# Patient Record
Sex: MALE | Race: WHITE | NOT HISPANIC OR LATINO | Employment: FULL TIME | URBAN - METROPOLITAN AREA
[De-identification: names, ages, dates, MRNs, and addresses within clinical notes are randomized per-mention and may not be internally consistent; named-entity substitution may affect disease eponyms.]

---

## 2017-05-13 ENCOUNTER — APPOINTMENT (OUTPATIENT)
Dept: LAB | Facility: MEDICAL CENTER | Age: 52
End: 2017-05-13
Payer: COMMERCIAL

## 2017-05-13 DIAGNOSIS — E03.9 HYPOTHYROIDISM: ICD-10-CM

## 2017-05-13 LAB — TSH SERPL DL<=0.05 MIU/L-ACNC: 1.55 UIU/ML (ref 0.36–3.74)

## 2017-05-13 PROCEDURE — 36415 COLL VENOUS BLD VENIPUNCTURE: CPT

## 2017-05-13 PROCEDURE — 84443 ASSAY THYROID STIM HORMONE: CPT

## 2017-05-30 ENCOUNTER — ALLSCRIPTS OFFICE VISIT (OUTPATIENT)
Dept: OTHER | Facility: OTHER | Age: 52
End: 2017-05-30

## 2017-06-06 ENCOUNTER — ALLSCRIPTS OFFICE VISIT (OUTPATIENT)
Dept: OTHER | Facility: OTHER | Age: 52
End: 2017-06-06

## 2017-06-06 DIAGNOSIS — D72.819 DECREASED WHITE BLOOD CELL COUNT: ICD-10-CM

## 2017-06-06 DIAGNOSIS — I10 ESSENTIAL (PRIMARY) HYPERTENSION: ICD-10-CM

## 2017-06-06 DIAGNOSIS — E03.9 HYPOTHYROIDISM: ICD-10-CM

## 2017-06-06 DIAGNOSIS — C01 MALIGNANT NEOPLASM OF BASE OF TONGUE (HCC): ICD-10-CM

## 2017-06-26 ENCOUNTER — ANESTHESIA EVENT (OUTPATIENT)
Dept: GASTROENTEROLOGY | Facility: HOSPITAL | Age: 52
End: 2017-06-26
Payer: COMMERCIAL

## 2017-06-26 RX ORDER — TADALAFIL 10 MG/1
5 TABLET ORAL DAILY PRN
COMMUNITY
End: 2018-03-23 | Stop reason: ALTCHOICE

## 2017-06-26 RX ORDER — DIPHENOXYLATE HYDROCHLORIDE AND ATROPINE SULFATE 2.5; .025 MG/1; MG/1
1 TABLET ORAL DAILY
COMMUNITY

## 2017-06-26 RX ORDER — LEVOTHYROXINE SODIUM 0.05 MG/1
88 TABLET ORAL DAILY
COMMUNITY
End: 2018-06-13 | Stop reason: SDUPTHER

## 2017-06-27 ENCOUNTER — ANESTHESIA (OUTPATIENT)
Dept: GASTROENTEROLOGY | Facility: HOSPITAL | Age: 52
End: 2017-06-27
Payer: COMMERCIAL

## 2017-06-27 ENCOUNTER — GENERIC CONVERSION - ENCOUNTER (OUTPATIENT)
Dept: PERIOP | Facility: HOSPITAL | Age: 52
End: 2017-06-27

## 2017-06-27 ENCOUNTER — HOSPITAL ENCOUNTER (OUTPATIENT)
Facility: HOSPITAL | Age: 52
Setting detail: OUTPATIENT SURGERY
Discharge: HOME | End: 2017-06-27
Attending: SURGERY | Admitting: SURGERY
Payer: COMMERCIAL

## 2017-06-27 VITALS
DIASTOLIC BLOOD PRESSURE: 72 MMHG | HEIGHT: 68 IN | SYSTOLIC BLOOD PRESSURE: 115 MMHG | RESPIRATION RATE: 20 BRPM | OXYGEN SATURATION: 100 % | WEIGHT: 167 LBS | HEART RATE: 47 BPM | TEMPERATURE: 98 F | BODY MASS INDEX: 25.31 KG/M2

## 2017-06-27 DIAGNOSIS — Z12.11 ENCOUNTER FOR SCREENING FOR MALIGNANT NEOPLASM OF COLON: ICD-10-CM

## 2017-06-27 PROCEDURE — 88305 TISSUE EXAM BY PATHOLOGIST: CPT | Performed by: SURGERY

## 2017-06-27 RX ORDER — SODIUM CHLORIDE 9 MG/ML
125 INJECTION, SOLUTION INTRAVENOUS CONTINUOUS
Status: DISCONTINUED | OUTPATIENT
Start: 2017-06-27 | End: 2017-06-27 | Stop reason: HOSPADM

## 2017-06-27 RX ORDER — PROPOFOL 10 MG/ML
INJECTION, EMULSION INTRAVENOUS AS NEEDED
Status: DISCONTINUED | OUTPATIENT
Start: 2017-06-27 | End: 2017-06-27 | Stop reason: SURG

## 2017-06-27 RX ORDER — ONDANSETRON 2 MG/ML
4 INJECTION INTRAMUSCULAR; INTRAVENOUS ONCE AS NEEDED
Status: DISCONTINUED | OUTPATIENT
Start: 2017-06-27 | End: 2017-06-27 | Stop reason: HOSPADM

## 2017-06-27 RX ADMIN — PROPOFOL 50 MG: 10 INJECTION, EMULSION INTRAVENOUS at 09:48

## 2017-06-27 RX ADMIN — PROPOFOL 50 MG: 10 INJECTION, EMULSION INTRAVENOUS at 09:46

## 2017-06-27 RX ADMIN — PROPOFOL 70 MG: 10 INJECTION, EMULSION INTRAVENOUS at 09:42

## 2017-06-27 RX ADMIN — PROPOFOL 30 MG: 10 INJECTION, EMULSION INTRAVENOUS at 09:44

## 2017-06-27 RX ADMIN — PROPOFOL 30 MG: 10 INJECTION, EMULSION INTRAVENOUS at 09:51

## 2017-06-27 RX ADMIN — PROPOFOL 20 MG: 10 INJECTION, EMULSION INTRAVENOUS at 09:53

## 2017-06-27 RX ADMIN — SODIUM CHLORIDE 125 ML/HR: 0.9 INJECTION, SOLUTION INTRAVENOUS at 09:35

## 2017-07-03 ENCOUNTER — GENERIC CONVERSION - ENCOUNTER (OUTPATIENT)
Dept: OTHER | Facility: OTHER | Age: 52
End: 2017-07-03

## 2017-07-05 ENCOUNTER — GENERIC CONVERSION - ENCOUNTER (OUTPATIENT)
Dept: OTHER | Facility: OTHER | Age: 52
End: 2017-07-05

## 2017-08-11 ENCOUNTER — TRANSCRIBE ORDERS (OUTPATIENT)
Dept: ADMINISTRATIVE | Age: 52
End: 2017-08-11

## 2017-08-11 ENCOUNTER — APPOINTMENT (OUTPATIENT)
Dept: LAB | Age: 52
End: 2017-08-11
Payer: COMMERCIAL

## 2017-08-11 DIAGNOSIS — Z00.8 HEALTH EXAMINATION IN POPULATION SURVEY: ICD-10-CM

## 2017-08-11 DIAGNOSIS — Z00.8 HEALTH EXAMINATION IN POPULATION SURVEY: Primary | ICD-10-CM

## 2017-08-11 LAB
CHOLEST SERPL-MCNC: 229 MG/DL (ref 50–200)
EST. AVERAGE GLUCOSE BLD GHB EST-MCNC: 108 MG/DL
HBA1C MFR BLD: 5.4 % (ref 4.2–6.3)
HDLC SERPL-MCNC: 85 MG/DL (ref 40–60)
LDLC SERPL CALC-MCNC: 132 MG/DL (ref 0–100)
TRIGL SERPL-MCNC: 60 MG/DL

## 2017-08-11 PROCEDURE — 83036 HEMOGLOBIN GLYCOSYLATED A1C: CPT

## 2017-08-11 PROCEDURE — 36415 COLL VENOUS BLD VENIPUNCTURE: CPT

## 2017-08-11 PROCEDURE — 80061 LIPID PANEL: CPT

## 2017-09-25 ENCOUNTER — ALLSCRIPTS OFFICE VISIT (OUTPATIENT)
Dept: OTHER | Facility: OTHER | Age: 52
End: 2017-09-25

## 2017-09-25 DIAGNOSIS — M25.50 PAIN IN JOINT: ICD-10-CM

## 2017-09-25 DIAGNOSIS — R53.83 OTHER FATIGUE: ICD-10-CM

## 2017-09-26 ENCOUNTER — APPOINTMENT (OUTPATIENT)
Dept: LAB | Facility: MEDICAL CENTER | Age: 52
End: 2017-09-26
Payer: COMMERCIAL

## 2017-09-26 DIAGNOSIS — R53.83 OTHER FATIGUE: ICD-10-CM

## 2017-09-26 DIAGNOSIS — M25.50 PAIN IN JOINT: ICD-10-CM

## 2017-09-26 LAB
ALBUMIN SERPL BCP-MCNC: 3.3 G/DL (ref 3.5–5)
ALP SERPL-CCNC: 55 U/L (ref 46–116)
ALT SERPL W P-5'-P-CCNC: 34 U/L (ref 12–78)
ANION GAP SERPL CALCULATED.3IONS-SCNC: 7 MMOL/L (ref 4–13)
AST SERPL W P-5'-P-CCNC: 17 U/L (ref 5–45)
BASOPHILS # BLD AUTO: 0.01 THOUSANDS/ΜL (ref 0–0.1)
BASOPHILS NFR BLD AUTO: 0 % (ref 0–1)
BILIRUB SERPL-MCNC: 0.92 MG/DL (ref 0.2–1)
BUN SERPL-MCNC: 10 MG/DL (ref 5–25)
CALCIUM SERPL-MCNC: 8.8 MG/DL (ref 8.3–10.1)
CHLORIDE SERPL-SCNC: 103 MMOL/L (ref 100–108)
CO2 SERPL-SCNC: 30 MMOL/L (ref 21–32)
CREAT SERPL-MCNC: 0.73 MG/DL (ref 0.6–1.3)
EOSINOPHIL # BLD AUTO: 0.12 THOUSAND/ΜL (ref 0–0.61)
EOSINOPHIL NFR BLD AUTO: 2 % (ref 0–6)
ERYTHROCYTE [DISTWIDTH] IN BLOOD BY AUTOMATED COUNT: 13 % (ref 11.6–15.1)
ERYTHROCYTE [SEDIMENTATION RATE] IN BLOOD: 12 MM/HOUR (ref 0–10)
GFR SERPL CREATININE-BSD FRML MDRD: 105 ML/MIN/1.73SQ M
GLUCOSE SERPL-MCNC: 119 MG/DL (ref 65–140)
HCT VFR BLD AUTO: 42.2 % (ref 36.5–49.3)
HGB BLD-MCNC: 14.6 G/DL (ref 12–17)
LYMPHOCYTES # BLD AUTO: 0.74 THOUSANDS/ΜL (ref 0.6–4.47)
LYMPHOCYTES NFR BLD AUTO: 13 % (ref 14–44)
MCH RBC QN AUTO: 32.7 PG (ref 26.8–34.3)
MCHC RBC AUTO-ENTMCNC: 34.6 G/DL (ref 31.4–37.4)
MCV RBC AUTO: 94 FL (ref 82–98)
MONOCYTES # BLD AUTO: 0.33 THOUSAND/ΜL (ref 0.17–1.22)
MONOCYTES NFR BLD AUTO: 6 % (ref 4–12)
NEUTROPHILS # BLD AUTO: 4.53 THOUSANDS/ΜL (ref 1.85–7.62)
NEUTS SEG NFR BLD AUTO: 79 % (ref 43–75)
NRBC BLD AUTO-RTO: 0 /100 WBCS
PLATELET # BLD AUTO: 166 THOUSANDS/UL (ref 149–390)
PMV BLD AUTO: 10.8 FL (ref 8.9–12.7)
POTASSIUM SERPL-SCNC: 3.6 MMOL/L (ref 3.5–5.3)
PROT SERPL-MCNC: 6.9 G/DL (ref 6.4–8.2)
RBC # BLD AUTO: 4.47 MILLION/UL (ref 3.88–5.62)
SODIUM SERPL-SCNC: 140 MMOL/L (ref 136–145)
TSH SERPL DL<=0.05 MIU/L-ACNC: 2.38 UIU/ML (ref 0.36–3.74)
WBC # BLD AUTO: 5.74 THOUSAND/UL (ref 4.31–10.16)

## 2017-09-26 PROCEDURE — 36415 COLL VENOUS BLD VENIPUNCTURE: CPT

## 2017-09-26 PROCEDURE — 86618 LYME DISEASE ANTIBODY: CPT

## 2017-09-26 PROCEDURE — 85025 COMPLETE CBC W/AUTO DIFF WBC: CPT

## 2017-09-26 PROCEDURE — 86430 RHEUMATOID FACTOR TEST QUAL: CPT

## 2017-09-26 PROCEDURE — 80053 COMPREHEN METABOLIC PANEL: CPT

## 2017-09-26 PROCEDURE — 84443 ASSAY THYROID STIM HORMONE: CPT

## 2017-09-26 PROCEDURE — 86038 ANTINUCLEAR ANTIBODIES: CPT

## 2017-09-26 PROCEDURE — 85652 RBC SED RATE AUTOMATED: CPT

## 2017-09-27 LAB
B BURGDOR IGG SER IA-ACNC: 0.2
B BURGDOR IGM SER IA-ACNC: 0.2
RHEUMATOID FACT SER QL LA: NEGATIVE
RYE IGE QN: NEGATIVE

## 2017-10-27 NOTE — PROGRESS NOTES
Assessment  1  Fatigue (780 79) (R53 83)   2  Arthralgia, unspecified joint (719 40) (M25 50)    Plan  Arthralgia, unspecified joint, Fatigue    · (1) FLY SCREEN W/REFLEX TO TITER/PATTERN; Status:Active; Requested  for:42Jdc8052;    · (1) CBC/PLT/DIFF; Status:Active; Requested for:31Yoh0571;    · (1) COMPREHENSIVE METABOLIC PANEL; Status:Active; Requested for:78Hno7110;    · (1) LYME ANTIBODY PROFILE W/REFLEX TO WESTERN BLOT; Status:Active; Requested for:52Mth9554;    · (1) RHEUMATOID FACTOR SCREEN; Status:Active; Requested for:10Yhr1499;    · (1) SED RATE; Status:Active; Requested for:94Pve2139;    · (1) TSH; Status:Active; Requested for:70Cmy5465;     Discussion/Summary    #1 fatiguearthralgiasI reviewed with pt  Unclear causes - ? viral, inflammatory, other?check labs as above  Cont conservative measures as directed  Recheck 2 weeks if no change - earlier if worse  Pt to call for problems or concerns in the interim  The patient was counseled regarding instructions for management,-- risk factor reductions,-- prognosis,-- patient and family education,-- impressions,-- risks and benefits of treatment options,-- importance of compliance with treatment  Possible side effects of new medications were reviewed with the patient/guardian today  The treatment plan was reviewed with the patient/guardian  The patient/guardian understands and agrees with the treatment plan      Chief Complaint  fatigue, achy      History of Present Illness  HPI: as abovept awoke with whole body aches this morning  Pt has been experiencing fatigue for the last 2 weeks  ?sl elevated temp this morning (99 8)  No rashes, URI symptoms or cough  No GI or  complaints  No known insect bites  Review of Systems    Constitutional: as noted in HPI    ENT: no complaints of earache, no loss of hearing, no nosebleeds or nasal discharge, no sore throat or hoarseness     Cardiovascular: no complaints of slow or fast heart rate, no chest pain, no palpitations, no leg claudication or lower extremity edema  Respiratory: no complaints of shortness of breath, no wheezing or cough, no dyspnea on exertion, no orthopnea or PND  Gastrointestinal: no complaints of abdominal pain, no constipation, no nausea or vomiting, no diarrhea or bloody stools  Genitourinary: no complaints of dysuria or incontinence, no hesitancy, no nocturia, no genital lesion, no inadequacy of penile erection  Musculoskeletal: as noted in HPI  Integumentary: no complaints of skin rash or lesion, no itching or dry skin, no skin wounds  Neurological: as noted in HPI  Active Problems  1  Abnormal loss of weight (783 21) (R63 4)   2  Acute frontal sinusitis (461 1) (J01 10)   3  Acute laryngitis (464 00) (J04 0)   4  Acute pharyngitis (462) (J02 9)   5  Acute upper respiratory infection (465 9) (J06 9)   6  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)   7  Chronic laryngitis (476 0) (J37 0)   8  Colon cancer screening (V76 51) (Z12 11)   9  Cough (786 2) (R05)   10  Dysphagia (787 20) (R13 10)   11  Erectile dysfunction (607 84) (N52 9)   12  Fever (780 60) (R50 9)   13  Hypertension (401 9) (I10)   14  Hypothyroidism (244 9) (E03 9)   15  Immunization due (V05 9) (Z23)   16  Influenza (487 1) (J11 1)   17  Leukopenia (288 50) (D72 819)   18  Malignant neoplasm of tongue (141 9) (C02 9)   19  Psoriasis (696 1) (L40 9)   20  Viral syndrome (079 99) (B34 9)    Past Medical History  1  History of Malignant Tongue Neoplasm (V10 01)  Active Problems And Past Medical History Reviewed: The active problems and past medical history were reviewed and updated today  Family History  Mother    1  Family history of Reactive Airway Dysfunction  Father    2  Family history of Coronary Artery Disease (V17 49)   3  Family history of Hypertension (V17 49)   4   Family history of Psoriasis    Social History   · Alcohol Use (History)   · History of Current Smoker (305 1)   · pt quit with dx of tongue base cancer   · Daily Coffee Consumption (1  Cups/Day)   · Daily Cola Consumption (___ Cans/Day)   · Daily Tea Consumption (___ Cups/Day)   · Dental care, regularly   · Employed   · Exercise: Running   · Exercise: Weight training   · Former smoker (V15 82) (Z02 702)   · High school graduate   ·    · No guns in the home   · Pets / animals   · Six children  The social history was reviewed and updated today  Surgical History  1  History of Floor Mouth Excision Of Malignant Tumor  Surgical History Reviewed: The surgical history was reviewed and updated today  Current Meds   1  Cialis 5 MG Oral Tablet; TAKE AS DIRECTED; Therapy: 45QBB2839 to (Last Rx:30Nov2016) Ordered   2  Daily Value Multivitamin TABS; Take 1 tablet daily Recorded   3  Levothyroxine Sodium 88 MCG Oral Tablet; TAKE 1 TABLET DAILY  Requested for:   98Xol0222; Last Rx:05Ktv2584 Ordered    The medication list was reviewed and updated today  Allergies  1  No Known Drug Allergies  2  No Known Environmental Allergies   3  No Known Food Allergies    Vitals   Recorded: 54ZOB8309 02:36PM   Temperature 98 1 F   Heart Rate 62   Systolic 150   Diastolic 80   Height 5 ft 8 in   Weight 173 lb    BMI Calculated 26 3   BSA Calculated 1 92     Physical Exam    Constitutional   General appearance: No acute distress, well appearing and well nourished  Head and Face   Head and face: Normal     Palpation of the face and sinuses: No sinus tenderness  Eyes   Conjunctiva and lids: No erythema, swelling or discharge  Pupils and irises: Equal, round, reactive to light  Ears, Nose, Mouth, and Throat   External inspection of ears and nose: Normal     Otoscopic examination: Tympanic membranes translucent with normal light reflex  Canals patent without erythema  Nasal mucosa, septum, and turbinates: Normal without edema or erythema  Lips, teeth, and gums: Normal, good dentition      Oropharynx: Abnormal  -- mucous membranes sl dry (chronic)  Neck   Neck: Abnormal  -- neck with post RT treatment skin changes and mild atrophy  Thyroid: Normal, no thyromegaly  Pulmonary   Respiratory effort: No increased work of breathing or signs of respiratory distress  Auscultation of lungs: Clear to auscultation  Cardiovascular   Auscultation of heart: Normal rate and rhythm, normal S1 and S2, no murmurs  Carotid pulses: 2+ bilaterally  Abdominal aorta: Normal     Pedal pulses: 2+ bilaterally  Peripheral vascular exam: Normal     Examination of extremities for edema and/or varicosities: Normal     Abdomen   Abdomen: Non-tender, no masses  Liver and spleen: No hepatomegaly or splenomegaly  Lymphatic   Palpation of lymph nodes in neck: No lymphadenopathy  Palpation of lymph nodes in axillae: No lymphadenopathy  Palpation of lymph nodes in groin: No lymphadenopathy  Palpation of lymph nodes in other areas: No lymphadenopathy  Musculoskeletal   Gait and station: Normal     Inspection/palpation of digits and nails: Normal without clubbing or cyanosis  Inspection/palpation of joints, bones, and muscles: Normal     Muscle strength/tone: Normal     Skin   Skin and subcutaneous tissue: Abnormal  -- as above  Neurologic   Cranial nerves: Cranial nerves 2-12 intact  Cortical function: Normal mental status  Results/Data  (1) HEMOGLOBIN A1C 11Aug2017 09:56AM Myriam Man     Test Name Result Flag Reference   HEMOGLOBIN A1C 5 4 %  4 2-6 3   EST  AVG  GLUCOSE 108 mg/dl         Future Appointments    Date/Time Provider Specialty Site   12/12/2017 03:30 PM ESTELLE Molina   [address]     Signatures   Electronically signed by : ESTELLE Valdez ; Sep 26 2017  7:29AM EST                       (Author)

## 2017-12-04 ENCOUNTER — HOSPITAL ENCOUNTER (EMERGENCY)
Facility: HOSPITAL | Age: 52
Discharge: HOME | End: 2017-12-04
Attending: EMERGENCY MEDICINE
Payer: COMMERCIAL

## 2017-12-04 ENCOUNTER — APPOINTMENT (EMERGENCY)
Dept: RADIOLOGY | Facility: HOSPITAL | Age: 52
End: 2017-12-04
Payer: COMMERCIAL

## 2017-12-04 VITALS
RESPIRATION RATE: 16 BRPM | DIASTOLIC BLOOD PRESSURE: 108 MMHG | SYSTOLIC BLOOD PRESSURE: 183 MMHG | TEMPERATURE: 101.2 F | OXYGEN SATURATION: 100 % | HEART RATE: 77 BPM

## 2017-12-04 DIAGNOSIS — J06.9 URI WITH COUGH AND CONGESTION: Primary | ICD-10-CM

## 2017-12-04 DIAGNOSIS — D72.819 LEUKOPENIA, UNSPECIFIED TYPE: ICD-10-CM

## 2017-12-04 LAB
ALBUMIN SERPL BCP-MCNC: 3.6 G/DL (ref 3.5–5)
ALP SERPL-CCNC: 60 U/L (ref 46–116)
ALT SERPL W P-5'-P-CCNC: 40 U/L (ref 12–78)
ANION GAP SERPL CALCULATED.3IONS-SCNC: 9 MMOL/L (ref 4–13)
APTT PPP: 28 SECONDS (ref 23–35)
AST SERPL W P-5'-P-CCNC: 23 U/L (ref 5–45)
BASOPHILS # BLD AUTO: 0 THOUSANDS/ΜL (ref 0–0.1)
BASOPHILS NFR BLD AUTO: 0 % (ref 0–1)
BILIRUB DIRECT SERPL-MCNC: 0.15 MG/DL (ref 0–0.2)
BILIRUB SERPL-MCNC: 0.7 MG/DL (ref 0.2–1)
BUN SERPL-MCNC: 12 MG/DL (ref 5–25)
CALCIUM SERPL-MCNC: 8.7 MG/DL (ref 8.3–10.1)
CHLORIDE SERPL-SCNC: 99 MMOL/L (ref 100–108)
CO2 SERPL-SCNC: 29 MMOL/L (ref 21–32)
CREAT SERPL-MCNC: 0.81 MG/DL (ref 0.6–1.3)
EOSINOPHIL # BLD AUTO: 0.05 THOUSAND/ΜL (ref 0–0.61)
EOSINOPHIL NFR BLD AUTO: 2 % (ref 0–6)
ERYTHROCYTE [DISTWIDTH] IN BLOOD BY AUTOMATED COUNT: 12.1 % (ref 11.6–15.1)
GFR SERPL CREATININE-BSD FRML MDRD: 101 ML/MIN/1.73SQ M
GLUCOSE SERPL-MCNC: 101 MG/DL (ref 65–140)
HCT VFR BLD AUTO: 43.7 % (ref 36.5–49.3)
HGB BLD-MCNC: 14.6 G/DL (ref 12–17)
INR PPP: 0.93 (ref 0.86–1.16)
LACTATE SERPL-SCNC: 0.7 MMOL/L (ref 0.5–2)
LYMPHOCYTES # BLD AUTO: 0.3 THOUSANDS/ΜL (ref 0.6–4.47)
LYMPHOCYTES NFR BLD AUTO: 13 % (ref 14–44)
MCH RBC QN AUTO: 31.1 PG (ref 26.8–34.3)
MCHC RBC AUTO-ENTMCNC: 33.4 G/DL (ref 31.4–37.4)
MCV RBC AUTO: 93 FL (ref 82–98)
MONOCYTES # BLD AUTO: 0.47 THOUSAND/ΜL (ref 0.17–1.22)
MONOCYTES NFR BLD AUTO: 20 % (ref 4–12)
NEUTROPHILS # BLD AUTO: 1.52 THOUSANDS/ΜL (ref 1.85–7.62)
NEUTS SEG NFR BLD AUTO: 65 % (ref 43–75)
PLATELET # BLD AUTO: 147 THOUSANDS/UL (ref 149–390)
PMV BLD AUTO: 9.8 FL (ref 8.9–12.7)
POTASSIUM SERPL-SCNC: 3.7 MMOL/L (ref 3.5–5.3)
PROT SERPL-MCNC: 6.9 G/DL (ref 6.4–8.2)
PROTHROMBIN TIME: 12.7 SECONDS (ref 12.1–14.4)
RBC # BLD AUTO: 4.7 MILLION/UL (ref 3.88–5.62)
SODIUM SERPL-SCNC: 137 MMOL/L (ref 136–145)
TROPONIN I SERPL-MCNC: <0.02 NG/ML
WBC # BLD AUTO: 2.34 THOUSAND/UL (ref 4.31–10.16)

## 2017-12-04 PROCEDURE — 36415 COLL VENOUS BLD VENIPUNCTURE: CPT | Performed by: EMERGENCY MEDICINE

## 2017-12-04 PROCEDURE — 80076 HEPATIC FUNCTION PANEL: CPT | Performed by: EMERGENCY MEDICINE

## 2017-12-04 PROCEDURE — 85025 COMPLETE CBC W/AUTO DIFF WBC: CPT | Performed by: EMERGENCY MEDICINE

## 2017-12-04 PROCEDURE — 99285 EMERGENCY DEPT VISIT HI MDM: CPT

## 2017-12-04 PROCEDURE — 80048 BASIC METABOLIC PNL TOTAL CA: CPT | Performed by: EMERGENCY MEDICINE

## 2017-12-04 PROCEDURE — 85730 THROMBOPLASTIN TIME PARTIAL: CPT | Performed by: EMERGENCY MEDICINE

## 2017-12-04 PROCEDURE — 83605 ASSAY OF LACTIC ACID: CPT | Performed by: EMERGENCY MEDICINE

## 2017-12-04 PROCEDURE — 96360 HYDRATION IV INFUSION INIT: CPT

## 2017-12-04 PROCEDURE — 85610 PROTHROMBIN TIME: CPT | Performed by: EMERGENCY MEDICINE

## 2017-12-04 PROCEDURE — 93005 ELECTROCARDIOGRAM TRACING: CPT

## 2017-12-04 PROCEDURE — 84484 ASSAY OF TROPONIN QUANT: CPT | Performed by: EMERGENCY MEDICINE

## 2017-12-04 PROCEDURE — 71020 HB CHEST X-RAY 2VW FRONTAL&LATL: CPT

## 2017-12-04 RX ORDER — DOXYCYCLINE HYCLATE 100 MG/1
100 CAPSULE ORAL ONCE
Status: COMPLETED | OUTPATIENT
Start: 2017-12-04 | End: 2017-12-04

## 2017-12-04 RX ORDER — DOXYCYCLINE HYCLATE 100 MG/1
100 CAPSULE ORAL 2 TIMES DAILY
Qty: 20 CAPSULE | Refills: 0 | Status: SHIPPED | OUTPATIENT
Start: 2017-12-04 | End: 2017-12-14

## 2017-12-04 RX ADMIN — DOXYCYCLINE 100 MG: 100 CAPSULE ORAL at 14:26

## 2017-12-04 RX ADMIN — SODIUM CHLORIDE 1000 ML: 0.9 INJECTION, SOLUTION INTRAVENOUS at 13:42

## 2017-12-04 NOTE — DISCHARGE INSTRUCTIONS
Upper Respiratory Infection, Ambulatory Care   [facility], [place], and Jhony Morrison[de-identified] Complete Guide to Symptoms, Illness, and Surgery, 4th ed  Jay Jay Chand, [place], [place], , 2000  Renetta Pantoja[de-identified] The common cold  Jihan Hernandez , 2003; 943(2614): 51-59  [place] for Clinical Systems Improvement (ICSI):: Viral upper respiratory infection (VURI) in adults and children    December 2002  [facility]): [place] for Clinical Systems Improvement (ICSI)  Available at: [employer] com cy  aspx? view_id=1&doc_id=3658 , (cited 11/17/03)  Medline Plus Health Information[de-identified] Colds    August 7, 2002  Available at: Kt pandya , (cited 11/17/03)  Cindy Andrade : The [place] Manual of Nursing Practice, 7th ed  ELOY Liu , 2001  Wilder White & Katharine G: Upper Respiratory Infection  In: Abdelrahman GOULD, ed  The 5-Minute Clinical Consult 2012, 20th ed  [address] [facility] [place], [place], 2012  © 2014 [address] is for End User's use only and may not be sold, redistributed or otherwise used for commercial purposes  All illustrations and images included in CareNotes® are the copyrighted property of A D A M , Inc  or [address]  The above information is an  only  It is not intended as medical advice for individual conditions or treatments  Talk to your doctor, nurse or pharmacist before following any medical regimen to see if it is safe and effective for you

## 2017-12-04 NOTE — ED PROVIDER NOTES
History  Chief Complaint   Patient presents with    Chest Pain     Pt here for cough and chest tightness  Also now with fevers  URI started 3 days ago  Worsening since  History provided by:  Patient  Chest Pain   Pain location:  Substernal area  Pain quality: dull    Pain radiates to:  Does not radiate  Pain severity:  Moderate  Onset quality:  Gradual  Duration:  1 day  Timing:  Intermittent  Progression:  Waxing and waning  Chronicity:  New  Context comment:  Cough, URI symptoms, found to be febrile here, history of "bad immune system" due to large amount of chemo and radiation for a tongue cancer  High risk for pneumonia  Relieved by:  None tried  Worsened by:  Nothing tried  Ineffective treatments:  None tried  Associated symptoms: cough and fever    Associated symptoms: no abdominal pain, no diaphoresis, no dizziness, no headache, no nausea, no numbness, no palpitations, no shortness of breath and not vomiting    Risk factors comment:  Multiple family members with similar URI symptoms      Prior to Admission Medications   Prescriptions Last Dose Informant Patient Reported?  Taking?   levothyroxine 50 mcg tablet   Yes No   Sig: Take 88 mcg by mouth daily   multivitamin (THERAGRAN) TABS   Yes No   Sig: Take 1 tablet by mouth daily   tadalafil (CIALIS) 10 MG tablet   Yes No   Sig: Take 5 mg by mouth daily as needed for erectile dysfunction      Facility-Administered Medications: None       Past Medical History:   Diagnosis Date    Cancer of base of tongue (HCC)     excision    Cough     Disease of thyroid gland     hypo    Dysphagia     ED (erectile dysfunction)     Hypertension     Leukopenia     Psoriasis     Tongue cancer (HCC)     Weight loss, abnormal        Past Surgical History:   Procedure Laterality Date    OTHER SURGICAL HISTORY      infusion port inserted and removed    PEG TUBE PLACEMENT      and removal    CA COLONOSCOPY FLX DX W/COLLJ SPEC WHEN PFRMD N/A 6/27/2017 Procedure: COLONOSCOPY with polypectomy x2;  Surgeon: Jaclyn Brown MD;  Location: [facility]; Service: General    TONGUE BIOPSY / EXCISION         History reviewed  No pertinent family history  I have reviewed and agree with the history as documented  Social History   Substance Use Topics    Smoking status: Former Smoker    Smokeless tobacco: Not on file    Alcohol use Yes      Comment: socially        Review of Systems   Constitutional: Positive for fever  Negative for activity change, chills and diaphoresis  HENT: Negative for congestion, sinus pressure and sore throat  Eyes: Negative for pain and visual disturbance  Respiratory: Positive for cough  Negative for chest tightness, shortness of breath, wheezing and stridor  Cardiovascular: Positive for chest pain  Negative for palpitations  Gastrointestinal: Negative for abdominal distention, abdominal pain, constipation, diarrhea, nausea and vomiting  Genitourinary: Negative for dysuria and frequency  Musculoskeletal: Negative for neck pain and neck stiffness  Skin: Negative for rash  Neurological: Negative for dizziness, speech difficulty, light-headedness, numbness and headaches  Physical Exam  ED Triage Vitals [12/04/17 1211]   Temperature Pulse Respirations Blood Pressure SpO2   (!) 101 2 °F (38 4 °C) 93 18 170/96 97 %      Temp Source Heart Rate Source Patient Position - Orthostatic VS BP Location FiO2 (%)   Oral Monitor Sitting Left arm --      Pain Score       6           Orthostatic Vital Signs  Vitals:    12/04/17 1211 12/04/17 1342 12/04/17 1358   BP: 170/96 (!) 196/88 (!) 183/108   Pulse: 93 82 77   Patient Position - Orthostatic VS: Sitting Lying Sitting       Physical Exam   Constitutional: He is oriented to person, place, and time  He appears well-developed  No distress  HENT:   Head: Normocephalic and atraumatic  Eyes: Pupils are equal, round, and reactive to light  Neck: Normal range of motion  Neck supple  No tracheal deviation present  Cardiovascular: Normal rate, regular rhythm, normal heart sounds and intact distal pulses  No murmur heard  Pulmonary/Chest: Effort normal and breath sounds normal  No stridor  No respiratory distress  Abdominal: Soft  He exhibits no distension  There is no tenderness  There is no rebound and no guarding  Musculoskeletal: Normal range of motion  Neurological: He is alert and oriented to person, place, and time  Skin: Skin is warm and dry  He is not diaphoretic  No erythema  No pallor  Psychiatric: He has a normal mood and affect  Vitals reviewed  ED Medications  Medications   sodium chloride 0 9 % bolus 1,000 mL (1,000 mL Intravenous New Bag 12/4/17 1342)   doxycycline hyclate (VIBRAMYCIN) capsule 100 mg (not administered)       Diagnostic Studies  Results Reviewed     Procedure Component Value Units Date/Time    Lactic acid, plasma [[id number]]  (Normal) Collected:  12/04/17 1340    Lab Status:  Final result Specimen:  Blood from Arm, Right Updated:  12/04/17 1408     LACTIC ACID 0 7 mmol/L     Narrative:         Result may be elevated if tourniquet was used during collection  Troponin I [[id number]]  (Normal) Collected:  12/04/17 1340    Lab Status:  Final result Specimen:  Blood from Arm, Right Updated:  12/04/17 1407     Troponin I <0 02 ng/mL     Narrative:         S[employer]ens Chemistry analyzer 99% cutoff is > 0 04 ng/mL in network labs    o cTnI 99% cutoff is useful only when applied to patients in the clinical setting of myocardial ischemia  o cTnI 99% cutoff should be interpreted in the context of clinical history, ECG findings and possibly cardiac imaging to establish correct diagnosis  o cTnI 99% cutoff may be suggestive but clearly not indicative of a coronary event without the clinical setting of myocardial ischemia      Basic metabolic panel [[id number]]  (Abnormal) Collected:  12/04/17 1340    Lab Status:  Final result Specimen:  Blood from Arm, Right Updated:  12/04/17 1406     Sodium 137 mmol/L      Potassium 3 7 mmol/L      Chloride 99 (L) mmol/L      CO2 29 mmol/L      Anion Gap 9 mmol/L      BUN 12 mg/dL      Creatinine 0 81 mg/dL      Glucose 101 mg/dL      Calcium 8 7 mg/dL      eGFR 101 ml/min/1 73sq m     Narrative:         National Kidney Disease Education Program recommendations are as follows:  GFR calculation is accurate only with a steady state creatinine  Chronic Kidney disease less than 60 ml/min/1 73 sq  meters  Kidney failure less than 15 ml/min/1 73 sq  meters  Hepatic function panel [[id number]]  (Normal) Collected:  12/04/17 1340    Lab Status:  Final result Specimen:  Blood from Arm, Right Updated:  12/04/17 1406     Total Bilirubin 0 70 mg/dL      Bilirubin, Direct 0 15 mg/dL      Alkaline Phosphatase 60 U/L      AST 23 U/L      ALT 40 U/L      Total Protein 6 9 g/dL      Albumin 3 6 g/dL     Protime-INR [[id number]]  (Normal) Collected:  12/04/17 1340    Lab Status:  Final result Specimen:  Blood from Arm, Right Updated:  12/04/17 1359     Protime 12 7 seconds      INR 0 93    APTT [[id number]]  (Normal) Collected:  12/04/17 1340    Lab Status:  Final result Specimen:  Blood from Arm, Right Updated:  12/04/17 1359     PTT 28 seconds     Narrative:          Therapeutic Heparin Range = 60-90 seconds    CBC and differential [[id number]]  (Abnormal) Collected:  12/04/17 1340    Lab Status:  Final result Specimen:  Blood from Arm, Right Updated:  12/04/17 1350     WBC 2 34 (L) Thousand/uL      RBC 4 70 Million/uL      Hemoglobin 14 6 g/dL      Hematocrit 43 7 %      MCV 93 fL      MCH 31 1 pg      MCHC 33 4 g/dL      RDW 12 1 %      MPV 9 8 fL      Platelets 040 (L) Thousands/uL      Neutrophils Relative 65 %      Lymphocytes Relative 13 (L) %      Monocytes Relative 20 (H) %      Eosinophils Relative 2 %      Basophils Relative 0 %      Neutrophils Absolute 1 52 (L) Thousands/µL      Lymphocytes Absolute 0 30 (L) Thousands/µL      Monocytes Absolute 0 47 Thousand/µL      Eosinophils Absolute 0 05 Thousand/µL      Basophils Absolute 0 00 Thousands/µL                  XR chest 2 views   ED Interpretation by Nisha Tomas DO (12/04 1412)   No acute infiltrate                 Procedures  ECG 12 Lead Documentation  Date/Time: 12/4/2017 1:15 PM  Performed by: Cee Collins by: Erika Lora     ECG reviewed by me, the ED Provider: yes    Patient location:  ED  Previous ECG:     Previous ECG:  Unavailable  Interpretation:     Interpretation: normal    Rate:     ECG rate:  87    ECG rate assessment: normal    Rhythm:     Rhythm: sinus rhythm    Ectopy:     Ectopy: PVCs      PVCs:  Frequent  QRS:     QRS axis:  Normal    QRS intervals:  Normal  Conduction:     Conduction: normal             Phone Contacts  ED Phone Contact    ED Course  ED Course as of Dec 04 1414   Mon Dec 04, 2017   1412  Patient with compromised immune system, mild leukopenia here, as multiple family members have similar symptoms likely viral upper respiratory tract infection but cover for pneumonia will place on doxycycline  Patient agrees with the plan                                MDM  Number of Diagnoses or Management Options  Leukopenia, unspecified type: new and requires workup  URI with cough and congestion: new and requires workup  Diagnosis management comments:  57-year-old male presents febrile with a degree of chest pain, occasionally states he has a weakened immune system due to large amount chemo and radiation has scarring in the area was told he has a high risk for pneumonia    Will check chest x-ray, blood work, less likely cardiac in origin more likely infectious due to fever disposition pending ED workup       Amount and/or Complexity of Data Reviewed  Clinical lab tests: ordered and reviewed  Tests in the radiology section of CPT®: ordered and reviewed  Review and summarize past medical records: yes  Independent visualization of images, tracings, or specimens: yes      CritCare Time    Disposition  Final diagnoses:   URI with cough and congestion   Leukopenia, unspecified type     Time reflects when diagnosis was documented in both MDM as applicable and the Disposition within this note     Time User Action Codes Description Comment    12/4/2017  2:13 PM Bradford Myaa Add [J06 9] URI with cough and congestion     12/4/2017  2:14 PM Bradford Maya Add [D72 819] Leukopenia, unspecified type       ED Disposition     ED Disposition Condition Comment    Discharge  Tresa Choi Sr  discharge to home/self care  Condition at discharge: Good        Follow-up Information     Follow up With Specialties Details Why Kymberly Gates MD Family Medicine Go in 3 days For repeat evaluation and  to ensure resolution [address]  [phone]          Patient's Medications   Discharge Prescriptions    DOXYCYCLINE HYCLATE (VIBRAMYCIN) 100 MG CAPSULE    Take 1 capsule by mouth 2 (two) times a day for 10 days       Start Date: 12/4/2017 End Date: 12/14/2017       Order Dose: 100 mg       Quantity: 20 capsule    Refills: 0     No discharge procedures on file      ED Provider  Electronically Signed by           Marquis Marcus DO  12/04/17 1435

## 2017-12-04 NOTE — ED NOTES
[facility]  [facility] , RN  12/04/17 1400

## 2017-12-06 LAB
ATRIAL RATE: 87 BPM
P AXIS: 42 DEGREES
PR INTERVAL: 150 MS
QRS AXIS: 20 DEGREES
QRSD INTERVAL: 98 MS
QT INTERVAL: 360 MS
QTC INTERVAL: 433 MS
T WAVE AXIS: 24 DEGREES
VENTRICULAR RATE: 87 BPM

## 2017-12-12 ENCOUNTER — ALLSCRIPTS OFFICE VISIT (OUTPATIENT)
Dept: OTHER | Facility: OTHER | Age: 52
End: 2017-12-12

## 2017-12-12 DIAGNOSIS — E03.9 HYPOTHYROIDISM: ICD-10-CM

## 2017-12-12 DIAGNOSIS — D72.819 DECREASED WHITE BLOOD CELL COUNT: ICD-10-CM

## 2017-12-13 ENCOUNTER — GENERIC CONVERSION - ENCOUNTER (OUTPATIENT)
Dept: OTHER | Facility: OTHER | Age: 52
End: 2017-12-13

## 2017-12-13 ENCOUNTER — APPOINTMENT (OUTPATIENT)
Dept: LAB | Facility: MEDICAL CENTER | Age: 52
End: 2017-12-13
Payer: COMMERCIAL

## 2017-12-13 DIAGNOSIS — E03.9 HYPOTHYROIDISM: ICD-10-CM

## 2017-12-13 DIAGNOSIS — D72.819 DECREASED WHITE BLOOD CELL COUNT: ICD-10-CM

## 2017-12-13 LAB
BASOPHILS # BLD AUTO: 0.01 THOUSANDS/ΜL (ref 0–0.1)
BASOPHILS NFR BLD AUTO: 0 % (ref 0–1)
EOSINOPHIL # BLD AUTO: 0.1 THOUSAND/ΜL (ref 0–0.61)
EOSINOPHIL NFR BLD AUTO: 2 % (ref 0–6)
ERYTHROCYTE [DISTWIDTH] IN BLOOD BY AUTOMATED COUNT: 12.2 % (ref 11.6–15.1)
HCT VFR BLD AUTO: 44.6 % (ref 36.5–49.3)
HGB BLD-MCNC: 15.1 G/DL (ref 12–17)
LYMPHOCYTES # BLD AUTO: 0.75 THOUSANDS/ΜL (ref 0.6–4.47)
LYMPHOCYTES NFR BLD AUTO: 15 % (ref 14–44)
MCH RBC QN AUTO: 31.7 PG (ref 26.8–34.3)
MCHC RBC AUTO-ENTMCNC: 33.9 G/DL (ref 31.4–37.4)
MCV RBC AUTO: 94 FL (ref 82–98)
MONOCYTES # BLD AUTO: 0.55 THOUSAND/ΜL (ref 0.17–1.22)
MONOCYTES NFR BLD AUTO: 11 % (ref 4–12)
NEUTROPHILS # BLD AUTO: 3.71 THOUSANDS/ΜL (ref 1.85–7.62)
NEUTS SEG NFR BLD AUTO: 72 % (ref 43–75)
NRBC BLD AUTO-RTO: 0 /100 WBCS
PLATELET # BLD AUTO: 262 THOUSANDS/UL (ref 149–390)
PMV BLD AUTO: 10.1 FL (ref 8.9–12.7)
RBC # BLD AUTO: 4.76 MILLION/UL (ref 3.88–5.62)
TSH SERPL DL<=0.05 MIU/L-ACNC: 3.08 UIU/ML (ref 0.36–3.74)
WBC # BLD AUTO: 5.14 THOUSAND/UL (ref 4.31–10.16)

## 2017-12-13 PROCEDURE — 84443 ASSAY THYROID STIM HORMONE: CPT

## 2017-12-13 PROCEDURE — 36415 COLL VENOUS BLD VENIPUNCTURE: CPT

## 2017-12-13 PROCEDURE — 85025 COMPLETE CBC W/AUTO DIFF WBC: CPT

## 2017-12-14 NOTE — PROGRESS NOTES
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)   2  Hypertension (401 9) (I10)   3  Hypothyroidism (244 9) (E03 9)   4  Leukopenia (288 50) (D72 819)    Plan  Hypothyroidism    · (1) TSH; Status:Active; Requested for:89Lap0257;   Leukopenia    · (1) CBC/PLT/DIFF; Status:Active; Requested for:17Zvp3286;     Discussion/Summary    1  Hypertension - controlled  Continue present medications  Continue monitor labs  Recheck 6 monthsHypothyroidism- recheck TSH on the lower dose of Synthroid  Just medications if not at goal  Recheck 6 monthsLeukopenia - patient with a history of leukopenia but had recently improved  White blood cells were low again in the emergency room however, I have patient repeat CBC and follow-up  Recheck 6 months-earlier if blood count is lower  cancer of the base of tongue - I reviewed with patient  No evidence recurrence  Continue to monitor  will follow up as above  Patient call for problems or concerns in the interim  The patient was counseled regarding diagnostic results,-- instructions for management,-- risk factor reductions,-- prognosis,-- patient and family education,-- impressions,-- risks and benefits of treatment options,-- importance of compliance with treatment  The treatment plan was reviewed with the patient/guardian  The patient/guardian understands and agrees with the treatment plan      Chief Complaint  6mo ck up due to Hypothyroid - note:pt is supposed to be taking Levothyroxine 88mcg but incorrect dose of 75mcg sent to pharm, so pt has not been on 88mcg since 10/2017   Patient is here today for follow up of chronic conditions described in HPI  History of Present Illness  as abovepharmacy refilled 75mcg of l-thyrox instead of 88 in Sept  Pt was able to get 88mcgs but ran out and had not been back to the pharmacy so he has been taking 75mcg for the last month  pt did not notice any differencept was seen in the ED 12/4 for chest discomfort and fever   Labs significant for low WBC (2 3) but CXR and EKG were normal  Pt was discharged to home  Pt has not had any further issues sincept denies any new GI or  complaints  pt had colonoscopy in June - 2 polyps found (both hyperplastic)         Review of Systems   Eyes: No complaints of eye pain, no red eyes, no discharge from eyes, no itchy eyes  ENT: no complaints of earache, no hearing loss, no nosebleeds, no nasal discharge, no sore throat, no hoarseness  Cardiovascular: as noted in HPI  Respiratory: as noted in HPI  Gastrointestinal: No complaints of abdominal pain, no constipation, no nausea or vomiting, no diarrhea or bloody stools  Genitourinary: No complaints of dysuria, no incontinence, no hesitancy, no nocturia, no genital lesion, no testicular pain  Musculoskeletal: No complaints of arthralgia, no myalgias, no joint swelling or stiffness, no limb pain or swelling  Integumentary: No complaints of skin rash or skin lesions, no itching, no skin wound, no dry skin  Neurological: No compliants of headache, no confusion, no convulsions, no numbness or tingling, no dizziness or fainting, no limb weakness, no difficulty walking  Endocrine: No complaints of proptosis, no hot flashes, no muscle weakness, no erectile dysfunction, no deepening of the voice, no feelings of weakness  Hematologic/Lymphatic: No complaints of swollen glands, no swollen glands in the neck, does not bleed easily, no easy bruising  Active Problems  1  Abnormal loss of weight (783 21) (R63 4)   2  Acute frontal sinusitis (461 1) (J01 10)   3  Acute laryngitis (464 00) (J04 0)   4  Acute pharyngitis (462) (J02 9)   5  Acute upper respiratory infection (465 9) (J06 9)   6  Arthralgia, unspecified joint (719 40) (M25 50)   7  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)   8  Chronic laryngitis (476 0) (J37 0)   9  Colon cancer screening (V76 51) (Z12 11)   10  Cough (786 2) (R05)   11  Dysphagia (787 20) (R13 10)   12   Erectile dysfunction (607 84) (N52 9)   13  Fatigue (780 79) (R53 83)   14  Fever (780 60) (R50 9)   15  Hypertension (401 9) (I10)   16  Hypothyroidism (244 9) (E03 9)   17  Immunization due (V05 9) (Z23)   18  Influenza (487 1) (J11 1)   19  Leukopenia (288 50) (D72 819)   20  Malignant neoplasm of tongue (141 9) (C02 9)   21  Psoriasis (696 1) (L40 9)   22  Viral syndrome (079 99) (B34 9)    Past Medical History  1  History of Malignant Tongue Neoplasm (V10 01)    The active problems and past medical history were reviewed and updated today  Surgical History  1  History of Floor Mouth Excision Of Malignant Tumor    Family History  Mother    1  Family history of Reactive Airway Dysfunction  Father    2  Family history of Coronary Artery Disease (V17 49)   3  Family history of Hypertension (V17 49)   4  Family history of Psoriasis    Social History     · Alcohol Use (History)   · History of Current Smoker (305 1)   · Daily Coffee Consumption (1  Cups/Day)   · Daily Cola Consumption (___ Cans/Day)   · Daily Tea Consumption (___ Cups/Day)   · Dental care, regularly   · Employed   · Exercise: Running   · Exercise: Weight training   · Former smoker (V15 82) (Z87 891)   · High school graduate   ·    · No guns in the home   · Pets / animals   · Six children  The social history was reviewed and updated today  Current Meds   1  Cialis 5 MG Oral Tablet; TAKE AS DIRECTED; Therapy: 04BMX3599 to (Last Rx:30Nov2016) Ordered   2  Daily Value Multivitamin TABS; Take 1 tablet daily Recorded   3  Levothyroxine Sodium 75 MCG Oral Tablet; Therapy: (Recorded:66Mvd9529) to Recorded   4  Levothyroxine Sodium 88 MCG Oral Tablet; TAKE 1 TABLET DAILY  Requested for: 53Yok5449; Last Rx:09Udc1585 Ordered    The medication list was reviewed and updated today  Allergies  1  No Known Drug Allergies  2  No Known Environmental Allergies   3   No Known Food Allergies    Vitals  Vital Signs    Recorded: 12Dec2017 03:38PM   Temperature 99 2 F   Heart Rate 72 Systolic 620   Diastolic 84   Height 5 ft 8 in   Weight 178 lb 4 oz   BMI Calculated 27 1   BSA Calculated 1 95       Physical Exam   Constitutional  General appearance: No acute distress, well appearing and well nourished  Head and Face  Head and face: Normal    Palpation of the face and sinuses: No sinus tenderness  Eyes  Conjunctiva and lids: No erythema, swelling or discharge  Pupils and irises: Equal, round, reactive to light  Ears, Nose, Mouth, and Throat  External inspection of ears and nose: Normal    Otoscopic examination: Tympanic membranes translucent with normal light reflex  Canals patent without erythema  Nasal mucosa, septum, and turbinates: Normal without edema or erythema  Lips, teeth, and gums: Normal, good dentition  Oropharynx: Abnormal  -- mucous membranes sl dry (chronic)  Neck  Neck: Abnormal  -- neck with post RT treatment skin changes and mild atrophy  Thyroid: Normal, no thyromegaly  Pulmonary  Respiratory effort: No increased work of breathing or signs of respiratory distress  Auscultation of lungs: Clear to auscultation  Cardiovascular  Auscultation of heart: Normal rate and rhythm, normal S1 and S2, no murmurs  Carotid pulses: 2+ bilaterally  Abdominal aorta: Normal    Pedal pulses: 2+ bilaterally  Peripheral vascular exam: Normal    Examination of extremities for edema and/or varicosities: Normal    Abdomen  Abdomen: Non-tender, no masses  Liver and spleen: No hepatomegaly or splenomegaly  Lymphatic  Palpation of lymph nodes in neck: No lymphadenopathy  Palpation of lymph nodes in other areas: No lymphadenopathy  Musculoskeletal  Gait and station: Normal    Inspection/palpation of digits and nails: Normal without clubbing or cyanosis  Inspection/palpation of joints, bones, and muscles: Normal    Muscle strength/tone: Normal    Skin  Skin and subcutaneous tissue: Abnormal  -- as above  Neurologic  Cranial nerves: Cranial nerves 2-12 intact  Cortical function: Normal mental status  Sensation: No sensory loss  Health Management  Colon cancer screening   COLONOSCOPY (GI, SURG); every 8 years; Last 28IDG5955; Next Due: 92LFH7284;  Active    Signatures   Electronically signed by : ESTELLE Caceres ; Dec 13 2017  7:30AM EST                       (Author)

## 2018-01-12 VITALS
HEART RATE: 62 BPM | WEIGHT: 173 LBS | BODY MASS INDEX: 26.22 KG/M2 | HEIGHT: 68 IN | SYSTOLIC BLOOD PRESSURE: 110 MMHG | DIASTOLIC BLOOD PRESSURE: 80 MMHG | TEMPERATURE: 98.1 F

## 2018-01-12 VITALS
RESPIRATION RATE: 16 BRPM | TEMPERATURE: 97.7 F | WEIGHT: 172.5 LBS | DIASTOLIC BLOOD PRESSURE: 82 MMHG | SYSTOLIC BLOOD PRESSURE: 132 MMHG | HEART RATE: 58 BPM | BODY MASS INDEX: 26.23 KG/M2

## 2018-01-12 NOTE — RESULT NOTES
Verified Results  (1) CBC/PLT/DIFF 83CMA1902 04:27PM [employer] [facility] Order Number: [id number]     Test Name Result Flag Reference   WBC COUNT 2 95 Thousand/uL L 4 31-10 16   RBC COUNT 4 78 Million/uL  3 88-5 62   HEMOGLOBIN 15 1 g/dL  12 0-17 0   HEMATOCRIT 44 4 %  36 5-49 3   MCV 93 fL  82-98   MCH 31 6 pg  26 8-34 3   MCHC 34 0 g/dL  31 4-37 4   RDW 12 2 %  11 6-15 1   MPV 10 3 fL  8 9-12 7   PLATELET COUNT 061 Thousands/uL  149-390   nRBC AUTOMATED 0 /100 WBCs     NEUTROPHILS RELATIVE PERCENT 56 %  43-75   LYMPHOCYTES RELATIVE PERCENT 26 %  14-44   MONOCYTES RELATIVE PERCENT 16 % H 4-12   EOSINOPHILS RELATIVE PERCENT 2 %  0-6   BASOPHILS RELATIVE PERCENT 0 %  0-1   NEUTROPHILS ABSOLUTE COUNT 1 63 Thousands/?L L 1 85-7 62   LYMPHOCYTES ABSOLUTE COUNT 0 77 Thousands/?L  0 60-4 47   MONOCYTES ABSOLUTE COUNT 0 46 Thousand/?L  0 17-1 22   EOSINOPHILS ABSOLUTE COUNT 0 07 Thousand/?L  0 00-0 61   BASOPHILS ABSOLUTE COUNT 0 01 Thousands/?L  0 00-0 10   - Patient Instructions: This bloodwork is non-fasting  Please drink two glasses of water morning of bloodwork  - Patient Instructions: This bloodwork is non-fasting  Please drink two glasses of water morning of bloodwork  (1) TSH 84MHW8914 04:27PM [employer] Order Number: [id number]     Test Name Result Flag Reference   TSH 4 610 uIU/mL H 0 358-3 740   - Patient Instructions: This bloodwork is non-fasting  Please drink two glasses of water morning of bloodwork  - Patient Instructions: This bloodwork is non-fasting  Please drink two glasses of water morning of bloodwork  Patients undergoing fluorescein dye angiography may retain small amounts of fluorescein in the body for 48-72 hours post procedure  Samples containing fluorescein can produce falsely depressed TSH values  If the patient had this procedure,a specimen should be resubmitted post fluorescein clearance

## 2018-01-14 VITALS
WEIGHT: 167.5 LBS | HEIGHT: 68 IN | BODY MASS INDEX: 25.39 KG/M2 | DIASTOLIC BLOOD PRESSURE: 80 MMHG | RESPIRATION RATE: 16 BRPM | SYSTOLIC BLOOD PRESSURE: 148 MMHG | HEART RATE: 80 BPM | TEMPERATURE: 98.4 F

## 2018-01-15 NOTE — MISCELLANEOUS
Message   Recorded as Task   Date: 07/03/2017 02:28 PM, Created By: Klaus Martinez   Task Name: Call Back   Assigned To: [facility],Team   Regarding Patient: Deepa Chauhan, Status: Active   CommentYony Elizabethas - 03 Jul 2017 2:28 PM     TASK CREATED  Please call with results  Hyperplastic polyps  7-10 years before repeat colonoscopy  Marimar Freitas - 03 Jul 2017 2:42 PM     TASK EDITED  Left message for patient to please give our office a call back  Blake Tate - 05 Jul 2017 9:31 AM     TASK EDITED  Patient called back at 9:29 on 7/5/17 and results were given  Told patient to except a letter of this information and I just told patient they will need to make an appointment in the next 7-10 years  Active Problems    1  Abnormal loss of weight (783 21) (R63 4)   2  Acute frontal sinusitis (461 1) (J01 10)   3  Acute laryngitis (464 00) (J04 0)   4  Acute pharyngitis (462) (J02 9)   5  Acute upper respiratory infection (465 9) (J06 9)   6  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)   7  Chronic laryngitis (476 0) (J37 0)   8  Colon cancer screening (V76 51) (Z12 11)   9  Cough (786 2) (R05)   10  Dysphagia (787 20) (R13 10)   11  Erectile dysfunction (607 84) (N52 9)   12  Fever (780 60) (R50 9)   13  Hypertension (401 9) (I10)   14  Hypothyroidism (244 9) (E03 9)   15  Immunization due (V05 9) (Z23)   16  Influenza (487 1) (J11 1)   17  Leukopenia (288 50) (D72 819)   18  Malignant neoplasm of tongue (141 9) (C02 9)   19  Psoriasis (696 1) (L40 9)   20  Viral syndrome (079 99) (B34 9)    Current Meds   1  Cialis 5 MG Oral Tablet; TAKE AS DIRECTED; Therapy: 11GUC9207 to (Last Rx:30Nov2016) Ordered   2  Daily Value Multivitamin TABS; Take 1 tablet daily Recorded   3  Levothyroxine Sodium 88 MCG Oral Tablet; TAKE 1 TABLET DAILY  Requested for:   14Fof2370; Last Rx:71Eli7906 Ordered    Allergies    1  No Known Drug Allergies    2  No Known Environmental Allergies   3   No Known Food Allergies    Signatures   Electronically signed by : Juanjose Martins, ; Jul 5 2017  9:32AM EST                       (Author)

## 2018-01-16 NOTE — MISCELLANEOUS
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)   7  Chronic laryngitis (476 0) (J37 0)   8  Colon cancer screening (V76 51) (Z12 11)   9  Cough (786 2) (R05)   10  Dysphagia (787 20) (R13 10)   11  Erectile dysfunction (607 84) (N52 9)   12  Fever (780 60) (R50 9)   13  Hypertension (401 9) (I10)   14  Hypothyroidism (244 9) (E03 9)   15  Immunization due (V05 9) (Z23)   16  Influenza (487 1) (J11 1)   17  Leukopenia (288 50) (D72 819)   18  Malignant neoplasm of tongue (141 9) (C02 9)   19  Psoriasis (696 1) (L40 9)   20  Viral syndrome (079 99) (B34 9)    Current Meds   1  Cialis 5 MG Oral Tablet; TAKE AS DIRECTED; Therapy: 76CGC0874 to (Last Rx:30Nov2016) Ordered   2  Daily Value Multivitamin TABS; Take 1 tablet daily Recorded   3  Levothyroxine Sodium 88 MCG Oral Tablet; TAKE 1 TABLET DAILY  Requested for:   73Rzh0567; Last Rx:63Mgh0854 Ordered    Allergies    1  No Known Drug Allergies    2  No Known Environmental Allergies   3  No Known Food Allergies    Plan  Colon cancer screening    · COLONOSCOPY (GI, SURG); Status:Complete - Retrospective By Protocol  Authorization;   Done: 83PBD0213 12:00AM   · COLONOSCOPY (GI, SURG) ; every 8 years;  Last 13YOZ3781; Next 39HLL1381;  Status:Active    Signatures   Electronically signed by : Zaire Garcia, ; Jul 12 2017  1:14PM EST                       (Author)

## 2018-01-18 NOTE — MISCELLANEOUS
Message   Recorded as Task   Date: 06/28/2017 09:41 AM, Created By: Arabella Feldman   Task Name: Follow Up   [facility]   Regarding Patient: hCrissy Dan, Status: Active   CommentTerrisimón Cullen - 28 Jun 2017 9:41 AM     TASK CREATED   Marimar Freitas - 28 Jun 2017 9:45 AM     TASK EDITED  Routine post colonoscopy call placed to patient  He had this procedure on 06/27/2017  Left message on patient's phone number to please give our office a call back  Pathology is pending  Active Problems    1  Abnormal loss of weight (783 21) (R63 4)   2  Acute frontal sinusitis (461 1) (J01 10)   3  Acute laryngitis (464 00) (J04 0)   4  Acute pharyngitis (462) (J02 9)   5  Acute upper respiratory infection (465 9) (J06 9)   6  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)   7  Chronic laryngitis (476 0) (J37 0)   8  Colon cancer screening (V76 51) (Z12 11)   9  Cough (786 2) (R05)   10  Dysphagia (787 20) (R13 10)   11  Erectile dysfunction (607 84) (N52 9)   12  Fever (780 60) (R50 9)   13  Hypertension (401 9) (I10)   14  Hypothyroidism (244 9) (E03 9)   15  Immunization due (V05 9) (Z23)   16  Influenza (487 1) (J11 1)   17  Leukopenia (288 50) (D72 819)   18  Malignant neoplasm of tongue (141 9) (C02 9)   19  Psoriasis (696 1) (L40 9)   20  Viral syndrome (079 99) (B34 9)    Current Meds   1  Cialis 5 MG Oral Tablet; TAKE AS DIRECTED; Therapy: 09JFK4492 to (Last Rx:30Nov2016) Ordered   2  Daily Value Multivitamin TABS; Take 1 tablet daily Recorded   3  Levothyroxine Sodium 88 MCG Oral Tablet; TAKE 1 TABLET DAILY  Requested for:   78Rye5177; Last Rx:77Epq0907 Ordered    Allergies    1  No Known Drug Allergies    2  No Known Environmental Allergies   3   No Known Food Allergies    Signatures   Electronically signed by : Luba Herrera, ; Jul  3 2017  2:42PM EST                       (Author)

## 2018-01-18 NOTE — RESULT NOTES
Verified Results  (1) CBC/PLT/DIFF 96GME8318 07:59AM [facility] Order Number: [id number]   Order Number: [id number]     Test Name Result Flag Reference   WBC COUNT 2 32 Thousand/uL L 4 31-10 16   RBC COUNT 4 78 Million/uL  3 88-5 62   HEMOGLOBIN 15 4 g/dL  12 0-17 0   HEMATOCRIT 45 4 %  36 5-49 3   MCV 95 fL  82-98   MCH 32 2 pg  26 8-34 3   MCHC 33 9 g/dL  31 4-37 4   RDW 12 5 %  11 6-15 1   MPV 10 6 fL  8 9-12 7   PLATELET COUNT 211 Thousands/uL  149-390   nRBC AUTOMATED 0 /100 WBCs     NEUTROPHILS RELATIVE PERCENT 47 %  43-75   LYMPHOCYTES RELATIVE PERCENT 31 %  14-44   MONOCYTES RELATIVE PERCENT 17 % H 4-12   EOSINOPHILS RELATIVE PERCENT 5 %  0-6   BASOPHILS RELATIVE PERCENT 0 %  0-1   NEUTROPHILS ABSOLUTE COUNT 1 08 Thousands/?L L 1 85-7 62   LYMPHOCYTES ABSOLUTE COUNT 0 72 Thousands/?L  0 60-4 47   MONOCYTES ABSOLUTE COUNT 0 40 Thousand/?L  0 17-1 22   EOSINOPHILS ABSOLUTE COUNT 0 11 Thousand/?L  0 00-0 61   BASOPHILS ABSOLUTE COUNT 0 01 Thousands/?L  0 00-0 10     (1) COMPREHENSIVE METABOLIC PANEL 36LBX3270 12:26UY Yarelis Jean Order Number: [id number]   Order Number: [id number] Order Number: [id number] Order Number: [id number]     Test Name Result Flag Reference   GLUCOSE,RANDM 89 mg/dL     If the patient is fasting, the ADA then defines impaired fasting glucose as > 100 mg/dL and diabetes as > or equal to 123 mg/dL     SODIUM 140 mmol/L  136-145   POTASSIUM 3 9 mmol/L  3 5-5 3   CHLORIDE 104 mmol/L  100-108   CARBON DIOXIDE 29 mmol/L  21-32   ANION GAP (CALC) 7 mmol/L  4-13   BLOOD UREA NITROGEN 15 mg/dL  5-25   CREATININE 0 76 mg/dL  0 60-1 30   Standardized to IDMS reference method   CALCIUM 8 1 mg/dL L 8 3-10 1   BILI, TOTAL 0 89 mg/dL  0 20-1 00   ALK PHOSPHATAS 51 U/L     ALT (SGPT) 44 U/L  12-78   AST(SGOT) 23 U/L  5-45   ALBUMIN 3 7 g/dL  3 5-5 0   TOTAL PROTEIN 6 4 g/dL  6 4-8 2 eGFR Non-African American      >60 0 ml/min/1 73sq m   [facility] Disease Education Program recommendations are as follows:  GFR calculation is accurate only with a steady state creatinine  Chronic Kidney disease less than 60 ml/min/1 73 sq  meters  Kidney failure less than 15 ml/min/1 73 sq  meters  (1) LIPID PANEL, FASTING 72PAW5179 07:59AM Vito Escobar Order Number: [id number]  TW Order Number: [id number] Order Number: [id number] Order Number: [id number]     Test Name Result Flag Reference   CHOLESTEROL 226 mg/dL H    HDL,DIRECT 72 mg/dL H 40-60   Specimen collection should occur prior to Metamizole administration due to the potential for falsely depressed results  LDL CHOLESTEROL CALCULATED 133 mg/dL H 0-100   Triglyceride:         Normal              <150 mg/dl       Borderline High    150-199 mg/dl       High               200-499 mg/dl       Very High          >499 mg/dl  Cholesterol:         Desirable        <200 mg/dl      Borderline High  200-239 mg/dl      High             >239 mg/dl  HDL Cholesterol:        High    >59 mg/dL      Low     <41 mg/dL  LDL CALCULATED:    This screening LDL is a calculated result  It does not have the accuracy of the Direct Measured LDL in the monitoring of patients with hyperlipidemia and/or statin therapy  Direct Measure LDL (AZX582) must be ordered separately in these patients  TRIGLYCERIDES 105 mg/dL  <=150   Specimen collection should occur prior to N-Acetylcysteine or Metamizole administration due to the potential for falsely depressed results       (1) TSH 91VAX2355 07:59AM Vito Escobar Order Number: [id number]  TW Order Number: [id number] Order Number: [id number] Order Number: [id number]     Test Name Result Flag Reference   TSH 3 750 uIU/mL H 0 358-3 740

## 2018-01-22 VITALS
SYSTOLIC BLOOD PRESSURE: 132 MMHG | DIASTOLIC BLOOD PRESSURE: 84 MMHG | HEIGHT: 68 IN | HEART RATE: 72 BPM | BODY MASS INDEX: 27.01 KG/M2 | WEIGHT: 178.25 LBS | TEMPERATURE: 99.2 F

## 2018-01-23 NOTE — RESULT NOTES
Verified Results  (1) CBC/PLT/DIFF 62VTF4540 10:42AM Lynda Glaze Order Number: [id number]     Test Name Result Flag Reference   WBC COUNT 5 14 Thousand/uL  4 31-10 16   RBC COUNT 4 76 Million/uL  3 88-5 62   HEMOGLOBIN 15 1 g/dL  12 0-17 0   HEMATOCRIT 44 6 %  36 5-49 3   MCV 94 fL  82-98   MCH 31 7 pg  26 8-34 3   MCHC 33 9 g/dL  31 4-37 4   RDW 12 2 %  11 6-15 1   MPV 10 1 fL  8 9-12 7   PLATELET COUNT 877 Thousands/uL  149-390   nRBC AUTOMATED 0 /100 WBCs     NEUTROPHILS RELATIVE PERCENT 72 %  43-75   LYMPHOCYTES RELATIVE PERCENT 15 %  14-44   MONOCYTES RELATIVE PERCENT 11 %  4-12   EOSINOPHILS RELATIVE PERCENT 2 %  0-6   BASOPHILS RELATIVE PERCENT 0 %  0-1   NEUTROPHILS ABSOLUTE COUNT 3 71 Thousands/? ??L  1 85-7 62   LYMPHOCYTES ABSOLUTE COUNT 0 75 Thousands/? ??L  0 60-4 47   MONOCYTES ABSOLUTE COUNT 0 55 Thousand/? ??L  0 17-1 22   EOSINOPHILS ABSOLUTE COUNT 0 10 Thousand/? ??L  0 00-0 61   BASOPHILS ABSOLUTE COUNT 0 01 Thousands/? ??L  0 00-0 10     (1) TSH 67YMH9484 10:42AM Lynda Glaemily Order Number: [id number]     Test Name Result Flag Reference   TSH 3 080 uIU/mL  0 358-3 740   Patients undergoing fluorescein dye angiography may retain small amounts of fluorescein in the body for 48-72 hours post procedure  Samples containing fluorescein can produce falsely depressed TSH values  If the patient had this procedure,a specimen should be resubmitted post fluorescein clearance

## 2018-03-13 DIAGNOSIS — E03.9 HYPOTHYROIDISM: ICD-10-CM

## 2018-03-23 ENCOUNTER — TELEPHONE (OUTPATIENT)
Dept: FAMILY MEDICINE CLINIC | Facility: CLINIC | Age: 53
End: 2018-03-23

## 2018-03-23 DIAGNOSIS — N52.9 ERECTILE DYSFUNCTION, UNSPECIFIED ERECTILE DYSFUNCTION TYPE: Primary | ICD-10-CM

## 2018-03-23 RX ORDER — SILDENAFIL 100 MG/1
100 TABLET, FILM COATED ORAL DAILY PRN
Qty: 3 TABLET | Refills: 3 | Status: SHIPPED | OUTPATIENT
Start: 2018-03-23 | End: 2018-03-27 | Stop reason: ALTCHOICE

## 2018-03-25 RX ORDER — TADALAFIL 5 MG
TABLET ORAL
Qty: 30 TABLET | Refills: 0 | OUTPATIENT
Start: 2018-03-25

## 2018-03-26 ENCOUNTER — TELEPHONE (OUTPATIENT)
Dept: FAMILY MEDICINE CLINIC | Facility: CLINIC | Age: 53
End: 2018-03-26

## 2018-03-27 ENCOUNTER — TELEPHONE (OUTPATIENT)
Dept: FAMILY MEDICINE CLINIC | Facility: CLINIC | Age: 53
End: 2018-03-27

## 2018-03-27 DIAGNOSIS — N52.9 ERECTILE DYSFUNCTION, UNSPECIFIED ERECTILE DYSFUNCTION TYPE: ICD-10-CM

## 2018-03-27 DIAGNOSIS — N52.9 VASCULOGENIC ERECTILE DYSFUNCTION, UNSPECIFIED VASCULOGENIC ERECTILE DYSFUNCTION TYPE: Primary | ICD-10-CM

## 2018-03-27 RX ORDER — TADALAFIL 5 MG/1
5 TABLET ORAL DAILY PRN
Qty: 10 TABLET | Refills: 0 | Status: SHIPPED | OUTPATIENT
Start: 2018-03-27 | End: 2018-04-30

## 2018-03-27 NOTE — TELEPHONE ENCOUNTER
Patient called to speak with Dr Layla Mcclure, message from yesterday states he spoke with him, but patient says he did not, would like to talk directly to you

## 2018-04-30 ENCOUNTER — OFFICE VISIT (OUTPATIENT)
Dept: UROLOGY | Facility: CLINIC | Age: 53
End: 2018-04-30
Payer: COMMERCIAL

## 2018-04-30 VITALS
BODY MASS INDEX: 25.62 KG/M2 | SYSTOLIC BLOOD PRESSURE: 162 MMHG | HEART RATE: 76 BPM | DIASTOLIC BLOOD PRESSURE: 94 MMHG | HEIGHT: 69 IN | WEIGHT: 173 LBS

## 2018-04-30 DIAGNOSIS — N48.6 PEYRONIE'S DISEASE: ICD-10-CM

## 2018-04-30 DIAGNOSIS — N52.9 ED (ERECTILE DYSFUNCTION) OF ORGANIC ORIGIN: Primary | ICD-10-CM

## 2018-04-30 PROCEDURE — 99244 OFF/OP CNSLTJ NEW/EST MOD 40: CPT | Performed by: UROLOGY

## 2018-04-30 NOTE — LETTER
April 30, 2018     Hammad Daniel MD  [address] [facility]  [address]    Patient: Candi Evans Sr  YOB: 1965   Date of Visit: 4/30/2018       Dear Dr Anderson Laughter:    Thank you for referring Kiara Bonilla to me for evaluation  Below are my notes for this consultation  If you have questions, please do not hesitate to call me  I look forward to following your patient along with you  Sincerely,        Amelia Mcclure MD        CC: No Recipients  Amelia Mcclure MD  4/30/2018  3:24 PM  Sign at close encounter  Referring Physician: Hammad Daniel MD  A copy of this note was sent to the referring physician  Diagnoses and all orders for this visit:    ED (erectile dysfunction) of organic origin    Peyronie's disease            Assessment and plan:       1  Peyronie's disease  -45 degree dorsal curvature    2  Erectile dysfunction    Discussed the nature of Peyronie's disease  We discussed options include observation, injection therapy with collagenase, versus surgical intervention  The patient still appears to be in the active phase  He does have some concomitant ED  The curvature appears to limit intercourse with the patient and his wife but to an infrequent agree  I recommended evaluation by 1 of my colleagues who specializes in this disease process  I will organize that  I have also prescribed generic sildenafil for his ED as it will be preferable from a cost standpoint to daily Cialis  Dosing and adverse effects were carefully reviewed  Amelia Mcclure MD      Chief Complaint     Sexual dysfunction      History of Present Illness     Candi Evans Sr  is a 52 y o  male referred for evaluation of sexual dysfunction  Over the past few months he has noted bothersome curvature, dorsally in his penis  He feels that occurs approximately 30-45 degrees toward his head  He has no pain with erections    His wife does report some dyspareunia associated with intercourse  Patient does also have a history of erectile dysfunction  In the past he used daily Cialis  This worked well and he only needed on occasion  However this dosing was cost prohibitive  He has no lower urinary tract symptoms  Detailed Urologic History     - please refer to HPI    Review of Systems     Review of Systems   Constitutional: Negative for activity change and fatigue  HENT: Negative for congestion  Eyes: Negative for visual disturbance  Respiratory: Negative for shortness of breath and wheezing  Cardiovascular: Negative for chest pain and leg swelling  Gastrointestinal: Negative for abdominal pain  Endocrine: Positive for polyuria  Genitourinary: Positive for dysuria  Negative for flank pain, hematuria and urgency  Musculoskeletal: Negative for back pain  Allergic/Immunologic: Negative for immunocompromised state  Neurological: Negative for dizziness and numbness  Psychiatric/Behavioral: Negative for dysphoric mood  All other systems reviewed and are negative  AUA SYMPTOM SCORE      Most Recent Value   AUA SYMPTOM SCORE   How often have you had a sensation of not emptying your bladder completely after you finished urinating? 0   How often have you had to urinate again less than two hours after you finished urinating? 0   How often have you found you stopped and started again several times when you urinate?  0   How often have you found it difficult to postpone urination? 0   How often have you had a weak urinary stream?  0   How often have you had to push or strain to begin urination?   0   How many times did you most typically get up to urinate from the time you went to bed at night until the time you got up in the morning?  0   Quality of Life: If you were to spend the rest of your life with your urinary condition just the way it is now, how would you feel about that?  0   AUA SYMPTOM SCORE  0            Allergies No Known Allergies    Physical Exam     Physical Exam   Constitutional: He is oriented to person, place, and time  He appears well-developed and well-nourished  No distress  HENT:   Head: Normocephalic and atraumatic  Radiation scarring noted left neck   Eyes: EOM are normal    Neck: Normal range of motion  Cardiovascular:   Negative lower extremity edema   Pulmonary/Chest: Effort normal and breath sounds normal    Abdominal: Soft  Genitourinary:   Genitourinary Comments: Circumcised phallus  Peroneus plaques are palpable in the dorsum of the penis bilaterally, at the level of the base   Musculoskeletal: Normal range of motion  Neurological: He is alert and oriented to person, place, and time  Skin: Skin is warm  Psychiatric: He has a normal mood and affect   His behavior is normal            Vital Signs  Vitals:    04/30/18 1437   BP: 162/94   Pulse: 76   Weight: 78 5 kg (173 lb)   Height: 5' 9" (1 753 m)         Current Medications       Current Outpatient Prescriptions:     levothyroxine 50 mcg tablet, Take 88 mcg by mouth daily, Disp: , Rfl:     multivitamin (THERAGRAN) TABS, Take 1 tablet by mouth daily, Disp: , Rfl:       Active Problems     Patient Active Problem List   Diagnosis    ED (erectile dysfunction) of organic origin    Peyronie's disease         Past Medical History     Past Medical History:   Diagnosis Date    Cancer of base of tongue ([address] )     excision    Cough     Disease of thyroid gland     hypo    Dysphagia     ED (erectile dysfunction)     Hypertension     Leukopenia     Psoriasis     Tongue cancer ([address] )     Weight loss, abnormal          Surgical History     Past Surgical History:   Procedure Laterality Date    OTHER SURGICAL HISTORY      infusion port inserted and removed    PEG TUBE PLACEMENT      and removal    GA COLONOSCOPY FLX DX W/COLLJ SPEC WHEN PFRMD N/A 6/27/2017    Procedure: COLONOSCOPY with polypectomy x2;  Surgeon: Zoie Burks MD; Location: [facility]; Service: General    TONGUE BIOPSY / EXCISION           Family History     No family history on file  Social History     Social History     History   Smoking Status    Former Smoker   Smokeless Tobacco    Not on file         Pertinent Lab Values     Lab Results   Component Value Date    CREATININE 0 81 12/04/2017       No results found for: PSA    @RESULTRCNT(1H])@      Pertinent Imaging      - n/a    Portions of the record may have been created with voice recognition software   Occasional wrong word or "sound a like" substitutions may have occurred due to the inherent limitations of voice recognition software   Read the chart carefully and recognize, using context, where substitutions have occurred

## 2018-04-30 NOTE — PROGRESS NOTES
Referring Physician: Stella Kim MD  A copy of this note was sent to the referring physician  Diagnoses and all orders for this visit:    ED (erectile dysfunction) of organic origin    Peyronie's disease            Assessment and plan:       1  Peyronie's disease  -45 degree dorsal curvature    2  Erectile dysfunction    Discussed the nature of Peyronie's disease  We discussed options include observation, injection therapy with collagenase, versus surgical intervention  The patient still appears to be in the active phase  He does have some concomitant ED  The curvature appears to limit intercourse with the patient and his wife but to an infrequent agree  I recommended evaluation by 1 of my colleagues who specializes in this disease process  I will organize that  I have also prescribed generic sildenafil for his ED as it will be preferable from a cost standpoint to daily Cialis  Dosing and adverse effects were carefully reviewed  Bailee Devries MD      Chief Complaint     Sexual dysfunction      History of Present Illness     Muriel Mccray Sr  is a 52 y o  male referred for evaluation of sexual dysfunction  Over the past few months he has noted bothersome curvature, dorsally in his penis  He feels that occurs approximately 30-45 degrees toward his head  He has no pain with erections  His wife does report some dyspareunia associated with intercourse  Patient does also have a history of erectile dysfunction  In the past he used daily Cialis  This worked well and he only needed on occasion  However this dosing was cost prohibitive  He has no lower urinary tract symptoms  Detailed Urologic History     - please refer to HPI    Review of Systems     Review of Systems   Constitutional: Negative for activity change and fatigue  HENT: Negative for congestion  Eyes: Negative for visual disturbance  Respiratory: Negative for shortness of breath and wheezing  Cardiovascular: Negative for chest pain and leg swelling  Gastrointestinal: Negative for abdominal pain  Endocrine: Positive for polyuria  Genitourinary: Positive for dysuria  Negative for flank pain, hematuria and urgency  Musculoskeletal: Negative for back pain  Allergic/Immunologic: Negative for immunocompromised state  Neurological: Negative for dizziness and numbness  Psychiatric/Behavioral: Negative for dysphoric mood  All other systems reviewed and are negative  AUA SYMPTOM SCORE      Most Recent Value   AUA SYMPTOM SCORE   How often have you had a sensation of not emptying your bladder completely after you finished urinating? 0   How often have you had to urinate again less than two hours after you finished urinating? 0   How often have you found you stopped and started again several times when you urinate?  0   How often have you found it difficult to postpone urination? 0   How often have you had a weak urinary stream?  0   How often have you had to push or strain to begin urination? 0   How many times did you most typically get up to urinate from the time you went to bed at night until the time you got up in the morning?  0   Quality of Life: If you were to spend the rest of your life with your urinary condition just the way it is now, how would you feel about that?  0   AUA SYMPTOM SCORE  0            Allergies     No Known Allergies    Physical Exam     Physical Exam   Constitutional: He is oriented to person, place, and time  He appears well-developed and well-nourished  No distress  HENT:   Head: Normocephalic and atraumatic  Radiation scarring noted left neck   Eyes: EOM are normal    Neck: Normal range of motion  Cardiovascular:   Negative lower extremity edema   Pulmonary/Chest: Effort normal and breath sounds normal    Abdominal: Soft  Genitourinary:   Genitourinary Comments: Circumcised phallus    Peroneus plaques are palpable in the dorsum of the penis bilaterally, at the level of the base   Musculoskeletal: Normal range of motion  Neurological: He is alert and oriented to person, place, and time  Skin: Skin is warm  Psychiatric: He has a normal mood and affect  His behavior is normal            Vital Signs  Vitals:    04/30/18 1437   BP: 162/94   Pulse: 76   Weight: 78 5 kg (173 lb)   Height: 5' 9" (1 753 m)         Current Medications       Current Outpatient Prescriptions:     levothyroxine 50 mcg tablet, Take 88 mcg by mouth daily, Disp: , Rfl:     multivitamin (THERAGRAN) TABS, Take 1 tablet by mouth daily, Disp: , Rfl:       Active Problems     Patient Active Problem List   Diagnosis    ED (erectile dysfunction) of organic origin    Peyronie's disease         Past Medical History     Past Medical History:   Diagnosis Date    [facility] )     excision    Cough     Disease of thyroid gland     hypo    Dysphagia     ED (erectile dysfunction)     Hypertension     Leukopenia     Psoriasis     Tongue cancer ([facility] )     Weight loss, abnormal          Surgical History     Past Surgical History:   Procedure Laterality Date    OTHER SURGICAL HISTORY      infusion port inserted and removed    PEG TUBE PLACEMENT      and removal    ND COLONOSCOPY FLX DX W/COLLJ SPEC WHEN PFRMD N/A 6/27/2017    Procedure: COLONOSCOPY with polypectomy x2;  Surgeon: Alex Aponte MD;  Location: [facility]; Service: General    TONGUE BIOPSY / EXCISION           Family History     No family history on file        Social History     Social History     History   Smoking Status    Former Smoker   Smokeless Tobacco    Not on file         Pertinent Lab Values     Lab Results   Component Value Date    CREATININE 0 81 12/04/2017       No results found for: PSA    @RESULTRCNT(1H])@      Pertinent Imaging      - n/a    Portions of the record may have been created with voice recognition software   Occasional wrong word or "sound a like" substitutions may have occurred due to the inherent limitations of voice recognition software   Read the chart carefully and recognize, using context, where substitutions have occurred

## 2018-05-30 ENCOUNTER — OFFICE VISIT (OUTPATIENT)
Dept: UROLOGY | Facility: MEDICAL CENTER | Age: 53
End: 2018-05-30
Payer: COMMERCIAL

## 2018-05-30 ENCOUNTER — TELEPHONE (OUTPATIENT)
Dept: UROLOGY | Facility: MEDICAL CENTER | Age: 53
End: 2018-05-30

## 2018-05-30 VITALS
WEIGHT: 170 LBS | DIASTOLIC BLOOD PRESSURE: 110 MMHG | SYSTOLIC BLOOD PRESSURE: 170 MMHG | HEIGHT: 69 IN | BODY MASS INDEX: 25.18 KG/M2

## 2018-05-30 DIAGNOSIS — N52.1 ERECTILE DYSFUNCTION DUE TO DISEASES CLASSIFIED ELSEWHERE: ICD-10-CM

## 2018-05-30 DIAGNOSIS — N48.6 PEYRONIE'S DISEASE: Primary | ICD-10-CM

## 2018-05-30 PROCEDURE — 99214 OFFICE O/P EST MOD 30 MIN: CPT | Performed by: UROLOGY

## 2018-05-30 RX ORDER — SILDENAFIL CITRATE 20 MG/1
TABLET ORAL
Refills: 6 | COMMUNITY
Start: 2018-05-03 | End: 2020-08-29 | Stop reason: SDUPTHER

## 2018-05-30 NOTE — LETTER
May 30, 2018     Mendez Wilder MD  [address]  [address] [place] [address]    Patient: August Guillen Sr  YOB: 1965   Date of Visit: 5/30/2018       Dear Dr William Barnhart:    Thank you for referring Vanessa Tinoco to me for evaluation  Below are my notes for this consultation  If you have questions, please do not hesitate to call me  I look forward to following your patient along with you  Sincerely,        Иван Agosto MD        CC: No Recipients  Иван Agosto MD  5/30/2018  2:53 PM  Sign at close encounter  Assessment/Plan:  1  Peyronie's disease  Patient presents noting a 3-4 month history of penile curvature  The patient denies any sentinel event where he may have injured his phallus and notes that his previously straight somewhat bent to the left erection that he remembers from his younger days suddenly began curving further  He describes a curvature dorsally and to the left from the mid shaft of the penis for approximately 45°  He denies any pain change in the degree of curvature or quality of his erection for number of months at this point  He notes that this does not inhibit his ability to have erection and intercourse but his wife does note some discomfort because of the bend  The patient presented to discuss options for treatment and we discussed in detail continued surveillance without intervention versus Xiaflex versus surgical interventions either plication contralaterally or incision and grafting ipsilaterally  The patient requests Xiaflex treatment understanding risks of corporal rupture worsening of the curve progression of further erectile dysfunction bleeding ecchymosis or infection  He agrees to proceed  2  erectile dysfunction secondary to Peyronie's disease and probable veno-occlusive dysfunction    The patient responds to lower doses of sildenafil and is able to get an adequately rigid erection for penetration and maintenance throughout intercourse until climax but does complain of the inability to mount a secondary erection after an appropriate refractory period  The patient is cleared increase his sildenafil to 100 mg per episode and will also consider switching to Cialis at a later time  2    No problem-specific Assessment & Plan notes found for this encounter  Diagnoses and all orders for this visit:    Peyronie's disease  -     PSA Total, Diagnostic; Future  -     Comprehensive metabolic panel; Future  -     Testosterone, free, total; Future    Erectile dysfunction due to diseases classified elsewhere    Other orders  -     sildenafil (REVATIO) 20 mg tablet; TAKE 1-5 TABLETS AS NEEDED          Subjective:      Patient ID: Lorenza Jeffries  is a 52 y o  male  HPI 49-year-old male with a 4-5 month history of Peyronie's curvature from the mid shaft somewhat left in dorsally  He denies any sentinel event but notes being treated prior for erectile dysfunction with sildenafil responding to that  He is currently able to penetrate and maintain his erection throughout intercourse has trouble obtaining another erection after climax and notes that his wife has some discomfort with the curvature but he does not at this point  The patient presents for treatment of erectile dysfunction and Peyronie's disease    The following portions of the patient's history were reviewed and updated as appropriate: allergies, current medications, past family history, past medical history, past social history, past surgical history and problem list     Review of Systems   Constitutional: Negative  HENT: Positive for trouble swallowing  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   Eyes: Negative  Respiratory: Negative  Cardiovascular: Negative  Gastrointestinal: Negative  Endocrine: Negative  Genitourinary: Negative  Musculoskeletal: Negative  Allergic/Immunologic: Negative  Neurological: Negative  Hematological: Negative  Psychiatric/Behavioral: Negative  Objective:      BP (!) 170/110   Ht 5' 9" (1 753 m)   Wt 77 1 kg (170 lb)   BMI 25 10 kg/m²           Physical Exam   Constitutional: He is oriented to person, place, and time  He appears well-developed and well-nourished  No distress  HENT:   Head: Atraumatic  Eyes: Conjunctivae and EOM are normal    Pulmonary/Chest: Effort normal  No respiratory distress  He has no wheezes  Abdominal: Soft  He exhibits no distension  There is no tenderness  Genitourinary:   Genitourinary Comments: Phallus circumcised without cutaneous lesions  Meatus patent normally placed  There is a palpable Peyronie's plaque dorsally and on the left at the mid penile shaft to the proximal penile shaft  Scrotum normal without cutaneous lesions  Testes bilaterally normal somewhat small somewhat soft without masses or adnexal abnormality  Perineum is normal   Digital rectal examination reveals a normal anal verge normal anal sphincter tone no palpable rectal masses and a prostate that is 20 g a nodular nontender  Neurological: He is alert and oriented to person, place, and time  Psychiatric: He has a normal mood and affect  His behavior is normal  Judgment and thought content normal    Nursing note and vitals reviewed

## 2018-05-30 NOTE — PROGRESS NOTES
Assessment/Plan:  1  Peyronie's disease  Patient presents noting a 3-4 month history of penile curvature  The patient denies any sentinel event where he may have injured his phallus and notes that his previously straight somewhat bent to the left erection that he remembers from his younger days suddenly began curving further  He describes a curvature dorsally and to the left from the mid shaft of the penis for approximately 45°  He denies any pain change in the degree of curvature or quality of his erection for number of months at this point  He notes that this does not inhibit his ability to have erection and intercourse but his wife does note some discomfort because of the bend  The patient presented to discuss options for treatment and we discussed in detail continued surveillance without intervention versus Xiaflex versus surgical interventions either plication contralaterally or incision and grafting ipsilaterally  The patient requests Xiaflex treatment understanding risks of corporal rupture worsening of the curve progression of further erectile dysfunction bleeding ecchymosis or infection  He agrees to proceed  2  erectile dysfunction secondary to Peyronie's disease and probable veno-occlusive dysfunction  The patient responds to lower doses of sildenafil and is able to get an adequately rigid erection for penetration and maintenance throughout intercourse until climax but does complain of the inability to mount a secondary erection after an appropriate refractory period  The patient is cleared increase his sildenafil to 100 mg per episode and will also consider switching to Cialis at a later time  2    No problem-specific Assessment & Plan notes found for this encounter  Diagnoses and all orders for this visit:    Peyronie's disease  -     PSA Total, Diagnostic; Future  -     Comprehensive metabolic panel;  Future  -     Testosterone, free, total; Future    Erectile dysfunction due to diseases classified elsewhere    Other orders  -     sildenafil (REVATIO) 20 mg tablet; TAKE 1-5 TABLETS AS NEEDED          Subjective:      Patient ID: Candi Evans Sr  is a 52 y o  male  HPI 57-year-old male with a 4-5 month history of Peyronie's curvature from the mid shaft somewhat left in dorsally  He denies any sentinel event but notes being treated prior for erectile dysfunction with sildenafil responding to that  He is currently able to penetrate and maintain his erection throughout intercourse has trouble obtaining another erection after climax and notes that his wife has some discomfort with the curvature but he does not at this point  The patient presents for treatment of erectile dysfunction and Peyronie's disease    The following portions of the patient's history were reviewed and updated as appropriate: allergies, current medications, past family history, past medical history, past social history, past surgical history and problem list     Review of Systems   Constitutional: Negative  HENT: Positive for trouble swallowing  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   Eyes: Negative  Respiratory: Negative  Cardiovascular: Negative  Gastrointestinal: Negative  Endocrine: Negative  Genitourinary: Negative  Musculoskeletal: Negative  Allergic/Immunologic: Negative  Neurological: Negative  Hematological: Negative  Psychiatric/Behavioral: Negative  Objective:      BP (!) 170/110   Ht 5' 9" (1 753 m)   Wt 77 1 kg (170 lb)   BMI 25 10 kg/m²          Physical Exam   Constitutional: He is oriented to person, place, and time  He appears well-developed and well-nourished  No distress  HENT:   Head: Atraumatic  Eyes: Conjunctivae and EOM are normal    Pulmonary/Chest: Effort normal  No respiratory distress  He has no wheezes  Abdominal: Soft  He exhibits no distension   There is no tenderness  Genitourinary:   Genitourinary Comments: Phallus circumcised without cutaneous lesions  Meatus patent normally placed  There is a palpable Peyronie's plaque dorsally and on the left at the mid penile shaft to the proximal penile shaft  Scrotum normal without cutaneous lesions  Testes bilaterally normal somewhat small somewhat soft without masses or adnexal abnormality  Perineum is normal   Digital rectal examination reveals a normal anal verge normal anal sphincter tone no palpable rectal masses and a prostate that is 20 g a nodular nontender  Neurological: He is alert and oriented to person, place, and time  Psychiatric: He has a normal mood and affect  His behavior is normal  Judgment and thought content normal    Nursing note and vitals reviewed

## 2018-05-30 NOTE — TELEPHONE ENCOUNTER
----- Message from MIHAI ALEMAN  [facility] sent at 5/30/2018  2:59 PM EDT -----  Regarding: Xiaflex Injection  PATIENT IS TO COME BACK IN 6 WKS PER PROVIDER TO START Xiaflex INJECTION  PATIENT ASKED IF HE COULD START THE INJECTIONS SOONER THAN 6 WEEKS  HE PREFERS AS LATE IN THE DAY AS POSSIBLE FOR THE APPOINTMENT

## 2018-05-31 ENCOUNTER — APPOINTMENT (OUTPATIENT)
Dept: LAB | Facility: MEDICAL CENTER | Age: 53
End: 2018-05-31
Payer: COMMERCIAL

## 2018-05-31 DIAGNOSIS — N48.6 PEYRONIE'S DISEASE: ICD-10-CM

## 2018-05-31 DIAGNOSIS — E03.9 HYPOTHYROIDISM: ICD-10-CM

## 2018-05-31 LAB
ALBUMIN SERPL BCP-MCNC: 3.6 G/DL (ref 3.5–5)
ALP SERPL-CCNC: 56 U/L (ref 46–116)
ALT SERPL W P-5'-P-CCNC: 42 U/L (ref 12–78)
ANION GAP SERPL CALCULATED.3IONS-SCNC: 6 MMOL/L (ref 4–13)
AST SERPL W P-5'-P-CCNC: 22 U/L (ref 5–45)
BILIRUB SERPL-MCNC: 0.81 MG/DL (ref 0.2–1)
BUN SERPL-MCNC: 12 MG/DL (ref 5–25)
CALCIUM SERPL-MCNC: 9.2 MG/DL (ref 8.3–10.1)
CHLORIDE SERPL-SCNC: 99 MMOL/L (ref 100–108)
CO2 SERPL-SCNC: 32 MMOL/L (ref 21–32)
CREAT SERPL-MCNC: 0.7 MG/DL (ref 0.6–1.3)
GFR SERPL CREATININE-BSD FRML MDRD: 106 ML/MIN/1.73SQ M
GLUCOSE P FAST SERPL-MCNC: 108 MG/DL (ref 65–99)
POTASSIUM SERPL-SCNC: 4.5 MMOL/L (ref 3.5–5.3)
PROT SERPL-MCNC: 6.9 G/DL (ref 6.4–8.2)
PSA SERPL-MCNC: 0.4 NG/ML (ref 0–4)
SODIUM SERPL-SCNC: 137 MMOL/L (ref 136–145)
TSH SERPL DL<=0.05 MIU/L-ACNC: 1.84 UIU/ML (ref 0.36–3.74)

## 2018-05-31 PROCEDURE — 84443 ASSAY THYROID STIM HORMONE: CPT

## 2018-05-31 PROCEDURE — 84153 ASSAY OF PSA TOTAL: CPT

## 2018-05-31 PROCEDURE — 36415 COLL VENOUS BLD VENIPUNCTURE: CPT

## 2018-05-31 PROCEDURE — 84402 ASSAY OF FREE TESTOSTERONE: CPT

## 2018-05-31 PROCEDURE — 84403 ASSAY OF TOTAL TESTOSTERONE: CPT

## 2018-05-31 PROCEDURE — 80053 COMPREHEN METABOLIC PANEL: CPT

## 2018-06-01 LAB
TESTOST FREE SERPL-MCNC: 10.8 PG/ML (ref 7.2–24)
TESTOST SERPL-MCNC: 554 NG/DL (ref 264–916)

## 2018-06-08 ENCOUNTER — TELEPHONE (OUTPATIENT)
Dept: UROLOGY | Facility: MEDICAL CENTER | Age: 53
End: 2018-06-08

## 2018-06-08 NOTE — TELEPHONE ENCOUNTER
Called to check on status of Prior Auth for Xiaflex  They did not receive faxed prescription  Called Endo and spoke to Kalli Vargas, , Prescription was not triaged  She will send today

## 2018-06-13 DIAGNOSIS — E03.9 HYPOTHYROIDISM, UNSPECIFIED TYPE: Primary | ICD-10-CM

## 2018-06-13 RX ORDER — LEVOTHYROXINE SODIUM 0.07 MG/1
TABLET ORAL
Qty: 90 TABLET | Refills: 0 | Status: SHIPPED | OUTPATIENT
Start: 2018-06-13 | End: 2018-09-19 | Stop reason: SDUPTHER

## 2018-06-19 DIAGNOSIS — N48.6 PEYRONIE'S DISEASE: Primary | ICD-10-CM

## 2018-06-19 RX ORDER — PENTOXIFYLLINE 400 MG/1
400 TABLET, EXTENDED RELEASE ORAL
Qty: 270 TABLET | Refills: 3 | Status: SHIPPED | OUTPATIENT
Start: 2018-06-19 | End: 2018-07-09 | Stop reason: SDUPTHER

## 2018-06-19 RX ORDER — PENTOXIFYLLINE 400 MG/1
400 TABLET, EXTENDED RELEASE ORAL
COMMUNITY
End: 2018-06-19

## 2018-06-19 NOTE — TELEPHONE ENCOUNTER
[facility] denied Prior Auth  Pt needed to fail on Trental, per Dr Alfaro March post Peer to Peer from today  Pt notified  Will try Trental Order sent to Dr Alfaro March to send to pharm  Explained to pt it may take 2-3 months

## 2018-06-27 ENCOUNTER — TELEPHONE (OUTPATIENT)
Dept: UROLOGY | Facility: MEDICAL CENTER | Age: 53
End: 2018-06-27

## 2018-06-27 NOTE — TELEPHONE ENCOUNTER
LMOM for pt to return call  Need to set up appt with Dr Mark Greene to evaluate on Trental for Peyronies

## 2018-06-27 NOTE — TELEPHONE ENCOUNTER
[facility]  Pt states he's not feeling well on medication  Very tired, fatigued, headaches, legs are sore  Pt instructed to stop medication, appt for re-evaluation 7/9 with Dr Meli Wilson  Pt stated maybe he should stay on medication longer  Again told pt to d/c medication and call office on Friday with status update   Will notify Dr Meli Wilson

## 2018-06-28 NOTE — TELEPHONE ENCOUNTER
Agree with stopping trental  Will plan to attempt to authorize xiaflex as pt now failed to tolerate medication

## 2018-07-09 ENCOUNTER — OFFICE VISIT (OUTPATIENT)
Dept: UROLOGY | Facility: MEDICAL CENTER | Age: 53
End: 2018-07-09
Payer: COMMERCIAL

## 2018-07-09 VITALS
SYSTOLIC BLOOD PRESSURE: 140 MMHG | BODY MASS INDEX: 25.18 KG/M2 | WEIGHT: 170 LBS | DIASTOLIC BLOOD PRESSURE: 70 MMHG | HEIGHT: 69 IN

## 2018-07-09 DIAGNOSIS — N48.6 PEYRONIE'S DISEASE: Primary | ICD-10-CM

## 2018-07-09 PROCEDURE — 99214 OFFICE O/P EST MOD 30 MIN: CPT | Performed by: UROLOGY

## 2018-07-09 RX ORDER — PENTOXIFYLLINE 400 MG/1
400 TABLET, EXTENDED RELEASE ORAL
Qty: 270 TABLET | Refills: 0 | Status: SHIPPED | OUTPATIENT
Start: 2018-07-09 | End: 2021-05-07 | Stop reason: ALTCHOICE

## 2018-07-09 NOTE — PROGRESS NOTES
Assessment/Plan:   1  Peyronie's disease  The patient is currently tolerating Trental 400 mg p o  t i d  He is unsure as to whether he is noted any positive change in the degree of curvature from his Peyronie's disease  He will continue on this 3 month course of Trental and return to this office at which time his curvature will be assessed  If this has not significantly improved reapplication for preauthorization for [place] will be made  Diagnoses and all orders for this visit:    Peyronie's disease          Subjective:     Patient ID: Wilder Aase  is a 52 y o  male  HPI 43-year-old male with Peyronie's disease producing significant curvature and associated sexual dysfunction presents after starting Trental approximately 2 weeks ago  Initially he felt uncomfortable taking the medication however at this point he is having no side effects  He is unsure as to whether he has had any improvement in his Peyronie's curvature and will continue to monitor this over the next 3 months  If this does not improve his Peyronie's curvature Xiaflex will again be recommended    Review of Systems   Constitutional: Negative  Respiratory: Negative  Cardiovascular: Negative  Gastrointestinal: Negative  Genitourinary: Negative  Musculoskeletal: Negative  Neurological: Negative  Psychiatric/Behavioral: Negative  Objective:     Physical Exam   Constitutional: He is oriented to person, place, and time  He appears well-developed and well-nourished  HENT:   Head: Atraumatic  Eyes: EOM are normal    Neck: Neck supple  Pulmonary/Chest: Effort normal  No respiratory distress  Neurological: He is alert and oriented to person, place, and time  Psychiatric: He has a normal mood and affect  His behavior is normal  Judgment and thought content normal    Vitals reviewed

## 2018-07-09 NOTE — LETTER
July 9, 2018     Sandy Bean MD  [address] [address]    Patient: Juan Luis Simon Sr  YOB: 1965   Date of Visit: 7/9/2018       Dear Dr Fany Valentine:    Thank you for referring Jane Villarreal to me for evaluation  Below are my notes for this consultation  If you have questions, please do not hesitate to call me  I look forward to following your patient along with you  Sincerely,        Paulina Amor MD        CC: No Recipients  Paulina Amor MD  7/9/2018  4:29 PM  Sign at close encounter  Assessment/Plan:   1  Peyronie's disease  The patient is currently tolerating Trental 400 mg p o  t i d  He is unsure as to whether he is noted any positive change in the degree of curvature from his Peyronie's disease  He will continue on this 3 month course of Trental and return to this office at which time his curvature will be assessed  If this has not significantly improved reapplication for preauthorization for Lucinda [place] will be made  Diagnoses and all orders for this visit:    Peyronie's disease          Subjective:     Patient ID: Nathalia Porter  is a 52 y o  male  HPI 49-year-old male with Peyronie's disease producing significant curvature and associated sexual dysfunction presents after starting Trental approximately 2 weeks ago  Initially he felt uncomfortable taking the medication however at this point he is having no side effects  He is unsure as to whether he has had any improvement in his Peyronie's curvature and will continue to monitor this over the next 3 months  If this does not improve his Peyronie's curvature Xiaflex will again be recommended    Review of Systems   Constitutional: Negative  Respiratory: Negative  Cardiovascular: Negative  Gastrointestinal: Negative  Genitourinary: Negative  Musculoskeletal: Negative  Neurological: Negative  Psychiatric/Behavioral: Negative            Objective: Physical Exam   Constitutional: He is oriented to person, place, and time  He appears well-developed and well-nourished  HENT:   Head: Atraumatic  Eyes: EOM are normal    Neck: Neck supple  Pulmonary/Chest: Effort normal  No respiratory distress  Neurological: He is alert and oriented to person, place, and time  Psychiatric: He has a normal mood and affect  His behavior is normal  Judgment and thought content normal    Vitals reviewed

## 2018-07-14 ENCOUNTER — TRANSCRIBE ORDERS (OUTPATIENT)
Dept: ADMINISTRATIVE | Facility: HOSPITAL | Age: 53
End: 2018-07-14

## 2018-07-14 ENCOUNTER — APPOINTMENT (OUTPATIENT)
Dept: LAB | Facility: MEDICAL CENTER | Age: 53
End: 2018-07-14

## 2018-07-14 DIAGNOSIS — Z00.8 HEALTH EXAMINATION IN POPULATION SURVEYS: Primary | ICD-10-CM

## 2018-07-14 LAB
CHOLEST SERPL-MCNC: 194 MG/DL (ref 50–200)
EST. AVERAGE GLUCOSE BLD GHB EST-MCNC: 105 MG/DL
HBA1C MFR BLD: 5.3 % (ref 4.2–6.3)
HDLC SERPL-MCNC: 72 MG/DL (ref 40–60)
LDLC SERPL CALC-MCNC: 111 MG/DL (ref 0–100)
NONHDLC SERPL-MCNC: 122 MG/DL
TRIGL SERPL-MCNC: 55 MG/DL

## 2018-07-14 PROCEDURE — 36415 COLL VENOUS BLD VENIPUNCTURE: CPT | Performed by: PREVENTIVE MEDICINE

## 2018-07-14 PROCEDURE — 83036 HEMOGLOBIN GLYCOSYLATED A1C: CPT | Performed by: PREVENTIVE MEDICINE

## 2018-07-14 PROCEDURE — 80061 LIPID PANEL: CPT | Performed by: PREVENTIVE MEDICINE

## 2018-08-22 ENCOUNTER — TELEPHONE (OUTPATIENT)
Dept: UROLOGY | Facility: MEDICAL CENTER | Age: 53
End: 2018-08-22

## 2018-08-22 NOTE — TELEPHONE ENCOUNTER
Pt asked for Fredrick Mullins to give them a call back regarding future appt and medication, please advise    [phone]

## 2018-08-29 ENCOUNTER — OFFICE VISIT (OUTPATIENT)
Dept: UROLOGY | Facility: MEDICAL CENTER | Age: 53
End: 2018-08-29
Payer: COMMERCIAL

## 2018-08-29 VITALS
SYSTOLIC BLOOD PRESSURE: 142 MMHG | BODY MASS INDEX: 25.18 KG/M2 | DIASTOLIC BLOOD PRESSURE: 86 MMHG | WEIGHT: 170 LBS | HEIGHT: 69 IN

## 2018-08-29 DIAGNOSIS — N52.9 ED (ERECTILE DYSFUNCTION) OF ORGANIC ORIGIN: ICD-10-CM

## 2018-08-29 DIAGNOSIS — N48.6 PEYRONIE'S DISEASE: Primary | ICD-10-CM

## 2018-08-29 LAB
SL AMB  POCT GLUCOSE, UA: NORMAL
SL AMB LEUKOCYTE ESTERASE,UA: NORMAL
SL AMB POCT BILIRUBIN,UA: NORMAL
SL AMB POCT BLOOD,UA: NORMAL
SL AMB POCT CLARITY,UA: CLEAR
SL AMB POCT COLOR,UA: YELLOW
SL AMB POCT KETONES,UA: NORMAL
SL AMB POCT NITRITE,UA: NORMAL
SL AMB POCT PH,UA: 6.5
SL AMB POCT SPECIFIC GRAVITY,UA: 1.01
SL AMB POCT URINE PROTEIN: NORMAL
SL AMB POCT UROBILINOGEN: 0.2

## 2018-08-29 PROCEDURE — 81003 URINALYSIS AUTO W/O SCOPE: CPT | Performed by: UROLOGY

## 2018-08-29 PROCEDURE — 99214 OFFICE O/P EST MOD 30 MIN: CPT | Performed by: UROLOGY

## 2018-08-29 NOTE — PROGRESS NOTES
Assessment/Plan:      Diagnoses and all orders for this visit:    Peyronie's disease  -     POCT urine dip auto non-scope    ED (erectile dysfunction) of organic origin          Subjective:     Patient ID: Chato Ortega  is a 52 y o  male  Chief complaint:  Peyronie's disease and penile curvature with mild erectile dysfunction    History of present illness:  77-year-old male who has been on Trental for the past 3 months for treatment of Peyronie's disease noting no change in his degree of curvature and reaffirmed in the fact that the curvature is approximately 45° dorsally and somewhat to the left from the midpoint of the penis  It makes intercourse somewhat difficult with some discomfort noted on the part of his partner  The patient and I had a long discussion concerning potential side effects of this treatment including penile rupture i e  rupture of the corpora or even false aneurysm  We talked about potential worsening of the curvature or incomplete correction of the curvature as well  The patient is considering Xiaflex injections as he notes that he is having trouble with intercourse  He does noting good response from sildenafil without side effects  Review of Systems   All other systems reviewed and are negative  Objective:     Physical Exam   Constitutional: He is oriented to person, place, and time  He appears well-developed and well-nourished  HENT:   Head: Atraumatic  Eyes: Conjunctivae and EOM are normal    Neck: Neck supple  Pulmonary/Chest: Effort normal    Abdominal: Soft  He exhibits no distension  Genitourinary:   Genitourinary Comments: Phallus normal circumcised without cutaneous lesions  There is a Peyronie's plaque noted dorsally in the mid shaft of the penis  Meatus is patent and normally placed  Scrotum and scrotal contents are palpably normal    Neurological: He is alert and oriented to person, place, and time  Psychiatric: He has a normal mood and affect  His behavior is normal  Thought content normal    Vitals reviewed

## 2018-08-29 NOTE — LETTER
August 29, 2018     Barb Isabel MD  [address]  [facility]    Patient: Amber Ortez Sr  YOB: 1965   Date of Visit: 8/29/2018       Dear Dr Ann Marie Negron:    Thank you for referring Hung Scottie to me for evaluation  Below are my notes for this consultation  If you have questions, please do not hesitate to call me  I look forward to following your patient along with you  Sincerely,        Lorenza Mehta MD        CC: No Recipients  Lorenza Mehta MD  8/29/2018  5:14 PM  Sign at close encounter  Assessment/Plan:      Diagnoses and all orders for this visit:    Peyronie's disease  -     POCT urine dip auto non-scope    ED (erectile dysfunction) of organic origin          Subjective:     Patient ID: Shaniqua Deleon  is a 52 y o  male  Chief complaint:  Peyronie's disease and penile curvature with mild erectile dysfunction    History of present illness:  57-year-old male who has been on Trental for the past 3 months for treatment of Peyronie's disease noting no change in his degree of curvature and reaffirmed in the fact that the curvature is approximately 45° dorsally and somewhat to the left from the midpoint of the penis  It makes intercourse somewhat difficult with some discomfort noted on the part of his partner  The patient and I had a long discussion concerning potential side effects of this treatment including penile rupture i e  rupture of the corpora or even false aneurysm  We talked about potential worsening of the curvature or incomplete correction of the curvature as well  The patient is considering Xiaflex injections as he notes that he is having trouble with intercourse  He does noting good response from sildenafil without side effects  Review of Systems   All other systems reviewed and are negative  Objective:     Physical Exam   Constitutional: He is oriented to person, place, and time   He appears well-developed and well-nourished  HENT:   Head: Atraumatic  Eyes: Conjunctivae and EOM are normal    Neck: Neck supple  Pulmonary/Chest: Effort normal    Abdominal: Soft  He exhibits no distension  Genitourinary:   Genitourinary Comments: Phallus normal circumcised without cutaneous lesions  There is a Peyronie's plaque noted dorsally in the mid shaft of the penis  Meatus is patent and normally placed  Scrotum and scrotal contents are palpably normal    Neurological: He is alert and oriented to person, place, and time  Psychiatric: He has a normal mood and affect  His behavior is normal  Thought content normal    Vitals reviewed

## 2018-09-19 DIAGNOSIS — E03.9 HYPOTHYROIDISM, UNSPECIFIED TYPE: ICD-10-CM

## 2018-09-19 RX ORDER — LEVOTHYROXINE SODIUM 0.07 MG/1
TABLET ORAL
Qty: 90 TABLET | Refills: 0 | Status: SHIPPED | OUTPATIENT
Start: 2018-09-19 | End: 2019-03-01 | Stop reason: SDUPTHER

## 2018-11-14 ENCOUNTER — TELEPHONE (OUTPATIENT)
Dept: FAMILY MEDICINE CLINIC | Facility: CLINIC | Age: 53
End: 2018-11-14

## 2018-11-14 ENCOUNTER — OFFICE VISIT (OUTPATIENT)
Dept: FAMILY MEDICINE CLINIC | Facility: CLINIC | Age: 53
End: 2018-11-14
Payer: COMMERCIAL

## 2018-11-14 VITALS
DIASTOLIC BLOOD PRESSURE: 60 MMHG | TEMPERATURE: 100.1 F | WEIGHT: 170 LBS | BODY MASS INDEX: 25.18 KG/M2 | SYSTOLIC BLOOD PRESSURE: 110 MMHG | HEART RATE: 60 BPM | HEIGHT: 69 IN

## 2018-11-14 DIAGNOSIS — R22.1 LOCALIZED SWELLING, MASS AND LUMP, NECK: Primary | ICD-10-CM

## 2018-11-14 DIAGNOSIS — Z23 IMMUNIZATION DUE: ICD-10-CM

## 2018-11-14 DIAGNOSIS — N14.4 NEPHROTOXICITY: Primary | ICD-10-CM

## 2018-11-14 DIAGNOSIS — C01 CANCER OF BASE OF TONGUE (HCC): ICD-10-CM

## 2018-11-14 PROBLEM — M25.50 ARTHRALGIA: Status: ACTIVE | Noted: 2017-09-25

## 2018-11-14 PROCEDURE — 90682 RIV4 VACC RECOMBINANT DNA IM: CPT

## 2018-11-14 PROCEDURE — 99213 OFFICE O/P EST LOW 20 MIN: CPT | Performed by: FAMILY MEDICINE

## 2018-11-14 PROCEDURE — 90471 IMMUNIZATION ADMIN: CPT

## 2018-11-14 PROCEDURE — 3008F BODY MASS INDEX DOCD: CPT | Performed by: FAMILY MEDICINE

## 2018-11-14 NOTE — TELEPHONE ENCOUNTER
Last Bun/Creat was in May - please find out from CT if a more recent one is needed   If yes, please order

## 2018-11-14 NOTE — PROGRESS NOTES
Assessment/Plan:     Diagnoses and all orders for this visit:    Localized swelling, mass and lump, neck  -     CTA neck w wo contrast; Future    Cancer of base of tongue ([address] )  -     CTA neck w wo contrast; Future    Immunization due  -     influenza vaccine, [id number], quadrivalent, recombinant, PF, 0 5 mL, for patients 18 yr+ (FLUBLOK)        Discussion:  Patient with mild asymmetry of the neck as well as a left trapezius/supraspinatus area mass in the setting of previous tongue base cancer  Trapezius mass is hard-more than I would expect for a typical lipoma  Given asymmetry of the neck, cannot rule out underlying recurrence  I reviewed with patient  I will refer patient for a CT scan of the neck with and without contrast to include the left trapezius/supraspinatus area  Further recommendations based on these results  Patient call for problems or concerns in the interim      Subjective:      Patient ID: Gray Mcconnell  is a 52 y o  male  Here for several issues  - patient with a history of tongue base cancer status post radiation treatment 6 years ago patient notes a L trapezius mass over last 1-2 months  It is hard in occasionally tender  There is no erythema or drainage  Patient is not sure if it is growing   - patient has also noted R neck strain and posterior neck strain/discomfort  x 2 weeks  No trauma  Pt concerned b/c of his hx of tongue based CA  Unclear if there is slight swelling of the left neck  Pt has not had a neck scan in several years  - patient denies any change in weight, worsening issues with swallowing, or other problems  Neck Pain          The following portions of the patient's history were reviewed and updated as appropriate: He  has a past medical history of Cancer of base of tongue ([address] ); Cough; Disease of thyroid gland; Dysphagia; ED (erectile dysfunction); Hypertension; Leukopenia; Psoriasis;  Tongue cancer ([address] ); and Weight loss, abnormal   He   Patient Active Problem List    Diagnosis Date Noted    ED (erectile dysfunction) of organic origin 04/30/2018    Peyronie's disease 04/30/2018    Arthralgia 09/25/2017    Cancer of base of tongue ([facility] ) 05/24/2016    Hypothyroidism 11/11/2013    Hypertension 09/19/2012    Psoriasis 09/19/2012     He  has a past surgical history that includes Tongue biopsy / excision; Other surgical history; PEG tube placement; and pr colonoscopy flx dx w/collj spec when pfrmd (N/A, 6/27/2017)  He  reports that he has quit smoking  He has quit using smokeless tobacco  He reports that he drinks alcohol  His drug history is not on file  Current Outpatient Prescriptions   Medication Sig Dispense Refill    levothyroxine 75 mcg tablet TAKE 1 TABLET DAILY  90 tablet 0    multivitamin (THERAGRAN) TABS Take 1 tablet by mouth daily      pentoxifylline (TRENtal) 400 mg ER tablet Take 1 tablet (400 mg total) by mouth 3 (three) times a day with meals 270 tablet 0    sildenafil (REVATIO) 20 mg tablet TAKE 1-5 TABLETS AS NEEDED  6     No current facility-administered medications for this visit  He has No Known Allergies       Review of Systems   Constitutional: Negative  HENT: Negative  Eyes: Negative  Cardiovascular: Negative  Musculoskeletal: Positive for neck pain  L trapezius area mass    Skin: Negative  Allergic/Immunologic: Negative  Neurological: Negative  Hematological: Does not bruise/bleed easily  Objective:      /60 (BP Location: Left arm, Patient Position: Sitting, Cuff Size: Adult)   Pulse 60   Temp 100 1 °F (37 8 °C) (Tympanic)   Ht 5' 9" (1 753 m)   Wt 77 1 kg (170 lb)   BMI 25 10 kg/m²          Physical Exam   Constitutional: He appears well-developed  HENT:   Head: Normocephalic and atraumatic  Right Ear: External ear normal    Left Ear: External ear normal    Nose: Nose normal    Mouth/Throat: Oropharynx is clear and moist    Eyes: Pupils are equal, round, and reactive to light  Conjunctivae and EOM are normal    Neck: Phonation normal  No thyroid mass present  Cardiovascular: Normal rate, regular rhythm and intact distal pulses      Pulmonary/Chest: Effort normal and breath sounds normal    Musculoskeletal:   Neck as above   Skin:   Chronic RT changes on L neck

## 2018-11-15 ENCOUNTER — TRANSCRIBE ORDERS (OUTPATIENT)
Dept: LAB | Facility: CLINIC | Age: 53
End: 2018-11-15

## 2018-11-15 ENCOUNTER — APPOINTMENT (OUTPATIENT)
Dept: LAB | Facility: CLINIC | Age: 53
End: 2018-11-15
Payer: COMMERCIAL

## 2018-11-15 ENCOUNTER — HOSPITAL ENCOUNTER (OUTPATIENT)
Dept: RADIOLOGY | Age: 53
Discharge: HOME | End: 2018-11-15
Payer: COMMERCIAL

## 2018-11-15 DIAGNOSIS — C01 CANCER OF BASE OF TONGUE (HCC): ICD-10-CM

## 2018-11-15 DIAGNOSIS — R22.1 LOCALIZED SWELLING, MASS AND LUMP, NECK: ICD-10-CM

## 2018-11-15 DIAGNOSIS — N14.4 NEPHROTOXICITY: ICD-10-CM

## 2018-11-15 LAB
BUN SERPL-MCNC: 9 MG/DL (ref 5–25)
CREAT SERPL-MCNC: 0.68 MG/DL (ref 0.6–1.3)
GFR SERPL CREATININE-BSD FRML MDRD: 108 ML/MIN/1.73SQ M

## 2018-11-15 PROCEDURE — 70491 CT SOFT TISSUE NECK W/DYE: CPT

## 2018-11-15 PROCEDURE — 82565 ASSAY OF CREATININE: CPT

## 2018-11-15 PROCEDURE — 36415 COLL VENOUS BLD VENIPUNCTURE: CPT

## 2018-11-15 PROCEDURE — 84520 ASSAY OF UREA NITROGEN: CPT

## 2018-11-15 RX ADMIN — IOHEXOL 85 ML: 350 INJECTION, SOLUTION INTRAVENOUS at 14:19

## 2018-11-16 DIAGNOSIS — D17.9 LIPOMA, UNSPECIFIED SITE: Primary | ICD-10-CM

## 2018-11-27 ENCOUNTER — TELEPHONE (OUTPATIENT)
Dept: FAMILY MEDICINE CLINIC | Facility: CLINIC | Age: 53
End: 2018-11-27

## 2018-11-27 DIAGNOSIS — R22.2 MASS OF CHEST WALL: Primary | ICD-10-CM

## 2018-11-27 DIAGNOSIS — Z85.89 HISTORY OF HEAD AND NECK CANCER: ICD-10-CM

## 2018-11-27 NOTE — TELEPHONE ENCOUNTER
Pt is aware of needing and MRI and gave number for Central Scheduling    Called Eleanor from Dr Vinay Reece office to notify her of the patients knowledge of MRI

## 2018-11-27 NOTE — TELEPHONE ENCOUNTER
Eleanor from surg  Oncology Dr Neo Akins office called, Dr Neo Akins will see him but the patient needs an MRI of brachial plexus first, then they will margot him in the office  Can you order then call pt so he can margot it  Call Oncology if any problems

## 2018-11-28 ENCOUNTER — HOSPITAL ENCOUNTER (OUTPATIENT)
Dept: RADIOLOGY | Age: 53
Discharge: HOME | End: 2018-11-28
Payer: COMMERCIAL

## 2018-11-28 DIAGNOSIS — Z85.89 HISTORY OF HEAD AND NECK CANCER: ICD-10-CM

## 2018-11-28 DIAGNOSIS — R22.2 MASS OF CHEST WALL: ICD-10-CM

## 2018-11-28 PROCEDURE — A9585 GADOBUTROL INJECTION: HCPCS | Performed by: FAMILY MEDICINE

## 2018-11-28 PROCEDURE — 71552 MRI CHEST W/O & W/DYE: CPT

## 2018-11-28 RX ADMIN — GADOBUTROL 7.5 ML: 604.72 INJECTION INTRAVENOUS at 16:41

## 2018-11-29 PROBLEM — R22.2 SUPRACLAVICULAR MASS: Status: ACTIVE | Noted: 2018-11-29

## 2018-11-30 ENCOUNTER — CONSULT (OUTPATIENT)
Dept: SURGICAL ONCOLOGY | Facility: CLINIC | Age: 53
End: 2018-11-30
Payer: COMMERCIAL

## 2018-11-30 VITALS
SYSTOLIC BLOOD PRESSURE: 130 MMHG | HEIGHT: 69 IN | HEART RATE: 119 BPM | TEMPERATURE: 99.8 F | RESPIRATION RATE: 16 BRPM | WEIGHT: 171 LBS | BODY MASS INDEX: 25.33 KG/M2 | DIASTOLIC BLOOD PRESSURE: 90 MMHG

## 2018-11-30 DIAGNOSIS — R22.2 MASS ON BACK: Primary | ICD-10-CM

## 2018-11-30 DIAGNOSIS — D17.9 LIPOMA, UNSPECIFIED SITE: ICD-10-CM

## 2018-11-30 PROCEDURE — 99244 OFF/OP CNSLTJ NEW/EST MOD 40: CPT | Performed by: SURGERY

## 2018-11-30 NOTE — LETTER
November 30, 2018     [facility] MD Margaret  [address]    Patient: Sim Juarez Sr  YOB: 1965   Date of Visit: 11/30/2018       Dear Dr Ramírez Ragsdale:    Thank you for referring Ana Zapata to me for evaluation  Below are my notes for this consultation  If you have questions, please do not hesitate to call me  I look forward to following your patient along with you  Sincerely,        Saúl Johnston MD        CC: No Recipients  Saúl Johnston MD  11/30/2018  1:10 PM  Sign at close encounter               Surgical Oncology consult       [facility] [address] [address]    Sim Juarez Sr   1965  [id number]  [address]  [facility] [place] [address]    Chief Complaint   Patient presents with   [address] [place] [address] patient consult for left supraclavicular mass  Assessment/Plan:    No problem-specific Assessment & Plan notes found for this encounter  Diagnoses and all orders for this visit:    Mass on back  -     Ambulatory referral to Physical Therapy; Future    Lipoma, unspecified site  -     Ambulatory referral to Hematology / Oncology        Advance Care Planning/Advance Directives:  Discussed disease status, cancer treatment plans and/or cancer treatment goals with the patient  No history exists  History of Present Illness:   Patient is a 53-year-old man with a history of 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 2012  He has been disease free since then  Ever since completion of radiation he noticed a fullness in his left upper back/trapezius area  This is persistently become somewhat tender  He has had associated muscle weakness with in his back and shoulder since completion of therapy    There is some concern that this could represent disease recurrence and he therefore comes in to have this evaluated  Review of Systems   Constitutional: Negative  HENT: Negative  Eyes: Negative  Respiratory: Negative  Cardiovascular: Negative  Gastrointestinal: Negative  Endocrine: Negative  Musculoskeletal:        Left upper back/trapezius fullness, weakness   Skin: Negative  Allergic/Immunologic: Negative  Neurological: Negative  Hematological: Negative  Psychiatric/Behavioral: Negative  Patient Active Problem List   Diagnosis    ED (erectile dysfunction) of organic origin    Peyronie's disease    Arthralgia    Cancer of base of tongue ([facility] [address] )    Hypertension    Hypothyroidism    Psoriasis    Supraclavicular mass     Past Medical History:   Diagnosis Date    Cancer of base of tongue ([facility] [address] )     excision    Cough     Disease of thyroid gland     hypo    Dysphagia     ED (erectile dysfunction)     Hypertension     Leukopenia     Psoriasis     Tongue cancer (HCC)     Weight loss, abnormal      Past Surgical History:   Procedure Laterality Date    OTHER SURGICAL HISTORY      infusion port inserted and removed    PEG TUBE PLACEMENT      and removal    VT COLONOSCOPY FLX DX W/COLLJ SPEC WHEN PFRMD N/A 6/27/2017    Procedure: COLONOSCOPY with polypectomy x2;  Surgeon: Rodolfo Contreras MD;  Location: [facility]; Service: General    TONGUE BIOPSY / EXCISION      Floor mouth excision of malignant tumor     Family History   Problem Relation Age of Onset    Other Mother         reactive airway dysfunction    Coronary artery disease Father     Hypertension Father     Psoriasis Father      Social History     Social History    Marital status: /Civil Union     Spouse name: N/A    Number of children: 6    Years of education: N/A     Occupational History    Not on file       Social History Main Topics    Smoking status: Former Smoker     Packs/day: 0 25     Years: 20 00     Quit date: 2011    Smokeless tobacco: Former User      Comment: pt quit with dx of tongue base cancer, per allscripts    Alcohol use 7 2 oz/week     12 Cans of beer per week      Comment: socially    Drug use: No    Sexual activity: Not on file     Other Topics Concern    Not on file     Social History Narrative    Daily coffee consumption, 1 cups/day    Daily cola consumption    Daily tea consumption    Dental care, regularly    Exercise: running, weight training    High school graduate    No guns in the home    Pets/animals,  Active               Current Outpatient Prescriptions:     levothyroxine 75 mcg tablet, TAKE 1 TABLET DAILY  , Disp: 90 tablet, Rfl: 0    multivitamin (THERAGRAN) TABS, Take 1 tablet by mouth daily, Disp: , Rfl:     pentoxifylline (TRENtal) 400 mg ER tablet, Take 1 tablet (400 mg total) by mouth 3 (three) times a day with meals, Disp: 270 tablet, Rfl: 0    sildenafil (REVATIO) 20 mg tablet, TAKE 1-5 TABLETS AS NEEDED, Disp: , Rfl: 6  No Known Allergies  Vitals:    11/30/18 1226   BP: 130/90   Pulse: (!) 119   Resp: 16   Temp: 99 8 °F (37 7 °C)       Physical Exam   Constitutional: He is oriented to person, place, and time  He appears well-developed and well-nourished  HENT:   Head: Normocephalic and atraumatic  Right Ear: External ear normal    Left Ear: External ear normal    Eyes: Pupils are equal, round, and reactive to light  Neck: No JVD present  No tracheal deviation present  No thyromegaly present  Cardiovascular: Normal rate, regular rhythm and normal heart sounds  Pulmonary/Chest: Effort normal and breath sounds normal  No stridor  Abdominal: Soft  Bowel sounds are normal    Musculoskeletal: Normal range of motion  Palpable fullness in left trapezius/supraspinatus area  Firm, feels like part of muscle  Tender to deep palpation  Otherwise no discrete masses  Lymphadenopathy:     He has no cervical adenopathy  Neurological: He is alert and oriented to person, place, and time  Skin: Skin is dry     Psychiatric: He has a normal mood and affect  Judgment and thought content normal        Pathology:  none    Labs:  CBC, Coags, BMP, Mg, Phos     Imaging    HISTORY: Chest wall mass  Head and neck carcinoma      Priors: CT of the neck dated 11/15/2018      TECHNIQUE: Axial fat-suppressed T2, coronal T1, coronal fat-suppressed T2  Sagittal T1 bilaterally  Coronal fat-suppressed T1 postcontrast and axial fat-suppressed T1 postcontrast, both with fat suppression  7 5 mL of Gadavist injected intravenously without immediate consequence      FINDINGS: There is no discrete well-circumscribed mass identified within the left neck  There is volume loss of the left sternocleidomastoid muscle along its course compared to the right  The left lateral paraspinal musculature demonstrates loss of   normal fascial planes with slight enlargement compared to the opposite right side  There is faint edema and enhancement identified within this musculature extending inferiorly to the T1 level and superiorly to the C4 level  These changes extend   posteriorly into the posterior superior aspect of the trapezius muscle      These muscular changes on the left are inseparable from the proximal aspect of the brachial plexus in the immediate paraspinal region from approximately C5-T1        Normal alignment of the mid to lower cervical and upper thoracic spine      Limited visualization of the chest is grossly unremarkable      IMPRESSION:     No discrete soft tissue mass identified      Volume loss of the left sternocleidomastoid muscle compared to the right      The left lateral paraspinal musculature beginning at approximately C4 and extending inferiorly to the supraclavicular region demonstrates mild enlargement with faint edema and enhancement  The anterior inferior aspect of these muscular changes are   inseparable from the proximal aspect of the left brachial plexus extending from C5 through T1    Although there is no discrete mass, there is loss of normal fascial planes  Correlate for history of surgery and correlate with the radiation field as these   changes may be treatment related  Although unlikely, tumor infiltration cannot be excluded      Workstation performed: [id number]  Ct Soft Tissue Neck W Contrast    Result Date: 11/15/2018  Narrative: CT NECK WITH CONTRAST INDICATION:   R22 1: Localized swelling, mass and lump, neck, superior left shoulder C01: Malignant neoplasm of base of tongue  Tumor 6 years earlier, COMPARISON:  CT 3/12/2008 TECHNIQUE:  Axial, sagittal, and coronal 2D reformatted images were created from the axial source data and submitted for interpretation  Surface fiducial placed by patient  Radiation dose length product (DLP) for this visit:  399 mGy-cm   This examination, like all CT scans performed in the [facility], was performed utilizing techniques to minimize radiation dose exposure, including the use of iterative reconstruction and automated exposure control  IV Contrast:  85 mL of iohexol (OMNIPAQUE) IMAGE QUALITY:  Diagnostic  FINDINGS: VISUALIZED BRAIN PARENCHYMA:  No acute intracranial pathology of the visualized brain parenchyma  Vascular calcification VISUALIZED ORBITS AND PARANASAL SINUSES:  Dominant left maxillary polyp or retention cyst, minor mucosal thickening right maxillary sinus  NASAL CAVITY AND NASOPHARYNX:  Minor rightward deviation of the nasal septum  SUPRAHYOID NECK:  Normal oral cavity, tongue base, tonsillar fossa and epiglottis  Parapharyngeal and  spaces are normal   Treatment-related changes in the suprahyoid neck with some minor reticulation of fat and thickening of the platysma muscle likely reflecting radiation changes  Submandibular glands are markedly hypoplastic INFRAHYOID NECK:  Edema of the aryepiglottic folds and supraglottic larynx, likely treatment related  Cords are opposed  Surface fiducial placed in the anterior left supraclavicular region    Just dorsal to this, there is an excess of fat in the supraclavicular fossa  This may represent lipoma/liposarcoma  Contrasted MR could be helpful  THYROID GLAND:  Hypoplastic PAROTID AND SUBMANDIBULAR GLANDS:  Submandibular glands are atrophic, likely related to therapy  LYMPH NODES:  No pathologic or enlarged adenopathy  VASCULAR STRUCTURES:  Normal enhancement of the cervical vasculature  Cervical internal carotid artery is tortuous  THORACIC INLET:  Lung apices and upper mediastinum are unremarkable  BONY STRUCTURES: No acute fracture or destructive osseous lesion  Impression: Left supraclavicular mass thought to represent fatty tumor  Contrasted MR of the brachial plexus is suggested  Posttreatment changes in the upper neck  No discrete mass or pathologic adenopathy  Workstation performed: [id number]     Mri Brachial Plexus Wo W Contrast    Addendum Date: 11/29/2018 Addendum:   ADDENDUM:      Result Date: 11/29/2018  Narrative: MRI of the brachial plexus with and without contrast  HISTORY: Chest wall mass  Head and neck carcinoma  Priors: CT of the neck dated 11/15/2018  TECHNIQUE: Axial fat-suppressed T2, coronal T1, coronal fat-suppressed T2  Sagittal T1 bilaterally  Coronal fat-suppressed T1 postcontrast and axial fat-suppressed T1 postcontrast, both with fat suppression  7 5 mL of Gadavist injected intravenously without immediate consequence  FINDINGS: There is no discrete well-circumscribed mass identified within the left neck  There is volume loss of the left sternocleidomastoid muscle along its course compared to the right  The left lateral paraspinal musculature demonstrates loss of normal fascial planes with slight enlargement compared to the opposite right side  There is faint edema and enhancement identified within this musculature extending inferiorly to the T1 level and superiorly to the C4 level  These changes extend posteriorly into the posterior superior aspect of the trapezius muscle  These muscular changes on the left are inseparable from the proximal aspect of the brachial plexus in the immediate paraspinal region from approximately C5-T1  Normal alignment of the mid to lower cervical and upper thoracic spine  Limited visualization of the chest is grossly unremarkable  Impression: No discrete soft tissue mass identified  Volume loss of the left sternocleidomastoid muscle compared to the right  The left lateral paraspinal musculature beginning at approximately C4 and extending inferiorly to the supraclavicular region demonstrates mild enlargement with faint edema and enhancement  The anterior inferior aspect of these muscular changes are inseparable from the proximal aspect of the left brachial plexus extending from C5 through T1  Although there is no discrete mass, there is loss of normal fascial planes  Correlate for history of surgery and correlate with the radiation field as these changes may be treatment related  Although unlikely, tumor infiltration cannot be excluded  Workstation performed: [id number]     I reviewed the above laboratory and imaging data  Discussion/Summary:  Left upper back/shoulder mass  I suspect this is likely post radiation changes to the musculature given that is been persistent since his treatment several years ago  No discrete findings noted on imaging studies  He does have persistent weakness and limited range of motion is left upper shoulder since treatment  We will therefore make referral to physical therapy  We discussed possibility of biopsying this mass, though I would be inclined to follow this clinically by exam   He will therefore come back 3 months for follow-up evaluation  Will make referral for physical therapy which hopefully will help with his left shoulder mobility, flexibility, and strength

## 2018-11-30 NOTE — PROGRESS NOTES
Surgical Oncology consult       [facility]  [facility] SURGICAL ONCOLOGY [place]  [address] [facility]  [place] [address]    Jason Marcuskeyla Marinelli   1965  [phone]  [address]  [facility]GICAL ONCOL[facility] [place]  [address] [place] [address]    Chief Complaint   Patient presents with   [address] [place] [address] patient consult for left supraclavicular mass  Assessment/Plan:    No problem-specific Assessment & Plan notes found for this encounter  Diagnoses and all orders for this visit:    Mass on back  -     Ambulatory referral to Physical Therapy; Future    Lipoma, unspecified site  -     Ambulatory referral to Hematology / Oncology        Advance Care Planning/Advance Directives:  Discussed disease status, cancer treatment plans and/or cancer treatment goals with the patient  No history exists  History of Present Illness:   Patient is a 28-year-old man with a history of 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 2012  He has been disease free since then  Ever since completion of radiation he noticed a fullness in his left upper back/trapezius area  This is persistently become somewhat tender  He has had associated muscle weakness with in his back and shoulder since completion of therapy  There is some concern that this could represent disease recurrence and he therefore comes in to have this evaluated  Review of Systems   Constitutional: Negative  HENT: Negative  Eyes: Negative  Respiratory: Negative  Cardiovascular: Negative  Gastrointestinal: Negative  Endocrine: Negative  Musculoskeletal:        Left upper back/trapezius fullness, weakness   Skin: Negative  Allergic/Immunologic: Negative  Neurological: Negative  Hematological: Negative  Psychiatric/Behavioral: Negative            Patient Active Problem List   Diagnosis    ED (erectile dysfunction) of organic origin    Peyronie's disease    Arthralgia    Cancer of base of tongue (HCC)    Hypertension    Hypothyroidism    Psoriasis    Supraclavicular mass     Past Medical History:   Diagnosis Date    Cancer of base of tongue (HCC)     excision    Cough     Disease of thyroid gland     hypo    Dysphagia     ED (erectile dysfunction)     Hypertension     Leukopenia     Psoriasis     Tongue cancer (HCC)     Weight loss, abnormal      Past Surgical History:   Procedure Laterality Date    OTHER SURGICAL HISTORY      infusion port inserted and removed    PEG TUBE PLACEMENT      and removal    MI COLONOSCOPY FLX DX W/COLLJ SPEC WHEN PFRMD N/A 6/27/2017    Procedure: COLONOSCOPY with polypectomy x2;  Surgeon: Sheri Chairez MD;  Location: [facility]; Service: General    TONGUE BIOPSY / EXCISION      Floor mouth excision of malignant tumor     Family History   Problem Relation Age of Onset    Other Mother         reactive airway dysfunction    Coronary artery disease Father     Hypertension Father     Psoriasis Father      Social History     Social History    Marital status: /Civil Union     Spouse name: N/A    Number of children: 6    Years of education: N/A     Occupational History    Not on file       Social History Main Topics    Smoking status: Former Smoker     Packs/day: 0 25     Years: 20 00     Quit date: 2011    Smokeless tobacco: Former User      Comment: pt quit with dx of tongue base cancer, per allscripts    Alcohol use 7 2 oz/week     12 Cans of beer per week      Comment: socially    Drug use: No    Sexual activity: Not on file     Other Topics Concern    Not on file     Social History Narrative    Daily coffee consumption, 1 cups/day    Daily cola consumption    Daily tea consumption    Dental care, regularly    Exercise: running, weight training    High school graduate    No guns in the home    Pets/animals,  Active               Current Outpatient Prescriptions:     levothyroxine 75 mcg tablet, TAKE 1 TABLET DAILY  , Disp: 90 tablet, Rfl: 0    multivitamin (THERAGRAN) TABS, Take 1 tablet by mouth daily, Disp: , Rfl:     pentoxifylline (TRENtal) 400 mg ER tablet, Take 1 tablet (400 mg total) by mouth 3 (three) times a day with meals, Disp: 270 tablet, Rfl: 0    sildenafil (REVATIO) 20 mg tablet, TAKE 1-5 TABLETS AS NEEDED, Disp: , Rfl: 6  No Known Allergies  Vitals:    11/30/18 1226   BP: 130/90   Pulse: (!) 119   Resp: 16   Temp: 99 8 °F (37 7 °C)       Physical Exam   Constitutional: He is oriented to person, place, and time  He appears well-developed and well-nourished  HENT:   Head: Normocephalic and atraumatic  Right Ear: External ear normal    Left Ear: External ear normal    Eyes: Pupils are equal, round, and reactive to light  Neck: No JVD present  No tracheal deviation present  No thyromegaly present  Cardiovascular: Normal rate, regular rhythm and normal heart sounds  Pulmonary/Chest: Effort normal and breath sounds normal  No stridor  Abdominal: Soft  Bowel sounds are normal    Musculoskeletal: Normal range of motion  Palpable fullness in left trapezius/supraspinatus area  Firm, feels like part of muscle  Tender to deep palpation  Otherwise no discrete masses  Lymphadenopathy:     He has no cervical adenopathy  Neurological: He is alert and oriented to person, place, and time  Skin: Skin is dry  Psychiatric: He has a normal mood and affect  Judgment and thought content normal        Pathology:  none    Labs:  CBC, Coags, BMP, Mg, Phos     Imaging    HISTORY: Chest wall mass  Head and neck carcinoma      Priors: CT of the neck dated 11/15/2018      TECHNIQUE: Axial fat-suppressed T2, coronal T1, coronal fat-suppressed T2  Sagittal T1 bilaterally  Coronal fat-suppressed T1 postcontrast and axial fat-suppressed T1 postcontrast, both with fat suppression    7 5 mL of Gadavist injected intravenously without immediate consequence      FINDINGS: There is no discrete well-circumscribed mass identified within the left neck  There is volume loss of the left sternocleidomastoid muscle along its course compared to the right  The left lateral paraspinal musculature demonstrates loss of   normal fascial planes with slight enlargement compared to the opposite right side  There is faint edema and enhancement identified within this musculature extending inferiorly to the T1 level and superiorly to the C4 level  These changes extend   posteriorly into the posterior superior aspect of the trapezius muscle      These muscular changes on the left are inseparable from the proximal aspect of the brachial plexus in the immediate paraspinal region from approximately C5-T1        Normal alignment of the mid to lower cervical and upper thoracic spine      Limited visualization of the chest is grossly unremarkable      IMPRESSION:     No discrete soft tissue mass identified      Volume loss of the left sternocleidomastoid muscle compared to the right      The left lateral paraspinal musculature beginning at approximately C4 and extending inferiorly to the supraclavicular region demonstrates mild enlargement with faint edema and enhancement  The anterior inferior aspect of these muscular changes are   inseparable from the proximal aspect of the left brachial plexus extending from C5 through T1  Although there is no discrete mass, there is loss of normal fascial planes  Correlate for history of surgery and correlate with the radiation field as these   changes may be treatment related  Although unlikely, tumor infiltration cannot be excluded      Workstation performed: [id number]  Ct Soft Tissue Neck W Contrast    Result Date: 11/15/2018  Narrative: CT NECK WITH CONTRAST INDICATION:   R22 1: Localized swelling, mass and lump, neck, superior left shoulder C01: Malignant neoplasm of base of tongue    Tumor 6 years earlier, COMPARISON:  CT 3/12/2008 TECHNIQUE:  Axial, sagittal, and coronal 2D reformatted images were created from the axial source data and submitted for interpretation  Surface fiducial placed by patient  Radiation dose length product (DLP) for this visit:  930 mGy-cm   This examination, like all CT scans performed in the [facility], was performed utilizing techniques to minimize radiation dose exposure, including the use of iterative reconstruction and automated exposure control  IV Contrast:  85 mL of iohexol (OMNIPAQUE) IMAGE QUALITY:  Diagnostic  FINDINGS: VISUALIZED BRAIN PARENCHYMA:  No acute intracranial pathology of the visualized brain parenchyma  Vascular calcification VISUALIZED ORBITS AND PARANASAL SINUSES:  Dominant left maxillary polyp or retention cyst, minor mucosal thickening right maxillary sinus  NASAL CAVITY AND NASOPHARYNX:  Minor rightward deviation of the nasal septum  SUPRAHYOID NECK:  Normal oral cavity, tongue base, tonsillar fossa and epiglottis  Parapharyngeal and  spaces are normal   Treatment-related changes in the suprahyoid neck with some minor reticulation of fat and thickening of the platysma muscle likely reflecting radiation changes  Submandibular glands are markedly hypoplastic INFRAHYOID NECK:  Edema of the aryepiglottic folds and supraglottic larynx, likely treatment related  Cords are opposed  Surface fiducial placed in the anterior left supraclavicular region  Just dorsal to this, there is an excess of fat in the supraclavicular fossa  This may represent lipoma/liposarcoma  Contrasted MR could be helpful  THYROID GLAND:  Hypoplastic PAROTID AND SUBMANDIBULAR GLANDS:  Submandibular glands are atrophic, likely related to therapy  LYMPH NODES:  No pathologic or enlarged adenopathy  VASCULAR STRUCTURES:  Normal enhancement of the cervical vasculature  Cervical internal carotid artery is tortuous  THORACIC INLET:  Lung apices and upper mediastinum are unremarkable   BONY STRUCTURES: No acute fracture or destructive osseous lesion  Impression: Left supraclavicular mass thought to represent fatty tumor  Contrasted MR of the brachial plexus is suggested  Posttreatment changes in the upper neck  No discrete mass or pathologic adenopathy  Workstation performed: [id number]     Mri Brachial Plexus Wo W Contrast    Addendum Date: 11/29/2018 Addendum:   ADDENDUM:      Result Date: 11/29/2018  Narrative: MRI of the brachial plexus with and without contrast  HISTORY: Chest wall mass  Head and neck carcinoma  Priors: CT of the neck dated 11/15/2018  TECHNIQUE: Axial fat-suppressed T2, coronal T1, coronal fat-suppressed T2  Sagittal T1 bilaterally  Coronal fat-suppressed T1 postcontrast and axial fat-suppressed T1 postcontrast, both with fat suppression  7 5 mL of Gadavist injected intravenously without immediate consequence  FINDINGS: There is no discrete well-circumscribed mass identified within the left neck  There is volume loss of the left sternocleidomastoid muscle along its course compared to the right  The left lateral paraspinal musculature demonstrates loss of normal fascial planes with slight enlargement compared to the opposite right side  There is faint edema and enhancement identified within this musculature extending inferiorly to the T1 level and superiorly to the C4 level  These changes extend posteriorly into the posterior superior aspect of the trapezius muscle  These muscular changes on the left are inseparable from the proximal aspect of the brachial plexus in the immediate paraspinal region from approximately C5-T1  Normal alignment of the mid to lower cervical and upper thoracic spine  Limited visualization of the chest is grossly unremarkable  Impression: No discrete soft tissue mass identified  Volume loss of the left sternocleidomastoid muscle compared to the right   The left lateral paraspinal musculature beginning at approximately C4 and extending inferiorly to the supraclavicular region demonstrates mild enlargement with faint edema and enhancement  The anterior inferior aspect of these muscular changes are inseparable from the proximal aspect of the left brachial plexus extending from C5 through T1  Although there is no discrete mass, there is loss of normal fascial planes  Correlate for history of surgery and correlate with the radiation field as these changes may be treatment related  Although unlikely, tumor infiltration cannot be excluded  Workstation performed: [id number]     I reviewed the above laboratory and imaging data  Discussion/Summary:  Left upper back/shoulder mass  I suspect this is likely post radiation changes to the musculature given that is been persistent since his treatment several years ago  No discrete findings noted on imaging studies  He does have persistent weakness and limited range of motion is left upper shoulder since treatment  We will therefore make referral to physical therapy  We discussed possibility of biopsying this mass, though I would be inclined to follow this clinically by exam   He will therefore come back 3 months for follow-up evaluation  Will make referral for physical therapy which hopefully will help with his left shoulder mobility, flexibility, and strength

## 2018-12-06 ENCOUNTER — EVALUATION (OUTPATIENT)
Dept: PHYSICAL THERAPY | Age: 53
End: 2018-12-06
Payer: COMMERCIAL

## 2018-12-06 DIAGNOSIS — R22.2 MASS ON BACK: ICD-10-CM

## 2018-12-06 DIAGNOSIS — C01 CANCER OF BASE OF TONGUE (HCC): ICD-10-CM

## 2018-12-06 DIAGNOSIS — R29.898 LEFT ARM WEAKNESS: Primary | ICD-10-CM

## 2018-12-06 PROCEDURE — G8984 CARRY CURRENT STATUS: HCPCS

## 2018-12-06 PROCEDURE — G8985 CARRY GOAL STATUS: HCPCS

## 2018-12-06 PROCEDURE — 97162 PT EVAL MOD COMPLEX 30 MIN: CPT

## 2018-12-07 NOTE — PROGRESS NOTES
PT Evaluation     Today's date: 2018  Patient name: Garret Sylvester  : 1965  MRN: [id number]  Referring provider: Corey Matos MD  Dx:   Encounter Diagnosis     ICD-10-CM    1  Left arm weakness R29 898    2  Mass on back R22 2 Ambulatory referral to Physical Therapy                  Assessment/Plan    Subjective Evaluation    History of Present Illness  Onset date: Oct 2012    Not a recurrent problem   Quality of life: good    Pain  Current pain ratin  At best pain ratin  At worst pain ratin  Location: c spine, left shoulder  Quality: tight  Relieving factors: heat and change in position  Aggravating factors: overhead activity and lifting  Progression: no change    Social Support  Steps to enter house: yes (2 no railing )  Stairs in house: yes (11 left railing)   Lives in: multiple-level home  Lives with: spouse and young children    Employment status: working (furniture sales   sometimes lifting )  Exercise history: yardwork, race car driving , outdoors activity    Treatments  Previous treatment: physical therapy  Current treatment: physical therapy  Patient Goals  Patient goals for therapy: increased strength, independence with ADLs/IADLs, return to sport/leisure activities, decreased pain and increased motion  Patient goal: increase left ue rom to full, increase left sh periscapular and sh strength by 1/2 muscle grade        Objective    Flowsheet Rows      Most Recent Value   PT/OT G-Codes   Current Score  63   Projected Score  61   FOTO information reviewed  Yes   Assessment Type  Evaluation   G code set  Carrying, Moving & Handling Objects   Carrying, Moving and Handling Objects Current Status ()  CJ   Carrying, Moving and Handling Objects Goal Status ()  CI          Precautions: no med allergies PMH: 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  until May 2012 ( no surgery)residual left ue and cervical muscle weakness and rom deficits with periscapular involvement on the left       Daily Treatment Diary     Manual  12/6/18             I eval                                                                    Exercise Diary  12/6            Bent knee pushups 5            Cervical isometrics 5 x 5 sec holds            Cervical extension stretch 1 set of 3 hold 10 sec             Foam roll self thoracic mobilization             Sh row, sh ext, sh ext rotation , int rot theraband exer              Serratus and triceps in supine dumbells             Body blade left ue             Cervical self rotation stretch             Cervical upper trap stretch             Cervical scm stretch             Levator stretch             Corner pect stretch             Supine cervical flexion static hold              Prone hughston's exer              Prone neck extension static hold                                                                                   Modalities

## 2018-12-10 ENCOUNTER — OFFICE VISIT (OUTPATIENT)
Dept: PHYSICAL THERAPY | Age: 53
End: 2018-12-10
Payer: COMMERCIAL

## 2018-12-10 DIAGNOSIS — R53.1 GENERALIZED WEAKNESS: Primary | ICD-10-CM

## 2018-12-10 PROCEDURE — 97110 THERAPEUTIC EXERCISES: CPT

## 2018-12-11 NOTE — PROGRESS NOTES
Daily Note     Today's date: 12/10/2018  Patient name: Emory Valdovinos  : 1965  MRN: [id number]  Referring provider: Su Quinn MD  Dx:   Encounter Diagnosis     ICD-10-CM    1  Generalized weakness R53 1                   Subjective: no new c/o's       Objective: See treatment diary below       Manual  18                       I eval                                                                                                                           Exercise Diary  12/6  12/10                   Bent knee pushups 5  10                   Cervical isometrics 5 x 5 sec holds                     Cervical extension stretch 1 set of 3 hold 10 sec                      Foam roll self thoracic mobilization    5 min                   Sh row, sh ext, sh ext rotation , int rot theraband exer     2 sets of 10 green                   Serratus and triceps in supine dumbells    4lb 2 sets 10                   Body blade left ue                       Cervical self rotation stretch    1 set of 3hold 10 sec                   Cervical upper trap stretch    1 set of 3 hold 10 sec                   Cervical scm stretch                       Levator stretch    1 set of 3 hold 10 sec                   Corner pect stretch    1 set of 5 hold 10 sec                   Supine cervical flexion static hold                        Prone hughston's exer     1,3,5 4lb 2 sets 10                   Prone neck extension static hold                                                                                                                                                      Modalities                                                                                                           Assessment: Tolerated treatment well  Patient would benefit from continued PT  Pt issued written home exer program       Plan: Continue per plan of care

## 2018-12-13 ENCOUNTER — OFFICE VISIT (OUTPATIENT)
Dept: PHYSICAL THERAPY | Age: 53
End: 2018-12-13
Payer: COMMERCIAL

## 2018-12-13 DIAGNOSIS — R53.1 GENERAL WEAKNESS: Primary | ICD-10-CM

## 2018-12-13 PROCEDURE — 97110 THERAPEUTIC EXERCISES: CPT

## 2018-12-13 NOTE — PROGRESS NOTES
Daily Note     Today's date: 2018  Patient name: Dianna Jurado  : 1965  MRN: [id number]  Referring provider: Jaden Mcgrath MD  Dx:   Encounter Diagnosis     ICD-10-CM    1  General weakness R53 1                   Subjective: no new c/o's       Objective: See treatment diary below    Manual  18                       I eval                                                                                                                           Exercise Diary  12/6  12/10  12/13                 Bent knee pushups 5  10  10 + 1  push up                 Cervical isometrics 5 x 5 sec holds                     Cervical extension stretch 1 set of 3 hold 10 sec                      Foam roll self thoracic mobilization    5 min  5 min                  Sh row, sh ext, sh ext rotation , int rot theraband exer     2 sets of 10 green  3 x 10                 Serratus and triceps in supine dumbells    4lb 2 sets 10  4lb 3 x 10                 Body blade left ue      30 sec x 5                 Cervical self rotation stretch    1 set of 3hold 10 sec                   Cervical upper trap stretch    1 set of 3 hold 10 sec                   Cervical scm stretch                       Levator stretch    1 set of 3 hold 10 sec                   Corner pect stretch    1 set of 5 hold 10 sec  5 hold 10 sec                  Supine cervical flexion static hold                        Prone hughston's exer     1,3,5 4lb 2 sets 10  1,3,5 3 c 10 4lb                 Prone neck extension static hold                         ball toss to self flex and abd palms up an down      30 each                  chest pass at pitch back and overhead throw      left ue 30 each                                                                                                Modalities                                                                                                           Assessment: Tolerated treatment well   Patient would benefit from continued PT      Plan: Continue per plan of care

## 2018-12-17 ENCOUNTER — OFFICE VISIT (OUTPATIENT)
Dept: PHYSICAL THERAPY | Age: 53
End: 2018-12-17
Payer: COMMERCIAL

## 2018-12-17 DIAGNOSIS — R53.1 GENERAL WEAKNESS: Primary | ICD-10-CM

## 2018-12-17 PROCEDURE — 97140 MANUAL THERAPY 1/> REGIONS: CPT

## 2018-12-17 PROCEDURE — 97110 THERAPEUTIC EXERCISES: CPT

## 2018-12-17 NOTE — PROGRESS NOTES
Daily Note     Today's date: 2018  Patient name: Dianna Jurado  : 1965  MRN: [id number]  Referring provider: Jaden Mcgrath MD  Dx:   Encounter Diagnosis     ICD-10-CM    1  General weakness R53 1                   Subjective: Without significant changes to report  Trial Kanchan left Cervical today  Objective: See treatment diary below      Assessment: Tolerated treatment well  Patient would benefit from continued PT      Plan: Continue per plan of care           Manual  18                 OFELIA Hemphill        15 min                                                                     Exercise Diary  12/6  12/10  12/13  12/17               Bent knee pushups 5  10  10 + 1  push up                 Cervical isometrics 5 x 5 sec holds    5sec x 5   5sec x 5               Cervical extension stretch 1 set of 3 hold 10 sec                      Foam roll self thoracic mobilization    5 min  5 min   5 min               Sh row, sh ext, sh ext rotation , int rot theraband exer     2 sets of 10 green  3 x 10  30               Serratus and triceps in supine dumbells    4lb 2 sets 10  4lb 3 x 10                 Body blade left ue      30 sec x 5                 Cervical self rotation stretch    1 set of 3hold 10 sec                   Cervical upper trap stretch    1 set of 3 hold 10 sec                   Cervical scm stretch                       Levator stretch    1 set of 3 hold 10 sec                   Corner pect stretch    1 set of 5 hold 10 sec  5 hold 10 sec                  Supine cervical flexion static hold                        Prone hughston's exer     1,3,5 4lb 2 sets 10  1,3,5 3 c 10 4lb                 Prone neck extension static hold                         ball toss to self flex and abd palms up an down      30 each                  chest pass at pitch back and overhead throw      left ue 30 each                   UBE        10 min alt                                                                     Modalities

## 2018-12-18 ENCOUNTER — TELEPHONE (OUTPATIENT)
Dept: FAMILY MEDICINE CLINIC | Facility: CLINIC | Age: 53
End: 2018-12-18

## 2018-12-18 DIAGNOSIS — R53.83 FATIGUE, UNSPECIFIED TYPE: Primary | ICD-10-CM

## 2018-12-18 NOTE — TELEPHONE ENCOUNTER
Hes been feeling really tired and sluggish     He is worried his tyroid is off   Can you please order thyroid tests     He would like someone to call him to let him know

## 2018-12-19 ENCOUNTER — APPOINTMENT (OUTPATIENT)
Dept: LAB | Age: 53
End: 2018-12-19
Payer: COMMERCIAL

## 2018-12-19 DIAGNOSIS — R53.83 FATIGUE, UNSPECIFIED TYPE: ICD-10-CM

## 2018-12-19 LAB — TSH SERPL DL<=0.05 MIU/L-ACNC: 3.3 UIU/ML (ref 0.36–3.74)

## 2018-12-19 PROCEDURE — 36415 COLL VENOUS BLD VENIPUNCTURE: CPT

## 2018-12-19 PROCEDURE — 84443 ASSAY THYROID STIM HORMONE: CPT

## 2018-12-20 ENCOUNTER — OFFICE VISIT (OUTPATIENT)
Dept: PHYSICAL THERAPY | Age: 53
End: 2018-12-20
Payer: COMMERCIAL

## 2018-12-20 DIAGNOSIS — R53.1 GENERAL WEAKNESS: Primary | ICD-10-CM

## 2018-12-20 PROCEDURE — 97140 MANUAL THERAPY 1/> REGIONS: CPT

## 2018-12-20 PROCEDURE — 97110 THERAPEUTIC EXERCISES: CPT

## 2018-12-20 NOTE — PROGRESS NOTES
Daily Note     Today's date: 2018  Patient name: Jayden Wong  : 1965  MRN: [id number]  Referring provider: Reinier Rowe MD  Dx:   Encounter Diagnosis     ICD-10-CM    1  General weakness R53 1                   Subjective: Pt  With increased left cervical ROM  Objective: See treatment diary below      Assessment: Tolerated treatment well  Patient would benefit from continued PT      Plan: Continue per plan of care           Manual  18               OFELIA Hemphill        15 min  15 min                                                                   Exercise Diary  12/6  12/10  12/13  12/17  12/20             Bent knee pushups 5  10  10 + 1  push up                 Cervical isometrics 5 x 5 sec holds    5sec x 5   5sec x 5  5sec x 5             Cervical extension stretch 1 set of 3 hold 10 sec         10sec x 5             Foam roll self thoracic mobilization    5 min  5 min   5 min  5 min             Sh row, sh ext, sh ext rotation , int rot theraband exer     2 sets of 10 green  3 x 10  30  30             Serratus and triceps in supine dumbells    4lb 2 sets 10  4lb 3 x 10                 Body blade left ue      30 sec x 5                 Cervical self rotation stretch    1 set of 3hold 10 sec  10sec x 5                 Cervical upper trap stretch    1 set of 3 hold 10 sec  10sec x 5                 Cervical scm stretch                       Levator stretch    1 set of 3 hold 10 sec                   Corner pect stretch    1 set of 5 hold 10 sec  5 hold 10 sec                  Supine cervical flexion static hold                        Prone hughston's exer     1,3,5 4lb 2 sets 10  1,3,5 3 c 10 4lb                 Prone neck extension static hold                         ball toss to self flex and abd palms up an down      30 each                  chest pass at pitch back and overhead throw      left ue 30 each                   UBE        10 min alt  10 min              T-band ER/IR          20                                           Modalities

## 2018-12-24 ENCOUNTER — OFFICE VISIT (OUTPATIENT)
Dept: PHYSICAL THERAPY | Age: 53
End: 2018-12-24
Payer: COMMERCIAL

## 2018-12-24 DIAGNOSIS — M62.81 GENERALIZED MUSCLE WEAKNESS: Primary | ICD-10-CM

## 2018-12-24 PROCEDURE — 97110 THERAPEUTIC EXERCISES: CPT

## 2018-12-24 PROCEDURE — 97140 MANUAL THERAPY 1/> REGIONS: CPT

## 2018-12-27 ENCOUNTER — OFFICE VISIT (OUTPATIENT)
Dept: PHYSICAL THERAPY | Age: 53
End: 2018-12-27
Payer: COMMERCIAL

## 2018-12-27 DIAGNOSIS — R53.1 GENERAL WEAKNESS: Primary | ICD-10-CM

## 2018-12-27 PROCEDURE — G8984 CARRY CURRENT STATUS: HCPCS

## 2018-12-27 PROCEDURE — 97140 MANUAL THERAPY 1/> REGIONS: CPT

## 2018-12-27 PROCEDURE — G8985 CARRY GOAL STATUS: HCPCS

## 2018-12-27 PROCEDURE — 97110 THERAPEUTIC EXERCISES: CPT

## 2018-12-27 NOTE — PROGRESS NOTES
Daily Note     Today's date: 2018  Patient name: Mayra Aguirre  : 1965  MRN: [id number]  Referring provider: Krista Mar MD  Dx:   Encounter Diagnosis     ICD-10-CM    1   General weakness R53 1                   Subjective: "I definitely see progress  "      Objective: See treatment diary below  Manual  18           I eval                                              Graston        15 min  15 min  15 min man  15 min man          man neck stretching               15 min man                                       Exercise Diary  12/6  12/10  12/13  12/17  12/20  14/25  12/27         Bent knee pushups 5  10  10 + 1  push up      3 x 10   5 reps          Cervical isometrics 5 x 5 sec holds    5sec x 5   5sec x 5  5sec x 5             Cervical extension stretch 1 set of 3 hold 10 sec         10sec x 5             Foam roll self thoracic mobilization    5 min  5 min   5 min  5 min    5 min         Sh row, sh ext, sh ext rotation , int rot theraband exer     2 sets of 10 green  3 x 10  30  30  blue 3 x 10  blue 3 x 10         Serratus and triceps in supine dumbells    4lb 2 sets 10  4lb 3 x 10      6lb bilatearl 3 x 10           Body blade left ue      30 sec x 5      1 min x 5   1 min each         Cervical self rotation stretch    1 set of 3hold 10 sec  10sec x 5                 Cervical upper trap stretch    1 set of 3 hold 10 sec  10sec x 5                 Cervical scm stretch            man           Levator stretch    1 set of 3 hold 10 sec                   Corner pect stretch    1 set of 5 hold 10 sec  5 hold 10 sec       5 hold 10 sec   5 reps          Supine cervical flexion static hold                        Prone hughston's exer     1,3,5 4lb 2 sets 10  1,3,5 3 c 10 4lb                 Prone neck extension static hold                         ball toss to self flex and abd palms up an down      30 each        larger small ball 30 reps 30 reps           chest pass at pitch back and overhead throw      left ue 30 each         30 reps           UBE        10 min alt  10 min  10 min             T-band ER/IR          20  3 x 10            at wall ball toss on wall overhead              30 reps               Modalities                                                                                                                   Assessment: Tolerated treatment well  Patient would benefit from continued PT      Plan: Continue per plan of care

## 2019-01-03 ENCOUNTER — APPOINTMENT (OUTPATIENT)
Dept: PHYSICAL THERAPY | Age: 54
End: 2019-01-03
Payer: COMMERCIAL

## 2019-01-07 ENCOUNTER — OFFICE VISIT (OUTPATIENT)
Dept: PHYSICAL THERAPY | Age: 54
End: 2019-01-07
Payer: COMMERCIAL

## 2019-01-07 DIAGNOSIS — R53.1 GENERAL WEAKNESS: Primary | ICD-10-CM

## 2019-01-07 PROCEDURE — 97110 THERAPEUTIC EXERCISES: CPT

## 2019-01-07 PROCEDURE — 97140 MANUAL THERAPY 1/> REGIONS: CPT

## 2019-01-08 NOTE — PROGRESS NOTES
Daily Note     Today's date: 2019  Patient name: Vi Alicea  : 1965  MRN: [id number]  Referring provider: Rich Zee MD  Dx:   Encounter Diagnosis     ICD-10-CM    1   General weakness R53 1                   Subjective: foto improved to 77 upon retest today      Objective: See treatment diary below  Manual  18         I eval                                              Graston        15 min  15 min  15 min man  15 min man  15 min man        man neck stretching               15 min man  15 min man        jt mobs prone c spine                 man 15             Exercise Diary  12/6  12/10  12/13  12/17  12/20  14/25  12/27  1/7       Bent knee pushups 5  10  10 + 1  push up      3 x 10   5 reps          Cervical isometrics 5 x 5 sec holds    5sec x 5   5sec x 5  5sec x 5             Cervical extension stretch 1 set of 3 hold 10 sec         10sec x 5             Foam roll self thoracic mobilization    5 min  5 min   5 min  5 min    5 min         Sh row, sh ext, sh ext rotation , int rot theraband exer     2 sets of 10 green  3 x 10  30  30  blue 3 x 10  blue 3 x 10  blue 3 x 10       Serratus and triceps in supine dumbells    4lb 2 sets 10  4lb 3 x 10      6lb bilatearl 3 x 10           Body blade left ue      30 sec x 5      1 min x 5   1 min each  1 min each       Cervical self rotation stretch    1 set of 3hold 10 sec  10sec x 5                 Cervical upper trap stretch    1 set of 3 hold 10 sec  10sec x 5                 Cervical scm stretch            man           Levator stretch    1 set of 3 hold 10 sec                   Corner pect stretch    1 set of 5 hold 10 sec  5 hold 10 sec       5 hold 10 sec   5 reps   5       Supine cervical flexion static hold                        Prone hughston's exer     1,3,5 4lb 2 sets 10  1,3,5 3 c 10 4lb                 Prone neck extension static hold                         ball toss to self flex and abd palms up an down      30 each        larger small ball 30 reps 30 reps           chest pass at pitch back and overhead throw      left ue 30 each         30 reps           UBE        10 min alt  10 min  10 min             T-band ER/IR          20  3 x 10            at wall ball toss on wall overhead              30 reps               Modalities                                                                                                             Assessment: Tolerated treatment well  Patient would benefit from continued PT      Plan: Continue per plan of care

## 2019-01-10 ENCOUNTER — OFFICE VISIT (OUTPATIENT)
Dept: PHYSICAL THERAPY | Age: 54
End: 2019-01-10
Payer: COMMERCIAL

## 2019-01-10 DIAGNOSIS — R53.1 GENERAL WEAKNESS: Primary | ICD-10-CM

## 2019-01-10 PROCEDURE — 97110 THERAPEUTIC EXERCISES: CPT

## 2019-01-10 PROCEDURE — 97140 MANUAL THERAPY 1/> REGIONS: CPT

## 2019-01-10 NOTE — PROGRESS NOTES
Daily Note     Today's date: 1/10/2019  Patient name: Ventura Hdz  : 1965  MRN: [id number]  Referring provider: Erika Rothman MD  Dx:   Encounter Diagnosis     ICD-10-CM    1  General weakness R53 1                   Subjective: Without further complaints  Objective: See treatment diary below      Assessment: Tolerated treatment well  Patient would benefit from continued PT      Plan: Continue per plan of care           Objective: See treatment diary below  Manual  12/6/18      12/17  12/20  12/24  12/27  1/7  1/10       I eval                                              Graston        15 min  15 min  15 min man  15 min man  15 min man  15 min      man neck stretching               15 min man  15 min man        jt mobs prone c spine                 man 15             Exercise Diary  12/6  12/10  12/13  12/17  12/20  14/25  12/27  1/7  1/10     Bent knee pushups 5  10  10 + 1  push up      3 x 10   5 reps          Cervical isometrics 5 x 5 sec holds    5sec x 5   5sec x 5  5sec x 5             Cervical extension stretch 1 set of 3 hold 10 sec         10sec x 5             Foam roll self thoracic mobilization    5 min  5 min   5 min  5 min    5 min    5 min     Sh row, sh ext, sh ext rotation , int rot theraband exer     2 sets of 10 green  3 x 10  30  30  blue 3 x 10  blue 3 x 10  blue 3 x 10  30     Serratus and triceps in supine dumbells    4lb 2 sets 10  4lb 3 x 10      6lb bilatearl 3 x 10      30     Body blade left ue      30 sec x 5      1 min x 5   1 min each  1 min each  1 min     Cervical self rotation stretch    1 set of 3hold 10 sec  10sec x 5            5     Cervical upper trap stretch    1 set of 3 hold 10 sec  10sec x 5                 Cervical scm stretch            man           Levator stretch    1 set of 3 hold 10 sec                   Corner pect stretch    1 set of 5 hold 10 sec  5 hold 10 sec       5 hold 10 sec   5 reps   5  5     Supine cervical flexion static hold                        Prone hughston's exer     1,3,5 4lb 2 sets 10  1,3,5 3 c 10 4lb                 Prone neck extension static hold                         ball toss to self flex and abd palms up an down      30 each        larger small ball 30 reps 30 reps           chest pass at pitch back and overhead throw      left ue 30 each         30 reps           UBE        10 min alt  10 min  10 min             T-band ER/IR          20  3 x 10            at wall ball toss on wall overhead              30 reps               Modalities

## 2019-01-14 ENCOUNTER — OFFICE VISIT (OUTPATIENT)
Dept: PHYSICAL THERAPY | Age: 54
End: 2019-01-14
Payer: COMMERCIAL

## 2019-01-14 DIAGNOSIS — R53.1 GENERAL WEAKNESS: Primary | ICD-10-CM

## 2019-01-14 PROCEDURE — 97140 MANUAL THERAPY 1/> REGIONS: CPT

## 2019-01-14 PROCEDURE — 97110 THERAPEUTIC EXERCISES: CPT

## 2019-01-14 NOTE — PROGRESS NOTES
Daily Note     Today's date: 2019  Patient name: Halle Gilbert  : 1965  MRN: [id number]  Referring provider: Ayanna Torres MD  Dx:   Encounter Diagnosis     ICD-10-CM    1  General weakness R53 1                   Subjective: Pt  Reports he does feel looser though tightness continues very close to the windpipe  Objective: See treatment diary below      Assessment: Tolerated treatment well  Patient would benefit from continued PT      Plan: Continue per plan of care           Manual  12/6/18      12/17  12/20  12/24  12/27  1/7  1/10  1/14     I eval                                              Graston        15 min  15 min  15 min man  15 min man  15 min man  15 min  15 min    man neck stretching               15 min man  15 min man        jt mobs prone c spine                 man 15             Exercise Diary  12/6  12/10  12/13  12/17  12/20  14/25  12/27  1/7  1/10  1/14   Bent knee pushups 5  10  10 + 1  push up      3 x 10   5 reps          Cervical isometrics 5 x 5 sec holds    5sec x 5   5sec x 5  5sec x 5          hep   Cervical extension stretch 1 set of 3 hold 10 sec         10sec x 5             Foam roll self thoracic mobilization    5 min  5 min   5 min  5 min    5 min    5 min  5 min   Sh row, sh ext, sh ext rotation , int rot theraband exer     2 sets of 10 green  3 x 10  30  30  blue 3 x 10  blue 3 x 10  blue 3 x 10  30  30   Serratus and triceps in supine dumbells    4lb 2 sets 10  4lb 3 x 10      6lb bilatearl 3 x 10      30     Body blade left ue      30 sec x 5      1 min x 5   1 min each  1 min each  1 min  1 min @   Cervical self rotation stretch    1 set of 3hold 10 sec  10sec x 5            5  5   Cervical upper trap stretch    1 set of 3 hold 10 sec  10sec x 5              man   Cervical scm stretch            man           Levator stretch    1 set of 3 hold 10 sec                   Corner pect stretch    1 set of 5 hold 10 sec  5 hold 10 sec       5 hold 10 sec   5 reps   5  5     Supine cervical flexion static hold                        Prone hughston's exer     1,3,5 4lb 2 sets 10  1,3,5 3 c 10 4lb                 Prone neck extension static hold                         ball toss to self flex and abd palms up an down      30 each        larger small ball 30 reps 30 reps           chest pass at pitch back and overhead throw      left ue 30 each         30 reps           UBE        10 min alt  10 min  10 min             T-band ER/IR          20  3 x 10            at wall ball toss on wall overhead              30 reps               Modalities

## 2019-01-17 ENCOUNTER — OFFICE VISIT (OUTPATIENT)
Dept: PHYSICAL THERAPY | Age: 54
End: 2019-01-17
Payer: COMMERCIAL

## 2019-01-17 DIAGNOSIS — R53.1 GENERALIZED WEAKNESS: Primary | ICD-10-CM

## 2019-01-17 PROCEDURE — 97750 PHYSICAL PERFORMANCE TEST: CPT

## 2019-01-17 PROCEDURE — 97110 THERAPEUTIC EXERCISES: CPT

## 2019-01-21 ENCOUNTER — OFFICE VISIT (OUTPATIENT)
Dept: PHYSICAL THERAPY | Age: 54
End: 2019-01-21
Payer: COMMERCIAL

## 2019-01-21 DIAGNOSIS — R53.1 GENERALIZED WEAKNESS: Primary | ICD-10-CM

## 2019-01-21 PROCEDURE — 97110 THERAPEUTIC EXERCISES: CPT

## 2019-01-22 NOTE — PROGRESS NOTES
Daily Note     Today's date: 2019  Patient name: Mayra Aguirre  : 1965  MRN: [id number]  Referring provider: Krista Mar MD  Dx:   Encounter Diagnosis     ICD-10-CM    1  Generalized weakness R53 1                   Subjective: "I shoveled for hours yesterday "      Objective: See treatment diary below     Manual                     cerv joint mobs man                     c prom man                     graston prn left cervical and ant cerv    15 min man                     perf testing for reassessment of status                                                   Exercise Diary                     Prone sh ext, middle trap, [id number] on left 7lb on right 3 x 10  6 and 7 lbs  3 x 10                    push ups 5-10                     Bent kneepush ups 10  10                   Body blade 1min  5 positions  1 min x 5                   Overhead box lifts    3 x 10 2 5 lb in box                    press    10 6 and 7lbs                   Singaporean curls    2lb 1 set of 10                    Pull overs    2lb 1 set of 10                   Bicep curls against wall                       Overhead ball toss on wall                       Cable pull downs                       Cable diagonal pulls                       Bench press    6 and 7lb 1 set of 10                                                                                                                                                                                                 Modalities                                                                                                             Assessment: Tolerated treatment well  Patient would benefit from continued PT, emphasis on overhead push, bench press, pullovers, and  press and instruction in multijoint Turkish curl  Plan: Continue per plan of care

## 2019-01-24 ENCOUNTER — OFFICE VISIT (OUTPATIENT)
Dept: PHYSICAL THERAPY | Age: 54
End: 2019-01-24
Payer: COMMERCIAL

## 2019-01-24 DIAGNOSIS — R53.1 GENERAL WEAKNESS: Primary | ICD-10-CM

## 2019-01-24 PROCEDURE — 97140 MANUAL THERAPY 1/> REGIONS: CPT

## 2019-01-24 PROCEDURE — 97110 THERAPEUTIC EXERCISES: CPT

## 2019-01-24 NOTE — PROGRESS NOTES
Daily Note     Today's date: 2019  Patient name: Shorty Mcmanus  : 1965  MRN: [id number]  Referring provider: Fransisco Vu MD  Dx:   Encounter Diagnosis     ICD-10-CM    1  General weakness R53 1                   Subjective: "I do think my neck has loosened up with the graston " Robert ERINN PT supervising       Objective: See treatment diary below       Manual                   cerv joint mobs man                     c prom man                     graston prn left cervical and ant cerv    15 min man  15 min man                   perf testing for reassessment of status                                                   Exercise Diary                   Prone sh ext, middle trap, [id number] on left 7lb on right 3 x 10  6 and 7 lbs  3 x 10                    push ups 5-10                     Bent kneepush ups 10  10                   Body blade 1min  5 positions  1 min x 5  1 min x 5                 Overhead box lifts    3 x 10 2 5 lb in box                    press sitting    10 6 and 7lbs  18 lb bar 3 sets of 10                  Dominican curls standing     2lb 1 set of 10   2lb 3 sets of 10                  Pull overs supine     2lb 1 set of 10  1lb bar 3 sets of 10                 Bicep curls against wall                       Overhead ball toss on wall                       Cable pull downs      20 lb 3 sets of 10                 Cable diagonal pulls                       Bench press    6 and 7lb 1 set of 10  18lb bar 3 sets 10                                                                                                                                                                                               Modalities                                                                                                              Assessment: Tolerated treatment well  Patient would benefit from continued PT      Plan: Continue per plan of care

## 2019-01-28 ENCOUNTER — OFFICE VISIT (OUTPATIENT)
Dept: PHYSICAL THERAPY | Age: 54
End: 2019-01-28
Payer: COMMERCIAL

## 2019-01-28 DIAGNOSIS — R53.1 GENERALIZED WEAKNESS: Primary | ICD-10-CM

## 2019-01-28 PROCEDURE — 97110 THERAPEUTIC EXERCISES: CPT

## 2019-01-28 PROCEDURE — 97140 MANUAL THERAPY 1/> REGIONS: CPT

## 2019-01-29 NOTE — PROGRESS NOTES
Daily Note     Today's date: 2019  Patient name: Marian López  : 1965  MRN: [id number]  Referring provider: Billie Powers MD  Dx:   Encounter Diagnosis     ICD-10-CM    1  Generalized weakness R53 1                   Subjective: no new c o's Elysia PTA supervising and Rocky Zheng       Objective: See treatment diary below     Manual                 cerv joint mobs man                     c prom man                     graston prn left cervical and ant cerv    15 min man  15 min man  15 man                 perf testing for reassessment of status                                                   Exercise Diary                 Prone sh ext, middle trap, [id number] on left 7lb on right 3 x 10  6 and 7 lbs  3 x 10                    push ups 5-10                     Bent kneepush ups 10  10                   Body blade 1min  5 positions  1 min x 5  1 min x 5  1 in x 5               Overhead box lifts    3 x 10 2 5 lb in box                    press sitting    10 6 and 7lbs  18 lb bar 3 sets of 10   18lb                Serbian curls standing     2lb 1 set of 10   2lb 3 sets of 10   2lb 3 sets of 10               Pull overs supine     2lb 1 set of 10  18lb bar 3 sets of 10  18, 3 x 10               Bicep curls against wall                       Overhead ball toss on wall                       Cable pull downs      20 lb 3 sets of 10  20 lb 3 x 10               Cable diagonal pulls                       Bench press    6 and 7lb 1 set of 10  18lb bar 3 sets 10  18lb 3 x 10                                                                                                                                                                                             Modalities                                                                                                             Assessment: Tolerated treatment well  Patient would benefit from continued PT  45 min charged exer only today for 1 on 1 care      Plan: Continue per plan of care

## 2019-01-31 ENCOUNTER — OFFICE VISIT (OUTPATIENT)
Dept: PHYSICAL THERAPY | Age: 54
End: 2019-01-31
Payer: COMMERCIAL

## 2019-01-31 DIAGNOSIS — R53.1 GENERALIZED WEAKNESS: Primary | ICD-10-CM

## 2019-01-31 PROCEDURE — 97140 MANUAL THERAPY 1/> REGIONS: CPT

## 2019-01-31 PROCEDURE — 97110 THERAPEUTIC EXERCISES: CPT

## 2019-02-01 NOTE — PROGRESS NOTES
Daily Note     Today's date: 2019  Patient name: Ryan Betancur  : 1965  MRN: [id number]  Referring provider: Elmer Celis MD  Dx:   Encounter Diagnosis     ICD-10-CM    1   Generalized weakness R53 1                   Subjective: "my neck is looser after graston treatment "      Objective: See treatment diary below       Manual               cerv joint mobs man        man             c prom man        man             graston prn left cervical and ant cerv    15 min man  15 min man  15 man  man 15 min               perf testing for reassessment of status                                                   Exercise Diary               Prone sh ext, middle trap, [id number] on left 7lb on right 3 x 10  6 and 7 lbs  3 x 10                    push ups 5-10                     Bent kneepush ups 10  10                   Body blade 1min  5 positions  1 min x 5  1 min x 5  1 in x 5   x 5 1 min each             Overhead box lifts    3 x 10 2 5 lb in box                    press sitting    10 6 and 7lbs  18 lb bar 3 sets of 10   18lb   18lb 3 x 10             Turkish curls standing     2lb 1 set of 10   2lb 3 sets of 10   2lb 3 sets of 10  2lb 3 x 10             Pull overs supine     2lb 1 set of 10  18lb bar 3 sets of 10  18, 3 x 10  18lb 3 x 10             Bicep curls against wall                       Overhead ball toss on wall                       Cable pull downs      20 lb 3 sets of 10  20 lb 3 x 10  20 lb 3 x 10             Cable diagonal pulls                       Bench press    6 and 7lb 1 set of 10  18lb bar 3 sets 10  18lb 3 x 10  18lb 3 x 10                                                                                                                                                                                           Modalities                                                                                                         Assessment: Tolerated treatment well  Patient would benefit from continued PT  The pt would like ot continue 2 x week  Plan: Continue per plan of care

## 2019-02-04 ENCOUNTER — OFFICE VISIT (OUTPATIENT)
Dept: PHYSICAL THERAPY | Age: 54
End: 2019-02-04
Payer: COMMERCIAL

## 2019-02-04 DIAGNOSIS — R53.1 GENERAL WEAKNESS: Primary | ICD-10-CM

## 2019-02-04 PROCEDURE — 97140 MANUAL THERAPY 1/> REGIONS: CPT

## 2019-02-04 PROCEDURE — 97110 THERAPEUTIC EXERCISES: CPT

## 2019-02-05 NOTE — PROGRESS NOTES
Daily Note     Today's date: 2019  Patient name: Kelli Justin  : 1965  MRN: [id number]  Referring provider: Kassandra Sexton MD  Dx:   Encounter Diagnosis     ICD-10-CM    1   General weakness R53 1        Start Time: 0900  Stop Time: 1000  Total time in clinic (min): 60 minutes    Subjective: no new c/o's       Objective: See treatment diary below     Manual    2/4           cerv joint mobs man        man  man           c prom man        man  man           graston prn left cervical and ant cerv    15 min man  15 min man  15 man  man 15 min  man             perf testing for reassessment of status                                                   Exercise Diary    2/4           Prone sh ext, middle trap, [id number] on left 7lb on right 3 x 10  6 and 7 lbs  3 x 10                    push ups 5-10                     Bent kneepush ups 10  10                   Body blade 1min  5 positions  1 min x 5  1 min x 5  1 in x 5   x 5 1 min each   x 5           Overhead box lifts    3 x 10 2 5 lb in box                    press sitting    10 6 and 7lbs  18 lb bar 3 sets of 10   18lb   18lb 3 x 10  18lb 3 x 10           Polish curls standing     2lb 1 set of 10   2lb 3 sets of 10   2lb 3 sets of 10  2lb 3 x 10  2lb 3 x 10           Pull overs supine     2lb 1 set of 10  18lb bar 3 sets of 10  18, 3 x 10  18lb 3 x 10  18 lb 3 x 10           Bicep curls against wall                       Overhead ball toss on wall                       Cable pull downs      20 lb 3 sets of 10  20 lb 3 x 10  20 lb 3 x 10  20 lb 3 x 10           Cable diagonal pulls                       Bench press    6 and 7lb 1 set of 10  18lb bar 3 sets 10  18lb 3 x 10  18lb 3 x 10  18;b 3 x 10-                                                                                                                                                                                       Modalities                                                                                                             Assessment: Tolerated treatment well  Patient would benefit from continued PT      Plan: Continue per plan of care

## 2019-02-07 ENCOUNTER — APPOINTMENT (OUTPATIENT)
Dept: PHYSICAL THERAPY | Age: 54
End: 2019-02-07
Payer: COMMERCIAL

## 2019-02-08 ENCOUNTER — OFFICE VISIT (OUTPATIENT)
Dept: PHYSICAL THERAPY | Age: 54
End: 2019-02-08
Payer: COMMERCIAL

## 2019-02-08 DIAGNOSIS — R53.1 GENERALIZED WEAKNESS: Primary | ICD-10-CM

## 2019-02-08 PROCEDURE — 97140 MANUAL THERAPY 1/> REGIONS: CPT

## 2019-02-08 PROCEDURE — 97110 THERAPEUTIC EXERCISES: CPT

## 2019-02-08 NOTE — PROGRESS NOTES
Daily Note     Today's date: 2019  Patient name: Cheyenne Hdz  : 1965  MRN: [id number]  Referring provider: Neli Grossman MD  Dx:   Encounter Diagnosis     ICD-10-CM    1  Generalized weakness R53 1      2             Subjective: "I do see my left shoulder blade rising up when I do curls " Pt again encouraged to utilize wall for extra stabilization with bicep curl exer also focus on scapular stabilization        Objective: See treatment diary below  Manual    2  2/8         cerv joint mobs man        man  man  man         c prom man        man  man  man         graston prn left cervical and ant cerv    15 min man  15 min man  15 man  man 15 min  man  man           perf testing for reassessment of status                                                   Exercise Diary    2  2/8         Prone sh ext, middle trap, [id number] on left 7lb on right 3 x 10  6 and 7 lbs  3 x 10          6 lb 3 x 10          push ups 5-10                     Bent kneepush ups 10  10          10         Body blade 1min  5 positions  1 min x 5  1 min x 5  1 in x 5   x 5 1 min each   x 5  5         Overhead box lifts    3 x 10 2 5 lb in box                    press sitting    10 6 and 7lbs  18 lb bar 3 sets of 10   18lb   18lb 3 x 10  18lb 3 x 10           Djiboutian curls standing     2lb 1 set of 10   2lb 3 sets of 10   2lb 3 sets of 10  2lb 3 x 10  2lb 3 x 10           Pull overs supine     2lb 1 set of 10  18lb bar 3 sets of 10  18, 3 x 10  18lb 3 x 10  18 lb 3 x 10  18lb 3 x 10         Bicep curls against wall              6lb 3 x 10         Overhead ball toss on wall                       Cable pull downs      20 lb 3 sets of 10  20 lb 3 x 10  20 lb 3 x 10  20 lb 3 x 10  20 lb 3 x 10         Cable diagonal pulls                       Bench press    6 and 7lb 1 set of 10  18lb bar 3 sets 10  18lb 3 x 10  18lb 3 x 10  18;b 3 x 10-  18lb 3 x 10                                                                                                                                                                                       Modalities                                                                                                          Assessment: Tolerated treatment well  Patient would benefit from continued PT      Plan: Continue per plan of care

## 2019-02-27 ENCOUNTER — OFFICE VISIT (OUTPATIENT)
Dept: PHYSICAL THERAPY | Age: 54
End: 2019-02-27
Payer: COMMERCIAL

## 2019-02-27 DIAGNOSIS — R53.1 GENERALIZED WEAKNESS: Primary | ICD-10-CM

## 2019-02-27 PROCEDURE — 97110 THERAPEUTIC EXERCISES: CPT

## 2019-02-27 PROCEDURE — 97140 MANUAL THERAPY 1/> REGIONS: CPT

## 2019-02-27 NOTE — PROGRESS NOTES
Daily Note     Today's date: 2019  Patient name: Santa Miner  : 1965  MRN: [id number]  Referring provider: Arun Angel MD  Dx:   Encounter Diagnosis     ICD-10-CM    1  Generalized weakness R53 1                   Subjective: Pt  With improved neck coloration and ROM  Objective: See treatment diary below      Assessment: Tolerated treatment well  Patient would benefit from continued PT      Plan: Continue per plan of care           Objective: See treatment diary below  Manual         cerv joint mobs man        man  man  man         c prom man        man  man  man         graston prn left cervical and ant cerv    15 min man  15 min man  15 man  man 15 min  man  man  30 min         perf testing for reassessment of status                                                   Exercise Diary         Prone sh ext, middle trap, [id number] on left 7lb on right 3 x 10  6 and 7 lbs  3 x 10          6 lb 3 x 10  6# x 30        push ups 5-10              ball x 30       Bent kneepush ups 10  10          10         Body blade 1min  5 positions  1 min x 5  1 min x 5  1 in x 5   x 5 1 min each   x 5  5  5       Overhead box lifts    3 x 10 2 5 lb in box                    press sitting    10 6 and 7lbs  18 lb bar 3 sets of 10   18lb   18lb 3 x 10  18lb 3 x 10           Slovak curls standing     2lb 1 set of 10   2lb 3 sets of 10   2lb 3 sets of 10  2lb 3 x 10  2lb 3 x 10           Pull overs supine     2lb 1 set of 10  18lb bar 3 sets of 10  18, 3 x 10  18lb 3 x 10  18 lb 3 x 10  18lb 3 x 10         Bicep curls against wall              6lb 3 x 10  30       Overhead ball toss on wall                       Cable pull downs      20 lb 3 sets of 10  20 lb 3 x 10  20 lb 3 x 10  20 lb 3 x 10  20 lb 3 x 10         Cable diagonal pulls                       Bench press    6 and 7lb 1 set of 10  18lb bar 3 sets 10  18lb 3 x 10  18lb 3 x 10  18;b 3 x 10-  18lb 3 x 10                                                                                                                                                                                       Modalities

## 2019-03-01 DIAGNOSIS — E03.9 HYPOTHYROIDISM, UNSPECIFIED TYPE: ICD-10-CM

## 2019-03-01 RX ORDER — LEVOTHYROXINE SODIUM 0.07 MG/1
TABLET ORAL
Qty: 90 TABLET | Refills: 0 | Status: SHIPPED | OUTPATIENT
Start: 2019-03-01 | End: 2019-05-31 | Stop reason: SDUPTHER

## 2019-03-06 ENCOUNTER — OFFICE VISIT (OUTPATIENT)
Dept: PHYSICAL THERAPY | Age: 54
End: 2019-03-06
Payer: COMMERCIAL

## 2019-03-06 DIAGNOSIS — R53.1 GENERALIZED WEAKNESS: Primary | ICD-10-CM

## 2019-03-06 PROCEDURE — 97110 THERAPEUTIC EXERCISES: CPT

## 2019-03-06 PROCEDURE — 97140 MANUAL THERAPY 1/> REGIONS: CPT

## 2019-03-06 NOTE — PROGRESS NOTES
Daily Note     Today's date: 3/6/2019  Patient name: Meneu Chen  : 1965  MRN: [id number]  Referring provider: Darlyn Kapadia MD  Dx:   Encounter Diagnosis     ICD-10-CM    1  Generalized weakness R53 1                   Subjective: Pt tightness more at L medial scapular border along with L UT      Objective: See treatment diary below      Assessment: Tolerated treatment well  Patient would benefit from continued PT  Plan: Continue per plan of care           Objective: See treatment diary below  Manual    2/4  2  3/6     cerv joint mobs man        man  man  man    man     c prom man        man  man  man    man     graston prn left cervical and ant cerv    15 min man  15 min man  15 man  man 15 min  man  man  30 min  25min       perf testing for reassessment of status                                                   Exercise Diary    2  2  3/6     Prone sh ext, middle trap, [id number] on left 7lb on right 3 x 10  6 and 7 lbs  3 x 10          6 lb 3 x 10  6# x 30  6# x30      push ups 5-10              ball x 30       Bent kneepush ups 10  10          10         Body blade 1min  5 positions  1 min x 5  1 min x 5  1 in x 5   x 5 1 min each   x 5  5  5  x5     Overhead box lifts    3 x 10 2 5 lb in box                    press sitting    10 6 and 7lbs  18 lb bar 3 sets of 10   18lb   18lb 3 x 10  18lb 3 x 10           English curls standing     2lb 1 set of 10   2lb 3 sets of 10   2lb 3 sets of 10  2lb 3 x 10  2lb 3 x 10           Pull overs supine     2lb 1 set of 10  18lb bar 3 sets of 10  18, 3 x 10  18lb 3 x 10  18 lb 3 x 10  18lb 3 x 10    18 lb 3x10     Bicep curls against wall              6lb 3 x 10  30       Overhead ball toss on wall                       Cable pull downs      20 lb 3 sets of 10  20 lb 3 x 10  20 lb 3 x 10  20 lb 3 x 10  20 lb 3 x 10  20  20 lb 3x10     Cable diagonal pulls                       Bench press    6 and 7lb 1 set of 10  18lb bar 3 sets 10  18lb 3 x 10  18lb 3 x 10  18;b 3 x 10-  18lb 3 x 10    18 lb 3x10                                                                                                                                                                                   Modalities

## 2019-03-11 ENCOUNTER — OFFICE VISIT (OUTPATIENT)
Dept: PHYSICAL THERAPY | Age: 54
End: 2019-03-11
Payer: COMMERCIAL

## 2019-03-11 DIAGNOSIS — R53.1 GENERALIZED WEAKNESS: Primary | ICD-10-CM

## 2019-03-11 PROCEDURE — 97110 THERAPEUTIC EXERCISES: CPT

## 2019-03-11 PROCEDURE — 97140 MANUAL THERAPY 1/> REGIONS: CPT

## 2019-03-11 NOTE — PROGRESS NOTES
Daily Note     Today's date: 3/11/2019  Patient name: Sabine De Oliveira  : 1965  MRN: [id number]  Referring provider: Melvina Andrews MD  Dx:   Encounter Diagnosis     ICD-10-CM    1  Generalized weakness R53 1                   Subjective: pt  Reports he feels stronger  Objective: See treatment diary below      Assessment: Tolerated treatment well  Patient would benefit from continued PT      Plan: Continue per plan of care       Manual    2/4  2/8  2/27  3/6  3/11   cerv joint mobs man        man  man  man    man     c prom man        man  man  man    man     roselia prn left cervical and ant cerv    15 min man  15 min man  15 man  man 15 min  man  man  30 min  25min  15 min     perf testing for reassessment of status                                                   Exercise Diary    2/4  2/8  2/27  3/6  3/11   Prone sh ext, middle trap, [id number] on left 7lb on right 3 x 10  6 and 7 lbs  3 x 10          6 lb 3 x 10  6# x 30  6# x30      push ups 5-10              ball x 30       Bent kneepush ups 10  10          10      2 x 12   Body blade 1min  5 positions  1 min x 5  1 min x 5  1 in x 5   x 5 1 min each   x 5  5  5  x5  x5   Overhead box lifts    3 x 10 2 5 lb in box                    press sitting    10 6 and 7lbs  18 lb bar 3 sets of 10   18lb   18lb 3 x 10  18lb 3 x 10        18# 3 x 18   Stateless curls standing     2lb 1 set of 10   2lb 3 sets of 10   2lb 3 sets of 10  2lb 3 x 10  2lb 3 x 10           Pull overs supine     2lb 1 set of 10  18lb bar 3 sets of 10  18, 3 x 10  18lb 3 x 10  18 lb 3 x 10  18lb 3 x 10    18 lb 3x10  18# 3 x 18   Bicep curls Cable              6lb 3 x 10  30    25# x 30   Cable pull downs                    25# x 30   Cable pull downs      20 lb 3 sets of 10  20 lb 3 x 10  20 lb 3 x 10  20 lb 3 x 10  20 lb 3 x 10  20  20 lb 3x10  25# x 30   Cable diagonal pulls                       Bench press    6 and 7lb 1 set of 10  18lb bar 3 sets 10  18lb 3 x 10  18lb 3 x 10  18;b 3 x 10-  18lb 3 x 10    18 lb 3x10  18# 3 x 18                                                                                                                                                                                 Modalities

## 2019-03-13 ENCOUNTER — OFFICE VISIT (OUTPATIENT)
Dept: PHYSICAL THERAPY | Age: 54
End: 2019-03-13
Payer: COMMERCIAL

## 2019-03-13 DIAGNOSIS — R53.1 GENERALIZED WEAKNESS: Primary | ICD-10-CM

## 2019-03-13 PROCEDURE — 97140 MANUAL THERAPY 1/> REGIONS: CPT

## 2019-03-13 PROCEDURE — 97110 THERAPEUTIC EXERCISES: CPT

## 2019-03-13 NOTE — PROGRESS NOTES
Daily Note     Today's date: 3/13/2019  Patient name: Lucrecia Brown  : 1965  MRN: [id number]  Referring provider: Arjun Rey MD  Dx:   Encounter Diagnosis     ICD-10-CM    1  Generalized weakness R53 1                   Subjective: Strength improving along with cervical ROM  Objective: See treatment diary below      Assessment: Tolerated treatment well  Patient would benefit from continued PT      Plan: Continue per plan of care         Manual  3/13  1/21  1/24  1/28  1/31  2/4  2/8  2/27  3/6  3/11   cerv joint mobs        Mylene Dami  man    man     c prom         man  man  man    man     graston prn left cervical and ant cerv  15 min  15 min man  15 min man  15 man  man 15 min  man  man  30 min  25min  15 min     perf testing for reassessment of status                                                   Exercise Diary  3/13  1/21  1/24  1/28  1/31  2/4  2/8  2/27  3/6  3/11   Prone sh ext, middle trap, rows 6# 3 x 10           6 lb 3 x 10  6# x 30  6# x30      push   2 x 10              ball x 30       Bent kneepush ups 10  10          10      2 x 12   Body blade 1min  5 positions  1 min x 5  1 min x 5  1 in x 5   x 5 1 min each   x 5  5  5  x5  x5   Overhead box lifts    3 x 10 2 5 lb in box                   Ball against wall  2 x 30    18 lb bar 3 sets of 10   18lb   18lb 3 x 10  18lb 3 x 10        18# 3 x 18   Ball push-ups   3 x 10    2lb 3 sets of 10   2lb 3 sets of 10  2lb 3 x 10  2lb 3 x 10           Pull overs supine   18# bar x 30    18lb bar 3 sets of 10  18, 3 x 10  18lb 3 x 10  18 lb 3 x 10  18lb 3 x 10    18 lb 3x10  18# 3 x 18   Bicep curls Cable  25# x 30            6lb 3 x 10  30    25# x 30   Cable pull downs  25# x 30                  25# x 30   Cable pull downs  25# x 30    20 lb 3 sets of 10  20 lb 3 x 10  20 lb 3 x 10  20 lb 3 x 10  20 lb 3 x 10  20  20 lb 3x10  25# x 30   Cable diagonal pulls                       Bench press    6 and 7lb 1 set of 10  18lb bar 3 sets 10  18lb 3 x 10  18lb 3 x 10  18;b 3 x 10-  18lb 3 x 10    18 lb 3x10  18# 3 x 18                                                                                                                                                                                 Modalities

## 2019-03-20 ENCOUNTER — OFFICE VISIT (OUTPATIENT)
Dept: PHYSICAL THERAPY | Age: 54
End: 2019-03-20
Payer: COMMERCIAL

## 2019-03-20 DIAGNOSIS — R53.1 GENERALIZED WEAKNESS: Primary | ICD-10-CM

## 2019-03-20 PROCEDURE — 97140 MANUAL THERAPY 1/> REGIONS: CPT

## 2019-03-20 PROCEDURE — 97110 THERAPEUTIC EXERCISES: CPT

## 2019-03-20 NOTE — PROGRESS NOTES
Daily Note     Today's date: 3/20/2019  Patient name: Charlie Lombardi  : 1965  MRN: [id number]  Referring provider: Clemencia Rivas MD  Dx:   Encounter Diagnosis     ICD-10-CM    1  Generalized weakness R53 1                   Subjective: Without further complaints  Objective: See treatment diary below      Assessment: Tolerated treatment well  Patient would benefit from continued PT      Plan: Continue per plan of care           Manual  3/13  3/20  1/24  1/28  1/31  2/4  2/8  2/27  3/6  3/11   cerv joint mobs        [place]  man    man     c prom         man  man  man    man     graston prn left cervical and ant cerv  15 min  15 min man  15 min man  15 man  man 15 min  man  man  30 min  25min  15 min     perf testing for reassessment of status                                                   Exercise Diary  3/13  3/20  1/24  1/28  1/31  2/4  2/8  2/27  3/6  3/11   Prone sh ext, middle trap, rows 6# 3 x 10           6 lb 3 x 10  6# x 30  6# x30      push   2 x 10              ball x 30       Bent kneepush ups 10            10      2 x 12   Body blade 1min  5 positions  1 min x 5  1 min x 5  1 in x 5   x 5 1 min each   x 5  5  5  x5  x5   Overhead box lifts    3 x 10 2 5 lb in box                   Ball against wall  2 x 30    18 lb bar 3 sets of 10   18lb   18lb 3 x 10  18lb 3 x 10        18# 3 x 18   Ball push-ups   3 x 10    2lb 3 sets of 10   2lb 3 sets of 10  2lb 3 x 10  2lb 3 x 10           Pull overs supine   18# bar x 30    18lb bar 3 sets of 10  18, 3 x 10  18lb 3 x 10  18 lb 3 x 10  18lb 3 x 10    18 lb 3x10  18# 3 x 18   Bicep curls Cable  25# x 30            6lb 3 x 10  30    25# x 30   Cable pull downs  25# x 30                  25# x 30   Cable pull downs  25# x 30    20 lb 3 sets of 10  20 lb 3 x 10  20 lb 3 x 10  20 lb 3 x 10  20 lb 3 x 10  20  20 lb 3x10  25# x 30   Cable diagonal pulls                       Bench press    6 and 7lb 1 set of 10  18lb bar 3 sets 10  18lb 3 x 10  18lb 3 x 10  18;b 3 x 10-  18lb 3 x 10    18 lb 3x10  18# 3 x 18                                                                                                                                                                                 Modalities

## 2019-03-22 ENCOUNTER — APPOINTMENT (OUTPATIENT)
Dept: PHYSICAL THERAPY | Age: 54
End: 2019-03-22
Payer: COMMERCIAL

## 2019-03-26 ENCOUNTER — OFFICE VISIT (OUTPATIENT)
Dept: PHYSICAL THERAPY | Age: 54
End: 2019-03-26
Payer: COMMERCIAL

## 2019-03-26 DIAGNOSIS — R53.1 GENERALIZED WEAKNESS: Primary | ICD-10-CM

## 2019-03-26 PROCEDURE — 97140 MANUAL THERAPY 1/> REGIONS: CPT

## 2019-03-26 PROCEDURE — 97110 THERAPEUTIC EXERCISES: CPT

## 2019-03-26 NOTE — PROGRESS NOTES
PT Re-Evaluation     Today's date: 3/26/2019  Patient name: Haritha Savage  : 1965  MRN: [id number]  Referring provider: Rakel Magana MD  Dx:   Encounter Diagnosis     ICD-10-CM    1   Generalized weakness R53 1                   Assessment/Plan    Subjective    Objective        Precautions: no med allergies  Amber Dilling supervising    Daily Treatment Diary     Manual  3/26            graston c spine ant /post Man             Cervical mobs grade 3 in prone ant post man                                                       Exercise Diary  3/26            Body blade 1 min  5 positions 5            Serratus, triceps 8lb 3 x 10            Prone 1,3,5 hughstons with dumbells 5 lb 3 x 10 5lb            Foam roll self thoracic mobilization   5min            Pulleys pull downs, rows, 40 lb  3 x 10            theraband blue sh ext, rows,  3 x 10            Pushups  10                                                                                                                                                                                         Modalities

## 2019-03-29 ENCOUNTER — APPOINTMENT (OUTPATIENT)
Dept: PHYSICAL THERAPY | Age: 54
End: 2019-03-29
Payer: COMMERCIAL

## 2019-04-20 ENCOUNTER — HOSPITAL ENCOUNTER (INPATIENT)
Facility: HOSPITAL | Age: 54
LOS: 2 days | Discharge: HOME | DRG: 871 | End: 2019-04-22
Attending: EMERGENCY MEDICINE | Admitting: INTERNAL MEDICINE
Payer: COMMERCIAL

## 2019-04-20 ENCOUNTER — APPOINTMENT (EMERGENCY)
Dept: ULTRASOUND IMAGING | Facility: HOSPITAL | Age: 54
DRG: 871 | End: 2019-04-20
Payer: COMMERCIAL

## 2019-04-20 ENCOUNTER — APPOINTMENT (EMERGENCY)
Dept: CT IMAGING | Facility: HOSPITAL | Age: 54
DRG: 871 | End: 2019-04-20
Payer: COMMERCIAL

## 2019-04-20 ENCOUNTER — APPOINTMENT (EMERGENCY)
Dept: RADIOLOGY | Facility: HOSPITAL | Age: 54
DRG: 871 | End: 2019-04-20
Payer: COMMERCIAL

## 2019-04-20 DIAGNOSIS — R13.10 DYSPHAGIA: Chronic | ICD-10-CM

## 2019-04-20 DIAGNOSIS — A41.9 SEPSIS (HCC): ICD-10-CM

## 2019-04-20 DIAGNOSIS — J18.9 PNEUMONIA: Primary | ICD-10-CM

## 2019-04-20 DIAGNOSIS — D72.825 BANDEMIA: ICD-10-CM

## 2019-04-20 PROBLEM — Z85.819 HISTORY OF ORAL CANCER: Chronic | Status: ACTIVE | Noted: 2019-04-20

## 2019-04-20 LAB
ALBUMIN SERPL BCP-MCNC: 3.5 G/DL (ref 3.5–5)
ALP SERPL-CCNC: 62 U/L (ref 46–116)
ALT SERPL W P-5'-P-CCNC: 42 U/L (ref 12–78)
ANION GAP SERPL CALCULATED.3IONS-SCNC: 6 MMOL/L (ref 4–13)
APTT PPP: 27 SECONDS (ref 26–38)
AST SERPL W P-5'-P-CCNC: 23 U/L (ref 5–45)
BACTERIA UR QL AUTO: ABNORMAL /HPF
BASOPHILS # BLD MANUAL: 0 THOUSAND/UL (ref 0–0.1)
BASOPHILS NFR MAR MANUAL: 0 % (ref 0–1)
BILIRUB SERPL-MCNC: 1.45 MG/DL (ref 0.2–1)
BILIRUB UR QL STRIP: ABNORMAL
BUN SERPL-MCNC: 16 MG/DL (ref 5–25)
CALCIUM SERPL-MCNC: 9.4 MG/DL (ref 8.3–10.1)
CHLORIDE SERPL-SCNC: 101 MMOL/L (ref 100–108)
CLARITY UR: CLEAR
CO2 SERPL-SCNC: 30 MMOL/L (ref 21–32)
COLOR UR: ABNORMAL
CREAT SERPL-MCNC: 0.93 MG/DL (ref 0.6–1.3)
EOSINOPHIL # BLD MANUAL: 0 THOUSAND/UL (ref 0–0.4)
EOSINOPHIL NFR BLD MANUAL: 0 % (ref 0–6)
ERYTHROCYTE [DISTWIDTH] IN BLOOD BY AUTOMATED COUNT: 12.2 % (ref 11.6–15.1)
GFR SERPL CREATININE-BSD FRML MDRD: 91 ML/MIN/1.73SQ M
GLUCOSE SERPL-MCNC: 104 MG/DL (ref 65–140)
GLUCOSE UR STRIP-MCNC: NEGATIVE MG/DL
HCT VFR BLD AUTO: 44.9 % (ref 36.5–49.3)
HGB BLD-MCNC: 15.1 G/DL (ref 12–17)
HGB UR QL STRIP.AUTO: NEGATIVE
HYALINE CASTS #/AREA URNS LPF: ABNORMAL /LPF
INR PPP: 1.05 (ref 0.86–1.17)
KETONES UR STRIP-MCNC: NEGATIVE MG/DL
LACTATE SERPL-SCNC: 1.1 MMOL/L (ref 0.5–2)
LEUKOCYTE ESTERASE UR QL STRIP: NEGATIVE
LIPASE SERPL-CCNC: 44 U/L (ref 73–393)
LYMPHOCYTES # BLD AUTO: 0.38 THOUSAND/UL (ref 0.6–4.47)
LYMPHOCYTES # BLD AUTO: 2 % (ref 14–44)
MCH RBC QN AUTO: 32 PG (ref 26.8–34.3)
MCHC RBC AUTO-ENTMCNC: 33.6 G/DL (ref 31.4–37.4)
MCV RBC AUTO: 95 FL (ref 82–98)
MONOCYTES # BLD AUTO: 0.75 THOUSAND/UL (ref 0–1.22)
MONOCYTES NFR BLD: 4 % (ref 4–12)
MUCOUS THREADS UR QL AUTO: ABNORMAL
NEUTROPHILS # BLD MANUAL: 17.67 THOUSAND/UL (ref 1.85–7.62)
NEUTS BAND NFR BLD MANUAL: 19 % (ref 0–8)
NEUTS SEG NFR BLD AUTO: 75 % (ref 43–75)
NITRITE UR QL STRIP: POSITIVE
NON-SQ EPI CELLS URNS QL MICRO: ABNORMAL /HPF
NRBC BLD AUTO-RTO: 0 /100 WBCS
PH UR STRIP.AUTO: 5.5 [PH] (ref 4.5–8)
PLATELET # BLD AUTO: 149 THOUSANDS/UL (ref 149–390)
PLATELET # BLD AUTO: 173 THOUSANDS/UL (ref 149–390)
PLATELET BLD QL SMEAR: ADEQUATE
PMV BLD AUTO: 9.2 FL (ref 8.9–12.7)
PMV BLD AUTO: 9.3 FL (ref 8.9–12.7)
POTASSIUM SERPL-SCNC: 4 MMOL/L (ref 3.5–5.3)
PROT SERPL-MCNC: 7.2 G/DL (ref 6.4–8.2)
PROT UR STRIP-MCNC: ABNORMAL MG/DL
PROTHROMBIN TIME: 13.4 SECONDS (ref 11.8–14.2)
RBC # BLD AUTO: 4.72 MILLION/UL (ref 3.88–5.62)
RBC #/AREA URNS AUTO: ABNORMAL /HPF
SODIUM SERPL-SCNC: 137 MMOL/L (ref 136–145)
SP GR UR STRIP.AUTO: 1.02 (ref 1–1.03)
TOTAL CELLS COUNTED SPEC: 100
UROBILINOGEN UR QL STRIP.AUTO: 0.2 E.U./DL
WBC # BLD AUTO: 18.8 THOUSAND/UL (ref 4.31–10.16)
WBC #/AREA URNS AUTO: ABNORMAL /HPF

## 2019-04-20 PROCEDURE — 99223 1ST HOSP IP/OBS HIGH 75: CPT | Performed by: INTERNAL MEDICINE

## 2019-04-20 PROCEDURE — 99285 EMERGENCY DEPT VISIT HI MDM: CPT

## 2019-04-20 PROCEDURE — 80053 COMPREHEN METABOLIC PANEL: CPT | Performed by: EMERGENCY MEDICINE

## 2019-04-20 PROCEDURE — 85730 THROMBOPLASTIN TIME PARTIAL: CPT | Performed by: EMERGENCY MEDICINE

## 2019-04-20 PROCEDURE — 94664 DEMO&/EVAL PT USE INHALER: CPT

## 2019-04-20 PROCEDURE — 85027 COMPLETE CBC AUTOMATED: CPT | Performed by: EMERGENCY MEDICINE

## 2019-04-20 PROCEDURE — 81001 URINALYSIS AUTO W/SCOPE: CPT

## 2019-04-20 PROCEDURE — 85610 PROTHROMBIN TIME: CPT | Performed by: EMERGENCY MEDICINE

## 2019-04-20 PROCEDURE — 83690 ASSAY OF LIPASE: CPT | Performed by: EMERGENCY MEDICINE

## 2019-04-20 PROCEDURE — 87631 RESP VIRUS 3-5 TARGETS: CPT | Performed by: INTERNAL MEDICINE

## 2019-04-20 PROCEDURE — 96375 TX/PRO/DX INJ NEW DRUG ADDON: CPT

## 2019-04-20 PROCEDURE — 83605 ASSAY OF LACTIC ACID: CPT | Performed by: EMERGENCY MEDICINE

## 2019-04-20 PROCEDURE — 36415 COLL VENOUS BLD VENIPUNCTURE: CPT | Performed by: EMERGENCY MEDICINE

## 2019-04-20 PROCEDURE — 74177 CT ABD & PELVIS W/CONTRAST: CPT

## 2019-04-20 PROCEDURE — 96361 HYDRATE IV INFUSION ADD-ON: CPT

## 2019-04-20 PROCEDURE — 87449 NOS EACH ORGANISM AG IA: CPT | Performed by: INTERNAL MEDICINE

## 2019-04-20 PROCEDURE — 76705 ECHO EXAM OF ABDOMEN: CPT

## 2019-04-20 PROCEDURE — 87040 BLOOD CULTURE FOR BACTERIA: CPT | Performed by: EMERGENCY MEDICINE

## 2019-04-20 PROCEDURE — 71046 X-RAY EXAM CHEST 2 VIEWS: CPT

## 2019-04-20 PROCEDURE — 94760 N-INVAS EAR/PLS OXIMETRY 1: CPT

## 2019-04-20 PROCEDURE — 99291 CRITICAL CARE FIRST HOUR: CPT | Performed by: EMERGENCY MEDICINE

## 2019-04-20 PROCEDURE — 85049 AUTOMATED PLATELET COUNT: CPT | Performed by: INTERNAL MEDICINE

## 2019-04-20 PROCEDURE — 84145 PROCALCITONIN (PCT): CPT | Performed by: INTERNAL MEDICINE

## 2019-04-20 PROCEDURE — 96365 THER/PROPH/DIAG IV INF INIT: CPT

## 2019-04-20 PROCEDURE — 93005 ELECTROCARDIOGRAM TRACING: CPT

## 2019-04-20 PROCEDURE — 85007 BL SMEAR W/DIFF WBC COUNT: CPT | Performed by: EMERGENCY MEDICINE

## 2019-04-20 RX ORDER — LEVOFLOXACIN 5 MG/ML
750 INJECTION, SOLUTION INTRAVENOUS ONCE
Status: COMPLETED | OUTPATIENT
Start: 2019-04-20 | End: 2019-04-20

## 2019-04-20 RX ORDER — PENTOXIFYLLINE 400 MG/1
400 TABLET, EXTENDED RELEASE ORAL
Status: DISCONTINUED | OUTPATIENT
Start: 2019-04-20 | End: 2019-04-22 | Stop reason: HOSPADM

## 2019-04-20 RX ORDER — HYDROMORPHONE HCL/PF 1 MG/ML
1 SYRINGE (ML) INJECTION ONCE
Status: COMPLETED | OUTPATIENT
Start: 2019-04-20 | End: 2019-04-20

## 2019-04-20 RX ORDER — LEVOTHYROXINE SODIUM 0.07 MG/1
75 TABLET ORAL DAILY
Status: DISCONTINUED | OUTPATIENT
Start: 2019-04-21 | End: 2019-04-22 | Stop reason: HOSPADM

## 2019-04-20 RX ORDER — TRAMADOL HYDROCHLORIDE 50 MG/1
50 TABLET ORAL EVERY 6 HOURS PRN
Status: DISCONTINUED | OUTPATIENT
Start: 2019-04-20 | End: 2019-04-22 | Stop reason: HOSPADM

## 2019-04-20 RX ORDER — ACETAMINOPHEN 325 MG/1
650 TABLET ORAL EVERY 6 HOURS PRN
Status: DISCONTINUED | OUTPATIENT
Start: 2019-04-20 | End: 2019-04-22 | Stop reason: HOSPADM

## 2019-04-20 RX ORDER — ONDANSETRON 2 MG/ML
4 INJECTION INTRAMUSCULAR; INTRAVENOUS ONCE
Status: COMPLETED | OUTPATIENT
Start: 2019-04-20 | End: 2019-04-20

## 2019-04-20 RX ORDER — HYDROMORPHONE HCL/PF 1 MG/ML
0.2 SYRINGE (ML) INJECTION
Status: DISCONTINUED | OUTPATIENT
Start: 2019-04-20 | End: 2019-04-22 | Stop reason: HOSPADM

## 2019-04-20 RX ORDER — SODIUM CHLORIDE 9 MG/ML
100 INJECTION, SOLUTION INTRAVENOUS CONTINUOUS
Status: DISCONTINUED | OUTPATIENT
Start: 2019-04-20 | End: 2019-04-20

## 2019-04-20 RX ORDER — ONDANSETRON 2 MG/ML
4 INJECTION INTRAMUSCULAR; INTRAVENOUS EVERY 6 HOURS PRN
Status: DISCONTINUED | OUTPATIENT
Start: 2019-04-20 | End: 2019-04-22 | Stop reason: HOSPADM

## 2019-04-20 RX ORDER — SENNOSIDES 8.6 MG
1 TABLET ORAL
Status: DISCONTINUED | OUTPATIENT
Start: 2019-04-20 | End: 2019-04-22 | Stop reason: HOSPADM

## 2019-04-20 RX ORDER — OXYCODONE HYDROCHLORIDE 5 MG/1
5 TABLET ORAL EVERY 4 HOURS PRN
Status: DISCONTINUED | OUTPATIENT
Start: 2019-04-20 | End: 2019-04-22 | Stop reason: HOSPADM

## 2019-04-20 RX ORDER — ACETAMINOPHEN 325 MG/1
650 TABLET ORAL ONCE
Status: COMPLETED | OUTPATIENT
Start: 2019-04-20 | End: 2019-04-20

## 2019-04-20 RX ORDER — GUAIFENESIN/DEXTROMETHORPHAN 100-10MG/5
10 SYRUP ORAL EVERY 4 HOURS PRN
Status: DISCONTINUED | OUTPATIENT
Start: 2019-04-20 | End: 2019-04-22 | Stop reason: HOSPADM

## 2019-04-20 RX ORDER — SODIUM CHLORIDE 9 MG/ML
100 INJECTION, SOLUTION INTRAVENOUS CONTINUOUS
Status: DISCONTINUED | OUTPATIENT
Start: 2019-04-20 | End: 2019-04-21

## 2019-04-20 RX ADMIN — ONDANSETRON 4 MG: 2 INJECTION INTRAMUSCULAR; INTRAVENOUS at 17:28

## 2019-04-20 RX ADMIN — ACETAMINOPHEN 650 MG: 325 TABLET, FILM COATED ORAL at 20:14

## 2019-04-20 RX ADMIN — HYDROMORPHONE HYDROCHLORIDE 1 MG: 1 INJECTION, SOLUTION INTRAMUSCULAR; INTRAVENOUS; SUBCUTANEOUS at 11:18

## 2019-04-20 RX ADMIN — ACETAMINOPHEN 650 MG: 325 TABLET, FILM COATED ORAL at 13:40

## 2019-04-20 RX ADMIN — SODIUM CHLORIDE 100 ML/HR: 0.9 INJECTION, SOLUTION INTRAVENOUS at 17:10

## 2019-04-20 RX ADMIN — METRONIDAZOLE 500 MG: 500 INJECTION, SOLUTION INTRAVENOUS at 14:31

## 2019-04-20 RX ADMIN — SODIUM CHLORIDE 1000 ML: 0.9 INJECTION, SOLUTION INTRAVENOUS at 12:57

## 2019-04-20 RX ADMIN — SODIUM CHLORIDE 1000 ML: 0.9 INJECTION, SOLUTION INTRAVENOUS at 11:23

## 2019-04-20 RX ADMIN — IOHEXOL 100 ML: 350 INJECTION, SOLUTION INTRAVENOUS at 14:02

## 2019-04-20 RX ADMIN — METRONIDAZOLE 500 MG: 500 INJECTION, SOLUTION INTRAVENOUS at 21:33

## 2019-04-20 RX ADMIN — ONDANSETRON 4 MG: 2 INJECTION INTRAMUSCULAR; INTRAVENOUS at 11:23

## 2019-04-20 RX ADMIN — LEVOFLOXACIN 750 MG: 5 INJECTION, SOLUTION INTRAVENOUS at 15:31

## 2019-04-21 PROBLEM — R13.10 DYSPHAGIA: Chronic | Status: RESOLVED | Noted: 2019-04-20 | Resolved: 2019-04-21

## 2019-04-21 PROBLEM — A41.9 SEPSIS (HCC): Status: RESOLVED | Noted: 2019-04-20 | Resolved: 2019-04-21

## 2019-04-21 PROBLEM — E80.6 HYPERBILIRUBINEMIA: Status: ACTIVE | Noted: 2019-04-21

## 2019-04-21 PROBLEM — J15.4 STREPTOCOCCAL PNEUMONIA (HCC): Status: ACTIVE | Noted: 2019-04-20

## 2019-04-21 LAB
ALBUMIN SERPL BCP-MCNC: 2.5 G/DL (ref 3.5–5)
ALP SERPL-CCNC: 56 U/L (ref 46–116)
ALT SERPL W P-5'-P-CCNC: 36 U/L (ref 12–78)
ANION GAP SERPL CALCULATED.3IONS-SCNC: 9 MMOL/L (ref 4–13)
AST SERPL W P-5'-P-CCNC: 21 U/L (ref 5–45)
ATRIAL RATE: 71 BPM
BASOPHILS # BLD AUTO: 0.03 THOUSANDS/ΜL (ref 0–0.1)
BASOPHILS NFR BLD AUTO: 0 % (ref 0–1)
BILIRUB SERPL-MCNC: 0.7 MG/DL (ref 0.2–1)
BUN SERPL-MCNC: 12 MG/DL (ref 5–25)
CALCIUM SERPL-MCNC: 8.7 MG/DL (ref 8.3–10.1)
CHLORIDE SERPL-SCNC: 107 MMOL/L (ref 100–108)
CO2 SERPL-SCNC: 25 MMOL/L (ref 21–32)
CREAT SERPL-MCNC: 0.64 MG/DL (ref 0.6–1.3)
EOSINOPHIL # BLD AUTO: 0.02 THOUSAND/ΜL (ref 0–0.61)
EOSINOPHIL NFR BLD AUTO: 0 % (ref 0–6)
ERYTHROCYTE [DISTWIDTH] IN BLOOD BY AUTOMATED COUNT: 12.8 % (ref 11.6–15.1)
FLUAV AG SPEC QL: NOT DETECTED
FLUBV AG SPEC QL: NOT DETECTED
GFR SERPL CREATININE-BSD FRML MDRD: 109 ML/MIN/1.73SQ M
GLUCOSE SERPL-MCNC: 84 MG/DL (ref 65–140)
HCT VFR BLD AUTO: 36.6 % (ref 36.5–49.3)
HGB BLD-MCNC: 12.2 G/DL (ref 12–17)
IMM GRANULOCYTES # BLD AUTO: 0.45 THOUSAND/UL (ref 0–0.2)
IMM GRANULOCYTES NFR BLD AUTO: 3 % (ref 0–2)
L PNEUMO1 AG UR QL IA.RAPID: NEGATIVE
LYMPHOCYTES # BLD AUTO: 0.79 THOUSANDS/ΜL (ref 0.6–4.47)
LYMPHOCYTES NFR BLD AUTO: 6 % (ref 14–44)
MCH RBC QN AUTO: 31.9 PG (ref 26.8–34.3)
MCHC RBC AUTO-ENTMCNC: 33.3 G/DL (ref 31.4–37.4)
MCV RBC AUTO: 96 FL (ref 82–98)
MONOCYTES # BLD AUTO: 0.95 THOUSAND/ΜL (ref 0.17–1.22)
MONOCYTES NFR BLD AUTO: 7 % (ref 4–12)
NEUTROPHILS # BLD AUTO: 11.44 THOUSANDS/ΜL (ref 1.85–7.62)
NEUTS SEG NFR BLD AUTO: 84 % (ref 43–75)
NRBC BLD AUTO-RTO: 0 /100 WBCS
P AXIS: 46 DEGREES
PLATELET # BLD AUTO: 145 THOUSANDS/UL (ref 149–390)
PMV BLD AUTO: 9.9 FL (ref 8.9–12.7)
POTASSIUM SERPL-SCNC: 3.8 MMOL/L (ref 3.5–5.3)
PR INTERVAL: 158 MS
PROCALCITONIN SERPL-MCNC: 2.68 NG/ML
PROCALCITONIN SERPL-MCNC: 2.74 NG/ML
PROT SERPL-MCNC: 5.7 G/DL (ref 6.4–8.2)
QRS AXIS: 41 DEGREES
QRSD INTERVAL: 100 MS
QT INTERVAL: 372 MS
QTC INTERVAL: 404 MS
RBC # BLD AUTO: 3.82 MILLION/UL (ref 3.88–5.62)
RSV B RNA SPEC QL NAA+PROBE: NOT DETECTED
S PNEUM AG UR QL: POSITIVE
SODIUM SERPL-SCNC: 141 MMOL/L (ref 136–145)
T WAVE AXIS: 56 DEGREES
VENTRICULAR RATE: 71 BPM
WBC # BLD AUTO: 13.68 THOUSAND/UL (ref 4.31–10.16)

## 2019-04-21 PROCEDURE — 85025 COMPLETE CBC W/AUTO DIFF WBC: CPT | Performed by: INTERNAL MEDICINE

## 2019-04-21 PROCEDURE — 80053 COMPREHEN METABOLIC PANEL: CPT | Performed by: INTERNAL MEDICINE

## 2019-04-21 PROCEDURE — 92610 EVALUATE SWALLOWING FUNCTION: CPT

## 2019-04-21 PROCEDURE — 93010 ELECTROCARDIOGRAM REPORT: CPT | Performed by: INTERNAL MEDICINE

## 2019-04-21 PROCEDURE — 99232 SBSQ HOSP IP/OBS MODERATE 35: CPT | Performed by: INTERNAL MEDICINE

## 2019-04-21 PROCEDURE — 84145 PROCALCITONIN (PCT): CPT | Performed by: INTERNAL MEDICINE

## 2019-04-21 RX ORDER — BUTALBITAL, ACETAMINOPHEN AND CAFFEINE 50; 325; 40 MG/1; MG/1; MG/1
1 TABLET ORAL EVERY 4 HOURS PRN
Status: DISCONTINUED | OUTPATIENT
Start: 2019-04-21 | End: 2019-04-22 | Stop reason: HOSPADM

## 2019-04-21 RX ADMIN — METRONIDAZOLE 500 MG: 500 INJECTION, SOLUTION INTRAVENOUS at 05:33

## 2019-04-21 RX ADMIN — CEFTRIAXONE 1000 MG: 2 INJECTION, POWDER, FOR SOLUTION INTRAMUSCULAR; INTRAVENOUS at 11:18

## 2019-04-21 RX ADMIN — AZITHROMYCIN MONOHYDRATE 500 MG: 500 INJECTION, POWDER, LYOPHILIZED, FOR SOLUTION INTRAVENOUS at 11:18

## 2019-04-21 RX ADMIN — METRONIDAZOLE 500 MG: 500 INJECTION, SOLUTION INTRAVENOUS at 13:38

## 2019-04-21 RX ADMIN — SODIUM CHLORIDE 100 ML/HR: 0.9 INJECTION, SOLUTION INTRAVENOUS at 03:00

## 2019-04-21 RX ADMIN — ACETAMINOPHEN 650 MG: 325 TABLET, FILM COATED ORAL at 13:38

## 2019-04-21 RX ADMIN — LEVOTHYROXINE SODIUM 75 MCG: 75 TABLET ORAL at 05:30

## 2019-04-21 RX ADMIN — PENTOXIFYLLINE 400 MG: 400 TABLET, EXTENDED RELEASE ORAL at 08:34

## 2019-04-22 VITALS
DIASTOLIC BLOOD PRESSURE: 68 MMHG | HEART RATE: 70 BPM | WEIGHT: 167.2 LBS | BODY MASS INDEX: 24.76 KG/M2 | SYSTOLIC BLOOD PRESSURE: 130 MMHG | RESPIRATION RATE: 18 BRPM | TEMPERATURE: 98.7 F | HEIGHT: 69 IN | OXYGEN SATURATION: 97 %

## 2019-04-22 LAB
ALBUMIN SERPL BCP-MCNC: 2.5 G/DL (ref 3.5–5)
ALP SERPL-CCNC: 58 U/L (ref 46–116)
ALT SERPL W P-5'-P-CCNC: 31 U/L (ref 12–78)
ANION GAP SERPL CALCULATED.3IONS-SCNC: 9 MMOL/L (ref 4–13)
AST SERPL W P-5'-P-CCNC: 18 U/L (ref 5–45)
BASOPHILS # BLD AUTO: 0.01 THOUSANDS/ΜL (ref 0–0.1)
BASOPHILS NFR BLD AUTO: 0 % (ref 0–1)
BILIRUB SERPL-MCNC: 0.4 MG/DL (ref 0.2–1)
BUN SERPL-MCNC: 10 MG/DL (ref 5–25)
CALCIUM SERPL-MCNC: 8.4 MG/DL (ref 8.3–10.1)
CHLORIDE SERPL-SCNC: 106 MMOL/L (ref 100–108)
CO2 SERPL-SCNC: 24 MMOL/L (ref 21–32)
CREAT SERPL-MCNC: 0.65 MG/DL (ref 0.6–1.3)
EOSINOPHIL # BLD AUTO: 0.09 THOUSAND/ΜL (ref 0–0.61)
EOSINOPHIL NFR BLD AUTO: 1 % (ref 0–6)
ERYTHROCYTE [DISTWIDTH] IN BLOOD BY AUTOMATED COUNT: 12.8 % (ref 11.6–15.1)
GFR SERPL CREATININE-BSD FRML MDRD: 109 ML/MIN/1.73SQ M
GLUCOSE SERPL-MCNC: 88 MG/DL (ref 65–140)
HCT VFR BLD AUTO: 39.5 % (ref 36.5–49.3)
HGB BLD-MCNC: 13 G/DL (ref 12–17)
IMM GRANULOCYTES # BLD AUTO: 0.05 THOUSAND/UL (ref 0–0.2)
IMM GRANULOCYTES NFR BLD AUTO: 1 % (ref 0–2)
LYMPHOCYTES # BLD AUTO: 0.59 THOUSANDS/ΜL (ref 0.6–4.47)
LYMPHOCYTES NFR BLD AUTO: 8 % (ref 14–44)
MCH RBC QN AUTO: 31.3 PG (ref 26.8–34.3)
MCHC RBC AUTO-ENTMCNC: 32.9 G/DL (ref 31.4–37.4)
MCV RBC AUTO: 95 FL (ref 82–98)
MONOCYTES # BLD AUTO: 0.43 THOUSAND/ΜL (ref 0.17–1.22)
MONOCYTES NFR BLD AUTO: 6 % (ref 4–12)
NEUTROPHILS # BLD AUTO: 6.62 THOUSANDS/ΜL (ref 1.85–7.62)
NEUTS SEG NFR BLD AUTO: 84 % (ref 43–75)
NRBC BLD AUTO-RTO: 0 /100 WBCS
PLATELET # BLD AUTO: 150 THOUSANDS/UL (ref 149–390)
PMV BLD AUTO: 9.5 FL (ref 8.9–12.7)
POTASSIUM SERPL-SCNC: 3.5 MMOL/L (ref 3.5–5.3)
PROCALCITONIN SERPL-MCNC: 1.64 NG/ML
PROT SERPL-MCNC: 5.7 G/DL (ref 6.4–8.2)
RBC # BLD AUTO: 4.16 MILLION/UL (ref 3.88–5.62)
SODIUM SERPL-SCNC: 139 MMOL/L (ref 136–145)
WBC # BLD AUTO: 7.79 THOUSAND/UL (ref 4.31–10.16)

## 2019-04-22 PROCEDURE — 80053 COMPREHEN METABOLIC PANEL: CPT | Performed by: INTERNAL MEDICINE

## 2019-04-22 PROCEDURE — 99239 HOSP IP/OBS DSCHRG MGMT >30: CPT | Performed by: INTERNAL MEDICINE

## 2019-04-22 PROCEDURE — 85025 COMPLETE CBC W/AUTO DIFF WBC: CPT | Performed by: INTERNAL MEDICINE

## 2019-04-22 PROCEDURE — 84145 PROCALCITONIN (PCT): CPT | Performed by: INTERNAL MEDICINE

## 2019-04-22 RX ORDER — CEFDINIR 300 MG/1
300 CAPSULE ORAL EVERY 12 HOURS SCHEDULED
Qty: 10 CAPSULE | Refills: 0 | Status: SHIPPED | OUTPATIENT
Start: 2019-04-22 | End: 2019-04-27

## 2019-04-22 RX ADMIN — LEVOTHYROXINE SODIUM 75 MCG: 75 TABLET ORAL at 05:42

## 2019-04-22 RX ADMIN — PENTOXIFYLLINE 400 MG: 400 TABLET, EXTENDED RELEASE ORAL at 12:50

## 2019-04-22 RX ADMIN — PENTOXIFYLLINE 400 MG: 400 TABLET, EXTENDED RELEASE ORAL at 09:39

## 2019-04-22 RX ADMIN — CEFTRIAXONE 1000 MG: 2 INJECTION, POWDER, FOR SOLUTION INTRAMUSCULAR; INTRAVENOUS at 12:50

## 2019-04-23 ENCOUNTER — TRANSITIONAL CARE MANAGEMENT (OUTPATIENT)
Dept: FAMILY MEDICINE CLINIC | Facility: CLINIC | Age: 54
End: 2019-04-23

## 2019-04-25 ENCOUNTER — OFFICE VISIT (OUTPATIENT)
Dept: FAMILY MEDICINE CLINIC | Facility: CLINIC | Age: 54
End: 2019-04-25
Payer: COMMERCIAL

## 2019-04-25 VITALS
SYSTOLIC BLOOD PRESSURE: 132 MMHG | TEMPERATURE: 98.2 F | BODY MASS INDEX: 23.73 KG/M2 | WEIGHT: 160.2 LBS | HEART RATE: 72 BPM | HEIGHT: 69 IN | DIASTOLIC BLOOD PRESSURE: 82 MMHG

## 2019-04-25 DIAGNOSIS — R13.10 SWALLOWING DYSFUNCTION: ICD-10-CM

## 2019-04-25 DIAGNOSIS — IMO0001 TRANSITION OF CARE PERFORMED WITH SHARING OF CLINICAL SUMMARY: Primary | ICD-10-CM

## 2019-04-25 DIAGNOSIS — J13 PNEUMONIA OF RIGHT LUNG DUE TO STREPTOCOCCUS PNEUMONIAE, UNSPECIFIED PART OF LUNG (HCC): ICD-10-CM

## 2019-04-25 DIAGNOSIS — J15.4 STREPTOCOCCAL PNEUMONIA (HCC): ICD-10-CM

## 2019-04-25 LAB
BACTERIA BLD CULT: NORMAL
BACTERIA BLD CULT: NORMAL

## 2019-04-25 PROCEDURE — 1111F DSCHRG MED/CURRENT MED MERGE: CPT | Performed by: FAMILY MEDICINE

## 2019-04-25 PROCEDURE — 99495 TRANSJ CARE MGMT MOD F2F 14D: CPT | Performed by: FAMILY MEDICINE

## 2019-04-26 PROBLEM — R13.10 SWALLOWING DYSFUNCTION: Status: ACTIVE | Noted: 2019-04-26

## 2019-04-26 PROBLEM — R22.2 SUPRACLAVICULAR MASS: Status: RESOLVED | Noted: 2018-11-29 | Resolved: 2019-04-26

## 2019-05-09 ENCOUNTER — TELEPHONE (OUTPATIENT)
Dept: FAMILY MEDICINE CLINIC | Facility: CLINIC | Age: 54
End: 2019-05-09

## 2019-05-09 ENCOUNTER — APPOINTMENT (OUTPATIENT)
Dept: LAB | Age: 54
End: 2019-05-09
Payer: COMMERCIAL

## 2019-05-09 DIAGNOSIS — R61 DIAPHORESIS: ICD-10-CM

## 2019-05-09 DIAGNOSIS — R53.83 FATIGUE, UNSPECIFIED TYPE: ICD-10-CM

## 2019-05-09 DIAGNOSIS — R61 DIAPHORESIS: Primary | ICD-10-CM

## 2019-05-09 LAB
ALBUMIN SERPL BCP-MCNC: 3.3 G/DL (ref 3.5–5)
ALP SERPL-CCNC: 79 U/L (ref 46–116)
ALT SERPL W P-5'-P-CCNC: 49 U/L (ref 12–78)
ANION GAP SERPL CALCULATED.3IONS-SCNC: 3 MMOL/L (ref 4–13)
AST SERPL W P-5'-P-CCNC: 21 U/L (ref 5–45)
BASOPHILS # BLD AUTO: 0.02 THOUSANDS/ΜL (ref 0–0.1)
BASOPHILS NFR BLD AUTO: 1 % (ref 0–1)
BILIRUB SERPL-MCNC: 0.63 MG/DL (ref 0.2–1)
BUN SERPL-MCNC: 16 MG/DL (ref 5–25)
CALCIUM SERPL-MCNC: 8.8 MG/DL (ref 8.3–10.1)
CHLORIDE SERPL-SCNC: 101 MMOL/L (ref 100–108)
CO2 SERPL-SCNC: 32 MMOL/L (ref 21–32)
CREAT SERPL-MCNC: 0.74 MG/DL (ref 0.6–1.3)
EOSINOPHIL # BLD AUTO: 0.2 THOUSAND/ΜL (ref 0–0.61)
EOSINOPHIL NFR BLD AUTO: 5 % (ref 0–6)
ERYTHROCYTE [DISTWIDTH] IN BLOOD BY AUTOMATED COUNT: 12.1 % (ref 11.6–15.1)
ERYTHROCYTE [SEDIMENTATION RATE] IN BLOOD: 22 MM/HOUR (ref 0–10)
GFR SERPL CREATININE-BSD FRML MDRD: 103 ML/MIN/1.73SQ M
GLUCOSE SERPL-MCNC: 146 MG/DL (ref 65–140)
HCT VFR BLD AUTO: 41.6 % (ref 36.5–49.3)
HGB BLD-MCNC: 13.7 G/DL (ref 12–17)
IMM GRANULOCYTES # BLD AUTO: 0.02 THOUSAND/UL (ref 0–0.2)
IMM GRANULOCYTES NFR BLD AUTO: 1 % (ref 0–2)
LYMPHOCYTES # BLD AUTO: 0.7 THOUSANDS/ΜL (ref 0.6–4.47)
LYMPHOCYTES NFR BLD AUTO: 16 % (ref 14–44)
MCH RBC QN AUTO: 31.4 PG (ref 26.8–34.3)
MCHC RBC AUTO-ENTMCNC: 32.9 G/DL (ref 31.4–37.4)
MCV RBC AUTO: 95 FL (ref 82–98)
MONOCYTES # BLD AUTO: 0.51 THOUSAND/ΜL (ref 0.17–1.22)
MONOCYTES NFR BLD AUTO: 12 % (ref 4–12)
NEUTROPHILS # BLD AUTO: 2.83 THOUSANDS/ΜL (ref 1.85–7.62)
NEUTS SEG NFR BLD AUTO: 65 % (ref 43–75)
NRBC BLD AUTO-RTO: 0 /100 WBCS
PLATELET # BLD AUTO: 244 THOUSANDS/UL (ref 149–390)
PMV BLD AUTO: 10.3 FL (ref 8.9–12.7)
POTASSIUM SERPL-SCNC: 4 MMOL/L (ref 3.5–5.3)
PROCALCITONIN SERPL-MCNC: <0.05 NG/ML
PROT SERPL-MCNC: 7.6 G/DL (ref 6.4–8.2)
RBC # BLD AUTO: 4.36 MILLION/UL (ref 3.88–5.62)
SODIUM SERPL-SCNC: 136 MMOL/L (ref 136–145)
T4 FREE SERPL-MCNC: 1.19 NG/DL (ref 0.76–1.46)
TSH SERPL DL<=0.05 MIU/L-ACNC: 3.37 UIU/ML (ref 0.36–3.74)
WBC # BLD AUTO: 4.28 THOUSAND/UL (ref 4.31–10.16)

## 2019-05-09 PROCEDURE — 84443 ASSAY THYROID STIM HORMONE: CPT

## 2019-05-09 PROCEDURE — 80053 COMPREHEN METABOLIC PANEL: CPT

## 2019-05-09 PROCEDURE — 84439 ASSAY OF FREE THYROXINE: CPT

## 2019-05-09 PROCEDURE — 85025 COMPLETE CBC W/AUTO DIFF WBC: CPT

## 2019-05-09 PROCEDURE — 85652 RBC SED RATE AUTOMATED: CPT

## 2019-05-09 PROCEDURE — 84145 PROCALCITONIN (PCT): CPT

## 2019-05-09 PROCEDURE — 36415 COLL VENOUS BLD VENIPUNCTURE: CPT

## 2019-05-13 ENCOUNTER — OFFICE VISIT (OUTPATIENT)
Dept: FAMILY MEDICINE CLINIC | Facility: CLINIC | Age: 54
End: 2019-05-13
Payer: COMMERCIAL

## 2019-05-13 VITALS
SYSTOLIC BLOOD PRESSURE: 126 MMHG | BODY MASS INDEX: 23.79 KG/M2 | HEART RATE: 74 BPM | WEIGHT: 160.6 LBS | TEMPERATURE: 98.9 F | HEIGHT: 69 IN | DIASTOLIC BLOOD PRESSURE: 82 MMHG

## 2019-05-13 DIAGNOSIS — R53.83 OTHER FATIGUE: Primary | ICD-10-CM

## 2019-05-13 DIAGNOSIS — G89.29 CHRONIC MIDLINE LOW BACK PAIN, WITH SCIATICA PRESENCE UNSPECIFIED: ICD-10-CM

## 2019-05-13 DIAGNOSIS — M54.5 CHRONIC MIDLINE LOW BACK PAIN, WITH SCIATICA PRESENCE UNSPECIFIED: ICD-10-CM

## 2019-05-13 PROCEDURE — 99213 OFFICE O/P EST LOW 20 MIN: CPT | Performed by: FAMILY MEDICINE

## 2019-05-13 PROCEDURE — 3008F BODY MASS INDEX DOCD: CPT | Performed by: FAMILY MEDICINE

## 2019-05-31 DIAGNOSIS — E03.9 HYPOTHYROIDISM, UNSPECIFIED TYPE: ICD-10-CM

## 2019-05-31 RX ORDER — LEVOTHYROXINE SODIUM 0.07 MG/1
TABLET ORAL
Qty: 90 TABLET | Refills: 0 | Status: SHIPPED | OUTPATIENT
Start: 2019-05-31 | End: 2019-09-05 | Stop reason: SDUPTHER

## 2019-07-12 ENCOUNTER — OFFICE VISIT (OUTPATIENT)
Dept: FAMILY MEDICINE CLINIC | Facility: CLINIC | Age: 54
End: 2019-07-12
Payer: COMMERCIAL

## 2019-07-12 VITALS
WEIGHT: 157.6 LBS | SYSTOLIC BLOOD PRESSURE: 122 MMHG | TEMPERATURE: 97.8 F | HEART RATE: 74 BPM | BODY MASS INDEX: 23.34 KG/M2 | HEIGHT: 69 IN | DIASTOLIC BLOOD PRESSURE: 82 MMHG

## 2019-07-12 DIAGNOSIS — R13.10 SWALLOWING DYSFUNCTION: ICD-10-CM

## 2019-07-12 DIAGNOSIS — E03.9 HYPOTHYROIDISM, UNSPECIFIED TYPE: ICD-10-CM

## 2019-07-12 DIAGNOSIS — R51.9 SINUS HEADACHE: ICD-10-CM

## 2019-07-12 DIAGNOSIS — R53.83 FATIGUE, UNSPECIFIED TYPE: Primary | ICD-10-CM

## 2019-07-12 PROCEDURE — 1036F TOBACCO NON-USER: CPT | Performed by: FAMILY MEDICINE

## 2019-07-12 PROCEDURE — 99213 OFFICE O/P EST LOW 20 MIN: CPT | Performed by: FAMILY MEDICINE

## 2019-07-12 PROCEDURE — 3008F BODY MASS INDEX DOCD: CPT | Performed by: FAMILY MEDICINE

## 2019-07-12 RX ORDER — FLUTICASONE PROPIONATE 50 MCG
2 SPRAY, SUSPENSION (ML) NASAL DAILY
Qty: 1 BOTTLE | Refills: 2 | Status: SHIPPED | OUTPATIENT
Start: 2019-07-12 | End: 2021-05-07 | Stop reason: ALTCHOICE

## 2019-07-12 NOTE — PROGRESS NOTES
Assessment/Plan:     Diagnoses and all orders for this visit:    Fatigue, unspecified type  Comments:  ? mood related  Reviewed [facility] as well as stress reduction  Pt to watch now  Recheck 3m    Sinus headache  Comments:  ? allergy related  Restart flonase  Recheck 1 week if not improved  Orders:  -     fluticasone (FLONASE) 50 mcg/act nasal spray; 2 sprays into each nostril daily 2 sprays bilat qd    Hypothyroidism, unspecified type  Comments:  Monitor TSH  Recheck 3m    Swallowing dysfunction  Comments:  chronic since tongue base cancer treatment  Cont conservative therapy  Recheck as above          Subjective:      Patient ID: Marlene Bacon  is a 62 y o  male  F/u several issues  - fatigue a little better  Still has stress but is trying to handle it  Sleep is sl labile  Has occ depressed mood and anhedonia but he notes that he has many more good days than bad  PHQ done  - notes frontal HA  Sl increased congestion  Mild vertigo today  No fever/chills or cough  - still struggling with swallowing  No CV, GI or  complaints      The following portions of the patient's history were reviewed and updated as appropriate: He  has a past medical history of Cancer of base of tongue ([address] ), Cough, Disease of thyroid gland, Dysphagia, ED (erectile dysfunction), Hypertension, Leukopenia, Peyronie's disease, Psoriasis, Tongue cancer ([address] ), and Weight loss, abnormal   He   Patient Active Problem List    Diagnosis Date Noted    Swallowing dysfunction 04/26/2019    Hyperbilirubinemia 04/21/2019    Streptococcal pneumonia with Sepsis POA ([address] ) 04/20/2019    History of oral cancer 04/20/2019    ED (erectile dysfunction) of organic origin 04/30/2018    Peyronie's disease 04/30/2018    Arthralgia 09/25/2017    Cancer of [facility] of tongue [facility] ) 05/24/2016    Hypothyroidism 11/11/2013    Psoriasis 09/19/2012     He  has a past surgical history that includes Tongue biopsy / excision;  Other surgical history; PEG tube placement; and pr colonoscopy flx dx w/collj spec when pfrmd (N/A, 6/27/2017)  He  reports that he quit smoking about 8 years ago  He has a 5 00 pack-year smoking history  He has quit using smokeless tobacco  He reports that he drinks about 12 0 standard drinks of alcohol per week  He reports that he does not use drugs  Current Outpatient Medications   Medication Sig Dispense Refill    fluticasone (FLONASE) 50 mcg/act nasal spray 2 sprays into each nostril daily 2 sprays bilat qd 1 Bottle 2    levothyroxine 75 mcg tablet TAKE ONE TABLET BY MOUTH EVERY DAY 90 tablet 0    multivitamin (THERAGRAN) TABS Take 1 tablet by mouth daily      pentoxifylline (TRENtal) 400 mg ER tablet Take 1 tablet (400 mg total) by mouth 3 (three) times a day with meals 270 tablet 0    sildenafil (REVATIO) 20 mg tablet TAKE 1-5 TABLETS AS NEEDED  6     No current facility-administered medications for this visit  He has No Known Allergies       Review of Systems   Constitutional: Positive for fatigue  HENT: Positive for sinus pressure and trouble swallowing (mild, chronic)  Negative for ear pain, facial swelling, sinus pain and voice change  Eyes: Negative  Respiratory: Negative  Cardiovascular: Negative  Gastrointestinal: Negative  Endocrine: Negative  Genitourinary: Negative  Skin: Negative  Neurological: Negative  Hematological: Negative  Psychiatric/Behavioral: Positive for dysphoric mood (occasional) and sleep disturbance  Negative for suicidal ideas  The patient is nervous/anxious  Objective:      /82 (BP Location: Right arm, Patient Position: Sitting, Cuff Size: Standard)   Pulse 74   Temp 97 8 °F (36 6 °C)   Ht 5' 9" (1 753 m)   Wt 71 5 kg (157 lb 9 6 oz)   BMI 23 27 kg/m²          Physical Exam   Constitutional: He is oriented to person, place, and time  He appears well-developed and well-nourished  HENT:   Head: Normocephalic and atraumatic     Right Ear: External ear normal    Left Ear: External ear normal    Mouth/Throat: Oropharynx is clear and moist    Sl nasal congestion, clear discharge  Frontal sinus sl TTP   Eyes: Pupils are equal, round, and reactive to light  Conjunctivae and EOM are normal    Neck: Normal range of motion  Neck supple  Post RT changes in neck noted   Cardiovascular: Normal rate, regular rhythm, normal heart sounds and intact distal pulses  No murmur heard  Pulmonary/Chest: Effort normal and breath sounds normal    Musculoskeletal: Normal range of motion  He exhibits no edema  Winging of L scapula noted   Lymphadenopathy:     He has no cervical adenopathy  Neurological: He is alert and oriented to person, place, and time  No cranial nerve deficit  He exhibits normal muscle tone  Coordination normal    Psychiatric: His behavior is normal  Thought content normal    PHQ-2 = 2    Mild anxiety

## 2019-08-14 ENCOUNTER — HOSPITAL ENCOUNTER (OUTPATIENT)
Dept: RADIOLOGY | Facility: HOSPITAL | Age: 54
Discharge: HOME | End: 2019-08-14
Payer: COMMERCIAL

## 2019-08-14 ENCOUNTER — HOSPITAL ENCOUNTER (OUTPATIENT)
Dept: RADIOLOGY | Facility: HOSPITAL | Age: 54
Discharge: HOME | End: 2019-08-14
Attending: INTERNAL MEDICINE
Payer: COMMERCIAL

## 2019-08-14 DIAGNOSIS — R13.10 DYSPHAGIA: ICD-10-CM

## 2019-08-14 DIAGNOSIS — J13 PNEUMONIA OF RIGHT LUNG DUE TO STREPTOCOCCUS PNEUMONIAE, UNSPECIFIED PART OF LUNG (HCC): ICD-10-CM

## 2019-08-14 PROCEDURE — 92611 MOTION FLUOROSCOPY/SWALLOW: CPT

## 2019-08-14 PROCEDURE — G8997 SWALLOW GOAL STATUS: HCPCS

## 2019-08-14 PROCEDURE — 71046 X-RAY EXAM CHEST 2 VIEWS: CPT

## 2019-08-14 PROCEDURE — G8998 SWALLOW D/C STATUS: HCPCS

## 2019-08-14 PROCEDURE — 74230 X-RAY XM SWLNG FUNCJ C+: CPT

## 2019-08-14 PROCEDURE — G8996 SWALLOW CURRENT STATUS: HCPCS

## 2019-08-14 NOTE — PROCEDURES
Video Swallow Study      Patient Name: Ramandeep Manuel Sr  Today's Date: 8/14/2019        Past Medical History  Past Medical History:   Diagnosis Date    Cancer of base of tongue (HCC)     excision    Cough     Disease of thyroid gland     hypo    Dysphagia     ED (erectile dysfunction)     Hypertension     Leukopenia     Peyronie's disease     Psoriasis     Tongue cancer (HCC)     Weight loss, abnormal         Past Surgical History  Past Surgical History:   Procedure Laterality Date    OTHER SURGICAL HISTORY      infusion port inserted and removed    PEG TUBE PLACEMENT      and removal    MT COLONOSCOPY FLX DX W/COLLJ SPEC WHEN PFRMD N/A 6/27/2017    Procedure: COLONOSCOPY with polypectomy x2;  Surgeon: Perez Nugent MD;  Location: [facility]; Service: General    TONGUE BIOPSY / EXCISION      Floor mouth excision of malignant tumor         General Information:    63 yo gentleman referred to [address]  for a VBS by Dr Oral Arnold for dysphagia w/ occas coughing, choking when eating  Pt w/ history of tongue base retraction s/p chemo and XRT in [id number]  Pt w/ recent hospitalization at [facility] (4/20- 4/22/19)  for sepsis pneumonia  Clinical swallow eval completed 4/21/19 w/ results indicating functional oral and pharyngeal swallowing skills  CXR: 4/20/19 showed RML airspace consolidation consistent w/ pneumonia  CT-chest: 4/20 RLL airspace consolidation consistent w/ pneumonia  Cognition:  WNL    Speech/Swallow Mech: Oral motor movements appeared  WFL; Dentition was  natural; Cough was strong  Voice was raspy- pt stated it has been this way since chemo/XRT  Respiratory Status: WFL on RA;   Current diet: regular w/ thin liquids  Prior VBS : pt reported having a VBS completed at [facility] in 2011 or 2012 and results were unremarkable and follow up recommended      Pt was seen in radiology for a Video Barium Swallow Study, pt stood and was viewed laterally with the following consistencies: puree, soft/solid (nutrigrain bar), hard solid ([place] doone cookie), HTL, NTL, thin liquids, 1/2 barium pill w/ NTL by cup  Results are as follows:     **Images are available for review on PACS          Oral Stage: Mildly impaired   pt exhibited adequate bolus retrieval  Thorough mastication of all consistencies; good oral control of liquids  Slow transfer  Reduced velar elevation; Oral residue noted  Pharyngeal Stage: Moderately impaired   Swallow initiation was timely; weak tongue base retraction, minimal epiglottic inversion and airway entrance closure  Retropulsion of food boluses noted back to oral cavity w/ multiple transfers and re-swallows  Minimal pharyngeal retention after the swallow  Deep, transient penetration w/ all consistencies, small amts of passive silent aspiration noted w/ puree residual, NTL, and moreso w/ thin liquids  Strategies trialed were chin tuck, head turn w/ chin tuck and breath hold, but were not effective in improving airway closure and reducing penetration/aspiration  Smaller cup sips did reduce amount of penetration  Esophageal Stage:   briefly assessed, no overt abnormality  All materials cleared the esophagus w/o difficulty  Assessment Summary:   Mild oral/moderate pharyngeal dysphagia due to decreased tongue base retraction, decreased epiglottic inversion and airway entrance closure w/ deep transient penetration of all consistencies,  And passive silent aspiration of pharyngeal residue and thin liquids  Diagnosis/Prognosis:            Recommendations:   Soft, moist diet  Discussed nectar thick liquids vs cont thin liquids via small sips, w/ aspiration precautions and frequent oral care, pt prefers to cont w/ thin liquids at this time  Risks of aspiration and potential to develop aspiration pneumonia were explained, pt stated understanding     Aspiration Precautions  Meds as tolerated  Outpt Speech tx for dysphagia    April Rose, [address] Oakes CCC-SLP  Speech Pathogist  Available via  tiger text

## 2019-09-05 DIAGNOSIS — E03.9 HYPOTHYROIDISM, UNSPECIFIED TYPE: ICD-10-CM

## 2019-09-05 RX ORDER — LEVOTHYROXINE SODIUM 0.07 MG/1
TABLET ORAL
Qty: 90 TABLET | Refills: 0 | Status: SHIPPED | OUTPATIENT
Start: 2019-09-05 | End: 2019-12-05 | Stop reason: SDUPTHER

## 2019-10-23 NOTE — PROGRESS NOTES
Daily Note     Today's date: 2018  Patient name: Izabela Sierra  : 1965  MRN: [id number]  Referring provider: Parviz Whyte MD  Dx:   Encounter Diagnosis     ICD-10-CM    1   Generalized muscle weakness M62 81                   Subjective: "I am improving  "      Objective: See treatment diary below    Manual  18             I clintabraham Hemphill        15 min  15 min  15 min man                                                                 Exercise Diary  12/6  12/10  12/13  12/17  12/20  14/25           Bent knee pushups 5  10  10 + 1  push up      3 x 10           Cervical isometrics 5 x 5 sec holds    5sec x 5   5sec x 5  5sec x 5             Cervical extension stretch 1 set of 3 hold 10 sec         10sec x 5             Foam roll self thoracic mobilization    5 min  5 min   5 min  5 min             Sh row, sh ext, sh ext rotation , int rot theraband exer     2 sets of 10 green  3 x 10  30  30  blue 3 x 10           Serratus and triceps in supine dumbells    4lb 2 sets 10  4lb 3 x 10      6lb bilatearl 3 x 10           Body blade left ue      30 sec x 5      1 min x 5            Cervical self rotation stretch    1 set of 3hold 10 sec  10sec x 5                 Cervical upper trap stretch    1 set of 3 hold 10 sec  10sec x 5                 Cervical scm stretch                       Levator stretch    1 set of 3 hold 10 sec                   Corner pect stretch    1 set of 5 hold 10 sec  5 hold 10 sec       5 hold 10 sec            Supine cervical flexion static hold                        Prone hughston's exer     1,3,5 4lb 2 sets 10  1,3,5 3 c 10 4lb                 Prone neck extension static hold                         ball toss to self flex and abd palms up an down      30 each                  chest pass at pitch back and overhead throw      left ue 30 each                   UBE        10 min alt  10 min  10 min US results and recommendations sent to patient via [facility].                T-band ER/IR          20  3 x 10                                         Modalities                                                                                                                 Assessment: Tolerated treatment well  Patient would benefit from continued PT      Plan: Continue per plan of care

## 2019-11-07 ENCOUNTER — IMMUNIZATIONS (OUTPATIENT)
Dept: FAMILY MEDICINE CLINIC | Facility: CLINIC | Age: 54
End: 2019-11-07
Payer: COMMERCIAL

## 2019-11-07 DIAGNOSIS — Z23 ENCOUNTER FOR IMMUNIZATION: ICD-10-CM

## 2019-11-07 PROCEDURE — 90686 IIV4 VACC NO PRSV 0.5 ML IM: CPT | Performed by: FAMILY MEDICINE

## 2019-11-07 PROCEDURE — 90471 IMMUNIZATION ADMIN: CPT | Performed by: FAMILY MEDICINE

## 2019-11-11 ENCOUNTER — APPOINTMENT (OUTPATIENT)
Dept: PHYSICAL THERAPY | Age: 54
End: 2019-11-11
Payer: COMMERCIAL

## 2019-11-13 ENCOUNTER — EVALUATION (OUTPATIENT)
Dept: PHYSICAL THERAPY | Age: 54
End: 2019-11-13
Payer: COMMERCIAL

## 2019-11-13 DIAGNOSIS — R53.1 GENERALIZED WEAKNESS: Primary | ICD-10-CM

## 2019-11-13 DIAGNOSIS — C02.9 TONGUE CANCER (HCC): ICD-10-CM

## 2019-11-13 PROCEDURE — G8985 CARRY GOAL STATUS: HCPCS

## 2019-11-13 PROCEDURE — 97163 PT EVAL HIGH COMPLEX 45 MIN: CPT

## 2019-11-13 PROCEDURE — 97140 MANUAL THERAPY 1/> REGIONS: CPT

## 2019-11-13 PROCEDURE — G8984 CARRY CURRENT STATUS: HCPCS

## 2019-11-14 NOTE — PROGRESS NOTES
PT Evaluation     Today's date: 2019  Patient name: Wendy Staples   : 1965  MRN: [id number]  Referring provider: Smitha Alanis PT  Dx:   Encounter Diagnosis     ICD-10-CM    1  Tongue cancer ([facility] [address] ) C02 9    2   Generalized weakness R53 1                   Assessment  Impairments: abnormal or restricted ROM, activity intolerance, impaired physical strength and pain with function  Other impairment: muscle fatigue post chemotherapy and radiation treatment for cancer of the tongue  Understanding of Dx/Px/POC: good   Prognosis: good    Plan  Patient would benefit from: PT eval and skilled physical therapy  Referral necessary: No  Planned therapy interventions: manual therapy, massage, joint mobilization, neuromuscular re-education, patient education, postural training, strengthening, stretching, therapeutic activities, therapeutic exercise and home exercise program  Other planned therapy interventions: roselia cervical stabilization exer,   Frequency: 2x week  Duration in weeks: 8  Treatment plan discussed with: patient        Subjective Evaluation    Pain  Current pain ratin  At best pain ratin  At worst pain rating: 3  Location:  c spine left sided periscapular region  Quality: radiating, tight, sharp and dull ache  Relieving factors: heat, change in position and rest  Aggravating factors: overhead activity and lifting  Progression: improved    Social Support  Steps to enter house: yes  Stairs in house: yes   Lives in: multiple-level home  Lives with: spouse and adult children    Employment status: working  Exercise history: for past 3 months intensive weight training   Life stress: high stress at work    Treatments  Previous treatment: physical therapy  Current treatment: physical therapy  Patient Goals  Patient goals for therapy: decreased pain, increased motion, independence with ADLs/IADLs, increased strength and return to sport/leisure activities  Patient goal: increased cervical muscle strength wfl's         Objective    Flowsheet Rows      Most Recent Value   PT/OT G-Codes   Current Score  53   Projected Score  65             Precautions: no known med allergies       Manual  11/13/2019             I emigdio [place] to c spine              Cervical prom  man                                          Exercise Diary  11/13            Cervical isometrics             Cervical flexion with theraband in supine              Prone cervical extension              marcel 1,3, 5             Ball toss weighted activities             Push ups             Bench press             pullovers              press             Fitter with [employer] toss against wall overhead 3 positions             theraband ext and int rotation                                                                                                                         Modalities

## 2019-11-18 ENCOUNTER — OFFICE VISIT (OUTPATIENT)
Dept: PHYSICAL THERAPY | Age: 54
End: 2019-11-18
Payer: COMMERCIAL

## 2019-11-18 DIAGNOSIS — C02.9 TONGUE CANCER (HCC): ICD-10-CM

## 2019-11-18 DIAGNOSIS — Z92.3 STATUS POST RADIATION THERAPY: ICD-10-CM

## 2019-11-18 DIAGNOSIS — M62.81 MUSCLE WEAKNESS (GENERALIZED): Primary | ICD-10-CM

## 2019-11-18 PROCEDURE — 97110 THERAPEUTIC EXERCISES: CPT

## 2019-11-19 NOTE — PROGRESS NOTES
Daily Note     Today's date: 2019  Patient name: Juanjose Sal Sr   : 1965  MRN: [id number]  Referring provider: Tom Jaffe PT  Dx:   Encounter Diagnosis     ICD-10-CM    1  Muscle weakness (generalized) M62 81    2  Status post radiation therapy Z92 3    3  Tongue cancer ([facility] [address] ) C02 9                   Subjective: "I can' lift my head up off of the pillow when I lie on my back "  Reena Alvarez PT supervising first 15 min       Objective: See treatment diary below      Assessment: Tolerated treatment well  Patient would benefit from continued PT      Plan: Continue per plan of care        Precautions: no known med allergies       Manual  2019            I clintal            [place] to c spine              Cervical prom  man                                          Exercise Diary             Cervical isometrics  5 reps all 5 sec hold           Cervical flexion with theraband in supine   unable aarom cervical flexion3 sets of 5 reps            Prone cervical extension   Blue theraband 2 x 10            hughstons 1,3, 5             Ball toss weighted activities             Push ups  Bent knee 3 sets of 10           Bench press             pullovers              press             Fitter with ue's   30 reps side to side and forward backward 2 cords           Ball toss against wall overhead 3 positions  3 x 10 blue small weighted ball           theraband ext and int rotation  Blue 3 x 10 left ue           Body blade 5 positions 1 min hold left ue  5                                                                                                          Modalities

## 2019-11-20 ENCOUNTER — OFFICE VISIT (OUTPATIENT)
Dept: PHYSICAL THERAPY | Age: 54
End: 2019-11-20
Payer: COMMERCIAL

## 2019-11-20 DIAGNOSIS — M62.81 MUSCLE WEAKNESS (GENERALIZED): Primary | ICD-10-CM

## 2019-11-20 PROCEDURE — 97110 THERAPEUTIC EXERCISES: CPT

## 2019-11-20 NOTE — PROGRESS NOTES
Daily Note     Today's date: 2019  Patient name: Raquel Gann Sr   : 1965  MRN: [id number]  Referring provider: Reva Porter, PT  Dx:   Encounter Diagnosis     ICD-10-CM    1  Muscle weakness (generalized) M62 81                   Subjective: "I don't understand why it's still weak on my neck and left shoulder region " discussion over the effects of radiation causing muscle weakness      Objective: See treatment diary below      Assessment: Tolerated treatment well  Patient would benefit from continued PT      Plan: Continue per plan of care        Precautions: no known med allergies       Manual  2019           I emigdio [place] to c spine              Cervical prom  man                                          Exercise Diary            Cervical isometrics  5 reps all 5 sec hold           Cervical flexion with theraband in supine   unable aarom cervical flexion3 sets of 5 reps  4 sets of 10 aarom flexion in supine  No band,           Prone cervical extension   Blue theraband 2 x 10  Green theraband 3 x 10           hughstons 1,3, 5             Ball toss weighted activities   Sh flex and abd 90 30 reps palms up and down ball toss to self           Push ups  Bent knee 3 sets of 10 2 sets of 10   push up 1 set of 7          Bench press             pullovers              press             Fitter with ue's   30 reps side to side and forward backward 2 cords           Ball toss against wall overhead 3 positions  3 x 10 blue small weighted ball  3 x 10          theraband ext and int rotation  Blue 3 x 10 left ue Green 3 x 10           Body blade 5 positions 1 min hold left ue  5 5 positions           theraband green sh abd 90 int ext rot , left elbow flexed to 90 degrees   3 x 10                                                                                             Modalities

## 2019-11-21 ENCOUNTER — EVALUATION (OUTPATIENT)
Dept: SPEECH THERAPY | Age: 54
End: 2019-11-21
Payer: COMMERCIAL

## 2019-11-21 ENCOUNTER — TELEPHONE (OUTPATIENT)
Dept: FAMILY MEDICINE CLINIC | Facility: CLINIC | Age: 54
End: 2019-11-21

## 2019-11-21 DIAGNOSIS — R13.12 OROPHARYNGEAL DYSPHAGIA: Primary | ICD-10-CM

## 2019-11-21 DIAGNOSIS — R13.12 DYSPHAGIA, OROPHARYNGEAL: Primary | ICD-10-CM

## 2019-11-21 PROCEDURE — 92610 EVALUATE SWALLOWING FUNCTION: CPT

## 2019-11-21 NOTE — PROGRESS NOTES
"Chief Complaint   Patient presents with     Physical       Initial /72  Pulse 94  Temp 96.3  F (35.7  C) (Temporal)  Ht 5' 1.5\" (1.562 m)  Wt 217 lb (98.4 kg)  LMP 12/20/2017 (Exact Date)  SpO2 98%  Breastfeeding? No  BMI 40.34 kg/m2 Estimated body mass index is 40.34 kg/(m^2) as calculated from the following:    Height as of this encounter: 5' 1.5\" (1.562 m).    Weight as of this encounter: 217 lb (98.4 kg).  Medication Reconciliation: complete   Jigna Moy CMA    " Speech Language Pathology Evaluation  Today's date: 2019  Patient name: Mallorie José    : 1965        Referring provider: Pietro Garg*  Dx:   Encounter Diagnosis     ICD-10-CM    1  Dysphagia, oropharyngeal R13 12        Start Time: 930  Stop Time: 103  Total time in clinic (min): 65 minutes    Subjective Comments: Patient came easily to evaluation  He was a good  of his medical history  Safety Measures: aspiration precautions  Reason for Referral:Difficulty swallowing solids or liquids  Prior Functional Status:Swallowing effective with use of compensatory strategies  Medical History significant for:   Past Medical History:   Diagnosis Date    Cancer of base of tongue (HCC)     excision    Cough     Disease of thyroid gland     hypo    Dysphagia     ED (erectile dysfunction)     Hypertension     Leukopenia     Peyronie's disease     Psoriasis     Tongue cancer (HCC)     Weight loss, abnormal      Clinically Complex Situations:Patient has a hx of H&N CA; he is currently in physical therapy  He received ST briefly after his XRT in   Hearing:Not Tested  Vision:Other appears WFL  Medication List:   Current Outpatient Medications   Medication Sig Dispense Refill    fluticasone (FLONASE) 50 mcg/act nasal spray 2 sprays into each nostril daily 2 sprays bilat qd 1 Bottle 2    levothyroxine 75 mcg tablet TAKE ONE TABLET BY MOUTH EVERY DAY 90 tablet 0    multivitamin (THERAGRAN) TABS Take 1 tablet by mouth daily      pentoxifylline (TRENtal) 400 mg ER tablet Take 1 tablet (400 mg total) by mouth 3 (three) times a day with meals 270 tablet 0    sildenafil (REVATIO) 20 mg tablet TAKE 1-5 TABLETS AS NEEDED  6     No current facility-administered medications for this visit  Allergies: No Known Allergies  Primary Language: English  Preferred Language: English     Home Environment/ Lifestyle:  Works;  with children  Current Education status: NA  Highest Level of Education: NA  Current / Prior Services being received: Physical Therapy    Mental Status: Alert  Behavior Status:Cooperative  Communication Modalities: Verbal  Recent Speech/ Language therapy:None  Rehabilitation Prognosis:Good rehab potential to reach the established goals    Assessments:  DYSPHAGIA EVALUATION:    -Reason for referral: Signs/symptoms of dysphagia, History of aspiration pneumonia and length of time to eat Results of recent VBSS showed reduced TBR, decreased LE, poor epiglottic inversion; penetration and aspiration specifically with vallecular residue       -Subjective report of swallowing difficulty: Coughing, Choking, Globus sensation , Regurgitation of food and nasal regurgitation    -Difficulty swallowing: Solids, Liquids and Pills    -Current diet (solids): Regular patient requires liquids with all solids     -Current diet (liquids): Thin  -Alternative Feeding Method?: NA    -Facial appearance Abnormal movements   -Dentition Adequate   -Labial function WFL   -Lingual function Decreased protrusion/retraction and poor TB retraction   -Velar function Symmetrical     Sensitive gag; c/o dry mouth; noted gurgly/wet vocal quality during conversation  LIQUID CONSISTENCY TESTIN  Liquid Consistency (Thin) - water    Administered by: plastic bottle   Strategies, attempts, and responses: Other: prep set    CLINICAL FINDINGS:   Oral phase impairments: Bolus formation/control   Pharyngeal Phase Impairments: Wet vocal quality, Cough and Throat clear    *Laryngeal excursion upon palpation: Poor HLE upon palpation      SOLID CONSISTENCY TESTIN  Solid Consistency (Mechanical Soft and Puree) - diced pear and applesauce   Administered by: Spoon and Self-fed   Strategies, attempts, and responses: Multiple swallows observed consistently  Patient reported needing liquid was frequently        CLINICAL FINDINGS:   Oral phase impairments: Other (poor tongue base retraction - refer to VBSS)   Pharyngeal Phase Impairments: Wet vocal quality, Cough, Throat clear and Complaints of globus sensation    *Laryngeal excursion upon palpation: Poor HLE upon palpation      SWALLOWING DIAGNOSTIC IMPRESSION:  -Swallowing diagnosis/severity: Mild-moderate oropharyngeal phase dysphagia    -Factors affecting performance: Compliance and Other dry mouth    -Safety concerns: Risk for aspiration and Risk for inadequate nutrition/ hydration    -Risk factors: History of Head and Neck Cancer and History of aspiration pneumonia    SAFETY PRECAUTIONS:  -Supervision: Independent     -Strategies: Small sips and bites when eating, Slow rate, swallow between bites, Alternate liquids and solids and Other prep set    -Positioning: Upright position at least 30 minutes after meal and Upright position during meals    -Compensatory strategies: Effortful swallow, Multiple swallows and Throat clear    -Recommend (solids): soft regular/moist    -Recommend (liquids): Thin and Nectar-thick    -Recommend (medications): with liquids; liquid as able  REFERRALS: Gastroenterology and Otolaryngology - consider ENT for ongoing following  Patient inquired regarding possible need for EGD  Reviewed results of video barium swallow  Goals  Short Term Goals:  1  Patient will improve TBR for propulsion of bolus into the pharynx based on results of a repeat VBSS in 6-8 weeks  2   Patient will swallow without s/s of aspiration in 90% of trials  3   Patient will have decreased c/o bolus sensation with PO trials in 80% of attempts  Long Term Goals:  1  Patient will tolerate least restricted liquid without risk of aspiration with use of strategies  2   Patient will tolerate least restricted solids without risk of aspiration with use of strategies  Patient goal: "to make it easier for everyone"    Impressions/ Recommendations  Impressions:Patient presents with mild-moderate oropharyngeal dysphagia  Recommendations:   Patients would benefit from: Speech/ language therapy   Frequency:2x weekly   Duration:Other 8 weeks    Intervention certification BVFY:10/80/98  Intervention certification HV:9/93/41  Intervention Comments:dysphagia therapy, exercise, NMES, MFR, education, carryover

## 2019-11-21 NOTE — TELEPHONE ENCOUNTER
Received call from Wesley @ [address] [facility] Speech [facility]  Patient is there for an speech eval and treat  Can an order please be put into the computer for patient to be seen?

## 2019-11-25 ENCOUNTER — OFFICE VISIT (OUTPATIENT)
Dept: PHYSICAL THERAPY | Age: 54
End: 2019-11-25
Payer: COMMERCIAL

## 2019-11-25 DIAGNOSIS — M62.81 MUSCLE WEAKNESS (GENERALIZED): Primary | ICD-10-CM

## 2019-11-25 PROCEDURE — 97110 THERAPEUTIC EXERCISES: CPT

## 2019-11-26 ENCOUNTER — OFFICE VISIT (OUTPATIENT)
Dept: FAMILY MEDICINE CLINIC | Facility: CLINIC | Age: 54
End: 2019-11-26
Payer: COMMERCIAL

## 2019-11-26 VITALS
SYSTOLIC BLOOD PRESSURE: 122 MMHG | HEART RATE: 76 BPM | BODY MASS INDEX: 24.73 KG/M2 | HEIGHT: 69 IN | WEIGHT: 167 LBS | DIASTOLIC BLOOD PRESSURE: 82 MMHG | TEMPERATURE: 99 F

## 2019-11-26 DIAGNOSIS — R13.10 SWALLOWING DYSFUNCTION: ICD-10-CM

## 2019-11-26 DIAGNOSIS — E03.9 HYPOTHYROIDISM, UNSPECIFIED TYPE: Primary | ICD-10-CM

## 2019-11-26 DIAGNOSIS — C01 CANCER OF BASE OF TONGUE (HCC): ICD-10-CM

## 2019-11-26 PROCEDURE — 99214 OFFICE O/P EST MOD 30 MIN: CPT | Performed by: FAMILY MEDICINE

## 2019-11-26 NOTE — PROGRESS NOTES
Daily Note     Today's date: 2019  Patient name: Derek Duarte Sr   : 1965  MRN: [id number]  Referring provider: Charly Parks, PT  Dx:   Encounter Diagnosis     ICD-10-CM    1  Muscle weakness (generalized) M62 81                   Subjective: no new c/o's       Objective: See treatment diary below      Assessment: Tolerated treatment well  Patient would benefit from continued PT      Plan: Continue per plan of care        Precautions: no known med allergies       Manual  2019          I emigdio            [place] to c spine              Cervical prom  man                                          Exercise Diary           Cervical isometrics  5 reps all 5 sec hold           Cervical flexion with theraband in supine   unable aarom cervical flexion3 sets of 5 reps  4 sets of 10 aarom flexion in supine  No band,  3 sets of 10       10 se c holds 10 reps aarom          Prone cervical extension   Blue theraband 2 x 10  Green theraband 3 x 10  Green 3 x 10         hughstons 1,3, 5             Ball toss weighted activities   Sh flex and abd 90 30 reps palms up and down ball toss to self  30 reps each         Push ups  Bent knee 3 sets of 10 2 sets of 10   push up 1 set of 7 7  push ups          Bench press             pullovers              press             Fitter with ue's   30 reps side to side and forward backward 2 cords  30 reps each         Ball toss against wall overhead 3 positions  3 x 10 blue small weighted ball  3 x 10 3 x 10         theraband ext and int rotation  Blue 3 x 10 left ue Green 3 x 10  Green 3 x 10          Body blade 5 positions 1 min hold left ue  5 5 positions  1 min holds 5 positions         theraband green sh abd 90 int ext rot , left elbow flexed to 90 degrees   3 x 10  3 x 10                                                                                           Modalities

## 2019-11-26 NOTE — PROGRESS NOTES
Assessment/Plan:    Cancer of base of tongue (HCC)  No evidence of recurrence  Cont to monitor  Recheck 6m    Hypothyroidism  Appears to be stable clinically  Check TSH  Adjust meds if TSH is not at goal  Recheck 6m      Swallowing dysfunction  Pt involved with speech therapy  Continue present care  Recheck 6m - earlier if worse       Diagnoses and all orders for this visit:    Hypothyroidism, unspecified type  -     TSH, 3rd generation; Future    Swallowing dysfunction    Cancer of base of tongue (HCC)          Subjective:      Patient ID: Todd Price  is a 62 y o  male  F/u several issues  - pt with sl cough and congestion for the last few days  Pt states that he gets this with season change every year  No hx of allergy  Nothing has worked in the past  Pt notes sl chills last night but no fever    - fatigue seems to be better  Pt has been very active at work  - pt exercises 2-3x/week at [employer]  Does resistant machines but no Cardio  Denies CP, palp, lightheadedness or other CV symptoms with or without exertion  - still struggling with swallowing  No aspiration symptoms noted  - no  complaints      The following portions of the patient's history were reviewed and updated as appropriate:   He  has a past medical history of Cancer of base of tongue ([address] ), Cough, Disease of thyroid gland, Dysphagia, ED (erectile dysfunction), Hypertension, Leukopenia, Peyronie's disease, Psoriasis, Tongue cancer ([address] ), and Weight loss, abnormal   He   Patient Active Problem List    Diagnosis Date Noted    Swallowing dysfunction 04/26/2019    Hyperbilirubinemia 04/21/2019    History of oral cancer 04/20/2019    ED (erectile dysfunction) of organic origin 04/30/2018    Peyronie's disease 04/30/2018    Arthralgia 09/25/2017    Cancer of base of tongue ([address] ) 05/24/2016    Hypothyroidism 11/11/2013    Psoriasis 09/19/2012     He  has a past surgical history that includes Tongue biopsy / excision;  Other surgical history; PEG tube placement; and pr colonoscopy flx dx w/collj spec when pfrmd (N/A, 6/27/2017)  He  reports that he quit smoking about 8 years ago  He has a 5 00 pack-year smoking history  He has quit using smokeless tobacco  He reports that he drinks about 12 0 standard drinks of alcohol per week  He reports that he does not use drugs  Current Outpatient Medications   Medication Sig Dispense Refill    fluticasone (FLONASE) 50 mcg/act nasal spray 2 sprays into each nostril daily 2 sprays bilat qd 1 Bottle 2    levothyroxine 75 mcg tablet TAKE ONE TABLET BY MOUTH EVERY DAY 90 tablet 0    multivitamin (THERAGRAN) TABS Take 1 tablet by mouth daily      pentoxifylline (TRENtal) 400 mg ER tablet Take 1 tablet (400 mg total) by mouth 3 (three) times a day with meals 270 tablet 0    sildenafil (REVATIO) 20 mg tablet TAKE 1-5 TABLETS AS NEEDED  6     No current facility-administered medications for this visit  He has No Known Allergies       Review of Systems   Constitutional: Negative for fatigue (improved)  HENT: Positive for trouble swallowing (mild, chronic)  Negative for voice change  Eyes: Negative  Respiratory: Negative  Cardiovascular: Negative  Gastrointestinal: Negative  Endocrine: Negative  Genitourinary: Negative  Skin: Negative  Neurological: Negative  Hematological: Negative  Psychiatric/Behavioral: Positive for sleep disturbance  Negative for dysphoric mood (improved) and suicidal ideas  The patient is nervous/anxious (occasional)  Objective:      /82 (BP Location: Right arm, Patient Position: Sitting, Cuff Size: Standard)   Pulse 76   Temp 99 °F (37 2 °C)   Ht 5' 9" (1 753 m)   Wt 75 8 kg (167 lb)   BMI 24 66 kg/m²          Physical Exam   Constitutional: He is oriented to person, place, and time  He appears well-developed and well-nourished  HENT:   Head: Normocephalic and atraumatic     Right Ear: External ear normal    Left Ear: External ear normal    Nose: Nose normal    Mouth/Throat: Oropharynx is clear and moist    Eyes: Pupils are equal, round, and reactive to light  Conjunctivae and EOM are normal    Neck: Normal range of motion  Neck supple  Post RT changes in neck noted - unchanged   Cardiovascular: Normal rate, regular rhythm, normal heart sounds and intact distal pulses  No murmur heard  Pulmonary/Chest: Effort normal and breath sounds normal    Abdominal: Soft  Bowel sounds are normal    Musculoskeletal: Normal range of motion  He exhibits no edema  Lymphadenopathy:     He has no cervical adenopathy  Neurological: He is alert and oriented to person, place, and time  No cranial nerve deficit  He exhibits normal muscle tone  Coordination normal    Skin: Skin is warm  Psychiatric: He has a normal mood and affect  His behavior is normal  Thought content normal    Vitals reviewed

## 2019-12-01 PROBLEM — J15.4 STREPTOCOCCAL PNEUMONIA (HCC): Status: RESOLVED | Noted: 2019-04-20 | Resolved: 2019-12-01

## 2019-12-02 ENCOUNTER — OFFICE VISIT (OUTPATIENT)
Dept: SPEECH THERAPY | Age: 54
End: 2019-12-02
Payer: COMMERCIAL

## 2019-12-02 ENCOUNTER — OFFICE VISIT (OUTPATIENT)
Dept: PHYSICAL THERAPY | Age: 54
End: 2019-12-02
Payer: COMMERCIAL

## 2019-12-02 DIAGNOSIS — R13.12 DYSPHAGIA, OROPHARYNGEAL: Primary | ICD-10-CM

## 2019-12-02 DIAGNOSIS — R53.1 GENERAL WEAKNESS: Primary | ICD-10-CM

## 2019-12-02 DIAGNOSIS — C01 CANCER OF BASE OF TONGUE (HCC): ICD-10-CM

## 2019-12-02 PROCEDURE — 92526 ORAL FUNCTION THERAPY: CPT

## 2019-12-02 PROCEDURE — 97110 THERAPEUTIC EXERCISES: CPT

## 2019-12-02 NOTE — PROGRESS NOTES
Speech Treatment Note    Today's date: 2019  Patient name: Christo Geronimo  : 1965  MRN: [id number]  Referring provider: Anita Cates*  Dx:   Encounter Diagnosis     ICD-10-CM    1  Dysphagia, oropharyngeal R13 12    2  Cancer of base of tongue ([facility] [address] ) C01        Start Time: 0830  Stop Time: 0915  Total time in clinic (min): 45 minutes    Visit Number: 2/BOMN  Re-assessment: 20    Subjective/Behavioral: Patient transitioned with treating therapist and discussed current status  Patient reported that he no longer regurgitates food, but continues to require multiple swallows on all trials  This was observed during the session in additional to alternating solids and liquids  Patient tolerated NMES (min threshold: 2 5 mA & max threshold: 5 5 mA) in conjunction with PO intake and exercises  Traditional VitalStim    Channel(s) used: 2   Placement: 3b   Duty Cycle: standard   Frequency: standard   Phase Duration: standard   Treatment Duration: 40 minutes     Patient consumed the following during [facility] session: 4 oz pear applesauce puree and ~10 oz of water  Clinician educated patient on the following safe swallow strategies: Small sips and bites when eating    During swallowing trials, patient was observed to elevate his head up which appeared to increase throat clearing  He was educated on why not to do this and was told to hold at a neutral slight chin tongue position  Reviewed safe swallow strategies and discussed carryover of exercises at home (yawn/sign, tongue strengthening, effortful swallow, and Chelsey) to be conducted 1-3x daily in-between meals  Patient reported he used to do this post cancer treatment  Patient was observed to throat clear frequently throughout the session  He reported that he does this all of the time  Goals  Short Term Goals:  1    Patient will improve TBR for propulsion of bolus into the pharynx based on results of a repeat VBSS in - weeks  Completed >20 trials of effortful swallow in puree today w/ additional of vital stimulation  Presented home based exercises to improve TBR  2   Patient will swallow without s/s of aspiration in 90% of trials  Patient was observed to throat clear throughout the session, but did not appear to be caused by swallowing, but secondary to a chronic globus sensation  3   Patient will have decreased c/o bolus sensation with PO trials in 80% of attempts  See goal 2    Long Term Goals:  1  Patient will tolerate least restricted liquid without risk of aspiration with use of strategies  2   Patient will tolerate least restricted solids without risk of aspiration with use of strategies  Patient goal: "to make it easier for everyone"      Other:Patient was provided with home exercises/ activies to target goals in plan of care    Recommendations:Continue with Plan of Care

## 2019-12-02 NOTE — PROGRESS NOTES
Daily Note     Today's date: 2019  Patient name: Maribel López Sr   : 1965  MRN: [id number]  Referring provider: Yessica Johnson, PT  Dx:   Encounter Diagnosis     ICD-10-CM    1  General weakness R53 1                   Subjective: pt seen [phone] and [phone]  With speech therapy in between  Will PT supervising [phone]      Objective: See treatment diary below      Assessment: Tolerated treatment well  Patient would benefit from continued PT      Plan: Continue per plan of care        Precautions: no known med allergies       Manual  2019         I eval            graston to c spine              Cervical prom  man                                          Exercise Diary          Cervical isometrics  5 reps all 5 sec hold           Cervical flexion with theraband in supine   unable aarom cervical flexion3 sets of 5 reps  4 sets of 10 aarom flexion in supine  No band,  3 sets of 10       10 se c holds 10 reps aarom          Prone cervical extension   Blue theraband 2 x 10  Green theraband 3 x 10  Green 3 x 10         hughstons 1,3, 5             Ball toss weighted activities   Sh flex and abd 90 30 reps palms up and down ball toss to self  30 reps each 30 reps each        Push ups  Bent knee 3 sets of 10 2 sets of 10   push up 1 set of 7 7  push ups  7        Bench press             pullovers              press             Fitter with ue's   30 reps side to side and forward backward 2 cords  30 reps each         Ball toss against wall overhead 3 positions  3 x 10 blue small weighted ball  3 x 10 3 x 10 3 x 10        theraband ext and int rotation  Blue 3 x 10 left ue Green 3 x 10  Green 3 x 10  Green 3 x 100        Body blade 5 positions 1 min hold left ue  5 5 positions  1 min holds 5 positions 1 min x 5 x 2        theraband green sh abd 90 int ext rot , left elbow flexed to 90 degrees   3 x 10  3 x 10 3 x 10 Modalities

## 2019-12-04 ENCOUNTER — OFFICE VISIT (OUTPATIENT)
Dept: SPEECH THERAPY | Age: 54
End: 2019-12-04
Payer: COMMERCIAL

## 2019-12-04 ENCOUNTER — OFFICE VISIT (OUTPATIENT)
Dept: PHYSICAL THERAPY | Age: 54
End: 2019-12-04
Payer: COMMERCIAL

## 2019-12-04 DIAGNOSIS — Z92.3 STATUS POST RADIATION THERAPY: ICD-10-CM

## 2019-12-04 DIAGNOSIS — R13.12 DYSPHAGIA, OROPHARYNGEAL: Primary | ICD-10-CM

## 2019-12-04 DIAGNOSIS — C01 CANCER OF BASE OF TONGUE (HCC): ICD-10-CM

## 2019-12-04 DIAGNOSIS — R53.1 GENERAL WEAKNESS: Primary | ICD-10-CM

## 2019-12-04 PROCEDURE — 92526 ORAL FUNCTION THERAPY: CPT

## 2019-12-04 PROCEDURE — 97110 THERAPEUTIC EXERCISES: CPT

## 2019-12-04 NOTE — PROGRESS NOTES
Daily Note     Today's date: 2019  Patient name: Awa Pena Sr   : 1965  MRN: [id number]  Referring provider: Bia Patel, PT  Dx:   Encounter Diagnosis     ICD-10-CM    1  General weakness R53 1    2  Status post radiation therapy Z92 3                   Subjective: "I haven't been to the gym due to illness "      Objective: See treatment diary below      Assessment: Tolerated treatment well  Patient would benefit from continued PT      Plan: Continue per plan of care        Precautions: no known med allergies       Manual  2019        I eval            graston to c spine              Cervical prom  man                                          Exercise Diary         Cervical isometrics  5 reps all 5 sec hold    5 with ball on wall       Cervical flexion with theraband in supine   unable aarom cervical flexion3 sets of 5 reps  4 sets of 10 aarom flexion in supine  No band,  3 sets of 10       10 se c holds 10 reps aarom   3 x 10       Prone cervical extension   Blue theraband 2 x 10  Green theraband 3 x 10  Green 3 x 10  manually resisted 3 x 10       hughstons 1,3, 5             Ball toss weighted activities   Sh flex and abd 90 30 reps palms up and down ball toss to self  30 reps each 30 reps each 30 reps each       Push ups  Bent knee 3 sets of 10 2 sets of 10   push up 1 set of 7 7  push ups  7 9         Bench press             pullovers              press             Fitter with ue's   30 reps side to side and forward backward 2 cords  30 reps each         Ball toss against wall overhead 3 positions  3 x 10 blue small weighted ball  3 x 10 3 x 10 3 x 10 3 x 10       theraband ext and int rotation  Blue 3 x 10 left ue Green 3 x 10  Green 3 x 10  Green 3 x 100 Green 3 x 10       Body blade 5 positions 1 min hold left ue  5 5 positions  1 min holds 5 positions 1 min x 5 x 2 X 5 1 min each x 2 therabanjanneth green sh abd 90 int ext rot , left elbow flexed to 90 degrees   3 x 10  3 x 10 3 x 10 3 x10                                                                                         Modalities

## 2019-12-04 NOTE — PROGRESS NOTES
Speech Treatment Note    Today's date: 2019  Patient name: Davonte Ham  : 1965  MRN: [id number]  Referring provider: Cesar Starkey*  Dx:   Encounter Diagnosis     ICD-10-CM    1  Dysphagia, oropharyngeal R13 12    2  Cancer of base of tongue ([facility] [address] ) C01        Start Time: 0930  Stop Time: 1015  Total time in clinic (min): 45 minutes    Visit Number:   Re-assessment: 20    Subjective/Behavioral: Patient transitioned with treating therapist and discussed current status  Patient reported that he no longer regurgitates food, but continues to require multiple swallows on all trials  This was observed during the session in additional to alternating solids and liquids  Patient tolerated NMES (min threshold: 2 5 mA & max threshold: 8 5 mA) in conjunction with PO intake and exercises  Traditional VitalStim    Channel(s) used: 2   Placement: 3b   Duty Cycle: 57/1 sec   Frequency: 80 pps   Phase Duration: 300 microseconds   Treatment Duration: 31 minutes     Patient consumed the following during todays session: 4 oz pear mechanical soft and ~10 oz of water  Clinician educated patient on the following safe swallow strategies: Small sips and bites when eating    Previous session reported head tilt up - this was not observed during today's session  Reviewed safe swallow strategies and discussed carryover of exercises at home (mendelsohn, effortful swallow, and Chelsey) to be conducted 1-3x daily in-between meals  Patient was observed to throat clear frequently throughout the session  He reported that he does this all of the time  Goals  Short Term Goals:  1  Patient will improve TBR for propulsion of bolus into the pharynx based on results of a repeat VBSS in 6-8 weeks  Completed >30 trials of effortful swallow in mechanical soft today w/ addition of NMES  Presented home based exercises to improve TBR      2   Patient will swallow without s/s of aspiration in 90% of trials  Inconsistent throat clearing at rest, with swallowing exercises and PO attempts  3   Patient will have decreased c/o bolus sensation with PO trials in 80% of attempts  Patient required liquid wash with all PO trials today  Encouraged patient to use liquid wash after 3 effortful swallows  All globus sensation cleared after liquid wash  Long Term Goals:  1  Patient will tolerate least restricted liquid without risk of aspiration with use of strategies  2   Patient will tolerate least restricted solids without risk of aspiration with use of strategies  Patient goal: "to make it easier for everyone"      Other:Patient was provided with home exercises/ activies to target goals in plan of care    Recommendations:Continue with Plan of Care

## 2019-12-05 DIAGNOSIS — E03.9 HYPOTHYROIDISM, UNSPECIFIED TYPE: ICD-10-CM

## 2019-12-05 RX ORDER — LEVOTHYROXINE SODIUM 0.07 MG/1
TABLET ORAL
Qty: 90 TABLET | Refills: 0 | Status: SHIPPED | OUTPATIENT
Start: 2019-12-05 | End: 2019-12-31

## 2019-12-06 DIAGNOSIS — E03.9 HYPOTHYROIDISM, UNSPECIFIED TYPE: ICD-10-CM

## 2019-12-06 RX ORDER — LEVOTHYROXINE SODIUM 0.07 MG/1
TABLET ORAL
Qty: 90 TABLET | Refills: 0 | Status: SHIPPED | OUTPATIENT
Start: 2019-12-06 | End: 2019-12-31

## 2019-12-09 ENCOUNTER — OFFICE VISIT (OUTPATIENT)
Dept: PHYSICAL THERAPY | Age: 54
End: 2019-12-09
Payer: COMMERCIAL

## 2019-12-09 ENCOUNTER — OFFICE VISIT (OUTPATIENT)
Dept: SPEECH THERAPY | Age: 54
End: 2019-12-09
Payer: COMMERCIAL

## 2019-12-09 DIAGNOSIS — T66.XXXS ADVERSE EFFECT OF RADIATION, SEQUELA: ICD-10-CM

## 2019-12-09 DIAGNOSIS — R53.1 GENERAL WEAKNESS: Primary | ICD-10-CM

## 2019-12-09 DIAGNOSIS — R13.12 DYSPHAGIA, OROPHARYNGEAL: Primary | ICD-10-CM

## 2019-12-09 DIAGNOSIS — C01 CANCER OF BASE OF TONGUE (HCC): ICD-10-CM

## 2019-12-09 PROCEDURE — 92526 ORAL FUNCTION THERAPY: CPT

## 2019-12-09 PROCEDURE — 97110 THERAPEUTIC EXERCISES: CPT

## 2019-12-09 NOTE — PROGRESS NOTES
Daily Note     Today's date: 2019  Patient name: Semaj Perea Sr   : 1965  MRN: [id number]  Referring provider: Tim Galeana, PT  Dx:   Encounter Diagnosis     ICD-10-CM    1  General weakness R53 1    2  Adverse effect of radiation, sequela T66  XXXS                   Subjective:  Pt here late modification of schedule  Objective: See treatment diary below      Assessment: Tolerated treatment well  Patient would benefit from continued PT      Plan: Continue per plan of care        Precautions: no known med allergies       Manual  2019       I eval            graston to c spine              Cervical prom  man      man                                    Exercise Diary        Cervical isometrics  5 reps all 5 sec hold    5 with ball on wall 5      Cervical flexion with theraband in supine   unable aarom cervical flexion3 sets of 5 reps  4 sets of 10 aarom flexion in supine  No band,  3 sets of 10       10 se c holds 10 reps aarom   3 x 10  3 x10      Prone cervical extension   Blue theraband 2 x 10  Green theraband 3 x 10  Green 3 x 10  manually resisted 3 x 10  3 x 10      hughstons 1,3, 5             Ball toss weighted activities   Sh flex and abd 90 30 reps palms up and down ball toss to self  30 reps each 30 reps each 30 reps each 30 reps  30 reps      Push ups  Bent knee 3 sets of 10 2 sets of 10   push up 1 set of 7 7  push ups  7 9    11 reps      Bench press             pullovers              press             Fitter with ue's   30 reps side to side and forward backward 2 cords  30 reps each    30 reps      Ball toss against wall overhead 3 positions  3 x 10 blue small weighted ball  3 x 10 3 x 10 3 x 10 3 x 10  3  x10      theraband ext and int rotation  Blue 3 x 10 left ue Green 3 x 10  Green 3 x 10  Green 3 x 100 Green 3 x 10  Green 3 x 10     Body blade 5 positions 1 min hold left ue  5 5 positions  1 min holds 5 positions 1 min x 5 x 2 X 5 1 min each x 2    1min x 5      theraband green sh abd 90 int ext rot , left elbow flexed to 90 degrees   3 x 10  3 x 10 3 x 10 3 x10  3 x 10                                                                                       Modalities

## 2019-12-09 NOTE — PROGRESS NOTES
Speech Treatment Note    Today's date: 2019  Patient name: Maritza Mueller  : 1965  MRN: [id number]  Referring provider: Gabriela Ybarra  Dx:   Encounter Diagnosis     ICD-10-CM    1  Dysphagia, oropharyngeal R13 12    2  Cancer of base of tongue ([facility] [address] ) C01        Start Time: 915  Stop Time: 1005  Total time in clinic (min): 50 minutes    Visit Number: 34/BOMN  Re-assessment: 20    Subjective/Behavioral: Patient transitioned with treating therapist and discussed current status  SLP student intern present  Patient reports no changes this week  Patient tolerated NMES (min threshold: 2 5 mA & max threshold: 9 5 mA) in conjunction with PO intake and exercises  Traditional VitalStim    Channel(s) used: 2   Placement: 3b   Duty Cycle: 57/1 sec   Frequency: 80 pps   Phase Duration: 300 microseconds   Treatment Duration: Total 24  minutes     Patient consumed the following during todays session: several apple chips, ritz crackers, xavier crackers  Clinician educated patient on the following safe swallow strategies: Small sips and bites when eating    Previous session reported head tilt up - this was not observed during today's session  NMES and PO trials were broken into 2 sets - initial set 13 min noted: Patient was noted to have t/c x1 after ritz cracker, x2 after xavier cracker; at least 7 attempts with no s/s of aspiration  Patient had appropriate alternating solids and liquids  He was noted to have a delayed throat clear x1 after 13 min intervention  The patient was noted to consistently use multiple swallows  Second PO trial initiated after further dysphagia therapy - see below: patient continued PO trials with NMES x11 minutes: patient continued above consistencies with x1 throat/clear following all PO  Continued with multiple swallows, alternating solids and liquids  Initiated sEMG with dysphagia therapy today    Patient was able to use visual biofeedback to improve success with completion of effortful swallows and Mendehlsohn swallow exercises  He completed x 12 Mendhelsohn, x6 Chelsey, and x10 Effortful swallows  This occurred between the PO trials  Reviewed safe swallow strategies and discussed carryover of exercises at home (mendelsohn, effortful swallow, and Chelsey) to be conducted 1-3x daily in-between meals  Patient was observed to throat clear frequently throughout the session  He reported that he does this all of the time  Goals  Short Term Goals:  1  Patient will improve TBR for propulsion of bolus into the pharynx based on results of a repeat VBSS in 6-8 weeks  Completed >30 trials of effortful swallow in mechanical soft today w/ addition of NMES  Presented home based exercises to improve TBR  2   Patient will swallow without s/s of aspiration in 90% of trials  Inconsistent throat clearing at rest, with swallowing exercises and PO attempts  3   Patient will have decreased c/o bolus sensation with PO trials in 80% of attempts  Patient required liquid wash with all PO trials today  Encouraged patient to use liquid wash after 3 effortful swallows  All globus sensation cleared after liquid wash  Long Term Goals:  1  Patient will tolerate least restricted liquid without risk of aspiration with use of strategies  2   Patient will tolerate least restricted solids without risk of aspiration with use of strategies  Patient goal: "to make it easier for everyone"      Other:Patient was provided with home exercises/ activies to target goals in plan of care    Recommendations:Continue with Plan of Care

## 2019-12-10 ENCOUNTER — OFFICE VISIT (OUTPATIENT)
Dept: SPEECH THERAPY | Age: 54
End: 2019-12-10
Payer: COMMERCIAL

## 2019-12-10 DIAGNOSIS — R13.12 DYSPHAGIA, OROPHARYNGEAL: Primary | ICD-10-CM

## 2019-12-10 DIAGNOSIS — C01 CANCER OF BASE OF TONGUE (HCC): ICD-10-CM

## 2019-12-10 PROCEDURE — 92526 ORAL FUNCTION THERAPY: CPT

## 2019-12-10 NOTE — PROGRESS NOTES
Speech Treatment Note    Today's date: 12/10/2019  Patient name: Vivi Felix  : 1965  MRN: [id number]  Referring provider: Airam Martin*  Dx:   Encounter Diagnosis     ICD-10-CM    1  Dysphagia, oropharyngeal R13 12    2  Cancer of base of tongue ([facility] [address] ) C01        Start Time: 0915  Stop Time: 1005  Total time in clinic (min): 50 minutes    Visit Number: 5/BOMN  Re-assessment: 20    Subjective/Behavioral: Patient transitioned with treating therapist and discussed current status  SLP student intern present  Patient reports no changes this week  Initiated Vital Stim Plus with sEMG combined today  sEMG    Average Threshold: 60uV   Average Work: 14 6uV   Exercise: Mendehlsohn/Effortful [place]   Sets x Reps: 10 x 2   Total time with NMES: 8 min 37 seconds  Patient frequently using water via cup to maintain ease of swallowing  Noted frequent throat clearing  Patient reports "rushing"  Discussed need to take time  Occasional note of neck extension with last sips - discussed neutral head positioning to maintain airway protection  Patient agreeable  Patient tolerated NMES (min threshold: 2 5 mA & max threshold: 13 5 mA) in conjunction with PO intake and exercises  Traditional VitalStim    Channel(s) used: 1 and 2   Placement: 3b   Duty Cycle: 57/1 sec   Frequency: 80 pps   Phase Duration: 300 microseconds   Treatment Duration: Total 12  minutes     Patient consumed the following during todays session: ravioli in sauce  Clinician educated patient on the following safe swallow strategies: Small sips and bites when eating; posture; time  PO trial initiated after exercise/sEMG - biofeedback with NMES: patient continued PO trials with NMES x12 minutes: patient continued above consistencies with x1 throat/clear following all PO  Continued with multiple swallows, alternating solids and liquids        Reviewed safe swallow strategies and discussed carryover of exercises at home [facility], effortful swallow, and Chelsey) to be conducted 1-3x daily in-between meals  Patient was observed to throat clear frequently throughout the session  He reported that he does this all of the time  Goals  Short Term Goals:  1  Patient will improve TBR for propulsion of bolus into the pharynx based on results of a repeat VBSS in 6-8 weeks  Completed >30 trials of effortful swallow in mechanical soft today w/ addition of NMES  Presented home based exercises to improve TBR  2   Patient will swallow without s/s of aspiration in 90% of trials  Inconsistent throat clearing at rest, with swallowing exercises and PO attempts  3   Patient will have decreased c/o bolus sensation with PO trials in 80% of attempts  Patient required liquid wash with all PO trials today  Encouraged patient to use liquid wash after 3 effortful swallows  All globus sensation cleared after liquid wash  Long Term Goals:  1  Patient will tolerate least restricted liquid without risk of aspiration with use of strategies  2   Patient will tolerate least restricted solids without risk of aspiration with use of strategies  Patient goal: "to make it easier for everyone"      Other:Patient was provided with home exercises/ activies to target goals in plan of care    Recommendations:Continue with Plan of Care

## 2019-12-11 ENCOUNTER — APPOINTMENT (OUTPATIENT)
Dept: PHYSICAL THERAPY | Age: 54
End: 2019-12-11
Payer: COMMERCIAL

## 2019-12-18 ENCOUNTER — OFFICE VISIT (OUTPATIENT)
Dept: PHYSICAL THERAPY | Age: 54
End: 2019-12-18
Payer: COMMERCIAL

## 2019-12-18 DIAGNOSIS — R53.1 GENERALIZED WEAKNESS: Primary | ICD-10-CM

## 2019-12-18 PROCEDURE — 97110 THERAPEUTIC EXERCISES: CPT

## 2019-12-18 PROCEDURE — 97140 MANUAL THERAPY 1/> REGIONS: CPT

## 2019-12-18 NOTE — PROGRESS NOTES
Daily Note     Today's date: 2019  Patient name: Radha Fox Sr   : 1965  MRN: [id number]  Referring provider: Frankey Kill, PT  Dx:   Encounter Diagnosis     ICD-10-CM    1  Generalized weakness R53 1                   Subjective: "My insurance is changing at the end of the year and I'm upset about it " Pt's spouse changing jobs hence insurance options changing  Objective: See treatment diary below      Assessment: Tolerated treatment well  Patient would benefit from continued PT      Plan: Continue per plan of care        Precautions: no known med allergies       Manual  2019      I eval            graston to c spine              Cervical prom  man      man man                                   Exercise Diary       Cervical isometrics  5 reps all 5 sec hold    5 with ball on wall 5      Cervical flexion with theraband in supine   unable aarom cervical flexion3 sets of 5 reps  4 sets of 10 aarom flexion in supine  No band,  3 sets of 10       10 se c holds 10 reps aarom   3 x 10  3 x10 3 x 10     Prone cervical extension   Blue theraband 2 x 10  Green theraband 3 x 10  Green 3 x 10  manually resisted 3 x 10  3 x 10 3 x 10     hughstons 1,3, 5             Ball toss weighted activities   Sh flex and abd 90 30 reps palms up and down ball toss to self  30 reps each 30 reps each 30 reps each 30 reps  30 reps      Push ups  Bent knee 3 sets of 10 2 sets of 10   push up 1 set of 7 7  push ups  7 9   11 reps       Bench press             pullovers              press             Fitter with ue's   30 reps side to side and forward backward 2 cords  30 reps each   30 reps 30 reps      Ball toss against wall overhead 3 positions  3 x 10 blue small weighted ball  3 x 10 3 x 10 3 x 10 3 x 10 3 x 10 3  x10      theraband ext and int rotation  Blue 3 x 10 left ue Green 3 x 10  Green 3 x 10  Green 3 x 100 Green 3 x 10 Green 3 x 10 Green 3 x 10     Body blade 5 positions 1 min hold left ue  5 5 positions  1 min holds 5 positions 1 min x 5 x 2 X 5 1 min each x 2 1 min x 5   1min x 5      theraband green sh abd 90 int ext rot , left elbow flexed to 90 degrees   3 x 10  3 x 10 3 x 10 3 x10  3 x 10 3 x 10                                                                                       Modalities

## 2019-12-30 ENCOUNTER — APPOINTMENT (OUTPATIENT)
Dept: LAB | Age: 54
End: 2019-12-30
Payer: COMMERCIAL

## 2019-12-30 DIAGNOSIS — E03.9 HYPOTHYROIDISM, UNSPECIFIED TYPE: ICD-10-CM

## 2019-12-30 LAB — TSH SERPL DL<=0.05 MIU/L-ACNC: 4.61 UIU/ML (ref 0.36–3.74)

## 2019-12-30 PROCEDURE — 84443 ASSAY THYROID STIM HORMONE: CPT

## 2019-12-30 PROCEDURE — 36415 COLL VENOUS BLD VENIPUNCTURE: CPT

## 2019-12-31 ENCOUNTER — TELEPHONE (OUTPATIENT)
Dept: FAMILY MEDICINE CLINIC | Facility: CLINIC | Age: 54
End: 2019-12-31

## 2019-12-31 DIAGNOSIS — E03.9 HYPOTHYROIDISM, UNSPECIFIED TYPE: Primary | ICD-10-CM

## 2019-12-31 RX ORDER — LEVOTHYROXINE SODIUM 88 UG/1
88 TABLET ORAL DAILY
Qty: 30 TABLET | Refills: 3 | Status: SHIPPED | OUTPATIENT
Start: 2019-12-31 | End: 2020-01-02

## 2020-01-02 DIAGNOSIS — E03.9 HYPOTHYROIDISM, UNSPECIFIED TYPE: ICD-10-CM

## 2020-01-02 RX ORDER — LEVOTHYROXINE SODIUM 88 UG/1
88 TABLET ORAL DAILY
Qty: 30 TABLET | Refills: 3 | Status: SHIPPED | OUTPATIENT
Start: 2020-01-02 | End: 2020-02-24

## 2020-01-02 NOTE — TELEPHONE ENCOUNTER
Patients wife stated she called on Monday asking for this to be sent to [facility] in [place]   Please resend today     And please change her Pharm and take out home star from her chart

## 2020-02-18 NOTE — PROGRESS NOTES
Patient has not returned to therapy secondary to recent changes in insurance  Discharge at this time - 2/18/20  In the event he is able to return, we can re-evaluate for current status and determine POC

## 2020-02-23 DIAGNOSIS — E03.9 HYPOTHYROIDISM, UNSPECIFIED TYPE: ICD-10-CM

## 2020-02-24 RX ORDER — LEVOTHYROXINE SODIUM 88 UG/1
TABLET ORAL
Qty: 90 TABLET | Refills: 2 | Status: SHIPPED | OUTPATIENT
Start: 2020-02-24 | End: 2020-11-30

## 2020-08-25 ENCOUNTER — TELEPHONE (OUTPATIENT)
Dept: FAMILY MEDICINE CLINIC | Facility: CLINIC | Age: 55
End: 2020-08-25

## 2020-08-25 DIAGNOSIS — R05.8 PRODUCTIVE COUGH: Primary | ICD-10-CM

## 2020-08-25 NOTE — TELEPHONE ENCOUNTER
He refused video visit, he will go in a walk in if need  Can you order a chest xray? He has hx of  CA  He has a dry cough at night, white mucus in the am   Has nose congestion, denies fever  He doesn't want it to turn in the pneumonia like in the past   Pl adv      He is also over due for his 6 mon ck

## 2020-08-25 NOTE — TELEPHONE ENCOUNTER
Patient notified of CXR order  No fever/chills, sob or any other issues  Appt scheduled for 10/5/20

## 2020-08-25 NOTE — TELEPHONE ENCOUNTER
Any fever/chills, SOB or other issues?  I will order the CXR but he should come in some time in the next few weeks

## 2020-08-29 DIAGNOSIS — N52.9 ED (ERECTILE DYSFUNCTION) OF ORGANIC ORIGIN: Primary | ICD-10-CM

## 2020-08-31 ENCOUNTER — APPOINTMENT (OUTPATIENT)
Dept: RADIOLOGY | Facility: MEDICAL CENTER | Age: 55
End: 2020-08-31
Payer: COMMERCIAL

## 2020-08-31 DIAGNOSIS — R05.8 PRODUCTIVE COUGH: ICD-10-CM

## 2020-08-31 PROCEDURE — 71046 X-RAY EXAM CHEST 2 VIEWS: CPT

## 2020-08-31 RX ORDER — SILDENAFIL CITRATE 20 MG/1
20 TABLET ORAL DAILY PRN
Qty: 30 TABLET | Refills: 0 | Status: SHIPPED | OUTPATIENT
Start: 2020-08-31 | End: 2020-09-23

## 2020-09-02 ENCOUNTER — APPOINTMENT (OUTPATIENT)
Dept: LAB | Facility: CLINIC | Age: 55
End: 2020-09-02
Payer: COMMERCIAL

## 2020-09-02 ENCOUNTER — OFFICE VISIT (OUTPATIENT)
Dept: FAMILY MEDICINE CLINIC | Facility: CLINIC | Age: 55
End: 2020-09-02
Payer: COMMERCIAL

## 2020-09-02 VITALS
WEIGHT: 156 LBS | BODY MASS INDEX: 23.11 KG/M2 | HEIGHT: 69 IN | HEART RATE: 72 BPM | TEMPERATURE: 97.9 F | SYSTOLIC BLOOD PRESSURE: 120 MMHG | DIASTOLIC BLOOD PRESSURE: 76 MMHG

## 2020-09-02 DIAGNOSIS — Z13.83 SCREENING FOR CARDIOVASCULAR, RESPIRATORY, AND GENITOURINARY DISEASES: ICD-10-CM

## 2020-09-02 DIAGNOSIS — Z13.6 SCREENING FOR CARDIOVASCULAR, RESPIRATORY, AND GENITOURINARY DISEASES: ICD-10-CM

## 2020-09-02 DIAGNOSIS — C01 CANCER OF BASE OF TONGUE (HCC): ICD-10-CM

## 2020-09-02 DIAGNOSIS — R13.10 SWALLOWING DYSFUNCTION: ICD-10-CM

## 2020-09-02 DIAGNOSIS — Z12.5 SCREENING FOR MALIGNANT NEOPLASM OF PROSTATE: ICD-10-CM

## 2020-09-02 DIAGNOSIS — R05.9 COUGH: ICD-10-CM

## 2020-09-02 DIAGNOSIS — J30.9 ALLERGIC RHINITIS, UNSPECIFIED SEASONALITY, UNSPECIFIED TRIGGER: ICD-10-CM

## 2020-09-02 DIAGNOSIS — E03.9 HYPOTHYROIDISM, UNSPECIFIED TYPE: ICD-10-CM

## 2020-09-02 DIAGNOSIS — R63.4 WEIGHT LOSS: ICD-10-CM

## 2020-09-02 DIAGNOSIS — Z13.89 SCREENING FOR CARDIOVASCULAR, RESPIRATORY, AND GENITOURINARY DISEASES: ICD-10-CM

## 2020-09-02 DIAGNOSIS — R13.12 OROPHARYNGEAL DYSPHAGIA: ICD-10-CM

## 2020-09-02 DIAGNOSIS — Z00.00 ANNUAL PHYSICAL EXAM: Primary | ICD-10-CM

## 2020-09-02 LAB
ALBUMIN SERPL BCP-MCNC: 3.5 G/DL (ref 3.5–5)
ALP SERPL-CCNC: 94 U/L (ref 46–116)
ALT SERPL W P-5'-P-CCNC: 38 U/L (ref 12–78)
ANION GAP SERPL CALCULATED.3IONS-SCNC: 3 MMOL/L (ref 4–13)
AST SERPL W P-5'-P-CCNC: 22 U/L (ref 5–45)
BASOPHILS # BLD AUTO: 0.02 THOUSANDS/ΜL (ref 0–0.1)
BASOPHILS NFR BLD AUTO: 0 % (ref 0–1)
BILIRUB SERPL-MCNC: 0.83 MG/DL (ref 0.2–1)
BUN SERPL-MCNC: 8 MG/DL (ref 5–25)
CALCIUM SERPL-MCNC: 9.2 MG/DL (ref 8.3–10.1)
CHLORIDE SERPL-SCNC: 99 MMOL/L (ref 100–108)
CHOLEST SERPL-MCNC: 203 MG/DL (ref 50–200)
CO2 SERPL-SCNC: 34 MMOL/L (ref 21–32)
CREAT SERPL-MCNC: 0.64 MG/DL (ref 0.6–1.3)
EOSINOPHIL # BLD AUTO: 0.19 THOUSAND/ΜL (ref 0–0.61)
EOSINOPHIL NFR BLD AUTO: 4 % (ref 0–6)
ERYTHROCYTE [DISTWIDTH] IN BLOOD BY AUTOMATED COUNT: 12.2 % (ref 11.6–15.1)
GFR SERPL CREATININE-BSD FRML MDRD: 109 ML/MIN/1.73SQ M
GLUCOSE P FAST SERPL-MCNC: 94 MG/DL (ref 65–99)
HCT VFR BLD AUTO: 43.5 % (ref 36.5–49.3)
HDLC SERPL-MCNC: 63 MG/DL
HGB BLD-MCNC: 14.4 G/DL (ref 12–17)
IMM GRANULOCYTES # BLD AUTO: 0.02 THOUSAND/UL (ref 0–0.2)
IMM GRANULOCYTES NFR BLD AUTO: 0 % (ref 0–2)
LDLC SERPL CALC-MCNC: 130 MG/DL (ref 0–100)
LYMPHOCYTES # BLD AUTO: 0.71 THOUSANDS/ΜL (ref 0.6–4.47)
LYMPHOCYTES NFR BLD AUTO: 16 % (ref 14–44)
MCH RBC QN AUTO: 31.6 PG (ref 26.8–34.3)
MCHC RBC AUTO-ENTMCNC: 33.1 G/DL (ref 31.4–37.4)
MCV RBC AUTO: 96 FL (ref 82–98)
MONOCYTES # BLD AUTO: 0.46 THOUSAND/ΜL (ref 0.17–1.22)
MONOCYTES NFR BLD AUTO: 10 % (ref 4–12)
NEUTROPHILS # BLD AUTO: 3.06 THOUSANDS/ΜL (ref 1.85–7.62)
NEUTS SEG NFR BLD AUTO: 70 % (ref 43–75)
NONHDLC SERPL-MCNC: 140 MG/DL
NRBC BLD AUTO-RTO: 0 /100 WBCS
PLATELET # BLD AUTO: 297 THOUSANDS/UL (ref 149–390)
PMV BLD AUTO: 9.6 FL (ref 8.9–12.7)
POTASSIUM SERPL-SCNC: 4.1 MMOL/L (ref 3.5–5.3)
PROT SERPL-MCNC: 7.9 G/DL (ref 6.4–8.2)
PSA SERPL-MCNC: 0.6 NG/ML (ref 0–4)
RBC # BLD AUTO: 4.55 MILLION/UL (ref 3.88–5.62)
SODIUM SERPL-SCNC: 136 MMOL/L (ref 136–145)
TRIGL SERPL-MCNC: 52 MG/DL
TSH SERPL DL<=0.05 MIU/L-ACNC: 3.24 UIU/ML (ref 0.36–3.74)
WBC # BLD AUTO: 4.46 THOUSAND/UL (ref 4.31–10.16)

## 2020-09-02 PROCEDURE — 80053 COMPREHEN METABOLIC PANEL: CPT

## 2020-09-02 PROCEDURE — 84443 ASSAY THYROID STIM HORMONE: CPT

## 2020-09-02 PROCEDURE — 80061 LIPID PANEL: CPT

## 2020-09-02 PROCEDURE — 82785 ASSAY OF IGE: CPT

## 2020-09-02 PROCEDURE — 86003 ALLG SPEC IGE CRUDE XTRC EA: CPT

## 2020-09-02 PROCEDURE — 1036F TOBACCO NON-USER: CPT | Performed by: FAMILY MEDICINE

## 2020-09-02 PROCEDURE — 99396 PREV VISIT EST AGE 40-64: CPT | Performed by: FAMILY MEDICINE

## 2020-09-02 PROCEDURE — 85025 COMPLETE CBC W/AUTO DIFF WBC: CPT

## 2020-09-02 PROCEDURE — 3725F SCREEN DEPRESSION PERFORMED: CPT | Performed by: FAMILY MEDICINE

## 2020-09-02 PROCEDURE — G0103 PSA SCREENING: HCPCS

## 2020-09-02 PROCEDURE — 36415 COLL VENOUS BLD VENIPUNCTURE: CPT

## 2020-09-02 RX ORDER — BENZONATATE 200 MG/1
200 CAPSULE ORAL 3 TIMES DAILY PRN
Qty: 30 CAPSULE | Refills: 0 | Status: SHIPPED | OUTPATIENT
Start: 2020-09-02 | End: 2021-05-07 | Stop reason: ALTCHOICE

## 2020-09-02 RX ORDER — LORATADINE 10 MG/1
10 TABLET ORAL DAILY
Qty: 30 TABLET | Refills: 2 | Status: SHIPPED | OUTPATIENT
Start: 2020-09-02 | End: 2020-12-19

## 2020-09-02 NOTE — PROGRESS NOTES
[address]    NAME: Merced Baron Sr   AGE: 60 y o  SEX: male  : 1965     DATE: 9/3/2020     Assessment and Plan:     Problem List Items Addressed This Visit        Digestive    Cancer of base of tongue ([address] )     No signs of recurrence  Still with swallowing issues  Would benefit from restarting speech therapy, but cannot afford it under present insurance plan  Encourage compliance with recommendations made by speech therapy after his swallowing study as well as encouraging use of protein drinks  Recheck labs and f/u 6m         Relevant Orders    CBC and differential (Completed)    Comprehensive metabolic panel (Completed)       Endocrine    Hypothyroidism     Appears to be stable clinically  Check TSH  Adjust meds if TSH is not at goal  Recheck 6m           Relevant Orders    TSH, 3rd generation with Free T4 reflex (Completed)       Respiratory    Allergic rhinitis     Check allergy panel  Start loratadine - watch for worsening dry mouth  Recheck 2 weeks if not improved         Relevant Medications    loratadine (CLARITIN) 10 mg tablet    Other Relevant Orders    [facility] Allergy Panel, Adult       Other    Swallowing dysfunction     Still with swallowing issues  Would benefit from restarting speech therapy, but cannot afford it under present insurance plan  Encourage compliance with recommendations made by speech therapy after his swallowing study as well as encouraging use of protein drinks   Recheck labs and f/u 6m             Other Visit Diagnoses     Annual physical exam    -  Primary    Weight loss        Relevant Orders    CBC and differential (Completed)    Comprehensive metabolic panel (Completed)    Oropharyngeal dysphagia        Cough        Relevant Medications    benzonatate (TESSALON) 200 MG capsule    Screening for cardiovascular, respiratory, and genitourinary diseases        Relevant Orders    Lipid panel (Completed)    Screening for malignant neoplasm of prostate        Relevant Orders    PSA, Total Screen (Completed)          Immunizations and preventive care screenings were discussed with patient today  Appropriate education was printed on patient's after visit summary  Counseling:  · Exercise: the importance of regular exercise/physical activity was discussed  Recommend exercise 3-5 times per week for at least 30 minutes  Return in about 6 months (around 3/2/2021)  Chief Complaint:     Chief Complaint   Patient presents with    Physical Exam    Follow-up      History of Present Illness:     Adult Annual Physical   Patient here for a comprehensive physical exam  The patient reports problems - as above  - pt wth increased nasal congestion  cough and rhinorrhea over the last 1-2 weeks  Typically gets this in the fall  Covid test done last week was negative  CXR pending  - still with dry mouth s/p treatment of tongue based cancer  Still has issues with swallowing and has difficulty eating  Was going to Speech therapy but had to stop when insurance changed  Pt notes that he has lost more weight since stopping      Diet and Physical Activity  · Diet/Nutrition: mostly soft, moist diet  · Exercise: no formal exercise  Depression Screening  PHQ-9 Depression Screening    PHQ-9:    Frequency of the following problems over the past two weeks:       Little interest or pleasure in doing things:  0 - not at all  Feeling down, depressed, or hopeless:  0 - not at all  PHQ-2 Score:  0       General Health  · Sleep: sleeps well and gets 4-6 hours of sleep on average  · Hearing: normal - bilateral   · Vision: no vision problems  · Dental: regular dental visits and brushes teeth twice daily   Health  · Symptoms include: none     Review of Systems:     Review of Systems   Constitutional: Positive for appetite change  Negative for chills, diaphoresis, fatigue and fever     HENT: Positive for congestion, rhinorrhea and trouble swallowing  Negative for ear pain, sinus pressure, sinus pain and sore throat  Eyes: Negative  Respiratory: Positive for cough  Cardiovascular: Negative  Gastrointestinal: Negative  Endocrine: Negative  Genitourinary: Negative  Musculoskeletal: Positive for back pain (occ rhomboid area) and myalgias (occ rhomboid)  Negative for arthralgias and gait problem  Skin: Negative  Allergic/Immunologic: Negative  Neurological: Negative  Hematological: Negative  Psychiatric/Behavioral: Negative  Past Medical History:     Past Medical History:   Diagnosis Date    Cancer of base of tongue ([address] )     excision    Cough     Disease of thyroid gland     hypo    Dysphagia     ED (erectile dysfunction)     Hypertension     Leukopenia     Peyronie's disease     Psoriasis     Tongue cancer (HCC)     Weight loss, abnormal       Past Surgical History:     Past Surgical History:   Procedure Laterality Date    OTHER SURGICAL HISTORY      infusion port inserted and removed    PEG TUBE PLACEMENT      and removal    [place] COLONOSCOPY FLX DX W/COLLJ SPEC WHEN PFRMD N/A 6/27/2017    Procedure: COLONOSCOPY with polypectomy x2;  Surgeon: Kendy Munoz MD;  Location: [facility];   Service: General    TONGUE BIOPSY / EXCISION      Floor mouth excision of malignant tumor      Family History:     Family History   Problem Relation Age of Onset    Other Mother         reactive airway dysfunction    Coronary artery disease Father     Hypertension Father     Psoriasis Father       Social History:        Social History     Socioeconomic History    Marital status: /Civil Union     Spouse name: None    Number of children: 6    Years of education: None    Highest education level: None   Occupational History    None   Social Needs    Financial resource strain: None    Food insecurity     Worry: None     Inability: None    Transportation needs Medical: None     Non-medical: None   Tobacco Use    Smoking status: Former Smoker     Packs/day: 0 25     Years: 20 00     Pack years: 5 00     Last attempt to quit:      Years since quittin 6    Smokeless tobacco: Former User    Tobacco comment: pt quit with dx of tongue base cancer, per allscripts   Substance and Sexual Activity    Alcohol use:  Yes     Alcohol/week: 12 0 standard drinks     Types: 12 Cans of beer per week     Comment: socially    Drug use: No    Sexual activity: None   Lifestyle    Physical activity     Days per week: None     Minutes per session: None    Stress: None   Relationships    Social connections     Talks on phone: None     Gets together: None     Attends Anabaptist service: None     Active member of club or organization: None     Attends meetings of clubs or organizations: None     Relationship status: None    Intimate partner violence     Fear of current or ex partner: None     Emotionally abused: None     Physically abused: None     Forced sexual activity: None   Other Topics Concern    None   Social History Narrative    Daily coffee consumption, 1 cups/day    Daily cola consumption    Daily tea consumption    Dental care, regularly    Exercise: running, weight training    High school graduate    No guns in the home    Pets/animals,  Active          Current Medications:     Current Outpatient Medications   Medication Sig Dispense Refill    benzonatate (TESSALON) 200 MG capsule Take 1 capsule (200 mg total) by mouth 3 (three) times a day as needed for cough 30 capsule 0    fluticasone (FLONASE) 50 mcg/act nasal spray 2 sprays into each nostril daily 2 sprays bilat qd 1 Bottle 2    levothyroxine 88 mcg tablet TAKE 1 TABLET BY MOUTH EVERY DAY 90 tablet 2    loratadine (CLARITIN) 10 mg tablet Take 1 tablet (10 mg total) by mouth daily 30 tablet 2    multivitamin (THERAGRAN) TABS Take 1 tablet by mouth daily      pentoxifylline (TRENtal) 400 mg ER tablet Take 1 tablet (400 mg total) by mouth 3 (three) times a day with meals 270 tablet 0    sildenafil (REVATIO) 20 mg tablet Take 1 tablet (20 mg total) by mouth daily as needed (erectile dysfunction) 30 tablet 0     No current facility-administered medications for this visit  Allergies:     No Known Allergies   Physical Exam:     /76   Pulse 72   Temp 97 9 °F (36 6 °C)   Ht 5' 9" (1 753 m)   Wt 70 8 kg (156 lb)   BMI 23 04 kg/m²     Physical Exam  Vitals signs reviewed  Constitutional:       Appearance: He is well-developed  HENT:      Head: Normocephalic and atraumatic  Right Ear: Tympanic membrane, ear canal and external ear normal       Left Ear: Tympanic membrane, ear canal and external ear normal       Nose: Congestion (mild) present  Eyes:      Extraocular Movements: Extraocular movements intact  Conjunctiva/sclera: Conjunctivae normal       Pupils: Pupils are equal, round, and reactive to light  Neck:      Musculoskeletal: Normal range of motion and neck supple  Thyroid: No thyromegaly  Vascular: No JVD  Comments: L neck with RT changes  Cardiovascular:      Rate and Rhythm: Normal rate and regular rhythm  Pulses: Normal pulses  Heart sounds: Normal heart sounds  No murmur  Pulmonary:      Effort: Pulmonary effort is normal  No respiratory distress  Breath sounds: Normal breath sounds  No wheezing or rales  Abdominal:      General: Bowel sounds are normal  There is no distension  Palpations: Abdomen is soft  There is no mass  Tenderness: There is no abdominal tenderness  Musculoskeletal: Normal range of motion  General: No swelling  Right lower leg: No edema  Left lower leg: No edema  Lymphadenopathy:      Cervical: No cervical adenopathy  Skin:     General: Skin is warm  Capillary Refill: Capillary refill takes less than 2 seconds     Neurological:      Mental Status: He is alert and oriented to person, place, and time  Cranial Nerves: No cranial nerve deficit  Sensory: No sensory deficit  Motor: No weakness or abnormal muscle tone  Coordination: Coordination normal       Gait: Gait normal       Deep Tendon Reflexes: Reflexes normal    Psychiatric:         Mood and Affect: Mood normal          Behavior: Behavior normal          Thought Content:  Thought content normal          Judgment: Judgment normal       Comments: PHQ-2 = 0          MD Israel Crabtree

## 2020-09-02 NOTE — PATIENT INSTRUCTIONS

## 2020-09-03 PROBLEM — J30.9 ALLERGIC RHINITIS: Status: ACTIVE | Noted: 2020-09-03

## 2020-09-03 PROBLEM — Z85.819 HISTORY OF ORAL CANCER: Chronic | Status: RESOLVED | Noted: 2019-04-20 | Resolved: 2020-09-03

## 2020-09-03 LAB
A ALTERNATA IGE QN: <0.1 KUA/I
A FUMIGATUS IGE QN: <0.1 KUA/I
ALLERGEN COMMENT: ABNORMAL
BERMUDA GRASS IGE QN: <0.1 KUA/I
BOXELDER IGE QN: 0.14 KUA/I
C HERBARUM IGE QN: <0.1 KUA/I
CAT DANDER IGE QN: <0.1 KUA/I
CMN PIGWEED IGE QN: <0.1 KUA/I
COMMON RAGWEED IGE QN: <0.1 KUA/I
COTTONWOOD IGE QN: <0.1 KUA/I
D FARINAE IGE QN: <0.1 KUA/I
D PTERONYSS IGE QN: <0.1 KUA/I
DOG DANDER IGE QN: <0.1 KUA/I
LONDON PLANE IGE QN: <0.1 KUA/I
MOUSE URINE PROT IGE QN: <0.1 KUA/I
MT JUNIPER IGE QN: <0.1 KUA/I
MUGWORT IGE QN: <0.1 KUA/I
P NOTATUM IGE QN: <0.1 KUA/I
ROACH IGE QN: <0.1 KUA/I
SHEEP SORREL IGE QN: <0.1 KUA/I
SILVER BIRCH IGE QN: <0.1 KUA/I
TIMOTHY IGE QN: <0.1 KUA/I
TOTAL IGE SMQN RAST: 108 KU/L (ref 0–113)
WALNUT IGE QN: <0.1 KUA/I
WHITE ASH IGE QN: 0.37 KUA/I
WHITE ELM IGE QN: <0.1 KUA/I
WHITE MULBERRY IGE QN: <0.1 KUA/I
WHITE OAK IGE QN: 0.11 KUA/I

## 2020-09-03 NOTE — ASSESSMENT & PLAN NOTE
Check allergy panel  Start loratadine - watch for worsening dry mouth   Recheck 2 weeks if not improved

## 2020-09-03 NOTE — ASSESSMENT & PLAN NOTE
Still with swallowing issues  Would benefit from restarting speech therapy, but cannot afford it under present insurance plan  Encourage compliance with recommendations made by speech therapy after his swallowing study as well as encouraging use of protein drinks   Recheck labs and f/u 6m

## 2020-09-03 NOTE — ASSESSMENT & PLAN NOTE
No signs of recurrence  Still with swallowing issues  Would benefit from restarting speech therapy, but cannot afford it under present insurance plan  Encourage compliance with recommendations made by speech therapy after his swallowing study as well as encouraging use of protein drinks   Recheck labs and f/u 6m

## 2020-09-08 ENCOUNTER — TELEPHONE (OUTPATIENT)
Dept: FAMILY MEDICINE CLINIC | Facility: CLINIC | Age: 55
End: 2020-09-08

## 2020-09-08 DIAGNOSIS — R05.9 COUGH: Primary | ICD-10-CM

## 2020-09-08 RX ORDER — DEXTROMETHORPHAN HYDROBROMIDE AND PROMETHAZINE HYDROCHLORIDE 15; 6.25 MG/5ML; MG/5ML
5 SOLUTION ORAL 4 TIMES DAILY PRN
Qty: 150 ML | Refills: 0 | Status: SHIPPED | OUTPATIENT
Start: 2020-09-08 | End: 2021-05-07 | Stop reason: ALTCHOICE

## 2020-09-08 NOTE — TELEPHONE ENCOUNTER
Patient called stating the tessalon medication has closed his throat up twice  He is asking if something else could be called in? He would also like a call back       [facility]

## 2020-09-23 DIAGNOSIS — N52.9 ED (ERECTILE DYSFUNCTION) OF ORGANIC ORIGIN: ICD-10-CM

## 2020-09-23 RX ORDER — SILDENAFIL CITRATE 20 MG/1
TABLET ORAL
Qty: 30 TABLET | Refills: 0 | Status: SHIPPED | OUTPATIENT
Start: 2020-09-23 | End: 2021-01-08 | Stop reason: SDUPTHER

## 2020-11-24 ENCOUNTER — TELEPHONE (OUTPATIENT)
Dept: FAMILY MEDICINE CLINIC | Facility: CLINIC | Age: 55
End: 2020-11-24

## 2020-11-24 DIAGNOSIS — R13.12 OROPHARYNGEAL DYSPHAGIA: Primary | ICD-10-CM

## 2020-11-24 DIAGNOSIS — R13.10 SWALLOWING DYSFUNCTION: ICD-10-CM

## 2020-11-29 DIAGNOSIS — E03.9 HYPOTHYROIDISM, UNSPECIFIED TYPE: ICD-10-CM

## 2020-11-30 RX ORDER — LEVOTHYROXINE SODIUM 88 UG/1
TABLET ORAL
Qty: 90 TABLET | Refills: 2 | Status: SHIPPED | OUTPATIENT
Start: 2020-11-30 | End: 2021-05-07

## 2020-12-03 ENCOUNTER — APPOINTMENT (OUTPATIENT)
Dept: SPEECH THERAPY | Age: 55
End: 2020-12-03
Payer: COMMERCIAL

## 2020-12-07 ENCOUNTER — EVALUATION (OUTPATIENT)
Dept: SPEECH THERAPY | Age: 55
End: 2020-12-07
Payer: COMMERCIAL

## 2020-12-07 DIAGNOSIS — R13.12 OROPHARYNGEAL DYSPHAGIA: Primary | ICD-10-CM

## 2020-12-07 PROCEDURE — 92610 EVALUATE SWALLOWING FUNCTION: CPT

## 2020-12-19 DIAGNOSIS — J30.9 ALLERGIC RHINITIS, UNSPECIFIED SEASONALITY, UNSPECIFIED TRIGGER: ICD-10-CM

## 2020-12-19 RX ORDER — LORATADINE 10 MG/1
TABLET ORAL
Qty: 90 TABLET | Refills: 0 | Status: SHIPPED | OUTPATIENT
Start: 2020-12-19 | End: 2021-05-07 | Stop reason: ALTCHOICE

## 2020-12-20 ENCOUNTER — NURSE TRIAGE (OUTPATIENT)
Dept: OTHER | Facility: OTHER | Age: 55
End: 2020-12-20

## 2020-12-20 DIAGNOSIS — Z11.59 SPECIAL SCREENING EXAMINATION FOR UNSPECIFIED VIRAL DISEASE: ICD-10-CM

## 2020-12-20 DIAGNOSIS — Z11.59 SPECIAL SCREENING EXAMINATION FOR UNSPECIFIED VIRAL DISEASE: Primary | ICD-10-CM

## 2020-12-20 PROCEDURE — U0003 INFECTIOUS AGENT DETECTION BY NUCLEIC ACID (DNA OR RNA); SEVERE ACUTE RESPIRATORY SYNDROME CORONAVIRUS 2 (SARS-COV-2) (CORONAVIRUS DISEASE [COVID-19]), AMPLIFIED PROBE TECHNIQUE, MAKING USE OF HIGH THROUGHPUT TECHNOLOGIES AS DESCRIBED BY CMS-2020-01-R: HCPCS | Performed by: FAMILY MEDICINE

## 2020-12-22 LAB — SARS-COV-2 RNA SPEC QL NAA+PROBE: DETECTED

## 2020-12-23 ENCOUNTER — TELEMEDICINE (OUTPATIENT)
Dept: FAMILY MEDICINE CLINIC | Facility: CLINIC | Age: 55
End: 2020-12-23
Payer: COMMERCIAL

## 2020-12-23 DIAGNOSIS — U07.1 COVID-19 VIRUS INFECTION: Primary | ICD-10-CM

## 2020-12-23 PROCEDURE — 99213 OFFICE O/P EST LOW 20 MIN: CPT | Performed by: FAMILY MEDICINE

## 2020-12-23 PROCEDURE — 1036F TOBACCO NON-USER: CPT | Performed by: FAMILY MEDICINE

## 2020-12-26 ENCOUNTER — TELEPHONE (OUTPATIENT)
Dept: FAMILY MEDICINE CLINIC | Facility: CLINIC | Age: 55
End: 2020-12-26

## 2021-01-07 ENCOUNTER — TELEPHONE (OUTPATIENT)
Dept: UROLOGY | Facility: MEDICAL CENTER | Age: 56
End: 2021-01-07

## 2021-01-07 DIAGNOSIS — N52.9 ED (ERECTILE DYSFUNCTION) OF ORGANIC ORIGIN: ICD-10-CM

## 2021-01-07 NOTE — TELEPHONE ENCOUNTER
Spoke to pt and advised he would need to make an appt to see Dr Nery Adkins to discuss adjustment of sildenafil rx  Pt hasn't been seen since Aug  2018  Pt told he would be contacted to set up appt

## 2021-01-07 NOTE — TELEPHONE ENCOUNTER
Advised pt provider would be contacted to send in new sildenafil rx until his appt with Dr Nasra Betancur in April to discuss dosage adjustments or other suggestions

## 2021-01-07 NOTE — TELEPHONE ENCOUNTER
Pt called back stating his appointment is not until April is he suppose to wait until then for refill,I informed pt again he has not been seen since 2018 and medical assessment is required he asked why was medication refill in Sept without appointment,at this point I told pt I would again send his request

## 2021-01-07 NOTE — TELEPHONE ENCOUNTER
This is a patient of Dr Marcelina Espinosa in [facility]  Patient called and would like to talk to the nurse or doctor about getting sildenafil adjusted or other suggestions  Patient goes to [facility] in [address] [place] [address]  Please call patient back at [phone]

## 2021-01-08 RX ORDER — SILDENAFIL CITRATE 20 MG/1
TABLET ORAL
Qty: 30 TABLET | Refills: 0 | Status: SHIPPED | OUTPATIENT
Start: 2021-01-08 | End: 2021-05-07 | Stop reason: ALTCHOICE

## 2021-01-08 NOTE — TELEPHONE ENCOUNTER
I spoke with the patient regarding his situation  He feels his testosterone levels are low because he so unbelievably tired all the time  Performance is also an issue  He was scheduled to see Dr Belinda Samson in April, 2021 but decided to reschedule his visit with [facility] Workman for 2/26/21  We also discuss correct dosing of Sildenafil 20mg  He was apparently only taking ONE 20mg tablet thinking that that was all he needed and did not know to take multiple pills per encounter  I reviewed correct dosing instructions, side-effects, etc ; patient verbalized understanding  We also discussed cost-effective pricing and various pharmacy vendors as Sildenafil 20mg is NOT covered for the purpose in which he is intending to us it  Naveed recommended for best cash pay pricing  Cost of #30 count supply of Sildenafil 20mg to [facility]pharmacy in [place] is $20 25  Naveed coupon was texted to the patient    Script for the requested medication was queued and forwarded to the Advanced Practitioner covering the [address] [facility] location for approval

## 2021-02-05 ENCOUNTER — TELEPHONE (OUTPATIENT)
Dept: SPEECH THERAPY | Age: 56
End: 2021-02-05

## 2021-02-05 NOTE — TELEPHONE ENCOUNTER
Spoke with patient and wife  He is interested in returning to therapy  He is going to schedule his VBSS first; then call back to schedule our next appointment

## 2021-02-12 NOTE — PROGRESS NOTES
Addended by: OZ ARNOLD on: 2/12/2021 07:57 AM     Modules accepted: Orders     PT Re-Evaluation     Today's date: 2019  Patient name: Izabela Sierra  : 1965  MRN: [id number]  Referring provider: Parviz Whyte MD  Dx:   Encounter Diagnosis     ICD-10-CM    1   Generalized weakness R53 1                   Assessment/Plan    Subjective Evaluation    History of Present Illness  Mechanism of injury: The pt has made steady progress in left ue strength and scapular stability, recommend to continue PT with progression to discharge to local fitness facility ( [employer])  Not a recurrent problem   Quality of life: good    Pain  Current pain ratin  At best pain ratin  At worst pain ratin  Location: cervical tightness  Quality: tight  Progression: improved    Treatments  Previous treatment: physical therapy  Current treatment: physical therapy  Patient Goals  Patient goals for therapy: increased motion, increased strength, independence with ADLs/IADLs, return to sport/leisure activities, return to work and decreased pain  Patient goal: achieve overhead lifts without scapular weakness        Objective        Precautions: no med allergies    Daily Treatment Diary     Manual              cerv joint mobs man            c prom man            graston prn left cervical and ant cerv              perf testing for reassessment of status                             Exercise Diary              Prone sh ext, middle trap, [id number] on left 7lb on right 3 x 10             push ups 5-10            Bent kneepush ups 10            Body blade 1min  5 positions            Overhead box lifts              press             German curls             Pull overs             Bicep curls against wall             Overhead ball toss on wall             Cable pull downs             Cable diagonal pulls             Bench press                                                                                                            Modalities

## 2021-02-23 ENCOUNTER — APPOINTMENT (OUTPATIENT)
Dept: SPEECH THERAPY | Age: 56
End: 2021-02-23
Payer: COMMERCIAL

## 2021-03-02 ENCOUNTER — OFFICE VISIT (OUTPATIENT)
Dept: UROLOGY | Facility: CLINIC | Age: 56
End: 2021-03-02
Payer: COMMERCIAL

## 2021-03-02 ENCOUNTER — OFFICE VISIT (OUTPATIENT)
Dept: FAMILY MEDICINE CLINIC | Facility: CLINIC | Age: 56
End: 2021-03-02
Payer: COMMERCIAL

## 2021-03-02 VITALS
DIASTOLIC BLOOD PRESSURE: 100 MMHG | BODY MASS INDEX: 22.84 KG/M2 | TEMPERATURE: 97.7 F | HEIGHT: 69 IN | HEART RATE: 70 BPM | OXYGEN SATURATION: 98 % | SYSTOLIC BLOOD PRESSURE: 142 MMHG | WEIGHT: 154.2 LBS

## 2021-03-02 VITALS
WEIGHT: 154 LBS | SYSTOLIC BLOOD PRESSURE: 132 MMHG | DIASTOLIC BLOOD PRESSURE: 70 MMHG | HEIGHT: 69 IN | BODY MASS INDEX: 22.81 KG/M2

## 2021-03-02 DIAGNOSIS — N52.9 ERECTILE DYSFUNCTION, UNSPECIFIED ERECTILE DYSFUNCTION TYPE: ICD-10-CM

## 2021-03-02 DIAGNOSIS — N40.0 BENIGN PROSTATIC HYPERPLASIA WITHOUT LOWER URINARY TRACT SYMPTOMS: ICD-10-CM

## 2021-03-02 DIAGNOSIS — N52.9 ERECTILE DYSFUNCTION, UNSPECIFIED ERECTILE DYSFUNCTION TYPE: Primary | ICD-10-CM

## 2021-03-02 DIAGNOSIS — E03.9 HYPOTHYROIDISM, UNSPECIFIED TYPE: ICD-10-CM

## 2021-03-02 DIAGNOSIS — I10 ESSENTIAL HYPERTENSION: Primary | ICD-10-CM

## 2021-03-02 DIAGNOSIS — C01 CANCER OF BASE OF TONGUE (HCC): ICD-10-CM

## 2021-03-02 PROCEDURE — 99213 OFFICE O/P EST LOW 20 MIN: CPT | Performed by: PHYSICIAN ASSISTANT

## 2021-03-02 PROCEDURE — 99214 OFFICE O/P EST MOD 30 MIN: CPT | Performed by: FAMILY MEDICINE

## 2021-03-02 PROCEDURE — 1036F TOBACCO NON-USER: CPT | Performed by: PHYSICIAN ASSISTANT

## 2021-03-02 PROCEDURE — 3008F BODY MASS INDEX DOCD: CPT | Performed by: PHYSICIAN ASSISTANT

## 2021-03-02 PROCEDURE — 3725F SCREEN DEPRESSION PERFORMED: CPT | Performed by: FAMILY MEDICINE

## 2021-03-02 RX ORDER — AMLODIPINE BESYLATE 5 MG/1
5 TABLET ORAL DAILY
Qty: 30 TABLET | Refills: 5 | Status: SHIPPED | OUTPATIENT
Start: 2021-03-02 | End: 2021-08-27

## 2021-03-02 RX ORDER — TADALAFIL 5 MG/1
5 TABLET ORAL DAILY PRN
Qty: 30 TABLET | Refills: 6 | Status: SHIPPED | OUTPATIENT
Start: 2021-03-02 | End: 2021-03-12 | Stop reason: SDUPTHER

## 2021-03-02 NOTE — PROGRESS NOTES
Assessment/Plan:    Cancer of base of tongue (HCC)  Persistent swallowing issues  Discussed second opinion  Pt will look into it  Recheck 6m      Hypothyroidism  Appears to be stable clinically  Check TSH  Adjust meds if TSH is not at goal  Recheck 6m      Essential hypertension  Poor control  Restart amlodipine 5mg qd  Recheck 3m         Diagnoses and all orders for this visit:    Essential hypertension  -     CBC and differential; Future  -     Comprehensive metabolic panel; Future  -     Lipid panel; Future  -     amLODIPine (NORVASC) 5 mg tablet; Take 1 tablet (5 mg total) by mouth daily    Hypothyroidism, unspecified type  -     TSH, 3rd generation; Future    Cancer of base of tongue (HCC)          Subjective:      Patient ID: Kelly Aguayo  is a 60 y o  male  F/u several issues  - pt recovering well from COVID - had infection at the end of December  Pt states that energy is stable  He denies any CV, resp, new GI or extremity complaints  Mood stable  - has not been exercising as much  Planning on returning this week   - pt denies CP, palp, lightheadedness or other CV symptoms with or without exertion  Home Bps have gone up recently  140-160/110-120 recently  No change in meds, increased stimulant use or other changes  - fatigue seems to be better  Pt has been very active at work  - still struggling with swallowing  No aspiration symptoms noted   Pt is interested in getting second opinion from different Speech therapist  Pt thinking of going back to [place] to speak with them  - no  complaints      The following portions of the patient's history were reviewed and updated as appropriate:   He  has a past medical history of Cancer of base of tongue ([address] ), Cough, Disease of thyroid gland, Dysphagia, ED (erectile dysfunction), Hypertension, Leukopenia, Peyronie's disease, Psoriasis, Tongue cancer ([address] ), and Weight loss, abnormal   He   Patient Active Problem List    Diagnosis Date Noted    Allergic rhinitis 09/03/2020    Swallowing dysfunction 04/26/2019    Hyperbilirubinemia 04/21/2019    ED (erectile dysfunction) of organic origin 04/30/2018    Peyronie's disease 04/30/2018    Arthralgia 09/25/2017    Cancer of base of [address] [address] ) 05/24/2016    Hypothyroidism 11/11/2013    Essential hypertension 09/19/2012    Psoriasis 09/19/2012     He  has a past surgical history that includes Tongue biopsy / excision; Other surgical history; PEG tube placement; and pr colonoscopy flx dx w/collj spec when pfrmd (N/A, 6/27/2017)  He  reports that he quit smoking about 10 years ago  He has a 5 00 pack-year smoking history  He has quit using smokeless tobacco  He reports current alcohol use of about 12 0 standard drinks of alcohol per week  He reports that he does not use drugs  Current Outpatient Medications   Medication Sig Dispense Refill    levothyroxine 88 mcg tablet TAKE 1 TABLET BY MOUTH EVERY DAY 90 tablet 2    multivitamin (THERAGRAN) TABS Take 1 tablet by mouth daily      pentoxifylline (TRENtal) 400 mg ER tablet Take 1 tablet (400 mg total) by mouth 3 (three) times a day with meals 270 tablet 0    sildenafil (REVATIO) 20 mg tablet Take 1 to 5 tablets by mouth one (1) hour prior to intercourse on an empty stomach  Limit to 3 encounters per week   30 tablet 0    amLODIPine (NORVASC) 5 mg tablet Take 1 tablet (5 mg total) by mouth daily 30 tablet 5    benzonatate (TESSALON) 200 MG capsule Take 1 capsule (200 mg total) by mouth 3 (three) times a day as needed for cough 30 capsule 0    fluticasone (FLONASE) 50 mcg/act nasal spray 2 sprays into each nostril daily 2 sprays bilat qd 1 Bottle 2    loratadine (CLARITIN) 10 mg tablet TAKE 1 TABLET BY MOUTH EVERY DAY 90 tablet 0    Promethazine-DM (PHENERGAN-DM) 6 25-15 mg/5 mL oral syrup Take 5 mL by mouth 4 (four) times a day as needed for cough 150 mL 0    tadalafil (CIALIS) 5 MG tablet Take 1 tablet (5 mg total) by mouth daily as needed for erectile dysfunction 30 tablet 6     No current facility-administered medications for this visit  He has No Known Allergies       Review of Systems   Constitutional: Negative  HENT: Negative for ear pain, mouth sores, sinus pressure, sinus pain, sore throat and voice change  Eyes: Negative  Respiratory: Negative  Cardiovascular: Negative  Gastrointestinal: Negative  Endocrine: Negative  Genitourinary: Negative  Musculoskeletal: Negative  Skin: Negative  Allergic/Immunologic: Negative  Neurological: Negative  Hematological: Negative  Psychiatric/Behavioral: Negative  Objective:      /100   Pulse 70   Temp 97 7 °F (36 5 °C)   Ht 5' 9" (1 753 m)   Wt 69 9 kg (154 lb 3 2 oz)   SpO2 98%   BMI 22 77 kg/m²          Physical Exam  Vitals signs reviewed  Constitutional:       Appearance: Normal appearance  He is well-developed  HENT:      Head: Normocephalic and atraumatic  Right Ear: Tympanic membrane, ear canal and external ear normal       Left Ear: Tympanic membrane, ear canal and external ear normal       Mouth/Throat:      Mouth: Mucous membranes are dry  Comments: unchanged  Eyes:      Extraocular Movements: Extraocular movements intact  Conjunctiva/sclera: Conjunctivae normal       Pupils: Pupils are equal, round, and reactive to light  Neck:      Musculoskeletal: Normal range of motion and neck supple  Comments: Post RT changes in neck noted - unchanged  Cardiovascular:      Rate and Rhythm: Normal rate and regular rhythm  Pulses: Normal pulses  Heart sounds: Normal heart sounds  No murmur  Pulmonary:      Effort: Pulmonary effort is normal       Breath sounds: Normal breath sounds  No rales  Abdominal:      General: Bowel sounds are normal  There is no distension  Palpations: Abdomen is soft  There is no mass  Tenderness: There is no abdominal tenderness  Musculoskeletal: Normal range of motion  General: No swelling or deformity  Right lower leg: No edema  Left lower leg: No edema  Lymphadenopathy:      Cervical: No cervical adenopathy  Skin:     General: Skin is warm  Capillary Refill: Capillary refill takes less than 2 seconds  Neurological:      Mental Status: He is alert and oriented to person, place, and time  Cranial Nerves: No cranial nerve deficit  Motor: No abnormal muscle tone        Coordination: Coordination normal    Psychiatric:         Mood and Affect: Mood normal

## 2021-03-02 NOTE — TELEPHONE ENCOUNTER
Patient seen by Eleanor Rodriges today in [place]    Patient called stating medication was to expensive and would like to know where else he can get it    He stated Eleanor Rodriges mentioned another pharmacy patient does not remember

## 2021-03-03 ENCOUNTER — TRANSCRIBE ORDERS (OUTPATIENT)
Dept: ADMINISTRATIVE | Age: 56
End: 2021-03-03

## 2021-03-03 ENCOUNTER — APPOINTMENT (OUTPATIENT)
Dept: LAB | Age: 56
End: 2021-03-03
Payer: COMMERCIAL

## 2021-03-03 DIAGNOSIS — N52.9 ERECTILE DYSFUNCTION, UNSPECIFIED ERECTILE DYSFUNCTION TYPE: ICD-10-CM

## 2021-03-03 DIAGNOSIS — E03.9 HYPOTHYROIDISM, UNSPECIFIED TYPE: ICD-10-CM

## 2021-03-03 DIAGNOSIS — I10 ESSENTIAL HYPERTENSION: ICD-10-CM

## 2021-03-03 DIAGNOSIS — N40.0 BENIGN PROSTATIC HYPERPLASIA WITHOUT LOWER URINARY TRACT SYMPTOMS: ICD-10-CM

## 2021-03-03 LAB
ALBUMIN SERPL BCP-MCNC: 3.4 G/DL (ref 3.5–5)
ALP SERPL-CCNC: 63 U/L (ref 46–116)
ALT SERPL W P-5'-P-CCNC: 30 U/L (ref 12–78)
ANION GAP SERPL CALCULATED.3IONS-SCNC: 5 MMOL/L (ref 4–13)
AST SERPL W P-5'-P-CCNC: 20 U/L (ref 5–45)
BASOPHILS # BLD AUTO: 0.01 THOUSANDS/ΜL (ref 0–0.1)
BASOPHILS NFR BLD AUTO: 0 % (ref 0–1)
BILIRUB SERPL-MCNC: 0.8 MG/DL (ref 0.2–1)
BUN SERPL-MCNC: 11 MG/DL (ref 5–25)
CALCIUM ALBUM COR SERPL-MCNC: 9.1 MG/DL (ref 8.3–10.1)
CALCIUM SERPL-MCNC: 8.6 MG/DL (ref 8.3–10.1)
CHLORIDE SERPL-SCNC: 101 MMOL/L (ref 100–108)
CHOLEST SERPL-MCNC: 199 MG/DL (ref 50–200)
CO2 SERPL-SCNC: 32 MMOL/L (ref 21–32)
CREAT SERPL-MCNC: 0.58 MG/DL (ref 0.6–1.3)
EOSINOPHIL # BLD AUTO: 0.1 THOUSAND/ΜL (ref 0–0.61)
EOSINOPHIL NFR BLD AUTO: 3 % (ref 0–6)
ERYTHROCYTE [DISTWIDTH] IN BLOOD BY AUTOMATED COUNT: 13.1 % (ref 11.6–15.1)
GFR SERPL CREATININE-BSD FRML MDRD: 112 ML/MIN/1.73SQ M
GLUCOSE P FAST SERPL-MCNC: 91 MG/DL (ref 65–99)
HCT VFR BLD AUTO: 46 % (ref 36.5–49.3)
HDLC SERPL-MCNC: 78 MG/DL
HGB BLD-MCNC: 15.2 G/DL (ref 12–17)
IMM GRANULOCYTES # BLD AUTO: 0.01 THOUSAND/UL (ref 0–0.2)
IMM GRANULOCYTES NFR BLD AUTO: 0 % (ref 0–2)
LDLC SERPL CALC-MCNC: 111 MG/DL (ref 0–100)
LYMPHOCYTES # BLD AUTO: 0.61 THOUSANDS/ΜL (ref 0.6–4.47)
LYMPHOCYTES NFR BLD AUTO: 20 % (ref 14–44)
MCH RBC QN AUTO: 31.3 PG (ref 26.8–34.3)
MCHC RBC AUTO-ENTMCNC: 33 G/DL (ref 31.4–37.4)
MCV RBC AUTO: 95 FL (ref 82–98)
MONOCYTES # BLD AUTO: 0.34 THOUSAND/ΜL (ref 0.17–1.22)
MONOCYTES NFR BLD AUTO: 11 % (ref 4–12)
NEUTROPHILS # BLD AUTO: 2.04 THOUSANDS/ΜL (ref 1.85–7.62)
NEUTS SEG NFR BLD AUTO: 66 % (ref 43–75)
NONHDLC SERPL-MCNC: 121 MG/DL
NRBC BLD AUTO-RTO: 0 /100 WBCS
PLATELET # BLD AUTO: 239 THOUSANDS/UL (ref 149–390)
PMV BLD AUTO: 9.5 FL (ref 8.9–12.7)
POTASSIUM SERPL-SCNC: 4.1 MMOL/L (ref 3.5–5.3)
PROT SERPL-MCNC: 7.2 G/DL (ref 6.4–8.2)
PSA SERPL-MCNC: 0.6 NG/ML (ref 0–4)
RBC # BLD AUTO: 4.85 MILLION/UL (ref 3.88–5.62)
SODIUM SERPL-SCNC: 138 MMOL/L (ref 136–145)
TRIGL SERPL-MCNC: 52 MG/DL
TSH SERPL DL<=0.05 MIU/L-ACNC: 2.39 UIU/ML (ref 0.36–3.74)
WBC # BLD AUTO: 3.11 THOUSAND/UL (ref 4.31–10.16)

## 2021-03-03 PROCEDURE — 36415 COLL VENOUS BLD VENIPUNCTURE: CPT

## 2021-03-03 PROCEDURE — 84153 ASSAY OF PSA TOTAL: CPT

## 2021-03-03 PROCEDURE — 85025 COMPLETE CBC W/AUTO DIFF WBC: CPT

## 2021-03-03 PROCEDURE — 84443 ASSAY THYROID STIM HORMONE: CPT

## 2021-03-03 PROCEDURE — 80053 COMPREHEN METABOLIC PANEL: CPT

## 2021-03-03 PROCEDURE — 80061 LIPID PANEL: CPT

## 2021-03-03 NOTE — PROGRESS NOTES
UROLOGY PROGRESS NOTE   Patient Identifiers: Jos Juarez (MRN [id number])  Date of Service: 3/2/2021    Subjective:     57-year-old man history of Peyronie's disease and erectile dysfunction  He last saw Dr Bela Davis for evaluation of Peyronie's disease and ED  He had been on Trental all 400 mg t i d  and was offered Xiaflex injection  He uses only 20 mg of sildenafil with a fairly good response  His last testosterone was over 500 three years ago  PSA 0 6  He has no lower tract symptoms  He is interested in trying a different medication        Reason for visit:  ED follow-up    Objective:     VITALS:    Vitals:    03/02/21 1441   BP: 132/70           LABS:  Lab Results   Component Value Date    HGB 14 4 09/02/2020    HCT 43 5 09/02/2020    WBC 4 46 09/02/2020     09/02/2020   ]    Lab Results   Component Value Date     12/05/2015    K 4 1 09/02/2020    CL 99 (L) 09/02/2020    CO2 34 (H) 09/02/2020    BUN 8 09/02/2020    CREATININE 0 64 09/02/2020    CALCIUM 9 2 09/02/2020    GLUCOSE 85 12/05/2015   ]        INPATIENT MEDS:    Current Outpatient Medications:     amLODIPine (NORVASC) 5 mg tablet, Take 1 tablet (5 mg total) by mouth daily, Disp: 30 tablet, Rfl: 5    benzonatate (TESSALON) 200 MG capsule, Take 1 capsule (200 mg total) by mouth 3 (three) times a day as needed for cough, Disp: 30 capsule, Rfl: 0    fluticasone (FLONASE) 50 mcg/act nasal spray, 2 sprays into each nostril daily 2 sprays bilat qd, Disp: 1 Bottle, Rfl: 2    levothyroxine 88 mcg tablet, TAKE 1 TABLET BY MOUTH EVERY DAY, Disp: 90 tablet, Rfl: 2    loratadine (CLARITIN) 10 mg tablet, TAKE 1 TABLET BY MOUTH EVERY DAY, Disp: 90 tablet, Rfl: 0    multivitamin (THERAGRAN) TABS, Take 1 tablet by mouth daily, Disp: , Rfl:     pentoxifylline (TRENtal) 400 mg ER tablet, Take 1 tablet (400 mg total) by mouth 3 (three) times a day with meals, Disp: 270 tablet, Rfl: 0    Promethazine-DM (PHENERGAN-DM) 6 25-15 mg/5 mL oral syrup, Take 5 mL by mouth 4 (four) times a day as needed for cough, Disp: 150 mL, Rfl: 0    sildenafil (REVATIO) 20 mg tablet, Take 1 to 5 tablets by mouth one (1) hour prior to intercourse on an empty stomach  Limit to 3 encounters per week , Disp: 30 tablet, Rfl: 0    tadalafil (CIALIS) 5 MG tablet, Take 1 tablet (5 mg total) by mouth daily as needed for erectile dysfunction, Disp: 30 tablet, Rfl: 6      Physical Exam:   /70 (BP Location: Left arm, Patient Position: Sitting, Cuff Size: Adult)   Ht 5' 9" (1 753 m)   Wt 69 9 kg (154 lb)   BMI 22 74 kg/m²   GEN: no acute distress    RESP: breathing comfortably with no accessory muscle use    ABD: soft, non-tender, non-distended   INCISION:    EXT: no significant peripheral edema   (Male): Penis circumcised, phallus normal, meatus patent  Testicles descended into scrotum bilaterally without masses nor tenderness  No inguinal hernias bilaterally  NEGRO: Prostate is enlarged at 35 grams  The prostate is not boggy  The prostate is not tender  No nodules noted      RADIOLOGY:    none     Assessment:    1  Erectile dysfunction   2   BPH     Plan:   - he would like to repeat a testosterone and a PSA-  - I sent a prescription for tadalafil 5 mg for daily use  - he will call me if he has any questions about the prescription or if there is a cost issue  - otherwise I will see him in 6 months with PSA prior to visit

## 2021-03-04 ENCOUNTER — HOSPITAL ENCOUNTER (OUTPATIENT)
Dept: RADIOLOGY | Facility: HOSPITAL | Age: 56
Discharge: HOME | End: 2021-03-04
Payer: COMMERCIAL

## 2021-03-04 DIAGNOSIS — R13.12 OROPHARYNGEAL DYSPHAGIA: ICD-10-CM

## 2021-03-04 PROCEDURE — 92611 MOTION FLUOROSCOPY/SWALLOW: CPT

## 2021-03-04 PROCEDURE — 74230 X-RAY XM SWLNG FUNCJ C+: CPT

## 2021-03-04 NOTE — PROCEDURES
Video Swallow Study      Patient Name: Job Collins Sr  Today's Date: 3/4/2021        Past Medical History  Past Medical History:   Diagnosis Date    Cancer of base of tongue (HCC)     excision    Cough     Disease of thyroid gland     hypo    Dysphagia     ED (erectile dysfunction)     Hypertension     Leukopenia     Peyronie's disease     Psoriasis     Tongue cancer (HCC)     Weight loss, abnormal         Past Surgical History  Past Surgical History:   Procedure Laterality Date    OTHER SURGICAL HISTORY      infusion port inserted and removed    PEG TUBE PLACEMENT      and removal    CT COLONOSCOPY FLX DX W/COLLJ SPEC WHEN PFRMD N/A 6/27/2017    Procedure: COLONOSCOPY with polypectomy x2;  Surgeon: Navi Downey MD;  Location: [facility]; Service: General    TONGUE BIOPSY / EXCISION      Floor mouth excision of malignant tumor         General Information:    61 yo gentleman referred to [facility]  for a VBS by Dr Олег Monaco for dysphagia w/  c/o difficulty swallowing  Pt has a hx of base of tongue cancer  Pt is known to the department and had a VBS 8/14/19,  followed up w/ outpatient ST between 11/2019- 12/2019  Pt states he is going to return to outpatient ST for dysphagia tx starting next week  Pt reports it feels like he "chews and swallows and it doesn't go down " Pt reports he has xerostomia, has lost weight, and has difficulty gaining weight  Pt states it might take him 30-40 minutes to eat half of a hamburger, and he becomes frustrated and tired  Pt reports sometimes foods/liquids come out his nose and causes him to sneeze  Pt reports "gurgling" voice since his onset of cancer reportedly in 2012  Pt reports he has an easier time w/ gel pills than non-coated pills  Cognition:  Alert, able to partake in conversation, and follow directions  Speech/Swallow [facility]: Oral motor movements appeared  [facility];  Dentition was  adequate; Respiratory Status: on RA;   Current diet: regular, thin liquids  Prior VBS 08/14/2019: Mild oral/moderate pharyngeal dysphagia due to decreased tongue base retraction, decreased epiglottic inversion and airway entrance closure w/ deep transient penetration of all consistencies,  And passive silent aspiration of pharyngeal residue and thin liquids  Recommended soft, moist diet  Discussed nectar thick liquids vs cont thin liquids via small sips, w/ aspiration precautions and frequent oral care, pt prefers to cont w/ thin liquids at this time  Risks of aspiration and potential to develop aspiration pneumonia were explained, pt stated understanding  Outpatient speech tx for dysphagia  Pt was seen in radiology for a Video Barium Swallow Study, standing upright and viewed laterally with the following consistencies: HTL by cup, NTL by cup, thin liquid by cup, puree, and soft solids (nutrigrain bar)  Results are as follows:     **Images are available for review on PACS      Oral Stage: WFL   Pt had adequate bolus retrieval, and mastication of soft solids  Pt had good bolus formation and good oral control of liquids  Mildly reduced velar elevation  No oral residue noted  Pharyngeal Stage: severly impaired   No premature spill noted  Back and forth pharyngeal retropulsion to the oral cavity noted w/ soft solids and purees > liquids  Delayed swallow initiation of all consistencies  Minimal BOT retraction, no epiglottic inversion, minimal hyolaryngeal elevation, and reduced airway entrance closure  Transient penetration of purees  Pt had trace penetration and silent aspiration of HTL, NTL, and thin liquids during the swallow and after the swallow on vocal fold retention  Mild vallecular and posterior pharyngeal wall retention of soft solids, cleared w/ HTL  Chin tuck strategy increased airway entrance closure but did not eliminate penetration or silent aspiration w/ HTL   Effortful swallow increased amount of silent aspiration w/ HTL  Esophageal Stage:   Briefly assessed  No over abnormalities noted  Assessment Summary:   Pt presents w/ severe pharyngeal dysphagia characterized by no epiglottic inversion, minimal BOT retraction, minimal hyolaryngeal elevation, reduced airway entrance closure, and trace amounts of penetration and silent aspiration during and after swallowing thick and thin liquids  Pt is at high risk of developing aspiration pneumonia  Lengthy discussion was had w/ patient to review results of VBS and recommendations  Diagnosis/Prognosis:            Recommendations:   Soft, moist foods  Continue thin liquids per previous discussion and pt's preference  Pills crushed  Careful monitoring of any development of aspiration pneumonia symptoms  Outpatient dysphagia tx  Aspiration precautions  Consider alternative means of nutrition

## 2021-03-08 NOTE — TELEPHONE ENCOUNTER
Patient called stating he needs to know what pharmacy he can go to get his prescription  (Cialis)  Patient would have to pay out of pocket      Patient can be reached at [phone]

## 2021-03-09 ENCOUNTER — OFFICE VISIT (OUTPATIENT)
Dept: SPEECH THERAPY | Age: 56
End: 2021-03-09
Payer: COMMERCIAL

## 2021-03-09 DIAGNOSIS — Z85.89 HISTORY OF HEAD AND NECK CANCER: ICD-10-CM

## 2021-03-09 DIAGNOSIS — R13.13 PHARYNGEAL DYSPHAGIA: Primary | ICD-10-CM

## 2021-03-09 PROCEDURE — 92526 ORAL FUNCTION THERAPY: CPT

## 2021-03-09 RX ORDER — TADALAFIL 5 MG/1
5 TABLET ORAL DAILY
Qty: 90 TABLET | Refills: 1 | Status: CANCELLED | OUTPATIENT
Start: 2021-03-09

## 2021-03-09 NOTE — TELEPHONE ENCOUNTER
I attempted to reach the patient by phone but needed to leave a message on his mobile phone voice mail  I left my direct dial number for ease in communication

## 2021-03-09 NOTE — TELEPHONE ENCOUNTER
Patient returned my call  He stated [facility] wanted to charge him $125 for a 30 day supply of this medication  We discussed cost-effective pricing and alternative pharmacy vendors  [employer]ave[employer] recommended for best cash pay pricing  Cost of #90 count supply of Tadalafil 5mg to [facility] is $16 41  [employer] coupon was texted to the patient    Script requeued and forwarded to the Advanced Practitioner covering the [facility] location for approval

## 2021-03-09 NOTE — PROGRESS NOTES
Speech Language Pathology Plan of Care update  Today's date: 3/9/2021  Patient name: Leeann Hayes    : 1965        Referring provider: Nyasia He*  Dx:   Encounter Diagnosis     ICD-10-CM    1  Pharyngeal dysphagia  R13 13    2  History of head and neck cancer  Z85 89        Start Time:   Stop Time: 955  Total time in clinic (min): 65 minutes    Subjective Comments: Patient came easily to therapy session today independently  He reported some information regarding results of VBSS  He reports he would have liked to trial more water during the test   Provided extensive education regarding results of VBSS, status of swallowing with VBSS, considerations of diet level, therapy recommendations  Utilized video recorded VBSS for education and explanations  Safety Measures: aspiration precautions  Clinically Complex Situations:Previous therapy to address similar deficits and hx of H&N cancer/RT  Recent results of VBSS:   "Oral Stage: WFL   Pt had adequate bolus retrieval, and mastication of soft solids  Pt had good bolus formation and good oral control of liquids  Mildly reduced velar elevation  No oral residue noted     Pharyngeal Stage: severly impaired   No premature spill noted  Back and forth pharyngeal retropulsion to the oral cavity noted w/ soft solids and purees > liquids  Delayed swallow initiation of all consistencies  Minimal BOT retraction, no epiglottic inversion, minimal hyolaryngeal elevation, and reduced airway entrance closure  Transient penetration of purees  Pt had trace penetration and silent aspiration of HTL, NTL, and thin liquids during the swallow and after the swallow on vocal fold retention  Mild vallecular and posterior pharyngeal wall retention of soft solids, cleared w/ HTL  Chin tuck strategy increased airway entrance closure but did not eliminate penetration or silent aspiration w/ HTL   Effortful swallow increased amount of silent aspiration w/ HTL     Esophageal Stage:   Briefly assessed  No over abnormalities noted     Assessment Summary:   Pt presents w/ severe pharyngeal dysphagia characterized by no epiglottic inversion, minimal BOT retraction, minimal hyolaryngeal elevation, reduced airway entrance closure, and trace amounts of penetration and silent aspiration during and after swallowing thick and thin liquids  Pt is at high risk of developing aspiration pneumonia  Lengthy discussion was had w/ patient to review results of VBS and recommendations     Diagnosis/Prognosis:   Recommendations:   Soft, moist foods  Continue thin liquids per previous discussion and pt's preference  Pills crushed  Careful monitoring of any development of aspiration pneumonia symptoms  Outpatient dysphagia tx  Aspiration precautions  Consider alternative means of nutrition "     Mental Status: Alert  Behavior Status:Cooperative  Communication Modalities: Verbal  Recent Speech/ Language therapy:None  It has been approximately 1 years since previous therapy  Rehabilitation Prognosis:Good rehab potential to reach the established goals    Assessments:  Swallowing Assessment   Reason for referral:Signs/ Symptoms of Dysphagia  Subjective Report of Swallowing Difficulty Globus sensation  and requires many swallows per bite; increased mastication; avoids certain foods  Difficulty SwallowingSolids, Liquids and Pills    Current DietSolids Regular, Mechanical Soft and Pureed and Liquids Thin   Swallowing History: Patient was recommended to receive outpatient swallowing therapy secondary to reduced TBR, reduced hyolaryngeal rise, risk of aspiration  Testing Variables: patient sat upright in a chair; fed himself  Laryngeal Excursion upon palpation Poor HLE upon palpation    Liquid Consistency Testing:   Thin Liquids  Administered By: Self-fed and water bottle    Clinical Observations:Prolonged oral transit time    Signs/Symptoms of penetration/aspiration:Coughing, Globus sensation reported and Throat clear    Strategies/Response: Multiple swallows with multiple swallows able to clear sensation  Clinical Findings   Oral Phase Impairments Other prolonged mastication   Pharyngeal Phase Impairments Decreased Hyolaryngeal elevation      Liquid Swallowing comments: Patient reports he drinks a lot of water each day  Swallowing Diagnostic Impression  Swallowing Diagnosis/Severity Pharyngeal Dysphagia severe  Factors affecting performance None  Safety concerns Risk for aspiration  Risk FactorsHistory of Head and Neck Cancer and Xerostomia    Safety Precautions  Supervision:Independent   Presentation:Other: single bites and sips  StrategiesSmall sips and bites when eating, Slow rate, swallow between bites and Alternate liquids and solids  PositioningUpright position at least 30 minutes after meal  Compensatory Strategies Multiple swallows  Recommend: dysphagia therapy, NMES, MFR    Diet Texture Recommendations  Solids: Other: Continue current diet - based on swallow study - patient to continue soft, moist foods with thin liquid at his request     Liquids: Thin - per patient request     Pills: Other: as tolerated      Goals    Short Term Goals:  1  Patient will improve timing of swallow response with liquids in 90% of opportunities across 3 sessions  2  Patient will improve tongue base retraction for improve propulsion of food based on results of next VBSS  3   Patient will improve hyolaryngeal rise for improved swallow in 90+% of opportunities across 3 sessions  3   Patient will improve airway protection based on result next VBSS  Long Term Goals:  1  Patient will tolerate least restrictive diet without s/s of aspiration by discharge  2   Patient will utilize strategies with meals for swallowing without s/s of aspiration independently by discharge        Patient tolerated NMES (min threshold: 1 5 mA & max threshold: 18 5 mA) in conjunction with PO intake and exercises  Traditional VitalStim    Channel(s) used: 1,2   Placement: 2b   Duty Cycle: 57/3 s   Frequency: 80 pulses per second   Phase Duration: 300 microseconds   Treatment Duration: 5 minutes     Patient consumed the following during todays session: Water via bottle    Clinician educated patient on the following safe swallow strategies: breath hold, no talking while eating  Reviewed safe swallow strategies and discussed carryover at home  Patient able to independently verbalize safe swallow strategies as trained by primary clinician  Delayed coughing was noted after sipping water  Impressions/ Recommendations  Impressions:Patient presents with moderate to severe pharyngeal dysphagia  He is currently at risk for aspiration  Recommendations:   Patients would benefit from: Dysphagia therapy including NMES   Patient would benefit from VBSS  Frequency:2x weekly   Duration:Other 3 months    Intervention certification SHNM:5/5/23  Intervention certification BP:0/0/7972  Intervention Comments: dysphagia therapy, MFR, NMES

## 2021-03-11 NOTE — TELEPHONE ENCOUNTER
Patient called me directly stating [facility] has NOT received the prescription redirect we spoke about on the 9th    Script requeued and forwarded to Clinical Lead, MAR Armstrong for approval

## 2021-03-12 ENCOUNTER — OFFICE VISIT (OUTPATIENT)
Dept: SPEECH THERAPY | Age: 56
End: 2021-03-12
Payer: COMMERCIAL

## 2021-03-12 DIAGNOSIS — N52.9 ERECTILE DYSFUNCTION, UNSPECIFIED ERECTILE DYSFUNCTION TYPE: ICD-10-CM

## 2021-03-12 DIAGNOSIS — R13.13 PHARYNGEAL DYSPHAGIA: Primary | ICD-10-CM

## 2021-03-12 DIAGNOSIS — Z85.89 HISTORY OF HEAD AND NECK CANCER: ICD-10-CM

## 2021-03-12 PROCEDURE — 92526 ORAL FUNCTION THERAPY: CPT

## 2021-03-12 RX ORDER — TADALAFIL 5 MG/1
5 TABLET ORAL DAILY PRN
Qty: 30 TABLET | Refills: 6 | Status: SHIPPED | OUTPATIENT
Start: 2021-03-12 | End: 2021-04-26 | Stop reason: SDUPTHER

## 2021-03-12 NOTE — PROGRESS NOTES
Speech Treatment Note    Today's date: 3/12/2021  Patient name: Gay Maravilla  : 1965  MRN: [id number]  Referring provider: Gaylyn Sever*  Dx:   Encounter Diagnosis     ICD-10-CM    1  Pharyngeal dysphagia  R13 13    2  History of head and neck cancer  Z85 89        Start Time: 0900  Stop Time: 0940  Total time in clinic (min): 40 minutes     Recommendations:       Patients would benefit from: Dysphagia therapy including NMES       Patient would benefit from VBSS  Frequency:2x weekly       Duration:Other 3 months     Intervention certification SCYV:  Intervention certification [id number]  Intervention Comments: dysphagia therapy, MFR, NMES  Visit Number: 2      Subjective/Behavioral: Patient arrived late to appointment today  Continued education  Goals     Short Term Goals:  1  Patient will improve timing of swallow response with liquids in 90% of opportunities across 3 sessions  2  Patient will improve tongue base retraction for improve propulsion of food based on results of next VBSS  3   Patient will improve hyolaryngeal rise for improved swallow in 90+% of opportunities across 3 sessions  3   Patient will improve airway protection based on result next VBSS        Long Term Goals:  1  Patient will tolerate least restrictive diet without s/s of aspiration by discharge  2   Patient will utilize strategies with meals for swallowing without s/s of aspiration independently by discharge        Patient tolerated NMES (min threshold: 1 5 mA & max threshold: channel 1: 14 5 channel 2: 16 5 mA) in conjunction with PO intake and exercises       Traditional VitalStim     Channel(s) used: 1,2   Placement: 2b   Duty Cycle: 57/1 s   Frequency: 80 pulses per second   Phase Duration: 300 microseconds   Treatment Duration: 22 minutes    Average mA - Ch 1: 13 2, Ch 2 : 14 3    Patient consumed the following during todays session: Water via bottle; puree via spoon     Patient reports noted change in sensation with PO trials during NMES in which he needed to concentrate on what strategies to utilize  Appropriate oral stage swallowing;   Patient used piecemeal transfer with multiple swallows per bolus  Patient was noted to have consistent throat clearing at rest and throughout PO trials  Education regarding NMES and dysphagia therapy was initiated  Will provide exercise and carryover activities for next session       Assessment: Continues with moderate to severe pharyngeal dysphagia  He is currently at risk for aspiration  Other: Patient was educated throughout the session about POC and intervention strategies      Recommendations:Continue with Plan of Care

## 2021-03-16 ENCOUNTER — OFFICE VISIT (OUTPATIENT)
Dept: SPEECH THERAPY | Age: 56
End: 2021-03-16
Payer: COMMERCIAL

## 2021-03-16 DIAGNOSIS — Z85.89 HISTORY OF HEAD AND NECK CANCER: ICD-10-CM

## 2021-03-16 DIAGNOSIS — R13.13 PHARYNGEAL DYSPHAGIA: Primary | ICD-10-CM

## 2021-03-16 PROCEDURE — 92526 ORAL FUNCTION THERAPY: CPT

## 2021-03-16 NOTE — PROGRESS NOTES
Speech Treatment Note    Today's date: 3/16/2021  Patient name: Leeann Hayes  : 1965  MRN: [id number]  Referring provider: Nyasia He*  Dx:   Encounter Diagnosis     ICD-10-CM    1  Pharyngeal dysphagia  R13 13    2  History of head and neck cancer  Z85 89        Start Time: 915  Stop Time: 945  Total time in clinic (min): 30 minutes     Recommendations:       Patients would benefit from: Dysphagia therapy including NMES       Patient would benefit from VBSS  Frequency:2x weekly       Duration:Other 3 months     Intervention certification [id number]  Intervention certification [id number]  Intervention Comments: dysphagia therapy, MFR, NMES  Visit Number: 3      Subjective/Behavioral: Patient arrived late to appointment today  Discussed importance of timeliness for full benefits of intervention  Patient reports understanding  He reports some follow up comments regarding NMES feeling/tingly up into his teeth last session  Continued education regarding dysphagia, impairments  Goals     Short Term Goals:  1  Patient will improve timing of swallow response with liquids in 90% of opportunities across 3 sessions  2  Patient will improve tongue base retraction for improve propulsion of food based on results of next VBSS  3   Patient will improve hyolaryngeal rise for improved swallow in 90+% of opportunities across 3 sessions  3   Patient will improve airway protection based on result next VBSS        Long Term Goals:  1  Patient will tolerate least restrictive diet without s/s of aspiration by discharge    2   Patient will utilize strategies with meals for swallowing without s/s of aspiration independently by discharge        Patient tolerated NMES (min threshold: 1 5 mA & max threshold: channel 1: 13 5 channel 2: 15 0 mA) in conjunction with PO intake and exercises       Traditional VitalStim     Channel(s) used: 1,2   Placement: 2b   Duty Cycle: 57/1 s   Frequency: 80 pulses per second   Phase Duration: 300 microseconds   Treatment Duration: 23 minutes    Average mA - Ch 1: 12 4, Ch 2 : 13 5    Patient consumed the following during todays session: Water - approximately 10 oz via bottle; puree - 4oz via spoon        Patient reports feeling improvements at home  Appropriate oral stage swallowing; Patient was noted to extend neck, reported for stretches, after multiple swallows before taking another bolus  Patient used piecemeal transfer with multiple swallows per bolus  Patient used water for liquid wash after each puree  He was noted to have only 3 delayed cough or throat clear during entire PO trials  Reviewed results of VBSS with physiology and impairments  Initiated BOT exercises  Patient noted to benefits from visual models and verbal explanations for completion of tongue protrusion/retraction exercise  Also modeled and explained overemphasized velar initial sounds  Assessment: Noted less overt s/s of aspiration during PO trials  Continues with moderate to severe pharyngeal dysphagia  He is currently at risk for aspiration  Other: Patient was educated throughout the session about POC and intervention strategies      Recommendations:Continue with Plan of Care

## 2021-03-19 ENCOUNTER — OFFICE VISIT (OUTPATIENT)
Dept: SPEECH THERAPY | Age: 56
End: 2021-03-19
Payer: COMMERCIAL

## 2021-03-19 DIAGNOSIS — R13.13 PHARYNGEAL DYSPHAGIA: Primary | ICD-10-CM

## 2021-03-19 DIAGNOSIS — Z85.89 HISTORY OF HEAD AND NECK CANCER: ICD-10-CM

## 2021-03-19 PROCEDURE — 92526 ORAL FUNCTION THERAPY: CPT

## 2021-03-19 NOTE — PROGRESS NOTES
Speech Treatment Note    Today's date: 3/19/2021  Patient name: Misael Yeung  : 1965  MRN: [id number]  Referring provider: Metro Route*  Dx:   Encounter Diagnosis     ICD-10-CM    1  Pharyngeal dysphagia  R13 13    2  History of head and neck cancer  Z85 89                    Recommendations:       Patients would benefit from: Dysphagia therapy including NMES       Patient would benefit from VBSS  Frequency:2x weekly       Duration:Other 3 months     Intervention certification BSFD:03  Intervention certification [id number]  Intervention Comments: dysphagia therapy, MFR, NMES  Visit Number: 4      Subjective/Behavioral: Patient arrived late to appointment today  Patient acknowledged lateness as "better but not perfect"  He reports no changes since last session; however, reports wife commented on observing increased intake  Goals     Short Term Goals:  1  Patient will improve timing of swallow response with liquids in 90% of opportunities across 3 sessions  2  Patient will improve tongue base retraction for improve propulsion of food based on results of next VBSS  3   Patient will improve hyolaryngeal rise for improved swallow in 90+% of opportunities across 3 sessions  3   Patient will improve airway protection based on result next VBSS        Long Term Goals:  1  Patient will tolerate least restrictive diet without s/s of aspiration by discharge    2   Patient will utilize strategies with meals for swallowing without s/s of aspiration independently by discharge        Patient tolerated NMES (min threshold: 0 5 mA & max threshold: channel 1: 8 0channel 2: 15 0 mA) in conjunction with PO intake and exercises       Traditional VitalStim     Channel(s) used: 1,2   Placement: 2b   Duty Cycle: 57/1 s   Frequency: 80 pulses per second   Phase Duration: 300 microseconds   Treatment Duration: 21 minutes    Average mA - Ch 1: 7 8, Ch 2 : 14 0  Utilized coban to maintain contact throughout  Patient consumed the following during todays session: Water - approximately 10 oz via bottle; sausage renato - solid - self fed      Patient reports feeling improvements at home  Appropriate oral stage swallowing; No neck extension observed today  Patient with prolonged mastication  Discussed increased attention to tongue base retraction to propel food posteriorly - patient reports understanding  Discussed carryover with at least 1 PO trial/day  Reviewed carryover of tongue base retraction exercises- patient reports limited carryover but "thought about doing it"  Patient used piecemeal transfer with multiple swallows per bolus  Patient used water for liquid wash after each bite of solid  He was noted to have only 3 delayed cough or throat clear during entire PO trials  He later reports feeling of residue in his nasal cavity/area - discussed sniff/swallow strategy to try to remove before blowing/sneezing occurs  Increasing coughing was noted at this time  Assessment: Noted less overt s/s of aspiration during PO trials  Continues with moderate to severe pharyngeal dysphagia  He is currently at risk for aspiration  Other: Patient was educated throughout the session about POC and intervention strategies      Recommendations:Continue with Plan of Care

## 2021-03-26 ENCOUNTER — OFFICE VISIT (OUTPATIENT)
Dept: SPEECH THERAPY | Age: 56
End: 2021-03-26
Payer: COMMERCIAL

## 2021-03-26 DIAGNOSIS — R13.13 PHARYNGEAL DYSPHAGIA: Primary | ICD-10-CM

## 2021-03-26 DIAGNOSIS — Z85.89 HISTORY OF HEAD AND NECK CANCER: ICD-10-CM

## 2021-03-26 PROCEDURE — 92526 ORAL FUNCTION THERAPY: CPT

## 2021-03-26 NOTE — PROGRESS NOTES
Speech Treatment Note    Today's date: 3/26/2021  Patient name: Val Nieto  : 1965  MRN: [id number]  Referring provider: Eladio Talavera*  Dx:   Encounter Diagnosis     ICD-10-CM    1  Pharyngeal dysphagia  R13 13    2  History of head and neck cancer  Z85 89        Start Time: 905  Stop Time: 940  Total time in clinic (min): 35 minutes     Recommendations:       Patients would benefit from: Dysphagia therapy including NMES       Patient would benefit from VBSS  Frequency:2x weekly       Duration:Other 3 months     Intervention certification GFKN:23  Intervention certification [id number]  Intervention Comments: dysphagia therapy, MFR, NMES  Visit Number: 5      Subjective/Behavioral: Patient arrived late to appointment today  Patient reports his swallowing has been stable, not challenges or improvements since the previous session  He reports currently high stress with job  Goals     Short Term Goals:  1  Patient will improve timing of swallow response with liquids in 90% of opportunities across 3 sessions  2  Patient will improve tongue base retraction for improve propulsion of food based on results of next VBSS  3   Patient will improve hyolaryngeal rise for improved swallow in 90+% of opportunities across 3 sessions  3   Patient will improve airway protection based on result next VBSS        Long Term Goals:  1  Patient will tolerate least restrictive diet without s/s of aspiration by discharge    2   Patient will utilize strategies with meals for swallowing without s/s of aspiration independently by discharge        Patient tolerated NMES (min threshold: 1 0  mA & max threshold: channel 1: 10 0 channel 2: 15 0 mA) in conjunction with PO intake and exercises       Traditional VitalStim     Channel(s) used: 1,2   Placement: 2b   Duty Cycle: 57/1 s   Frequency: 80 pulses per second   Phase Duration: 300 microseconds   Treatment Duration: 21 minutes    Average mA - Ch 1: 9 9, Ch 2 : 14 4  Utilized coban to maintain contact throughout  Patient consumed the following during todays session: Water - approximately 10 oz via bottle; diced pear cup in fruit juice        Patient reports feeling improvements at home  Appropriate oral stage swallowing; No neck extension observed today  Patient with prolonged mastication  Reviewed carryover of tongue base retraction exercises- patient reports some carryover  Patient used piecemeal transfer with multiple swallows per bolus  Patient used water for liquid wash after each bite of mixed consistency  He was noted to have coughing and throat clearing at least 50% of trials today  Discussed and educated regarding mixed consistency challenges - encouraged patient to take solids/liquids separately  Expressed understanding  Assessment: Noted some overt s/s of aspiration during PO trials  Continues with moderate to severe pharyngeal dysphagia  He is currently at risk for aspiration  Other: Patient was educated throughout the session about POC and intervention strategies      Recommendations:Continue with Plan of Care

## 2021-03-29 ENCOUNTER — OFFICE VISIT (OUTPATIENT)
Dept: SPEECH THERAPY | Age: 56
End: 2021-03-29
Payer: COMMERCIAL

## 2021-03-29 DIAGNOSIS — Z85.89 HISTORY OF HEAD AND NECK CANCER: ICD-10-CM

## 2021-03-29 DIAGNOSIS — R13.13 PHARYNGEAL DYSPHAGIA: Primary | ICD-10-CM

## 2021-03-29 PROCEDURE — 92526 ORAL FUNCTION THERAPY: CPT

## 2021-03-29 NOTE — PROGRESS NOTES
Speech Treatment Note    Today's date: 3/29/2021  Patient name: Minor Pitts  : 1965  MRN: [id number]  Referring provider: Meaghan Kovacs*  Dx:   Encounter Diagnosis     ICD-10-CM    1  Pharyngeal dysphagia  R13 13    2  History of head and neck cancer  Z85 89        Start Time: 1045  Stop Time: 1115  Total time in clinic (min): 30 minutes     Recommendations:       Patients would benefit from: Dysphagia therapy including NMES       Patient would benefit from VBSS  Frequency:2x weekly       Duration:Other 3 months     Intervention certification CUAE:  Intervention certification [id number]  Intervention Comments: dysphagia therapy, MFR, NMES  Visit Number: 6      Subjective/Behavioral: Patient arrived late to appointment today  Patient reports his swallowing has been stable, no challenges or improvements since the previous session  However, later he reports eating 2 slices of pizza which is unusual Also that his wife noted improvement  He reports currently high stress with job  Goals     Short Term Goals:  1  Patient will improve timing of swallow response with liquids in 90% of opportunities across 3 sessions  2  Patient will improve tongue base retraction for improve propulsion of food based on results of next VBSS  3   Patient will improve hyolaryngeal rise for improved swallow in 90+% of opportunities across 3 sessions  3   Patient will improve airway protection based on result next VBSS        Long Term Goals:  1  Patient will tolerate least restrictive diet without s/s of aspiration by discharge    2   Patient will utilize strategies with meals for swallowing without s/s of aspiration independently by discharge        Patient tolerated NMES (min threshold: 2 0  mA & max threshold: channel 1: 10 0 channel 2: 12 0 mA) in conjunction with PO intake and exercises       Traditional VitalStim     Channel(s) used: 1,2   Placement: 2b   Duty Cycle: 57/1 s Frequency: 80 pulses per second   Phase Duration: 300 microseconds   Treatment Duration: 20 minutes    Average mA - Ch 1: 9 9, Ch 2 : 11 6    Utilized coban to maintain contact throughout  Patient consumed the following during todays session: Water - approximately 16 oz via bottle; flavored diced pear cup in fruit juice        Patient reports feeling improvements at home  Appropriate oral stage swallowing; No neck extension observed today  Patient with prolonged mastication  Reviewed carryover of tongue base retraction exercises- patient reports some carryover  Patient would benefit from written/visual cues for swallowing therapy exercises for task completion with carryover  Patient used piecemeal transfer with multiple swallows per bolus  Patient used water for liquid wash after each bite of mixed consistency  He was noted to separate liquids/solids with the mixed consistency - noted x3 throat clear/cough - significant improvement from previous session  Assessment: Noted some overt s/s of aspiration during PO trials  Continues with moderate to severe pharyngeal dysphagia  He is currently at risk for aspiration  Other: Patient was educated throughout the session about POC and intervention strategies      Recommendations:Continue with Plan of Care

## 2021-03-30 ENCOUNTER — OFFICE VISIT (OUTPATIENT)
Dept: SPEECH THERAPY | Age: 56
End: 2021-03-30
Payer: COMMERCIAL

## 2021-03-30 DIAGNOSIS — Z85.89 HISTORY OF HEAD AND NECK CANCER: ICD-10-CM

## 2021-03-30 DIAGNOSIS — R13.13 PHARYNGEAL DYSPHAGIA: Primary | ICD-10-CM

## 2021-03-30 PROCEDURE — 92526 ORAL FUNCTION THERAPY: CPT

## 2021-04-07 ENCOUNTER — OFFICE VISIT (OUTPATIENT)
Dept: SPEECH THERAPY | Age: 56
End: 2021-04-07
Payer: COMMERCIAL

## 2021-04-07 DIAGNOSIS — R13.13 PHARYNGEAL DYSPHAGIA: Primary | ICD-10-CM

## 2021-04-07 DIAGNOSIS — Z85.89 HISTORY OF HEAD AND NECK CANCER: ICD-10-CM

## 2021-04-07 PROCEDURE — 92526 ORAL FUNCTION THERAPY: CPT

## 2021-04-07 NOTE — PROGRESS NOTES
Speech Treatment Note    Today's date: 2021  Patient name: Sarika ePace  : 1965  MRN: [id number]  Referring provider: Bernarda Tarango*  Dx:   Encounter Diagnosis     ICD-10-CM    1  Pharyngeal dysphagia  R13 13    2  History of head and neck cancer  Z85 89        Start Time: 8102  Stop Time: 1725  Total time in clinic (min): 50 minutes     Recommendations:       Patients would benefit from: Dysphagia therapy including NMES       Patient would benefit from VBSS  Frequency:2x weekly       Duration:Other 3 months     Intervention certification BTNF:1/3/97  Intervention certification NW:3/4/0825  Intervention Comments: dysphagia therapy, MFR, NMES  Visit Number: 8    Subjective/Behavioral: Patient reports very stressful day today  He reports min-0 oral intake except hydration (water)  During PO trials he stated "it's a rough day"  Goals     Short Term Goals:  1  Patient will improve timing of swallow response with liquids in 90% of opportunities across 3 sessions  2  Patient will improve tongue base retraction for improve propulsion of food based on results of next VBSS  3   Patient will improve hyolaryngeal rise for improved swallow in 90+% of opportunities across 3 sessions  3   Patient will improve airway protection based on result next VBSS        Long Term Goals:  1  Patient will tolerate least restrictive diet without s/s of aspiration by discharge    2   Patient will utilize strategies with meals for swallowing without s/s of aspiration independently by discharge        Patient tolerated NMES (min threshold: 2 0  mA & max threshold: channel 1: 13 0 channel 2: 11 0 mA) in conjunction with PO intake and exercises       Traditional VitalStim     Channel(s) used: 1,2   Placement: 2b   Duty Cycle: 57/1 s   Frequency: 80 pulses per second   Phase Duration: 300 microseconds   Treatment Duration: 36 minutes      Average mA - Ch 1: 12 5, Ch 2 : 11 0    Utilized coban to maintain contact throughout  Patient consumed the following during todays session: Water - approximately 16 oz via bottle; diced pear cup in fruit juice        Noted increased prolongation of mastication today  Increased phonation throughout PO trial today  Patient used piecemeal transfer with multiple swallows per bolus  Patient used water for liquid wash after each bite of mixed consistency  He was noted to separate liquids/solids with the mixed consistency  Patient was noted to have wet vocal quality x4 during session  x1 sneeze, eye watering during PO trials  Patient also noted to have extended time to complete 4 oz cup of fruit  Decreased success with increased s/s of aspiration today  Reviewed carryover of tongue base retraction exercises- patient reports some carryover  Reviewed effortful and kasie swallows x5 each during today's NMES intervention  Patient was able to complete given direction  Reports he has been able to do [facility] for some time - discussed increased tongue protrusion for increasing strengthening  Assessment: Noted some overt s/s of aspiration during PO trials  Continues with moderate to severe pharyngeal dysphagia  He is currently at risk for aspiration  Other: Patient was educated throughout the session about POC and intervention strategies  Discussed meal time and eating as part of therapy and encouraged consistent PO intake daily      Recommendations:Continue with Plan of Care

## 2021-04-09 ENCOUNTER — OFFICE VISIT (OUTPATIENT)
Dept: SPEECH THERAPY | Age: 56
End: 2021-04-09
Payer: COMMERCIAL

## 2021-04-09 DIAGNOSIS — Z85.89 HISTORY OF HEAD AND NECK CANCER: ICD-10-CM

## 2021-04-09 DIAGNOSIS — R13.13 PHARYNGEAL DYSPHAGIA: Primary | ICD-10-CM

## 2021-04-09 PROCEDURE — 92526 ORAL FUNCTION THERAPY: CPT

## 2021-04-09 NOTE — PROGRESS NOTES
Speech Treatment Note    Today's date: 2021  Patient name: Jeferson Page  : 1965  MRN: [id number]  Referring provider: William Kamara*  Dx:   Encounter Diagnosis     ICD-10-CM    1  Pharyngeal dysphagia  R13 13    2  History of head and neck cancer  Z85 89        Start Time: 910  Stop Time: 0940  Total time in clinic (min): 30 minutes     Recommendations:       Patients would benefit from: Dysphagia therapy including NMES       Patient would benefit from VBSS  Frequency:2x weekly       Duration:Other 3 months     Intervention certification PYNM:9/3/18  Intervention certification [id number]  Intervention Comments: dysphagia therapy, MFR, NMES  Visit Number: 9    Subjective/Behavioral: Patient reports very stressful day today  He reports no major changes with swallowing in 2 days  He reports he did he chicken parmesan for dinner the other night  Goals     Short Term Goals:  1  Patient will improve timing of swallow response with liquids in 90% of opportunities across 3 sessions  2  Patient will improve tongue base retraction for improve propulsion of food based on results of next VBSS  3   Patient will improve hyolaryngeal rise for improved swallow in 90+% of opportunities across 3 sessions  3   Patient will improve airway protection based on result next VBSS        Long Term Goals:  1  Patient will tolerate least restrictive diet without s/s of aspiration by discharge    2   Patient will utilize strategies with meals for swallowing without s/s of aspiration independently by discharge        Patient tolerated NMES (min threshold: 2 0  mA & max threshold: channel 1: 14 5 channel 2: 12 5 mA) in conjunction with PO intake and exercises       Traditional VitalStim     Channel(s) used: 1,2   Placement: 2b   Duty Cycle: 57/1 s   Frequency: 80 pulses per second   Phase Duration: 300 microseconds   Treatment Duration:  20 minutes      Average mA - Ch 1: 12 8, Ch 2 : 11 2    Utilized coban to maintain contact throughout  Patient consumed the following during todays session: Water - approximately 16 oz via bottle; coffee via insulated cup with lid   ; diced pear cup in fruit juice        Reviewed strategies to separate solids/liquids; use liquids for wash; limited phonation during meal   Patient with more appropriate mastication timeliness today  Patient used piecemeal transfer with multiple swallows per bolus  Patient used water for liquid wash after each bite of mixed consistency  He was noted to separate liquids/solids with the mixed consistency  Patient was noted to have wet vocal quality x1 during session, x3 throat clear, x1 cough  Discussed avoiding neck extension with thin liquids  Also discussed use of prep set with liquids - patient expressed reciprocal understanding and demonstrated use of prep set with liquids x1  Reviewed carryover of tongue base retraction exercises- patient reports some carryover  Reviewed effortful and kasie swallows Reviewed carryover strategies  Assessment: Noted some overt s/s of aspiration during PO trials  Continues with moderate to severe pharyngeal dysphagia  He is currently at risk for aspiration  Other: Patient was educated throughout the session about POC and intervention strategies  Discussed meal time and eating as part of therapy and encouraged consistent PO intake daily      Recommendations:Continue with Plan of Care

## 2021-04-12 ENCOUNTER — OFFICE VISIT (OUTPATIENT)
Dept: SPEECH THERAPY | Age: 56
End: 2021-04-12
Payer: COMMERCIAL

## 2021-04-12 DIAGNOSIS — Z85.89 HISTORY OF HEAD AND NECK CANCER: ICD-10-CM

## 2021-04-12 DIAGNOSIS — R13.13 PHARYNGEAL DYSPHAGIA: Primary | ICD-10-CM

## 2021-04-12 PROCEDURE — 92526 ORAL FUNCTION THERAPY: CPT

## 2021-04-12 NOTE — PROGRESS NOTES
Speech Treatment Note    Today's date: 2021  Patient name: Ajay Luna  : 1965  MRN: [id number]  Referring provider: Prema Xie*  Dx:   Encounter Diagnosis     ICD-10-CM    1  Pharyngeal dysphagia  R13 13    2  History of head and neck cancer  Z85 89        Start Time: 1539  Stop Time: 1630  Total time in clinic (min): 51 minutes     Recommendations:       Patients would benefit from: Dysphagia therapy including NMES       Patient would benefit from VBSS  Frequency:2x weekly       Duration:Other 3 months     Intervention certification PZFQ:3/2/16  Intervention certification HV:  Intervention Comments: dysphagia therapy, MFR, NMES  Visit Number: 10    Subjective/Behavioral: Patient acclimated today  He reports no major changes with swallowing in 2 days  He reports he did he chicken parmesan for dinner the other night  Patient reports going to the gym more consistently  Discussed benefits of exercise and stretching  Patient also reports eating a hamburger this weekend with success  Goals     Short Term Goals:  1  Patient will improve timing of swallow response with liquids in 90% of opportunities across 3 sessions  2  Patient will improve tongue base retraction for improve propulsion of food based on results of next VBSS  3   Patient will improve hyolaryngeal rise for improved swallow in 90+% of opportunities across 3 sessions  3   Patient will improve airway protection based on result next VBSS        Long Term Goals:  1  Patient will tolerate least restrictive diet without s/s of aspiration by discharge  2   Patient will utilize strategies with meals for swallowing without s/s of aspiration independently by discharge        Patient tolerated NMES (min threshold: 2 0  mA & sub max/therapeutic level: channel 1: 11 5 channel 2: 15  0mA) in conjunction with PO intake and exercises       Traditional VitalStim     Channel(s) used: 1,2   Placement: 2b Duty Cycle: 57/1 s   Frequency: 80 pulses per second   Phase Duration: 300 microseconds   Treatment Duration:  39 minutes      Average mA - Ch 1:11 1mA , Ch 2 :14 2mA at therapeutic level  "when I swallow, it pulls more"  Utilized coban to maintain contact throughout  Patient consumed the following during todays session: Water - approximately 16 oz via bottle; fruit cup of mandarin oranges - discussed mixed consistency of fruit        Reviewed strategies to separate solids/liquids; use liquids for wash; limited phonation during meal   Patient with more appropriate mastication timeliness today  Patient used piecemeal transfer with multiple swallows per bolus  Patient used water for liquid wash after each bite of mixed consistency  He was noted to separate liquids/solids with the mixed consistency  Patient was noted to have wet vocal quality x1 during session, x3 throat clear, x1 cough  After a significant cough, patient blew his nose and reports mandarin orange debris in tissue  Continued with effortful and kasie swallows x5 each; introduced Mendelsohn maneuver  - patient able to produce with verbal cues, tactile cues x5  Assessment: Noted some overt s/s of aspiration during PO trials  Continues with moderate to severe pharyngeal dysphagia  He is currently at risk for aspiration  Other: Patient was educated throughout the session about POC and intervention strategies      Recommendations:Continue with Plan of Care

## 2021-04-16 ENCOUNTER — OFFICE VISIT (OUTPATIENT)
Dept: SPEECH THERAPY | Age: 56
End: 2021-04-16
Payer: COMMERCIAL

## 2021-04-16 DIAGNOSIS — R13.13 PHARYNGEAL DYSPHAGIA: Primary | ICD-10-CM

## 2021-04-16 DIAGNOSIS — Z85.89 HISTORY OF HEAD AND NECK CANCER: ICD-10-CM

## 2021-04-16 PROCEDURE — 92526 ORAL FUNCTION THERAPY: CPT

## 2021-04-16 NOTE — PROGRESS NOTES
Speech Treatment Note    Today's date: 2021  Patient name: Santa Miner  : 1965  MRN: [id number]  Referring provider: Catherine Hoffman*  Dx:   Encounter Diagnosis     ICD-10-CM    1  Pharyngeal dysphagia  R13 13    2  History of head and neck cancer  Z85 89        Start Time: 920  Stop Time: 1010  Total time in clinic (min): 50 minutes     Recommendations:       Patients would benefit from: Dysphagia therapy including NMES       Patient would benefit from VBSS  Frequency:2x weekly       Duration:Other 3 months     Intervention certification XVCU:09  Intervention certification [id number]  Intervention Comments: dysphagia therapy, MFR, NMES  Visit Number: 11    Subjective/Behavioral:  He reports no major changes with swallowing in 2 days  Patient notably throat clearing consistently during application of NMES, during dysphagia therapy exercises reported below  Patient reports he has been waking up with dryness/throat clearing most of the week  Discussed impacts of seasonal changes - consider humidifier, nasal saline spray  Goals     Short Term Goals:  1  Patient will improve timing of swallow response with liquids in 90% of opportunities across 3 sessions  2  Patient will improve tongue base retraction for improve propulsion of food based on results of next VBSS  3   Patient will improve hyolaryngeal rise for improved swallow in 90+% of opportunities across 3 sessions  3   Patient will improve airway protection based on result next VBSS        Long Term Goals:  1  Patient will tolerate least restrictive diet without s/s of aspiration by discharge  2   Patient will utilize strategies with meals for swallowing without s/s of aspiration independently by discharge        Patient tolerated NMES (min threshold: 2 0  mA & sub max/therapeutic level: channel 1: 14 0 channel 2: 17  0mA) in conjunction with dysphagia therapy - see below - exercise first, then PO intake  Traditional NMES        Channel(s) used: 1,2   Placement: 2b   Duty Cycle: 57/1 s   Frequency: 80 pulses per second   Phase Duration: 300 microseconds   Treatment Duration:  36 5 minutes      Average mA - Ch 1:13 7 mA , Ch 2 :16 4 mA at therapeutic level  "It's pulling now "    Utilized coban to maintain contact throughout  Dysphagia therapy/exercises:   Continued with effortful and kasie swallows x5 each; Reviewed and completed Mendelsohn maneuver  - patient able to produce with verbal cues, tactile cues x5  Increased throat clearing observed during exercises - patient reports "It's difficulty today"  Patient consumed the following during todays session: Water - approximately 16 oz via bottle; fruit cup of flavored pears - discussed increasing/changing foods         Reviewed strategies to separate solids/liquids; use liquids for wash; limited phonation during meal   Patient with more appropriate mastication timeliness today  Patient used piecemeal transfer with multiple swallows per bolus  Patient used water for liquid wash after each bite of mixed consistency  Patient was given cues for swallow hard/fast - patient reports difficult with increasing bolus size, discussed maintaining smaller bolus size while increasing hard/fast attempts  Patient agreeable  Patient was noted to have less throat clearing during meal than at rest prior to PO intake  Patient reports consistently having trouble the last 2 days  Assessment: Noted significant throat clearing at rest  Less symptoms during PO trials  Patient hesitant to follow hard/fast cueing  Continue with dysphagia therapy, NMES; increase MFR/warm towel  Other: Patient was educated throughout the session about POC and intervention strategies      Recommendations:Continue with Plan of Care

## 2021-04-22 ENCOUNTER — OFFICE VISIT (OUTPATIENT)
Dept: SPEECH THERAPY | Age: 56
End: 2021-04-22
Payer: COMMERCIAL

## 2021-04-22 DIAGNOSIS — Z85.89 HISTORY OF HEAD AND NECK CANCER: ICD-10-CM

## 2021-04-22 DIAGNOSIS — R13.13 PHARYNGEAL DYSPHAGIA: Primary | ICD-10-CM

## 2021-04-22 PROCEDURE — 92526 ORAL FUNCTION THERAPY: CPT

## 2021-04-22 NOTE — PROGRESS NOTES
Speech Treatment Note    Today's date: 2021  Patient name: Raysa Brooks  : 1965  MRN: [id number]  Referring provider: Michael Faulkner*  Dx:   Encounter Diagnosis     ICD-10-CM    1  Pharyngeal dysphagia  R13 13    2  History of head and neck cancer  Z85 89        Start Time: 1510  Stop Time: 1600  Total time in clinic (min): 50 minutes     Recommendations:       Patients would benefit from: Dysphagia therapy including NMES       Patient would benefit from VBSS  Frequency:2x weekly       Duration:Other 3 months     Intervention certification [id number]  Intervention certification [id number]  Intervention Comments: dysphagia therapy, MFR, NMES  Visit Number: 12    Subjective/Behavioral:  He reports no major changes with swallowing in 2 days  Patient reports improvements with throat clearing  He reports noting increased throat clearing with certain foods - supplement and creamer  Goals     Short Term Goals:  1  Patient will improve timing of swallow response with liquids in 90% of opportunities across 3 sessions  2  Patient will improve tongue base retraction for improve propulsion of food based on results of next VBSS  3   Patient will improve hyolaryngeal rise for improved swallow in 90+% of opportunities across 3 sessions  3   Patient will improve airway protection based on result next VBSS        Long Term Goals:  1  Patient will tolerate least restrictive diet without s/s of aspiration by discharge  2   Patient will utilize strategies with meals for swallowing without s/s of aspiration independently by discharge        Patient tolerated NMES (min threshold: 2 0  mA & sub max/therapeutic level: channel 1: 12 5 channel 2: 17  5mA) in conjunction with dysphagia therapy - see below - exercise first, then PO intake     Traditional NMES        Channel(s) used: 1,2   Placement: 2b   Duty Cycle: 57/1 s   Frequency: 80 pulses per second   Phase Duration: 300 microseconds   Treatment Duration:  40 minutes      Average mA - Ch 1: 12 2 mA , Ch 2 : 16 8 mA at therapeutic level  Utilized coban to maintain contact throughout  Dysphagia therapy/exercises:   Continued with effortful and kasie swallows x5 each; Patient reports improvements with swallowing today  Patient noted to report that he is unable to do effortful swallows with a bolus  Patient consumed the following during todays session: Water - approximately 16 oz via bottle; fruit cup of flavored pears - discussed increasing/changing foods         Reviewed strategies to separate solids/liquids; use liquids for wash; limited phonation during meal   Patient with more appropriate mastication timeliness today  Patient used piecemeal transfer with multiple swallows per bolus  Patient used water for liquid wash after each bite of mixed consistency  Patient with throat clear x2  Patient noted to have improvement since previous session  Assessment: Overall improvement with todays PO trials  Continue with dysphagia therapy, NMES; increase MFR/warm towel  Other: Patient was educated throughout the session about POC and intervention strategies      Recommendations:Continue with Plan of Care

## 2021-04-23 ENCOUNTER — OFFICE VISIT (OUTPATIENT)
Dept: SPEECH THERAPY | Age: 56
End: 2021-04-23
Payer: COMMERCIAL

## 2021-04-23 DIAGNOSIS — R13.13 PHARYNGEAL DYSPHAGIA: Primary | ICD-10-CM

## 2021-04-23 DIAGNOSIS — Z85.89 HISTORY OF HEAD AND NECK CANCER: ICD-10-CM

## 2021-04-23 PROCEDURE — 92526 ORAL FUNCTION THERAPY: CPT

## 2021-04-23 NOTE — PROGRESS NOTES
Speech Treatment Note    Today's date: 2021  Patient name: Jayden Wong  : 1965  MRN: [id number]  Referring provider: Pancho Pavon  Dx:   Encounter Diagnosis     ICD-10-CM    1  Pharyngeal dysphagia  R13 13    2  History of head and neck cancer  Z85 89        Start Time: 925  Stop Time: 1000  Total time in clinic (min): 35 minutes     Recommendations:       Patients would benefit from: Dysphagia therapy including NMES       Patient would benefit from VBSS  Frequency:2x weekly       Duration:Other 3 months     Intervention certification CQEO:  Intervention certification [id number]  Intervention Comments: dysphagia therapy, MFR, NMES  Visit Number: 12    Subjective/Behavioral:  He reports no major changes with swallowing in 2 days  Patient reports improvements with throat clearing  He reports noting increased throat clearing with certain foods - supplement and creamer  Goals     Short Term Goals:  1  Patient will improve timing of swallow response with liquids in 90% of opportunities across 3 sessions  2  Patient will improve tongue base retraction for improve propulsion of food based on results of next VBSS  3   Patient will improve hyolaryngeal rise for improved swallow in 90+% of opportunities across 3 sessions  3   Patient will improve airway protection based on result next VBSS        Long Term Goals:  1  Patient will tolerate least restrictive diet without s/s of aspiration by discharge  2   Patient will utilize strategies with meals for swallowing without s/s of aspiration independently by discharge        Patient tolerated NMES (min threshold: 2 0  mA & sub max/therapeutic level: channel 1: 13 0 channel 2: 15  0mA) in conjunction with dysphagia therapy - see below - exercise first, then PO intake     Traditional NMES        Channel(s) used: 1,2   Placement: 2b   Duty Cycle: 57/1 s   Frequency: 80 pulses per second   Phase Duration: 300 microseconds   Treatment Duration:  32 minutes      Average mA - Ch 1: 12 9 mA , Ch 2 : 14 9 mA at therapeutic level  Utilized coban to maintain contact throughout  Dysphagia therapy/exercises:   Re-educated regarding tongue base retraction exercises - patient able to complete given model from SLP  Patient instructed to practice daily  Continued with effortful, mendelsohn,  and kasie swallows x3 each  Patient reports improvements with swallowing today  Patient consumed the following during todays session: Water - approximately 16 oz via bottle; mandarin oranges - discussed increasing/changing foods        Reviewed strategies to separate solids/liquids; use liquids for wash; limited phonation during meal   Patient with more appropriate mastication timeliness today  Patient used piecemeal transfer with multiple swallows per bolus  Patient used water for liquid wash after each bite of mixed consistency  Patient with throat clear x1  Patient noted to have improvement since previous session  Assessment: Overall improvement with todays PO trials  Continue with dysphagia therapy, NMES; increase MFR/warm towel  Other: Patient was educated throughout the session about POC and intervention strategies  Discussed next week consistency - patient willing to do scrambled eggs      Recommendations:Continue with Plan of Care

## 2021-04-24 ENCOUNTER — TELEPHONE (OUTPATIENT)
Dept: OTHER | Facility: OTHER | Age: 56
End: 2021-04-24

## 2021-04-24 NOTE — TELEPHONE ENCOUNTER
Pt called regarding rx: Cilalis 5 mg  " I received the 30 day supply a month ago but I would like to get the 90 day supply "

## 2021-04-26 DIAGNOSIS — N52.9 ERECTILE DYSFUNCTION, UNSPECIFIED ERECTILE DYSFUNCTION TYPE: ICD-10-CM

## 2021-04-26 RX ORDER — TADALAFIL 5 MG/1
5 TABLET ORAL DAILY
Qty: 90 TABLET | Refills: 3 | Status: SHIPPED | OUTPATIENT
Start: 2021-04-26 | End: 2021-04-27 | Stop reason: SDUPTHER

## 2021-04-26 NOTE — TELEPHONE ENCOUNTER
Called and spoke to patient at this time     Advised 90 days supply sent by provider Rosie     Patient would like medication sent to [facility] on [address]   He states medication is cheaper there       Advised will route to provider to have sent to [facility]    Patient verbalized understanding and is thankful for call

## 2021-04-27 DIAGNOSIS — N52.9 ERECTILE DYSFUNCTION, UNSPECIFIED ERECTILE DYSFUNCTION TYPE: ICD-10-CM

## 2021-04-27 RX ORDER — TADALAFIL 5 MG/1
5 TABLET ORAL DAILY
Qty: 90 TABLET | Refills: 3 | Status: SHIPPED | OUTPATIENT
Start: 2021-04-27 | End: 2022-06-28

## 2021-04-28 ENCOUNTER — OFFICE VISIT (OUTPATIENT)
Dept: SPEECH THERAPY | Age: 56
End: 2021-04-28
Payer: COMMERCIAL

## 2021-04-28 DIAGNOSIS — R13.13 PHARYNGEAL DYSPHAGIA: Primary | ICD-10-CM

## 2021-04-28 DIAGNOSIS — Z85.89 HISTORY OF HEAD AND NECK CANCER: ICD-10-CM

## 2021-04-28 PROCEDURE — 92526 ORAL FUNCTION THERAPY: CPT

## 2021-04-28 NOTE — PROGRESS NOTES
Speech Treatment Note    Today's date: 2021  Patient name: Lee Ann Whitten  : 1965  MRN: [id number]  Referring provider: Priscilla Jain*  Dx:   Encounter Diagnosis     ICD-10-CM    1  Pharyngeal dysphagia  R13 13    2  History of head and neck cancer  Z85 89        Start Time:   Stop Time:   Total time in clinic (min): 60 minutes     Recommendations:       Patients would benefit from: Dysphagia therapy including NMES       Patient would benefit from VBSS  Frequency:2x weekly       Duration:Other 3 months     Intervention certification BNKZ:  Intervention certification [id number]  Intervention Comments: dysphagia therapy, MFR, NMES  Visit Number: 12    Subjective/Behavioral:  He reports no major changes with swallowing in the last few days  Reports fatigue from lengthy work trip  Reports increased allergy like symptoms since return - noted with wet vocal quality at rest   Patient encouraged to increase nasal saline and use of humidifier at night  Patient reports eating burger at a restaurant over his trip - patient reports eating entire burger and fries - He reports moisture on the burger (sauce, pickles); along with lots of liquid wash  Goals     Short Term Goals:  1  Patient will improve timing of swallow response with liquids in 90% of opportunities across 3 sessions  2  Patient will improve tongue base retraction for improve propulsion of food based on results of next VBSS  3   Patient will improve hyolaryngeal rise for improved swallow in 90+% of opportunities across 3 sessions  3   Patient will improve airway protection based on result next VBSS        Long Term Goals:  1  Patient will tolerate least restrictive diet without s/s of aspiration by discharge    2   Patient will utilize strategies with meals for swallowing without s/s of aspiration independently by discharge        Patient tolerated NMES (min threshold: 2 0  mA & sub max/therapeutic level: channel 1: 13 0 channel 2: 15  0mA) in conjunction with dysphagia therapy - see below - exercise first, then PO intake  Traditional NMES        Channel(s) used: 1,2   Placement: 2b/inverted T position   Duty Cycle: 57/1 s   Frequency: 80 pulses per second   Phase Duration: 300 microseconds   Treatment Duration:  40 minutes      Average mA - Ch 1: 12 9 mA , Ch 2 : 14 9 mA at therapeutic level  Noted vocal change and report of "pulling"  Utilized coban to maintain contact throughout  Dysphagia therapy/exercises:   Continued with effortful, mendelsohn,  and kasie swallows x5 each  Continued tongue base retraction exercises after the meal       Patient consumed the following during todays session: Water - approximately 16 oz via cup; mandarin oranges - discussed increasing/changing foods        Reviewed strategies to separate solids/liquids; use liquids for wash; limited phonation during meal   Patient with more appropriate mastication timeliness today  Patient used piecemeal transfer with multiple swallows per bolus  Patient used water for liquid wash after each bite of mixed consistency  Patient with throat clear x3  Patient had significant cough x2 - reports feeling "piece" stuck in back of throat - patient coughed, blew his nose and was able to regroup; returning to PO intake  Assessment: Overall improvement with todays PO trials  Continue with dysphagia therapy, NMES; increase MFR/warm towel  Other: Patient was educated throughout the session about POC and intervention strategies  Discussed next session consistency - patient willing to do scrambled eggs      Recommendations:Continue with Plan of Care

## 2021-04-29 ENCOUNTER — OFFICE VISIT (OUTPATIENT)
Dept: SPEECH THERAPY | Age: 56
End: 2021-04-29
Payer: COMMERCIAL

## 2021-04-29 DIAGNOSIS — Z85.89 HISTORY OF HEAD AND NECK CANCER: ICD-10-CM

## 2021-04-29 DIAGNOSIS — R13.13 PHARYNGEAL DYSPHAGIA: Primary | ICD-10-CM

## 2021-04-29 PROCEDURE — 92526 ORAL FUNCTION THERAPY: CPT

## 2021-04-29 NOTE — PROGRESS NOTES
Speech Treatment Note    Today's date: 2021  Patient name: Ajay Luna  : 1965  MRN: [id number]  Referring provider: Prema Barton  Dx:   Encounter Diagnosis     ICD-10-CM    1  Pharyngeal dysphagia  R13 13    2  History of head and neck cancer  Z85 89        Start Time: 930  Stop Time: 1030  Total time in clinic (min): 60 minutes     Recommendations:       Patients would benefit from: Dysphagia therapy including NMES       Patient would benefit from VBSS  Frequency:2x weekly       Duration:Other 3 months     Intervention certification CFNQ:  Intervention certification [id number]  Intervention Comments: dysphagia therapy, MFR, NMES  Visit Number: 12    Subjective/Behavioral:  He reports no major changes with swallowing in the last few days  Reports fatigue from lengthy work trip  Reports increased allergy like symptoms since return - noted with wet vocal quality at rest   Patient encouraged to increase nasal saline and use of humidifier at night  Patient reports eating burger at a restaurant over his trip - patient reports eating entire burger and fries - He reports moisture on the burger (sauce, pickles); along with lots of liquid wash  Goals     Short Term Goals:  1  Patient will improve timing of swallow response with liquids in 90% of opportunities across 3 sessions  2  Patient will improve tongue base retraction for improve propulsion of food based on results of next VBSS  3   Patient will improve hyolaryngeal rise for improved swallow in 90+% of opportunities across 3 sessions  3   Patient will improve airway protection based on result next VBSS        Long Term Goals:  1  Patient will tolerate least restrictive diet without s/s of aspiration by discharge    2   Patient will utilize strategies with meals for swallowing without s/s of aspiration independently by discharge        Patient tolerated NMES (min threshold: 2 0  mA & sub max/therapeutic level: channel 1: 14 0 channel 2: 16  0mA) in conjunction with dysphagia therapy - see below - exercise first, then PO intake  Traditional NMES        Channel(s) used: 1,2   Placement: 2b/inverted T position   Duty Cycle: 57/1 s   Frequency: 80 pulses per second   Phase Duration: 300 microseconds   Treatment Duration:  33 minutes      Average mA - Ch 1: 13 4 mA , Ch 2 : 15 2 mA at therapeutic level  Noted throat clearing and report of "pulling"  Utilized coban to maintain contact throughout  Dysphagia therapy/exercises:   Continued with effortful, mendelsohn,  and kasie swallows x5 each  Patient consumed the following during todays session: Water - approximately 16 oz via cup; Scrambled egg with cheese        Patient was offered and accepted ketchup to use to increase moisture in the scrambled eggs; Noted increased conversation during PO trials today  Patient with more appropriate mastication timeliness today  Patient used piecemeal transfer with multiple swallows per bolus  Patient used water for liquid wash after each bite of solid consistency  Patient with throat clear x3  Patient had significant cough x1 - patient commented that he was talking too much  Continued further NMES in conjunction with sEMG to continue dysphagia therapy - patient given visual feedback from sEMG  Education provided regarding visual representation of effortful swallow and mendhelsohn swallows  Patient had positive feedback regarding understanding and use of visual biofeedback method  Incorporated NMES to above specifications at submaximal therapeutic level for completion of x10 sets of 5 seconds on, 10 seconds rest   Patient felt he was able to increase effort and benefit from exercise with visual biofeedback  Assessment: Overall improvement with todays PO trials  Continue with dysphagia therapy, NMES; increase MFR/warm towel      Other: Patient was educated throughout the session about POC and intervention strategies      Recommendations:Continue with Plan of Care

## 2021-05-04 LAB
ALBUMIN SERPL BCP-MCNC: 3.7 G/DL (ref 3.5–5)
ALP SERPL-CCNC: 85 U/L (ref 46–116)
ALT SERPL W P-5'-P-CCNC: 49 U/L (ref 12–78)
ANION GAP SERPL CALCULATED.3IONS-SCNC: 7 MMOL/L (ref 4–13)
AST SERPL W P-5'-P-CCNC: 36 U/L (ref 5–45)
BASOPHILS # BLD AUTO: 0.02 THOUSANDS/ΜL (ref 0–0.1)
BASOPHILS NFR BLD AUTO: 1 % (ref 0–1)
BILIRUB SERPL-MCNC: 0.47 MG/DL (ref 0.2–1)
BUN SERPL-MCNC: 5 MG/DL (ref 5–25)
CALCIUM SERPL-MCNC: 8.5 MG/DL (ref 8.3–10.1)
CHLORIDE SERPL-SCNC: 100 MMOL/L (ref 100–108)
CO2 SERPL-SCNC: 31 MMOL/L (ref 21–32)
CREAT SERPL-MCNC: 0.59 MG/DL (ref 0.6–1.3)
EOSINOPHIL # BLD AUTO: 0.13 THOUSAND/ΜL (ref 0–0.61)
EOSINOPHIL NFR BLD AUTO: 3 % (ref 0–6)
ERYTHROCYTE [DISTWIDTH] IN BLOOD BY AUTOMATED COUNT: 12.7 % (ref 11.6–15.1)
GFR SERPL CREATININE-BSD FRML MDRD: 112 ML/MIN/1.73SQ M
GLUCOSE SERPL-MCNC: 81 MG/DL (ref 65–140)
HCT VFR BLD AUTO: 43.9 % (ref 36.5–49.3)
HGB BLD-MCNC: 14.5 G/DL (ref 12–17)
IMM GRANULOCYTES # BLD AUTO: 0.01 THOUSAND/UL (ref 0–0.2)
IMM GRANULOCYTES NFR BLD AUTO: 0 % (ref 0–2)
LYMPHOCYTES # BLD AUTO: 0.89 THOUSANDS/ΜL (ref 0.6–4.47)
LYMPHOCYTES NFR BLD AUTO: 22 % (ref 14–44)
MCH RBC QN AUTO: 31.3 PG (ref 26.8–34.3)
MCHC RBC AUTO-ENTMCNC: 33 G/DL (ref 31.4–37.4)
MCV RBC AUTO: 95 FL (ref 82–98)
MONOCYTES # BLD AUTO: 0.58 THOUSAND/ΜL (ref 0.17–1.22)
MONOCYTES NFR BLD AUTO: 14 % (ref 4–12)
NEUTROPHILS # BLD AUTO: 2.41 THOUSANDS/ΜL (ref 1.85–7.62)
NEUTS SEG NFR BLD AUTO: 60 % (ref 43–75)
NRBC BLD AUTO-RTO: 0 /100 WBCS
PLATELET # BLD AUTO: 201 THOUSANDS/UL (ref 149–390)
PMV BLD AUTO: 9 FL (ref 8.9–12.7)
POTASSIUM SERPL-SCNC: 3.7 MMOL/L (ref 3.5–5.3)
PROT SERPL-MCNC: 7.6 G/DL (ref 6.4–8.2)
RBC # BLD AUTO: 4.64 MILLION/UL (ref 3.88–5.62)
SODIUM SERPL-SCNC: 138 MMOL/L (ref 136–145)
TROPONIN I SERPL-MCNC: <0.02 NG/ML
WBC # BLD AUTO: 4.04 THOUSAND/UL (ref 4.31–10.16)

## 2021-05-04 PROCEDURE — 84443 ASSAY THYROID STIM HORMONE: CPT | Performed by: EMERGENCY MEDICINE

## 2021-05-04 PROCEDURE — 99285 EMERGENCY DEPT VISIT HI MDM: CPT

## 2021-05-04 PROCEDURE — 93005 ELECTROCARDIOGRAM TRACING: CPT

## 2021-05-04 PROCEDURE — 36415 COLL VENOUS BLD VENIPUNCTURE: CPT

## 2021-05-04 PROCEDURE — 84484 ASSAY OF TROPONIN QUANT: CPT | Performed by: EMERGENCY MEDICINE

## 2021-05-04 PROCEDURE — 85025 COMPLETE CBC W/AUTO DIFF WBC: CPT | Performed by: EMERGENCY MEDICINE

## 2021-05-04 PROCEDURE — 80053 COMPREHEN METABOLIC PANEL: CPT | Performed by: EMERGENCY MEDICINE

## 2021-05-05 ENCOUNTER — HOSPITAL ENCOUNTER (EMERGENCY)
Facility: HOSPITAL | Age: 56
Discharge: HOME | End: 2021-05-05
Attending: EMERGENCY MEDICINE | Admitting: EMERGENCY MEDICINE
Payer: COMMERCIAL

## 2021-05-05 ENCOUNTER — APPOINTMENT (EMERGENCY)
Dept: CT IMAGING | Facility: HOSPITAL | Age: 56
End: 2021-05-05
Payer: COMMERCIAL

## 2021-05-05 ENCOUNTER — TELEPHONE (OUTPATIENT)
Dept: FAMILY MEDICINE CLINIC | Facility: CLINIC | Age: 56
End: 2021-05-05

## 2021-05-05 VITALS
RESPIRATION RATE: 18 BRPM | SYSTOLIC BLOOD PRESSURE: 139 MMHG | DIASTOLIC BLOOD PRESSURE: 74 MMHG | OXYGEN SATURATION: 100 % | TEMPERATURE: 99.5 F | WEIGHT: 162.7 LBS | HEART RATE: 60 BPM | BODY MASS INDEX: 24.03 KG/M2

## 2021-05-05 DIAGNOSIS — G47.30 SLEEP APNEA IN ADULT: Primary | ICD-10-CM

## 2021-05-05 DIAGNOSIS — R56.9 UNSPECIFIED CONVULSIONS (HCC): ICD-10-CM

## 2021-05-05 DIAGNOSIS — E03.9 HYPOTHYROIDISM: ICD-10-CM

## 2021-05-05 DIAGNOSIS — I10 HYPERTENSION: ICD-10-CM

## 2021-05-05 LAB
ATRIAL RATE: 83 BPM
P AXIS: 61 DEGREES
PR INTERVAL: 154 MS
QRS AXIS: 35 DEGREES
QRSD INTERVAL: 98 MS
QT INTERVAL: 374 MS
QTC INTERVAL: 429 MS
T WAVE AXIS: 51 DEGREES
TSH SERPL DL<=0.05 MIU/L-ACNC: 7.25 UIU/ML (ref 0.36–3.74)
VENTRICULAR RATE: 79 BPM

## 2021-05-05 PROCEDURE — 96361 HYDRATE IV INFUSION ADD-ON: CPT

## 2021-05-05 PROCEDURE — 99285 EMERGENCY DEPT VISIT HI MDM: CPT | Performed by: EMERGENCY MEDICINE

## 2021-05-05 PROCEDURE — 70450 CT HEAD/BRAIN W/O DYE: CPT

## 2021-05-05 PROCEDURE — 96374 THER/PROPH/DIAG INJ IV PUSH: CPT

## 2021-05-05 PROCEDURE — G1004 CDSM NDSC: HCPCS

## 2021-05-05 PROCEDURE — 93010 ELECTROCARDIOGRAM REPORT: CPT | Performed by: INTERNAL MEDICINE

## 2021-05-05 RX ORDER — LABETALOL 20 MG/4 ML (5 MG/ML) INTRAVENOUS SYRINGE
10 ONCE
Status: COMPLETED | OUTPATIENT
Start: 2021-05-05 | End: 2021-05-05

## 2021-05-05 RX ADMIN — LABETALOL 20 MG/4 ML (5 MG/ML) INTRAVENOUS SYRINGE 10 MG: at 00:44

## 2021-05-05 RX ADMIN — SODIUM CHLORIDE 1000 ML: 0.9 INJECTION, SOLUTION INTRAVENOUS at 00:39

## 2021-05-05 NOTE — ED PROVIDER NOTES
History  Chief Complaint   Patient presents with    Seizure - New Onset     Pt reports being in the airplane when all of the sudden Pt started "convulsing" for about 20 seconds, then woke up suddenly  Reports being in the sun and drinking a couple beers earlier  Denies CP/SOB     78-year-old male presents the emergency department with his wife for evaluation of a convulsive type of episode that occurred while he was traveling by plane home to [place] from the [facility]  Patient does not recall exactly what happened and history is provided mostly by his wife  She states that they had spent the day in the sun and drank some alcohol prior to boarding the plane to return home  Within the 1st 20 minutes of the flight the patient fell asleep  While he was sleeping he was bent for with his neck in a flexed position  He had a few episodes of mild clonal muscle jerking followed by a brief period where he appeared flaccid  Patient's wife quickly got up to try to wake him and he woke up  No tongue biting or incontinence noted  He did not have a postictal period and there was no confusion  The patient does have a history of tongue cancer and received radiation to the neck  He has chronic difficulty swallowing and chronically feels that his airway is narrowed  Patient's wife has noticed that he has sleep apnea and she suspects that he may have stopped breathing secondary to the position that his neck was in while on the plane  He did not appear to be blue or hypoxic during the event  He returned to his normal state of health and stayed awake for the remainder of the flight  He presents currently feeling fatigued but offers no other complaints        History provided by:  Patient and spouse   used: No    Medical Problem  Location:  Generalized  Quality:  Brief episode of twitching/shaking followed by brief unresponsiveness, ? syncope  Onset quality:  Sudden  Duration: 20 seconds  Timing:  Unable to specify  Progression:  Resolved  Chronicity:  New  Context:  Patient did drink alcohol and spent the day in the sun prior to boarding his plane  Patient's wife does suspect that he may have been slightly dehydrated  Relieved by:  Nothing  Worsened by:  Nothing  Ineffective treatments:  None  Associated symptoms: fatigue and loss of consciousness    Associated symptoms: no abdominal pain, no congestion, no cough, no diarrhea, no ear pain, no fever, no headaches, no nausea (Brief), no sore throat, no vomiting and no wheezing        Prior to Admission Medications   Prescriptions Last Dose Informant Patient Reported? Taking? amLODIPine (NORVASC) 5 mg tablet  Self No No   Sig: Take 1 tablet (5 mg total) by mouth daily   multivitamin (THERAGRAN) TABS  Self Yes No   Sig: Take 1 tablet by mouth daily   tadalafil (CIALIS) 5 MG tablet  Self No No   Sig: Take 1 tablet (5 mg total) by mouth daily      Facility-Administered Medications: None       Past Medical History:   Diagnosis Date    Cancer of base of tongue (HCC)     excision    Cough     Disease of thyroid gland     hypo    Dysphagia     ED (erectile dysfunction)     Hypertension     Leukopenia     Peyronie's disease     Psoriasis     Tongue cancer ([facility] [address] )     Weight loss, abnormal        Past Surgical History:   Procedure Laterality Date    OTHER SURGICAL HISTORY      infusion port inserted and removed    PEG TUBE PLACEMENT      and removal    PA COLONOSCOPY FLX DX W/COLLJ SPEC WHEN PFRMD N/A 6/27/2017    Procedure: COLONOSCOPY with polypectomy x2;  Surgeon: Tabitha Elder MD;  Location: [facility];   Service: General    TONGUE BIOPSY / EXCISION      Floor mouth excision of malignant tumor       Family History   Problem Relation Age of Onset    Other Mother         reactive airway dysfunction    Coronary artery disease Father     Hypertension Father     Psoriasis Father      I have reviewed and agree with the history as documented  E-Cigarette/Vaping    E-Cigarette Use Never User      E-Cigarette/Vaping Substances    Nicotine No     THC No     CBD No     Flavoring No     Other No     Unknown No      Social History     Tobacco Use    Smoking status: Former Smoker     Packs/day: 0 25     Years: 20 00     Pack years: 5 00     Quit date: 2011     Years since quitting: 10 3    Smokeless tobacco: Former User    Tobacco comment: pt quit with dx of tongue base cancer, per allscripts   Substance Use Topics    Alcohol use: Yes     Alcohol/week: 12 0 standard drinks     Types: 12 Cans of beer per week     Comment: socially    Drug use: No       Review of Systems   Constitutional: Positive for fatigue  Negative for appetite change and fever  HENT: Positive for trouble swallowing (Chronic)  Negative for congestion, ear pain and sore throat  Respiratory: Negative for cough and wheezing  Gastrointestinal: Negative for abdominal pain, diarrhea, nausea (Brief) and vomiting  Musculoskeletal: Negative for back pain  Neurological: Positive for loss of consciousness and syncope  Negative for dizziness, seizures, speech difficulty, weakness, light-headedness, numbness and headaches  All other systems reviewed and are negative  Physical Exam  Physical Exam  Vitals signs reviewed  Constitutional:       General: He is not in acute distress  Appearance: Normal appearance  He is well-developed and normal weight  He is not ill-appearing, toxic-appearing or diaphoretic  Comments: Skin of the face is red for secondary to sun exposure   HENT:      Head: Normocephalic and atraumatic  Jaw: There is normal jaw occlusion  Comments: Swallows well without difficulty handling secretions     Nose: Nose normal       Mouth/Throat:      Mouth: Mucous membranes are dry  Pharynx: No posterior oropharyngeal erythema     Eyes:      Conjunctiva/sclera: Conjunctivae normal       Pupils: Pupils are equal, round, and reactive to light  Neck:      Musculoskeletal: Normal range of motion and neck supple  Cardiovascular:      Rate and Rhythm: Normal rate and regular rhythm  Occasional extrasystoles are present  Heart sounds: Normal heart sounds  No murmur  Pulmonary:      Effort: Pulmonary effort is normal  No accessory muscle usage or respiratory distress  Breath sounds: Normal breath sounds  Chest:      Chest wall: No tenderness  Abdominal:      General: Bowel sounds are normal  There is no distension  Palpations: Abdomen is soft  Tenderness: There is no abdominal tenderness  There is no guarding or rebound  Musculoskeletal: Normal range of motion  General: No tenderness or deformity  Lymphadenopathy:      Cervical: No cervical adenopathy  Skin:     General: Skin is warm and dry  Capillary Refill: Capillary refill takes less than 2 seconds  Findings: No rash  Comments: Radiation changes to the neck present   Neurological:      General: No focal deficit present  Mental Status: He is alert and oriented to person, place, and time  Mental status is at baseline  Cranial Nerves: No cranial nerve deficit  Sensory: No sensory deficit  Motor: No weakness  Coordination: Coordination normal       Deep Tendon Reflexes: Reflexes are normal and symmetric  Psychiatric:         Mood and Affect: Mood normal          Behavior: Behavior normal          Thought Content:  Thought content normal          Judgment: Judgment normal          Vital Signs  ED Triage Vitals [05/04/21 2241]   Temperature Pulse Respirations Blood Pressure SpO2   99 5 °F (37 5 °C) 76 18 (!) 211/127 100 %      Temp Source Heart Rate Source Patient Position - Orthostatic VS BP Location FiO2 (%)   Oral Monitor Sitting Left arm --      Pain Score       --           Vitals:    05/05/21 0047 05/05/21 0100 05/05/21 0145 05/05/21 0200   BP: 153/84 134/83 151/86 139/74   Pulse: 64 60 60 60   Patient Position - Orthostatic VS: Lying Lying Lying Lying         Visual Acuity  Visual Acuity      Most Recent Value   L Pupil Size (mm)  3   R Pupil Size (mm)  3          ED Medications  Medications   sodium chloride 0 9 % bolus 1,000 mL (0 mL Intravenous Stopped 5/5/21 0242)   Labetalol HCl (NORMODYNE) injection 10 mg (10 mg Intravenous Given 5/5/21 0044)       Diagnostic Studies  Results Reviewed     Procedure Component Value Units Date/Time    TSH [[id number]]  (Abnormal) Collected: 05/04/21 2257    Lab Status: Final result Specimen: Blood from Arm, Right Updated: 05/05/21 0055     TSH 3RD GENERATON 7 249 uIU/mL     Narrative:      Patients undergoing fluorescein dye angiography may retain small amounts of fluorescein in the body for 48-72 hours post procedure  Samples containing fluorescein can produce falsely depressed TSH values  If the patient had this procedure,a specimen should be resubmitted post fluorescein clearance        Comprehensive metabolic panel [[id number]]  (Abnormal) Collected: 05/04/21 2257    Lab Status: Final result Specimen: Blood from Arm, Right Updated: 05/04/21 2340     Sodium 138 mmol/L      Potassium 3 7 mmol/L      Chloride 100 mmol/L      CO2 31 mmol/L      ANION GAP 7 mmol/L      BUN 5 mg/dL      Creatinine 0 59 mg/dL      Glucose 81 mg/dL      Calcium 8 5 mg/dL      AST 36 U/L      ALT 49 U/L      Alkaline Phosphatase 85 U/L      Total Protein 7 6 g/dL      Albumin 3 7 g/dL      Total Bilirubin 0 47 mg/dL      eGFR 112 ml/min/1 73sq m     Narrative:      Elisha guidelines for Chronic Kidney Disease (CKD):     Stage 1 with normal or high GFR (GFR > 90 mL/min/1 73 square meters)    Stage 2 Mild CKD (GFR = 60-89 mL/min/1 73 square meters)    Stage 3A Moderate CKD (GFR = 45-59 mL/min/1 73 square meters)    Stage 3B Moderate CKD (GFR = 30-44 mL/min/1 73 square meters)    Stage 4 Severe CKD (GFR = 15-29 mL/min/1 73 square meters)    Stage 5 End Stage CKD (GFR <15 mL/min/1 73 square meters)  Note: GFR calculation is accurate only with a steady state creatinine    Troponin I [[id number]]  (Normal) Collected: 05/04/21 2257    Lab Status: Final result Specimen: Blood from Arm, Right Updated: 05/04/21 2325     Troponin I <0 02 ng/mL     CBC and differential [[id number]]  (Abnormal) Collected: 05/04/21 2257    Lab Status: Final result Specimen: Blood from Arm, Right Updated: 05/04/21 2308     WBC 4 04 Thousand/uL      RBC 4 64 Million/uL      Hemoglobin 14 5 g/dL      Hematocrit 43 9 %      MCV 95 fL      MCH 31 3 pg      MCHC 33 0 g/dL      RDW 12 7 %      MPV 9 0 fL      Platelets 364 Thousands/uL      nRBC 0 /100 WBCs      Neutrophils Relative 60 %      Immat GRANS % 0 %      Lymphocytes Relative 22 %      Monocytes Relative 14 %      Eosinophils Relative 3 %      Basophils Relative 1 %      Neutrophils Absolute 2 41 Thousands/µL      Immature Grans Absolute 0 01 Thousand/uL      Lymphocytes Absolute 0 89 Thousands/µL      Monocytes Absolute 0 58 Thousand/µL      Eosinophils Absolute 0 13 Thousand/µL      Basophils Absolute 0 02 Thousands/µL                  CT head without contrast   Final Result by Walter Billings MD (05/05 0139)      No acute intracranial hemorrhage, midline shift, or mass effect                    Workstation performed: [id number]                    Procedures  ECG 12 Lead Documentation Only    Date/Time: 5/5/2021 2:15 AM  Performed by: Mango Cullen DO  Authorized by: Mango Cullen DO     Indications / Diagnosis:  Question of syncope verses seizure  ECG reviewed by me, the ED Provider: yes    Patient location:  ED  Previous ECG:     Previous ECG:  Compared to current    Comparison ECG info:  April 2019    Similarity:  No change  Interpretation:     Interpretation: abnormal    Rate:     ECG rate:  79    ECG rate assessment: normal    Rhythm:     Rhythm: sinus rhythm    Ectopy:     Ectopy: PVCs      PVCs:  Frequent  QRS:     QRS axis:  Normal  Q waves:     Q waves:  V1 and V2  Other findings:     Other findings: LAE               ED Course  ED Course as of May 07 1853   Wed May 05, 2021   0157 Patient fell asleep and his wife noted him to be snoring  He did drop his oxygen concentration into the 80's but quickly recovers with position change/awakening  SBIRT 20yo+      Most Recent Value   SBIRT (24 yo +)   In order to provide better care to our patients, we are screening all of our patients for alcohol and drug use  Would it be okay to ask you these screening questions? No Filed at: 05/05/2021 0130                    MDM  Number of Diagnoses or Management Options  Hypertension: new and requires workup  Hypothyroidism: new and requires workup  Sleep apnea in adult: new and requires workup  Unspecified convulsions ([facility] [address] ): new and requires workup     Amount and/or Complexity of Data Reviewed  Clinical lab tests: ordered and reviewed  Tests in the radiology section of CPT®: ordered and reviewed  Tests in the medicine section of CPT®: reviewed and ordered  Decide to obtain previous medical records or to obtain history from someone other than the patient: yes  Obtain history from someone other than the patient: yes  Independent visualization of images, tracings, or specimens: yes    Risk of Complications, Morbidity, and/or Mortality  General comments: 57-year-old male presents after having a an episode of possible syncope  Patient's workup is reassuring, head CT is within normal limits  I did witness the patient become briefly hypoxic while he fell asleep and I do suspect that he has of some sleep apnea component that may have contributed to the episode that occurred today on the plane  It is likely that he has narrowing of his trachea secondary to previous neck radiation which may make him more prone to obstruction with neck movement  I highly encouraged him to have the sleep study that had been previously recommended    Patient's wife also agrees  This may also be contributing to his significantly elevated blood pressure  I do not suspect the patient had a seizure as he did not have a postictal period and return to normal mental status after being awoken  I suspect he may have had a hypoxic event while traveling  Patient informed that his TSH is slightly elevated and will need follow-up  We discussed signs and symptoms return to the emergency department  Patient's wife will ensure that he has close follow-up with his PCP    Patient Progress  Patient progress: improved      Disposition  Final diagnoses:   Sleep apnea in adult   [facility] )   Hypothyroidism   Hypertension     Time reflects when diagnosis was documented in both MDM as applicable and the Disposition within this note     Time User Action Codes Description Comment    5/5/2021  1:59 AM Milly Georgetown Add [G47 30] Sleep apnea in adult     5/5/2021  1:59 AM Carmela Farooq Add [R56 9] Unspecified convulsions ([facility] )     5/5/2021  1:59 AM Carmela Farooq Add [E03 9] Hypothyroidism     5/5/2021  2:00 AM Carmela Farooq Add [I10] Hypertension       ED Disposition     ED Disposition Condition Date/Time Comment    Discharge Stable Wed May 5, 2021  1:58 AM Darby Deandre Sr  discharge to home/self care  Follow-up Information     Follow up With Specialties Details Why Arlyn Valentine MD [facility] Schedule an appointment as soon as possible for a visit in 1 day for recheck of blood pressure, For recheck of current symptoms [id number]    [address]            Discharge Medication List as of 5/5/2021  2:02 AM      CONTINUE these medications which have NOT CHANGED    Details   amLODIPine (NORVASC) 5 mg tablet Take 1 tablet (5 mg total) by mouth daily, Starting Tue 3/2/2021, Normal      multivitamin (THERAGRAN) TABS Take 1 tablet by mouth daily, Historical Med      tadalafil (CIALIS) 5 MG tablet Take 1 tablet (5 mg total) by mouth daily, Starting Tue 4/27/2021, Normal      benzonatate (TESSALON) 200 MG capsule Take 1 capsule (200 mg total) by mouth 3 (three) times a day as needed for cough, Starting Wed 9/2/2020, Normal      fluticasone (FLONASE) 50 mcg/act nasal spray 2 sprays into each nostril daily 2 sprays bilat qd, Starting Fri 7/12/2019, Normal      levothyroxine 88 mcg tablet TAKE 1 TABLET BY MOUTH EVERY DAY, Normal      loratadine (CLARITIN) 10 mg tablet TAKE 1 TABLET BY MOUTH EVERY DAY, Normal      pentoxifylline (TRENtal) 400 mg ER tablet Take 1 tablet (400 mg total) by mouth 3 (three) times a day with meals, Starting Mon 7/9/2018, Normal      Promethazine-DM (PHENERGAN-DM) 6 25-15 mg/5 mL oral syrup Take 5 mL by mouth 4 (four) times a day as needed for cough, Starting Tue 9/8/2020, Normal      sildenafil (REVATIO) 20 mg tablet Take 1 to 5 tablets by mouth one (1) hour prior to intercourse on an empty stomach  Limit to 3 encounters per week , Normal           No discharge procedures on file      PDMP Review     None          ED Provider  Electronically Signed by           Kamari Gallego DO  05/07/21 8351

## 2021-05-05 NOTE — TELEPHONE ENCOUNTER
Patient was seen in the ER yesterday he had a weird issue while on the plane sleeping  The ER wants the patient to get a sleep study done but stated the PCP had to give him an order  Also patients TSH was up to 7 2 and would like the medication adjusted?      [facility]  Aware to check with pharmacy

## 2021-05-06 ENCOUNTER — OFFICE VISIT (OUTPATIENT)
Dept: SPEECH THERAPY | Age: 56
End: 2021-05-06
Payer: COMMERCIAL

## 2021-05-06 DIAGNOSIS — R13.13 PHARYNGEAL DYSPHAGIA: Primary | ICD-10-CM

## 2021-05-06 DIAGNOSIS — Z85.89 HISTORY OF HEAD AND NECK CANCER: ICD-10-CM

## 2021-05-06 PROCEDURE — 92526 ORAL FUNCTION THERAPY: CPT

## 2021-05-06 NOTE — PROGRESS NOTES
Speech Treatment Note    Today's date: 2021  Patient name: Haritha Savage  : 1965  MRN: [id number]  Referring provider: Norris Garibay*  Dx:   Encounter Diagnosis     ICD-10-CM    1  Pharyngeal dysphagia  R13 13    2  History of head and neck cancer  Z85 89        Start Time: 1000  Stop Time: 1045  Total time in clinic (min): 45 minutes     Recommendations:       Patients would benefit from: Dysphagia therapy including NMES       Patient would benefit from VBSS  Frequency:2x weekly       Duration:Other 3 months     Intervention certification CWOH:7/3/46  Intervention certification [id number]  Intervention Comments: dysphagia therapy, MFR, NMES  Visit Number: 13    Subjective/Behavioral:  Patient reports recent ER visit after "episode" on flight home from vacation - he reports wife thought he stopped breathing/passed out  ER cleared him for heart/seizure activity, etc   Patient concerned regarding possible sleep apnea - patient following up with PCP tomorrow  Goals     Short Term Goals:  1  Patient will improve timing of swallow response with liquids in 90% of opportunities across 3 sessions  2  Patient will improve tongue base retraction for improve propulsion of food based on results of next VBSS  3   Patient will improve hyolaryngeal rise for improved swallow in 90+% of opportunities across 3 sessions  3   Patient will improve airway protection based on result next VBSS        Long Term Goals:  1  Patient will tolerate least restrictive diet without s/s of aspiration by discharge  2   Patient will utilize strategies with meals for swallowing without s/s of aspiration independently by discharge        Patient tolerated NMES (min threshold: 2 0  mA & sub max/therapeutic level: channel 1: 11 5 channel 2: 13  5mA) in conjunction with dysphagia therapy - see below - exercise first, then PO intake       Traditional NMES        Channel(s) used: 1,2   Placement: 2b/inverted T position   Duty Cycle: 57/1 s   Frequency: 80 pulses per second   Phase Duration: 300 microseconds   Treatment Duration:  22 5 minutes      Average mA - Ch 1: 11 2 mA , Ch 2 : 13 1 mA at therapeutic level  Noted throat clearing and report of "pulling"  Utilized coban to maintain contact throughout  Dysphagia therapy/exercises:   Continued with effortful, mendelsohn,  and kasie swallows x5 each  Patient consumed the following during todays session: Water - approximately 16 oz via cup; Scrambled egg with cheese        Patient was offered and accepted ketchup to use to increase moisture in the scrambled eggs; Noted increased conversation during PO trials today  Patient with more appropriate mastication timeliness today  Patient used piecemeal transfer with multiple swallows per bolus  Patient used water for liquid wash after each bite of solid consistency  Patient with throat clear x0 during the meal; x1 post the meal     Attempted sEMG with limited response through machine - will continue next session  Assessment: Overall improvement with todays PO trials  Continue with dysphagia therapy, NMES; increase MFR/warm towel  Other: Patient was educated throughout the session about POC and intervention strategies      Recommendations:Continue with Plan of Care

## 2021-05-07 ENCOUNTER — OFFICE VISIT (OUTPATIENT)
Dept: FAMILY MEDICINE CLINIC | Facility: CLINIC | Age: 56
End: 2021-05-07
Payer: COMMERCIAL

## 2021-05-07 ENCOUNTER — TELEPHONE (OUTPATIENT)
Dept: FAMILY MEDICINE CLINIC | Facility: CLINIC | Age: 56
End: 2021-05-07

## 2021-05-07 ENCOUNTER — APPOINTMENT (OUTPATIENT)
Dept: SPEECH THERAPY | Age: 56
End: 2021-05-07
Payer: COMMERCIAL

## 2021-05-07 VITALS
SYSTOLIC BLOOD PRESSURE: 130 MMHG | HEIGHT: 69 IN | DIASTOLIC BLOOD PRESSURE: 80 MMHG | BODY MASS INDEX: 23.4 KG/M2 | TEMPERATURE: 97.9 F | WEIGHT: 158 LBS | HEART RATE: 64 BPM

## 2021-05-07 DIAGNOSIS — C01 CANCER OF BASE OF TONGUE (HCC): ICD-10-CM

## 2021-05-07 DIAGNOSIS — E03.9 HYPOTHYROIDISM, UNSPECIFIED TYPE: ICD-10-CM

## 2021-05-07 DIAGNOSIS — R06.81 WITNESSED EPISODE OF APNEA: Primary | ICD-10-CM

## 2021-05-07 DIAGNOSIS — I10 ESSENTIAL HYPERTENSION: ICD-10-CM

## 2021-05-07 DIAGNOSIS — R13.10 SWALLOWING DYSFUNCTION: ICD-10-CM

## 2021-05-07 PROCEDURE — 3079F DIAST BP 80-89 MM HG: CPT | Performed by: FAMILY MEDICINE

## 2021-05-07 PROCEDURE — 3075F SYST BP GE 130 - 139MM HG: CPT | Performed by: FAMILY MEDICINE

## 2021-05-07 PROCEDURE — 3008F BODY MASS INDEX DOCD: CPT | Performed by: FAMILY MEDICINE

## 2021-05-07 PROCEDURE — 1036F TOBACCO NON-USER: CPT | Performed by: FAMILY MEDICINE

## 2021-05-07 PROCEDURE — 99214 OFFICE O/P EST MOD 30 MIN: CPT | Performed by: FAMILY MEDICINE

## 2021-05-07 RX ORDER — LEVOTHYROXINE SODIUM 0.1 MG/1
100 TABLET ORAL DAILY
Qty: 30 TABLET | Refills: 4 | Status: SHIPPED | OUTPATIENT
Start: 2021-05-07 | End: 2021-08-02

## 2021-05-07 NOTE — TELEPHONE ENCOUNTER
Patient wife called she is requesting a call back from Dr Bettina Granda   She said you did not discuss his high TSH levels and meds     Please call Tyree Valderrama

## 2021-05-07 NOTE — PROGRESS NOTES
Assessment/Plan:    Cancer of base of tongue ([facility] [address] )  S/p treatment - no signs of recurrence  Has trouble swallowing but unclear if treatment related to sleep issues  Refer for home sleep study and to sleep specialist  Alejandro montana    Hypothyroidism  TSH elevated  Increase L-thyroxine to 100mcg qd  Recheck TSh in 2m    Essential hypertension  Well controlled  Cont present treatment  Monitor labs  Recheck 6m      Swallowing dysfunction  F/u with speech therapy  Witnessed episode of apnea  Has trouble swallowing s/p cancer treatment but unclear if treatment related to sleep issues  Refer for home sleep study and to sleep specialist  Alejandro Curtis 3m       Diagnoses and all orders for this visit:    Witnessed episode of apnea  -     Home Study; Future  -     Ambulatory referral to Sleep Medicine; Future    Cancer of base of tongue ([facility] )  -     Ambulatory referral to Sleep Medicine; Future    Swallowing dysfunction  -     Ambulatory referral to Sleep Medicine; Future    Essential hypertension    Hypothyroidism, unspecified type          Subjective:      Patient ID: Margarito Mccabe  is a 60 y o  male  Pt was returning on a flight from [place] when he fell asleep right after take off (pt was in the upright position)  Pt then had an oberved episode of "jerking" of his head up and down briefly, then he went limp  No arm/leg tremor  Pt came awake after 20-30sec without any confusion or other symptoms  No loss of bowel or bladder  Has hx of swallowing dysfunction secondary to oropharyngeal CA treatment  Wife has noted occ episode of apnea when he sleeps  - while in the ED, TSH found to be elevated  Dose of med not changed        The following portions of the patient's history were reviewed and updated as appropriate:   He  has a past medical history of Cancer of base of tongue ([facility] [address] ), Cough, Disease of thyroid gland, Dysphagia, ED (erectile dysfunction), Hypertension, Leukopenia, Peyronie's disease, Psoriasis, Tongue cancer ([facility] [address] ), and Weight loss, abnormal   He   Patient Active Problem List    Diagnosis Date Noted    Witnessed episode of apnea 05/10/2021    Allergic rhinitis 09/03/2020    Swallowing dysfunction 04/26/2019    Hyperbilirubinemia 04/21/2019    ED (erectile dysfunction) of organic origin 04/30/2018    Peyronie's disease 04/30/2018    Arthralgia 09/25/2017    Cancer of base of tongue ([facility] [address] ) 05/24/2016    Hypothyroidism 11/11/2013    Essential hypertension 09/19/2012    Psoriasis 09/19/2012     He  has a past surgical history that includes Tongue biopsy / excision; Other surgical history; PEG tube placement; and pr colonoscopy flx dx w/collj spec when pfrmd (N/A, 6/27/2017)  He  reports that he quit smoking about 10 years ago  He has a 5 00 pack-year smoking history  He has quit using smokeless tobacco  He reports current alcohol use of about 12 0 standard drinks of alcohol per week  He reports that he does not use drugs  Current Outpatient Medications   Medication Sig Dispense Refill    amLODIPine (NORVASC) 5 mg tablet Take 1 tablet (5 mg total) by mouth daily 30 tablet 5    multivitamin (THERAGRAN) TABS Take 1 tablet by mouth daily      tadalafil (CIALIS) 5 MG tablet Take 1 tablet (5 mg total) by mouth daily 90 tablet 3    levothyroxine 100 mcg tablet Take 1 tablet (100 mcg total) by mouth daily 30 tablet 4     No current facility-administered medications for this visit  He has No Known Allergies       Review of Systems   Constitutional: Positive for fatigue (intermittent)  Negative for activity change and appetite change  HENT: Positive for trouble swallowing (chronic)  Eyes: Negative  Respiratory: Negative  Cardiovascular: Negative  Gastrointestinal: Negative  Endocrine: Negative  Genitourinary: Negative  Musculoskeletal: Negative for arthralgias and back pain  Skin: Negative  Allergic/Immunologic: Negative  Neurological: Negative    Negative for dizziness, seizures, speech difficulty, light-headedness and headaches  Hematological: Negative  Psychiatric/Behavioral: Negative  Objective:      /80   Pulse 64   Temp 97 9 °F (36 6 °C)   Ht 5' 9" (1 753 m)   Wt 71 7 kg (158 lb)   BMI 23 33 kg/m²          Physical Exam  Vitals signs reviewed  HENT:      Head: Normocephalic and atraumatic  Right Ear: Tympanic membrane, ear canal and external ear normal       Left Ear: Tympanic membrane, ear canal and external ear normal       Nose: Nose normal  No congestion  Mouth/Throat:      Comments: No masses  Eyes:      Extraocular Movements: Extraocular movements intact  Conjunctiva/sclera: Conjunctivae normal       Pupils: Pupils are equal, round, and reactive to light  Neck:      Comments: L neck with RT related changes  No masses appreciated  Cardiovascular:      Rate and Rhythm: Normal rate and regular rhythm  Pulses: Normal pulses  Pulmonary:      Effort: Pulmonary effort is normal  No respiratory distress  Breath sounds: No stridor  No wheezing or rales  Musculoskeletal: Normal range of motion  General: No swelling or tenderness  Right lower leg: No edema  Left lower leg: No edema  Lymphadenopathy:      Cervical: No cervical adenopathy  Skin:     General: Skin is warm  Capillary Refill: Capillary refill takes less than 2 seconds  Neurological:      General: No focal deficit present  Mental Status: He is alert  Cranial Nerves: No cranial nerve deficit  Sensory: No sensory deficit  Motor: No weakness

## 2021-05-10 PROBLEM — R06.81 WITNESSED EPISODE OF APNEA: Status: ACTIVE | Noted: 2021-05-10

## 2021-05-10 NOTE — ASSESSMENT & PLAN NOTE
Has trouble swallowing s/p cancer treatment but unclear if treatment related to sleep issues   Refer for home sleep study and to sleep specialist  Gavin montana

## 2021-05-10 NOTE — ASSESSMENT & PLAN NOTE
S/p treatment - no signs of recurrence  Has trouble swallowing but unclear if treatment related to sleep issues   Refer for home sleep study and to sleep specialist  Felipa montana

## 2021-05-11 ENCOUNTER — APPOINTMENT (OUTPATIENT)
Dept: SPEECH THERAPY | Age: 56
End: 2021-05-11
Payer: COMMERCIAL

## 2021-05-14 ENCOUNTER — OFFICE VISIT (OUTPATIENT)
Dept: SPEECH THERAPY | Age: 56
End: 2021-05-14
Payer: COMMERCIAL

## 2021-05-14 DIAGNOSIS — R13.13 PHARYNGEAL DYSPHAGIA: Primary | ICD-10-CM

## 2021-05-14 DIAGNOSIS — Z85.89 HISTORY OF HEAD AND NECK CANCER: ICD-10-CM

## 2021-05-14 PROCEDURE — 92526 ORAL FUNCTION THERAPY: CPT

## 2021-05-14 NOTE — PROGRESS NOTES
Speech Treatment Note    Today's date: 2021  Patient name: Izabela Sierra  : 1965  MRN: [id number]  Referring provider: Bushra Kwan*  Dx:   Encounter Diagnosis     ICD-10-CM    1  Pharyngeal dysphagia  R13 13    2  History of head and neck cancer  Z85 89        Start Time: 928  Stop Time: 1000  Total time in clinic (min): 32 minutes     Recommendations:       Patients would benefit from: Dysphagia therapy including NMES       Patient would benefit from VBSS  Frequency:2x weekly       Duration:Other 3 months     Intervention certification [id number]  Intervention certification JR:  Intervention Comments: dysphagia therapy, MFR, NMES  Visit Number: 13    Subjective/Behavioral:  Patient reports Continued concern regarding breathing status; noting several instances during meals where he felt he couldn't catch his breath/could not breath  Discussed current plan set - Sleep study for end , Sleep Specialist end   Discussed also possible return to his ENT for consult  Patient agreeable  Goals     Short Term Goals:  1  Patient will improve timing of swallow response with liquids in 90% of opportunities across 3 sessions  2  Patient will improve tongue base retraction for improve propulsion of food based on results of next VBSS  3   Patient will improve hyolaryngeal rise for improved swallow in 90+% of opportunities across 3 sessions  3   Patient will improve airway protection based on result next VBSS        Long Term Goals:  1  Patient will tolerate least restrictive diet without s/s of aspiration by discharge  2   Patient will utilize strategies with meals for swallowing without s/s of aspiration independently by discharge        Patient tolerated NMES (min threshold: 2 0  mA & sub max/therapeutic level: channel 1: 11 0 channel 2: 11  0mA) in conjunction with dysphagia therapy - see below - exercise first, then PO intake       Traditional NMES        Channel(s) used: 1,2   Placement: 2b/inverted T position   Duty Cycle: 57/1 s   Frequency: 80 pulses per second   Phase Duration: 300 microseconds   Treatment Duration:  29 minutes      Average mA - Ch 1: 10 7 mA , Ch 2 : 10 7 mA at therapeutic level  Noted throat clearing and report of "pulling"  Utilized coban to maintain contact throughout  Reviewed carryover completion of dysphagia therapy/exercises:   Effortful, mendelsohn,  and kasie swallows  Unable to complete today secondary delayed start of session  Also discussed carryover of slow rate at meals for increasing clearance, monitoring volume, avoiding instances of breathing difficulties - patient to monitor for any patterns  Patient consumed the following during todays session: Water - approximately 16 oz via cup; fruit cup  Patient presented with significant cough x2 during PO trials  Patient able to adjust rate of intake to minimize any difficulties with breathing; used multiple swallows  Assessment: Overall improvement with todays PO trials  Continue with dysphagia therapy, NMES; increase MFR/warm towel  Other: Patient was educated throughout the session about POC and intervention strategies      Recommendations:Continue with Plan of Care

## 2021-05-17 ENCOUNTER — OFFICE VISIT (OUTPATIENT)
Dept: SPEECH THERAPY | Age: 56
End: 2021-05-17
Payer: COMMERCIAL

## 2021-05-17 DIAGNOSIS — Z85.89 HISTORY OF HEAD AND NECK CANCER: ICD-10-CM

## 2021-05-17 DIAGNOSIS — R13.13 PHARYNGEAL DYSPHAGIA: Primary | ICD-10-CM

## 2021-05-17 PROCEDURE — 92526 ORAL FUNCTION THERAPY: CPT

## 2021-05-17 NOTE — PROGRESS NOTES
Speech Treatment Note    Today's date: 2021  Patient name: Sarika Peace  : 1965  MRN: [id number]  Referring provider: Bernarda Tarango*  Dx:   Encounter Diagnosis     ICD-10-CM    1  Pharyngeal dysphagia  R13 13    2  History of head and neck cancer  Z85 89        Start Time: 1535  Stop Time: 1630  Total time in clinic (min): 55 minutes     Recommendations:       Patients would benefit from: Dysphagia therapy including NMES       Patient would benefit from VBSS  Frequency:2x weekly       Duration:Other 3 months     Intervention certification HPFD:  Intervention certification [id number]  Intervention Comments: dysphagia therapy, MFR, NMES  Visit Number: 14    Subjective/Behavioral:  Patient reports no major changes since previous session  Goals     Short Term Goals:  1  Patient will improve timing of swallow response with liquids in 90% of opportunities across 3 sessions  2  Patient will improve tongue base retraction for improve propulsion of food based on results of next VBSS  3   Patient will improve hyolaryngeal rise for improved swallow in 90+% of opportunities across 3 sessions  3   Patient will improve airway protection based on result next VBSS        Long Term Goals:  1  Patient will tolerate least restrictive diet without s/s of aspiration by discharge  2   Patient will utilize strategies with meals for swallowing without s/s of aspiration independently by discharge        Patient tolerated NMES (min threshold: 2 0  mA & sub max/therapeutic level: channel 1: 11 0 channel 2: 15  0mA) in conjunction with dysphagia therapy - see below - exercise first, then PO intake  Traditional NMES        Channel(s) used: 1,2   Placement: 2b/inverted T position   Duty Cycle: 57/1 s   Frequency: 80 pulses per second   Phase Duration: 300 microseconds   Treatment Duration:  37 minutes      Average mA - Ch 1: 10 6 mA , Ch 2 : 14 0 mA at therapeutic level   Noted throat clearing and report of "pulling"  Reviewed carryover completion of dysphagia therapy/exercises:   Effortful, mendelsohn,  and kasie swallows  Utilized sEMG for improving visual feedback  Patient responded well and able to produce increased muscle activity to targets given visual feedback  Patient consumed the following during todays session: Water - approximately 16 oz via cup; pasta salad -   Patient presented with significant cough x0 during PO trials; delay cough x1  Patient able to adjust rate of intake to minimize any difficulties with breathing; used multiple swallows  Assessment: Overall improvement with todays PO trials  Continue with dysphagia therapy, NMES; increase MFR/warm towel  Other: Patient was educated throughout the session about POC and intervention strategies  Consider sEMG and sEMG/NMES combo only      Recommendations:Continue with Plan of Care

## 2021-05-24 ENCOUNTER — APPOINTMENT (OUTPATIENT)
Dept: SPEECH THERAPY | Age: 56
End: 2021-05-24
Payer: COMMERCIAL

## 2021-06-10 ENCOUNTER — TELEPHONE (OUTPATIENT)
Dept: SLEEP CENTER | Facility: CLINIC | Age: 56
End: 2021-06-10

## 2021-06-10 NOTE — TELEPHONE ENCOUNTER
----- Message from Misbah Banks MD sent at 6/10/2021  2:30 PM EDT -----  Approved  ----- Message -----  From: Maru Bernard MA  Sent: 5/11/2021   8:29 AM EDT  To: Sleep Medicine [place] Provider    This sleep study needs approval      If approved please sign and return to clerical pool  If denied please include reasons why  Also provide alternative testing if warranted  Please sign and return to clerical pool

## 2021-06-28 ENCOUNTER — HOSPITAL ENCOUNTER (OUTPATIENT)
Dept: SLEEP CENTER | Facility: CLINIC | Age: 56
Discharge: HOME | End: 2021-06-28
Payer: COMMERCIAL

## 2021-06-28 DIAGNOSIS — R06.81 WITNESSED EPISODE OF APNEA: ICD-10-CM

## 2021-06-28 PROCEDURE — G0399 HOME SLEEP TEST/TYPE 3 PORTA: HCPCS

## 2021-06-30 PROCEDURE — 95806 SLEEP STUDY UNATT&RESP EFFT: CPT | Performed by: INTERNAL MEDICINE

## 2021-07-02 ENCOUNTER — TELEPHONE (OUTPATIENT)
Dept: SLEEP CENTER | Facility: CLINIC | Age: 56
End: 2021-07-02

## 2021-07-02 NOTE — TELEPHONE ENCOUNTER
Called patient regarding results of sleep study  Patient states results were reviewed with him by his PCP office  Patient already has consult scheduled with Dr Alayna Bentley on 9/16/21 to review results further and discuss treatment options

## 2021-07-02 NOTE — TELEPHONE ENCOUNTER
Dr Aarti Herman and staff,  I see you placed an order for a CPAP titration study for this patient  At this time PAP studies are on hold since our testing equipment has been affected by the Amy recall  We are being told by management that we do not expect to have new equipment for at least 30-60 days  Would you like to order APAP instead? Patient has consult scheduled with Dr Darlene Phillip on 9/16/21

## 2021-07-13 NOTE — TELEPHONE ENCOUNTER
Spoke to sleep center and because everything going on with the CPAP, they schedules are full and the appt he has is the only one

## 2021-07-21 ENCOUNTER — OFFICE VISIT (OUTPATIENT)
Dept: FAMILY MEDICINE CLINIC | Facility: CLINIC | Age: 56
End: 2021-07-21
Payer: COMMERCIAL

## 2021-07-21 ENCOUNTER — TELEPHONE (OUTPATIENT)
Dept: NEUROLOGY | Facility: CLINIC | Age: 56
End: 2021-07-21

## 2021-07-21 VITALS
SYSTOLIC BLOOD PRESSURE: 130 MMHG | BODY MASS INDEX: 23.7 KG/M2 | TEMPERATURE: 98.2 F | HEART RATE: 62 BPM | DIASTOLIC BLOOD PRESSURE: 74 MMHG | WEIGHT: 160 LBS | HEIGHT: 69 IN

## 2021-07-21 DIAGNOSIS — R20.2 PARESTHESIAS: Primary | ICD-10-CM

## 2021-07-21 DIAGNOSIS — R29.898 SHOULDER WEAKNESS: ICD-10-CM

## 2021-07-21 DIAGNOSIS — M95.8 WINGED SCAPULA OF BOTH SIDES: ICD-10-CM

## 2021-07-21 PROCEDURE — 3008F BODY MASS INDEX DOCD: CPT | Performed by: FAMILY MEDICINE

## 2021-07-21 PROCEDURE — 99214 OFFICE O/P EST MOD 30 MIN: CPT | Performed by: FAMILY MEDICINE

## 2021-07-21 PROCEDURE — 3075F SYST BP GE 130 - 139MM HG: CPT | Performed by: FAMILY MEDICINE

## 2021-07-21 PROCEDURE — 3078F DIAST BP <80 MM HG: CPT | Performed by: FAMILY MEDICINE

## 2021-07-21 PROCEDURE — 1036F TOBACCO NON-USER: CPT | Performed by: FAMILY MEDICINE

## 2021-07-21 NOTE — PROGRESS NOTES
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 for post radiation brachial plexus injury the patient has some cervical signs as well as some mild carpal tunnel syndrome size at the wrist   Given complexity of presentation and history, will recommend 2nd opinion with Neurology as well as any EMG  Will contact Neurology to discuss case  Continue conservative care for now  Recheck 2 months    Winged scapula of both sides   Unclear cause of right-sided symptoms  Left side would be consistent with the suspected brachial plexus injury  Refer to Neurology  Await EMG results  Recheck 2 months       Diagnoses and all orders for this visit:    Paresthesias  -     EMG 1 Limb; Future  -     Ambulatory referral to Neurology; Future    Shoulder weakness  -     EMG 1 Limb; Future  -     Ambulatory referral to Neurology; Future    Winged scapula of both sides  -     EMG 1 Limb; Future  -     Ambulatory referral to Neurology; Future          Subjective:      Patient ID: Wilder Aase  is a 60 y o  male  61 yo male with hx of tongue base CA, s/p chemo and RT present with worsening numbness in L hand over the last 2-3 years  Symptoms primarily in first 2 fingers, that then involved the 3rd  Now has numbness that is radiating up his forearm  Pt notes improvement when he takes a hot shower  Pt has decreased ROM of his L shoulder and worsening L shoulder / arm weakness since his treatment   He denies HA, increased neck pain, CP, SOB, or other symptoms associated with present symptoms      The following portions of the patient's history were reviewed and updated as appropriate:   He  has a past medical history of Cancer of base of tongue ([facility] [address] ), Cough, Disease of thyroid gland, Dysphagia, ED (erectile dysfunction), Hypertension, Leukopenia, Peyronie's disease, Psoriasis, Tongue cancer ([facility] [address] ), and Weight loss, abnormal   He   Patient Active Problem List    Diagnosis Date Noted    Paresthesias 07/22/2021    Shoulder weakness 07/22/2021    Winged scapula of both sides 07/22/2021    ERIK (obstructive sleep apnea)     Witnessed episode of apnea 05/10/2021    Allergic rhinitis 09/03/2020    Swallowing dysfunction 04/26/2019    Hyperbilirubinemia 04/21/2019    ED (erectile dysfunction) of organic origin 04/30/2018    Peyronie's disease 04/30/2018    Arthralgia 09/25/2017    Cancer of base of tongue ([facility] [address] ) 05/24/2016    Hypothyroidism 11/11/2013    Essential hypertension 09/19/2012    Psoriasis 09/19/2012     He  has a past surgical history that includes Tongue biopsy / excision; Other surgical history; PEG tube placement; and pr colonoscopy flx dx w/collj spec when pfrmd (N/A, 6/27/2017)  He  reports that he quit smoking about 10 years ago  He has a 5 00 pack-year smoking history  He has quit using smokeless tobacco  He reports current alcohol use of about 12 0 standard drinks of alcohol per week  He reports that he does not use drugs  Current Outpatient Medications   Medication Sig Dispense Refill    amLODIPine (NORVASC) 5 mg tablet Take 1 tablet (5 mg total) by mouth daily 30 tablet 5    levothyroxine 100 mcg tablet Take 1 tablet (100 mcg total) by mouth daily 30 tablet 4    multivitamin (THERAGRAN) TABS Take 1 tablet by mouth daily      tadalafil (CIALIS) 5 MG tablet Take 1 tablet (5 mg total) by mouth daily 90 tablet 3     No current facility-administered medications for this visit  He has No Known Allergies       Review of Systems Constitutional: Negative  HENT: Positive for trouble swallowing  Chronic dry mouth   Eyes: Negative  Respiratory: Negative  Cardiovascular: Negative  Gastrointestinal: Negative  Musculoskeletal: Positive for arthralgias, myalgias and neck stiffness  Negative for back pain, gait problem, joint swelling and neck pain  Skin: Negative  Chronic RT changes to L neck   Neurological: Positive for weakness and numbness  Negative for dizziness, light-headedness and headaches  Objective:      /74   Pulse 62   Temp 98 2 °F (36 8 °C)   Ht 5' 9" (1 753 m)   Wt 72 6 kg (160 lb)   BMI 23 63 kg/m²          Physical Exam  Vitals reviewed  HENT:      Head: Normocephalic  Eyes:      Extraocular Movements: Extraocular movements intact  Conjunctiva/sclera: Conjunctivae normal       Pupils: Pupils are equal, round, and reactive to light  Neck:      Comments: L neck with RT skin changes and atrophy of L strap muscles and L trapezius  (+) sl decreased ROM on L rotation  ?mild L arm discomfort on posterior flexion with L rotation  Cardiovascular:      Rate and Rhythm: Normal rate and regular rhythm  Pulses: Normal pulses  Pulmonary:      Effort: Pulmonary effort is normal       Breath sounds: No wheezing or rales  Musculoskeletal:         General: Deformity (bilat winges scapula  Atrophy of L neck and L trapezius) present  No swelling or tenderness  Right lower leg: No edema  Left lower leg: No edema  Comments: Decreased AROM of L shoulder on anterior flexion and abduction  L trapezius atrophy noted  (+) bilat winged scapula   Lymphadenopathy:      Cervical: No cervical adenopathy  Skin:     General: Skin is warm  Capillary Refill: Capillary refill takes less than 2 seconds  Comments: RT changes of L neck   Neurological:      Mental Status: He is alert and oriented to person, place, and time        Sensory: Sensory deficit (mild in median nerve distribution of L hand  (+) Tinnel and phalens) present  Motor: Weakness (Mild L deltoid and L ) present        Coordination: Coordination normal       Gait: Gait normal       Deep Tendon Reflexes: Reflexes normal

## 2021-07-21 NOTE — TELEPHONE ENCOUNTER
Best contact number for patient:  [phone]  Emergency Contact name and number:  [phone]-wife-Prabha  Referring provider and telephone number:  Jason Olszewski  Primary Care Provider Name and if affiliated with [facility]:     Reason for Appointment/Dx:paresthesia    Have you seen and followed up with a pediatric Neurologist for this disease in the past?      No (If yes ok to schedule with Dr Trini Almodovar)    Neurology Location patient would like to be seen:    Order received? Yes                                                 Records Received? Yes    Have you ever seen another Neurologist?       No    Insurance Information    Insurance Name:    ID/Policy #:    Secondary Insurance:    ID/Policy#: Workman's Comp/ Accident/ School  Information      Workman's Comp/Accident/School related? No    If yes name of Insurance company:    Claim #:    Date of Injury:    Type of Injury:     Name and Telephone Number:    Notes:                   Appointment date: 11/10/21 @ 2pm, w/Dr Lexis Martinez in [place]  Verified address/insurance/phone-all current  Sent appt details/directions

## 2021-07-22 PROBLEM — R20.2 PARESTHESIAS: Status: ACTIVE | Noted: 2021-07-22

## 2021-07-22 PROBLEM — R29.898 SHOULDER WEAKNESS: Status: ACTIVE | Noted: 2021-07-22

## 2021-07-22 PROBLEM — M95.8 WINGED SCAPULA OF BOTH SIDES: Status: ACTIVE | Noted: 2021-07-22

## 2021-07-22 NOTE — ASSESSMENT & PLAN NOTE
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

## 2021-07-23 NOTE — ASSESSMENT & PLAN NOTE
Unclear cause of right-sided symptoms  Left side would be consistent with the suspected brachial plexus injury  Refer to Neurology  Await EMG results    Recheck 2 months

## 2021-07-29 ENCOUNTER — HOSPITAL ENCOUNTER (OUTPATIENT)
Dept: NEUROLOGY | Facility: CLINIC | Age: 56
Discharge: HOME | End: 2021-07-29
Payer: COMMERCIAL

## 2021-07-29 DIAGNOSIS — R20.2 PARESTHESIAS: ICD-10-CM

## 2021-07-29 DIAGNOSIS — R29.898 SHOULDER WEAKNESS: ICD-10-CM

## 2021-07-29 DIAGNOSIS — M95.8 WINGED SCAPULA OF BOTH SIDES: ICD-10-CM

## 2021-07-29 PROCEDURE — 95910 NRV CNDJ TEST 7-8 STUDIES: CPT | Performed by: PSYCHIATRY & NEUROLOGY

## 2021-07-29 PROCEDURE — 95886 MUSC TEST DONE W/N TEST COMP: CPT | Performed by: PSYCHIATRY & NEUROLOGY

## 2021-08-01 DIAGNOSIS — E03.9 HYPOTHYROIDISM, UNSPECIFIED TYPE: ICD-10-CM

## 2021-08-02 RX ORDER — LEVOTHYROXINE SODIUM 0.1 MG/1
TABLET ORAL
Qty: 90 TABLET | Refills: 1 | Status: SHIPPED | OUTPATIENT
Start: 2021-08-02 | End: 2022-01-31

## 2021-08-24 ENCOUNTER — HOSPITAL ENCOUNTER (OUTPATIENT)
Dept: RADIOLOGY | Facility: HOSPITAL | Age: 56
Discharge: HOME | End: 2021-08-24
Payer: COMMERCIAL

## 2021-08-24 DIAGNOSIS — R94.131 ABNORMAL EMG: ICD-10-CM

## 2021-08-24 DIAGNOSIS — M54.12 LEFT CERVICAL RADICULOPATHY: ICD-10-CM

## 2021-08-24 PROCEDURE — G1004 CDSM NDSC: HCPCS

## 2021-08-24 PROCEDURE — 72141 MRI NECK SPINE W/O DYE: CPT

## 2021-08-27 DIAGNOSIS — I10 ESSENTIAL HYPERTENSION: ICD-10-CM

## 2021-08-27 RX ORDER — AMLODIPINE BESYLATE 5 MG/1
TABLET ORAL
Qty: 90 TABLET | Refills: 1 | Status: SHIPPED | OUTPATIENT
Start: 2021-08-27 | End: 2022-01-31

## 2021-09-16 ENCOUNTER — OFFICE VISIT (OUTPATIENT)
Dept: SLEEP CENTER | Facility: CLINIC | Age: 56
End: 2021-09-16
Payer: COMMERCIAL

## 2021-09-16 VITALS
DIASTOLIC BLOOD PRESSURE: 90 MMHG | WEIGHT: 160 LBS | SYSTOLIC BLOOD PRESSURE: 150 MMHG | HEIGHT: 69 IN | BODY MASS INDEX: 23.7 KG/M2 | HEART RATE: 78 BPM

## 2021-09-16 DIAGNOSIS — R13.10 SWALLOWING DYSFUNCTION: ICD-10-CM

## 2021-09-16 DIAGNOSIS — R06.81 WITNESSED EPISODE OF APNEA: ICD-10-CM

## 2021-09-16 DIAGNOSIS — C01 CANCER OF BASE OF TONGUE (HCC): ICD-10-CM

## 2021-09-16 PROCEDURE — 3077F SYST BP >= 140 MM HG: CPT | Performed by: INTERNAL MEDICINE

## 2021-09-16 PROCEDURE — 99203 OFFICE O/P NEW LOW 30 MIN: CPT | Performed by: INTERNAL MEDICINE

## 2021-09-16 PROCEDURE — 3080F DIAST BP >= 90 MM HG: CPT | Performed by: INTERNAL MEDICINE

## 2021-09-16 PROCEDURE — 1036F TOBACCO NON-USER: CPT | Performed by: INTERNAL MEDICINE

## 2021-09-16 PROCEDURE — 3008F BODY MASS INDEX DOCD: CPT | Performed by: INTERNAL MEDICINE

## 2021-09-17 ENCOUNTER — TELEPHONE (OUTPATIENT)
Dept: SLEEP CENTER | Facility: CLINIC | Age: 56
End: 2021-09-17

## 2021-09-17 NOTE — TELEPHONE ENCOUNTER
DILSHAD script given to [employer] representative  Left message for patient, advised script given to [facility], they will contact patient  Advised to call office with questions or concerns

## 2021-10-07 ENCOUNTER — CONSULT (OUTPATIENT)
Dept: NEUROLOGY | Facility: CLINIC | Age: 56
End: 2021-10-07
Payer: COMMERCIAL

## 2021-10-07 VITALS
HEART RATE: 65 BPM | BODY MASS INDEX: 23.7 KG/M2 | HEIGHT: 69 IN | TEMPERATURE: 97.4 F | WEIGHT: 160 LBS | DIASTOLIC BLOOD PRESSURE: 70 MMHG | SYSTOLIC BLOOD PRESSURE: 130 MMHG

## 2021-10-07 DIAGNOSIS — G56.02 CARPAL TUNNEL SYNDROME OF LEFT WRIST: ICD-10-CM

## 2021-10-07 DIAGNOSIS — M95.8 WINGED SCAPULA OF BOTH SIDES: ICD-10-CM

## 2021-10-07 DIAGNOSIS — R29.898 SHOULDER WEAKNESS: ICD-10-CM

## 2021-10-07 DIAGNOSIS — G54.0 BRACHIAL PLEXOPATHY: Primary | ICD-10-CM

## 2021-10-07 DIAGNOSIS — R20.2 PARESTHESIAS: ICD-10-CM

## 2021-10-07 PROCEDURE — 3078F DIAST BP <80 MM HG: CPT | Performed by: PSYCHIATRY & NEUROLOGY

## 2021-10-07 PROCEDURE — 1036F TOBACCO NON-USER: CPT | Performed by: PSYCHIATRY & NEUROLOGY

## 2021-10-07 PROCEDURE — 3008F BODY MASS INDEX DOCD: CPT | Performed by: PSYCHIATRY & NEUROLOGY

## 2021-10-07 PROCEDURE — 99244 OFF/OP CNSLTJ NEW/EST MOD 40: CPT | Performed by: PSYCHIATRY & NEUROLOGY

## 2021-10-07 PROCEDURE — 3075F SYST BP GE 130 - 139MM HG: CPT | Performed by: PSYCHIATRY & NEUROLOGY

## 2021-10-07 RX ORDER — GABAPENTIN 100 MG/1
100 CAPSULE ORAL 2 TIMES DAILY
Qty: 30 CAPSULE | Refills: 1 | Status: SHIPPED | OUTPATIENT
Start: 2021-10-07 | End: 2022-08-10 | Stop reason: ALTCHOICE

## 2021-11-01 ENCOUNTER — HOSPITAL ENCOUNTER (OUTPATIENT)
Dept: RADIOLOGY | Age: 56
Discharge: HOME | End: 2021-11-01
Payer: COMMERCIAL

## 2021-11-01 DIAGNOSIS — M95.8 WINGED SCAPULA OF BOTH SIDES: ICD-10-CM

## 2021-11-01 DIAGNOSIS — R29.898 SHOULDER WEAKNESS: ICD-10-CM

## 2021-11-01 DIAGNOSIS — R20.2 PARESTHESIAS: ICD-10-CM

## 2021-11-01 PROCEDURE — A9585 GADOBUTROL INJECTION: HCPCS

## 2021-11-01 PROCEDURE — G1004 CDSM NDSC: HCPCS

## 2021-11-01 PROCEDURE — 71552 MRI CHEST W/O & W/DYE: CPT

## 2021-11-01 RX ADMIN — GADOBUTROL 7 ML: 604.72 INJECTION INTRAVENOUS at 15:50

## 2022-01-24 ENCOUNTER — TELEPHONE (OUTPATIENT)
Dept: NEUROLOGY | Facility: CLINIC | Age: 57
End: 2022-01-24

## 2022-01-24 NOTE — TELEPHONE ENCOUNTER
Patient of Dr Troy Rivas, left  asking for results of MRI done 3-4 months ago  Jeremiah Reddy was called away for work so will not be able to make his appointment on the 1/27 and responded back to text he recd (already cancelled)  Requesting cb to review result  Thank you  [phone]

## 2022-01-29 DIAGNOSIS — I10 ESSENTIAL HYPERTENSION: ICD-10-CM

## 2022-01-29 DIAGNOSIS — E03.9 HYPOTHYROIDISM, UNSPECIFIED TYPE: ICD-10-CM

## 2022-01-31 RX ORDER — LEVOTHYROXINE SODIUM 0.1 MG/1
TABLET ORAL
Qty: 90 TABLET | Refills: 1 | Status: SHIPPED | OUTPATIENT
Start: 2022-01-31

## 2022-01-31 RX ORDER — AMLODIPINE BESYLATE 5 MG/1
TABLET ORAL
Qty: 90 TABLET | Refills: 1 | Status: SHIPPED | OUTPATIENT
Start: 2022-01-31 | End: 2022-08-10 | Stop reason: ALTCHOICE

## 2022-02-26 ENCOUNTER — OFFICE VISIT (OUTPATIENT)
Dept: URGENT CARE | Facility: MEDICAL CENTER | Age: 57
End: 2022-02-26
Payer: COMMERCIAL

## 2022-02-26 ENCOUNTER — APPOINTMENT (OUTPATIENT)
Dept: RADIOLOGY | Facility: MEDICAL CENTER | Age: 57
End: 2022-02-26
Payer: COMMERCIAL

## 2022-02-26 ENCOUNTER — NURSE TRIAGE (OUTPATIENT)
Dept: OTHER | Facility: OTHER | Age: 57
End: 2022-02-26

## 2022-02-26 VITALS
HEART RATE: 56 BPM | DIASTOLIC BLOOD PRESSURE: 64 MMHG | SYSTOLIC BLOOD PRESSURE: 106 MMHG | WEIGHT: 160 LBS | HEIGHT: 69 IN | RESPIRATION RATE: 18 BRPM | OXYGEN SATURATION: 98 % | TEMPERATURE: 97.6 F | BODY MASS INDEX: 23.7 KG/M2

## 2022-02-26 DIAGNOSIS — R50.9 FEVER, UNSPECIFIED FEVER CAUSE: Primary | ICD-10-CM

## 2022-02-26 DIAGNOSIS — R50.9 FEVER, UNSPECIFIED FEVER CAUSE: ICD-10-CM

## 2022-02-26 DIAGNOSIS — B34.9 VIRAL SYNDROME: ICD-10-CM

## 2022-02-26 PROCEDURE — G0382 LEV 3 HOSP TYPE B ED VISIT: HCPCS | Performed by: PHYSICIAN ASSISTANT

## 2022-02-26 PROCEDURE — 71046 X-RAY EXAM CHEST 2 VIEWS: CPT

## 2022-02-26 PROCEDURE — 87636 SARSCOV2 & INF A&B AMP PRB: CPT | Performed by: PHYSICIAN ASSISTANT

## 2022-02-26 NOTE — PROGRESS NOTES
[facility] Now        NAME: Sergio Aguila Sr  is a 60 y o  male  : 1965    MRN: [id number]  DATE: 2022  TIME: 1:01 PM    Assessment and Plan   Fever, unspecified fever cause [R50 9]  1  Fever, unspecified fever cause  XR chest pa & lateral   2  Viral syndrome  Covid19 and INFLUENZA A/B PCR         Patient Instructions     1  Based on our discussion, we will call you when the official reading of your chest x-ray is complete and if the radiologist sees an pneumonia fill the antibiotic prescription as given in paper  2  Continue acetaminophen and/or ibuprofen as needed for fever and pain control  3  Increase oral hydration  4  Go to the ER for any worsening symptoms  5  Follow-up with your primary care provider early next week  Chief Complaint     Chief Complaint   Patient presents with    Headache    Fever     Fever began last night  History of Present Illness       40-year-old male patient with a history of high fever intermittently since last evening  At onset patient did have some rigors and chills, but has had no more specific symptoms since  Fever has been controlled with both acetaminophen and ibuprofen  Patient denies any increase in cough from normal, sore throat, vomiting and diarrhea or any other specific symptoms  He does relate some generalized body aches  He is concerned about pneumonia due to his swallowing issues status post neck radiation therapy  Review of Systems   Review of Systems   Constitutional: Positive for fever  Negative for chills  HENT: Negative for ear pain and sore throat  Eyes: Negative for pain and visual disturbance  Respiratory: Negative for cough and shortness of breath  Cardiovascular: Negative for chest pain and palpitations  Gastrointestinal: Negative for abdominal pain and vomiting  Genitourinary: Negative for dysuria and hematuria  Musculoskeletal: Positive for myalgias   Negative for arthralgias and back pain    Skin: Negative for color change and rash  Neurological: Negative for seizures and syncope  All other systems reviewed and are negative  Current Medications       Current Outpatient Medications:     amLODIPine (NORVASC) 5 mg tablet, TAKE 1 TABLET BY MOUTH EVERY DAY, Disp: 90 tablet, Rfl: 1    gabapentin (Neurontin) 100 mg capsule, Take 1 capsule (100 mg total) by mouth 2 (two) times a day, Disp: 30 capsule, Rfl: 1    levothyroxine 100 mcg tablet, TAKE 1 TABLET BY MOUTH EVERY DAY, Disp: 90 tablet, Rfl: 1    multivitamin (THERAGRAN) TABS, Take 1 tablet by mouth daily, Disp: , Rfl:     tadalafil (CIALIS) 5 MG tablet, Take 1 tablet (5 mg total) by mouth daily, Disp: 90 tablet, Rfl: 3    Current Allergies     Allergies as of 02/26/2022    (No Known Allergies)            The following portions of the patient's history were reviewed and updated as appropriate: allergies, current medications, past family history, past medical history, past social history, past surgical history and problem list      Past Medical History:   Diagnosis Date    Cancer of base of tongue (HCC)     excision    Cough     Disease of thyroid gland     hypo    Dysphagia     ED (erectile dysfunction)     Hypertension     Leukopenia     Peyronie's disease     Psoriasis     Tongue cancer ([address] )     Weight loss, abnormal        Past Surgical History:   Procedure Laterality Date    OTHER SURGICAL HISTORY      infusion port inserted and removed    PEG TUBE PLACEMENT      and removal    UT COLONOSCOPY FLX DX W/COLLJ SPEC WHEN PFRMD N/A 6/27/2017    Procedure: COLONOSCOPY with polypectomy x2;  Surgeon: Lisa Salazar MD;  Location: [facility];   Service: General    TONGUE BIOPSY / EXCISION      Floor mouth excision of malignant tumor       Family History   Problem Relation Age of Onset    Other Mother         reactive airway dysfunction    Coronary artery disease Father     Hypertension Father     Psoriasis Father Medications have been verified  Objective   /64   Pulse 56   Temp 97 6 °F (36 4 °C)   Resp 18   Ht 5' 9" (1 753 m)   Wt 72 6 kg (160 lb)   SpO2 98%   BMI 23 63 kg/m²        Physical Exam     Physical Exam  Vitals and nursing note reviewed  Constitutional:       Appearance: Normal appearance  HENT:      Head: Normocephalic  Nose: Nose normal       Mouth/Throat:      Mouth: Mucous membranes are dry  Pharynx: Oropharynx is clear  Eyes:      Conjunctiva/sclera: Conjunctivae normal       Pupils: Pupils are equal, round, and reactive to light  Cardiovascular:      Rate and Rhythm: Normal rate and regular rhythm  Pulses: Normal pulses  Pulmonary:      Effort: Pulmonary effort is normal       Breath sounds: Normal breath sounds  Musculoskeletal:         General: Normal range of motion  Cervical back: Normal range of motion and neck supple  Skin:     General: Skin is warm and dry  Neurological:      Mental Status: He is alert  Psychiatric:         Mood and Affect: Mood normal          Behavior: Behavior normal          Medical decision making note:   Due to patient's risk factors for pneumonia despite clear lung sounds and an essentially normal physical exam, will perform chest x-ray  Will also send off COVID/flu test       Preliminary x-ray reading:   I question possible right upper lobe and right middle lobe infiltrate  Patient prefers to take prescription in paper and await the official radiology report before taking antibiotics

## 2022-02-26 NOTE — TELEPHONE ENCOUNTER
Regarding: Fever, possible pneumonia  ----- Message from Tierra Beyer sent at 2/26/2022  9:43 AM EST -----  "My  woke up with a high fever   He has a history of neck cancer and is prone to aspirating, so I feel like it is pneumonia "

## 2022-02-26 NOTE — TELEPHONE ENCOUNTER
Spoke with Dr Liudmila Milton, she will call patient's wife directly  Reason for Disposition   Fever > 104 F (40 C)    Answer Assessment - Initial Assessment Questions  1  TEMPERATURE: "What is the most recent temperature?"  "How was it measured?"       103 to 104 forehead  2  ONSET: "When did the fever start?"       Started this morning  3  SYMPTOMS: "Do you have any other symptoms besides the fever?"  (e g , colds, headache, sore throat, earache, cough, rash, diarrhea, vomiting, abdominal pain)     Headache and body aches  4  CAUSE: If there are no symptoms, ask: "What do you think is causing the fever?"      Unsure  5  CONTACTS: "Does anyone else in the family have an infection?"      Denies  6  TREATMENT: "What have you done so far to treat this fever?" (e g , medications)     Tylenol 1000mg at 0700  Temp now down to 102 forehead  7  IMMUNOCOMPROMISE: "Do you have of the following: diabetes, HIV positive, splenectomy, cancer chemotherapy, chronic steroid treatment, transplant patient, etc "      Hx of tongue cancer 2013  9  TRAVEL: "Have you traveled out of the country in the last month?" (e g , travel history, exposures)     Denies    This past Thusday 2 24 had ENT appt, swallow study done  Protocols used:  FEVER-ADULT-

## 2022-02-26 NOTE — TELEPHONE ENCOUNTER
Spoke to patient's wife- recommended she take him to the ER so he can have full workup including blood and cultures if needed

## 2022-02-26 NOTE — PATIENT INSTRUCTIONS
1  Based on our discussion, we will call you when the official reading of your chest x-ray is complete and if the radiologist sees an pneumonia fill the antibiotic prescription as given in paper  2  Continue acetaminophen and/or ibuprofen as needed for fever and pain control  3  Increase oral hydration  4  Go to the ER for any worsening symptoms  5  Follow-up with your primary care provider early next week

## 2022-02-27 ENCOUNTER — TELEPHONE (OUTPATIENT)
Dept: URGENT CARE | Facility: MEDICAL CENTER | Age: 57
End: 2022-02-27

## 2022-02-27 LAB
FLUAV RNA RESP QL NAA+PROBE: NEGATIVE
FLUBV RNA RESP QL NAA+PROBE: NEGATIVE
SARS-COV-2 RNA RESP QL NAA+PROBE: NEGATIVE

## 2022-03-08 ENCOUNTER — TELEPHONE (OUTPATIENT)
Dept: FAMILY MEDICINE CLINIC | Facility: CLINIC | Age: 57
End: 2022-03-08

## 2022-03-08 NOTE — TELEPHONE ENCOUNTER
Patient is coming in on Thurs for a ER urgent care FU  Pneum     He is requesting a order for a repeat chest xray to be done before  he comes in

## 2022-03-08 NOTE — TELEPHONE ENCOUNTER
Patient wants to know if he should still come in on Thursday or wait until after he has repeat x-rays

## 2022-03-08 NOTE — TELEPHONE ENCOUNTER
If he is feeling better, we can put the visit off 2 weeks  He should get the CXR around 3/18 and I will see him the following week   I will put the order in

## 2022-03-08 NOTE — TELEPHONE ENCOUNTER
CXR was done on 2/26   I can often take 3-4 weeks to see resolution of pneumonia - I would prefer to wait until then to do the repeat CXR  (that is, if done too early, the XR may still show pneumonia, which would mean that we would still have to repeat it in 2 more weeks)

## 2022-03-21 ENCOUNTER — APPOINTMENT (OUTPATIENT)
Dept: RADIOLOGY | Facility: MEDICAL CENTER | Age: 57
End: 2022-03-21
Payer: COMMERCIAL

## 2022-03-21 DIAGNOSIS — J18.9 COMMUNITY ACQUIRED PNEUMONIA OF RIGHT LUNG, UNSPECIFIED PART OF LUNG: ICD-10-CM

## 2022-03-21 PROCEDURE — 71046 X-RAY EXAM CHEST 2 VIEWS: CPT

## 2022-03-23 ENCOUNTER — OFFICE VISIT (OUTPATIENT)
Dept: FAMILY MEDICINE CLINIC | Facility: CLINIC | Age: 57
End: 2022-03-23
Payer: COMMERCIAL

## 2022-03-23 VITALS
TEMPERATURE: 98 F | OXYGEN SATURATION: 98 % | HEART RATE: 68 BPM | SYSTOLIC BLOOD PRESSURE: 142 MMHG | DIASTOLIC BLOOD PRESSURE: 98 MMHG | WEIGHT: 153 LBS | RESPIRATION RATE: 16 BRPM | HEIGHT: 69 IN | BODY MASS INDEX: 22.66 KG/M2

## 2022-03-23 DIAGNOSIS — I10 ESSENTIAL HYPERTENSION: ICD-10-CM

## 2022-03-23 DIAGNOSIS — J18.9 COMMUNITY ACQUIRED PNEUMONIA OF RIGHT LUNG, UNSPECIFIED PART OF LUNG: ICD-10-CM

## 2022-03-23 DIAGNOSIS — R13.10 SWALLOWING DYSFUNCTION: Primary | ICD-10-CM

## 2022-03-23 DIAGNOSIS — G54.0 LEFT BRACHIAL PLEXITIS: ICD-10-CM

## 2022-03-23 PROCEDURE — 3077F SYST BP >= 140 MM HG: CPT | Performed by: FAMILY MEDICINE

## 2022-03-23 PROCEDURE — 3008F BODY MASS INDEX DOCD: CPT | Performed by: FAMILY MEDICINE

## 2022-03-23 PROCEDURE — 3080F DIAST BP >= 90 MM HG: CPT | Performed by: FAMILY MEDICINE

## 2022-03-23 PROCEDURE — 3725F SCREEN DEPRESSION PERFORMED: CPT | Performed by: FAMILY MEDICINE

## 2022-03-23 PROCEDURE — 99214 OFFICE O/P EST MOD 30 MIN: CPT | Performed by: FAMILY MEDICINE

## 2022-03-23 PROCEDURE — 1036F TOBACCO NON-USER: CPT | Performed by: FAMILY MEDICINE

## 2022-03-23 RX ORDER — LEVOFLOXACIN 500 MG/1
TABLET, FILM COATED ORAL
COMMUNITY
Start: 2022-02-26 | End: 2022-08-10 | Stop reason: ALTCHOICE

## 2022-03-23 NOTE — PROGRESS NOTES
Assessment/Plan:    Essential hypertension  Slightly elevated the patient is little upset today  Patient will check blood pressures at home (wife is a nurse) in call in 1-2 weeks results  Adjust medications if still elevated  Left brachial plexitis  Secondary to radiation therapy  I reviewed with patient  Discussed range-of-motion exercises as well as strengthening exercises to maintain as much function as possible  Recheck 3 months    Swallowing dysfunction  I reviewed with patient  Is been disappointed regarding success of therapy so far  Weight has decreased recently due to worsening problems with swallowing  Recommend that he follow-up with speech therapy and ENT at [facility] to get 2nd opinion  Patient will call in 1-2 weeks with follow-up  Community acquired pneumonia of right lung  Appear to be viral rather than aspiration  Repeat chest x-ray official reading is pending however I did not appreciate any persistent infiltrate  Patient is asymptomatic at present  Recheck as needed  Diagnoses and all orders for this visit:    Swallowing dysfunction    Essential hypertension    Left brachial plexitis    Community acquired pneumonia of right lung, unspecified part of lung    Other orders  -     levofloxacin (LEVAQUIN) 500 mg tablet; TAKE 1 TABLET BY MOUTH DAILY FOR 10 DAYS (Patient not taking: Reported on 3/23/2022)          Subjective:      Patient ID: Klaus Kwon  is a 59 y o  male  Here for several concerns  - pt with febrile illness in Feb  Seen at local Urgent Care where CXR showed mild R patchy pneumonia  COVD neg  Fever resolved and pt denies cough or SOB  Official reading of repeat CXR done earlier this week is still pending, but appears to have cleared  - pt continues to have issues with swallowing  Pt is s/p RT for tongue based Ca, and has developed worsening dysphagia over the years   Speech eval showed "severe pharyngeal dysphagia characterized by no epiglottic inversion, minimal BOT retraction, minimal hyolaryngeal elevation, reduced airway entrance closure, and trace amounts of penetration and silent aspiration during and after swallowing thick and thin liquids"  Pt was recommended to use thick liquids etc, but finds that he needs to use thin liquids to be able to get the "thick stuff down"  Weight has been decreasing  Pt went back to ENT but was not offered any other suggestions  Pt has not been back to [facility] arm/hand/shoulder remain weak  Work up suggests brachial plexopathy as a result of his RT  No significant C-spine issues noted  Has is starting to exercise more  No decreased ROM noted  The following portions of the patient's history were reviewed and updated as appropriate:   He  has a past medical history of Cancer of base of tongue ([facility] [address] ), Cough, Disease of thyroid gland, Dysphagia, ED (erectile dysfunction), Hypertension, Leukopenia, Peyronie's disease, Psoriasis, Tongue cancer ([facility] [address] ), and Weight loss, abnormal   He   Patient Active Problem List    Diagnosis Date Noted    Community acquired pneumonia of right lung 03/27/2022    Left brachial plexitis 10/07/2021    Cervical radiculopathy at C8     Carpal tunnel syndrome     Paresthesias 07/22/2021    Shoulder weakness 07/22/2021    Winged scapula of both sides 07/22/2021    ERIK (obstructive sleep apnea)     Witnessed episode of apnea 05/10/2021    Allergic rhinitis 09/03/2020    Swallowing dysfunction 04/26/2019    Hyperbilirubinemia 04/21/2019    ED (erectile dysfunction) of organic origin 04/30/2018    Peyronie's disease 04/30/2018    Arthralgia 09/25/2017    Cancer of base of tongue ([facility] [address] ) 05/24/2016    Hypothyroidism 11/11/2013    Essential hypertension 09/19/2012    Psoriasis 09/19/2012     He  has a past surgical history that includes Tongue biopsy / excision; Other surgical history; PEG tube placement; and pr colonoscopy flx dx w/collj spec when pfrmd (N/A, 6/27/2017)    He reports that he quit smoking about 11 years ago  He has a 5 00 pack-year smoking history  He has quit using smokeless tobacco  He reports current alcohol use of about 12 0 standard drinks of alcohol per week  He reports that he does not use drugs  Current Outpatient Medications   Medication Sig Dispense Refill    amLODIPine (NORVASC) 5 mg tablet TAKE 1 TABLET BY MOUTH EVERY DAY 90 tablet 1    levothyroxine 100 mcg tablet TAKE 1 TABLET BY MOUTH EVERY DAY 90 tablet 1    multivitamin (THERAGRAN) TABS Take 1 tablet by mouth daily      tadalafil (CIALIS) 5 MG tablet Take 1 tablet (5 mg total) by mouth daily 90 tablet 3    gabapentin (Neurontin) 100 mg capsule Take 1 capsule (100 mg total) by mouth 2 (two) times a day (Patient not taking: Reported on 3/23/2022 ) 30 capsule 1    levofloxacin (LEVAQUIN) 500 mg tablet TAKE 1 TABLET BY MOUTH DAILY FOR 10 DAYS (Patient not taking: Reported on 3/23/2022)       No current facility-administered medications for this visit  He has No Known Allergies       Review of Systems   Constitutional: Positive for unexpected weight change  Negative for activity change, appetite change, chills and fever  HENT: Positive for sore throat and trouble swallowing  Negative for ear discharge, ear pain, mouth sores, sinus pressure and sinus pain  Eyes: Negative  Respiratory: Negative  Cardiovascular: Negative  Gastrointestinal: Negative  Genitourinary: Negative  Musculoskeletal: Positive for arthralgias and myalgias  Negative for gait problem, joint swelling and neck pain  Neurological: Positive for weakness and numbness  Negative for dizziness and light-headedness  Objective:      /98   Pulse 68   Temp 98 °F (36 7 °C)   Resp 16   Ht 5' 9" (1 753 m)   Wt 69 4 kg (153 lb)   SpO2 98%   BMI 22 59 kg/m²          Physical Exam  Vitals reviewed  HENT:      Head: Normocephalic        Right Ear: Tympanic membrane, ear canal and external ear normal  Left Ear: Tympanic membrane, ear canal and external ear normal       Mouth/Throat:      Mouth: Mucous membranes are dry  Pharynx: No oropharyngeal exudate or posterior oropharyngeal erythema  Eyes:      Extraocular Movements: Extraocular movements intact  Conjunctiva/sclera: Conjunctivae normal       Pupils: Pupils are equal, round, and reactive to light  Neck:      Comments: L neck with skin changes and muscular atrophy-unchanged   Cardiovascular:      Rate and Rhythm: Normal rate and regular rhythm  Pulses: Normal pulses  Pulmonary:      Effort: Pulmonary effort is normal       Breath sounds: No wheezing or rales  Abdominal:      General: There is no distension  Palpations: There is no mass  Tenderness: There is no abdominal tenderness  Musculoskeletal:         General: Deformity present  No swelling or tenderness  Right lower leg: No edema  Left lower leg: No edema  Comments: Left trapezius atrophy, and left wing scapular unchanged  Persistent decreased range of motion of left shoulder with abduction and anterior flexion  Lymphadenopathy:      Cervical: No cervical adenopathy  Skin:     General: Skin is warm  Capillary Refill: Capillary refill takes less than 2 seconds  Comments: RT changes of L neck   Neurological:      Mental Status: He is alert and oriented to person, place, and time  Sensory: Sensory deficit (mild left hand-unchanged) present  Motor: Weakness (L hand ) present        Coordination: Coordination normal       Gait: Gait normal       Deep Tendon Reflexes: Reflexes normal

## 2022-03-27 PROBLEM — J18.9 COMMUNITY ACQUIRED PNEUMONIA OF RIGHT LUNG: Status: ACTIVE | Noted: 2022-03-27

## 2022-03-27 NOTE — ASSESSMENT & PLAN NOTE
Secondary to radiation therapy  I reviewed with patient  Discussed range-of-motion exercises as well as strengthening exercises to maintain as much function as possible    Recheck 3 months

## 2022-03-27 NOTE — ASSESSMENT & PLAN NOTE
Appear to be viral rather than aspiration  Repeat chest x-ray official reading is pending however I did not appreciate any persistent infiltrate  Patient is asymptomatic at present  Recheck as needed

## 2022-03-27 NOTE — ASSESSMENT & PLAN NOTE
I reviewed with patient  Is been disappointed regarding success of therapy so far  Weight has decreased recently due to worsening problems with swallowing  Recommend that he follow-up with speech therapy and ENT at [facility] to get 2nd opinion  Patient will call in 1-2 weeks with follow-up

## 2022-03-27 NOTE — ASSESSMENT & PLAN NOTE
Slightly elevated the patient is little upset today  Patient will check blood pressures at home (wife is a nurse) in call in 1-2 weeks results  Adjust medications if still elevated

## 2022-05-04 ENCOUNTER — OFFICE VISIT (OUTPATIENT)
Dept: URGENT CARE | Facility: MEDICAL CENTER | Age: 57
End: 2022-05-04
Payer: COMMERCIAL

## 2022-05-04 VITALS
WEIGHT: 153 LBS | HEART RATE: 105 BPM | RESPIRATION RATE: 22 BRPM | TEMPERATURE: 101.4 F | BODY MASS INDEX: 22.66 KG/M2 | OXYGEN SATURATION: 98 % | HEIGHT: 69 IN

## 2022-05-04 DIAGNOSIS — R50.9 FEVER, UNSPECIFIED FEVER CAUSE: ICD-10-CM

## 2022-05-04 DIAGNOSIS — J20.9 ACUTE BRONCHITIS, UNSPECIFIED ORGANISM: Primary | ICD-10-CM

## 2022-05-04 PROCEDURE — G0382 LEV 3 HOSP TYPE B ED VISIT: HCPCS | Performed by: PHYSICIAN ASSISTANT

## 2022-05-04 PROCEDURE — 87636 SARSCOV2 & INF A&B AMP PRB: CPT | Performed by: PHYSICIAN ASSISTANT

## 2022-05-04 RX ORDER — ALBUTEROL SULFATE 90 UG/1
2 AEROSOL, METERED RESPIRATORY (INHALATION) EVERY 4 HOURS PRN
Qty: 8.5 G | Refills: 1 | Status: SHIPPED | OUTPATIENT
Start: 2022-05-04 | End: 2022-05-11

## 2022-05-04 RX ORDER — BENZONATATE 200 MG/1
200 CAPSULE ORAL 3 TIMES DAILY PRN
Qty: 20 CAPSULE | Refills: 0 | Status: SHIPPED | OUTPATIENT
Start: 2022-05-04 | End: 2022-08-10 | Stop reason: ALTCHOICE

## 2022-05-04 RX ORDER — AZITHROMYCIN 250 MG/1
TABLET, FILM COATED ORAL
Qty: 6 TABLET | Refills: 0 | Status: SHIPPED | OUTPATIENT
Start: 2022-05-04 | End: 2022-05-08

## 2022-05-04 NOTE — PATIENT INSTRUCTIONS
1  Use albuterol inhaler as directed  2  Use over-the-counter plain Mucinex or Robitussin for phlegm relief  3  Increase oral fluids  4  Use over-the-counter ibuprofen or acetaminophen as needed for fever pain  5  If symptoms do not improve within the next 4-5 days or if they worsen at any time till the Z-Vaughn prescription and take it as directed  6  Go to the ER with any worsening symptoms

## 2022-05-04 NOTE — PROGRESS NOTES
[facility] Now        NAME: Susan Pemberton Sr  is a 59 y o  male  : 1965    MRN: [id number]  DATE: May 4, 2022  TIME: 6:12 PM    Assessment and Plan   Acute bronchitis, unspecified organism [J20 9]  1  Acute bronchitis, unspecified organism  azithromycin (ZITHROMAX) 250 mg tablet    albuterol (ProAir HFA) 90 mcg/act inhaler    benzonatate (TESSALON) 200 MG capsule    Covid19 and INFLUENZA A/B PCR   2  Fever, unspecified fever cause  azithromycin (ZITHROMAX) 250 mg tablet    Covid19 and INFLUENZA A/B PCR         Patient Instructions   1  Use albuterol inhaler as directed  2  Use over-the-counter plain Mucinex or Robitussin for phlegm relief  3  Increase oral fluids  4  Use over-the-counter ibuprofen or acetaminophen as needed for fever pain  5  If symptoms do not improve within the next 4-5 days or if they worsen at any time till the Z-Vaughn prescription and take it as directed  6  Go to the ER with any worsening symptoms  Chief Complaint     Chief Complaint   Patient presents with    Cough     cough, fever, chills since           History of Present Illness       53-year-old male patient with a 3 day history of severe cough, fever, fatigue, body aches  Positive nasal congestion, rhinorrhea  No GI symptoms  Patient was exposed to [employer] about a week and a half to 2 weeks ago  Review of Systems   Review of Systems   Constitutional: Positive for appetite change, chills, fatigue and fever  HENT: Positive for congestion, rhinorrhea, sinus pressure and sore throat  Negative for facial swelling and sinus pain  Respiratory: Positive for cough  Negative for shortness of breath  Cardiovascular: Negative for chest pain  Gastrointestinal: Negative for abdominal pain, diarrhea, nausea and vomiting  Musculoskeletal: Positive for myalgias  Skin: Negative for rash  Neurological: Negative for dizziness           Current Medications       Current Outpatient Medications:    albuterol (ProAir HFA) 90 mcg/act inhaler, Inhale 2 puffs every 4 (four) hours as needed for wheezing or shortness of breath for up to 7 days, Disp: 8 5 g, Rfl: 1    amLODIPine (NORVASC) 5 mg tablet, TAKE 1 TABLET BY MOUTH EVERY DAY, Disp: 90 tablet, Rfl: 1    azithromycin (ZITHROMAX) 250 mg tablet, Take 2 tablets today then 1 tablet daily x 4 days, Disp: 6 tablet, Rfl: 0    benzonatate (TESSALON) 200 MG capsule, Take 1 capsule (200 mg total) by mouth 3 (three) times a day as needed for cough, Disp: 20 capsule, Rfl: 0    gabapentin (Neurontin) 100 mg capsule, Take 1 capsule (100 mg total) by mouth 2 (two) times a day (Patient not taking: Reported on 3/23/2022 ), Disp: 30 capsule, Rfl: 1    levofloxacin (LEVAQUIN) 500 mg tablet, TAKE 1 TABLET BY MOUTH DAILY FOR 10 DAYS (Patient not taking: Reported on 3/23/2022), Disp: , Rfl:     levothyroxine 100 mcg tablet, TAKE 1 TABLET BY MOUTH EVERY DAY, Disp: 90 tablet, Rfl: 1    multivitamin (THERAGRAN) TABS, Take 1 tablet by mouth daily, Disp: , Rfl:     tadalafil (CIALIS) 5 MG tablet, Take 1 tablet (5 mg total) by mouth daily, Disp: 90 tablet, Rfl: 3    Current Allergies     Allergies as of 05/04/2022    (No Known Allergies)            The following portions of the patient's history were reviewed and updated as appropriate: allergies, current medications, past family history, past medical history, past social history, past surgical history and problem list      Past Medical History:   Diagnosis Date    Cancer of base of tongue (HCC)     excision    Cough     Disease of thyroid gland     hypo    Dysphagia     ED (erectile dysfunction)     Hypertension     Leukopenia     Peyronie's disease     Psoriasis     [facility] ([facility] [address] )     Weight loss, abnormal        Past Surgical History:   Procedure Laterality Date    OTHER SURGICAL HISTORY      infusion port inserted and removed    PEG TUBE PLACEMENT      and removal    GA COLONOSCOPY FLX DX W/COLLJ SPEC WHEN PFRMD N/A 6/27/2017    Procedure: COLONOSCOPY with polypectomy x2;  Surgeon: Teena Mack MD;  Location: [facility]; Service: General    TONGUE BIOPSY / EXCISION      Floor mouth excision of malignant tumor       Family History   Problem Relation Age of Onset    Other Mother         reactive airway dysfunction    Coronary artery disease Father     Hypertension Father     Psoriasis Father          Medications have been verified  Objective   Pulse 105   Temp (!) 101 4 °F (38 6 °C)   Resp 22   Ht 5' 9" (1 753 m)   Wt 69 4 kg (153 lb)   SpO2 98%   BMI 22 59 kg/m²        Physical Exam     Physical Exam  Vitals and nursing note reviewed  Constitutional:       General: He is not in acute distress  Appearance: He is ill-appearing  HENT:      Head: Normocephalic  Right Ear: Tympanic membrane normal       Left Ear: Tympanic membrane normal       Nose: Congestion and rhinorrhea present  Mouth/Throat:      Mouth: Mucous membranes are dry  Pharynx: Posterior oropharyngeal erythema present  Eyes:      Conjunctiva/sclera: Conjunctivae normal       Pupils: Pupils are equal, round, and reactive to light  Cardiovascular:      Rate and Rhythm: Regular rhythm  Tachycardia present  Pulses: Normal pulses  Pulmonary:      Effort: Pulmonary effort is normal       Breath sounds: Wheezing and rhonchi present  Abdominal:      Tenderness: There is no abdominal tenderness  Musculoskeletal:         General: Normal range of motion  Cervical back: Normal range of motion and neck supple  No rigidity  Skin:     General: Skin is warm and dry  Neurological:      Mental Status: He is alert     Psychiatric:         Mood and Affect: Mood normal          Behavior: Behavior normal

## 2022-08-03 ENCOUNTER — APPOINTMENT (OUTPATIENT)
Dept: LAB | Facility: MEDICAL CENTER | Age: 57
End: 2022-08-03
Payer: COMMERCIAL

## 2022-08-03 DIAGNOSIS — N52.9 ERECTILE DYSFUNCTION, UNSPECIFIED ERECTILE DYSFUNCTION TYPE: ICD-10-CM

## 2022-08-03 LAB — PSA SERPL-MCNC: 0.6 NG/ML (ref 0–4)

## 2022-08-03 PROCEDURE — 36415 COLL VENOUS BLD VENIPUNCTURE: CPT

## 2022-08-03 PROCEDURE — 84153 ASSAY OF PSA TOTAL: CPT

## 2022-08-10 ENCOUNTER — OFFICE VISIT (OUTPATIENT)
Dept: UROLOGY | Facility: CLINIC | Age: 57
End: 2022-08-10
Payer: COMMERCIAL

## 2022-08-10 ENCOUNTER — TELEPHONE (OUTPATIENT)
Dept: FAMILY MEDICINE CLINIC | Facility: CLINIC | Age: 57
End: 2022-08-10

## 2022-08-10 ENCOUNTER — APPOINTMENT (OUTPATIENT)
Dept: LAB | Facility: AMBULARY SURGERY CENTER | Age: 57
End: 2022-08-10
Attending: OTOLARYNGOLOGY
Payer: COMMERCIAL

## 2022-08-10 VITALS
HEART RATE: 86 BPM | SYSTOLIC BLOOD PRESSURE: 138 MMHG | BODY MASS INDEX: 22.36 KG/M2 | OXYGEN SATURATION: 98 % | HEIGHT: 69 IN | DIASTOLIC BLOOD PRESSURE: 86 MMHG | WEIGHT: 151 LBS

## 2022-08-10 DIAGNOSIS — E03.9 ACQUIRED HYPOTHYROIDISM: ICD-10-CM

## 2022-08-10 DIAGNOSIS — J38.01 PARALYSIS OF LEFT VOCAL FOLD: ICD-10-CM

## 2022-08-10 DIAGNOSIS — N52.9 ERECTILE DYSFUNCTION, UNSPECIFIED ERECTILE DYSFUNCTION TYPE: ICD-10-CM

## 2022-08-10 DIAGNOSIS — J38.3 GLOTTIC INSUFFICIENCY: ICD-10-CM

## 2022-08-10 DIAGNOSIS — R49.0 DYSPHONIA: ICD-10-CM

## 2022-08-10 DIAGNOSIS — R13.10 DYSPHAGIA, UNSPECIFIED TYPE: ICD-10-CM

## 2022-08-10 DIAGNOSIS — R13.13 PHARYNGEAL DYSPHAGIA: ICD-10-CM

## 2022-08-10 DIAGNOSIS — Z12.5 SCREENING FOR PROSTATE CANCER: ICD-10-CM

## 2022-08-10 DIAGNOSIS — Z12.5 SCREENING FOR PROSTATE CANCER: Primary | ICD-10-CM

## 2022-08-10 DIAGNOSIS — Z91.89 RISK OF EXPOSURE TO LYME DISEASE: ICD-10-CM

## 2022-08-10 LAB — TSH SERPL DL<=0.05 MIU/L-ACNC: 3.89 UIU/ML (ref 0.45–4.5)

## 2022-08-10 PROCEDURE — 99213 OFFICE O/P EST LOW 20 MIN: CPT | Performed by: PHYSICIAN ASSISTANT

## 2022-08-10 PROCEDURE — 84443 ASSAY THYROID STIM HORMONE: CPT

## 2022-08-10 PROCEDURE — 36415 COLL VENOUS BLD VENIPUNCTURE: CPT

## 2022-08-10 RX ORDER — TADALAFIL 5 MG/1
5 TABLET ORAL DAILY
Qty: 90 TABLET | Refills: 3 | Status: SHIPPED | OUTPATIENT
Start: 2022-08-10

## 2022-08-10 NOTE — TELEPHONE ENCOUNTER
----- Message from Kwasi Bowie MD sent at 8/10/2022 10:24 AM EDT -----  Pt needs appt after 8/16   Please print out the attached form for me to fill out  ----- Message -----  From: Marlene Salazar  Sent: 8/9/2022   5:29 PM EDT  To: Kwasi oBwie MD

## 2022-08-10 NOTE — PROGRESS NOTES
1  Screening for prostate cancer  PSA, Total Screen   2  Erectile dysfunction, unspecified erectile dysfunction type  tadalafil (CIALIS) 5 MG tablet       Assessment and plan:     1  Erectile dysfunction  2  LUTS  - continue cialis 5mg daily      3  Prostate cancer screening  - PSA stable at 0 6  - normal NEGRO  - f/u 1 year PSA prior to visit    Karri Varma PA-C      Chief Complaint     ED    History of Present Illness     Sadia Bueno Sr  is a 59 y o  Male presenting today for erectile dysfunction  Patient uses Cialis 5 mg daily for lower urinary tract and erectile function  Overall happy with the results of the pharmacotherapy without any reported adverse side effects  History of Peyronie disease  No family history of  malignancy    PSA 0 6 (8/3/22)    Laboratory     Lab Results   Component Value Date    CREATININE 0 59 (L) 05/04/2021       Lab Results   Component Value Date    PSA 0 6 08/03/2022    PSA 0 6 03/03/2021    PSA 0 6 09/02/2020       Review of Systems     Review of Systems   Constitutional: Negative for activity change, appetite change, chills, diaphoresis, fatigue, fever and unexpected weight change  Respiratory: Negative for chest tightness and shortness of breath  Cardiovascular: Negative for chest pain, palpitations and leg swelling  Gastrointestinal: Negative for abdominal distention, abdominal pain, constipation, diarrhea, nausea and vomiting  Genitourinary: Negative for decreased urine volume, difficulty urinating, dysuria, enuresis, flank pain, frequency, genital sores, hematuria and urgency  Musculoskeletal: Negative for back pain, gait problem and myalgias  Skin: Negative for color change, pallor, rash and wound  Psychiatric/Behavioral: Negative for behavioral problems  The patient is not nervous/anxious  Allergies     No Known Allergies    Physical Exam     Physical Exam  Constitutional:       General: He is not in acute distress       Appearance: Normal appearance  He is not ill-appearing  HENT:      Head: Normocephalic and atraumatic  Eyes:      General:         Right eye: No discharge  Left eye: No discharge  Conjunctiva/sclera: Conjunctivae normal    Pulmonary:      Effort: Pulmonary effort is normal  No respiratory distress  Genitourinary:     Comments: Good rectal tone  Prostate 30g, no nodules, smooth, symmetric  Musculoskeletal:         General: No swelling or tenderness  Normal range of motion  Skin:     General: Skin is warm and dry  Coloration: Skin is not jaundiced or pale  Neurological:      General: No focal deficit present  Mental Status: He is alert and oriented to person, place, and time     Psychiatric:         Mood and Affect: Mood normal          Behavior: Behavior normal            Vital Signs     Vitals:    08/10/22 0737   BP: 138/86   Pulse: 86   SpO2: 98%   Weight: 68 5 kg (151 lb)   Height: 5' 9" (1 753 m)         Current Medications       Current Outpatient Medications:     levothyroxine 100 mcg tablet, TAKE 1 TABLET BY MOUTH EVERY DAY, Disp: 90 tablet, Rfl: 1    multivitamin (THERAGRAN) TABS, Take 1 tablet by mouth daily, Disp: , Rfl:     tadalafil (CIALIS) 5 MG tablet, Take 1 tablet (5 mg total) by mouth daily, Disp: 90 tablet, Rfl: 3    amLODIPine (NORVASC) 5 mg tablet, TAKE 1 TABLET BY MOUTH EVERY DAY (Patient not taking: Reported on 8/10/2022), Disp: 90 tablet, Rfl: 1    benzonatate (TESSALON) 200 MG capsule, Take 1 capsule (200 mg total) by mouth 3 (three) times a day as needed for cough (Patient not taking: No sig reported), Disp: 20 capsule, Rfl: 0    gabapentin (Neurontin) 100 mg capsule, Take 1 capsule (100 mg total) by mouth 2 (two) times a day (Patient not taking: No sig reported), Disp: 30 capsule, Rfl: 1    levofloxacin (LEVAQUIN) 500 mg tablet, TAKE 1 TABLET BY MOUTH DAILY FOR 10 DAYS (Patient not taking: No sig reported), Disp: , Rfl:       Active Problems     Patient Active Problem List   Diagnosis    ED (erectile dysfunction) of organic origin    Peyronie's disease    Arthralgia    Cancer of base of tongue ([address] )    Essential hypertension    Hypothyroidism    Psoriasis    Hyperbilirubinemia    Swallowing dysfunction    Allergic rhinitis    Witnessed episode of apnea    ERIK (obstructive sleep apnea)    Paresthesias    Shoulder weakness    Winged scapula of both sides    Cervical radiculopathy at C8    Carpal tunnel syndrome    Left brachial plexitis    Community acquired pneumonia of right lung         Past Medical History     Past Medical History:   Diagnosis Date    Cancer of base of tongue (HCC)     excision    Cough     Disease of thyroid gland     hypo    Dysphagia     ED (erectile dysfunction)     Hypertension     Leukopenia     Peyronie's disease     Psoriasis     Tongue cancer (HCC)     Weight loss, abnormal          Surgical History     Past Surgical History:   Procedure Laterality Date    OTHER SURGICAL HISTORY      infusion port inserted and removed    PEG TUBE PLACEMENT      and removal    AL COLONOSCOPY FLX DX W/COLLJ SPEC WHEN PFRMD N/A 6/27/2017    Procedure: COLONOSCOPY with polypectomy x2;  Surgeon: Haja Ac MD;  Location: [facility];   Service: General    TONGUE BIOPSY / EXCISION      Floor mouth excision of malignant tumor         Family History     Family History   Problem Relation Age of Onset    Other Mother         reactive airway dysfunction    Coronary artery disease Father     Hypertension Father     Psoriasis Father          Social History     Social History       Radiology

## 2022-08-11 ENCOUNTER — RA CDI HCC (OUTPATIENT)
Dept: OTHER | Facility: HOSPITAL | Age: 57
End: 2022-08-11

## 2022-08-11 NOTE — PROGRESS NOTES
[facility]  coding opportunities       Chart reviewed, no opportunity found: CHART REVIEWED, NO OPPORTUNITY FOUND        Patients Insurance        Commercial Insurance: [employer]

## 2022-08-18 ENCOUNTER — OFFICE VISIT (OUTPATIENT)
Dept: FAMILY MEDICINE CLINIC | Facility: CLINIC | Age: 57
End: 2022-08-18
Payer: COMMERCIAL

## 2022-08-18 VITALS
HEART RATE: 86 BPM | DIASTOLIC BLOOD PRESSURE: 82 MMHG | BODY MASS INDEX: 22.81 KG/M2 | SYSTOLIC BLOOD PRESSURE: 130 MMHG | WEIGHT: 154 LBS | TEMPERATURE: 97.9 F | HEIGHT: 69 IN

## 2022-08-18 DIAGNOSIS — I10 ESSENTIAL HYPERTENSION: ICD-10-CM

## 2022-08-18 DIAGNOSIS — E03.9 HYPOTHYROIDISM, UNSPECIFIED TYPE: ICD-10-CM

## 2022-08-18 DIAGNOSIS — C01 CANCER OF BASE OF TONGUE (HCC): ICD-10-CM

## 2022-08-18 DIAGNOSIS — Z01.818 PREOP EXAMINATION: Primary | ICD-10-CM

## 2022-08-18 DIAGNOSIS — R13.10 SWALLOWING DYSFUNCTION: ICD-10-CM

## 2022-08-18 PROCEDURE — 99214 OFFICE O/P EST MOD 30 MIN: CPT | Performed by: FAMILY MEDICINE

## 2022-08-18 PROCEDURE — 93000 ELECTROCARDIOGRAM COMPLETE: CPT | Performed by: FAMILY MEDICINE

## 2022-08-18 PROCEDURE — 3079F DIAST BP 80-89 MM HG: CPT | Performed by: FAMILY MEDICINE

## 2022-08-18 PROCEDURE — 3075F SYST BP GE 130 - 139MM HG: CPT | Performed by: FAMILY MEDICINE

## 2022-08-18 NOTE — PROGRESS NOTES
Patient ID: Ty Walker is a 59 y o  male  HPI: 59 y o male is being seen for a preoperative visit: laryngoscopy, vocal fold injection, bx of epiglotttis    Surgical Risk Assessment:    Prior anesthesia: Adverse Reaction to : Epidural n    General n   Spinal n  Family history of adverse reactions to anesthesia? Pertinent Past Medical History:  Hypothyroid, Tongue Base CA, HTN, L shoulder weakness    Exercise Capacity:     Able to walk 4 blocks w/o Sx      y   Able to walk 2 flights of steps w/o Sx   y    Lifestyle Factors: Tobacco Use:  Former  Quit         Pack years  Alcohol Use:  2-3 beers/night  Illicit Drug Use:  none  No transfusions : Protestant   none     ERIK Risk Factors: none      Personal history of venous thromboembolic disease:    History of Steroid use for >2 weeks within last year? no      Family History   Problem Relation Age of Onset    Other Mother         reactive airway dysfunction    Coronary artery disease Father     Hypertension Father     Psoriasis Father      Social History     Socioeconomic History    Marital status: /Civil Union     Spouse name: Not on file    Number of children: 10    Years of education: Not on file    Highest education level: Not on file   Occupational History    Not on file   Tobacco Use    Smoking status: Former Smoker     Packs/day: 0 25     Years: 20 00     Pack years: 5 00     Quit date:      Years since quittin 6    Smokeless tobacco: Former User    Tobacco comment: pt quit with dx of tongue base cancer, per allscripts   Vaping Use    Vaping Use: Never used   Substance and Sexual Activity    Alcohol use:  Yes     Alcohol/week: 12 0 standard drinks     Types: 12 Cans of beer per week     Comment: socially    Drug use: No    Sexual activity: Not on file   Other Topics Concern    Not on file   Social History Narrative    Daily coffee consumption, 1 cups/day    Daily cola consumption    Daily tea consumption    Dental care, regularly    Exercise: running, weight training    High school graduate    No guns in the home    Pets/animals,  Active         Social Determinants of Health     Financial Resource Strain: Not on file   Food Insecurity: Not on file   Transportation Needs: Not on file   Physical Activity: Not on file   Stress: Not on file   Social Connections: Not on file   Intimate Partner Violence: Not on file   Housing Stability: Not on file     Past Medical History:   Diagnosis Date    Cancer of base of tongue ([facility] )     excision    Cough     Disease of thyroid gland     hypo    Dysphagia     ED (erectile dysfunction)     Hypertension     Leukopenia     Peyronie's disease     Psoriasis     Tongue cancer ([facility] )     Weight loss, abnormal      Past Surgical History:   Procedure Laterality Date    OTHER SURGICAL HISTORY      infusion port inserted and removed    PEG TUBE PLACEMENT      and removal    [place] COLONOSCOPY FLX DX W/COLLJ SPEC WHEN PFRMD N/A 6/27/2017    Procedure: COLONOSCOPY with polypectomy x2;  Surgeon: Kareem Berry MD;  Location: [facility];   Service: General    TONGUE BIOPSY / EXCISION      Floor mouth excision of malignant tumor     No Known Allergies    Current Outpatient Medications:     levothyroxine 100 mcg tablet, TAKE 1 TABLET BY MOUTH EVERY DAY, Disp: 90 tablet, Rfl: 1    multivitamin (THERAGRAN) TABS, Take 1 tablet by mouth daily, Disp: , Rfl:     pentoxifylline (TRENtal) 400 mg ER tablet, Take 1 tablet (400 mg total) by mouth 3 (three) times a day with meals, Disp: 90 tablet, Rfl: 11    tadalafil (CIALIS) 5 MG tablet, Take 1 tablet (5 mg total) by mouth daily, Disp: 90 tablet, Rfl: 3     Review of Systems    Consitutional:  Positive for intermittent chills but no  fever, sleep disturbance, weight gain and weight loss  ENT:  blurry vision, double vision, eye pain and photophobia hoarseness  Pulmonary:  cough with swallowing but shortness of breath, dyspnea on exertion  Cardiovascular:  Denies chest pain/pressure   Abdomen:   Denies abdominal pain, nausea, vomiting, diarrhea, constipation    Genitourinary:  no urinary symptoms   Hematology/Lymphatics:   Denies, blood clots, bruising, jaundice   Musculoskeletal:  Positive for muscular weakness in legs and L arm  No gait disturbance, myalgia,  arthalgia  Integumentary:  Denies ecchymosis, petechiae, rash or lesions Positive for L neck atrophy  Neurological:  Chronic tingling and numbness in L arm/hand  Weakness in legs  Psychological:  "I have accepted where I am at and have moved on"  "It is still very frustrating"  OBJECTIVE    /82   Pulse 86   Temp 97 9 °F (36 6 °C)   Ht 5' 9" (1 753 m)   Wt 69 9 kg (154 lb)   BMI 22 74 kg/m²     Constitutional:  Well appearing and in no acute distress  ENT:  ENT exam normal, no neck nodes or sinus tenderness   Pulmonary:  clear to auscultation bilaterally and no crackles, no wheezes, chest expansion normal  Cardiovascular:  S1S2, regular rate and rhythm  Gastrointestinal:  abdomen is soft without significant tenderness  Lymphatic:  no lymphadenopathy   Musculoskeletal:  Atrophy of the L neck and upper back with post radiation scarring  Bilat winged scapula  Skin:  L neck and upper chest/back skin atrophy  Neurologic:  Alert and oriented x 4 and CN I-XII intact  L upper extremity weakness      DATA:  Laboratory Results:     Lab Results   Component Value Date    ALT 49 05/04/2021    AST 36 05/04/2021    BUN 5 05/04/2021    CALCIUM 8 5 05/04/2021     05/04/2021    CHOL 199 12/05/2015    CO2 31 05/04/2021    CREATININE 0 59 (L) 05/04/2021    HDL 78 03/03/2021    HCT 43 9 05/04/2021    HGB 14 5 05/04/2021    HGBA1C 5 3 07/14/2018     05/04/2021    K 3 7 05/04/2021    PSA 0 6 08/03/2022     12/05/2015    TRIG 52 03/03/2021    WBC 4 04 (L) 05/04/2021     ECG: NSR with incomplete LBBB  Possible WARREN No ST/T changes   No change from 2019 ECG    Patient Clearance:Risk Estimation: per the Revised Cardiac Risk Index (Circ  [id number], 1999): the patient's risk factors for cardiac complications include:   RCI Risk Class: I    Current medications which may produce withdrawal symptoms if withheld perioperatively:    Pre-op Evaluation Plan  1  Further preoperative work-up as follows:  2  Medication Management/Recommendations:  3   Prophylaxis for cardiac events with perioperative beta-blockers:     Clearance:  Patient is CLEARED for surgery without any additional cardiac testing      Assessment/Plan:  Diagnoses and all orders for this visit:    Preop examination  -     POCT ECG    Swallowing dysfunction    Cancer of base of tongue (HCC)    Essential hypertension  -     POCT ECG    Hypothyroidism, unspecified type

## 2022-08-21 PROBLEM — J18.9 COMMUNITY ACQUIRED PNEUMONIA OF RIGHT LUNG: Status: RESOLVED | Noted: 2022-03-27 | Resolved: 2022-08-21

## 2022-08-28 DIAGNOSIS — E03.9 HYPOTHYROIDISM, UNSPECIFIED TYPE: ICD-10-CM

## 2022-08-29 RX ORDER — LEVOTHYROXINE SODIUM 0.1 MG/1
TABLET ORAL
Qty: 90 TABLET | Refills: 1 | Status: SHIPPED | OUTPATIENT
Start: 2022-08-29

## 2022-08-30 ENCOUNTER — TELEPHONE (OUTPATIENT)
Dept: FAMILY MEDICINE CLINIC | Facility: CLINIC | Age: 57
End: 2022-08-30

## 2022-08-30 ENCOUNTER — HOSPITAL ENCOUNTER (OUTPATIENT)
Dept: VASCULAR ULTRASOUND | Facility: HOSPITAL | Age: 57
Discharge: HOME | End: 2022-08-30
Payer: COMMERCIAL

## 2022-08-30 DIAGNOSIS — G52.7 CRANIAL NEUROPATHIES, MULTIPLE: ICD-10-CM

## 2022-08-30 DIAGNOSIS — Z92.3 HX OF HEAD AND NECK RADIATION: ICD-10-CM

## 2022-08-30 DIAGNOSIS — R09.89 CARDIORESPIRATORY PROBLEMS: ICD-10-CM

## 2022-08-30 DIAGNOSIS — J38.01 PARALYSIS OF LEFT VOCAL FOLD: ICD-10-CM

## 2022-08-30 DIAGNOSIS — L90.5 SCAR OF NECK: ICD-10-CM

## 2022-08-30 DIAGNOSIS — R29.810 FACIAL WEAKNESS: ICD-10-CM

## 2022-08-30 DIAGNOSIS — Z85.89 HX OF MALIGNANT NEOPLASM OF HEAD AND NECK: ICD-10-CM

## 2022-08-30 PROCEDURE — 93880 EXTRACRANIAL BILAT STUDY: CPT | Performed by: SURGERY

## 2022-08-30 PROCEDURE — 93880 EXTRACRANIAL BILAT STUDY: CPT

## 2022-08-30 NOTE — TELEPHONE ENCOUNTER
Patient was at appt at the vascular center when his blood pressure was taken and the reading was 220/120  As a concern the tech call to make the office aware  Per Dr Montero Else, patient was recommend to come to the office for a Bp check with the nurses  The schedule was checked and an appt was offered for 115p for him to come in  Patient declined appt saying he had a full schedule  Dr Winston Flores was made aware of this and suggested patient keep an eye any symptoms and if anything arrises go to the ER and if want to get checked he will need to be seen tomorrow morning being Dr Winston Flores will not be in the office tomorrow afternoon

## 2022-09-01 NOTE — H&P (VIEW-ONLY)
Otolaryngology - Head and Neck Surgery  Return Visit      Jhonathan Vivar is a 59 y o  who returned for evaluation of swallow   HPI:    Symptoms:  Surgery scheduled 9/16/22    Breathing - ok at rest and activity, SOB only with talking or singing  Voice - stable  Swallow - more concerning/bothersome than his voice    Labs  Normal - TSH  Not done - [facility] ab panel, RF, FLY    Taking trental    MRI brain/facial nerve scheduled 9/7    Carotid doppler 8/30/22  <50% stenosis bilaterally  Vertebral art flow is not identified  BP was elevated (210-20/110-20)    SLP? 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  Treated for this 10 yrs ago (chemo/RT at [facility])  No surgery for this (other than biopsy)  Swallow issues occurred since that time (shortly after treatment)  Attributed to the treatment  Followed by Dr Binta Santiago over the years  Left side of neck is scarred, left eye twitching for the past 1-1 5 yrs  Left arm neuropathies/radiculopathies as well  Cannot raise left arm above shoulder  Solids - takes multiple swallows to go down, liquid wash  Some foods can clog like breads; but ok with chips  Thin or toasted bread works better  Liquids - no cough/choke  Drinks a lot of water daily ("a gallon")  Saliva gets more dry at night; he "always feels cold"  Gum chewing helps as well  After taking a drink, if bending over after; will have regurgitation through nasal cavity    2 cups coffee/day    Evals/workup - swallow studies, last one was approx 1-2 yrs  Tried swallow therapy with SLP ([address] [place] [address], swallow exercises, nothing manual/MFR type); not too much progress with this       Allergy- min seasonal symptoms;  Mild SOB, had inhaler for this      Reflux- no HB/regurgitation; No globus; Some mucous/throat clearing worse in AM; occasional dry cough, worse with talking; some hoarseness, worse with talking; No PND; No sore or dry throat; No sour/metallic taste     GI eval: colonoscopy; due for another within the year(?);  No EGD before  Dairy - avoids; more mucous/bothers throat  Gluten - no symptoms    Lyme? No hx    Trying to put on more weight; used to take weight gain/protein shakes  Runs [employer]  Has 6 children      Attended acupuncture -which may have helped swallow      VBS 3/4/2021  Oral- near normal  Pharyngeal- back and forth pharyngeal retropulsion, delayed swallow initiation, min BOT retraction, min hyolaryngeal elevation, reduced airway entrance closure  Transient penetration with puree, silent aspiration  Retention vallecula, posterior pharyngeal wall  Chin tuck helped somewhat  Effortful swallow increased amt of silent aspiration  Esophageal- no abnormalities    CXR 3/21/22  Resolution of right pneumonia  RML scar chronic    MRI cspine 8/24/21  Fatty marrow infiltration within the cervical spine and the proximal upper thoracic ribs with persistent asymmetric left lower cervical and upper thoracic muscular signal abnormality without a discrete soft tissue mass lesion  These changes raise the   question of prior cervical radiation therapy and possibly chronic left brachial plexitis with secondary muscular atrophy  These changes were also noted on the prior MRI brachial plexus study of November 28, 2018  Mild noncompressive degenerative discogenic disease at C5-C6        TSH 5/4/21 elevated    Takes synthroid 100mcg daily; no change was made in dose reportedly    Historical Information   Past Medical History:   Diagnosis Date    Cancer of base of tongue ([address] )     excision    Cough     Disease of thyroid gland     hypo    Dysphagia     ED (erectile dysfunction)     Hypertension     Leukopenia     Peyronie's disease     Psoriasis     Tongue cancer [facility] )     Weight loss, abnormal      Past Surgical History:   Procedure Laterality Date    OTHER SURGICAL HISTORY      infusion port inserted and removed    PEG TUBE PLACEMENT      and removal    AR COLONOSCOPY FLX DX W/COLLJ SPEC WHEN PFRMD N/A 2017    Procedure: COLONOSCOPY with polypectomy x2;  Surgeon: Tabitha Elder MD;  Location: [facility]; Service: General    TONGUE BIOPSY / EXCISION      Floor mouth excision of malignant tumor     Social History   Social History     Substance and Sexual Activity   Alcohol Use Yes    Alcohol/week: 12 0 standard drinks    Types: 12 Cans of beer per week    Comment: socially     Social History     Substance and Sexual Activity   Drug Use No     Social History     Tobacco Use   Smoking Status Former Smoker    Packs/day: 0 25    Years: 20 00    Pack years: 5 00    Quit date:     Years since quittin 6   Smokeless Tobacco Former User   Tobacco Comment    pt quit with dx of tongue base cancer, per allscripts     Family History   Problem Relation Age of Onset    Other Mother         reactive airway dysfunction    Coronary artery disease Father     Hypertension Father     Psoriasis Father        Meds/Allergies     Current Outpatient Medications on File Prior to Visit   Medication Sig Dispense Refill    levothyroxine 100 mcg tablet TAKE 1 TABLET BY MOUTH EVERY DAY 90 tablet 1    multivitamin (THERAGRAN) TABS Take 1 tablet by mouth daily      pentoxifylline (TRENtal) 400 mg ER tablet Take 1 tablet (400 mg total) by mouth 3 (three) times a day with meals 90 tablet 11    tadalafil (CIALIS) 5 MG tablet Take 1 tablet (5 mg total) by mouth daily 90 tablet 3     No current facility-administered medications on file prior to visit  No Known Allergies      Physical exam:     There were no vitals taken for this visit      Gen: NAD, cooperative, well nourished  Voice: mild raspy, some gravel/clearing  Head: normocephalic, atraumatic  Face: left eye twitch  TMJ: Nontender, no crepitus, no subluxation  Eyes: without edema or ecchymosis  Balance assessment: Gait steady  Nose: External nose without lesions  Nares patent  No pus  No polyps  Nasal septum mildly deviated  Inferior turbinates pink and without hypertrophy  Sinuses: No tenderness to palpation of maxillary and frontal sinuses  Oral cavity: No lesions/masses  Tongue mobile and soft  No abnormality of parotid ducts  Oropharynx: No lesions/masses  mild cobblestoning  Tonsils without ulceration or exudate  Neck: No LAD  No masses  Left side diffusely stiff/fibrotic with dec ROM  Salivary glands: Parotids nontender, non-enlarged  Submandibular glands nontender, non-enlarged  Thyroid: WIthout hypertrophy  No palpable nodules  Nontender  Neuro: Alert  CN III-VI, XII intact; difficulty raising left arm above shoulder, left facial weakness (eye twitch), dec elevation soft palate  Larynx: Inadequate view of the hypopharynx and larynx with indirect laryngoscopy  Pulm: CTAB, nonlabored, no stridor  CV: RRR  Extremities warm/perfused  Abd soft/nontender      Procedures  Procedure:     Strobovideolaryngoscopy (23055)    Pre-Operative Diagnosis:  1  Dysphonia   2  Dysphagia   3  Hx SCCA BOT s/p chemo/RT ~2012 [facility]  4  Left vf immobility  5  Glottic insufficiency  6  Left hemilarynx with mod edema/fibrosis  7  Min erythema  8  No pooling of secretions  9  Narrow/crowding throughout  10  No laryngeal sensation left hemilarynx, right side intact  11  Small lesion 4-5mm laryngeal surface of epiglottis right of midline, leukoplakic/hypervascular    Post-Operative Diagnosis:    1  SAME  2  Stable epiglottic lesion  3  Stable larynx    Surgeon:   Anthony Mixon MD    Anesthesia:     -Lidocaine 2%  -Oxymetazoline    Stroboscope:  Atmos  Flexible Endoscope:    chip tip  Rigid endoscope:      Operative Details:   The procedure was performed in the endoscopy special procedures room  A high resolution video laryngoscope was inserted transnasally or orally and suspended from the video system for magnification and documentation  Stroboscopic light at several frequencies and intensities was used  Voice: midl raspy, some gravel/clearing    Supraglottic Hyperfunction:    present, mild ant/post  Arytenoid Joint Movement:    Normal  Dysdiadochokinesis:     Absent  Arytenoids:   mild erythema, mod edema L>R  Posterior Cobblestoning:  present      Right Vocal Fold:  Abduction:    Normal  Adduction:    Normal  Longitudinal Tension:   Normal    Left Vocal Fold:  Abduction:    absent  Adduction:    absent  Longitudinal Tension:   dec    Strobovideolaryngoscopy with Magnification    Amplitude Symmetry:   Symmetric  Phase Symmetry:    Symmetric  Periodicity:    Regular    Glottic Closure:    incomplete    Vocal Process Height:    Equal    Amplitude of Vocal Folds:  Right: Normal  Left: Normal    Wave Form of Vocal Folds:  Right: Normal  Left: Normal    Vibratory Function of Vocal Folds:  Right: Normal  Left: Normal    Vocal Fold Color:  Right: Normal  Left: Normal    Masses/Vibratory Margin Irregularities: Mild Valerio's edema  Left hemilarynx edema/fibrosis  Other Lesions: small raised leukoplakic lesion epiglottis 4-5mm    The procedure was concluded without complication and the laryngoscope was removed  Reflux Finding Score (RFS)  Subglottic Edema:   2  Ventricular Obliteration:   2-4 L>R    Erythema/Hyperemia:    2 min  Vocal Fold Edema:   1  Diffuse Laryngeal Edema:   2 L>R   Posterior Commissure Hypertrophy:   2     Granuloma/Granulation:   0  Thick Endolaryngeal Mucus:  0     Total: 11      Assessment:    Diagnosis ICD-10-CM Associated Orders   1  Dysphonia  R49 0    2  Pharyngoesophageal dysphagia  R13 14    3  Velopharyngeal insufficiency, acquired  K13 79    4  Lesion of epiglottis  J38 7    5   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  J38 01 6  Glottic insufficiency  J38 3    7  Hypothyroidism, unspecified type  E03 9    8  ERIK (obstructive sleep apnea)  G47 33    9  Laryngeal edema  J38 4    10  Cranial neuropathies, multiple  G52 7    11  Psoriasis  L40 9    12  Scar of neck  L90 5    13  Hx of squamous cell carcinoma-BOT, s/p chemo/RT 2012  Z85 89    14  Muscle tension dysphonia  R49 0    15  Hx of malignant neoplasm of head and neck  Z85 89    16  Hx of head and neck radiation  Z92 3    17  Facial weakness  R29 810          Plan:    Sequelae from hx of scca BOT treated with chemo/RT in 2012  Multiple cranial neuropathies  Left vf paralysis/immobility and left hemilaryngeal anesthesia  Epiglottis lesion  Velopharyngeal insufficiency    Discussed surgery incl RBA of MDL, excision/biopsy of epiglottis lesion, possible vf injection (Restylane), posterior pharyngeal wall injection (CAHA), flexible esophagoscopy with possible dilation  Risks discussed include but not limited to hoarseness, need for additional surgery, pain, bleeding, infection, airway obstruction, oral/pharyngeal/esophageal injury, tongue pain/swelling/numbness      Medical clearance    Trental    SLP for MFR/swallow as scheduled    Trental    CPAP    MRI as scheduled    Labs    On hold   Restech off meds - did not tolerate placement; can also consider Bravo    He will return to my office postop

## 2022-09-02 ENCOUNTER — CLINICAL SUPPORT (OUTPATIENT)
Dept: FAMILY MEDICINE CLINIC | Facility: CLINIC | Age: 57
End: 2022-09-02
Payer: COMMERCIAL

## 2022-09-02 ENCOUNTER — TELEPHONE (OUTPATIENT)
Dept: FAMILY MEDICINE CLINIC | Facility: CLINIC | Age: 57
End: 2022-09-02

## 2022-09-02 VITALS
DIASTOLIC BLOOD PRESSURE: 104 MMHG | TEMPERATURE: 98.3 F | HEIGHT: 69 IN | WEIGHT: 155 LBS | HEART RATE: 68 BPM | BODY MASS INDEX: 22.96 KG/M2 | SYSTOLIC BLOOD PRESSURE: 168 MMHG | OXYGEN SATURATION: 98 %

## 2022-09-02 DIAGNOSIS — I10 ESSENTIAL HYPERTENSION: Primary | ICD-10-CM

## 2022-09-02 PROCEDURE — 99211 OFF/OP EST MAY X REQ PHY/QHP: CPT | Performed by: FAMILY MEDICINE

## 2022-09-02 RX ORDER — AMLODIPINE BESYLATE 5 MG/1
5 TABLET ORAL DAILY
Qty: 30 TABLET | Refills: 5 | Status: SHIPPED | OUTPATIENT
Start: 2022-09-02 | End: 2022-09-26

## 2022-09-02 NOTE — TELEPHONE ENCOUNTER
Patient's wife called and asked if Coco Son should discontinue taking the Trental    She states he cannot remember if you told him to do that

## 2022-09-02 NOTE — PROGRESS NOTES
Patient present to office for blood pressure check  Patient blood pressure was check on left arm sitting was 178/104  Patient recheck his blood pressure with his personal machine was 161/93  After waiting 10- 15 minutes patient blood pressure was 168/104; patient personal machine was 178/110  Dr Buster Piedra advised and personally met with patient to discuss further treatment  As per Dr Buster Piedra patient will stop taking Trental and start taking Amlodipine 5 mg  Patient will take 10 off systolic and 6 off diastolic, continue monitoring blood pressure at home  Patient should  Call the office in 2 weeks with results  Per Dr Buster Piedra patient verbalized an understanding

## 2022-09-02 NOTE — TELEPHONE ENCOUNTER
I want him to hold the Trental  I cannot find anything about it raising BP, but his did go up since starting it    I will let his ENT know

## 2022-09-06 ENCOUNTER — EVALUATION (OUTPATIENT)
Dept: SPEECH THERAPY | Facility: CLINIC | Age: 57
End: 2022-09-06
Payer: COMMERCIAL

## 2022-09-06 DIAGNOSIS — J38.01 PARALYSIS OF LEFT VOCAL FOLD: ICD-10-CM

## 2022-09-06 DIAGNOSIS — R49.0 DYSPHONIA: ICD-10-CM

## 2022-09-06 DIAGNOSIS — Z92.3 HX OF HEAD AND NECK RADIATION: ICD-10-CM

## 2022-09-06 DIAGNOSIS — R13.13 PHARYNGEAL DYSPHAGIA: Primary | ICD-10-CM

## 2022-09-06 DIAGNOSIS — Z85.89 HX OF MALIGNANT NEOPLASM OF HEAD AND NECK: ICD-10-CM

## 2022-09-06 PROCEDURE — 92610 EVALUATE SWALLOWING FUNCTION: CPT

## 2022-09-06 NOTE — PROGRESS NOTES
Speech-Language Pathology Initial Evaluation    Today's date: 2022  Patients name: Bismark Marquez Sr   : 1965  MRN: [id number]  Safety measures: post HN CA  Current Diet: Regular/thin  Referring provider: Rocio German MD    Primary diagnosis/billing code: R13 13  Secondary diagnosis/billing code: J38 01, Z92 3    Visit tracking:  -Referring provider: Epic  -Billing guidelines: AMA  -Visit #1 [address]: [employer]et[employer] (20%)  -RE due 2022    Subjective comments: "I can eat whatever I want, it just depends on how much time and patience I have"    How did the patient hear about us? Physician    Patient's goal(s): Patient looking to try anything that would help improve his swallowing/QOL    Reason for referral: Difficulty swallowing solids or liquids  Prior functional status: Swallowing effective with use of compensatory strategies  Clinically complex situations: Mental/behavioral diagnosis affecting rate of recovery    History: Patient is a 59 y o  male who was referred to outpatient skilled Speech Therapy services for a dysphagia evaluation  Patient was dx with SCCA of BOT in   He was treated with definitive chemo/RT  Patient does not report going through any kind of swallowing therapy  He recently sought the advice of Dr Juhi Samson, ENT to possibly help with his c/o dysphagia  Patient is scheduled for a pharyngeal wall injection, VF injection, and esophageal dilation next Friday  His weight remains stable; he reports starting up more protein shakes recently  3/4/21 VBSS  Oral Stage: WFL Pt had adequate bolus retrieval, and mastication of soft solids  Pt had good bolus formation and good oral control of liquids  Mildly reduced velar elevation  No oral residue noted       Pharyngeal Stage: severly impaired  No premature spill noted  Back and forth pharyngeal retropulsion to the oral cavity noted w/ soft solids and purees > liquids  Delayed swallow initiation of all consistencies   Minimal BOT retraction, no epiglottic inversion, minimal hyolaryngeal elevation, and reduced airway entrance closure  Transient penetration of purees  Pt had trace penetration and silent aspiration of HTL, NTL, and thin liquids during the swallow and after the swallow on vocal fold retention  Mild vallecular and posterior pharyngeal wall retention of soft solids, cleared w/ HTL  Chin tuck strategy increased airway entrance closure but did not eliminate penetration or silent aspiration w/ HTL  Effortful swallow increased amount of silent aspiration w/ HTL       Esophageal Stage: Briefly assessed  No over abnormalities noted       Assessment Summary: Pt presents w/ severe pharyngeal dysphagia characterized by no epiglottic inversion, minimal BOT retraction, minimal hyolaryngeal elevation, reduced airway entrance closure, and trace amounts of penetration and silent aspiration during and after swallowing thick and thin liquids  Pt is at high risk of developing aspiration pneumonia  Lengthy discussion was had w/ patient to review results of VBS and recommendations       Recommendations:   Soft, moist foods  Continue thin liquids per previous discussion and pt's preference  Pills crushed  Careful monitoring of any development of aspiration pneumonia symptoms  Outpatient dysphagia tx  Aspiration precautions  Consider alternative means of nutrition      Hearing: [facility] for testing  Vision: [facility] for testing    Home environment/lifestyle: home with wife and one child (in HS)   Has 6 children total    Highest level of education: unknown  Vocational status: works at [employer] status: Alert  Behavior status: Cooperative  Communication modalities: Verbal  Rehabilitation prognosis: Good rehab potential to reach the established goals    Assessments      DYSPHAGIA EVALUATION:    -Reason for referral: Signs/symptoms of dysphagia    -Subjective report of swallowing difficulty: Coughing, Globus sensation  and Regurgitation of food    -Difficulty swallowing: Solids and Pills    -Current diet (solids): Regular  -Current diet (liquids): Thin  -Current pill intake method: whole with thin liquid (one at a time)  -Alternative Feeding Method?: No    Functional Oral Intake Scale (FOIS): 6 - total oral intake with no special preparation, but must avoid specific foods or liquid items    Eating Assessment Tool (EAT-10) score:  21/40        -Facial appearance Symmetrical   -Dentition Adequate   -Labial function WFL   -Lingual function WFL   -Velar function Symmetrical   -Trismus (i e , limited jaw opening) Absent (measurement: 4cm)   -Lymphedema Absent   -Xerostomia  Present    -Neoplastic pain Absent    -Odynophagia (i e , pain with swallowing) Absent    -Mucositis  Absent   -Dysgeusia (i e , altered taste)  Absent       SWALLOWING DIAGNOSTIC IMPRESSION:  -Swallowing diagnosis/severity: severe pharyngeal phase dysphagia    -Safety concerns: Risk for aspiration    -Risk factors: History of Head and Neck Cancer and Xerostomia      Goals    Short Term Goals    Patient will identify regions of sensitivity (if any) and participate in relaxation and stretching/release exercises with clinician to reduce globus sensation and laryngeal tightness (to be achieved in 4-6 weeks)  Patient will decrease laryngeal and cervical muscle tension by independently completing relaxation/stretching exercises, to be achieved in 6-8 weeks  Long Term Goals    Patient will receive a videofluoroscopic swallow study (VFSS) to fully assess physiology and anatomy of the swallow and to determine the appropriate diet and/or rehabilitation exercises by discharge  Patient will maintain adequate hydration and nutrition with optimum safety and efficacy of swallowing function on P O  intake without overt s/sx of penetration or aspiration by discharge         Impressions/Recommendations    Impressions:   -Patient presents with severe pharyngeal dysphagia characterized by no epiglottic inversion, minimal BOT retraction, minimal hyolaryngeal elevation, reduced airway entrance closure, and trace amounts of penetration and silent aspiration during and after swallowing thick and thin liquids  Pt is at high risk of developing aspiration pneumonia  Patient was recommended to trial MFR (myofacial release) treatment for voice and swallowing by his ENT; he will also be undergoing injections and esophageal dilations next Friday in hopes to improve swallow outcomes  Recommendations:  -Patient would benefit from outpatient skilled Speech Therapy services: Dysphagia therapy - MFR  Due to patient's current symptoms he/she may benefit from Myofascial Release treatment (MFR) for voice and swallowing  This is a manual therapy technique through myofascial release (stimulation/pressure/stretch) to address patient's symptoms, and will be completed unless otherwise contraindicated         -Frequency: 1x weekly  -Duration: 6-8 weeks    -Intervention certification from: 7/7/4606  -Intervention certification to: 80/1/3544

## 2022-09-07 ENCOUNTER — HOSPITAL ENCOUNTER (OUTPATIENT)
Dept: MRI IMAGING | Facility: CLINIC | Age: 57
Discharge: HOME | End: 2022-09-07
Payer: COMMERCIAL

## 2022-09-07 DIAGNOSIS — J38.01 PARALYSIS OF LEFT VOCAL FOLD: ICD-10-CM

## 2022-09-07 DIAGNOSIS — R29.810 FACIAL WEAKNESS: ICD-10-CM

## 2022-09-07 PROCEDURE — G1004 CDSM NDSC: HCPCS

## 2022-09-07 PROCEDURE — 70553 MRI BRAIN STEM W/O & W/DYE: CPT

## 2022-09-07 PROCEDURE — A9585 GADOBUTROL INJECTION: HCPCS | Performed by: OTOLARYNGOLOGY

## 2022-09-07 RX ADMIN — GADOBUTROL 7 ML: 604.72 INJECTION INTRAVENOUS at 14:41

## 2022-09-08 ENCOUNTER — APPOINTMENT (OUTPATIENT)
Dept: LAB | Facility: MEDICAL CENTER | Age: 57
End: 2022-09-08
Payer: COMMERCIAL

## 2022-09-08 DIAGNOSIS — I10 ESSENTIAL HYPERTENSION: Primary | ICD-10-CM

## 2022-09-08 DIAGNOSIS — R49.0 DYSPHONIA: ICD-10-CM

## 2022-09-08 DIAGNOSIS — J38.3 GLOTTIC INSUFFICIENCY: ICD-10-CM

## 2022-09-08 DIAGNOSIS — R13.10 DYSPHAGIA, UNSPECIFIED TYPE: ICD-10-CM

## 2022-09-08 DIAGNOSIS — I10 ESSENTIAL HYPERTENSION: ICD-10-CM

## 2022-09-08 LAB
ALBUMIN SERPL BCP-MCNC: 3.6 G/DL (ref 3.5–5)
ALP SERPL-CCNC: 71 U/L (ref 46–116)
ALT SERPL W P-5'-P-CCNC: 35 U/L (ref 12–78)
ANION GAP SERPL CALCULATED.3IONS-SCNC: 2 MMOL/L (ref 4–13)
AST SERPL W P-5'-P-CCNC: 20 U/L (ref 5–45)
BACTERIA UR QL AUTO: ABNORMAL /HPF
BASOPHILS # BLD AUTO: 0.01 THOUSANDS/ΜL (ref 0–0.1)
BASOPHILS NFR BLD AUTO: 0 % (ref 0–1)
BILIRUB SERPL-MCNC: 0.67 MG/DL (ref 0.2–1)
BILIRUB UR QL STRIP: NEGATIVE
BUN SERPL-MCNC: 12 MG/DL (ref 5–25)
CALCIUM SERPL-MCNC: 9.5 MG/DL (ref 8.3–10.1)
CHLORIDE SERPL-SCNC: 101 MMOL/L (ref 96–108)
CLARITY UR: CLEAR
CO2 SERPL-SCNC: 33 MMOL/L (ref 21–32)
COLOR UR: YELLOW
CREAT SERPL-MCNC: 0.71 MG/DL (ref 0.6–1.3)
EOSINOPHIL # BLD AUTO: 0.15 THOUSAND/ΜL (ref 0–0.61)
EOSINOPHIL NFR BLD AUTO: 4 % (ref 0–6)
ERYTHROCYTE [DISTWIDTH] IN BLOOD BY AUTOMATED COUNT: 12.3 % (ref 11.6–15.1)
GFR SERPL CREATININE-BSD FRML MDRD: 102 ML/MIN/1.73SQ M
GLUCOSE P FAST SERPL-MCNC: 90 MG/DL (ref 65–99)
GLUCOSE UR STRIP-MCNC: NEGATIVE MG/DL
HCT VFR BLD AUTO: 46.9 % (ref 36.5–49.3)
HGB BLD-MCNC: 15.1 G/DL (ref 12–17)
HGB UR QL STRIP.AUTO: NEGATIVE
IMM GRANULOCYTES # BLD AUTO: 0.02 THOUSAND/UL (ref 0–0.2)
IMM GRANULOCYTES NFR BLD AUTO: 1 % (ref 0–2)
KETONES UR STRIP-MCNC: NEGATIVE MG/DL
LEUKOCYTE ESTERASE UR QL STRIP: NEGATIVE
LYMPHOCYTES # BLD AUTO: 0.7 THOUSANDS/ΜL (ref 0.6–4.47)
LYMPHOCYTES NFR BLD AUTO: 20 % (ref 14–44)
MCH RBC QN AUTO: 31 PG (ref 26.8–34.3)
MCHC RBC AUTO-ENTMCNC: 32.2 G/DL (ref 31.4–37.4)
MCV RBC AUTO: 96 FL (ref 82–98)
MONOCYTES # BLD AUTO: 0.44 THOUSAND/ΜL (ref 0.17–1.22)
MONOCYTES NFR BLD AUTO: 12 % (ref 4–12)
MUCOUS THREADS UR QL AUTO: ABNORMAL
NEUTROPHILS # BLD AUTO: 2.24 THOUSANDS/ΜL (ref 1.85–7.62)
NEUTS SEG NFR BLD AUTO: 63 % (ref 43–75)
NITRITE UR QL STRIP: NEGATIVE
NON-SQ EPI CELLS URNS QL MICRO: ABNORMAL /HPF
NRBC BLD AUTO-RTO: 0 /100 WBCS
PH UR STRIP.AUTO: 6 [PH]
PLATELET # BLD AUTO: 286 THOUSANDS/UL (ref 149–390)
PMV BLD AUTO: 9.7 FL (ref 8.9–12.7)
POTASSIUM SERPL-SCNC: 3.7 MMOL/L (ref 3.5–5.3)
PROT SERPL-MCNC: 7.9 G/DL (ref 6.4–8.4)
PROT UR STRIP-MCNC: ABNORMAL MG/DL
RBC # BLD AUTO: 4.87 MILLION/UL (ref 3.88–5.62)
RBC #/AREA URNS AUTO: ABNORMAL /HPF
RHEUMATOID FACT SER QL LA: NEGATIVE
SODIUM SERPL-SCNC: 136 MMOL/L (ref 135–147)
SP GR UR STRIP.AUTO: 1.02 (ref 1–1.03)
UROBILINOGEN UR STRIP-ACNC: <2 MG/DL
WBC # BLD AUTO: 3.56 THOUSAND/UL (ref 4.31–10.16)
WBC #/AREA URNS AUTO: ABNORMAL /HPF

## 2022-09-08 PROCEDURE — 86147 CARDIOLIPIN ANTIBODY EA IG: CPT

## 2022-09-08 PROCEDURE — 36415 COLL VENOUS BLD VENIPUNCTURE: CPT

## 2022-09-08 PROCEDURE — 81001 URINALYSIS AUTO W/SCOPE: CPT | Performed by: FAMILY MEDICINE

## 2022-09-08 PROCEDURE — 86200 CCP ANTIBODY: CPT

## 2022-09-08 PROCEDURE — 86376 MICROSOMAL ANTIBODY EACH: CPT

## 2022-09-08 PROCEDURE — 86235 NUCLEAR ANTIGEN ANTIBODY: CPT

## 2022-09-08 PROCEDURE — 86431 RHEUMATOID FACTOR QUANT: CPT

## 2022-09-08 PROCEDURE — 80053 COMPREHEN METABOLIC PANEL: CPT

## 2022-09-08 PROCEDURE — 83519 RIA NONANTIBODY: CPT

## 2022-09-08 PROCEDURE — 86225 DNA ANTIBODY NATIVE: CPT

## 2022-09-08 PROCEDURE — 86038 ANTINUCLEAR ANTIBODIES: CPT

## 2022-09-08 PROCEDURE — 85025 COMPLETE CBC W/AUTO DIFF WBC: CPT

## 2022-09-08 PROCEDURE — 86160 COMPLEMENT ANTIGEN: CPT

## 2022-09-08 PROCEDURE — 86430 RHEUMATOID FACTOR TEST QUAL: CPT

## 2022-09-08 PROCEDURE — 86256 FLUORESCENT ANTIBODY TITER: CPT

## 2022-09-08 RX ORDER — VALSARTAN 80 MG/1
80 TABLET ORAL DAILY
Qty: 30 TABLET | Refills: 5 | Status: SHIPPED | OUTPATIENT
Start: 2022-09-08 | End: 2022-09-13 | Stop reason: ALTCHOICE

## 2022-09-09 LAB — CCP AB SER IA-ACNC: 1.8

## 2022-09-12 LAB — ACHR BIND AB SER-SCNC: <0.03 NMOL/L (ref 0–0.24)

## 2022-09-13 NOTE — PRE-PROCEDURE INSTRUCTIONS
Pre-Surgery Instructions:   Medication Instructions    amLODIPine (NORVASC) 5 mg tablet Instructed to take per normal schedule including DOS with sips water    levothyroxine 100 mcg tablet Instructed to take per normal schedule including DOS with sips water    pentoxifylline (TRENtal) 400 mg ER tablet Take per normal schedule     tadalafil (CIALIS) 5 MG tablet Stop taking 1 day prior to surgery  Pre-op medication, and showering instructions with antibacteral soap reviewed  Instructed to avoid all ASA/NSAIDs and OTC Vit/Supp from now until after surgery per anesthesia guidelines  Tylenol ok prn  Pt  Verbalized an understanding of all instructions reviewed and offers no concerns at this time

## 2022-09-15 ENCOUNTER — ANESTHESIA EVENT (OUTPATIENT)
Dept: PERIOP | Facility: HOSPITAL | Age: 57
End: 2022-09-15
Payer: COMMERCIAL

## 2022-09-15 PROBLEM — K13.79 VELOPHARYNGEAL INSUFFICIENCY, ACQUIRED: Status: ACTIVE | Noted: 2022-09-15

## 2022-09-15 PROBLEM — L90.5 SCAR OF NECK: Status: ACTIVE | Noted: 2022-09-15

## 2022-09-15 PROBLEM — Z92.3 HX OF HEAD AND NECK RADIATION: Status: ACTIVE | Noted: 2022-09-15

## 2022-09-15 PROBLEM — Z85.89: Status: ACTIVE | Noted: 2022-09-15

## 2022-09-15 PROBLEM — R49.0 DYSPHONIA: Status: ACTIVE | Noted: 2022-09-15

## 2022-09-15 PROBLEM — J38.01 PARALYSIS OF LEFT VOCAL FOLD: Status: ACTIVE | Noted: 2022-09-15

## 2022-09-15 PROBLEM — G52.7 CRANIAL NEUROPATHIES, MULTIPLE: Status: ACTIVE | Noted: 2022-09-15

## 2022-09-15 PROBLEM — R06.02 SHORTNESS OF BREATH: Status: ACTIVE | Noted: 2022-09-15

## 2022-09-15 PROBLEM — R49.0 MUSCLE TENSION DYSPHONIA: Status: ACTIVE | Noted: 2022-09-15

## 2022-09-15 PROBLEM — J38.3 GLOTTIC INSUFFICIENCY: Status: ACTIVE | Noted: 2022-09-15

## 2022-09-15 PROBLEM — J38.7 LESION OF EPIGLOTTIS: Status: ACTIVE | Noted: 2022-09-15

## 2022-09-15 PROBLEM — R29.810 FACIAL WEAKNESS: Status: ACTIVE | Noted: 2022-09-15

## 2022-09-15 PROBLEM — J38.4 LARYNGEAL EDEMA: Status: ACTIVE | Noted: 2022-09-15

## 2022-09-16 ENCOUNTER — HOSPITAL ENCOUNTER (OUTPATIENT)
Facility: HOSPITAL | Age: 57
Setting detail: OUTPATIENT SURGERY
Discharge: HOME | End: 2022-09-16
Attending: OTOLARYNGOLOGY | Admitting: OTOLARYNGOLOGY
Payer: COMMERCIAL

## 2022-09-16 ENCOUNTER — ANESTHESIA (OUTPATIENT)
Dept: PERIOP | Facility: HOSPITAL | Age: 57
End: 2022-09-16
Payer: COMMERCIAL

## 2022-09-16 VITALS
BODY MASS INDEX: 22.96 KG/M2 | RESPIRATION RATE: 16 BRPM | OXYGEN SATURATION: 97 % | TEMPERATURE: 97.3 F | DIASTOLIC BLOOD PRESSURE: 68 MMHG | HEIGHT: 69 IN | HEART RATE: 61 BPM | SYSTOLIC BLOOD PRESSURE: 107 MMHG | WEIGHT: 155 LBS

## 2022-09-16 DIAGNOSIS — G89.18 POSTOPERATIVE PAIN: Primary | ICD-10-CM

## 2022-09-16 DIAGNOSIS — J38.3 GLOTTIC INSUFFICIENCY: ICD-10-CM

## 2022-09-16 DIAGNOSIS — R13.10 DYSPHAGIA, UNSPECIFIED TYPE: ICD-10-CM

## 2022-09-16 DIAGNOSIS — R49.0 DYSPHONIA: ICD-10-CM

## 2022-09-16 PROBLEM — T88.4XXA DIFFICULT AIRWAY FOR INTUBATION: Status: ACTIVE | Noted: 2022-09-16

## 2022-09-16 PROBLEM — R13.14 PHARYNGOESOPHAGEAL DYSPHAGIA: Status: ACTIVE | Noted: 2019-04-20

## 2022-09-16 LAB — ACHR BLOCK AB/ACHR TOTAL SFR SER: 17 % (ref 0–25)

## 2022-09-16 PROCEDURE — L8607 INJ VOCAL CORD BULKING AGENT: HCPCS | Performed by: OTOLARYNGOLOGY

## 2022-09-16 PROCEDURE — 88312 SPECIAL STAINS GROUP 1: CPT | Performed by: PATHOLOGY

## 2022-09-16 PROCEDURE — 31571 LARYNGOSCOP W/VC INJ + SCOPE: CPT | Performed by: OTOLARYNGOLOGY

## 2022-09-16 PROCEDURE — 88305 TISSUE EXAM BY PATHOLOGIST: CPT | Performed by: PATHOLOGY

## 2022-09-16 PROCEDURE — 43202 ESOPHAGOSCOPY FLEX BIOPSY: CPT | Performed by: OTOLARYNGOLOGY

## 2022-09-16 PROCEDURE — 31540 LARYNGOSCOPY W/EXC OF TUMOR: CPT | Performed by: OTOLARYNGOLOGY

## 2022-09-16 DEVICE — PROLARYN PLUS IS A WATER BASED INJECTABLE GEL IMPLANT USED TO TREAT VOCAL FOLD INSUFFICIENCY. THE PRINCIPLE COMPONENT OF PROLARYN PLUS IS SYNTHETIC CALCIUM HYDROXYAPATITE, A BIOMATERIAL FOUND IN BONE AND TEETH. INJECTION WITH PROLARYN PLUS AUGMENTS OR BULKS UPS THE DISPLACED OR DAMAGED VOCAL FOLD SO THAT IT CAN IMPROVE SPEAKING. THE RESULT IS LONG TERM RESTORATION AND AUGMENTATION. PROLARYN PLUS CAN BE INJECTED WITH A 26 GAUGE OR LARGER DIAMETER THIN-WALL-NEEDLE.
Type: IMPLANTABLE DEVICE | Site: THROAT | Status: FUNCTIONAL
Brand: PROLARYN PLUS INJECTABLE IMPLANT

## 2022-09-16 RX ORDER — SODIUM CHLORIDE, SODIUM LACTATE, POTASSIUM CHLORIDE, CALCIUM CHLORIDE 600; 310; 30; 20 MG/100ML; MG/100ML; MG/100ML; MG/100ML
INJECTION, SOLUTION INTRAVENOUS CONTINUOUS PRN
Status: DISCONTINUED | OUTPATIENT
Start: 2022-09-16 | End: 2022-09-16

## 2022-09-16 RX ORDER — DEXAMETHASONE SODIUM PHOSPHATE 10 MG/ML
INJECTION, SOLUTION INTRAMUSCULAR; INTRAVENOUS AS NEEDED
Status: DISCONTINUED | OUTPATIENT
Start: 2022-09-16 | End: 2022-09-16 | Stop reason: HOSPADM

## 2022-09-16 RX ORDER — HYDROMORPHONE HCL/PF 1 MG/ML
0.5 SYRINGE (ML) INJECTION
Status: DISCONTINUED | OUTPATIENT
Start: 2022-09-16 | End: 2022-09-16 | Stop reason: HOSPADM

## 2022-09-16 RX ORDER — LIDOCAINE HYDROCHLORIDE 10 MG/ML
INJECTION, SOLUTION EPIDURAL; INFILTRATION; INTRACAUDAL; PERINEURAL AS NEEDED
Status: DISCONTINUED | OUTPATIENT
Start: 2022-09-16 | End: 2022-09-16

## 2022-09-16 RX ORDER — ROCURONIUM BROMIDE 10 MG/ML
INJECTION, SOLUTION INTRAVENOUS AS NEEDED
Status: DISCONTINUED | OUTPATIENT
Start: 2022-09-16 | End: 2022-09-16

## 2022-09-16 RX ORDER — MAGNESIUM HYDROXIDE 1200 MG/15ML
LIQUID ORAL AS NEEDED
Status: DISCONTINUED | OUTPATIENT
Start: 2022-09-16 | End: 2022-09-16 | Stop reason: HOSPADM

## 2022-09-16 RX ORDER — HYDROCODONE BITARTRATE AND ACETAMINOPHEN 2.5; 108 MG/5ML; MG/5ML
10 SOLUTION ORAL EVERY 4 HOURS PRN
Status: DISCONTINUED | OUTPATIENT
Start: 2022-09-16 | End: 2022-09-16 | Stop reason: HOSPADM

## 2022-09-16 RX ORDER — ONDANSETRON 2 MG/ML
4 INJECTION INTRAMUSCULAR; INTRAVENOUS ONCE AS NEEDED
Status: DISCONTINUED | OUTPATIENT
Start: 2022-09-16 | End: 2022-09-16 | Stop reason: HOSPADM

## 2022-09-16 RX ORDER — DEXAMETHASONE SODIUM PHOSPHATE 10 MG/ML
INJECTION, SOLUTION INTRAMUSCULAR; INTRAVENOUS AS NEEDED
Status: DISCONTINUED | OUTPATIENT
Start: 2022-09-16 | End: 2022-09-16

## 2022-09-16 RX ORDER — PROMETHAZINE HYDROCHLORIDE 25 MG/ML
12.5 INJECTION, SOLUTION INTRAMUSCULAR; INTRAVENOUS ONCE AS NEEDED
Status: DISCONTINUED | OUTPATIENT
Start: 2022-09-16 | End: 2022-09-16 | Stop reason: HOSPADM

## 2022-09-16 RX ORDER — FENTANYL CITRATE 50 UG/ML
INJECTION, SOLUTION INTRAMUSCULAR; INTRAVENOUS AS NEEDED
Status: DISCONTINUED | OUTPATIENT
Start: 2022-09-16 | End: 2022-09-16

## 2022-09-16 RX ORDER — PROPOFOL 10 MG/ML
INJECTION, EMULSION INTRAVENOUS AS NEEDED
Status: DISCONTINUED | OUTPATIENT
Start: 2022-09-16 | End: 2022-09-16

## 2022-09-16 RX ORDER — FENTANYL CITRATE/PF 50 MCG/ML
25 SYRINGE (ML) INJECTION
Status: DISCONTINUED | OUTPATIENT
Start: 2022-09-16 | End: 2022-09-16 | Stop reason: HOSPADM

## 2022-09-16 RX ORDER — MEPERIDINE HYDROCHLORIDE 25 MG/ML
12.5 INJECTION INTRAMUSCULAR; INTRAVENOUS; SUBCUTANEOUS
Status: DISCONTINUED | OUTPATIENT
Start: 2022-09-16 | End: 2022-09-16 | Stop reason: HOSPADM

## 2022-09-16 RX ORDER — SODIUM CHLORIDE, SODIUM LACTATE, POTASSIUM CHLORIDE, CALCIUM CHLORIDE 600; 310; 30; 20 MG/100ML; MG/100ML; MG/100ML; MG/100ML
125 INJECTION, SOLUTION INTRAVENOUS CONTINUOUS
Status: DISCONTINUED | OUTPATIENT
Start: 2022-09-16 | End: 2022-09-16 | Stop reason: HOSPADM

## 2022-09-16 RX ORDER — MIDAZOLAM HYDROCHLORIDE 2 MG/2ML
INJECTION, SOLUTION INTRAMUSCULAR; INTRAVENOUS AS NEEDED
Status: DISCONTINUED | OUTPATIENT
Start: 2022-09-16 | End: 2022-09-16

## 2022-09-16 RX ORDER — EPHEDRINE SULFATE 50 MG/ML
INJECTION INTRAVENOUS AS NEEDED
Status: DISCONTINUED | OUTPATIENT
Start: 2022-09-16 | End: 2022-09-16

## 2022-09-16 RX ORDER — LIDOCAINE HYDROCHLORIDE 10 MG/ML
0.5 INJECTION, SOLUTION EPIDURAL; INFILTRATION; INTRACAUDAL; PERINEURAL ONCE
Status: COMPLETED | OUTPATIENT
Start: 2022-09-16 | End: 2022-09-16

## 2022-09-16 RX ORDER — OXYMETAZOLINE HYDROCHLORIDE 0.05 G/100ML
SPRAY NASAL AS NEEDED
Status: DISCONTINUED | OUTPATIENT
Start: 2022-09-16 | End: 2022-09-16 | Stop reason: HOSPADM

## 2022-09-16 RX ORDER — ONDANSETRON 2 MG/ML
INJECTION INTRAMUSCULAR; INTRAVENOUS AS NEEDED
Status: DISCONTINUED | OUTPATIENT
Start: 2022-09-16 | End: 2022-09-16

## 2022-09-16 RX ORDER — ALBUTEROL SULFATE 2.5 MG/3ML
2.5 SOLUTION RESPIRATORY (INHALATION) ONCE AS NEEDED
Status: DISCONTINUED | OUTPATIENT
Start: 2022-09-16 | End: 2022-09-16 | Stop reason: HOSPADM

## 2022-09-16 RX ADMIN — FENTANYL CITRATE 100 MCG: 50 INJECTION INTRAMUSCULAR; INTRAVENOUS at 09:32

## 2022-09-16 RX ADMIN — EPHEDRINE SULFATE 25 MG: 50 INJECTION INTRAVENOUS at 08:40

## 2022-09-16 RX ADMIN — ROCURONIUM BROMIDE 50 MG: 50 INJECTION, SOLUTION INTRAVENOUS at 08:40

## 2022-09-16 RX ADMIN — EPHEDRINE SULFATE 10 MG: 50 INJECTION INTRAVENOUS at 08:58

## 2022-09-16 RX ADMIN — PHENYLEPHRINE HYDROCHLORIDE 20 MCG/MIN: 10 INJECTION INTRAVENOUS at 08:54

## 2022-09-16 RX ADMIN — SODIUM CHLORIDE, SODIUM LACTATE, POTASSIUM CHLORIDE, AND CALCIUM CHLORIDE: .6; .31; .03; .02 INJECTION, SOLUTION INTRAVENOUS at 08:33

## 2022-09-16 RX ADMIN — Medication 25 MCG: at 10:41

## 2022-09-16 RX ADMIN — Medication 25 MCG: at 10:57

## 2022-09-16 RX ADMIN — PROPOFOL 200 MG: 10 INJECTION, EMULSION INTRAVENOUS at 08:39

## 2022-09-16 RX ADMIN — LIDOCAINE HYDROCHLORIDE 0.5 ML: 10 INJECTION, SOLUTION EPIDURAL; INFILTRATION; INTRACAUDAL; PERINEURAL at 08:23

## 2022-09-16 RX ADMIN — FENTANYL CITRATE 100 MCG: 50 INJECTION INTRAMUSCULAR; INTRAVENOUS at 09:03

## 2022-09-16 RX ADMIN — MIDAZOLAM 2 MG: 1 INJECTION INTRAMUSCULAR; INTRAVENOUS at 08:30

## 2022-09-16 RX ADMIN — FENTANYL CITRATE 100 MCG: 50 INJECTION INTRAMUSCULAR; INTRAVENOUS at 08:38

## 2022-09-16 RX ADMIN — LIDOCAINE HYDROCHLORIDE 2 ML: 10 INJECTION, SOLUTION EPIDURAL; INFILTRATION; INTRACAUDAL; PERINEURAL at 08:39

## 2022-09-16 RX ADMIN — SUGAMMADEX 200 MG: 100 INJECTION, SOLUTION INTRAVENOUS at 10:09

## 2022-09-16 RX ADMIN — DEXAMETHASONE SODIUM PHOSPHATE 10 MG: 10 INJECTION, SOLUTION INTRAMUSCULAR; INTRAVENOUS at 09:09

## 2022-09-16 RX ADMIN — ONDANSETRON 4 MG: 2 INJECTION INTRAMUSCULAR; INTRAVENOUS at 10:03

## 2022-09-16 RX ADMIN — SODIUM CHLORIDE, SODIUM LACTATE, POTASSIUM CHLORIDE, AND CALCIUM CHLORIDE: .6; .31; .03; .02 INJECTION, SOLUTION INTRAVENOUS at 08:56

## 2022-09-16 RX ADMIN — SODIUM CHLORIDE, SODIUM LACTATE, POTASSIUM CHLORIDE, AND CALCIUM CHLORIDE 125 ML/HR: .6; .31; .03; .02 INJECTION, SOLUTION INTRAVENOUS at 08:22

## 2022-09-16 NOTE — INTERVAL H&P NOTE
H&P reviewed  After examining the patient I find no changes in the patients condition since the H&P had been written      Vitals:    09/16/22 0700   BP: 142/95   Pulse: 69   Resp: 16   Temp: 98 7 °F (37 1 °C)   SpO2: 100%

## 2022-09-16 NOTE — OP NOTE
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]    Procedure(s) (LRB):  micro direct laryngoscopy, vocal fold injection, biopsy epiglottis lesion, posterior pharyngeal wall injection, flexible esophagoscopy with dilation (Bilateral)  ESOPHAGOSCOPY FLEXIBLE (N/A)    Specimen(s):  ID Type Source Tests Collected by Time Destination   1 : Mid-Esophagus Tissue Esophagus TISSUE EXAM Oliver Jacques MD 2022  9:35 AM    2 : epiglottis lesion Tissue Epiglottis TISSUE EXAM Oliver Jacques MD 2022  9:45 AM        Estimated Blood Loss:   Minimal    Drains:  * No LDAs found *    Anesthesia Type:   General    Operative Indications:  Dysphagia, unspecified type [R13 10]  Dysphonia [R49 0]  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]  ***    Operative Findings:  ***    Complications:   {Post Op Complications:49711}    Procedure and Technique:  ***   {Op note attestation:68753}    Patient Disposition:  {op note disposition:67410}        SIGNATURE: [unfilled]  DATE: 2022  TIME: 10:20 AM name, ID, and procedure  General endotracheal anesthesia was induced and size 5-0 ETT was used  Patient was turned 90 degrees  Eyes were protected with tape, upper teeth/gingiva were protected with tooth guard   Anterior commissure laryngoscope was used to obtain view of the larynx and was suspended  Views were obtained microscope  The esophagus was dilated first in order for the esophagus to accommodate the flexible esophagoscope  Serial dilation up to 48 [place] Mimi Esparza dilators was completed with little resistance  The flexible esophagoscope was advanced transorally into the esophagus and stomach  Above findings were noted  Retroflex view with obtained  Biopsy was obtained from one of the white plaques scattered throughout the esophagus  The esophagoscope was removed  View of the larynx was obtained with small anterior commissure laryngoscope  Exposure was limited  Due to small glottic airway and patient's report of intermittent stridor/laryngospasm, decision was made to do steroid injection subepithelially only to improve voice  Decadron 5mg/mL was injected subepithelially with Daniel needle with good elevation, better on the right  Excess was suctioned  The epiglottis lesion was then placed in view  The lesion was removed with cup forceps and submitted to pathology  Coblator LW was used to ablate this area in the event that the lesion contained dysplasia  The laryngoscope and tooth guard were removed  Posterior pharyngeal wall injection was then completed using Ether [place]  The oropharynx was exposed  Daniel needle was used to injection Prolaryn Plus into the posterior pharyngeal wall submucosally at the level where the soft palate meets the wall  A full syringe was injected  Hemostasis was confirmed  All instruments were removed  Care of patient was returned to anesthesia for extubation  Patient was then taken to the postoperative anesthesia care unit for recovery  I was present for the entire procedure    Patient Disposition:  PACU         SIGNATURE: Tamara Lugo MD  DATE: September 16, 2022  TIME: 10:20 AM

## 2022-09-16 NOTE — ANESTHESIA PREPROCEDURE EVALUATION
Procedure:  micro direct laryngoscopy, vocal fold injection, biopsy epiglottis lesion, posterior pharyngeal wall injection, flexible esophagoscopy possible dilation (Bilateral Throat)  ESOPHAGOSCOPY FLEXIBLE (N/A Esophagus)    Relevant Problems   CARDIO   (+) Essential hypertension      ENDO   (+) Hypothyroidism      GI/HEPATIC   (+) Pharyngoesophageal dysphagia      MUSCULOSKELETAL   (+) Velopharyngeal insufficiency, acquired      NEURO/PSYCH   (+) Hx of head and neck radiation   (+) Hx of malignant neoplasm of head and neck   (+) Hx of squamous cell carcinoma-BOT, s/p chemo/RT 2012   (+) Paresthesias      PULMONARY   (+) ERIK (obstructive sleep apnea)   (+) Shortness of breath      Respiratory   (+) Glottic insufficiency   (+) 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      Nervous and Auditory   (+) Carpal tunnel syndrome   (+) Cervical radiculopathy at C8   (+) Cranial neuropathies, multiple      Musculoskeletal and Integument   (+) Peyronie's disease      Other   (+) Shoulder weakness   (+) Winged scapula of both sides        Physical Exam    Airway    Mallampati score: II  TM Distance: >3 FB  Neck ROM: full     Dental       Cardiovascular  Cardiovascular exam normal    Pulmonary  Pulmonary exam normal     Other Findings        Anesthesia Plan  ASA Score- 3     Anesthesia Type- general with ASA Monitors  Additional Monitors:   Airway Plan: ETT  Plan Factors-Exercise tolerance (METS): >4 METS  Chart reviewed  EKG reviewed  Imaging results reviewed  Existing labs reviewed  Patient summary reviewed  Patient is not a current smoker  Patient did not smoke on day of surgery  Obstructive sleep apnea risk education given perioperatively  Induction- intravenous  Postoperative Plan- Plan for postoperative opioid use  Planned trial extubation    Informed Consent- Anesthetic plan and risks discussed with patient  I personally reviewed this patient with the CRNA   Discussed and agreed on the Anesthesia Plan with the MELL Marte

## 2022-09-16 NOTE — DISCHARGE INSTRUCTIONS
Voice rest x 24 hours    Resume previous diet    Tylenol, ibuprofen as needed for pain  Hydrocodone as needed for breakthrough pain    Monitor for persistent bleeding, shortness of breath, chest pain, fever above 101F  Notify Dr Colten Martinez if this should occur, or go to nearest ER    Follow up Dr Colten Martinez in 1 week

## 2022-09-16 NOTE — ANESTHESIA POSTPROCEDURE EVALUATION
Post-Op Assessment Note    CV Status:  Stable  Pain Score: 0    Pain management: adequate     Mental Status:  Alert and awake   Hydration Status:  Euvolemic   PONV Controlled:  Controlled   Airway Patency:  Patent      Post Op Vitals Reviewed: Yes      Staff: Anesthesiologist, CRNA         No complications documented      BP   169/95   Temp   97 1   Pulse  70   Resp   10   SpO2   99 face mask

## 2022-09-16 NOTE — INTERIM OP NOTE
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]  Velopharyngeal insufficiency  Epiglottis lesion    Post-Op Diagnosis Codes:  SAME    Procedure(s) (LRB):  Micro direct laryngoscopy, vocal fold injection (bilateral decadron), biopsy epiglottis lesion, posterior pharyngeal wall injection (CAHA)  Flexible esophagoscopy with dilation     Surgeon(s) and Role:     * Kristy Marie MD - Primary     * Edison Velázquez MD - Assisting    Specimens:  ID Type Source Tests Collected by Time Destination   1 : Mid-Esophagus Tissue Esophagus TISSUE EXAM Kristy Marie MD 2022  9:35 AM    2 : epiglottis lesion Tissue Epiglottis TISSUE EXAM Kristy Marie MD 2022  9:45 AM        Estimated Blood Loss:   <5mL    Anesthesia Type:    General     Findings:   Esophagus dilated to 50 Fr with min resistance  Scattered plaques throughout the esophagus (biopsied), normal LES/EGJ, normal stomach incl retroflex  VF epithelium elevated well (R>L) with Decadron 5mg/mL  Small lesion epiglottis biopsied and surface ablated with coblator LW  posterior pharyngeal wall injection with Prolaryn Plus    Complications:   None    SIGNATURE: Kristy Marie MD   DATE: 2022   TIME: 10:15 AM

## 2022-09-20 ENCOUNTER — APPOINTMENT (OUTPATIENT)
Dept: SPEECH THERAPY | Facility: CLINIC | Age: 57
End: 2022-09-20
Payer: COMMERCIAL

## 2022-09-20 DIAGNOSIS — Z92.3 HX OF HEAD AND NECK RADIATION: ICD-10-CM

## 2022-09-20 DIAGNOSIS — R13.13 PHARYNGEAL DYSPHAGIA: Primary | ICD-10-CM

## 2022-09-20 DIAGNOSIS — J38.01 PARALYSIS OF LEFT VOCAL FOLD: ICD-10-CM

## 2022-09-20 DIAGNOSIS — Z85.89 HX OF MALIGNANT NEOPLASM OF HEAD AND NECK: ICD-10-CM

## 2022-09-20 DIAGNOSIS — R49.0 DYSPHONIA: ICD-10-CM

## 2022-09-20 LAB — MISCELLANEOUS LAB TEST RESULT: NORMAL

## 2022-09-20 NOTE — PROGRESS NOTES
Daily Speech Treatment Note    Today's date: 2022  Patients name: Danita Ramires Sr   : 1965  MRN: [id number]  Safety measures: ***  Referring provider: Raul Sen MD    Primary diagnosis/billing code: A24 89  Secondary diagnosis/billing code: J38 01, Z92 3    Visit tracking:  -Referring provider: Epic  -Billing guidelines: AMA  -Visit #1*** BOMN  -Insurance: [employer] (20%)  -RE due 2022    Subjective/Behavioral:  -***    Objective/Assessment:  {ST note:15649}    Short-term goals:  Patient will identify regions of sensitivity (if any) and participate in relaxation and stretching/release exercises with clinician to reduce globus sensation and laryngeal tightness (to be achieved in 4-6 weeks)  Patient will decrease laryngeal and cervical muscle tension by independently completing relaxation/stretching exercises, to be achieved in 6-8 weeks  Following patient's verbal consent to treat, manual therapy technique through myofascial release was applied to patient's ***  Region(s) ***         Hyoid             Anterior Cervical            Palpatory examination revealed {MILD, MOD, DAVIE:41199} mechanosensitivity (i e , sensitivity to mechanical pressure/stretch) through the *** region  A mild palpatory pressure was suspected to show a {MILD, MOD, CNJ:36143} area of apparent thickening that, with stimulation/pressure/stretch, reproduced patient's complaints/correlated to {Desc; his/her:59566} primary issue of ***  Patient verbalized that technique/pressure, through triggering {Desc; his/her:29627} symptoms, was helpful      Pillow ***  Wedge ***  Heat ***      Plan:  {RECOMMENDATIONS:38039}

## 2022-09-24 DIAGNOSIS — I10 ESSENTIAL HYPERTENSION: ICD-10-CM

## 2022-09-26 RX ORDER — AMLODIPINE BESYLATE 5 MG/1
5 TABLET ORAL DAILY
Qty: 90 TABLET | Refills: 2 | Status: SHIPPED | OUTPATIENT
Start: 2022-09-26

## 2022-09-27 ENCOUNTER — OFFICE VISIT (OUTPATIENT)
Dept: SPEECH THERAPY | Facility: CLINIC | Age: 57
End: 2022-09-27
Payer: COMMERCIAL

## 2022-09-27 DIAGNOSIS — J38.01 PARALYSIS OF LEFT VOCAL FOLD: Primary | ICD-10-CM

## 2022-09-27 DIAGNOSIS — R49.0 DYSPHONIA: ICD-10-CM

## 2022-09-27 DIAGNOSIS — Z85.89 HX OF MALIGNANT NEOPLASM OF HEAD AND NECK: ICD-10-CM

## 2022-09-27 DIAGNOSIS — R13.13 PHARYNGEAL DYSPHAGIA: ICD-10-CM

## 2022-09-27 DIAGNOSIS — Z92.3 HX OF HEAD AND NECK RADIATION: ICD-10-CM

## 2022-09-27 PROCEDURE — 92526 ORAL FUNCTION THERAPY: CPT

## 2022-09-27 NOTE — PROGRESS NOTES
Daily Speech Treatment Note    Today's date: 2022  Patients name: Maliha Steiner Sr   : 1965  MRN: [id number]  Safety measures: post HN CA  Current Diet: Regular/thin  Referring provider: Selwyn Garcia MD    Primary diagnosis/billing code: R13 13  Secondary diagnosis/billing code: J38 01, Z92 3    Visit tracking:  -Referring provider: Epic  -Billing guidelines: AMA  -Visit #2 [facility]: [employer] (20%)  -RE due 2022    Subjective/Behavioral:  -Patient reports he is feeling better every day    Objective/Assessment:  -Reviewed testing results and goals in plan care with patient  Patient is in agreement at this time  Short-term goals:  Patient will identify regions of sensitivity (if any) and participate in relaxation and stretching/release exercises with clinician to reduce globus sensation and laryngeal tightness (to be achieved in 4-6 weeks)  Patient will decrease laryngeal and cervical muscle tension by independently completing relaxation/stretching exercises, to be achieved in 6-8 weeks  Following patient's verbal consent to treat, manual therapy technique through myofascial release was applied to patient's anterior neck  Region(s) 22         Hyoid    x15         Anterior Cervical   x15         Palpatory examination revealed moderate mechanosensitivity (i e , sensitivity to mechanical pressure/stretch) through the L neck region  A mild palpatory pressure was suspected to show a moderate area of apparent thickeningPatient verbalized that technique/pressure, through triggering his symptoms, was helpful  Pillow x1  Liquid chalk      Plan:  -Continue with current plan of care

## 2022-10-03 ENCOUNTER — OFFICE VISIT (OUTPATIENT)
Dept: SPEECH THERAPY | Facility: CLINIC | Age: 57
End: 2022-10-03
Payer: COMMERCIAL

## 2022-10-03 DIAGNOSIS — R13.13 PHARYNGEAL DYSPHAGIA: ICD-10-CM

## 2022-10-03 DIAGNOSIS — Z85.89 HX OF MALIGNANT NEOPLASM OF HEAD AND NECK: ICD-10-CM

## 2022-10-03 DIAGNOSIS — J38.01 PARALYSIS OF LEFT VOCAL FOLD: ICD-10-CM

## 2022-10-03 DIAGNOSIS — Z92.3 HX OF HEAD AND NECK RADIATION: ICD-10-CM

## 2022-10-03 DIAGNOSIS — R49.0 DYSPHONIA: Primary | ICD-10-CM

## 2022-10-03 LAB — MISCELLANEOUS LAB TEST RESULT: NORMAL

## 2022-10-03 PROCEDURE — 92526 ORAL FUNCTION THERAPY: CPT

## 2022-10-03 NOTE — PROGRESS NOTES
Daily Speech Treatment Note    Today's date: 10/3/2022  Patients name: John Sunshine Sr   : 1965  MRN: [id number]  Safety measures: post HN CA  Current Diet: Regular/thin  Referring provider: Shamir Hernandez MD    Primary diagnosis/billing code: R13 13  Secondary diagnosis/billing code: J38 01, Z92 3    Visit tracking:  -Referring provider: Epic  -Billing guidelines: AMA  -Visit #3 [address]: [employer] (20%)  -RE due 2022    Subjective/Behavioral:  -No changes since last week    -Dr Isis Valderrama follow up sometime the end of this month    -Patient will be out of town next week (in [place])    Objective/Assessment:    Short-term goals:  Patient will identify regions of sensitivity (if any) and participate in relaxation and stretching/release exercises with clinician to reduce globus sensation and laryngeal tightness (to be achieved in 4-6 weeks)  Patient will decrease laryngeal and cervical muscle tension by independently completing relaxation/stretching exercises, to be achieved in 6-8 weeks  Following patient's verbal consent to treat, manual therapy technique through myofascial release was applied to patient's anterior neck  Region(s) 9/27/22 10/03/22        Hyoid    x15 x20        Anterior Cervical   x15 x20        Palpatory examination revealed moderate mechanosensitivity (i e , sensitivity to mechanical pressure/stretch) through the L neck region  A mild palpatory pressure was suspected to show a moderate area of apparent thickeningPatient verbalized that technique/pressure, through triggering his symptoms, was helpful  Pillow x1  Liquid chalk      Plan:  -Continue with current plan of care

## 2022-10-04 ENCOUNTER — APPOINTMENT (OUTPATIENT)
Dept: SPEECH THERAPY | Facility: CLINIC | Age: 57
End: 2022-10-04

## 2022-10-11 ENCOUNTER — APPOINTMENT (OUTPATIENT)
Dept: SPEECH THERAPY | Facility: CLINIC | Age: 57
End: 2022-10-11

## 2022-10-18 ENCOUNTER — APPOINTMENT (OUTPATIENT)
Dept: SPEECH THERAPY | Facility: CLINIC | Age: 57
End: 2022-10-18

## 2022-10-25 ENCOUNTER — OFFICE VISIT (OUTPATIENT)
Dept: SPEECH THERAPY | Facility: CLINIC | Age: 57
End: 2022-10-25
Payer: COMMERCIAL

## 2022-10-25 DIAGNOSIS — J38.01 PARALYSIS OF LEFT VOCAL FOLD: ICD-10-CM

## 2022-10-25 DIAGNOSIS — Z92.3 HX OF HEAD AND NECK RADIATION: ICD-10-CM

## 2022-10-25 DIAGNOSIS — R49.0 DYSPHONIA: Primary | ICD-10-CM

## 2022-10-25 DIAGNOSIS — Z85.89 HX OF MALIGNANT NEOPLASM OF HEAD AND NECK: ICD-10-CM

## 2022-10-25 DIAGNOSIS — R13.13 PHARYNGEAL DYSPHAGIA: ICD-10-CM

## 2022-10-25 PROCEDURE — 92526 ORAL FUNCTION THERAPY: CPT

## 2022-10-25 NOTE — PROGRESS NOTES
Daily Speech Treatment Note    Today's date: 10/25/2022  Patient’s name: Taina Solorzano Sr   : 1965  MRN: [id number]  Safety measures: post HN CA  Current Diet: Regular/thin  Referring provider: Marina Sanford MD    Primary diagnosis/billing code: R13 13  Secondary diagnosis/billing code: J38 01, Z92 3    Visit tracking:  -Referring provider: Epic  -Billing guidelines: AMA  -Visit #4 [address]: [employer] (20%)  -RE due 2022    Subjective/Behavioral:  Patient reports he has been very busy with work; but it doing well  He saw Dr Dionte Atkinson who scoped him and reported "everything looked better than it has"  He has a follow up with Dionte Atkinson on 22/    At this time, unfortunately the patient has not showed any signs of improvement after MFR sessions/stretching and we have decided to discharge at this time  Patient in agreement  Objective/Assessment:    Short-term goals:  Patient will identify regions of sensitivity (if any) and participate in relaxation and stretching/release exercises with clinician to reduce globus sensation and laryngeal tightness (to be achieved in 4-6 weeks)  Patient will decrease laryngeal and cervical muscle tension by independently completing relaxation/stretching exercises, to be achieved in 6-8 weeks  Following patient's verbal consent to treat, manual therapy technique through myofascial release was applied to patient's anterior neck  Region(s) 9/27/22 10/03/22 10/25/22       Hyoid    x15 x20 x20       Anterior Cervical   x15 x20 x20       Palpatory examination revealed moderate mechanosensitivity (i e , sensitivity to mechanical pressure/stretch) through the L neck region  A mild palpatory pressure was suspected to show a moderate area of apparent thickeningPatient verbalized that technique/pressure, through triggering his symptoms, was helpful  Pillow x1  Liquid chalk  Lotion      Plan:  -Discharge

## 2023-01-01 PROBLEM — J38.7 LESION OF EPIGLOTTIS: Status: RESOLVED | Noted: 2022-09-15 | Resolved: 2023-01-01

## 2023-03-06 DIAGNOSIS — E03.9 HYPOTHYROIDISM, UNSPECIFIED TYPE: ICD-10-CM

## 2023-03-06 RX ORDER — LEVOTHYROXINE SODIUM 0.1 MG/1
TABLET ORAL
Qty: 90 TABLET | Refills: 1 | Status: SHIPPED | OUTPATIENT
Start: 2023-03-06

## 2023-03-26 NOTE — TELEPHONE ENCOUNTER
I spoke with patient by phone regarding the infiltrate seen on his x-ray  He actually started the antibiotic last evening as a precaution  I advised him sternly to go to the ER immediately for any worsening symptoms and to follow-up with his physician early this coming week  We discussed that his COVID test is still pending and that he should watch my chart and that I will call him if it is positive  chaplaincy/clergy/

## 2023-05-20 DIAGNOSIS — I10 ESSENTIAL HYPERTENSION: ICD-10-CM

## 2023-05-22 RX ORDER — AMLODIPINE BESYLATE 5 MG/1
5 TABLET ORAL DAILY
Qty: 90 TABLET | Refills: 2 | Status: SHIPPED | OUTPATIENT
Start: 2023-05-22

## 2023-06-27 ENCOUNTER — TELEPHONE (OUTPATIENT)
Dept: FAMILY MEDICINE CLINIC | Facility: CLINIC | Age: 58
End: 2023-06-27

## 2023-06-27 NOTE — TELEPHONE ENCOUNTER
Patient was inpatient in [facility] with aspiration pneumonia  He is currently with peg tube and is NPO  He was receiving IV antibiotics in hospital and came home on amoxicillin for peg tube at home  Had first dosing today  Came home last night  Low grade fever x 24 hours, resolved with ibuprofen and has not returned  He was extubated on 06/20/2023  He is eager to go back to eating regular food  They want to know how soon it would be advisable to do a barium swallow  Also, he is apneic  She knows he had a sleep study back in 2021 and was supposed to follow up with a CPAP study, but never scheduled it  She wants to know if you can reorder CPAP study so that he can start doing the right things  She promises he will be following through this time

## 2023-06-28 DIAGNOSIS — T17.908D ASPIRATION INTO AIRWAY, SUBSEQUENT ENCOUNTER: Primary | ICD-10-CM

## 2023-06-28 DIAGNOSIS — G47.33 OBSTRUCTIVE SLEEP APNEA SYNDROME: ICD-10-CM

## 2023-06-30 ENCOUNTER — RA CDI HCC (OUTPATIENT)
Dept: OTHER | Facility: HOSPITAL | Age: 58
End: 2023-06-30

## 2023-06-30 NOTE — PROGRESS NOTES
[facility] 75  coding opportunities       Chart reviewed, no opportunity found: CHART REVIEWED, NO OPPORTUNITY FOUND        Patients Insurance        Commercial Insurance: [employer]

## 2023-07-03 ENCOUNTER — TELEPHONE (OUTPATIENT)
Dept: SLEEP CENTER | Facility: CLINIC | Age: 58
End: 2023-07-03

## 2023-07-03 NOTE — TELEPHONE ENCOUNTER
----- Message from Keiko Grace MD sent at 6/30/2023  6:45 PM EDT -----  Approved    ----- Message -----  From: Cathy Camargo  Sent: 6/29/2023   7:59 AM EDT  To: Sleep Medicine [facility] Provider    This cpap sleep study needs approval.     If approved please sign and return to clerical pool. If denied please include reasons why. Also provide alternative testing if warranted. Please sign and return to clerical pool.

## 2023-07-05 ENCOUNTER — HOSPITAL ENCOUNTER (OUTPATIENT)
Dept: RADIOLOGY | Facility: HOSPITAL | Age: 58
Discharge: HOME | End: 2023-07-05
Attending: OTOLARYNGOLOGY
Payer: COMMERCIAL

## 2023-07-05 DIAGNOSIS — R13.14 PHARYNGOESOPHAGEAL DYSPHAGIA: ICD-10-CM

## 2023-07-05 PROCEDURE — 74230 X-RAY XM SWLNG FUNCJ C+: CPT

## 2023-07-05 PROCEDURE — 92611 MOTION FLUOROSCOPY/SWALLOW: CPT

## 2023-07-05 NOTE — PROCEDURES
Speech Pathology - Modified Barium Swallow Study    Patient Name: Elsi Brice Sr. Today's Date: 7/5/2023     Problem List  Active Problems: There are no active Hospital Problems. Past Medical History  Past Medical History:   Diagnosis Date   • Cancer of base of tongue ([address])     excision   • Cough    • Disease of thyroid gland     hypo   • Dysphagia    • ED (erectile dysfunction)    • Hypertension    • Leukopenia    • Peyronie's disease    • Psoriasis    • Sleep apnea    • Tongue cancer ([address])    • Weight loss, abnormal      Past Surgical History  Past Surgical History:   Procedure Laterality Date   • ESOPHAGOSCOPY N/A 9/16/2022    Procedure: ESOPHAGOSCOPY FLEXIBLE;  Surgeon: Rosemarie Stanley MD;  Location: [facility];  Service: ENT   • OTHER SURGICAL HISTORY      infusion port inserted and removed   • PEG TUBE PLACEMENT      and removal   • MO COLONOSCOPY FLX DX W/COLLJ SPEC WHEN PFRMD N/A 6/27/2017    Procedure: COLONOSCOPY with polypectomy x2;  Surgeon: Carson Brandt MD;  Location: [facility]; Service: General   • MO General acute hospital W/NJX VOCAL CORD THER W/MICRO/TELESCOPE Bilateral 9/16/2022    Procedure: micro direct laryngoscopy, vocal fold injection, biopsy epiglottis lesion, posterior pharyngeal wall injection, flexible esophagoscopy with dilation;  Surgeon: Rosemarie Stanley MD;  Location: [facility];  Service: ENT   • TONGUE BIOPSY / EXCISION      Floor mouth excision of malignant tumor       Assessment Summary:    Pt presents with mild oral and severe-profound pharyngeal dysphagia characterized by reduced AP transport, delayed swallow initiation with significantly reduced/minimal hyolaryngeal rise, reduced base of tongue retraction, absent epiglottic inversion and absent pharyngeal stripping wave. Airway protection/closure was minimal, and silent aspiration occurred with all liquid consistencies.  Pt was unable to swallow puree due to lack of driving force on the bolus and had to regurgitate/expectorate the puree from his vallecula. Strategies were not effective in eliminating aspiration. Note: Images are available for review in PACS as desired. Recommendations:   Recommended Diet: NPO with tube feeds   Recommended Form of Medications: via PEG   Consider referral to: ENT, OP speech therapy  SLP Dysphagia therapy recommended: yes, repeat MBS to determine if progress is made in therapy    Results Reviewed with: patient   Pt Education: initiated. Pt and caregivers would benefit from continued education. General Information;  Pt is a 59 y.o. male with a PMH remarkable for SCC BOT s/p chemo/RT 2012, L vocal fold paralysis, and dysphagia. Pt has been seen by [address] [place] [address] in the past, recently seen by ENT 4/18/23 for botulinum toxin type A (DYSPORT) treatment. Pt was on vacation in [place] last month and was hospitalized and intubated for respiratory distress. He had an MBS while admitted to the hospital which showed silent aspiration, and a PEG tube was placed. Pt reports he has been primarily using PEG tube for nutrition since d/c from the hospital with minimal oral intake. MBS was recommended to assess oropharyngeal stage swallowing skills at this time. Prior MBS 8/14/2019:  Assessment Summary:   Mild oral/moderate pharyngeal dysphagia due to decreased tongue base retraction, decreased epiglottic inversion and airway entrance closure w/ deep transient penetration of all consistencies,  And passive silent aspiration of pharyngeal residue and thin liquids. Recommendations:  Soft, moist diet  Discussed nectar thick liquids vs cont thin liquids via small sips, w/ aspiration precautions and frequent oral care, pt prefers to cont w/ thin liquids at this time. Risks of aspiration and potential to develop aspiration pneumonia were explained, pt stated understanding.    Aspiration Precautions  Meds as tolerated  Outpt Speech tx for dysphagia     MBS 6/21/23 The oral phase of swallowing is normal. Silent aspiration with thin liquid and moderately thick liquid consistencies. Pt was viewed sitting upright in the lateral and AP positions. Due to concerns for patient safety, trials provided deviated from the MBSImP Validated Protocol. Pt was given 5-mL thin liquid x2, 5-mL nectar thick, 5-mL honey thick and 5-mL pudding. Pt was also given thin liquids by straw, as well as a barium tablet with (thin liquid/pudding). Initial view observations/comments: Clear view of the upper airway      8-Point Penetration-Aspiration Scale   Thin liquid 8 - Material enters the airway, passes below the vocal folds, and no effort is made to eject     Nectar thick liquid 8 - Material enters the airway, passes below the vocal folds, and no effort is made to eject     Honey thick liquid 8 - Material enters the airway, passes below the vocal folds, and no effort is made to eject     Puree (pudding) N/a, attempted puree however unable to swallow   Solid N/a      Strategies and Efficacy: Chin tuck/head turn did not eliminate aspiration. Cued cough after swallow partially cleared aspirated material.    Aspiration Response and Efficacy: Aspiration events were silent (no cough response)    MBS IMP Rating    ORAL Impairment  Compinent 1--Lip Closure  Judged at any point during the swallow. 1 - Interlabial escape; no progression to anterior lip    Component 2--Tongue Control During Bolus Hold  Judged on held liquid boluses only and prior to productive tongue movement. 0 - Cohesive bolus between tongue to palatal seal    Component 3--Bolus Preparation/Mastication  Judged only during presentation of 1/2 shortbread cookie coated in pudding. n/a    Component 4--Bolus Transport/Lingual Motion  Judged after first productive tongue movement for oral bolus transport. 3 - Repetitive/disorganized tongue motion    Component 5--Oral Residue  Judged after first swallow or after the last swallow of the sequential swallow task.   3 - Majority of bolus remaining   Location   C - Tongue    Component 6--Initiation of Pharyngeal Swallow  Judged at first movement of the brisk superior-anterior hyoid trajectory. 3 - Bolus head in pyriforms      PHARYNGEAL Impairment  Component 7--Soft Palate Elevation  Judged during maximum displacement of soft palate. 0 - No bolus between the soft palate (SP)/pharyngeal wall (PW)    Component 8--Laryngeal Elevation  Judged when epiglottis is in its most horizontal position. 2 - Minimal superior movement of thyroid cartilage with minimal approximation of arytenoids to epiglottic petiole    Component 9--Anterior Hyoid Excursion  Judged at height of swallow/maximal anterior hyoid displacement. 2 - No anterior movement    Component 10--Epiglottic Movement  Judged at height of swallow/maximal anterior hyoid displacement. 2 - No inversion    Component 11--Laryngeal Vestibular Closure  Judged at height of swallow/maximal anterior hyoid displacement. 2 - None; wide column air/contrast in laryngeal vestibule    Component 12--Pharyngeal Stripping Wave  Judged during the full duration of the pharyngeal swallow. 2 - Absent    Component 13--Pharyngeal Contraction  Judged in AP view at rest and throughout maximum movement of structures. n/a    Component 14--Pharyngoesophageal Segment Opening  Judged during maximum distension of PES and throughout opening and closure. 2 - Minimal distension/minimal duration; marked obstruction of flow    Component 15--Tongue Base (TB) Retraction  Judged during maximum retraction of the tongue base. 3 - Wide column of contrast or air between TB and PW    Component 16--Pharyngeal Residue  Judged after first swallow or after the last swallow of the sequential swallow task.   3 - Majority of contrast within or on pharyngeal structures   Location   F - Diffuse (>3 areas)      ESOPHAGEAL Impairment  Component 17--Esophageal Clearance Upright Position  Judged in AP view during bolus transit through the oral cavity to the LES  n/a

## 2023-07-06 ENCOUNTER — OFFICE VISIT (OUTPATIENT)
Dept: FAMILY MEDICINE CLINIC | Facility: CLINIC | Age: 58
End: 2023-07-06
Payer: COMMERCIAL

## 2023-07-06 VITALS
DIASTOLIC BLOOD PRESSURE: 80 MMHG | SYSTOLIC BLOOD PRESSURE: 140 MMHG | HEIGHT: 69 IN | WEIGHT: 148 LBS | BODY MASS INDEX: 21.92 KG/M2 | HEART RATE: 68 BPM | TEMPERATURE: 96.6 F

## 2023-07-06 DIAGNOSIS — Z00.00 ANNUAL PHYSICAL EXAM: Primary | ICD-10-CM

## 2023-07-06 DIAGNOSIS — Z85.810 HISTORY OF TONGUE CANCER: ICD-10-CM

## 2023-07-06 DIAGNOSIS — Z93.1 S/P PERCUTANEOUS ENDOSCOPIC GASTROSTOMY (PEG) TUBE PLACEMENT (HCC): ICD-10-CM

## 2023-07-06 DIAGNOSIS — I10 ESSENTIAL HYPERTENSION: ICD-10-CM

## 2023-07-06 DIAGNOSIS — J96.01 ACUTE HYPOXEMIC RESPIRATORY FAILURE (HCC): ICD-10-CM

## 2023-07-06 DIAGNOSIS — K13.79 VELOPHARYNGEAL INSUFFICIENCY, ACQUIRED: ICD-10-CM

## 2023-07-06 DIAGNOSIS — R13.10 SEVERE SWALLOWING DYSFUNCTION: ICD-10-CM

## 2023-07-06 DIAGNOSIS — J69.0 ASPIRATION PNEUMONIA OF BOTH LUNGS, UNSPECIFIED ASPIRATION PNEUMONIA TYPE, UNSPECIFIED PART OF LUNG (HCC): ICD-10-CM

## 2023-07-06 DIAGNOSIS — Z78.9 ALCOHOL USE: ICD-10-CM

## 2023-07-06 PROBLEM — R41.82 ALTERED MENTAL STATUS: Status: ACTIVE | Noted: 2023-06-17

## 2023-07-06 PROBLEM — C01 CANCER OF BASE OF TONGUE (HCC): Status: ACTIVE | Noted: 2023-07-06

## 2023-07-06 PROBLEM — R56.9 CONVULSIONS, UNSPECIFIED CONVULSION TYPE (HCC): Status: ACTIVE | Noted: 2023-07-06

## 2023-07-06 PROCEDURE — 99215 OFFICE O/P EST HI 40 MIN: CPT | Performed by: FAMILY MEDICINE

## 2023-07-06 PROCEDURE — 99396 PREV VISIT EST AGE 40-64: CPT | Performed by: FAMILY MEDICINE

## 2023-07-06 NOTE — PROGRESS NOTES
[facility]ULT [facility]AL [facility]    NAME: Francisco Gage Sr.  AGE: 59 y.o. SEX: male  : 1965     DATE: 2023     Assessment and Plan:     Problem List Items Addressed This Visit        Digestive    Velopharyngeal insufficiency, acquired     I reviewed speech eval/swallowing test results with pt. I cannot recommend that patient take anything orally at this point given his recent aspiration pneumonia with resp failure, and persistent aspiration despite all interventions attempted by speech therapy. Pt reluctant to stop, stating the the swallowing studies were no different than prior tests. I strongly recommended that pt avoid alcohol which could exacerbate issues. He will f/u with ENT next week for second opinion. Pt has been to speech/swallowing therapy in the past but did not find it to be effective. Pt wishes to get ENT opinion before restarting. F/u 6w- earlier if symptoms worsen            Respiratory    Acute hypoxemic respiratory failure ([address])     Secondary to aspiration pneumonia. Oxygenation is stable, and lungs appear to be clear on exam.  Discussed risk for further aspiration-see above. Will monitor for now. Recheck chest x-ray in 3 to 4 weeks. Follow-up 6 weeks-earlier if respiratory symptoms should return         Aspiration pneumonia of both lungs (HCC)     Oxygen levels are stable and patient without symptoms. Lungs are clear on auscultation. No dullness on percussion. Recheck chest x-ray in 3 to 4 weeks. Discussed the risk for further aspiration-see above. Recheck 6 weeks         Relevant Orders    Ambulatory referral to Nutrition Services    Ambulatory referral to complex care management program    XR chest pa & lateral    Comprehensive metabolic panel    CBC and differential       Cardiovascular and Mediastinum    Essential hypertension     Blood pressure is borderline today. We will continue amlodipine.   Recommend the patient take medications via PEG tube-patient reluctant. Patient understands risks. Recheck 6 weeks            Other    Alcohol use     Patient does not seem to feel that drinking has been a problem in the past.  Review of notes from hospitalization in [place] shows that he (?  Or family) reported that he was drinking at least 3 to 4 cans of beer at least 3-4 times a week. Alcohol level done in the emergency room was consistent with intoxication (141 mg/dL on admission), and did not decrease after 1 hour. Transaminase level (ALT) was elevated though AST was normal.  I explained to patient that he should avoid all alcohol going forward given that it may have played a significant role in his episode of aspiration. We will continue to monitor. Recheck 6 weeks         History of tongue cancer     With extensive issues related to his radiation treatments. This includes but is not limited to dry mouth, and swallowing dysfunction with aspiration. Patient will follow-up with ENT regarding his swallowing issues. Recheck 6 weeks         S/P percutaneous endoscopic gastrostomy (PEG) tube placement [facility])     I reviewed with patient. Patient seems depressed and very disappointed that he now has a PEG tube. I explained at this point, PEG tube feedings are the safest way to provide him nutrition as well as give him his medications. Patient states that he has been drinking and taking orally recent swallowing test was no different than what he had several years ago. I explained that he may have just been "gi" not to have a more significant aspiration episode prior to his recent occurrence, that he is now at high risk for further episodes going forward. Patient agrees to continue tube feeds, will follow-up with ENT to discuss swallowing, but did not promise that he would stop oral intake. Patient understands risks.   Recheck 6 weeks         Relevant Orders    Ambulatory referral to Nutrition Services    Ambulatory referral to complex care management program    Comprehensive metabolic panel    CBC and differential    Severe swallowing dysfunction     I reviewed speech therapy results with the patient. They do not recommend any liquid or food to be taken orally. Patient states that this test is essentially a change from previous testing. He seems reluctant to stop oral intake. He is compliant with his PEG tube feedings however. We discussed risk for further aspiration, as well as how close this past episode was to be a terminal event. Strongly recommend that patient stop all alcohol intake which could be exacerbating his risk for aspiration. He has an appointment with ENT next week for second opinion-I will discuss with ENT prior to visit. Recheck 6 weeks         Relevant Orders    Ambulatory referral to Nutrition Services    Ambulatory referral to complex care management program    Comprehensive metabolic panel    CBC and differential   Other Visit Diagnoses     Annual physical exam    -  Primary          Immunizations and preventive care screenings were discussed with patient today. Appropriate education was printed on patient's after visit summary. Discussed risks and benefits of prostate cancer screening. We discussed the controversial history of PSA screening for prostate cancer in the [facility] as well as the risk of over detection and over treatment of prostate cancer by way of PSA screening. The patient understands that PSA blood testing is an imperfect way to screen for prostate cancer and that elevated PSA levels in the blood may also be caused by infection, inflammation, prostatic trauma or manipulation, urological procedures, or by benign prostatic enlargement. The role of the digital rectal examination in prostate cancer screening was also discussed and I discussed with him that there is large interobserver variability in the findings of digital rectal examination.     Counseling:  Alcohol/drug use: discussed moderation in alcohol intake, the recommendations for healthy alcohol use, and avoidance of illicit drug use. nutrition         Return in about 6 weeks (around 8/17/2023). Chief Complaint:     Chief Complaint   Patient presents with   • Annual Exam      History of Present Illness:     Adult Annual Physical   Patient here for a comprehensive physical exam. The patient reports problems as below. - 62 yo male with hx of tongue base ca, s/p RT, with subsequent swallowing issues and dry mouth, was in normal state of health until 6/17. Pt was on vacation with family in [place], when family found him to be unresponsive (family was at the pool, and pt decided to stay in his hotel room). Pt apparently aspirated, and became hypoxic. 911 was called and paramedics intubated patient in the field. In the ED, pt was found to be hypotensive, meeting criteria for shock, thought to be septic. Alcohol level was 141, consistent with intoxication. CXR showed bibasilar infiltrate thought to be due to aspiration. Pt was admitted to the ICU, and remained intubated. IV abx initiated. Pt slowly improved over the next few days, with stabilization of vitals, and improvement of oxygenation. Pt was extubated on 6/20. Pt underwent subsequent speech eval/swallowing eval which showed severe swallowing dysfunction, and risk for further aspiration. PEG tube placed and pt was started on PEG tube feedings. By  6/26, pt was stable and able to be discharged to home  - since return to home, pt underwent repeat swallowing eval which revealed "Pt presents with mild oral and severe-profound pharyngeal dysphagia characterized by reduced AP transport, delayed swallow initiation with significantly reduced/minimal hyolaryngeal rise, reduced base of tongue retraction, absent epiglottic inversion and absent pharyngeal stripping wave. Airway protection/closure was minimal, and silent aspiration occurred with all liquid consistencies.  Pt was unable to swallow puree due to lack of driving force on the bolus and had to regurgitate/expectorate the puree from his vallecula. Strategies were not effective in eliminating aspiration". Pt states that he has returned to his previous eating/drinking practices despite these reports. He is scheduled to see ENT next week (Dr Cleon Halsted). He is utilizing Jevity 1.5, 6.5cans a day as recommended by nutrition consult in [place]  - pt states that he is disappointed re: these recent events, and is somewhat depressed re: his need for PEG tube. - pt admits to drinking several beers several days a week. Pt does not appear to believe that this was a problem in the past, and did not ever effect his work, family responsibilities, etc.   - pt denies any ongoing chest discomfort, worsening SOB, productive cough, fever/chills or other symptoms. Pt finished Augmentin  - pt on regular meds, which where still being taken po (as per discharge instructions) despite Speech recommendations. - I reviewed available hospital records, lab results and study reports with pt      Diet and Physical Activity  Diet/Nutrition: tube feedings as well as some po? [employer] Exercise: no formal exercise. Depression Screening  PHQ-2/9 Depression Screening    Little interest or pleasure in doing things: 0 - not at all  Feeling down, depressed, or hopeless: 2 - more than half the days  PHQ-2 Score: 2  PHQ-2 Interpretation: Negative depression screen       General Health  Sleep: labile. Hearing: normal - bilateral.  Vision: no vision problems. Dental: regular dental visits and brushes teeth twice daily.  Health  Symptoms include: none     Review of Systems:     Review of Systems   Constitutional: Negative. HENT: Positive for trouble swallowing. Negative for sinus pressure and sore throat. Chronic dry mouth   Eyes: Negative. Respiratory: Negative. Negative for shortness of breath and wheezing. Cardiovascular: Negative. Gastrointestinal: Negative. Genitourinary: Negative. Musculoskeletal: Positive for arthralgias, myalgias, neck pain and neck stiffness. Negative for gait problem. Skin: Negative. Neurological: Positive for weakness (L arm related to brachial plexus injury from RT ). Negative for dizziness, facial asymmetry, light-headedness, numbness and headaches. Hematological: Negative for adenopathy. Does not bruise/bleed easily. Psychiatric/Behavioral: Positive for dysphoric mood (mild,). Negative for suicidal ideas. The patient is not nervous/anxious. Past Medical History:     Past Medical History:   Diagnosis Date   • Cancer of base of tongue ([address])     excision   • Cough    • Disease of thyroid gland     hypo   • Dysphagia    • ED (erectile dysfunction)    • Hypertension    • Leukopenia    • Peyronie's disease    • Psoriasis    • Sleep apnea    • Tongue cancer ([address])    • Weight loss, abnormal       Past Surgical History:     Past Surgical History:   Procedure Laterality Date   • ESOPHAGOSCOPY N/A 9/16/2022    Procedure: ESOPHAGOSCOPY FLEXIBLE;  Surgeon: Lora Waters MD;  Location: [facility];  Service: ENT   • OTHER SURGICAL HISTORY      infusion port inserted and removed   • PEG TUBE PLACEMENT      and removal   • NV COLONOSCOPY FLX DX W/COLLJ SPEC WHEN PFRMD N/A 6/27/2017    Procedure: COLONOSCOPY with polypectomy x2;  Surgeon: Bryn Trejo MD;  Location: [facility];   Service: General   • NV [facility] W/NJX VOCAL CORD THER W/MICRO/TELESCOPE Bilateral 9/16/2022    Procedure: micro direct laryngoscopy, vocal fold injection, biopsy epiglottis lesion, posterior pharyngeal wall injection, flexible esophagoscopy with dilation;  Surgeon: Lora Waters MD;  Location: [facility];  Service: ENT   • TONGUE BIOPSY / EXCISION      Floor mouth excision of malignant tumor      Family History:     Family History   Problem Relation Age of Onset   • Other Mother         reactive airway dysfunction   • Coronary artery disease Father    • Hypertension Father    • Psoriasis Father       Social History:     Social History     Socioeconomic History   • Marital status: /Civil Union     Spouse name: None   • Number of children: 6   • Years of education: None   • Highest education level: None   Occupational History   • None   Tobacco Use   • Smoking status: Former     Packs/day: 0.25     Years: 20.00     Total pack years: 5.00     Types: Cigarettes     Quit date: 2011     Years since quittin.5   • Smokeless tobacco: Former   • Tobacco comments:     pt quit with dx of tongue base cancer, per allscripts   Vaping Use   • Vaping Use: Never used   Substance and Sexual Activity   • Alcohol use: Yes     Alcohol/week: 12.0 standard drinks of alcohol     Types: 12 Cans of beer per week     Comment: about 2 drinks daily   • Drug use: No   • Sexual activity: Yes     Partners: Female     Birth control/protection: Post-menopausal   Other Topics Concern   • None   Social History Narrative    Daily coffee consumption, 1 cups/day    Daily cola consumption    Daily tea consumption    Dental care, regularly    Exercise: running, weight training    High school graduate    No guns in the home    Pets/animals,  Active         Social Determinants of Health     Financial Resource Strain: Not on file   Food Insecurity: Not on file   Transportation Needs: Not on file   Physical Activity: Not on file   Stress: Not on file   Social Connections: Not on file   Intimate Partner Violence: Not on file   Housing Stability: Not on file      Current Medications:     Current Outpatient Medications   Medication Sig Dispense Refill   • amLODIPine (NORVASC) 5 mg tablet TAKE 1 TABLET (5 MG TOTAL) BY MOUTH DAILY.  90 tablet 2   • levothyroxine 100 mcg tablet TAKE 1 TABLET BY MOUTH EVERY DAY 90 tablet 1   • multivitamin (THERAGRAN) TABS Take 1 tablet by mouth daily     • pentoxifylline (TRENtal) 400 mg ER tablet TAKE 1 TABLET BY MOUTH 3 TIMES A DAY WITH MEALS. 270 tablet 3   • tadalafil (CIALIS) 5 MG tablet Take 1 tablet (5 mg total) by mouth daily 90 tablet 3     No current facility-administered medications for this visit. Allergies:     No Known Allergies   Physical Exam:     /80 (BP Location: Left arm, Patient Position: Sitting, Cuff Size: Adult)   Pulse 68   Temp (!) 96.6 °F (35.9 °C)   Ht 5' 9" (1.753 m)   Wt 67.1 kg (148 lb)   BMI 21.86 kg/m²     Physical Exam  Vitals reviewed. Constitutional:       Appearance: He is well-developed. He is not toxic-appearing. Comments: Thin appearing   HENT:      Head: Normocephalic and atraumatic. Right Ear: Tympanic membrane, ear canal and external ear normal.      Left Ear: Tympanic membrane, ear canal and external ear normal.      Nose: Congestion (mild) present. Mouth/Throat:      Mouth: Mucous membranes are dry. Pharynx: No oropharyngeal exudate or posterior oropharyngeal erythema. Eyes:      General: No scleral icterus. Extraocular Movements: Extraocular movements intact. Conjunctiva/sclera: Conjunctivae normal.      Pupils: Pupils are equal, round, and reactive to light. Neck:      Thyroid: No thyromegaly. Vascular: No JVD. Comments: Chronic RT changes to anterior neck, L>R  Cardiovascular:      Rate and Rhythm: Normal rate and regular rhythm. Pulses: Normal pulses. Heart sounds: Normal heart sounds. No murmur heard. Pulmonary:      Effort: Pulmonary effort is normal. No respiratory distress. Breath sounds: Normal breath sounds. No wheezing or rales. Comments: No dullness to percussion  Abdominal:      General: Bowel sounds are normal. There is no distension. Palpations: Abdomen is soft. There is no mass. Tenderness: There is no abdominal tenderness. Comments: PEG tube in place. Site clean, dry and without discharge/erythema   Musculoskeletal:         General: No swelling or tenderness (mild, L neck and shoulder). Cervical back: Normal range of motion and neck supple. Right lower leg: No edema. Left lower leg: No edema. Lymphadenopathy:      Cervical: No cervical adenopathy. Skin:     General: Skin is warm. Capillary Refill: Capillary refill takes less than 2 seconds. Neurological:      Mental Status: He is alert and oriented to person, place, and time. Cranial Nerves: No cranial nerve deficit. Sensory: No sensory deficit. Motor: Weakness (L hand/arm (mild, diffuse)) present. No abnormal muscle tone. Coordination: Coordination normal.      Gait: Gait normal.      Deep Tendon Reflexes: Reflexes abnormal (sl decreased L arm). Psychiatric:         Mood and Affect: Mood normal.         Behavior: Behavior normal.         Thought Content:  Thought content normal.         Judgment: Judgment normal.      Comments: PHQ-2/9 Depression Screening    Little interest or pleasure in doing things: 0 - not at all  Feeling down, depressed, or hopeless: 2 - more than half the days  PHQ-2 Score: 2  PHQ-2 Interpretation: Negative depression screen          Bernard Kauffman MD  [address]

## 2023-07-07 ENCOUNTER — PATIENT OUTREACH (OUTPATIENT)
Dept: FAMILY MEDICINE CLINIC | Facility: CLINIC | Age: 58
End: 2023-07-07

## 2023-07-07 PROBLEM — Z93.1 S/P PERCUTANEOUS ENDOSCOPIC GASTROSTOMY (PEG) TUBE PLACEMENT (HCC): Status: ACTIVE | Noted: 2023-07-07

## 2023-07-07 PROBLEM — R56.9 CONVULSIONS, UNSPECIFIED CONVULSION TYPE (HCC): Status: RESOLVED | Noted: 2023-07-06 | Resolved: 2023-07-07

## 2023-07-07 PROBLEM — F10.90 ALCOHOL USE: Status: ACTIVE | Noted: 2023-07-07

## 2023-07-07 PROBLEM — J69.0 ASPIRATION PNEUMONIA OF BOTH LUNGS (HCC): Status: ACTIVE | Noted: 2023-07-07

## 2023-07-07 PROBLEM — Z78.9 ALCOHOL USE: Status: ACTIVE | Noted: 2023-07-07

## 2023-07-07 PROBLEM — Z85.810 HISTORY OF TONGUE CANCER: Status: ACTIVE | Noted: 2023-07-06

## 2023-07-07 NOTE — ASSESSMENT & PLAN NOTE
Oxygen levels are stable and patient without symptoms. Lungs are clear on auscultation. No dullness on percussion. Recheck chest x-ray in 3 to 4 weeks. Discussed the risk for further aspiration-see above.   Recheck 6 weeks

## 2023-07-07 NOTE — ASSESSMENT & PLAN NOTE
I reviewed speech eval/swallowing test results with pt. I cannot recommend that patient take anything orally at this point given his recent aspiration pneumonia with resp failure, and persistent aspiration despite all interventions attempted by speech therapy. Pt reluctant to stop, stating the the swallowing studies were no different than prior tests. I strongly recommended that pt avoid alcohol which could exacerbate issues. He will f/u with ENT next week for second opinion. Pt has been to speech/swallowing therapy in the past but did not find it to be effective. Pt wishes to get ENT opinion before restarting.  F/u 6w- earlier if symptoms worsen

## 2023-07-07 NOTE — ASSESSMENT & PLAN NOTE
Secondary to aspiration pneumonia. Oxygenation is stable, and lungs appear to be clear on exam.  Discussed risk for further aspiration-see above. Will monitor for now. Recheck chest x-ray in 3 to 4 weeks.   Follow-up 6 weeks-earlier if respiratory symptoms should return

## 2023-07-07 NOTE — PROGRESS NOTES
Spoke with Claire Guzman He is doing good. Takes 6.5 cans of jevity a day. Not sure who will order Jevity going forward. Will see ENT next week and then decide about more swallowing therapy.  I will check back after ENT appointment

## 2023-07-07 NOTE — ASSESSMENT & PLAN NOTE
Patient does not seem to feel that drinking has been a problem in the past.  Review of notes from hospitalization in [place] shows that he (?  Or family) reported that he was drinking at least 3 to 4 cans of beer at least 3-4 times a week. Alcohol level done in the emergency room was consistent with intoxication (141 mg/dL on admission), and did not decrease after 1 hour. Transaminase level (ALT) was elevated though AST was normal.  I explained to patient that he should avoid all alcohol going forward given that it may have played a significant role in his episode of aspiration. We will continue to monitor.   Recheck 6 weeks

## 2023-07-07 NOTE — ASSESSMENT & PLAN NOTE
I reviewed speech therapy results with the patient. They do not recommend any liquid or food to be taken orally. Patient states that this test is essentially a change from previous testing. He seems reluctant to stop oral intake. He is compliant with his PEG tube feedings however. We discussed risk for further aspiration, as well as how close this past episode was to be a terminal event. Strongly recommend that patient stop all alcohol intake which could be exacerbating his risk for aspiration. He has an appointment with ENT next week for second opinion-I will discuss with ENT prior to visit.   Recheck 6 weeks

## 2023-07-07 NOTE — ASSESSMENT & PLAN NOTE
With extensive issues related to his radiation treatments. This includes but is not limited to dry mouth, and swallowing dysfunction with aspiration. Patient will follow-up with ENT regarding his swallowing issues.   Recheck 6 weeks

## 2023-07-07 NOTE — ASSESSMENT & PLAN NOTE
Blood pressure is borderline today. We will continue amlodipine. Recommend the patient take medications via PEG tube-patient reluctant. Patient understands risks.   Recheck 6 weeks

## 2023-07-10 ENCOUNTER — DOCUMENTATION (OUTPATIENT)
Dept: NUTRITION | Facility: HOSPITAL | Age: 58
End: 2023-07-10

## 2023-07-10 NOTE — PROGRESS NOTES
Kenna Coleman is being assessed for home TF. History of tongue CA 2012 s/p radiation therapy, swallowing issues, dry mouth. Recently on vacation in [place] and hospitalized for respiratory distress and aspiration. PT underwent subsequent speech/swallow evaluations and showed severe swallowing dysfunction and risk for further aspiration, PEG placed  6/23/23. Diet hx: Called Mr. Grimm to obtain information but no response. 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 Wife explained past 3 years has struggled with chewing and swallowing, tires him out and wasn’t getting enough calories, has tried smoothies and protein shakes but then stopped drinking them, would also drink about 3 beers a day bud light. With recent episode on vacation, now he stopped drinking alcohol and takes in small amounts of food as pleasure such as eggs, noodles. He has been tolerating the TF well, boluses Jevity 1.5 6.5 cans a day provides total of  2310cal, 98g pro, 1170mL, unsure about water flushes. Anthropometrics: HT: 5’8”, WT: 148lbs/67.3kg (7/6/23),  BMI 22.5   Weight hx records: 1/12/23 155lbs, 4/18/23 148lbs, 7/6/23 148lbs, weight appears stable  Labs: no recent labs  Energy Needs: Calories: 5573-4253UEI (30-35cal/kg)  Protein: 81-100g protein (1.2-1.5g/kg)    Fluid: 8249-8533GZ (1mL/hayley)  Nutrition Diagnoses: Swallowing difficulty related to hx of tongue CA s/p radiation, aspiration as evidence by reports from video swallow evaluations, need for TF. Goals:  Meet and tolerate % of estimated needs via TF. Recommendations: Recent VBS recommended NPO, TF. As primary source of nutrition recommend continuing current TF regimen Jevity 1.5 via PEG boluses 6.5 cans a day (237mL /1can 5 x day, 355mL/1.5cans once a day=6.5cans), recommend water flushes 90mL before and after each bolus, provides total of 2310, 98g pro, 2250mL.  Recommend continuing with ST therapy for any improvement, initiation of PO, depending on intake/tolerance, TF can be adjusted accordingly. Continue to monitor weight.

## 2023-07-17 ENCOUNTER — TELEPHONE (OUTPATIENT)
Dept: FAMILY MEDICINE CLINIC | Facility: CLINIC | Age: 58
End: 2023-07-17

## 2023-07-17 DIAGNOSIS — E03.9 HYPOTHYROIDISM, UNSPECIFIED TYPE: Primary | ICD-10-CM

## 2023-07-17 NOTE — TELEPHONE ENCOUNTER
Patient's wife called requesting that TSH be added to his follow up labs that he is going to be getting this week     She is also asking for a letter that they can bring with them to the airport that allows them to travel with the liquid for nutrition that goes into his PIC tube.  They fly out this Friday     Please advise

## 2023-07-19 ENCOUNTER — PATIENT OUTREACH (OUTPATIENT)
Dept: FAMILY MEDICINE CLINIC | Facility: CLINIC | Age: 58
End: 2023-07-19

## 2023-07-21 ENCOUNTER — APPOINTMENT (OUTPATIENT)
Dept: RADIOLOGY | Facility: MEDICAL CENTER | Age: 58
End: 2023-07-21
Payer: COMMERCIAL

## 2023-07-21 ENCOUNTER — APPOINTMENT (OUTPATIENT)
Dept: LAB | Facility: MEDICAL CENTER | Age: 58
End: 2023-07-21
Payer: COMMERCIAL

## 2023-07-21 DIAGNOSIS — Z93.1 S/P PERCUTANEOUS ENDOSCOPIC GASTROSTOMY (PEG) TUBE PLACEMENT (HCC): ICD-10-CM

## 2023-07-21 DIAGNOSIS — J69.0 ASPIRATION PNEUMONIA OF BOTH LUNGS, UNSPECIFIED ASPIRATION PNEUMONIA TYPE, UNSPECIFIED PART OF LUNG (HCC): ICD-10-CM

## 2023-07-21 DIAGNOSIS — E03.9 HYPOTHYROIDISM, UNSPECIFIED TYPE: ICD-10-CM

## 2023-07-21 DIAGNOSIS — E03.9 HYPOTHYROIDISM, UNSPECIFIED TYPE: Primary | ICD-10-CM

## 2023-07-21 DIAGNOSIS — R13.10 SEVERE SWALLOWING DYSFUNCTION: ICD-10-CM

## 2023-07-21 LAB
ALBUMIN SERPL BCP-MCNC: 3.2 G/DL (ref 3.5–5)
ALP SERPL-CCNC: 79 U/L (ref 46–116)
ALT SERPL W P-5'-P-CCNC: 33 U/L (ref 12–78)
ANION GAP SERPL CALCULATED.3IONS-SCNC: 4 MMOL/L
AST SERPL W P-5'-P-CCNC: 23 U/L (ref 5–45)
BASOPHILS # BLD AUTO: 0.02 THOUSANDS/ÂΜL (ref 0–0.1)
BASOPHILS NFR BLD AUTO: 0 % (ref 0–1)
BILIRUB SERPL-MCNC: 0.56 MG/DL (ref 0.2–1)
BUN SERPL-MCNC: 16 MG/DL (ref 5–25)
CALCIUM ALBUM COR SERPL-MCNC: 10.1 MG/DL (ref 8.3–10.1)
CALCIUM SERPL-MCNC: 9.5 MG/DL (ref 8.3–10.1)
CHLORIDE SERPL-SCNC: 104 MMOL/L (ref 96–108)
CO2 SERPL-SCNC: 33 MMOL/L (ref 21–32)
CREAT SERPL-MCNC: 0.51 MG/DL (ref 0.6–1.3)
EOSINOPHIL # BLD AUTO: 0.13 THOUSAND/ÂΜL (ref 0–0.61)
EOSINOPHIL NFR BLD AUTO: 3 % (ref 0–6)
ERYTHROCYTE [DISTWIDTH] IN BLOOD BY AUTOMATED COUNT: 12.2 % (ref 11.6–15.1)
GFR SERPL CREATININE-BSD FRML MDRD: 116 ML/MIN/1.73SQ M
GLUCOSE SERPL-MCNC: 117 MG/DL (ref 65–140)
HCT VFR BLD AUTO: 42 % (ref 36.5–49.3)
HGB BLD-MCNC: 13.4 G/DL (ref 12–17)
IMM GRANULOCYTES # BLD AUTO: 0.02 THOUSAND/UL (ref 0–0.2)
IMM GRANULOCYTES NFR BLD AUTO: 0 % (ref 0–2)
LYMPHOCYTES # BLD AUTO: 0.67 THOUSANDS/ÂΜL (ref 0.6–4.47)
LYMPHOCYTES NFR BLD AUTO: 14 % (ref 14–44)
MCH RBC QN AUTO: 30.4 PG (ref 26.8–34.3)
MCHC RBC AUTO-ENTMCNC: 31.9 G/DL (ref 31.4–37.4)
MCV RBC AUTO: 95 FL (ref 82–98)
MONOCYTES # BLD AUTO: 0.47 THOUSAND/ÂΜL (ref 0.17–1.22)
MONOCYTES NFR BLD AUTO: 10 % (ref 4–12)
NEUTROPHILS # BLD AUTO: 3.39 THOUSANDS/ÂΜL (ref 1.85–7.62)
NEUTS SEG NFR BLD AUTO: 73 % (ref 43–75)
NRBC BLD AUTO-RTO: 0 /100 WBCS
PLATELET # BLD AUTO: 242 THOUSANDS/UL (ref 149–390)
PMV BLD AUTO: 10.2 FL (ref 8.9–12.7)
POTASSIUM SERPL-SCNC: 4.1 MMOL/L (ref 3.5–5.3)
PROT SERPL-MCNC: 7.4 G/DL (ref 6.4–8.4)
RBC # BLD AUTO: 4.41 MILLION/UL (ref 3.88–5.62)
SODIUM SERPL-SCNC: 141 MMOL/L (ref 135–147)
TSH SERPL DL<=0.05 MIU/L-ACNC: 6.63 UIU/ML (ref 0.45–4.5)
WBC # BLD AUTO: 4.7 THOUSAND/UL (ref 4.31–10.16)

## 2023-07-21 PROCEDURE — 80053 COMPREHEN METABOLIC PANEL: CPT

## 2023-07-21 PROCEDURE — 84443 ASSAY THYROID STIM HORMONE: CPT

## 2023-07-21 PROCEDURE — 71046 X-RAY EXAM CHEST 2 VIEWS: CPT

## 2023-07-21 PROCEDURE — 36415 COLL VENOUS BLD VENIPUNCTURE: CPT

## 2023-07-21 PROCEDURE — 85025 COMPLETE CBC W/AUTO DIFF WBC: CPT

## 2023-07-21 RX ORDER — LEVOTHYROXINE SODIUM 0.12 MG/1
125 TABLET ORAL
Qty: 90 TABLET | Refills: 3 | Status: SHIPPED | OUTPATIENT
Start: 2023-07-21

## 2023-07-25 ENCOUNTER — PATIENT OUTREACH (OUTPATIENT)
Dept: FAMILY MEDICINE CLINIC | Facility: CLINIC | Age: 58
End: 2023-07-25

## 2023-07-25 NOTE — PROGRESS NOTES
Spoke with Germán Ramirez and his wife. They are in [place] visiting their daughter. They report Germán Ramirez is doing good. Doing tube feedings and oral foods. Per [place] Juan C Craig told Germán Ramirez to eat what he knows he can handle Germán Ramirez is still deciding what to do about speech therapy. He has referral and will call to schedule if he decides. Last chest xray was clear they reported. No need for further outreach.

## 2023-07-31 ENCOUNTER — HOSPITAL ENCOUNTER (OUTPATIENT)
Dept: SLEEP CENTER | Facility: CLINIC | Age: 58
Discharge: HOME | End: 2023-07-31
Payer: COMMERCIAL

## 2023-07-31 DIAGNOSIS — G47.33 OBSTRUCTIVE SLEEP APNEA SYNDROME: ICD-10-CM

## 2023-07-31 PROCEDURE — 95811 POLYSOM 6/>YRS CPAP 4/> PARM: CPT | Performed by: INTERNAL MEDICINE

## 2023-07-31 PROCEDURE — 95811 POLYSOM 6/>YRS CPAP 4/> PARM: CPT

## 2023-08-01 ENCOUNTER — TELEPHONE (OUTPATIENT)
Dept: FAMILY MEDICINE CLINIC | Facility: CLINIC | Age: 58
End: 2023-08-01

## 2023-08-01 NOTE — TELEPHONE ENCOUNTER
Patient's wife states that last night [facility] Bowling has a sleep study done. He came home this morning with tubing and a mask for a CPAP, but no machine.  She is asking if that is something that needs to be ordered by you, or what the next step is     Please advise

## 2023-08-01 NOTE — PROGRESS NOTES
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  Caffeine use:YES- coffee  6 to 18 ounces    Alcohol:Dayshift workers after 5PM: Alcohol use:NO    Typical day for patient:YES       Study Documentation    Sleep Study Indications: ERIK diagnosed HST    Sleep Study: Treatment   Optimal PAP pressure: [id number]  Leak:Medium  Snore:Eliminated  REM Obtained:yes  Supplemental O2: no    Minimum SaO2 86%  Baseline SaO2 98%  PAP mask tried (list all)AirFit P10  PAP mask choice (final)AirFit F30  PAP mask type:full face  PAP pressure at which snoring was eliminated [id number]  Minimum SaO2 at final PAP pressure 93%  Mode of Therapy:CPAP  ETCO2:No  CPAP changed to BiPAP:No    Mode of Therapy:CPAP    EKG abnormalities: no     EEG abnormalities: no    Sleep Study Recorded < 2 hours: N/A    Sleep Study Recorded > 2 hours but incomplete study: N/A    Sleep Study Recorded 6 hours but no sleep obtained: NO    Patient classification: employed       Post-Sleep Study    Medication used at bedtime or during sleep study:NO    Patient reports time it took to fall asleep:30 to 60 minutes    Patient reports waking up during study:3 or more times. Patient reports returning to sleep in 10 to 30 minutes. Patient reports sleeping 4 to 6 hours without dreaming. Patient reports sleep during study:worse than usual    Patient rated sleepiness: Somewhat sleepy or tired    PAP treatment:yes: Post PAP treatment patient reports feeling unsure if a change is noted and  would wear PAP mask at home.

## 2023-08-01 NOTE — TELEPHONE ENCOUNTER
Called wife and explained study has not been read yet. I also explained on RX you requested he be followed by sleep medicine and that they arrange follow up and that they would contact him direct to provide machine.

## 2023-08-09 ENCOUNTER — TELEPHONE (OUTPATIENT)
Dept: SLEEP CENTER | Facility: CLINIC | Age: 58
End: 2023-08-09

## 2023-08-09 NOTE — TELEPHONE ENCOUNTER
CPAP study is completed. Spoke to the patient and his wife. Rescheduled him to a sooner appointment with Dr. Se Quiroga for September. Patient's home sleep study was completed on 6/30/2023 advised patient's wife insurance may require updated sleep study to get CPAP equipment. She will contact the insurance and inquire.  If updated diagnosis is needed she will ask PCP to order the study

## 2023-08-10 ENCOUNTER — APPOINTMENT (OUTPATIENT)
Dept: LAB | Facility: CLINIC | Age: 58
End: 2023-08-10
Payer: COMMERCIAL

## 2023-08-10 ENCOUNTER — OFFICE VISIT (OUTPATIENT)
Dept: LAB | Facility: CLINIC | Age: 58
End: 2023-08-10
Payer: COMMERCIAL

## 2023-08-10 DIAGNOSIS — K22.2 ESOPHAGEAL STENOSIS: ICD-10-CM

## 2023-08-10 DIAGNOSIS — R13.10 DYSPHAGIA, UNSPECIFIED TYPE: ICD-10-CM

## 2023-08-10 LAB
ANION GAP SERPL CALCULATED.3IONS-SCNC: 8 MMOL/L
BASOPHILS # BLD AUTO: 0.02 THOUSANDS/ÂΜL (ref 0–0.1)
BASOPHILS NFR BLD AUTO: 0 % (ref 0–1)
BUN SERPL-MCNC: 15 MG/DL (ref 5–25)
CALCIUM SERPL-MCNC: 9.3 MG/DL (ref 8.4–10.2)
CHLORIDE SERPL-SCNC: 95 MMOL/L (ref 96–108)
CO2 SERPL-SCNC: 34 MMOL/L (ref 21–32)
CREAT SERPL-MCNC: 0.44 MG/DL (ref 0.6–1.3)
EOSINOPHIL # BLD AUTO: 0.18 THOUSAND/ÂΜL (ref 0–0.61)
EOSINOPHIL NFR BLD AUTO: 4 % (ref 0–6)
ERYTHROCYTE [DISTWIDTH] IN BLOOD BY AUTOMATED COUNT: 12.2 % (ref 11.6–15.1)
GFR SERPL CREATININE-BSD FRML MDRD: 124 ML/MIN/1.73SQ M
GLUCOSE SERPL-MCNC: 83 MG/DL (ref 65–140)
HCT VFR BLD AUTO: 40.5 % (ref 36.5–49.3)
HGB BLD-MCNC: 13.1 G/DL (ref 12–17)
IMM GRANULOCYTES # BLD AUTO: 0.02 THOUSAND/UL (ref 0–0.2)
IMM GRANULOCYTES NFR BLD AUTO: 0 % (ref 0–2)
LYMPHOCYTES # BLD AUTO: 0.95 THOUSANDS/ÂΜL (ref 0.6–4.47)
LYMPHOCYTES NFR BLD AUTO: 20 % (ref 14–44)
MCH RBC QN AUTO: 30.3 PG (ref 26.8–34.3)
MCHC RBC AUTO-ENTMCNC: 32.3 G/DL (ref 31.4–37.4)
MCV RBC AUTO: 94 FL (ref 82–98)
MONOCYTES # BLD AUTO: 0.58 THOUSAND/ÂΜL (ref 0.17–1.22)
MONOCYTES NFR BLD AUTO: 12 % (ref 4–12)
NEUTROPHILS # BLD AUTO: 2.91 THOUSANDS/ÂΜL (ref 1.85–7.62)
NEUTS SEG NFR BLD AUTO: 64 % (ref 43–75)
NRBC BLD AUTO-RTO: 0 /100 WBCS
PLATELET # BLD AUTO: 360 THOUSANDS/UL (ref 149–390)
PMV BLD AUTO: 10 FL (ref 8.9–12.7)
POTASSIUM SERPL-SCNC: 4.2 MMOL/L (ref 3.5–5.3)
RBC # BLD AUTO: 4.33 MILLION/UL (ref 3.88–5.62)
SODIUM SERPL-SCNC: 137 MMOL/L (ref 135–147)
WBC # BLD AUTO: 4.66 THOUSAND/UL (ref 4.31–10.16)

## 2023-08-10 PROCEDURE — 80048 BASIC METABOLIC PNL TOTAL CA: CPT

## 2023-08-10 PROCEDURE — 36415 COLL VENOUS BLD VENIPUNCTURE: CPT

## 2023-08-10 PROCEDURE — 85025 COMPLETE CBC W/AUTO DIFF WBC: CPT

## 2023-08-10 PROCEDURE — 93005 ELECTROCARDIOGRAM TRACING: CPT

## 2023-08-11 LAB
ATRIAL RATE: 73 BPM
P AXIS: 38 DEGREES
PR INTERVAL: 152 MS
QRS AXIS: 23 DEGREES
QRSD INTERVAL: 102 MS
QT INTERVAL: 404 MS
QTC INTERVAL: 486 MS
T WAVE AXIS: 31 DEGREES
VENTRICULAR RATE: 87 BPM

## 2023-08-11 PROCEDURE — 93010 ELECTROCARDIOGRAM REPORT: CPT | Performed by: INTERNAL MEDICINE

## 2023-08-21 ENCOUNTER — OFFICE VISIT (OUTPATIENT)
Dept: FAMILY MEDICINE CLINIC | Facility: CLINIC | Age: 58
End: 2023-08-21
Payer: COMMERCIAL

## 2023-08-21 VITALS
SYSTOLIC BLOOD PRESSURE: 140 MMHG | HEIGHT: 69 IN | DIASTOLIC BLOOD PRESSURE: 92 MMHG | BODY MASS INDEX: 22.87 KG/M2 | OXYGEN SATURATION: 98 % | WEIGHT: 154.4 LBS | HEART RATE: 72 BPM | TEMPERATURE: 97.3 F

## 2023-08-21 DIAGNOSIS — G54.0 RADIATION-INDUCED BRACHIAL PLEXOPATHY: ICD-10-CM

## 2023-08-21 DIAGNOSIS — K13.79 VELOPHARYNGEAL INSUFFICIENCY, ACQUIRED: Primary | ICD-10-CM

## 2023-08-21 DIAGNOSIS — E03.9 ACQUIRED HYPOTHYROIDISM: ICD-10-CM

## 2023-08-21 DIAGNOSIS — I10 ESSENTIAL HYPERTENSION: ICD-10-CM

## 2023-08-21 DIAGNOSIS — Z93.1 S/P PERCUTANEOUS ENDOSCOPIC GASTROSTOMY (PEG) TUBE PLACEMENT (HCC): ICD-10-CM

## 2023-08-21 PROCEDURE — 99214 OFFICE O/P EST MOD 30 MIN: CPT | Performed by: FAMILY MEDICINE

## 2023-08-21 NOTE — PROGRESS NOTES
Name: Swathi Clark      : 1965      MRN: [id number]  Encounter Provider: Santi Jamil MD  Encounter Date: 2023   Encounter department: [address] [place] [address]     1. Velopharyngeal insufficiency, acquired  Assessment & Plan:  Patient has done well since last visit. He is eating better and following all recommended precautions. Weight is up 4-1/2 pounds. He is scheduled for pharyngeal esophageal dilation on the . Continue present treatment. Monitor caloric intake. Recheck 3 months-earlier if worse      2. Acquired hypothyroidism  Assessment & Plan: Tolerating medications. Recheck TSH in 3 to 6 months      3. Essential hypertension  Assessment & Plan:  Blood pressure slightly elevated today. Continue amlodipine. Recheck 3 months-earlier if blood pressures are higher. 4. Radiation-induced brachial plexopathy  Assessment & Plan:  Patient with slow but progressive weakness of the shoulder, arm and hand. Patient is trying to exercise routinely. I will reach out to radiation oncology to see if they have any other suggestions. Also consider neurology evaluation. Recheck 3 months      5. S/P percutaneous endoscopic gastrostomy (PEG) tube placement [facility])  Assessment & Plan:  Patient to 3 cans of Jevity 1.5 a day. Recommend the patient monitor her caloric intake between oral feedings and tube feedings. Patient will also monitor his weight. Recheck 3 months             Subjective     f/u multiple med issues  - pt states that he is a little better. - pt was seen by ENT who felt that pt could continue oral feeding. He was found to have so proximal esophageal/pharyngeal narrowing and is to have an esophageal dilation . Pt is taking oral and is following ENT and speech therapy recommendations re; head positioning, drinking fluids, etc with feedings. He denies choking or other signs of aspiration.  No increased cough is noted after eating, patient denies any worsening shortness of breath. He also denies any fever or chills. - PEG tube remains in place. Pt still doing daily bolus feedings. Has cut back Jevity 1.5 to 3cans a day since his oral intake has improved  - pt denies any new CV symptoms with or without exertion  - pt finds that his L shoulder and arm are slowly weakening. Previous MRI suggested "edema and mild asymmetric thickening along the course of the left brachial plexus from the level of the roots through divisions with associated smooth enhancement and most consistent with radiation plexopathy given the clinical history"  Pt would like to know if there is anything that can be done to either prevent worsening or improve strength/function  - pt denies any new  issues      Review of Systems   Constitutional: Positive for appetite change (sl improved). HENT: Positive for trouble swallowing (improved but not resolved) and voice change (mild, chronic). Negative for sinus pressure and sore throat. Chronic dry mouth   Eyes: Negative. Respiratory: Negative. Negative for cough, choking, shortness of breath and wheezing. Cardiovascular: Negative. Gastrointestinal: Negative. Tolerating PEG tube feedings   Genitourinary: Negative. Musculoskeletal: Positive for arthralgias, myalgias, neck pain and neck stiffness. Negative for gait problem. Skin: Negative. Neurological: Positive for weakness (L shoulder/arm ). Negative for dizziness, facial asymmetry, light-headedness, numbness and headaches. Hematological: Negative for adenopathy. Does not bruise/bleed easily.        Past Medical History:   Diagnosis Date   • Cancer of base of tongue ([address])     excision   • Cough    • Disease of thyroid gland     hypo   • Dysphagia    • ED (erectile dysfunction)    • History of pneumonia    • Hypertension    • Leukopenia    • Peyronie's disease    • Psoriasis    • Sleep apnea    • Tongue cancer ([address])    • Weight loss, abnormal Past Surgical History:   Procedure Laterality Date   • ESOPHAGOSCOPY N/A 2022    Procedure: Jamia Amel;  Surgeon: Redia Felty, MD;  Location: [facility];  Service: ENT   • OTHER SURGICAL HISTORY      infusion port inserted and removed   • PEG TUBE PLACEMENT      and removal   • [place] COLONOSCOPY FLX DX W/COLLJ SPEC WHEN PFRMD N/A 2017    Procedure: COLONOSCOPY with polypectomy x2;  Surgeon: Gil Weathers MD;  Location: [facility]; Service: General   • [place] [facility] W/NJX VOCAL CORD THER W/MICRO/TELESCOPE Bilateral 2022    Procedure: micro direct laryngoscopy, vocal fold injection, biopsy epiglottis lesion, posterior pharyngeal wall injection, flexible esophagoscopy with dilation;  Surgeon: Redia Felty, MD;  Location: [facility];  Service: ENT   • TONGUE BIOPSY / EXCISION      Floor mouth excision of malignant tumor     Family History   Problem Relation Age of Onset   • Other Mother         reactive airway dysfunction   • Coronary artery disease Father    • Hypertension Father    • Psoriasis Father      Social History     Socioeconomic History   • Marital status: /Civil Union     Spouse name: None   • Number of children: 6   • Years of education: None   • Highest education level: None   Occupational History   • None   Tobacco Use   • Smoking status: Former     Packs/day: 0.25     Years: 20.00     Total pack years: 5.00     Types: Cigarettes     Quit date: 2011     Years since quittin.6   • Smokeless tobacco: Former   • Tobacco comments:     pt quit with dx of tongue base cancer, per allscripts   Vaping Use   • Vaping Use: Never used   Substance and Sexual Activity   • Alcohol use:  Yes     Alcohol/week: 12.0 standard drinks of alcohol     Types: 12 Cans of beer per week     Comment: about 2 drinks daily   • Drug use: No   • Sexual activity: Yes     Partners: Female     Birth control/protection: Post-menopausal   Other Topics Concern   • None   Social History Narrative Daily coffee consumption, 1 cups/day    Daily cola consumption    Daily tea consumption    Dental care, regularly    Exercise: running, weight training    High school graduate    No guns in the home    Pets/animals,  Active         Social Determinants of Health     Financial Resource Strain: Not on file   Food Insecurity: Not on file   Transportation Needs: Not on file   Physical Activity: Not on file   Stress: Not on file   Social Connections: Not on file   Intimate Partner Violence: Not on file   Housing Stability: Not on file     Current Outpatient Medications on File Prior to Visit   Medication Sig   • amLODIPine (NORVASC) 5 mg tablet TAKE 1 TABLET (5 MG TOTAL) BY MOUTH DAILY. • levothyroxine 100 mcg tablet TAKE 1 TABLET BY MOUTH EVERY DAY   • multivitamin (THERAGRAN) TABS Take 1 tablet by mouth daily   • tadalafil (CIALIS) 5 MG tablet Take 1 tablet (5 mg total) by mouth daily   • famotidine (PEPCID) 20 mg/2.5 mL oral suspension 5 ML (40 MG TOTAL) BY PER G TUBE ROUTE 2 (TWO) TIMES A DAY BEFORE BREAKFAST AND BEDTIME (Patient not taking: Reported on 8/21/2023)   • pentoxifylline (TRENtal) 400 mg ER tablet TAKE 1 TABLET BY MOUTH 3 TIMES A DAY WITH MEALS.  (Patient not taking: Reported on 8/21/2023)   • [DISCONTINUED] levothyroxine (Euthyrox) 125 mcg tablet Take 1 tablet (125 mcg total) by mouth daily in the early morning     No Known Allergies  Immunization History   Administered Date(s) Administered   • INFLUENZA 10/28/2015   • Influenza, injectable, quadrivalent, preservative free 0.5 mL 11/07/2019, 11/16/2020   • Influenza, recombinant, quadrivalent,injectable, preservative free 11/14/2018   • Influenza, seasonal, injectable 12/18/2012, 10/29/2013, 10/28/2015, 10/31/2017   • Pneumococcal Polysaccharide PPV23 12/18/2012   • Tdap 08/16/2017       Objective     /92 (BP Location: Left arm, Patient Position: Sitting, Cuff Size: Adult)   Pulse 72   Temp (!) 97.3 °F (36.3 °C)   Ht 5' 9" (1.753 m)   Wt 70 kg (154 lb 6.4 oz)   SpO2 98%   BMI 22.80 kg/m²     Physical Exam  Vitals reviewed. HENT:      Head: Normocephalic. Right Ear: Tympanic membrane, ear canal and external ear normal.      Left Ear: Tympanic membrane, ear canal and external ear normal.      Mouth/Throat:      Mouth: Mucous membranes are moist.      Pharynx: No oropharyngeal exudate or posterior oropharyngeal erythema. Eyes:      Extraocular Movements: Extraocular movements intact. Conjunctiva/sclera: Conjunctivae normal.      Pupils: Pupils are equal, round, and reactive to light. Neck:      Comments: L neck atrophy unchanged  Cardiovascular:      Rate and Rhythm: Normal rate and regular rhythm. Pulses: Normal pulses. Pulmonary:      Effort: Pulmonary effort is normal.      Breath sounds: No wheezing or rales. Abdominal:      General: There is no distension. Palpations: There is no mass. Tenderness: There is no abdominal tenderness. Comments: PEG tube in place   Musculoskeletal:         General: Deformity present. No swelling. Cervical back: Tenderness (mild L posterior paracervical muscles) present. Right lower leg: No edema. Left lower leg: No edema. Comments: Persistent decreased range of motion of left shoulder with abduction and anterior flexion. Left trapezius atrophy, and left wing scapular unchanged. Lymphadenopathy:      Cervical: No cervical adenopathy. Skin:     General: Skin is warm. Capillary Refill: Capillary refill takes less than 2 seconds. Comments: RT changes of L neck   Neurological:      Mental Status: He is alert and oriented to person, place, and time. Sensory: Sensory deficit (mild left hand-unchanged) present. Motor: Weakness (L shoulder and L hand ) present.       Coordination: Coordination normal.      Gait: Gait normal.      Deep Tendon Reflexes: Reflexes normal.       Ottis Brunner, MD

## 2023-08-23 ENCOUNTER — ANESTHESIA EVENT (OUTPATIENT)
Dept: PERIOP | Facility: HOSPITAL | Age: 58
End: 2023-08-23
Payer: COMMERCIAL

## 2023-08-23 NOTE — ASSESSMENT & PLAN NOTE
Patient with slow but progressive weakness of the shoulder, arm and hand. Patient is trying to exercise routinely. I will reach out to radiation oncology to see if they have any other suggestions. Also consider neurology evaluation.   Recheck 3 months

## 2023-08-23 NOTE — PRE-PROCEDURE INSTRUCTIONS
Pre-Surgery Instructions:   Medication Instructions   • amLODIPine (NORVASC) 5 mg tablet Take day of surgery. • levothyroxine 100 mcg tablet Take day of surgery. • multivitamin (THERAGRAN) TABS Stop taking 3 days prior to surgery. • tadalafil (CIALIS) 5 MG tablet Stop taking 1 day prior to surgery. Medication instructions for day surgery reviewed. Please use only a sip of water to take your instructed medications. Avoid all over the counter vitamins, supplements and NSAIDS for one week prior to surgery per anesthesia guidelines. Tylenol is ok to take as needed. You will receive a call one business day prior to surgery with an arrival time and hospital directions. If your surgery is scheduled on a Monday, the hospital will be calling you on the Friday prior to your surgery. If you have not heard from anyone by 8pm, please call the hospital supervisor through the hospital  at [phone]. Lexii Camilla [phone]). Do not eat or drink anything after midnight the night before your surgery, including candy, mints, lifesavers, or chewing gum. Do not drink alcohol 24hrs before your surgery. Try not to smoke at least 24hrs before your surgery. Follow the pre surgery showering instructions as listed in the [facility] Surgical Experience Booklet” or otherwise provided by your surgeon's office. Do not shave the surgical area 24 hours before surgery. Do not apply any lotions, creams, including makeup, cologne, deodorant, or perfumes after showering on the day of your surgery. No contact lenses, eye make-up, or artificial eyelashes. Remove nail polish, including gel polish, and any artificial, gel, or acrylic nails if possible. Remove all jewelry including rings and body piercing jewelry. Wear causal clothing that is easy to take on and off. Consider your type of surgery. Keep any valuables, jewelry, piercings at home.  Please bring any specially ordered equipment (sling, braces) if indicated. Arrange for a responsible person to drive you to and from the hospital on the day of your surgery. Visitor Guidelines discussed. Call the surgeon's office with any new illnesses, exposures, or additional questions prior to surgery. Please reference your [facility] Surgical Experience Booklet” for additional information to prepare for your upcoming surgery. Pt. Verbalized an understanding of all instructions reviewed and offers no concerns at this time.

## 2023-08-23 NOTE — ASSESSMENT & PLAN NOTE
Patient has done well since last visit. He is eating better and following all recommended precautions. Weight is up 4-1/2 pounds. He is scheduled for pharyngeal esophageal dilation on the 25th. Continue present treatment. Monitor caloric intake.   Recheck 3 months-earlier if worse

## 2023-08-23 NOTE — ASSESSMENT & PLAN NOTE
Patient to 3 cans of Jevity 1.5 a day. Recommend the patient monitor her caloric intake between oral feedings and tube feedings. Patient will also monitor his weight.   Recheck 3 months

## 2023-08-23 NOTE — ASSESSMENT & PLAN NOTE
Blood pressure slightly elevated today. Continue amlodipine. Recheck 3 months-earlier if blood pressures are higher.

## 2023-08-25 ENCOUNTER — HOSPITAL ENCOUNTER (OUTPATIENT)
Facility: HOSPITAL | Age: 58
Setting detail: OUTPATIENT SURGERY
Discharge: HOME | End: 2023-08-25
Attending: OTOLARYNGOLOGY | Admitting: OTOLARYNGOLOGY
Payer: COMMERCIAL

## 2023-08-25 ENCOUNTER — ANESTHESIA (OUTPATIENT)
Dept: PERIOP | Facility: HOSPITAL | Age: 58
End: 2023-08-25
Payer: COMMERCIAL

## 2023-08-25 VITALS
HEART RATE: 53 BPM | DIASTOLIC BLOOD PRESSURE: 59 MMHG | SYSTOLIC BLOOD PRESSURE: 96 MMHG | OXYGEN SATURATION: 92 % | RESPIRATION RATE: 18 BRPM | TEMPERATURE: 97.5 F | BODY MASS INDEX: 22.81 KG/M2 | HEIGHT: 69 IN | WEIGHT: 154 LBS

## 2023-08-25 PROCEDURE — 43213 ESOPHAGOSCOPY RETRO BALLOON: CPT | Performed by: OTOLARYNGOLOGY

## 2023-08-25 RX ORDER — SODIUM CHLORIDE, SODIUM LACTATE, POTASSIUM CHLORIDE, CALCIUM CHLORIDE 600; 310; 30; 20 MG/100ML; MG/100ML; MG/100ML; MG/100ML
INJECTION, SOLUTION INTRAVENOUS CONTINUOUS PRN
Status: DISCONTINUED | OUTPATIENT
Start: 2023-08-25 | End: 2023-08-25

## 2023-08-25 RX ORDER — FENTANYL CITRATE 50 UG/ML
INJECTION, SOLUTION INTRAMUSCULAR; INTRAVENOUS AS NEEDED
Status: DISCONTINUED | OUTPATIENT
Start: 2023-08-25 | End: 2023-08-25

## 2023-08-25 RX ORDER — MIDAZOLAM HYDROCHLORIDE 2 MG/2ML
INJECTION, SOLUTION INTRAMUSCULAR; INTRAVENOUS AS NEEDED
Status: DISCONTINUED | OUTPATIENT
Start: 2023-08-25 | End: 2023-08-25

## 2023-08-25 RX ORDER — KETAMINE HCL IN NACL, ISO-OSM 100MG/10ML
SYRINGE (ML) INJECTION AS NEEDED
Status: DISCONTINUED | OUTPATIENT
Start: 2023-08-25 | End: 2023-08-25

## 2023-08-25 RX ORDER — METOCLOPRAMIDE HYDROCHLORIDE 5 MG/ML
10 INJECTION INTRAMUSCULAR; INTRAVENOUS ONCE AS NEEDED
Status: DISCONTINUED | OUTPATIENT
Start: 2023-08-25 | End: 2023-08-25 | Stop reason: HOSPADM

## 2023-08-25 RX ORDER — PROPOFOL 10 MG/ML
INJECTION, EMULSION INTRAVENOUS CONTINUOUS PRN
Status: DISCONTINUED | OUTPATIENT
Start: 2023-08-25 | End: 2023-08-25

## 2023-08-25 RX ORDER — LIDOCAINE HYDROCHLORIDE 20 MG/ML
INJECTION, SOLUTION EPIDURAL; INFILTRATION; INTRACAUDAL; PERINEURAL AS NEEDED
Status: DISCONTINUED | OUTPATIENT
Start: 2023-08-25 | End: 2023-08-25 | Stop reason: HOSPADM

## 2023-08-25 RX ORDER — FENTANYL CITRATE/PF 50 MCG/ML
50 SYRINGE (ML) INJECTION
Status: DISCONTINUED | OUTPATIENT
Start: 2023-08-25 | End: 2023-08-25 | Stop reason: HOSPADM

## 2023-08-25 RX ORDER — OXYMETAZOLINE HYDROCHLORIDE 0.05 G/100ML
SPRAY NASAL AS NEEDED
Status: DISCONTINUED | OUTPATIENT
Start: 2023-08-25 | End: 2023-08-25 | Stop reason: HOSPADM

## 2023-08-25 RX ORDER — MAGNESIUM HYDROXIDE 1200 MG/15ML
LIQUID ORAL AS NEEDED
Status: DISCONTINUED | OUTPATIENT
Start: 2023-08-25 | End: 2023-08-25 | Stop reason: HOSPADM

## 2023-08-25 RX ORDER — ONDANSETRON 2 MG/ML
4 INJECTION INTRAMUSCULAR; INTRAVENOUS ONCE AS NEEDED
Status: DISCONTINUED | OUTPATIENT
Start: 2023-08-25 | End: 2023-08-25 | Stop reason: HOSPADM

## 2023-08-25 RX ADMIN — FENTANYL CITRATE 25 MCG: 50 INJECTION, SOLUTION INTRAMUSCULAR; INTRAVENOUS at 08:42

## 2023-08-25 RX ADMIN — SODIUM CHLORIDE, SODIUM LACTATE, POTASSIUM CHLORIDE, AND CALCIUM CHLORIDE: .6; .31; .03; .02 INJECTION, SOLUTION INTRAVENOUS at 08:25

## 2023-08-25 RX ADMIN — Medication 10 MG: at 08:42

## 2023-08-25 RX ADMIN — DEXMEDETOMIDINE HYDROCHLORIDE 8 MCG: 100 INJECTION, SOLUTION INTRAVENOUS at 08:50

## 2023-08-25 RX ADMIN — Medication 10 MG: at 08:44

## 2023-08-25 RX ADMIN — Medication 10 MG: at 08:55

## 2023-08-25 RX ADMIN — PROPOFOL 30 MCG/KG/MIN: 10 INJECTION, EMULSION INTRAVENOUS at 08:47

## 2023-08-25 RX ADMIN — DEXMEDETOMIDINE HYDROCHLORIDE 8 MCG: 100 INJECTION, SOLUTION INTRAVENOUS at 08:42

## 2023-08-25 RX ADMIN — FENTANYL CITRATE 25 MCG: 50 INJECTION, SOLUTION INTRAMUSCULAR; INTRAVENOUS at 09:02

## 2023-08-25 RX ADMIN — DEXMEDETOMIDINE HYDROCHLORIDE 4 MCG: 100 INJECTION, SOLUTION INTRAVENOUS at 08:44

## 2023-08-25 RX ADMIN — FENTANYL CITRATE 25 MCG: 50 INJECTION, SOLUTION INTRAMUSCULAR; INTRAVENOUS at 08:54

## 2023-08-25 RX ADMIN — MIDAZOLAM 2 MG: 1 INJECTION INTRAMUSCULAR; INTRAVENOUS at 08:38

## 2023-08-25 NOTE — ANESTHESIA POSTPROCEDURE EVALUATION
Post-Op Assessment Note    CV Status:  Stable  Pain Score: 0    Pain management: adequate     Mental Status:  Alert and awake   Hydration Status:  Euvolemic   PONV Controlled:  Controlled   Airway Patency:  Patent      Post Op Vitals Reviewed: Yes      Staff: CRNA, Anesthesiologist   Comments: awake and alert, IVFs opened up        No notable events documented.     BP  86/53   Temp   96.9   Pulse  50   Resp   10   SpO2   100

## 2023-08-25 NOTE — ANESTHESIA PREPROCEDURE EVALUATION
Procedure:  ESOPHAGOSCOPY/ ESOPHAGEAL DILATION (BALLOON VS. Rex Laughter) (Esophagus)    Relevant Problems   CARDIO   (+) Essential hypertension      ENDO   (+) Hypothyroidism      GI/HEPATIC   (+) Pharyngoesophageal dysphagia      MUSCULOSKELETAL   (+) Velopharyngeal insufficiency, acquired      NEURO/PSYCH   (+) Paresthesias      PULMONARY   (+) Acute hypoxemic respiratory failure (HCC)   (+) Aspiration pneumonia of both lungs (HCC)   (+) Obstructive sleep apnea syndrome   (+) Shortness of breath             Anesthesia Plan  ASA Score- 3     Anesthesia Type- general with ASA Monitors. Additional Monitors:   Airway Plan: ETT. Comment:  Placement Date: 06/23/23; Placement Time: 5629 (created via procedure documentation); Mask Ventilation: 1; 6/23:/23: Technique: Video laryngoscopy; Type: ETT - single; Single Lumen Tube Size: 5.5 mm; Cuffed: Yes; Location: Oral; Grade View: Grade I; Removal Date: 06/23/23 . Plan Factors-    Chart reviewed. Induction- intravenous. Postoperative Plan-     Informed Consent- Anesthetic plan and risks discussed with patient. I personally reviewed this patient with the CRNA. Discussed and agreed on the Anesthesia Plan with the CRNA. Em Piña

## 2023-08-25 NOTE — H&P
Otolaryngology - Head and Neck Surgery  Return Visit      Juani Blanc Sr. is a 59 y.o. who returned for evaluation of swallow . HPI:    Here for surgery    Prior hx:  He was in [facility] about a month ago and was intubated for aspiration PNA, they were considering trach if they were not able to extubate but fortunately he was able to avoid trach. He did have a PEG placed due to aspiration risk. .      Overall doing ok feels he is back to baseline before [place] trip  Chewing slowly     VBS done 7/5/23  Oral: diminished bolus manipulation  Oropharyngeal: weak pharyngeal squeeze silent aspiration with liquids and decreased entry into UES  Esophageal: not commented, but appears with tight closure or premature closure of UES   Upon personal review evidence of aspiration with thin liquids improved with thick liquids     Sensation of regurgitation/water brash at times when bending over  Not currently on reflux medications  Did not tolerate restech placement attempt previously (gag)    Prior hx  Sometimes when swallowing, it can go the wrong direction (NP), gets hung up for a bit then lets down    Got flu and lost a few lbs; dec appetite, fever, headache    Prior hx:  S/p MDL, vf steroid injection, biopsy epiglottis, posterior pharyngeal wall injection, esophagoscopy w dilation 9/16/22    Voice is better, still with some gravel   Swallow is better     MRI brain w wo 9/7/22   White matter changes suggestive of chronic microangiopathy. No acute intracranial pathology. Tortuosity of the distal vertebral arteries and proximal basilar artery. The left vertebral artery rest immediately anterior to the 7th, 8th and 9th nerves as they exit the brainstem. Labs:  Normal- CCP ab, RF, AChR panel  abnl - FLY 1:160 speckled, chromatin Ab elev    Pathology 9/16/22    A. Esophagus, Mid-Esophagus, biopsy:  - Benign esophageal squamous mucosa with non-specific reactive changes. See Note.   - No active esophagitis; no intraepithelial eosinophils.  - Special stain for fungus (GMS) negative. - Negative for dysplasia and malignancy on multiple examined levels. B. Epiglottis, epiglottis lesion, biopsy:  - Portion of traumatized squamous mucosa with reactive basilar atypia, acutely inflamed. See Note. -- Associated hemorrhage, fibrin deposition and organized hematoma. -- Special stain for fungus (GMS) negative. - Negative for dysplasia and malignancy on multiple examined levels    Prior hx:  Labs  Normal - TSH  Not done - AChR ab panel, RF, FLY    Taking trental    MRI brain/facial nerve scheduled 9/7    Carotid doppler 8/30/22  <50% stenosis bilaterally  Vertebral art flow is not identified  BP was elevated (210-20/110-20)    SLP? Not yet but scheduled    Labs  TSH wnl   Others not done yet    Restech - unable to tolerate placement, therefore not completed    Mild ERIK on sleep study, CPAP was recommended    He knows Wai Mcfarland, whom I know. 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 Treated for this 10 yrs ago (chemo/RT at [facility]). No surgery for this (other than biopsy). Swallow issues occurred since that time (shortly after treatment). Attributed to the treatment. Followed by Dr. Ml Magana over the years. Left side of neck is scarred, left eye twitching for the past 1-1.5 yrs. Left arm neuropathies/radiculopathies as well. Cannot raise left arm above shoulder. Solids - takes multiple swallows to go down, liquid wash  Some foods can clog like breads; but ok with chips  Thin or toasted bread works better  Liquids - no cough/choke  Drinks a lot of water daily ("a gallon")  Saliva gets more dry at night; he "always feels cold"  Gum chewing helps as well  After taking a drink, if bending over after; will have regurgitation through nasal cavity    2 cups coffee/day    Evals/workup - swallow studies, last one was approx 1-2 yrs.     Tried swallow therapy with SLP ([address], swallow exercises, nothing manual/MFR type); not too much progress with this. Allergy- min seasonal symptoms;  Mild SOB, had inhaler for this. Reflux- no HB/regurgitation; No globus; Some mucous/throat clearing worse in AM; occasional dry cough, worse with talking; some hoarseness, worse with talking; No PND; No sore or dry throat; No sour/metallic taste     GI eval: colonoscopy; due for another within the year(?);  No EGD before. Dairy - avoids; more mucous/bothers throat  Gluten - no symptoms    Lyme? No hx    Trying to put on more weight; used to take weight gain/protein shakes. Runs [employer]  Has 6 children      Attended acupuncture -which may have helped swallow      VBS 3/4/2021  Oral- near normal  Pharyngeal- back and forth pharyngeal retropulsion, delayed swallow initiation, min BOT retraction, min hyolaryngeal elevation, reduced airway entrance closure  Transient penetration with puree, silent aspiration  Retention vallecula, posterior pharyngeal wall  Chin tuck helped somewhat  Effortful swallow increased amt of silent aspiration  Esophageal- no abnormalities    CXR 3/21/22  Resolution of right pneumonia  RML scar chronic    MRI cspine 8/24/21  Fatty marrow infiltration within the cervical spine and the proximal upper thoracic ribs with persistent asymmetric left lower cervical and upper thoracic muscular signal abnormality without a discrete soft tissue mass lesion. These changes raise the   question of prior cervical radiation therapy and possibly chronic left brachial plexitis with secondary muscular atrophy. These changes were also noted on the prior MRI brachial plexus study of November 28, 2018. Mild noncompressive degenerative discogenic disease at C5-C6.       TSH 5/4/21 elevated    Takes synthroid 100mcg daily; no change was made in dose reportedly    Historical Information   Past Medical History:   Diagnosis Date   • Cancer of base of tongue ([address])     excision   • Cough    • CPAP (continuous positive airway pressure) dependence     waiting on consult   • Disease of thyroid gland     hypo   • Dysphagia    • ED (erectile dysfunction)    • History of pneumonia    • Hypertension    • Leukopenia    • Peyronie's disease    • PONV (postoperative nausea and vomiting)    • Psoriasis    • Sleep apnea    • Tongue cancer ([address])    • Weight loss, abnormal      Past Surgical History:   Procedure Laterality Date   • ESOPHAGOSCOPY N/A 2022    Procedure: ESOPHAGOSCOPY FLEXIBLE;  Surgeon: Ofelia Gaitan MD;  Location: [facility];  Service: ENT   • OTHER SURGICAL HISTORY      infusion port inserted and removed   • PEG TUBE PLACEMENT      and removal   • MS COLONOSCOPY FLX DX W/COLLJ SPEC WHEN PFRMD N/A 2017    Procedure: COLONOSCOPY with polypectomy x2;  Surgeon: Vladimir Hoffman MD;  Location: [facility];   Service: General   • MS [facility] W/NJX VOCAL CORD THER W/MICRO/TELESCOPE Bilateral 2022    Procedure: micro direct laryngoscopy, vocal fold injection, biopsy epiglottis lesion, posterior pharyngeal wall injection, flexible esophagoscopy with dilation;  Surgeon: Ofelia Gaitan MD;  Location: [facility];  Service: ENT   • TONGUE BIOPSY / EXCISION      Floor mouth excision of malignant tumor     Social History   Social History     Substance and Sexual Activity   Alcohol Use Not Currently    Comment: rare     Social History     Substance and Sexual Activity   Drug Use No     Social History     Tobacco Use   Smoking Status Former   • Packs/day: 0.25   • Years: 20.00   • Total pack years: 5.00   • Types: Cigarettes   • Quit date: 2011   • Years since quittin.6   Smokeless Tobacco Former   • Quit date:    Tobacco Comments    pt quit with dx of tongue base cancer, per allscripts     Family History   Problem Relation Age of Onset   • Other Mother         reactive airway dysfunction   • Coronary artery disease Father    • Hypertension Father    • Psoriasis Father Meds/Allergies     No current facility-administered medications on file prior to encounter. Current Outpatient Medications on File Prior to Encounter   Medication Sig Dispense Refill   • amLODIPine (NORVASC) 5 mg tablet TAKE 1 TABLET (5 MG TOTAL) BY MOUTH DAILY. 90 tablet 2   • levothyroxine 100 mcg tablet TAKE 1 TABLET BY MOUTH EVERY DAY 90 tablet 1   • multivitamin (THERAGRAN) TABS Take 1 tablet by mouth daily     • famotidine (PEPCID) 20 mg/2.5 mL oral suspension 5 ML (40 MG TOTAL) BY PER G TUBE ROUTE 2 (TWO) TIMES A DAY BEFORE BREAKFAST AND BEDTIME (Patient not taking: Reported on 8/23/2023) 900 mL 4   • pentoxifylline (TRENtal) 400 mg ER tablet TAKE 1 TABLET BY MOUTH 3 TIMES A DAY WITH MEALS. (Patient not taking: Reported on 8/21/2023) 270 tablet 3   • tadalafil (CIALIS) 5 MG tablet Take 1 tablet (5 mg total) by mouth daily 90 tablet 3       No Known Allergies      Physical exam:     BP (!) 173/98   Pulse 65   Temp 97.8 °F (36.6 °C)   Resp 16   Ht 5' 9" (1.753 m)   Wt 69.9 kg (154 lb)   SpO2 99%   BMI 22.74 kg/m²     Gen: NAD, cooperative, well nourished  Voice: mild raspy, some gravel/clearing  Head: normocephalic, atraumatic  Face: mild asymmetry  Balance assessment: Gait steady. Nose: External nose without lesions. Nares patent. No pus. No polyps. Nasal septum mildly deviated. Inferior turbinates pink and without hypertrophy. Oral cavity: No lesions/masses. Tongue mobile and soft. No abnormality of parotid ducts. Oropharynx: No lesions/masses. mild cobblestoning. Tonsils without ulceration or exudate. Neck: No LAD. No masses. Left side diffusely stiff/fibrotic with dec ROM  Salivary glands: Parotids nontender, non-enlarged. Submandibular glands nontender, non-enlarged. Thyroid: WIthout hypertrophy. No palpable nodules.   Nontender  Neuro: Alert  CN III-VI, XII intact; difficulty raising left arm above shoulder, left facial weakness, dec elevation soft palate  Pulm:  CTAB nonlabored, no stridor  CV: RRR  Extremities warm/perfused  Abd soft nontender      Procedures  PRIOR      strobovideolaryngoscopy    Pre-Operative Diagnosis:  1. Dysphonia   2. Dysphagia   3. Hx SCCA BOT s/p chemo/RT ~2012 [facility]  4. Left vf immobility  5. Glottic insufficiency  6. Left hemilarynx with mod edema/fibrosis  7. Min erythema  8. No pooling of secretions  9. Narrow/crowding throughout  10. No laryngeal sensation left hemilarynx, right side intact  11. Small lesion 4-5mm laryngeal surface of epiglottis right of midline, leukoplakic/hypervascular  S/p MDL, vf steroid injection, biopsy epiglottis, posterior pharyngeal wall injection, esophagoscopy w dilation 9/16/22    Post-Operative Diagnosis:    1. SAME  2. Min VPI  3. Patent/adequate glottic airway  4. Left vf immobility  5. Improved right vf mobility  6. Pooling of saliva in pyriforms bilaterally  7. Pooling of saliva in vallecula     Surgeon:   Andres Beltran MD    Anesthesia:     -Lidocaine 2%  -Oxymetazoline    Stroboscope:  Atmos  Flexible Endoscope:    chip tip  Rigid endoscope:      Operative Details: The procedure was performed in the endoscopy special procedures room. A high resolution video laryngoscope was inserted transnasally or orally and suspended from the video system for magnification and documentation. Stroboscopic light at several frequencies and intensities was used.          Voice: mild raspy, some gravel/clearing    Supraglottic Hyperfunction:    present, mild ant/post  Arytenoid Joint Movement:    Normal  Dysdiadochokinesis:     Absent  Arytenoids:   mild erythema, mod edema L>R  Posterior Cobblestoning:  present      Right Vocal Fold:  Abduction:    Normal  Adduction:    Normal  Longitudinal Tension:   Normal    Left Vocal Fold:  Abduction:    absent  Adduction:    absent  Longitudinal Tension:   dec    Strobovideolaryngoscopy with Magnification    Amplitude Symmetry:   Symmetric  Phase Symmetry:    Symmetric  Periodicity: Regular    Glottic Closure:    incomplete    Vocal Process Height:    Equal    Amplitude of Vocal Folds:  Right: Normal  Left: Normal    Wave Form of Vocal Folds:  Right: Normal  Left: Normal    Vibratory Function of Vocal Folds:  Right: Normal  Left: Normal    Vocal Fold Color:  Right: Normal  Left: Normal    Masses/Vibratory Margin Irregularities: Mild Valerio's edema. Left hemilarynx edema/fibrosis  Other Lesions: The procedure was concluded without complication and the laryngoscope was removed. Reflux Finding Score (RFS)  Subglottic Edema:   2  Ventricular Obliteration:   2-4 L>R    Erythema/Hyperemia:    2 min  Vocal Fold Edema:   1  Diffuse Laryngeal Edema:   1-2 L>R   Posterior Commissure Hypertrophy:   2     Granuloma/Granulation:   0  Thick Endolaryngeal Mucus:  0     Total: 10    Assessment:    Diagnosis ICD-10-CM Associated Orders   1. Hx of squamous cell carcinoma-BOT, s/p chemo/RT 2012  Z85.89       2. Dysphonia  R49.0       3. ERIK (obstructive sleep apnea)  G47.33       4. Acquired hypothyroidism  E03.9       5. Pharyngoesophageal dysphagia  R13.14       6. Glottic insufficiency  J38.3       7. Velopharyngeal insufficiency, acquired  K13.79       8. Hx of head and neck radiation  Z92.3       9. Difficult airway for intubation, sequela  T88. 4XXS       10. Muscle tension dysphonia  R49.0       11.  Facial weakness  R29.810         Plan:    Sequelae from hx of scca BOT treated with chemo/RT in 2012  Multiple cranial neuropathies  Left vf paralysis/immobility and left hemilaryngeal anesthesia  Velopharyngeal insufficiency- improved with injection posterior pharyngeal wall    S/p MDL, vf steroid injection, biopsy epiglottis, posterior pharyngeal wall injection, esophagoscopy w dilation 9/16/22    Recent hospitalization with intubation for aspiration pna while in [place]    Swallow subjectively back to baseline before trip to [facility]  VBS demonstrated weak pharyngeal contraction, silent aspiration with thin liquids, tight UES (loss of elasticity)    Surgery discussed incl RBA of flexible (transnasal) esophagoscopy, esophageal dilation (balloon, vs bougie) - awake/local MAC vs gen anesthesia. Risks discussed include but not limited to pain, bleeding, infection, airway obstruction, oral/pharyngeal/esophageal injury.     Restech placement was not tolerated previously  Will make another attempt at placement following dilation procedure    Trental- resume postop    SLP for MFR/swallow  Can try turning head to the left for swallow     CPAP    Follow up Dr. Ines Cunningham re: botox to facial muscles    Famotidine 40mg BID     He will return to my office postop

## 2023-09-02 DIAGNOSIS — E03.9 HYPOTHYROIDISM, UNSPECIFIED TYPE: ICD-10-CM

## 2023-09-05 ENCOUNTER — OFFICE VISIT (OUTPATIENT)
Dept: SLEEP CENTER | Facility: CLINIC | Age: 58
End: 2023-09-05
Payer: COMMERCIAL

## 2023-09-05 VITALS
HEIGHT: 69 IN | DIASTOLIC BLOOD PRESSURE: 90 MMHG | SYSTOLIC BLOOD PRESSURE: 158 MMHG | BODY MASS INDEX: 22.96 KG/M2 | WEIGHT: 155 LBS

## 2023-09-05 DIAGNOSIS — R06.81 WITNESSED EPISODE OF APNEA: ICD-10-CM

## 2023-09-05 DIAGNOSIS — R03.0 ELEVATED BLOOD PRESSURE READING: ICD-10-CM

## 2023-09-05 DIAGNOSIS — G47.33 OBSTRUCTIVE SLEEP APNEA SYNDROME: Primary | ICD-10-CM

## 2023-09-05 DIAGNOSIS — R06.83 SNORING: ICD-10-CM

## 2023-09-05 PROBLEM — J69.0 ASPIRATION PNEUMONIA OF BOTH LUNGS (HCC): Status: RESOLVED | Noted: 2023-07-07 | Resolved: 2023-09-05

## 2023-09-05 PROCEDURE — 99214 OFFICE O/P EST MOD 30 MIN: CPT | Performed by: INTERNAL MEDICINE

## 2023-09-05 RX ORDER — LEVOTHYROXINE SODIUM 0.1 MG/1
TABLET ORAL
Qty: 90 TABLET | Refills: 1 | Status: SHIPPED | OUTPATIENT
Start: 2023-09-05

## 2023-09-05 NOTE — PATIENT INSTRUCTIONS
Ordered home study   I will order CPAP based on results  Follow up 3 months after getting CPAP for compliance visit

## 2023-09-05 NOTE — PROGRESS NOTES
Progress Note - Sleep Medicine  Devyn Klein. 59 y.o. male MRN: [id number]       Impression & Plan:     1. Mild obstructive sleep apnea  Home sleep study 6/28/2021 showed FENG 8.4, oxygen brad 51%, oxygen saturation less than or equal to 88% for 46.7 minutes  CPAP study 8/1/2023 showed optimal pressure of 11 cm H2O  Signs and symptoms include snoring, witnessed apneas, excessive tiredness  He was diagnosed in 2021 but did not use CPAP  PAP titration study was ordered by ENT physician  However diagnostic studies required to order CPAP    Plan  Ordered home study  I will speak to ENT prior to ordering CPAP as patient has significant risk of aspiration  If okay to order CPAP, I will order auto CPAP with pressure range 10 to 15 cm H2O    2. Elevated serum CO2  Serum CO2 elevated at 34  Concerning for hypoventilation  Hypoxemia resolved with CPAP pressure of 11 cm H2O during titration study    3. Snoring  As above    4. Witnessed apneas  As above    5. Elevated blood pressure reading  Recommended patient follow-up with his primary care physician    ______________________________________________________________________    HPI:    Amarilis Ortizjacky Marinelli. is a 52-year-old male with past medical history for head and neck cancer status postsurgery/radiation/chemotherapy, hypertension, hypothyroidism presents for follow-up of obstructive sleep apnea. Home sleep study showed an FENG of 8.4 indicating mild ERIK. However patient had significant nocturnal hypoxemia. 2 months ago he was intubated after aspiration pneumonia. They were considering trach if they were not able to extubate but fortunately he was able to avoid that. He did have a PEG tube placed due to aspiration risk. Patient reports he had extensive surgery/chemo/radiation for head and neck cancer. He has vocal cord paralysis. He is seeing ENT Dr. Carlos Frank for dysphagia, who ordered CPAP titration study.   Titration study last month showed optimal pressure of 11 cm H2O. Patient had difficulty tolerating CPAP. He had poor sleep efficiency during the study. Since being diagnosed with head and neck cancer he has lost 50 pounds. Review of Systems:  Review of Systems   All other systems reviewed and are negative.         Social history updates:  Social History     Tobacco Use   Smoking Status Former   • Packs/day: 0.25   • Years: 20.00   • Total pack years: 5.00   • Types: Cigarettes   • Quit date: 2011   • Years since quittin.6   Smokeless Tobacco Former   • Quit date:    Tobacco Comments    pt quit with dx of tongue base cancer, per allscripts     Social History     Socioeconomic History   • Marital status: /Civil Union     Spouse name: Not on file   • Number of children: 6   • Years of education: Not on file   • Highest education level: Not on file   Occupational History   • Not on file   Tobacco Use   • Smoking status: Former     Packs/day: 0.25     Years: 20.00     Total pack years: 5.00     Types: Cigarettes     Quit date: 2011     Years since quittin.6   • Smokeless tobacco: Former     Quit date:    • Tobacco comments:     pt quit with dx of tongue base cancer, per allscripts   Vaping Use   • Vaping Use: Never used   Substance and Sexual Activity   • Alcohol use: Not Currently     Comment: rare   • Drug use: No   • Sexual activity: Yes     Partners: Female     Birth control/protection: Post-menopausal   Other Topics Concern   • Not on file   Social History Narrative    Daily coffee consumption, 1 cups/day    Daily cola consumption    Daily tea consumption    Dental care, regularly    Exercise: running, weight training    High school graduate    No guns in the home    Pets/animals,  Active         Social Determinants of Health     Financial Resource Strain: Not on file   Food Insecurity: Not on file   Transportation Needs: Not on file   Physical Activity: Not on file   Stress: Not on file   Social Connections: Not on file Intimate Partner Violence: Not on file   Housing Stability: Not on file       PhysicalExamination:  Vitals:   /90   Ht 5' 9" (1.753 m)   Wt 70.3 kg (155 lb)   BMI 22.89 kg/m²     Physical Exam  General:  Awake alert and oriented x 3, conversant without conversational dyspnea, NAD, normal affect  HEENT:  PERRL, Sclera noninjected, nonicteric OU, Nares patent,  no craniofacial abnormalities, Mucous membranes, moist, no oral lesions, normal dentition, Mallampati class 4, crowded airway  NECK: Trachea midline, no accessory muscle use, no stridor, no cervical or supraclavicular adenopathy, JVP not elevated  CARDIAC: Reg, single s1/S2, no m/r/g  PULM: CTA bilaterally no wheezing, rhonchi or rales  EXT: No cyanosis, no clubbing, no edema, normal capillary refill  NEURO: no focal neurologic deficits, AAOx3, moving all extremities appropriately     Diagnostic Data:  Labs:   I personally reviewed the most recent laboratory data pertinent to today's visit  Appointment on 08/10/2023   Component Date Value   • Sodium 08/10/2023 137    • Potassium 08/10/2023 4.2    • Chloride 08/10/2023 95 (L)    • CO2 08/10/2023 34 (H)    • ANION GAP 08/10/2023 8    • BUN 08/10/2023 15    • Creatinine 08/10/2023 0.44 (L)    • Glucose 08/10/2023 83    • Calcium 08/10/2023 9.3    • eGFR 08/10/2023 124    • WBC 08/10/2023 4.66    • RBC 08/10/2023 4.33    • Hemoglobin 08/10/2023 13.1    • Hematocrit 08/10/2023 40.5    • MCV 08/10/2023 94    • MCH 08/10/2023 30.3    • MCHC 08/10/2023 32.3    • RDW 08/10/2023 12.2    • MPV 08/10/2023 10.0    • Platelets 12/75/9721 360    • nRBC 08/10/2023 0    • Neutrophils Relative 08/10/2023 64    • Immat GRANS % 08/10/2023 0    • Lymphocytes Relative 08/10/2023 20    • Monocytes Relative 08/10/2023 12    • Eosinophils Relative 08/10/2023 4    • Basophils Relative 08/10/2023 0    • Neutrophils Absolute 08/10/2023 2.91    • Immature Grans Absolute 08/10/2023 0.02    • Lymphocytes Absolute 08/10/2023 0.95 • Monocytes Absolute 08/10/2023 0.58    • Eosinophils Absolute 08/10/2023 0.18    • Basophils Absolute 08/10/2023 0.02    Office Visit on 08/10/2023   Component Date Value   • Ventricular Rate 08/10/2023 87    • Atrial Rate 08/10/2023 73    • AL Interval 08/10/2023 152    • QRSD Interval 08/10/2023 102    • QT Interval 08/10/2023 404    • QTC Interval 08/10/2023 486    • P Axis 08/10/2023 38    • QRS Axis 08/10/2023 23    • T Wave [place] 08/10/2023 31    Appointment on 07/21/2023   Component Date Value   • Sodium 07/21/2023 141    • Potassium 07/21/2023 4.1    • Chloride 07/21/2023 104    • CO2 07/21/2023 33 (H)    • ANION GAP 07/21/2023 4    • BUN 07/21/2023 16    • Creatinine 07/21/2023 0.51 (L)    • Glucose 07/21/2023 117    • Calcium 07/21/2023 9.5    • Corrected Calcium 07/21/2023 10.1    • AST 07/21/2023 23    • ALT 07/21/2023 33    • Alkaline Phosphatase 07/21/2023 79    • Total Protein 07/21/2023 7.4    • Albumin 07/21/2023 3.2 (L)    • Total Bilirubin 07/21/2023 0.56    • eGFR 07/21/2023 116    • WBC 07/21/2023 4.70    • RBC 07/21/2023 4.41    • Hemoglobin 07/21/2023 13.4    • Hematocrit 07/21/2023 42.0    • MCV 07/21/2023 95    • MCH 07/21/2023 30.4    • MCHC 07/21/2023 31.9    • RDW 07/21/2023 12.2    • MPV 07/21/2023 10.2    • Platelets 95/72/4366 242    • nRBC 07/21/2023 0    • Neutrophils Relative 07/21/2023 73    • Immat GRANS % 07/21/2023 0    • Lymphocytes Relative 07/21/2023 14    • Monocytes Relative 07/21/2023 10    • Eosinophils Relative 07/21/2023 3    • Basophils Relative 07/21/2023 0    • Neutrophils Absolute 07/21/2023 3.39    • Immature Grans Absolute 07/21/2023 0.02    • Lymphocytes Absolute 07/21/2023 0.67    • Monocytes Absolute 07/21/2023 0.47    • Eosinophils Absolute 07/21/2023 0.13    • Basophils Absolute 07/21/2023 0.02    • TSH 3RD GENERATON 07/21/2023 6.626 (H)        I have personally reviewed pertinent lab results.   Lab Results   Component Value Date    WBC 4.66 08/10/2023 HGB 13.1 08/10/2023    HCT 40.5 08/10/2023    MCV 94 08/10/2023     08/10/2023     Lab Results   Component Value Date    GLUCOSE 85 12/05/2015    CALCIUM 9.3 08/10/2023     12/05/2015    K 4.2 08/10/2023    CO2 34 (H) 08/10/2023    CL 95 (L) 08/10/2023    BUN 15 08/10/2023    CREATININE 0.44 (L) 08/10/2023     Lab Results   Component Value Date     09/02/2020     Lab Results   Component Value Date    ALT 33 07/21/2023    AST 23 07/21/2023    ALKPHOS 79 07/21/2023    BILITOT 0.75 12/05/2015     No results found for: "IRON", "TIBC", "FERRITIN"  No results found for: "[place]ano Phlegm"  No results found for: "FOLATE"      Arterial Blood Gas result:  NA    Sleep studies:  Home sleep study 6/28/2021 showed FENG 8.4, oxygen brad 51%, oxygen saturation less than or equal to 88% for 46.7 minutes  CPAP study 8/1/2023 showed optimal pressure of 11 cm H2O    Compliance Data: KASH Boyer MD  [facility] Sleep Medicine

## 2023-09-06 ENCOUNTER — HOSPITAL ENCOUNTER (OUTPATIENT)
Dept: SLEEP CENTER | Facility: CLINIC | Age: 58
Discharge: HOME | End: 2023-09-06
Payer: COMMERCIAL

## 2023-09-06 DIAGNOSIS — G47.33 OBSTRUCTIVE SLEEP APNEA SYNDROME: ICD-10-CM

## 2023-09-06 PROCEDURE — G0399 HOME SLEEP TEST/TYPE 3 PORTA: HCPCS

## 2023-09-09 NOTE — PROGRESS NOTES
Home Sleep Study Documentation    HOME STUDY DEVICE: Noxturnal no                                           Marleni G3 yes      Pre-Sleep Home Study:    Set-up and instructions performed by: Fernanda Goldberg performed demonstration for Patient: yes    Return demonstration performed by Patient: yes    Written instructions provided to Patient: yes    Patient signed consent form: yes        Post-Sleep Home Study:    Additional comments by Patient: none    Home Sleep Study Failed:no:    Failure reason: N/A    Reported or Detected: N/A    Scored by:  ALBERT Soria

## 2023-09-09 NOTE — OP NOTE
OPERATIVE REPORT  PATIENT NAME: Alana Lopez Sr.    :  1965  MRN: [id number]  Pt Location: [facility] 03    SURGERY DATE: 2023    Surgeon(s) and Role:     * Kaushik Osman MD - Primary     * Erin Nino MD - Assisting    Preop Diagnosis:  Dysphagia [R13.10]  Esophageal stenosis [K22.2]    Post-Op Diagnosis Codes: * Dysphagia [R13.10]     * Esophageal stenosis [K22.2]    Procedure(s):  TRANSNASAL ESOPHAGOSCOPY WITH BALLOON DILATION    Specimen(s):  * No specimens in log *    Estimated Blood Loss:   Minimal    Drains:  * No LDAs found *    Anesthesia Type:   General    Operative Indications:  Dysphagia with UES stricture/stenosis    Operative Findings:  Appropriate dilation with 23mm infinity balloon (5 catherine) in the UES    Complications:   None    Procedure and Technique:  After obtaining informed consent, patient was taken to the operating room by the anesthesia team.  Patient was placed in the supine position on the operating room table. Time out was performed to confirm patient's name, ID, and procedure. IV nesthesia was induced. Nares were anesthetized with lidocaine and oxymetazoline pledgets. A flexible bronchoscope was inserted transnasally and advanced into the esophageal inlet. Guidewire was introduced through the working channel; it was advanced while the bronchoscope was removed. Esophageal balloon was then placed over the guidewire through the nose and positioned appropriately within the UES. Visualization was achieved with transnasal bronchoscope. The balloon was inflated to 5 catherine for 1 minute. It was then deflated and removed along with bronchoscope. Care of patient was returned to anesthesia for awakening. He was then taken to the PACU. I was present for the entire procedure.     Patient Disposition:  PACU         SIGNATURE: Kaushik Osman MD  DATE: 2023  TIME: 9:38 PM

## 2023-09-13 PROCEDURE — 95806 SLEEP STUDY UNATT&RESP EFFT: CPT | Performed by: INTERNAL MEDICINE

## 2023-09-20 DIAGNOSIS — G47.33 OSA (OBSTRUCTIVE SLEEP APNEA): Primary | ICD-10-CM

## 2023-09-21 ENCOUNTER — TELEPHONE (OUTPATIENT)
Dept: SLEEP CENTER | Facility: CLINIC | Age: 58
End: 2023-09-21

## 2023-09-21 LAB
DME PARACHUTE DELIVERY DATE REQUESTED: NORMAL
DME PARACHUTE DELIVERY NOTE: NORMAL
DME PARACHUTE ITEM DESCRIPTION: NORMAL
DME PARACHUTE ORDER STATUS: NORMAL
DME PARACHUTE SUPPLIER NAME: NORMAL
DME PARACHUTE SUPPLIER PHONE: NORMAL

## 2023-09-21 NOTE — TELEPHONE ENCOUNTER
Sleep study shows mild ERIK and Dr. Michael Magana ordered APAP. Spoke to the patient advised results and scheduled DME set up and compliance appointments.  RX and clinicals sent to [address]

## 2023-09-25 LAB
DME PARACHUTE DELIVERY DATE EXPECTED: NORMAL
DME PARACHUTE DELIVERY DATE REQUESTED: NORMAL
DME PARACHUTE DELIVERY NOTE: NORMAL
DME PARACHUTE ITEM DESCRIPTION: NORMAL
DME PARACHUTE ORDER STATUS: NORMAL
DME PARACHUTE SUPPLIER NAME: NORMAL
DME PARACHUTE SUPPLIER PHONE: NORMAL

## 2023-10-09 LAB
DME PARACHUTE DELIVERY DATE ACTUAL: NORMAL
DME PARACHUTE DELIVERY DATE EXPECTED: NORMAL
DME PARACHUTE DELIVERY DATE REQUESTED: NORMAL
DME PARACHUTE DELIVERY NOTE: NORMAL
DME PARACHUTE ITEM DESCRIPTION: NORMAL
DME PARACHUTE ORDER STATUS: NORMAL
DME PARACHUTE SUPPLIER NAME: NORMAL
DME PARACHUTE SUPPLIER PHONE: NORMAL

## 2023-10-10 PROBLEM — G24.5 BLEPHAROSPASM OF BOTH EYES: Status: ACTIVE | Noted: 2023-10-10

## 2023-10-10 PROBLEM — G24.4 OROFACIAL DYSKINESIA: Status: ACTIVE | Noted: 2023-10-10

## 2023-10-10 PROBLEM — G24.4 OROFACIAL DYSTONIA: Status: ACTIVE | Noted: 2023-10-10

## 2023-10-27 DIAGNOSIS — E03.9 HYPOTHYROIDISM, UNSPECIFIED TYPE: Primary | ICD-10-CM

## 2023-10-28 ENCOUNTER — APPOINTMENT (OUTPATIENT)
Dept: LAB | Facility: MEDICAL CENTER | Age: 58
End: 2023-10-28
Payer: COMMERCIAL

## 2023-10-28 DIAGNOSIS — E03.9 HYPOTHYROIDISM, UNSPECIFIED TYPE: ICD-10-CM

## 2023-10-28 LAB
T4 FREE SERPL-MCNC: 1.19 NG/DL (ref 0.61–1.12)
TSH SERPL DL<=0.05 MIU/L-ACNC: 5.6 UIU/ML (ref 0.45–4.5)

## 2023-10-28 PROCEDURE — 84443 ASSAY THYROID STIM HORMONE: CPT

## 2023-10-28 PROCEDURE — 36415 COLL VENOUS BLD VENIPUNCTURE: CPT

## 2023-10-28 PROCEDURE — 84439 ASSAY OF FREE THYROXINE: CPT

## 2023-10-31 ENCOUNTER — TELEPHONE (OUTPATIENT)
Age: 58
End: 2023-10-31

## 2023-10-31 ENCOUNTER — APPOINTMENT (OUTPATIENT)
Dept: LAB | Facility: MEDICAL CENTER | Age: 58
End: 2023-10-31
Payer: COMMERCIAL

## 2023-10-31 DIAGNOSIS — E03.9 HYPOTHYROIDISM, UNSPECIFIED TYPE: ICD-10-CM

## 2023-10-31 DIAGNOSIS — R79.89 ELEVATED SERUM FREE T4 LEVEL: ICD-10-CM

## 2023-10-31 DIAGNOSIS — E03.9 HYPOTHYROIDISM, UNSPECIFIED TYPE: Primary | ICD-10-CM

## 2023-10-31 LAB
T3 SERPL-MCNC: 1.2 NG/ML
T4 FREE SERPL-MCNC: 1.15 NG/DL (ref 0.61–1.12)
TSH SERPL DL<=0.05 MIU/L-ACNC: 4.28 UIU/ML (ref 0.45–4.5)

## 2023-10-31 PROCEDURE — 84439 ASSAY OF FREE THYROXINE: CPT

## 2023-10-31 PROCEDURE — 36415 COLL VENOUS BLD VENIPUNCTURE: CPT

## 2023-10-31 PROCEDURE — 84480 ASSAY TRIIODOTHYRONINE (T3): CPT

## 2023-10-31 PROCEDURE — 84443 ASSAY THYROID STIM HORMONE: CPT

## 2023-10-31 NOTE — TELEPHONE ENCOUNTER
See mychart message from yesterday regarding changing dosage due to labs. Asking if change is needed for abnormal thyroid results.

## 2023-11-02 DIAGNOSIS — E03.9 HYPOTHYROIDISM, UNSPECIFIED TYPE: Primary | ICD-10-CM

## 2023-11-07 ENCOUNTER — CONSULT (OUTPATIENT)
Dept: ENDOCRINOLOGY | Facility: CLINIC | Age: 58
End: 2023-11-07
Payer: COMMERCIAL

## 2023-11-07 VITALS
HEIGHT: 69 IN | HEART RATE: 80 BPM | BODY MASS INDEX: 25 KG/M2 | WEIGHT: 168.8 LBS | DIASTOLIC BLOOD PRESSURE: 120 MMHG | SYSTOLIC BLOOD PRESSURE: 168 MMHG

## 2023-11-07 DIAGNOSIS — E03.9 HYPOTHYROIDISM, UNSPECIFIED TYPE: ICD-10-CM

## 2023-11-07 PROCEDURE — 99204 OFFICE O/P NEW MOD 45 MIN: CPT | Performed by: INTERNAL MEDICINE

## 2023-11-07 NOTE — PATIENT INSTRUCTIONS
Take levothyroxine on empty stomach 1 hour before breakfast or tube feeds   MVI at least 4 hours later   Any other supplements - stop 4-5 days before lab test

## 2023-11-07 NOTE — PROGRESS NOTES
Janelle Romero. 59 y.o. male MRN: [id number]    Encounter: [id number]      Assessment/Plan     Problem List Items Addressed This Visit          Endocrine    Hypothyroidism     TSH has been mildly elevated off-and-on for the past year which could be related to the way he is taking levothyroxine and eating within 10 minutes of taking levothyroxine in the morning. For now given written instructions to take levothyroxine in the morning on an empty stomach just with water at least an hour before breakfast or tube feeds. Take multivitamins or any supplements at least 4 hours later. Repeat TSH and free T4 in about 6 weeks. Further management will depend on the results         Relevant Orders    T4, free Lab Collect    TSH, 3rd generation Lab Collect        CC:   Hypothyroidism    History of Present Illness     HPI:  67-year-old male referred here for evaluation of hypothyroidism and elevated TSH. He was diagnosed with hypothyroidism 4-5 years back . 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     Review of Systems    Historical Information   Past Medical History:   Diagnosis Date    Cancer of base of tongue (HCC)     excision    Cough     CPAP (continuous positive airway pressure) dependence     waiting on consult    Disease of thyroid gland     hypo    Dysphagia     ED (erectile dysfunction)     History of pneumonia     Hypertension     Leukopenia Peyronie's disease     PONV (postoperative nausea and vomiting)     Psoriasis     Sleep apnea     Tongue cancer (HCC)     Weight loss, abnormal      Past Surgical History:   Procedure Laterality Date    ESOPHAGOSCOPY N/A 2022    Procedure: Haydee Delgado;  Surgeon: Natan Landon MD;  Location: [facility];  Service: ENT    ESOPHAGOSCOPY N/A 2023    Procedure: TRANSNASAL ESOPHAGOSCOPY WITH BALLOON DILATION/ ESOPHAGEAL DILATION (BALLOON);;  Surgeon: Natan Landon MD;  Location: [facility];  Service: ENT    OTHER SURGICAL HISTORY      infusion port inserted and removed    PEG TUBE PLACEMENT      and removal    SD COLONOSCOPY FLX DX W/COLLJ SPEC WHEN PFRMD N/A 2017    Procedure: COLONOSCOPY with polypectomy x2;  Surgeon: Heather Chatman MD;  Location: [facility];   Service: General     [place] W/N[place]X VOCAL CORD THER W/MICRO/TELESCOPE Bilateral 2022    Procedure: micro direct laryngoscopy, vocal fold injection, biopsy epiglottis lesion, posterior pharyngeal wall injection, flexible esophagoscopy with dilation;  Surgeon: Natan Landon MD;  Location: [facility];  Service: ENT    TONGUE BIOPSY / EXCISION      Floor mouth excision of malignant tumor     Social History   Social History     Substance and Sexual Activity   Alcohol Use Yes    Comment: rare     Social History     Substance and Sexual Activity   Drug Use No     Social History     Tobacco Use   Smoking Status Former    Packs/day: 0.25    Years: 20.00    Total pack years: 5.00    Types: Cigarettes    Quit date: 2011    Years since quittin.8   Smokeless Tobacco Former    Quit date:    Tobacco Comments    pt quit with dx of tongue base cancer, per allscripts     Family History:   Family History   Problem Relation Age of Onset    Other Mother         reactive airway dysfunction    Coronary artery disease Father     Hypertension Father     Psoriasis Father     Thyroid disease unspecified Neg Hx        Meds/Allergies   Current Outpatient Medications   Medication Sig Dispense Refill    amLODIPine (NORVASC) 5 mg tablet TAKE 1 TABLET (5 MG TOTAL) BY MOUTH DAILY. 90 tablet 2    fluticasone (FLONASE) 50 mcg/act nasal spray 1 spray into each nostril daily as needed for rhinitis 18.2 mL 3    levothyroxine 100 mcg tablet TAKE 1 TABLET BY MOUTH EVERY DAY 90 tablet 1    montelukast (SINGULAIR) 10 mg tablet Take 1 tablet (10 mg total) by mouth in the morning 60 tablet 1    multivitamin (THERAGRAN) TABS Take 1 tablet by mouth daily      tadalafil (CIALIS) 5 MG tablet Take 1 tablet (5 mg total) by mouth daily 90 tablet 3     No current facility-administered medications for this visit. No Known Allergies    Objective   Vitals: Blood pressure (!) 168/120, pulse 80, height 5' 9" (1.753 m), weight 76.6 kg (168 lb 12.8 oz). Physical Exam  Vitals reviewed. Constitutional:       General: He is not in acute distress. Appearance: Normal appearance. He is not ill-appearing, toxic-appearing or diaphoretic. HENT:      Head: Normocephalic and atraumatic. Eyes:      General: No scleral icterus. Extraocular Movements: Extraocular movements intact. Cardiovascular:      Rate and Rhythm: Normal rate and regular rhythm. Heart sounds: Normal heart sounds. No murmur heard. Pulmonary:      Effort: Pulmonary effort is normal. No respiratory distress. Breath sounds: Normal breath sounds. No wheezing or rales. Abdominal:      General: There is no distension. Palpations: Abdomen is soft. Tenderness: There is no abdominal tenderness. Musculoskeletal:      Cervical back: Neck supple. Right lower leg: No edema. Left lower leg: No edema. Lymphadenopathy:      Cervical: No cervical adenopathy. Skin:     General: Skin is warm and dry. Comments: RT related skin changes left neck   Neurological:      General: No focal deficit present. Mental Status: He is alert and oriented to person, place, and time.       Gait: Gait normal.   Psychiatric:         Mood and Affect: Mood normal.         Behavior: Behavior normal.         Thought Content: Thought content normal.         Judgment: Judgment normal.         The history was obtained from the review of the chart, patient. Lab Results:   Lab Results   Component Value Date/Time    TSH 3RD GENERATON 4.282 10/31/2023 04:24 PM    TSH 3RD GENERATON 5.598 (H) 10/28/2023 11:32 AM    TSH 3RD GENERATON 6.626 (H) 07/21/2023 09:31 AM    Free T4 1.15 (H) 10/31/2023 04:24 PM    Free T4 1.19 (H) 10/28/2023 11:32 AM       Imaging Studies:       I have personally reviewed pertinent reports. Portions of the record may have been created with voice recognition software. Occasional wrong word or "sound a like" substitutions may have occurred due to the inherent limitations of voice recognition software. Read the chart carefully and recognize, using context, where substitutions have occurred.

## 2023-11-07 NOTE — ASSESSMENT & PLAN NOTE
TSH has been mildly elevated off-and-on for the past year which could be related to the way he is taking levothyroxine and eating within 10 minutes of taking levothyroxine in the morning. For now given written instructions to take levothyroxine in the morning on an empty stomach just with water at least an hour before breakfast or tube feeds. Take multivitamins or any supplements at least 4 hours later. Repeat TSH and free T4 in about 6 weeks.   Further management will depend on the results

## 2023-11-26 DIAGNOSIS — I10 ESSENTIAL HYPERTENSION: ICD-10-CM

## 2023-11-27 RX ORDER — AMLODIPINE BESYLATE 5 MG/1
5 TABLET ORAL DAILY
Qty: 90 TABLET | Refills: 1 | Status: SHIPPED | OUTPATIENT
Start: 2023-11-27

## 2023-12-05 ENCOUNTER — OFFICE VISIT (OUTPATIENT)
Dept: SLEEP CENTER | Facility: CLINIC | Age: 58
End: 2023-12-05
Payer: COMMERCIAL

## 2023-12-05 VITALS
SYSTOLIC BLOOD PRESSURE: 132 MMHG | BODY MASS INDEX: 24.88 KG/M2 | DIASTOLIC BLOOD PRESSURE: 80 MMHG | WEIGHT: 168 LBS | HEART RATE: 77 BPM | HEIGHT: 69 IN | OXYGEN SATURATION: 95 %

## 2023-12-05 DIAGNOSIS — R06.83 SNORING: ICD-10-CM

## 2023-12-05 DIAGNOSIS — G47.33 OSA (OBSTRUCTIVE SLEEP APNEA): Primary | ICD-10-CM

## 2023-12-05 DIAGNOSIS — R79.81 ELEVATED CO2 LEVEL: ICD-10-CM

## 2023-12-05 PROCEDURE — 99214 OFFICE O/P EST MOD 30 MIN: CPT | Performed by: INTERNAL MEDICINE

## 2023-12-05 NOTE — PROGRESS NOTES
Progress Note - Sleep Medicine  Janace Mohs. 59 y.o. male MRN: [id number]       Impression & Plan:     1. Mild obstructive sleep apnea  Home sleep study 6/28/2021 showed FENG 8.4, oxygen brad 51%, oxygen saturation less than 88% for 46.7 minutes  CPAP study showed optimal pressure of 11 cm H2O  Initial signs and symptoms include snoring, witnessed apneas, excessive tiredness. He feels the pressure is too high and is having some difficulty  He states that pressure causes air leak  He frequently turns the machine on and off to lower pressure    Compliance from 11/1 - 11/30  More than 4 hours 87%, 6 hours and 15 minutes  Fixed pressure of 11  95th percentile leak is 63.9  AHI 1.3    Genoa score of 2    Plan  Change pressure to 5-11 cm H2O  Follow-up in 3 months    2. Elevated serum CO2  Serum CO2 elevated at 34  Concerning for hypoventilation  During PAP titration study hypoxemia resolved with CPAP pressure of 11 cm H2O  Consider ordering nocturnal pulse oximetry at next visit    3. Dysphagia  Is following up with ENT  History of aspiration pneumonia leading to intubation  Discussed with ENT regarding CPAP use    4. Snoring  Improved with CPAP use        ______________________________________________________________________    HPI:    Erin Bergeron Sr. 78-year-old male with past medical history of head and neck cancer status postsurgery/radiation/chemotherapy, hypertension, hypothyroidism, dysphagia, aspiration pneumonia leading to intubation who presents for follow-up of obstructive sleep apnea. Home sleep study showed an FENG of 8.4. PAP titration study showed optimal pressure of 11 cm H2O. He has lost a significant amount of weight since then. He is currently on a fixed pressure of 11 cm H2O. He has difficulty tolerating the pressure. He has frequent nighttime awakenings. His current Genoa score is 2. He has good compliance with CPAP. He is following up with the ENT for dysphagia.       Review of Systems:  Review of Systems   All other systems reviewed and are negative.         Social history updates:  Social History     Tobacco Use   Smoking Status Former    Packs/day: 0.25    Years: 20.00    Total pack years: 5.00    Types: Cigarettes    Quit date: 2011    Years since quittin.9   Smokeless Tobacco Former    Quit date:    Tobacco Comments    pt quit with dx of tongue base cancer, per allscripts     Social History     Socioeconomic History    Marital status: /Civil Union     Spouse name: Not on file    Number of children: 6    Years of education: Not on file    Highest education level: Not on file   Occupational History    Not on file   Tobacco Use    Smoking status: Former     Packs/day: 0.25     Years: 20.00     Total pack years: 5.00     Types: Cigarettes     Quit date: 2011     Years since quittin.9    Smokeless tobacco: Former     Quit date:     Tobacco comments:     pt quit with dx of tongue base cancer, per allscripts   Vaping Use    Vaping Use: Never used   Substance and Sexual Activity    Alcohol use: Yes     Comment: rare    Drug use: No    Sexual activity: Yes     Partners: Female     Birth control/protection: Post-menopausal   Other Topics Concern    Not on file   Social History Narrative    Daily coffee consumption, 1 cups/day    Daily cola consumption    Daily tea consumption    Dental care, regularly    Exercise: running, weight training    High school graduate    No guns in the home    Pets/animals,  Active         Social Determinants of Health     Financial Resource Strain: Not on file   Food Insecurity: Not on file   Transportation Needs: Not on file   Physical Activity: Not on file   Stress: Not on file   Social Connections: Not on file   Intimate Partner Violence: Not on file   Housing Stability: Not on file       PhysicalExamination:  Vitals:   /80 (BP Location: Left arm, Patient Position: Sitting, Cuff Size: Standard)   Pulse 77   Ht 5' 9" (1.753 m)   Wt 76.2 kg (168 lb)   SpO2 95%   BMI 24.81 kg/m²     Physical Exam  General:  Awake alert and oriented x 3, conversant without conversational dyspnea, NAD, normal affect  HEENT:  PERRL, Sclera noninjected, nonicteric OU, Nares patent,  no craniofacial abnormalities, Mucous membranes, moist, no oral lesions, normal dentition  NECK:  Trachea midline, no accessory muscle use, no stridor, no cervical or supraclavicular adenopathy, JVP not elevated  CARDIAC: Reg, single s1/S2, no m/r/g  PULM: CTA bilaterally no wheezing, rhonchi or rales  EXT: No cyanosis, no clubbing, no edema, normal capillary refill  NEURO: no focal neurologic deficits, AAOx3, moving all extremities appropriately     Diagnostic Data:  Labs:   I personally reviewed the most recent laboratory data pertinent to today's visit  Appointment on 10/31/2023   Component Date Value    TSH 3RD GENERATON 10/31/2023 4.282     Free T4 10/31/2023 1.15 (H)     T3, Total 10/31/2023 1.2    Appointment on 10/28/2023   Component Date Value    TSH 3RD GENERATON 10/28/2023 5.598 (H)     Free T4 10/28/2023 1.19 (H)    Telephone on 09/21/2023   Component Date Value    Supplier Name 09/21/2023 [employer] - [address]     Supplier Phone Number 09/21/2023 [phone]     Order Status 09/21/2023 Delivery Successful     Delivery Note 09/21/2023 Lewis [facility]ine[facility]     Delivery Request Date 09/21/2023 10/09/2023     Date Delivered  09/21/2023 10/09/2023     Supplier Name 09/21/2023 10/09/2023     Item Description 09/21/2023 CPAP Machine, Resmed Airsense 11, Auto-CPAP     Item Description 09/21/2023 PAP Mask, Full Face, Fit Upon Setup, N/A, 1 per 3 months     Item Description 09/21/2023 PAP Mask Interface Cushion, Full Face, 1 per 1 month     Item Description 09/21/2023 PAP Headgear, 1 per 6 months     Item Description 09/21/2023 PAP Humidifier, Heated     Item Description 09/21/2023 Disposable PAP Filter, 2 per 1 month     Item Description 09/21/2023 Non-Disposable PAP Filter, 1 per 6 months     Item Description 09/21/2023 PAP Machine Tubing, Heated, 1 per 3 months     Item Description 09/21/2023 PAP Monitoring Modem     Item Description 09/21/2023 Humidifier Water Chamber, 1 per 6 months    Appointment on 08/10/2023   Component Date Value    Sodium 08/10/2023 137     Potassium 08/10/2023 4.2     Chloride 08/10/2023 95 (L)     CO2 08/10/2023 34 (H)     ANION GAP 08/10/2023 8     BUN 08/10/2023 15     Creatinine 08/10/2023 0.44 (L)     Glucose 08/10/2023 83     Calcium 08/10/2023 9.3     eGFR 08/10/2023 124     WBC 08/10/2023 4.66     RBC 08/10/2023 4.33     Hemoglobin 08/10/2023 13.1     Hematocrit 08/10/2023 40.5     MCV 08/10/2023 94     MCH 08/10/2023 30.3     MCHC 08/10/2023 32.3     RDW 08/10/2023 12.2     MPV 08/10/2023 10.0     Platelets 47/24/5470 360     nRBC 08/10/2023 0     Neutrophils Relative 08/10/2023 64     Immat GRANS % 08/10/2023 0     Lymphocytes Relative 08/10/2023 20     Monocytes Relative 08/10/2023 12     Eosinophils Relative 08/10/2023 4     Basophils Relative 08/10/2023 0     Neutrophils Absolute 08/10/2023 2.91     Immature Grans Absolute 08/10/2023 0.02     Lymphocytes Absolute 08/10/2023 0.95     Monocytes Absolute 08/10/2023 0.58     Eosinophils Absolute 08/10/2023 0.18     Basophils Absolute 08/10/2023 0.02    Office Visit on 08/10/2023   Component Date Value    Ventricular Rate 08/10/2023 87     Atrial Rate 08/10/2023 73     TN Interval 08/10/2023 152     QRSD Interval 08/10/2023 102     QT Interval 08/10/2023 404     QTC Interval 08/10/2023 486     P Axis 08/10/2023 38     QRS Oral 08/10/2023 23     T Wave Oral 08/10/2023 31    Appointment on 07/21/2023   Component Date Value    Sodium 07/21/2023 141     Potassium 07/21/2023 4.1     Chloride 07/21/2023 104     CO2 07/21/2023 33 (H)     ANION GAP 07/21/2023 4     BUN 07/21/2023 16     Creatinine 07/21/2023 0.51 (L)     Glucose 07/21/2023 117     Calcium 07/21/2023 9.5     Corrected Calcium 07/21/2023 10.1     AST 07/21/2023 23     ALT 07/21/2023 33     Alkaline Phosphatase 07/21/2023 79     Total Protein 07/21/2023 7.4     Albumin 07/21/2023 3.2 (L)     Total Bilirubin 07/21/2023 0.56     eGFR 07/21/2023 116     WBC 07/21/2023 4.70     RBC 07/21/2023 4.41     Hemoglobin 07/21/2023 13.4     Hematocrit 07/21/2023 42.0     MCV 07/21/2023 95     MCH 07/21/2023 30.4     MCHC 07/21/2023 31.9     RDW 07/21/2023 12.2     MPV 07/21/2023 10.2     Platelets 23/73/4391 242     nRBC 07/21/2023 0     Neutrophils Relative 07/21/2023 73     Immat GRANS % 07/21/2023 0     Lymphocytes Relative 07/21/2023 14     Monocytes Relative 07/21/2023 10     Eosinophils Relative 07/21/2023 3     Basophils Relative 07/21/2023 0     Neutrophils Absolute 07/21/2023 3.39     Immature Grans Absolute 07/21/2023 0.02     Lymphocytes Absolute 07/21/2023 0.67     Monocytes Absolute 07/21/2023 0.47     Eosinophils Absolute 07/21/2023 0.13     Basophils Absolute 07/21/2023 0.02     TSH 3RD GENERATON 07/21/2023 6.626 (H)        I have personally reviewed pertinent lab results.   Lab Results   Component Value Date    WBC 4.66 08/10/2023    HGB 13.1 08/10/2023    HCT 40.5 08/10/2023    MCV 94 08/10/2023     08/10/2023     Lab Results   Component Value Date    GLUCOSE 85 12/05/2015    CALCIUM 9.3 08/10/2023     12/05/2015    K 4.2 08/10/2023    CO2 34 (H) 08/10/2023    CL 95 (L) 08/10/2023    BUN 15 08/10/2023    CREATININE 0.44 (L) 08/10/2023     Lab Results   Component Value Date     09/02/2020     Lab Results   Component Value Date    ALT 33 07/21/2023    AST 23 07/21/2023    ALKPHOS 79 07/21/2023    BILITOT 0.75 12/05/2015     No results found for: "IRON", "TIBC", "FERRITIN"  No results found for: "[id number]"  No results found for: "FOLATE"      Arterial Blood Gas result: MD Tanvir Garcia [facility] Sleep Medicine

## 2023-12-06 ENCOUNTER — TELEPHONE (OUTPATIENT)
Dept: SLEEP CENTER | Facility: CLINIC | Age: 58
End: 2023-12-06

## 2023-12-06 NOTE — TELEPHONE ENCOUNTER
Having personal issues he prefers to only talk to the doctor about states it's important
VTE Present on Admission, Assessment Completed on: 06-Dec-2023 02:44

## 2023-12-07 ENCOUNTER — EVALUATION (OUTPATIENT)
Dept: SPEECH THERAPY | Facility: CLINIC | Age: 58
End: 2023-12-07
Payer: COMMERCIAL

## 2023-12-07 ENCOUNTER — TELEPHONE (OUTPATIENT)
Age: 58
End: 2023-12-07

## 2023-12-07 DIAGNOSIS — R13.10 DYSPHAGIA, UNSPECIFIED TYPE: ICD-10-CM

## 2023-12-07 DIAGNOSIS — R13.14 PHARYNGOESOPHAGEAL DYSPHAGIA: Primary | ICD-10-CM

## 2023-12-07 DIAGNOSIS — G47.33 OSA (OBSTRUCTIVE SLEEP APNEA): ICD-10-CM

## 2023-12-07 DIAGNOSIS — T88.4XXS DIFFICULT AIRWAY FOR INTUBATION, SEQUELA: ICD-10-CM

## 2023-12-07 DIAGNOSIS — G24.5 BLEPHAROSPASM OF BOTH EYES: ICD-10-CM

## 2023-12-07 DIAGNOSIS — Z92.3 HX OF HEAD AND NECK RADIATION: ICD-10-CM

## 2023-12-07 DIAGNOSIS — G24.4 OROFACIAL DYSTONIA: ICD-10-CM

## 2023-12-07 DIAGNOSIS — Z85.89 HX OF SQUAMOUS CELL CARCINOMA: ICD-10-CM

## 2023-12-07 DIAGNOSIS — R49.0 DYSPHONIA: ICD-10-CM

## 2023-12-07 DIAGNOSIS — Z85.89 HX OF MALIGNANT NEOPLASM OF HEAD AND NECK: ICD-10-CM

## 2023-12-07 DIAGNOSIS — G24.4 OROFACIAL DYSKINESIA: ICD-10-CM

## 2023-12-07 DIAGNOSIS — L90.5 SCAR OF NECK: ICD-10-CM

## 2023-12-07 DIAGNOSIS — E03.9 ACQUIRED HYPOTHYROIDISM: ICD-10-CM

## 2023-12-07 DIAGNOSIS — K21.9 GASTROESOPHAGEAL REFLUX DISEASE, UNSPECIFIED WHETHER ESOPHAGITIS PRESENT: ICD-10-CM

## 2023-12-07 DIAGNOSIS — G52.7 CRANIAL NEUROPATHIES, MULTIPLE: ICD-10-CM

## 2023-12-07 DIAGNOSIS — R05.3 CHRONIC COUGH: ICD-10-CM

## 2023-12-07 LAB
DME PARACHUTE DELIVERY DATE ACTUAL: NORMAL
DME PARACHUTE DELIVERY DATE REQUESTED: NORMAL
DME PARACHUTE ITEM DESCRIPTION: NORMAL
DME PARACHUTE ORDER STATUS: NORMAL
DME PARACHUTE SUPPLIER NAME: NORMAL
DME PARACHUTE SUPPLIER PHONE: NORMAL

## 2023-12-07 PROCEDURE — 92610 EVALUATE SWALLOWING FUNCTION: CPT

## 2023-12-07 NOTE — PROGRESS NOTES
Speech-Language Pathology Initial Evaluation    Today's date: 2023   Patient’s name: Linda Ontiveros Sr.  : 1965  MRN: [id number]  Safety measures: HN CA  Referring provider: Fawad Munson MD    Encounter Diagnosis     ICD-10-CM    1. Pharyngoesophageal dysphagia  R13.14 Ambulatory referral to Speech Therapy      2. Orofacial dystonia  G24.4 Ambulatory referral to Speech Therapy      3. Orofacial dyskinesia  G24.4 Ambulatory referral to Speech Therapy      4. Blepharospasm of both eyes  G24.5 Ambulatory referral to Speech Therapy      5. Hx of head and neck radiation  Z92.3 Ambulatory referral to Speech Therapy      6. Hx of malignant neoplasm of head and neck  Z85.89 Ambulatory referral to Speech Therapy      7. Scar of neck  L90.5 Ambulatory referral to Speech Therapy      8. Cranial neuropathies, multiple  G52.7 Ambulatory referral to Speech Therapy      9. Dysphonia  R49.0 Ambulatory referral to Speech Therapy      10. Hx of squamous cell carcinoma-BOT, s/p chemo/RT   Z85.89 Ambulatory referral to Speech Therapy      11. Difficult airway for intubation, sequela  T88. 4XXS Ambulatory referral to Speech Therapy      12. Gastroesophageal reflux disease, unspecified whether esophagitis present  K21.9 Ambulatory referral to Speech Therapy      13. ERIK (obstructive sleep apnea)  G47.33 Ambulatory referral to Speech Therapy      14. Acquired hypothyroidism  E03.9 Ambulatory referral to Speech Therapy      15. Chronic cough  R05.3 Ambulatory referral to Speech Therapy      16. Dysphagia, unspecified type  R13.10 Ambulatory referral to Speech Therapy        Visit tracking:    Re-eval Due POC Expires Auth Expiration Date ST Visit Limit   24 N/a 120                           Visit/Unit Tracking  AUTH Status:  Date    120 approved Used 1    Remaining  119     Subjective comments: " I eat whatever I want"    Why did the patient choose us? Prior patient    Patient's goal(s):  To improve swallowing    Reason for referral: Difficulty swallowing solids or liquids  Prior functional status: Swallowing effective with use of compensatory strategies  Clinically complex situations: N/A    History: Patient is a 59 y.o. male who was referred to outpatient skilled Speech Therapy services for a dysphagia evaluation. Pt is a 59 y.o. male with a PMH remarkable for SCC BOT s/p chemo/RT 2012, L vocal fold paralysis, and dysphagia. Pt has been seen by Vernestine Hodgkin in the past, recently seen by ENT 4/18/23 for botulinum toxin type A (DYSPORT) treatment. Pt was on vacation in [place] last month and was hospitalized and intubated for respiratory distress. He had an MBS while admitted to the hospital which showed silent aspiration, and a PEG tube was placed. Pt reports he has been primarily using PEG tube for nutrition since d/c from the hospital with minimal oral intake. VBSS 7/5/23 recommended NPO.    11/20/23 Tico Fernandes appt)  Sequelae from hx of scca BOT treated with chemo/RT in 2012  Multiple cranial neuropathies  Left vf paralysis/immobility and left hemilaryngeal anesthesia  Velopharyngeal insufficiency- improved with injection posterior pharyngeal wall     S/p MDL, vf steroid injection, biopsy epiglottis, posterior pharyngeal wall injection, esophagoscopy w dilation 9/16/22  S/p balloon dilation of UES (OR) infinity balloon (smallest size) 8/25/23     1st generation antihistamine for cough    Trental     SLP for MFR/swallow  Can try turning head to the left for swallow      CPAP     Follow up Dr. Cholo Wilson re: botox to facial muscles     Future considerations:  Nasal saline  Restech placement was not tolerated previously  Will make another attempt in the future  Post dilation VBS     He will return to my office 6-8 weeks with laryngoscopy (12/11/23) - wants to do reflux testing      Today; patient reports he is doing well. He still has the feeding tube, and uses once a day; but is hoping to have it pulled out.  He is eating all foods he wants, he just makes sure to take his time. He expressed he feels the incident in [place] was an isolated incident and he is not following NPO orders. He also reports his ENT (Dr. Vipul Walsh) said he can return to eating. Patient previously attended SLP services in the past in this office; working on [facility] (no significant results). Discussed new POC with patient to target lingual strength and endurance using the IOPI device -- patient agreeable. [facility]: [facility]  [facility]    Mental status: Alert  Behavior status: Cooperative  Communication modalities: Verbal  Rehabilitation prognosis: Good rehab potential to reach the established goals    Assessments    7/5/23 VBSS Assessment Summary:    Pt presents with mild oral and severe-profound pharyngeal dysphagia characterized by reduced AP transport, delayed swallow initiation with significantly reduced/minimal hyolaryngeal rise, reduced base of tongue retraction, absent epiglottic inversion and absent pharyngeal stripping wave. Airway protection/closure was minimal, and silent aspiration occurred with all liquid consistencies. Pt was unable to swallow puree due to lack of driving force on the bolus and had to regurgitate/expectorate the puree from his vallecula. Strategies were not effective in eliminating aspiration. Note: Images are available for review in PACS as desired. Recommendations:   Recommended Diet: NPO with tube feeds   Recommended Form of Medications: via PEG   Consider referral to: ENT, OP speech therapy  SLP Dysphagia therapy recommended: yes, repeat MBS to determine if progress is made in therapy    -Eating Assessment Tool (EAT-10) Swallowing Screening Tool is a patient self-assessment tool that rates the percieved impairment a swallowing disorder has on the Functional, Physical, and Emotional aspects of one's life. -EAT-10 score:  20/40     Strength and Endurance Testing:   The IOPI Pro is used by medical professionals to measure, evaluate, and increase the strength and endurance of the tongue and lip in patients with oral motor disorders, including dysphagia and dysarthria. The IOPI measures the maximum pressure (Pmax) a patient can produce in an air-filled bulb when it is compressed as hard as possible by the tongue or lip against a hard surface (e.g. The palate or teeth, respectively). Pmax is a measure of strength, expressed in kilopascals (kPa, an international unit of pressure). For patients with dysphagia or dysarthria, oral motor fatigability may be of interest.  The IOPI Pro can be used to assess tongue fatigability. Low endurance values are an indicator of a high fatigability. Endurance is measured with the IOPI Pro by quantifying the length of time that a patient can maintain 50% of his or her Pmax. This procedure is conducted in Target Mode by setting the target value to 50% of the patient's Pmax and timing how long the patient can hold the top (green) light on. The medical professional determines what target value is appropriate for exercise therapy purposes and provides specific instructions to the patient for a particular exercise protocol. In Target Mode, the pressure required to illuminate the green light a the top of the light array can be adjusted using the Set Target arrow buttons. This green light is used as a visual target for the patient. Tongue tip Peak Max after 3 trials: 35 Norm for gender is 56   Tongue back Peak Max after 3 trials: 30 Norm for gender is 50         Goals    Short-term goals:    Patient will perform oral motor exercises using IOPI device on lingual muscles to facilitate improved strength and function by 25%, to be achieved in 4-6 weeks.     Patient will complete swallowing exercises (i.e., Mendelsohn, Chelsey, Effortful) to increase pharyngeal strength and coordination with min cues to 90% effectiveness to improve bolus formation and strengthen oral musculature, to be achieved in 4-6 weeks. Patient will perform compensatory strategies (e.g., upright positioning, small bites/sips, slow rate, alternation of consistencies, multiple swallows, effortful swallow, chin tuck, head turn, etc.) with 80% accuracy to eliminate overt s/sx penetration/aspiration of least restrictive food/liquid consistencies, to be achieved in 4-6 weeks. Long Term Goals    Patient will receive a videofluoroscopic swallow study (VFSS) to fully assess physiology and anatomy of the swallow and to determine the appropriate diet and/or rehabilitation exercises by discharge. Patient will maintain adequate hydration and nutrition with optimum safety and efficacy of swallowing function on P.O. intake without overt s/sx of penetration or aspiration by discharge. Patient will utilize compensatory strategies with optimum safety and efficacy of swallowing function on P.O. intake without overt s/sx of penetration or aspiration by discharge. Impressions/Recommendations    Impressions:   -Patient presents with moderate-severe dysphagia secondary to long standing effects of HN CA treatment/radiation. Patient's most recent VBSS (see above) had recommended he be NPO. However, patient is following with ENT who he reports has encouraged him to keep eating using safe swallow strategies. Patient still has his feeding tube and uses 1x/day. Testing with the IOPI today revealed decreased lingual strength; patient would benefit from exercise program to target strength and endurance. Follow up VBSS will be recommended as patient makes progress with strength.      Recommendations:  -Patient would benefit from outpatient skilled Speech Therapy services: Dysphagia therapy    -Frequency: 1-2x weekly  -Duration: 6-8 weeks    -Intervention certification from: 80/4/0044  -Intervention certification to: 7/7/30

## 2023-12-07 NOTE — TELEPHONE ENCOUNTER
Patient's call to request a referral for Feeding tube removed.     Any question please contact the  patient

## 2023-12-11 DIAGNOSIS — Z43.1 PEG (PERCUTANEOUS ENDOSCOPIC GASTROSTOMY) ADJUSTMENT/REPLACEMENT/REMOVAL (HCC): Primary | ICD-10-CM

## 2023-12-11 PROBLEM — Z91.89 AT RISK FOR ASPIRATION: Status: ACTIVE | Noted: 2023-12-11

## 2023-12-11 PROBLEM — K21.9 GASTROESOPHAGEAL REFLUX DISEASE: Status: ACTIVE | Noted: 2023-12-11

## 2023-12-11 PROBLEM — R05.3 CHRONIC COUGH: Status: ACTIVE | Noted: 2023-12-11

## 2023-12-12 NOTE — TELEPHONE ENCOUNTER
Patient called stating phone number he was give on the message left had him calling the office. I looked up patients referral and gave him the number on the referral/.

## 2023-12-13 ENCOUNTER — OFFICE VISIT (OUTPATIENT)
Dept: SPEECH THERAPY | Facility: CLINIC | Age: 58
End: 2023-12-13
Payer: COMMERCIAL

## 2023-12-13 DIAGNOSIS — E03.9 ACQUIRED HYPOTHYROIDISM: ICD-10-CM

## 2023-12-13 DIAGNOSIS — R05.3 CHRONIC COUGH: ICD-10-CM

## 2023-12-13 DIAGNOSIS — K21.9 GASTROESOPHAGEAL REFLUX DISEASE, UNSPECIFIED WHETHER ESOPHAGITIS PRESENT: ICD-10-CM

## 2023-12-13 DIAGNOSIS — G24.4 OROFACIAL DYSKINESIA: ICD-10-CM

## 2023-12-13 DIAGNOSIS — R49.0 DYSPHONIA: ICD-10-CM

## 2023-12-13 DIAGNOSIS — Z85.89 HX OF MALIGNANT NEOPLASM OF HEAD AND NECK: ICD-10-CM

## 2023-12-13 DIAGNOSIS — R13.10 DYSPHAGIA, UNSPECIFIED TYPE: ICD-10-CM

## 2023-12-13 DIAGNOSIS — G24.4 OROFACIAL DYSTONIA: Primary | ICD-10-CM

## 2023-12-13 DIAGNOSIS — G52.7 CRANIAL NEUROPATHIES, MULTIPLE: ICD-10-CM

## 2023-12-13 DIAGNOSIS — T88.4XXS DIFFICULT AIRWAY FOR INTUBATION, SEQUELA: ICD-10-CM

## 2023-12-13 DIAGNOSIS — G24.5 BLEPHAROSPASM OF BOTH EYES: ICD-10-CM

## 2023-12-13 DIAGNOSIS — R13.14 PHARYNGOESOPHAGEAL DYSPHAGIA: ICD-10-CM

## 2023-12-13 DIAGNOSIS — G47.33 OSA (OBSTRUCTIVE SLEEP APNEA): ICD-10-CM

## 2023-12-13 DIAGNOSIS — Z92.3 HX OF HEAD AND NECK RADIATION: ICD-10-CM

## 2023-12-13 DIAGNOSIS — Z85.89 HX OF SQUAMOUS CELL CARCINOMA: ICD-10-CM

## 2023-12-13 DIAGNOSIS — L90.5 SCAR OF NECK: ICD-10-CM

## 2023-12-13 PROCEDURE — 92526 ORAL FUNCTION THERAPY: CPT

## 2023-12-13 NOTE — PROGRESS NOTES
Daily Speech Treatment Note    Today's date: 2023  Patient’s name: Tracy Cruz Sr.  : 1965  MRN: [id number]  Safety measures: HN CA  Referring provider: Paulie Carnes MD    Encounter Diagnosis     ICD-10-CM    1. Orofacial dystonia  G24.4       2. Orofacial dyskinesia  G24.4       3. Blepharospasm of both eyes  G24.5       4. Hx of head and neck radiation  Z92.3       5. Hx of malignant neoplasm of head and neck  Z85.89       6. Scar of neck  L90.5       7. Cranial neuropathies, multiple  G52.7       8. Dysphonia  R49.0       9. Pharyngoesophageal dysphagia  R13.14       10. Hx of squamous cell carcinoma-BOT, s/p chemo/RT   Z85.89       11. Difficult airway for intubation, sequela  T88. 4XXS       12. Gastroesophageal reflux disease, unspecified whether esophagitis present  K21.9       13. ERIK (obstructive sleep apnea)  G47.33       14. Acquired hypothyroidism  E03.9       15. Chronic cough  R05.3       16. Dysphagia, unspecified type  R13.10         Visit tracking:  Re-eval Due POC Expires Auth Expiration Date ST Visit Limit   24 N/a 120                           Visit/Unit Tracking  AUTH Status:  Date 2023   120 approved Used 1  2    Remaining  119 118       Subjective/Behavioral:  -Patient was very pleasant to work with. No new concerns were brought up today. Objective/Assessment:  -Reviewed patient's home exercises/activities completed since last appointment. Patient reported that he has been practicing IOPI exercises at home. Short-term goals:  1. Patient will perform oral motor exercises using IOPI device on lingual muscles to facilitate improved strength and function by 25%, to be achieved in 4-6 weeks.     IOPI PEAK MEASUREMENT WEEKLY GRID      2023          Tongue tip  (Goal = 56) 35 37          Tongue back  (Goal = 50) 30 34              IOPI -- Today's Exercise Targets    Tongue tip  (Goal = 56) Peak Max after 3 trials: 37 Effort level: 50%  Endurance level: 50% Target for treatment: 19  Target for endurance: 19   Tongue back  (Goal = 50) Peak Max after 3 trials: 34 Effort level: 50%  Endurance level: 50% Target for treatment:17  Target for endurance: 17        12/07/2023 12/13/2023       Tongue tip  -Eval, no exercises completed  -3 sets; 30 reps    -1 set; 30 reps (hold for 5 sec)       Tongue back -Eval, no exercises completed  -3 sets; 30 reps    -1 set; 30 reps (hold for 5 sec)            2. Patient will complete swallowing exercises (i.e., Mendelsohn, Chelsey, Effortful) to increase pharyngeal strength and coordination with min cues to 90% effectiveness to improve bolus formation and strengthen oral musculature, to be achieved in 4-6 weeks. Types of swallow exercises were reviewed with Patient during today's session. Educational video was shown for [employer] swallow. He practiced each exercise at least 3X to ensure understanding. 3. Patient will perform compensatory strategies (e.g., upright positioning, small bites/sips, slow rate, alternation of consistencies, multiple swallows, effortful swallow, chin tuck, head turn, etc.) with 80% accuracy to eliminate overt s/sx penetration/aspiration of least restrictive food/liquid consistencies, to be achieved in 4-6 weeks. Plan:  -Patient was provided with home exercises/activities to target goals in plan of care at the end of today's session.  -Continue with current plan of care.

## 2023-12-14 ENCOUNTER — OFFICE VISIT (OUTPATIENT)
Dept: GASTROENTEROLOGY | Facility: AMBULARY SURGERY CENTER | Age: 58
End: 2023-12-14
Payer: COMMERCIAL

## 2023-12-14 VITALS
HEIGHT: 69 IN | SYSTOLIC BLOOD PRESSURE: 128 MMHG | BODY MASS INDEX: 24.73 KG/M2 | WEIGHT: 167 LBS | HEART RATE: 76 BPM | DIASTOLIC BLOOD PRESSURE: 70 MMHG | OXYGEN SATURATION: 93 %

## 2023-12-14 DIAGNOSIS — Z43.1 PEG (PERCUTANEOUS ENDOSCOPIC GASTROSTOMY) ADJUSTMENT/REPLACEMENT/REMOVAL (HCC): Primary | ICD-10-CM

## 2023-12-14 DIAGNOSIS — R13.10 DYSPHAGIA, UNSPECIFIED TYPE: ICD-10-CM

## 2023-12-14 PROCEDURE — 99203 OFFICE O/P NEW LOW 30 MIN: CPT | Performed by: INTERNAL MEDICINE

## 2023-12-14 NOTE — ASSESSMENT & PLAN NOTE
History of G tube placement when patient was on the vent because of respiratory distress back in June. He has not been using the feeding tube and requesting removal.    -Discussed with patient in detail about G-tube removal with external traction and to consider EGD if any trouble. Patient understands the risks and benefits and agreed to follow-up.   Will schedule at [facility]

## 2023-12-14 NOTE — PATIENT INSTRUCTIONS
Scheduled date of EGD(as of today):  12/18/23  Physician performing EGD:  Dr Apollo Flowers   Location of EGD:  [facility]   Instructions reviewed with patient by:  Raisa HOLM   Clearances: n/a

## 2023-12-14 NOTE — PROGRESS NOTES
Consultation - [address]  [facility]  Amarilis Martins Sr. 1965 male         ASSESSMENT & PLAN:    PEG (percutaneous endoscopic gastrostomy) adjustment/replacement/removal ([facility])  History of G tube placement when patient was on the vent because of respiratory distress back in June. He has not been using the feeding tube and requesting removal.    -Discussed with patient in detail about G-tube removal with external traction and to consider EGD if any trouble. Patient understands the risks and benefits and agreed to follow-up. Will schedule at [facility]      Chief Complaint:  For PEG removal    HPI: 58-year-old male with history of hypertension, tongue cancer status post radiation chemotherapy in 2012, hypothyroidism had feeding tube placed when he was on the vent for respiratory distress secondary to aspiration pneumonia in June. He has been doing well since then and eating as before with slight difficulty and not using the feeding tube anymore. He is requesting removal of the tube. Denies any abdominal pain, nausea or vomiting. Good appetite, no recent weight loss. Regular bowel movements. Chaperon: Ms. Lennon York: Review of Systems   Constitutional:  Negative for activity change, appetite change, chills, diaphoresis, fatigue, fever and unexpected weight change. HENT:  Negative for ear discharge, ear pain, facial swelling, hearing loss, nosebleeds, sore throat, tinnitus and voice change. Eyes:  Negative for pain, discharge, redness, itching and visual disturbance. Respiratory:  Negative for apnea, cough, chest tightness, shortness of breath and wheezing. Cardiovascular:  Negative for chest pain and palpitations. Gastrointestinal:         As noted in HPI   Endocrine: Negative for cold intolerance, heat intolerance and polyuria. Genitourinary:  Negative for difficulty urinating, dysuria, flank pain, hematuria and urgency.    Musculoskeletal:  Negative for arthralgias, back pain, gait problem, joint swelling and myalgias. Skin:  Negative for rash and wound. Neurological:  Negative for dizziness, tremors, seizures, speech difficulty, light-headedness, numbness and headaches. Hematological:  Negative for adenopathy. Does not bruise/bleed easily. Psychiatric/Behavioral:  Negative for agitation, behavioral problems and confusion. The patient is not nervous/anxious. Past Medical History:   Diagnosis Date    Cancer of base of tongue (HCC)     excision    Cough     CPAP (continuous positive airway pressure) dependence     waiting on consult    Disease of thyroid gland     hypo    Dysphagia     ED (erectile dysfunction)     History of pneumonia     Hypertension     Leukopenia     Peyronie's disease     PONV (postoperative nausea and vomiting)     Psoriasis     Sleep apnea     Tongue cancer (HCC)     Weight loss, abnormal       Past Surgical History:   Procedure Laterality Date    ESOPHAGOSCOPY N/A 9/16/2022    Procedure: Luis A Kingsley;  Surgeon: Ang Cooper MD;  [facility];  Service: ENT    ESOPHAGOSCOPY N/A 8/25/2023    Procedure: TRANSNASAL ESOPHAGOSCOPY WITH BALLOON DILATION/ ESOPHAGEAL DILATION (BALLOON);;  Surgeon: Ang Cooper MD;  [facility];  Service: ENT    OTHER SURGICAL HISTORY      infusion port inserted and removed    PEG TUBE PLACEMENT      and removal    FL COLONOSCOPY FLX DX W/COLLJ SPEC WHEN PFRMD N/A 6/27/2017    Procedure: COLONOSCOPY with polypectomy x2;  Surgeon: Delano Cook MD;  Location[facility];   Service: General     [place] W/NJX VOCAL CORD THER W/MICRO/TELESCOPE Bilateral 9/16/2022    Procedure: micro direct laryngoscopy, vocal fold injection, biopsy epiglottis lesion, posterior pharyngeal wall injection, flexible esophagoscopy with dilation;  Surgeon: Ang Cooper MD;  [facility];  Service: ENT    TONGUE BIOPSY / EXCISION      Floor mouth excision of malignant tumor     Social History Socioeconomic History    Marital status: /Civil Union     Spouse name: Not on file    Number of children: 6    Years of education: Not on file    Highest education level: Not on file   Occupational History    Not on file   Tobacco Use    Smoking status: Former     Current packs/day: 0.00     Average packs/day: 0.3 packs/day for 20.0 years (5.0 ttl pk-yrs)     Types: Cigarettes     Start date: 1991     Quit date: 2011     Years since quittin.9    Smokeless tobacco: Former     Quit date:     Tobacco comments:     pt quit with dx of tongue base cancer, per allscripts   Vaping Use    Vaping status: Never Used   Substance and Sexual Activity    Alcohol use: Yes     Comment: rare    Drug use: No    Sexual activity: Yes     Partners: Female     Birth control/protection: Post-menopausal   Other Topics Concern    Not on file   Social History Narrative    Daily coffee consumption, 1 cups/day    Daily cola consumption    Daily tea consumption    Dental care, regularly    Exercise: running, weight training    High school graduate    No guns in the home    Pets/animals,  Active         Social Determinants of Health     Financial Resource Strain: Low Risk  (2023)    Received from [address] Strain ([facility])     Difficulty of Paying Living Expenses: Not hard at all   Food Insecurity: Unknown (2023)    Received from [address] [facility]     Within the past 12 months, you worried that your food would run out before you got the money to buy more. : 1     Food Insecurity - Inability Most Recent Result: Not on file   Transportation Needs: No Transportation Needs (2023)    Received from [address]     In the past 12 months, has lack of transportation kept you from medical appointments or from getting medications?: 2     In the past 12 months, has lack of transportation kept you from meetings, work, or from getting things needed for daily living?: 2   Physical Activity: Not on file   Stress: Stress Concern Present (6/19/2023)    Received from [address] [place] [address]     Feeling of Stress : To some extent   Social Connections: Not on file   Intimate Partner Violence: Not on file   Housing Stability: Unknown (6/19/2023)    Received from [facility]     In the last 12 months, was there a time when you were not able to pay the mortgage or rent on time?: 2     [address] Value Most Recent Result: Not on file     [address] Value Most Recent Result: Not on file     Family History   Problem Relation Age of Onset    Other Mother         reactive airway dysfunction    Coronary artery disease Father     Hypertension Father     Psoriasis Father     Thyroid disease unspecified Neg Hx      Patient has no known allergies. Current Outpatient Medications   Medication Sig Dispense Refill    amLODIPine (NORVASC) 5 mg tablet TAKE 1 TABLET (5 MG TOTAL) BY MOUTH DAILY. 90 tablet 1    fluticasone (FLONASE) 50 mcg/act nasal spray 1 SPRAY INTO EACH NOSTRIL DAILY AS NEEDED FOR RHINITIS 48 mL 2    levothyroxine 100 mcg tablet TAKE 1 TABLET BY MOUTH EVERY DAY 90 tablet 1    montelukast (SINGULAIR) 10 mg tablet TAKE 1 TABLET (10 MG TOTAL) BY MOUTH IN THE MORNING 90 tablet 2    multivitamin (THERAGRAN) TABS Take 1 tablet by mouth daily      tadalafil (CIALIS) 5 MG tablet Take 1 tablet (5 mg total) by mouth daily (Patient not taking: Reported on 11/15/2023) 90 tablet 3     No current facility-administered medications for this visit. Blood pressure 128/70, pulse 76, height 5' 9" (1.753 m), weight 75.8 kg (167 lb), SpO2 93%. PHYSICAL EXAM: Physical Exam  Constitutional:       Appearance: He is well-developed. HENT:      Head: Normocephalic and atraumatic.    Eyes:      General: No scleral icterus. Right eye: No discharge. Left eye: No discharge. Conjunctiva/sclera: Conjunctivae normal.      Pupils: Pupils are equal, round, and reactive to light. Neck:      Thyroid: No thyromegaly. Vascular: No JVD. Trachea: No tracheal deviation. Cardiovascular:      Rate and Rhythm: Normal rate and regular rhythm. Heart sounds: Normal heart sounds. No murmur heard. No friction rub. No gallop. Pulmonary:      Effort: Pulmonary effort is normal. No respiratory distress. Breath sounds: Normal breath sounds. No wheezing or rales. Chest:      Chest wall: No tenderness. Abdominal:      General: Bowel sounds are normal. There is no distension. Palpations: Abdomen is soft. There is no mass. Tenderness: There is no abdominal tenderness. There is no guarding or rebound. Hernia: No hernia is present. Comments: G tube in place   Musculoskeletal:      Cervical back: Neck supple. Lymphadenopathy:      Cervical: No cervical adenopathy. Skin:     General: Skin is warm and dry. Findings: No erythema or rash. Neurological:      Mental Status: He is alert and oriented to person, place, and time. Psychiatric:         Behavior: Behavior normal.         Thought Content:  Thought content normal.          Lab Results   Component Value Date    WBC 4.66 08/10/2023    HGB 13.1 08/10/2023    HCT 40.5 08/10/2023    MCV 94 08/10/2023     08/10/2023     Lab Results   Component Value Date    GLUCOSE 85 12/05/2015    CALCIUM 9.3 08/10/2023     12/05/2015    K 4.2 08/10/2023    CO2 34 (H) 08/10/2023    CL 95 (L) 08/10/2023    BUN 15 08/10/2023    CREATININE 0.44 (L) 08/10/2023     Lab Results   Component Value Date    ALT 33 07/21/2023    AST 23 07/21/2023    ALKPHOS 79 07/21/2023    BILITOT 0.75 12/05/2015     Lab Results   Component Value Date    INR 1.05 04/20/2019    INR 0.93 12/04/2017    PROTIME 13.4 04/20/2019    PROTIME 12.7 12/04/2017 Home Study    Result Date: 2023  Impression: 1. Mild obstructive sleep apnea with an FENG of 9.8 events per hour. 2. Baseline oxygenation adequate. Respiratory event related hypoxemia with oxygen saturation less than 90% for 62 minutes. Oxygen saturation brad of 41%. RECOMMENDATION: Recommend PAP titration study as patient has severe hypoxemia with mild obstructive sleep apnea. Patient has a high risk of aspiration so I will discuss with patient and his ENT physician regarding the risks and benefits of PAP therapy. MD [facility]  Study Date: 2023 Patient Name: En Siemens Recording Device: Kita Byrnes Sex: M Height: 69.0 in. : 1965 Weight: 155.0 lbs. Age: 59 years B. M.I: 22.9 lb/in2 Times and Durations Lights off clock time:  11:04:24 PM Total Recording Time (TRT): 448.4 minutes Lights on clock time: 6:30:24 AM Time In Bed (TIB): 446.0 minutes   Monitoring Time (MT): 441.4 minutes Device and Sensor Details The study was recorded on a [employer] device using 1 RIP effort belt and a pressure based flow sensor. The heart rate is derived from the oximeter sensor and the snore signal is derived from the pressure sensor. The device also records body position and uses it to determine the monitoring time (sleep/wake periods). Summary FENG 9.8 OAI 5.4 LOIDA 0.0 Lowest Desat 41 FENG is the number of respiratory events per hour. OAI is the number of obstructive apneas per hour. LOIDA is the number of central apneas per hour. Lowest Desat is the lowest blood oxygen level that lasted at least 2 seconds. RESPIRATORY EVENTS  Index (#/hour) Total # of Events Mean duration  (sec) Max duration  (sec) # of Events by Position      Supine Prone Left Right Up Central Apneas 0.0 0 0.0 0.0 0 0 0 0 0 Obstructive Apneas 5.4 40 40.3 74.0 36 0 0 4 0 Mixed Apneas 0.0 0 0.0 0.0 0 0 0 0 0 Hypopneas 4.3 32 43.0 106. 5 10 0 9 13 0 Apneas + Hypopneas 9.8 72 41.5 106.5 46 0 9 17 0 RERAs 0.0 0 0.0 0.0 0 0 0 0 0 Total 9.8 72 41.5 106.5 46 0 9 17 0 Time in Position 86.6 0.1 238.9 115.6 3.2 FENG in Position 31.9 0.0 2.3 8.8 0.0 Positional Summary  Supine Non-Supine Total # of Events 46 26.00 Total Duration (minutes) 86.6 357.80 Sleep Duration (minutes) 86.4 355.00 FENG in Position 31.9 4.39 REISupine / REINon-Supine Ratio 7.26  Positional Sleep Apnea is indicated if FENG (supine) >= 2 x FENG (non-supine) per Annie Mccray, Sleep. 1984;7(2).   Oximetry Summary  Dur. (min) % TIB <90 % 62.0 13.9 <85 % 33.3 7.5 <80 % 21.6 4.8 <70 % 6.2 1.4 Total Dur (min) < 89 52.8 min Average (%) 91 Total # of Desats 72 Desat Index (#/hour) 9.8 Desat Max (%) 47 Desat Max dur (sec) 109.0 Lowest SpO2 % during sleep 40 Duration of Min SpO2 (sec) 1 Highest SpO2 % during sleep 98 Duration of Max SpO2(sec) 5  Heart Rate Stats Mean HR during sleep 66.8 (BPM) Highest HR during sleep 83  (BPM) Highest HR during TIB  83 (BPM) Lowest HR during sleep 60  (BPM) Lowest HR during TIB 60 (BPM) Snoring Summary Total Snoring Episodes 64 Total Duration with Snoring 10.0 minutes Mean Duration of Snoring 9.4 seconds Percentage of Snoring 2.3 %

## 2023-12-15 ENCOUNTER — ANESTHESIA (OUTPATIENT)
Dept: ANESTHESIOLOGY | Facility: HOSPITAL | Age: 58
End: 2023-12-15

## 2023-12-15 ENCOUNTER — ANESTHESIA EVENT (OUTPATIENT)
Dept: ANESTHESIOLOGY | Facility: HOSPITAL | Age: 58
End: 2023-12-15

## 2023-12-18 ENCOUNTER — TELEPHONE (OUTPATIENT)
Age: 58
End: 2023-12-18

## 2023-12-18 ENCOUNTER — HOSPITAL ENCOUNTER (OUTPATIENT)
Dept: SURGERY | Facility: HOSPITAL | Age: 58
Discharge: HOME | End: 2023-12-18
Attending: INTERNAL MEDICINE
Payer: COMMERCIAL

## 2023-12-18 VITALS
DIASTOLIC BLOOD PRESSURE: 97 MMHG | BODY MASS INDEX: 24.73 KG/M2 | OXYGEN SATURATION: 98 % | SYSTOLIC BLOOD PRESSURE: 186 MMHG | TEMPERATURE: 97.9 F | RESPIRATION RATE: 16 BRPM | WEIGHT: 167 LBS | HEIGHT: 69 IN | HEART RATE: 87 BPM

## 2023-12-18 DIAGNOSIS — Z43.1 PEG (PERCUTANEOUS ENDOSCOPIC GASTROSTOMY) ADJUSTMENT/REPLACEMENT/REMOVAL (HCC): ICD-10-CM

## 2023-12-18 PROCEDURE — 99212 OFFICE O/P EST SF 10 MIN: CPT

## 2023-12-18 NOTE — TELEPHONE ENCOUNTER
Pt's wife Prabha calling stating they received several calls and is confused. Pt has a procedure today at [facility] arrival time 12:15 pm w/ Dr. Vila. She stated when she spoke w/ one of the nurses they did not see the appt. I confirmed that he does has appt today [facility], [address], [place], [address], but I'm not sure what doctor. Per Prabha okay and thank you.

## 2023-12-18 NOTE — QUICK NOTE
After informed consent was obtained, PEG tube was removed at the bedside by applying traction to the tube. Tube was removed successfully and dressing was placed at the site.

## 2023-12-18 NOTE — H&P
History and Physical - [facility]  Mohamud Grimm Sr. 58 y.o. male MRN: [id number]      HPI: Mohamud Grimm Sr. is a 58 y.o. year-old male who presents for PEG tube removal.    REVIEW OF SYSTEMS: Per the HPI, and otherwise unremarkable.    PAST MEDICAL/SURGICAL HISTORY:  Past Medical History:   Diagnosis Date    Cancer of base of tongue (HCC)     excision    Cough     CPAP (continuous positive airway pressure) dependence     waiting on consult    Disease of thyroid gland     hypo    Dysphagia     ED (erectile dysfunction)     History of pneumonia     Hypertension     Leukopenia     Peyronie's disease     PONV (postoperative nausea and vomiting)     Psoriasis     Sleep apnea     Tongue cancer (HCC)     Weight loss, abnormal         Past Surgical History:   Procedure Laterality Date    ESOPHAGOSCOPY N/A 9/16/2022    Procedure: ESOPHAGOSCOPY FLEXIBLE;  Surgeon: Devonte Singh MD;  [facility];  Service: ENT    ESOPHAGOSCOPY N/A 8/25/2023    Procedure: TRANSNASAL ESOPHAGOSCOPY WITH BALLOON DILATION/ ESOPHAGEAL DILATION (BALLOON);;  Surgeon: Devonte Singh MD;  [facility];  Service: ENT    OTHER SURGICAL HISTORY      infusion port inserted and removed    PEG TUBE PLACEMENT      and removal    [place] COLONOSCOPY FLX DX W/COLLJ SPEC WHEN PFRMD N/A 6/27/2017    Procedure: COLONOSCOPY with polypectomy x2;  Surgeon: Janet Willard MD;  [facility];  Service: General    [place] W/NJX VOCAL CORD THER W/MICRO/TELESCOPE Bilateral 9/16/2022    Procedure: micro direct laryngoscopy, vocal fold injection, biopsy epiglottis lesion, posterior pharyngeal wall injection, flexible esophagoscopy with dilation;  Surgeon: Devonte Singh MD;  [facility];  Service: ENT    TONGUE BIOPSY / EXCISION      Floor mouth excision of malignant tumor       FAMILY/SOCIAL HISTORY:  Family History   Problem Relation Age of Onset    Other Mother         reactive airway dysfunction    Coronary artery  "disease Father     Hypertension Father     Psoriasis Father     Thyroid disease unspecified Neg Hx        Social History     Tobacco Use    Smoking status: Former     Current packs/day: 0.00     Average packs/day: 0.3 packs/day for 20.0 years (5.0 ttl pk-yrs)     Types: Cigarettes     Start date: 1991     Quit date: 2011     Years since quittin.9    Smokeless tobacco: Former     Quit date:     Tobacco comments:     pt quit with dx of tongue base cancer, per allscripts   Vaping Use    Vaping status: Never Used   Substance Use Topics    Alcohol use: Yes     Comment: rare    Drug use: No       Meds/Allergies       Current Outpatient Medications:     amLODIPine (NORVASC) 5 mg tablet    fluticasone (FLONASE) 50 mcg/act nasal spray    levothyroxine 100 mcg tablet    montelukast (SINGULAIR) 10 mg tablet    multivitamin (THERAGRAN) TABS    tadalafil (CIALIS) 5 MG tablet    No Known Allergies    Objective     BP (!) 225/128   Pulse 94   Temp 97.9 °F (36.6 °C) (Temporal)   Resp 16   Ht 5' 9\" (1.753 m)   Wt 75.8 kg (167 lb)   SpO2 97%   BMI 24.66 kg/m²   PHYSICAL EXAM    GEN: NAD  CARDIO: RRR  PULM: CTA bilaterally  ABD: soft, non-tender, non-distended  EXT: no lower extremity edema  NEURO: AAOx3    ASSESSMENT/PLAN:  58 y.o. year old male here for PEG tube removal. he is stable and optimized for his procedure.         "

## 2023-12-18 NOTE — PERIOPERATIVE NURSING NOTE
Patient for bedside PEG removal with Dr Avery. Dr Avery removed PEG tube. Area dressed with 2x2s and tegaderms. Patient stable denies pain.

## 2023-12-21 ENCOUNTER — OFFICE VISIT (OUTPATIENT)
Dept: SPEECH THERAPY | Facility: CLINIC | Age: 58
End: 2023-12-21
Payer: COMMERCIAL

## 2023-12-21 DIAGNOSIS — Z92.3 HX OF HEAD AND NECK RADIATION: ICD-10-CM

## 2023-12-21 DIAGNOSIS — R13.14 PHARYNGOESOPHAGEAL DYSPHAGIA: Primary | ICD-10-CM

## 2023-12-21 DIAGNOSIS — Z85.89 HX OF SQUAMOUS CELL CARCINOMA: ICD-10-CM

## 2023-12-21 DIAGNOSIS — T88.4XXS DIFFICULT AIRWAY FOR INTUBATION, SEQUELA: ICD-10-CM

## 2023-12-21 DIAGNOSIS — Z85.89 HX OF MALIGNANT NEOPLASM OF HEAD AND NECK: ICD-10-CM

## 2023-12-21 PROCEDURE — 92526 ORAL FUNCTION THERAPY: CPT

## 2023-12-21 NOTE — PROGRESS NOTES
Daily Speech Treatment Note    Today's date: 2023  Patient’s name: Mohamud Grimm Sr.  : 1965  MRN: [id number]  Safety measures: HN CA  Referring provider: Jose Antonio Rodriguez MD    Encounter Diagnosis     ICD-10-CM    1. Pharyngoesophageal dysphagia  R13.14       2. Hx of squamous cell carcinoma-BOT, s/p chemo/RT   Z85.89       3. Difficult airway for intubation, sequela  T88.4XXS       4. Hx of head and neck radiation  Z92.3       5. Hx of malignant neoplasm of head and neck  Z85.89         Visit tracking:  Re-eval Due POC Expires Auth Expiration Date ST Visit Limit   24 N/a 120                           Visit/Unit Tracking  AUTH Status:  Date    120 approved Used 1  2 3    Remaining  119 118 117       Subjective/Behavioral:  Patient's feeding tube was removed on Monday    Patient with increased coughing/irritation today    Objective/Assessment:  -Reviewed patient's home exercises/activities completed since last appointment. Patient reported that he has been practicing IOPI exercises at home.       Short-term goals:  1. Patient will perform oral motor exercises using IOPI device on lingual muscles to facilitate improved strength and function by 25%, to be achieved in 4-6 weeks.    IOPI PEAK MEASUREMENT WEEKLY GRID      2023         Tongue tip  (Goal = 56) 35 37 38         Tongue back  (Goal = 50) 30 34 36             IOPI -- Today's Exercise Targets    Tongue tip  (Goal = 56) Peak Max after 3 trials: 38 Effort level: 60%  Endurance level: 50% Target for treatment: 23  Target for endurance: 19   Tongue back  (Goal = 50) Peak Max after 3 trials: 36 Effort level: 60%  Endurance level: 50% Target for treatment:22  Target for endurance: 18        2023      Tongue tip  -Eval, no exercises completed  -3 sets; 30 reps    -1 set; 30 reps (hold for 5 sec) 3 sets; 30 reps at 60%    5 reps of 30 sec hold at 50%      Tongue back -Eval, no  exercises completed  -3 sets; 30 reps    -1 set; 30 reps (hold for 5 sec) 3 sets; 30 reps at 60%    5 reps of 30 sec hold at 50%           2. Patient will complete swallowing exercises (i.e., Mendelsohn, Chelsey, Effortful) to increase pharyngeal strength and coordination with min cues to 90% effectiveness to improve bolus formation and strengthen oral musculature, to be achieved in 4-6 weeks.    3. Patient will perform compensatory strategies (e.g., upright positioning, small bites/sips, slow rate, alternation of consistencies, multiple swallows, effortful swallow, chin tuck, head turn, etc.) with 80% accuracy to eliminate overt s/sx penetration/aspiration of least restrictive food/liquid consistencies, to be achieved in 4-6 weeks.      Plan:  -Patient was provided with home exercises/activities to target goals in plan of care at the end of today's session.  -Continue with current plan of care.

## 2023-12-28 ENCOUNTER — OFFICE VISIT (OUTPATIENT)
Dept: SPEECH THERAPY | Facility: CLINIC | Age: 58
End: 2023-12-28
Payer: COMMERCIAL

## 2023-12-28 DIAGNOSIS — Z85.89 HX OF SQUAMOUS CELL CARCINOMA: ICD-10-CM

## 2023-12-28 DIAGNOSIS — Z85.89 HX OF MALIGNANT NEOPLASM OF HEAD AND NECK: ICD-10-CM

## 2023-12-28 DIAGNOSIS — R13.14 PHARYNGOESOPHAGEAL DYSPHAGIA: Primary | ICD-10-CM

## 2023-12-28 DIAGNOSIS — Z92.3 HX OF HEAD AND NECK RADIATION: ICD-10-CM

## 2023-12-28 DIAGNOSIS — T88.4XXS DIFFICULT AIRWAY FOR INTUBATION, SEQUELA: ICD-10-CM

## 2023-12-28 PROCEDURE — 92526 ORAL FUNCTION THERAPY: CPT

## 2023-12-28 NOTE — PROGRESS NOTES
Daily Speech Treatment Note    Today's date: 2023  Patient’s name: Mohamud Grimm Sr.  : 1965  MRN: [id number]  Safety measures: HN CA  Referring provider: Jose Antonio Rodriguez MD    Encounter Diagnosis     ICD-10-CM    1. Pharyngoesophageal dysphagia  R13.14       2. Hx of squamous cell carcinoma-BOT, s/p chemo/RT   Z85.89       3. Difficult airway for intubation, sequela  T88.4XXS       4. Hx of head and neck radiation  Z92.3       5. Hx of malignant neoplasm of head and neck  Z85.89           Visit tracking:  Re-eval Due POC Expires Auth Expiration Date ST Visit Limit   24 N/a 120                           Visit/Unit Tracking  AUTH Status:  Date    120 approved Used 1  2 3 4    Remaining  119 118 117 116       Subjective/Behavioral:  Patient's feeding tube was removed on last Monday - no issues    Patient with increased coughing/irritation today - continued     Patient had a nice holiday.    Objective/Assessment:  -Reviewed patient's home exercises/activities completed since last appointment. Patient reported that he has been practicing IOPI exercises at home.       Short-term goals:  1. Patient will perform oral motor exercises using IOPI device on lingual muscles to facilitate improved strength and function by 25%, to be achieved in 4-6 weeks.    IOPI PEAK MEASUREMENT WEEKLY GRID      2023        Tongue tip  (Goal = 56) 35 37 38  40       Tongue back  (Goal = 50) 30 34 36  39           IOPI -- Today's Exercise Targets    Tongue tip  (Goal = 56) Peak Max after 3 trials: 40 Effort level: 60%  Endurance level: 50% Target for treatment: 24  Target for endurance: 20   Tongue back  (Goal = 50) Peak Max after 3 trials: 39 Effort level: 60%  Endurance level: 50% Target for treatment: 23  Target for endurance: 20        2023     Tongue tip  -Eval, no exercises completed  -3 sets; 30 reps    -1 set; 30 reps  (hold for 5 sec) 3 sets; 30 reps at 60%    5 reps of 30 sec hold at 50% 3 sets; 30 reps at 60%    5 reps of 30 sec hold at 50%     Tongue back -Eval, no exercises completed  -3 sets; 30 reps    -1 set; 30 reps (hold for 5 sec) 3 sets; 30 reps at 60%    5 reps of 30 sec hold at 50% 3 sets; 30 reps at 60%    5 reps of 30 sec hold at 50% - difficult to hold target for last set          2. Patient will complete swallowing exercises (i.e., Mendelsohn, Chelsey, Effortful) to increase pharyngeal strength and coordination with min cues to 90% effectiveness to improve bolus formation and strengthen oral musculature, to be achieved in 4-6 weeks.    Patient completed 10 trials of effortful swallow and 10 trials of Chelsey exercise.    3. Patient will perform compensatory strategies (e.g., upright positioning, small bites/sips, slow rate, alternation of consistencies, multiple swallows, effortful swallow, chin tuck, head turn, etc.) with 80% accuracy to eliminate overt s/sx penetration/aspiration of least restrictive food/liquid consistencies, to be achieved in 4-6 weeks.      Plan:  -Patient was provided with home exercises/activities to target goals in plan of care at the end of today's session.  -Continue with current plan of care.

## 2024-01-04 ENCOUNTER — OFFICE VISIT (OUTPATIENT)
Dept: SPEECH THERAPY | Facility: CLINIC | Age: 59
End: 2024-01-04
Payer: COMMERCIAL

## 2024-01-04 DIAGNOSIS — R49.0 DYSPHONIA: ICD-10-CM

## 2024-01-04 DIAGNOSIS — T88.4XXS DIFFICULT AIRWAY FOR INTUBATION, SEQUELA: ICD-10-CM

## 2024-01-04 DIAGNOSIS — G24.4 OROFACIAL DYSKINESIA: ICD-10-CM

## 2024-01-04 DIAGNOSIS — G24.4 OROFACIAL DYSTONIA: ICD-10-CM

## 2024-01-04 DIAGNOSIS — G52.7 CRANIAL NEUROPATHIES, MULTIPLE: ICD-10-CM

## 2024-01-04 DIAGNOSIS — R13.14 PHARYNGOESOPHAGEAL DYSPHAGIA: Primary | ICD-10-CM

## 2024-01-04 DIAGNOSIS — Z85.89 HX OF SQUAMOUS CELL CARCINOMA: ICD-10-CM

## 2024-01-04 DIAGNOSIS — Z85.89 HX OF MALIGNANT NEOPLASM OF HEAD AND NECK: ICD-10-CM

## 2024-01-04 PROCEDURE — 92526 ORAL FUNCTION THERAPY: CPT

## 2024-01-04 NOTE — PROGRESS NOTES
Daily Speech Treatment Note    Today's date: 2024  Patient’s name: Mohamud Grimm Sr.  : 1965  MRN: [id number]  Safety measures: HN [place]  Referring provider: Jose Antonio Rodriguez MD    Encounter Diagnosis     ICD-10-CM    1. Pharyngoesophageal dysphagia  R13.14       2. Hx of squamous cell carcinoma-BOT, s/p chemo/RT   Z85.89       3. Difficult airway for intubation, sequela  T88.4XXS       4. Hx of malignant neoplasm of head and neck  Z85.89       5. 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 Orofacial dyskinesia  G24.4       7. Dysphonia  R49.0       8. Cranial neuropathies, multiple  G52.7           Visit tracking:  Re-eval Due POC Expires Auth Expiration Date ST Visit Limit   24 N/a 120                           Visit/Unit Tracking  AUTH Status:  Date    120 approved Used 1  2 3 4 5    Remaining  119 118 117 116 115       Subjective/Behavioral:  Patient reports his cough gets more irritated when he goes from cold/warm (outside/inside) air.     More FRANCIA appts made (patient out of town week of 1/15)    Objective/Assessment:  -Reviewed patient's home exercises/activities completed since last appointment. Patient reported that he has been practicing IOPI exercises at home.       Short-term goals:  1. Patient will perform oral motor exercises using IOPI device on lingual muscles to facilitate improved strength and function by 25%, to be achieved in 4-6 weeks.    IOPI PEAK MEASUREMENT WEEKLY GRID      2023     Tongue tip  (Goal = 56) 35 37 38  40  44     Tongue back  (Goal = 50) 30 34 36  39  39         IOPI -- Today's Exercise Targets    Tongue tip  (Goal = 56) Peak Max after 3 trials: 44 Effort level: 65%  Endurance level: 50% Target for treatment: 29  Target for endurance: 22   Tongue back  (Goal = 50) Peak Max after 3 trials: 39 Effort level: 65%  Endurance level: 50% Target for treatment: 25  Target for endurance: 20        2023  12/13/2023 12/21/23 12/28/23 01/04/24    Tongue tip  -Eval, no exercises completed  -3 sets; 30 reps    -1 set; 30 reps (hold for 5 sec) 3 sets; 30 reps at 60%    5 reps of 30 sec hold at 50% 3 sets; 30 reps at 60%    5 reps of 30 sec hold at 50% 3 sets; 30 reps at 65%    5 reps of 30 sec hold at 50%    Tongue back -Eval, no exercises completed  -3 sets; 30 reps    -1 set; 30 reps (hold for 5 sec) 3 sets; 30 reps at 60%    5 reps of 30 sec hold at 50% 3 sets; 30 reps at 60%    5 reps of 30 sec hold at 50% - difficult to hold target for last set 3 sets; 30 reps at 65%    5 reps of 30 sec hold at 50%         2. Patient will complete swallowing exercises (i.e., Mendelsohn, Chelsey, Effortful) to increase pharyngeal strength and coordination with min cues to 90% effectiveness to improve bolus formation and strengthen oral musculature, to be achieved in 4-6 weeks.      3. Patient will perform compensatory strategies (e.g., upright positioning, small bites/sips, slow rate, alternation of consistencies, multiple swallows, effortful swallow, chin tuck, head turn, etc.) with 80% accuracy to eliminate overt s/sx penetration/aspiration of least restrictive food/liquid consistencies, to be achieved in 4-6 weeks.      Plan:  -Patient was provided with home exercises/activities to target goals in plan of care at the end of today's session.  -Continue with current plan of care.

## 2024-01-08 ENCOUNTER — LAB (OUTPATIENT)
Dept: LAB | Facility: MEDICAL CENTER | Age: 59
End: 2024-01-08
Payer: COMMERCIAL

## 2024-01-08 DIAGNOSIS — E03.9 HYPOTHYROIDISM, UNSPECIFIED TYPE: ICD-10-CM

## 2024-01-08 LAB
T4 FREE SERPL-MCNC: 1.12 NG/DL (ref 0.61–1.12)
TSH SERPL DL<=0.05 MIU/L-ACNC: 2.54 UIU/ML (ref 0.45–4.5)

## 2024-01-08 PROCEDURE — 84443 ASSAY THYROID STIM HORMONE: CPT

## 2024-01-08 PROCEDURE — 84439 ASSAY OF FREE THYROXINE: CPT

## 2024-01-08 PROCEDURE — 36415 COLL VENOUS BLD VENIPUNCTURE: CPT

## 2024-01-09 ENCOUNTER — OFFICE VISIT (OUTPATIENT)
Dept: ENDOCRINOLOGY | Facility: CLINIC | Age: 59
End: 2024-01-09
Payer: COMMERCIAL

## 2024-01-09 VITALS
HEIGHT: 69 IN | BODY MASS INDEX: 25.98 KG/M2 | DIASTOLIC BLOOD PRESSURE: 70 MMHG | HEART RATE: 89 BPM | WEIGHT: 175.4 LBS | SYSTOLIC BLOOD PRESSURE: 110 MMHG

## 2024-01-09 DIAGNOSIS — E03.9 HYPOTHYROIDISM, UNSPECIFIED TYPE: Primary | ICD-10-CM

## 2024-01-09 PROCEDURE — 99213 OFFICE O/P EST LOW 20 MIN: CPT

## 2024-01-09 NOTE — ASSESSMENT & PLAN NOTE
Thyroid function testing within target range. Recommend he continue current dose of levothyroxine. Repeat labs prior to next follow up.

## 2024-01-09 NOTE — PROGRESS NOTES
Established Patient Progress Note      Chief Complaint   Patient presents with    Hypothyroidism        Impression & Plan:    Problem List Items Addressed This Visit          Endocrine    Hypothyroidism - Primary     Thyroid function testing within target range. Recommend he continue current dose of levothyroxine. Repeat labs prior to next follow up.          Relevant Orders    TSH, 3rd generation    T4, free       History of Present Illness:   Mohamud Grimm Sr. is a 58 y.o. male with hypothyroidism seen in follow up. He is currently taking  levothyroxine 100 mcg daily. He is taking this regularly and properly. He denies any symptoms associated with hypo/hyperthyroidism.     Of note, he has hx of neck RT for  base of tongue cancer in 2012.        Patient Active Problem List   Diagnosis    ED (erectile dysfunction) of organic origin    Peyronie's disease    Arthralgia    Hx of squamous cell carcinoma-BOT, s/p chemo/RT 2012    Essential hypertension    Hypothyroidism    Psoriasis    Pharyngoesophageal dysphagia    Hyperbilirubinemia    Severe swallowing dysfunction    Allergic rhinitis    Witnessed episode of apnea    Obstructive sleep apnea syndrome    Paresthesias    Shoulder weakness    Winged scapula of both sides    Cervical radiculopathy at C8    Carpal tunnel syndrome    Radiation-induced brachial plexopathy    Facial weakness    Hx of head and neck radiation    Hx of malignant neoplasm of head and neck    Scar of neck    Cranial neuropathies, multiple    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    Dysphonia    Laryngeal edema    Glottic insufficiency    Muscle tension dysphonia    Shortness of breath    Velopharyngeal insufficiency, acquired    Difficult airway for intubation    Acute hypoxemic respiratory failure (HCC)    Altered mental status    History of tongue cancer    S/P percutaneous endoscopic gastrostomy (PEG) tube placement (HCC)    Alcohol use    ERIK (obstructive sleep apnea)    Orofacial  dystonia    Orofacial dyskinesia    Blepharospasm of both eyes    Gastroesophageal reflux disease    Chronic cough    At risk for aspiration    PEG (percutaneous endoscopic gastrostomy) adjustment/replacement/removal (HCC)    Dysphagia      Past Medical History:   Diagnosis Date    Cancer of base of tongue (HCC)     excision    Cough     CPAP (continuous positive airway pressure) dependence     waiting on consult    Disease of thyroid gland     hypo    Dysphagia     ED (erectile dysfunction)     History of pneumonia     Hypertension     Leukopenia     Peyronie's disease     PONV (postoperative nausea and vomiting)     Psoriasis     Sleep apnea     Tongue cancer (HCC)     Weight loss, abnormal       Past Surgical History:   Procedure Laterality Date    ESOPHAGOSCOPY N/A 9/16/2022    Procedure: ESOPHAGOSCOPY FLEXIBLE;  Surgeon: Devonte Singh MD;  Location: [facility];  Service: ENT    ESOPHAGOSCOPY N/A 8/25/2023    Procedure: TRANSNASAL ESOPHAGOSCOPY WITH BALLOON DILATION/ ESOPHAGEAL DILATION (BALLOON);;  Surgeon: Devonte Singh MD;  Location: [facility];  Service: ENT    OTHER SURGICAL HISTORY      infusion port inserted and removed    PEG TUBE PLACEMENT      and removal    AR COLONOSCOPY FLX DX W/COLLJ SPEC WHEN PFRMD N/A 6/27/2017    Procedure: COLONOSCOPY with polypectomy x2;  Surgeon: Janet Willard MD;  Location: [facility];  Service: General    AR LARGSC W/NJX VOCAL CORD THER W/MICRO/TELESCOPE Bilateral 9/16/2022    Procedure: micro direct laryngoscopy, vocal fold injection, biopsy epiglottis lesion, posterior pharyngeal wall injection, flexible esophagoscopy with dilation;  Surgeon: Devonte Singh MD;  Location: [facility];  Service: ENT    TONGUE BIOPSY / EXCISION      Floor mouth excision of malignant tumor      Family History   Problem Relation Age of Onset    Other Mother         reactive airway dysfunction    Coronary artery disease Father     Hypertension Father     Psoriasis Father     Thyroid disease  "unspecified Neg Hx      Social History     Tobacco Use    Smoking status: Former     Current packs/day: 0.00     Average packs/day: 0.3 packs/day for 20.0 years (5.0 ttl pk-yrs)     Types: Cigarettes     Start date: 1991     Quit date: 2011     Years since quittin.0    Smokeless tobacco: Former     Quit date:     Tobacco comments:     pt quit with dx of tongue base cancer, per allscripts   Substance Use Topics    Alcohol use: Yes     Comment: rare     No Known Allergies      Current Outpatient Medications:     amLODIPine (NORVASC) 5 mg tablet, TAKE 1 TABLET (5 MG TOTAL) BY MOUTH DAILY., Disp: 90 tablet, Rfl: 1    fluticasone (FLONASE) 50 mcg/act nasal spray, 1 SPRAY INTO EACH NOSTRIL DAILY AS NEEDED FOR RHINITIS, Disp: 48 mL, Rfl: 2    levothyroxine 100 mcg tablet, TAKE 1 TABLET BY MOUTH EVERY DAY, Disp: 90 tablet, Rfl: 1    montelukast (SINGULAIR) 10 mg tablet, TAKE 1 TABLET (10 MG TOTAL) BY MOUTH IN THE MORNING, Disp: 90 tablet, Rfl: 2    multivitamin (THERAGRAN) TABS, Take 1 tablet by mouth daily, Disp: , Rfl:     tadalafil (CIALIS) 5 MG tablet, Take 1 tablet (5 mg total) by mouth daily, Disp: 90 tablet, Rfl: 3    Review of Systems   Constitutional:  Negative for activity change, appetite change, chills, diaphoresis, fatigue, fever and unexpected weight change.   HENT:  Negative for sore throat, trouble swallowing and voice change.    Respiratory:  Negative for shortness of breath.    Cardiovascular:  Negative for chest pain and palpitations.   Gastrointestinal:  Negative for constipation and diarrhea.   Endocrine: Negative for cold intolerance and heat intolerance.   Neurological:  Negative for weakness.   Psychiatric/Behavioral:  Negative for sleep disturbance. The patient is not nervous/anxious.    All other systems reviewed and are negative.      Physical Exam:  Body mass index is 25.9 kg/m².  /70   Pulse 89   Ht 5' 9\" (1.753 m)   Wt 79.6 kg (175 lb 6.4 oz)   BMI 25.90 kg/m²    Wt " Readings from Last 3 Encounters:   01/09/24 79.6 kg (175 lb 6.4 oz)   12/18/23 75.8 kg (167 lb)   12/14/23 75.8 kg (167 lb)       Physical Exam  Vitals reviewed.   Constitutional:       Appearance: Normal appearance. He is normal weight.   Cardiovascular:      Rate and Rhythm: Normal rate and regular rhythm.      Pulses: Normal pulses.      Heart sounds: Normal heart sounds.   Pulmonary:      Effort: Pulmonary effort is normal.      Breath sounds: Normal breath sounds.   Neurological:      Mental Status: He is alert and oriented to person, place, and time.   Psychiatric:         Mood and Affect: Mood normal.         Thought Content: Thought content normal.       Labs:   Lab Results   Component Value Date    HGBA1C 5.3 07/14/2018    HGBA1C 5.4 08/11/2017    HGBA1C 5.3 08/15/2016     Lab Results   Component Value Date    CREATININE 0.44 (L) 08/10/2023    CREATININE 0.51 (L) 07/21/2023    CREATININE 0.71 09/08/2022    BUN 15 08/10/2023     12/05/2015    K 4.2 08/10/2023    CL 95 (L) 08/10/2023    CO2 34 (H) 08/10/2023     eGFR   Date Value Ref Range Status   08/10/2023 124 ml/min/1.73sq m Final     Lab Results   Component Value Date    CHOL 199 12/05/2015    HDL 78 03/03/2021    TRIG 52 03/03/2021     Lab Results   Component Value Date    ALT 33 07/21/2023    AST 23 07/21/2023    ALKPHOS 79 07/21/2023    BILITOT 0.75 12/05/2015     Lab Results   Component Value Date    IHG2BMAEGWLM 2.536 01/08/2024    GZC8GOCOTCOR 4.282 10/31/2023    KZZ2AUQEKRPS 5.598 (H) 10/28/2023     Lab Results   Component Value Date    FREET4 1.12 01/08/2024       Orders Placed This Encounter   Procedures    TSH, 3rd generation     This is a patient instruction: This test is non-fasting. Please drink two glasses of water morning of bloodwork.        Standing Status:   Future     Standing Expiration Date:   1/9/2025    T4, free     Standing Status:   Future     Standing Expiration Date:   1/9/2025       There are no Patient Instructions on  file for this visit.    Discussed with the patient and all questioned fully answered. He will call me if any problems arise.    MAR Treviño

## 2024-01-11 ENCOUNTER — APPOINTMENT (OUTPATIENT)
Dept: SPEECH THERAPY | Facility: CLINIC | Age: 59
End: 2024-01-11
Payer: COMMERCIAL

## 2024-01-11 DIAGNOSIS — R49.0 DYSPHONIA: ICD-10-CM

## 2024-01-11 DIAGNOSIS — T88.4XXS DIFFICULT AIRWAY FOR INTUBATION, SEQUELA: ICD-10-CM

## 2024-01-11 DIAGNOSIS — G24.4 OROFACIAL DYSKINESIA: ICD-10-CM

## 2024-01-11 DIAGNOSIS — G24.4 OROFACIAL DYSTONIA: ICD-10-CM

## 2024-01-11 DIAGNOSIS — R13.14 PHARYNGOESOPHAGEAL DYSPHAGIA: Primary | ICD-10-CM

## 2024-01-11 DIAGNOSIS — Z85.89 HX OF SQUAMOUS CELL CARCINOMA: ICD-10-CM

## 2024-01-11 DIAGNOSIS — Z85.89 HX OF MALIGNANT NEOPLASM OF HEAD AND NECK: ICD-10-CM

## 2024-01-11 NOTE — PROGRESS NOTES
Daily Speech Treatment Note    Today's date: 2024  Patient’s name: Mohamud Grimm Sr.  : 1965  MRN: [id number]  Safety measures: HN [place]  Referring provider: Jose Antonio Rodriguez MD    Encounter Diagnosis     ICD-10-CM    1. Pharyngoesophageal dysphagia  R13.14       2. Hx of squamous cell carcinoma-BOT, s/p chemo/RT   Z85.89       3. Difficult airway for intubation, sequela  T88.4XXS       4. Hx of malignant neoplasm of head and neck  Z85.89       5. 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 Orofacial dyskinesia  G24.4       7. Dysphonia  R49.0           Visit tracking:  Re-eval Due POC Expires Auth Expiration Date ST Visit Limit   24 N/a 120                           Visit/Unit Tracking  AUTH Status:  Date  ***   120 approved Used 1  2 3 4 5     Remaining  119 118 117 116 115        Subjective/Behavioral:***  Patient reports his cough gets more irritated when he goes from cold/warm (outside/inside) air.     More FRANCIA appts made (patient out of town week of 1/15)    Objective/Assessment:  -Reviewed patient's home exercises/activities completed since last appointment. Patient reported that he has been practicing IOPI exercises at home.       Short-term goals:  1. Patient will perform oral motor exercises using IOPI device on lingual muscles to facilitate improved strength and function by 25%, to be achieved in 4-6 weeks.    IOPI PEAK MEASUREMENT WEEKLY GRID      2023 ***    Tongue tip  (Goal = 56) 35 37 38  40  44  ***   Tongue back  (Goal = 50) 30 34 36  39  39  ***       IOPI -- Today's Exercise Targets    Tongue tip  (Goal = 56) Peak Max after 3 trials: 44*** Effort level: 65%  Endurance level: 50% Target for treatment: 29  Target for endurance: 22   Tongue back  (Goal = 50) Peak Max after 3 trials: 39*** Effort level: 65%  Endurance level: 50% Target for treatment: 25  Target for endurance: 20        2023  12/28/23 01/04/24 ***   Tongue tip  -Eval, no exercises completed  -3 sets; 30 reps    -1 set; 30 reps (hold for 5 sec) 3 sets; 30 reps at 60%    5 reps of 30 sec hold at 50% 3 sets; 30 reps at 60%    5 reps of 30 sec hold at 50% 3 sets; 30 reps at 65%    5 reps of 30 sec hold at 50% ***   Tongue back -Eval, no exercises completed  -3 sets; 30 reps    -1 set; 30 reps (hold for 5 sec) 3 sets; 30 reps at 60%    5 reps of 30 sec hold at 50% 3 sets; 30 reps at 60%    5 reps of 30 sec hold at 50% - difficult to hold target for last set 3 sets; 30 reps at 65%    5 reps of 30 sec hold at 50% ***        2. Patient will complete swallowing exercises (i.e., Mendelsohn, Chelsey, Effortful) to increase pharyngeal strength and coordination with min cues to 90% effectiveness to improve bolus formation and strengthen oral musculature, to be achieved in 4-6 weeks.      3. Patient will perform compensatory strategies (e.g., upright positioning, small bites/sips, slow rate, alternation of consistencies, multiple swallows, effortful swallow, chin tuck, head turn, etc.) with 80% accuracy to eliminate overt s/sx penetration/aspiration of least restrictive food/liquid consistencies, to be achieved in 4-6 weeks.      Plan:  -Patient was provided with home exercises/activities to target goals in plan of care at the end of today's session.  -Continue with current plan of care.

## 2024-01-25 ENCOUNTER — APPOINTMENT (OUTPATIENT)
Dept: SPEECH THERAPY | Facility: CLINIC | Age: 59
End: 2024-01-25
Payer: COMMERCIAL

## 2024-02-01 ENCOUNTER — OFFICE VISIT (OUTPATIENT)
Dept: SPEECH THERAPY | Facility: CLINIC | Age: 59
End: 2024-02-01
Payer: COMMERCIAL

## 2024-02-01 DIAGNOSIS — Z85.89 HX OF MALIGNANT NEOPLASM OF HEAD AND NECK: ICD-10-CM

## 2024-02-01 DIAGNOSIS — R13.14 PHARYNGOESOPHAGEAL DYSPHAGIA: Primary | ICD-10-CM

## 2024-02-01 DIAGNOSIS — Z85.89 HX OF SQUAMOUS CELL CARCINOMA: ICD-10-CM

## 2024-02-01 DIAGNOSIS — T88.4XXS DIFFICULT AIRWAY FOR INTUBATION, SEQUELA: ICD-10-CM

## 2024-02-01 PROCEDURE — 92526 ORAL FUNCTION THERAPY: CPT

## 2024-02-01 NOTE — PROGRESS NOTES
Daily Speech Treatment Note/DISCHARGE    Today's date: 2024  Patient’s name: Mohamud Grimm Sr.  : 1965  MRN: [id number]  Safety measures: HN [place]  Referring provider: Jose Antonio Rodriguez MD    Encounter Diagnosis     ICD-10-CM    1. Pharyngoesophageal dysphagia  R13.14       2. Hx of squamous cell carcinoma-BOT, s/p chemo/RT   Z85.89       3. Difficult airway for intubation, sequela  T88.4XXS       4. Hx of malignant neoplasm of head and neck  Z85.89           Visit tracking:  Re-eval Due POC Expires Auth Expiration Date ST Visit Limit   24 N/a 120                           Visit/Unit Tracking  AUTH Status:  Date    120 approved Used 1  2 3 4 5 6    Remaining  119 118 117 116 115 114       Subjective/Behavioral: Patient had follow up with Dr. Singh - reviewed reflux testing; and chronic cough (worsening)    Pepcid (40mg) - at night  Gabapentin - to treat Neurogenic cough ; laryngeal penetration/sensitivity    Chronic Cough a[employer]po Handouts provided today and reviewed with the patient.         Short-term goals:  1. Patient will perform oral motor exercises using IOPI device on lingual muscles to facilitate improved strength and function by 25%, to be achieved in 4-6 weeks.    2. Patient will complete swallowing exercises (i.e., Mendelsohn, Chelsey, Effortful) to increase pharyngeal strength and coordination with min cues to 90% effectiveness to improve bolus formation and strengthen oral musculature, to be achieved in 4-6 weeks.    3. Patient will perform compensatory strategies (e.g., upright positioning, small bites/sips, slow rate, alternation of consistencies, multiple swallows, effortful swallow, chin tuck, head turn, etc.) with 80% accuracy to eliminate overt s/sx penetration/aspiration of least restrictive food/liquid consistencies, to be achieved in 4-6 weeks.      Plan:  Discharge at this time  Patient's swallow has returned to baseline and is stable  He is  strugglig with chronic cough/irritation. ENT treating. SLP reviewed strategies today.

## 2024-03-01 DIAGNOSIS — E03.9 HYPOTHYROIDISM, UNSPECIFIED TYPE: ICD-10-CM

## 2024-03-01 RX ORDER — LEVOTHYROXINE SODIUM 0.1 MG/1
TABLET ORAL
Qty: 90 TABLET | Refills: 1 | Status: SHIPPED | OUTPATIENT
Start: 2024-03-01

## 2024-05-20 DIAGNOSIS — I10 ESSENTIAL HYPERTENSION: ICD-10-CM

## 2024-05-20 RX ORDER — AMLODIPINE BESYLATE 5 MG/1
5 TABLET ORAL DAILY
Qty: 90 TABLET | Refills: 1 | Status: SHIPPED | OUTPATIENT
Start: 2024-05-20

## 2024-05-21 ENCOUNTER — HOSPITAL ENCOUNTER (INPATIENT)
Facility: HOSPITAL | Age: 59
LOS: 3 days | Discharge: HOME | DRG: 871 | End: 2024-05-24
Attending: EMERGENCY MEDICINE | Admitting: INTERNAL MEDICINE
Payer: COMMERCIAL

## 2024-05-21 ENCOUNTER — OFFICE VISIT (OUTPATIENT)
Dept: URGENT CARE | Facility: MEDICAL CENTER | Age: 59
End: 2024-05-21
Payer: COMMERCIAL

## 2024-05-21 ENCOUNTER — APPOINTMENT (EMERGENCY)
Dept: RADIOLOGY | Facility: HOSPITAL | Age: 59
DRG: 871 | End: 2024-05-21
Payer: COMMERCIAL

## 2024-05-21 VITALS
DIASTOLIC BLOOD PRESSURE: 86 MMHG | HEIGHT: 69 IN | BODY MASS INDEX: 24.29 KG/M2 | WEIGHT: 164 LBS | SYSTOLIC BLOOD PRESSURE: 180 MMHG | OXYGEN SATURATION: 89 % | RESPIRATION RATE: 18 BRPM | HEART RATE: 109 BPM | TEMPERATURE: 102.4 F

## 2024-05-21 DIAGNOSIS — Z85.89 HX OF SQUAMOUS CELL CARCINOMA: ICD-10-CM

## 2024-05-21 DIAGNOSIS — J96.01 ACUTE HYPOXEMIC RESPIRATORY FAILURE (HCC): ICD-10-CM

## 2024-05-21 DIAGNOSIS — R13.10 DYSPHAGIA: ICD-10-CM

## 2024-05-21 DIAGNOSIS — A41.9 SEPSIS DUE TO PNEUMONIA (HCC): ICD-10-CM

## 2024-05-21 DIAGNOSIS — J18.9 MULTIFOCAL PNEUMONIA: Primary | ICD-10-CM

## 2024-05-21 DIAGNOSIS — R79.81 LOW OXYGEN SATURATION: ICD-10-CM

## 2024-05-21 DIAGNOSIS — R50.9 FEVER: ICD-10-CM

## 2024-05-21 DIAGNOSIS — Z87.898 HISTORY OF AIRWAY ASPIRATION: ICD-10-CM

## 2024-05-21 DIAGNOSIS — J18.9 SEPSIS DUE TO PNEUMONIA (HCC): ICD-10-CM

## 2024-05-21 DIAGNOSIS — Z78.9 ALCOHOL USE: ICD-10-CM

## 2024-05-21 DIAGNOSIS — R50.9 FEVER, UNSPECIFIED FEVER CAUSE: Primary | ICD-10-CM

## 2024-05-21 PROBLEM — T17.908A ASPIRATION INTO AIRWAY: Status: ACTIVE | Noted: 2024-04-14

## 2024-05-21 LAB
ALBUMIN SERPL BCP-MCNC: 3.3 G/DL (ref 3.5–5)
ALP SERPL-CCNC: 80 U/L (ref 34–104)
ALT SERPL W P-5'-P-CCNC: 11 U/L (ref 7–52)
ANION GAP SERPL CALCULATED.3IONS-SCNC: 8 MMOL/L (ref 4–13)
APTT PPP: 30 SECONDS (ref 23–37)
AST SERPL W P-5'-P-CCNC: 15 U/L (ref 13–39)
BASOPHILS # BLD AUTO: 0.03 THOUSANDS/ÂΜL (ref 0–0.1)
BASOPHILS NFR BLD AUTO: 0 % (ref 0–1)
BILIRUB SERPL-MCNC: 0.61 MG/DL (ref 0.2–1)
BILIRUB UR QL STRIP: NEGATIVE
BUN SERPL-MCNC: 9 MG/DL (ref 5–25)
CALCIUM ALBUM COR SERPL-MCNC: 9.7 MG/DL (ref 8.3–10.1)
CALCIUM SERPL-MCNC: 9.1 MG/DL (ref 8.4–10.2)
CHLORIDE SERPL-SCNC: 97 MMOL/L (ref 96–108)
CLARITY UR: CLEAR
CO2 SERPL-SCNC: 29 MMOL/L (ref 21–32)
COLOR UR: NORMAL
CREAT SERPL-MCNC: 0.52 MG/DL (ref 0.6–1.3)
EOSINOPHIL # BLD AUTO: 0.03 THOUSAND/ÂΜL (ref 0–0.61)
EOSINOPHIL NFR BLD AUTO: 0 % (ref 0–6)
ERYTHROCYTE [DISTWIDTH] IN BLOOD BY AUTOMATED COUNT: 13.8 % (ref 11.6–15.1)
FLUAV RNA RESP QL NAA+PROBE: NEGATIVE
FLUBV RNA RESP QL NAA+PROBE: NEGATIVE
GFR SERPL CREATININE-BSD FRML MDRD: 115 ML/MIN/1.73SQ M
GLUCOSE SERPL-MCNC: 118 MG/DL (ref 65–140)
GLUCOSE UR STRIP-MCNC: NEGATIVE MG/DL
HCT VFR BLD AUTO: 37.7 % (ref 36.5–49.3)
HGB BLD-MCNC: 12.4 G/DL (ref 12–17)
HGB UR QL STRIP.AUTO: NEGATIVE
IMM GRANULOCYTES # BLD AUTO: 0.04 THOUSAND/UL (ref 0–0.2)
IMM GRANULOCYTES NFR BLD AUTO: 1 % (ref 0–2)
INR PPP: 0.99 (ref 0.84–1.19)
KETONES UR STRIP-MCNC: NEGATIVE MG/DL
LACTATE SERPL-SCNC: 0.7 MMOL/L (ref 0.5–2)
LEUKOCYTE ESTERASE UR QL STRIP: NEGATIVE
LYMPHOCYTES # BLD AUTO: 0.49 THOUSANDS/ÂΜL (ref 0.6–4.47)
LYMPHOCYTES NFR BLD AUTO: 6 % (ref 14–44)
MCH RBC QN AUTO: 29.5 PG (ref 26.8–34.3)
MCHC RBC AUTO-ENTMCNC: 32.9 G/DL (ref 31.4–37.4)
MCV RBC AUTO: 90 FL (ref 82–98)
MONOCYTES # BLD AUTO: 0.59 THOUSAND/ÂΜL (ref 0.17–1.22)
MONOCYTES NFR BLD AUTO: 7 % (ref 4–12)
NEUTROPHILS # BLD AUTO: 6.97 THOUSANDS/ÂΜL (ref 1.85–7.62)
NEUTS SEG NFR BLD AUTO: 86 % (ref 43–75)
NITRITE UR QL STRIP: NEGATIVE
NRBC BLD AUTO-RTO: 0 /100 WBCS
PH UR STRIP.AUTO: 7 [PH]
PLATELET # BLD AUTO: 306 THOUSANDS/UL (ref 149–390)
PMV BLD AUTO: 8.7 FL (ref 8.9–12.7)
POTASSIUM SERPL-SCNC: 3.7 MMOL/L (ref 3.5–5.3)
PROCALCITONIN SERPL-MCNC: 1.8 NG/ML
PROT SERPL-MCNC: 7.2 G/DL (ref 6.4–8.4)
PROT UR STRIP-MCNC: NEGATIVE MG/DL
PROTHROMBIN TIME: 13.6 SECONDS (ref 11.6–14.5)
RBC # BLD AUTO: 4.2 MILLION/UL (ref 3.88–5.62)
RSV RNA RESP QL NAA+PROBE: NEGATIVE
SARS-COV-2 AG UPPER RESP QL IA: NEGATIVE
SARS-COV-2 RNA RESP QL NAA+PROBE: NEGATIVE
SODIUM SERPL-SCNC: 134 MMOL/L (ref 135–147)
SP GR UR STRIP.AUTO: 1.01 (ref 1–1.03)
UROBILINOGEN UR STRIP-ACNC: <2 MG/DL
VALID CONTROL: NORMAL
WBC # BLD AUTO: 8.15 THOUSAND/UL (ref 4.31–10.16)

## 2024-05-21 PROCEDURE — 85025 COMPLETE CBC W/AUTO DIFF WBC: CPT | Performed by: EMERGENCY MEDICINE

## 2024-05-21 PROCEDURE — 99222 1ST HOSP IP/OBS MODERATE 55: CPT | Performed by: PHYSICIAN ASSISTANT

## 2024-05-21 PROCEDURE — 84145 PROCALCITONIN (PCT): CPT | Performed by: EMERGENCY MEDICINE

## 2024-05-21 PROCEDURE — 94760 N-INVAS EAR/PLS OXIMETRY 1: CPT

## 2024-05-21 PROCEDURE — 87040 BLOOD CULTURE FOR BACTERIA: CPT | Performed by: EMERGENCY MEDICINE

## 2024-05-21 PROCEDURE — 99285 EMERGENCY DEPT VISIT HI MDM: CPT

## 2024-05-21 PROCEDURE — 87811 SARS-COV-2 COVID19 W/OPTIC: CPT

## 2024-05-21 PROCEDURE — 87070 CULTURE OTHR SPECIMN AEROBIC: CPT | Performed by: PHYSICIAN ASSISTANT

## 2024-05-21 PROCEDURE — 85730 THROMBOPLASTIN TIME PARTIAL: CPT | Performed by: EMERGENCY MEDICINE

## 2024-05-21 PROCEDURE — 87205 SMEAR GRAM STAIN: CPT | Performed by: PHYSICIAN ASSISTANT

## 2024-05-21 PROCEDURE — 96365 THER/PROPH/DIAG IV INF INIT: CPT

## 2024-05-21 PROCEDURE — 87081 CULTURE SCREEN ONLY: CPT | Performed by: PHYSICIAN ASSISTANT

## 2024-05-21 PROCEDURE — 93005 ELECTROCARDIOGRAM TRACING: CPT

## 2024-05-21 PROCEDURE — 0241U HB NFCT DS VIR RESP RNA 4 TRGT: CPT

## 2024-05-21 PROCEDURE — 99291 CRITICAL CARE FIRST HOUR: CPT | Performed by: EMERGENCY MEDICINE

## 2024-05-21 PROCEDURE — 81003 URINALYSIS AUTO W/O SCOPE: CPT | Performed by: EMERGENCY MEDICINE

## 2024-05-21 PROCEDURE — 71045 X-RAY EXAM CHEST 1 VIEW: CPT

## 2024-05-21 PROCEDURE — G0382 LEV 3 HOSP TYPE B ED VISIT: HCPCS

## 2024-05-21 PROCEDURE — 80053 COMPREHEN METABOLIC PANEL: CPT | Performed by: EMERGENCY MEDICINE

## 2024-05-21 PROCEDURE — 83605 ASSAY OF LACTIC ACID: CPT | Performed by: EMERGENCY MEDICINE

## 2024-05-21 PROCEDURE — 36415 COLL VENOUS BLD VENIPUNCTURE: CPT

## 2024-05-21 PROCEDURE — 85610 PROTHROMBIN TIME: CPT | Performed by: EMERGENCY MEDICINE

## 2024-05-21 RX ORDER — AZELASTINE 1 MG/ML
1 SPRAY, METERED NASAL 2 TIMES DAILY
Status: DISCONTINUED | OUTPATIENT
Start: 2024-05-21 | End: 2024-05-24 | Stop reason: HOSPADM

## 2024-05-21 RX ORDER — FLUTICASONE PROPIONATE 50 MCG
1 SPRAY, SUSPENSION (ML) NASAL DAILY PRN
Status: DISCONTINUED | OUTPATIENT
Start: 2024-05-21 | End: 2024-05-24 | Stop reason: HOSPADM

## 2024-05-21 RX ORDER — IBUPROFEN 400 MG/1
400 TABLET ORAL ONCE
Status: DISCONTINUED | OUTPATIENT
Start: 2024-05-21 | End: 2024-05-21

## 2024-05-21 RX ORDER — FAMOTIDINE 40 MG/5ML
40 POWDER, FOR SUSPENSION ORAL
Status: DISCONTINUED | OUTPATIENT
Start: 2024-05-21 | End: 2024-05-24 | Stop reason: HOSPADM

## 2024-05-21 RX ORDER — LEVOTHYROXINE SODIUM 0.1 MG/1
100 TABLET ORAL
Status: DISCONTINUED | OUTPATIENT
Start: 2024-05-22 | End: 2024-05-24 | Stop reason: HOSPADM

## 2024-05-21 RX ORDER — GUAIFENESIN/DEXTROMETHORPHAN 100-10MG/5
10 SYRUP ORAL EVERY 4 HOURS PRN
Status: DISCONTINUED | OUTPATIENT
Start: 2024-05-21 | End: 2024-05-24 | Stop reason: HOSPADM

## 2024-05-21 RX ORDER — FOLIC ACID 1 MG/1
1 TABLET ORAL DAILY
Status: DISCONTINUED | OUTPATIENT
Start: 2024-05-22 | End: 2024-05-24 | Stop reason: HOSPADM

## 2024-05-21 RX ORDER — PENTOXIFYLLINE 400 MG/1
400 TABLET, EXTENDED RELEASE ORAL
Status: DISCONTINUED | OUTPATIENT
Start: 2024-05-22 | End: 2024-05-24 | Stop reason: HOSPADM

## 2024-05-21 RX ORDER — ACETAMINOPHEN 325 MG/1
650 TABLET ORAL EVERY 6 HOURS PRN
Status: DISCONTINUED | OUTPATIENT
Start: 2024-05-21 | End: 2024-05-24 | Stop reason: HOSPADM

## 2024-05-21 RX ORDER — AMLODIPINE BESYLATE 5 MG/1
5 TABLET ORAL DAILY
Status: DISCONTINUED | OUTPATIENT
Start: 2024-05-22 | End: 2024-05-23

## 2024-05-21 RX ORDER — LANOLIN ALCOHOL/MO/W.PET/CERES
100 CREAM (GRAM) TOPICAL DAILY
Status: DISCONTINUED | OUTPATIENT
Start: 2024-05-22 | End: 2024-05-24 | Stop reason: HOSPADM

## 2024-05-21 RX ORDER — ENOXAPARIN SODIUM 100 MG/ML
40 INJECTION SUBCUTANEOUS DAILY
Status: DISCONTINUED | OUTPATIENT
Start: 2024-05-22 | End: 2024-05-24 | Stop reason: HOSPADM

## 2024-05-21 RX ORDER — GABAPENTIN 300 MG/1
600 CAPSULE ORAL 2 TIMES DAILY
Status: DISCONTINUED | OUTPATIENT
Start: 2024-05-21 | End: 2024-05-24 | Stop reason: HOSPADM

## 2024-05-21 RX ORDER — ACETAMINOPHEN 10 MG/ML
1000 INJECTION, SOLUTION INTRAVENOUS ONCE
Status: COMPLETED | OUTPATIENT
Start: 2024-05-21 | End: 2024-05-21

## 2024-05-21 RX ORDER — GUAIFENESIN 600 MG/1
600 TABLET, EXTENDED RELEASE ORAL 2 TIMES DAILY
Status: DISCONTINUED | OUTPATIENT
Start: 2024-05-21 | End: 2024-05-21

## 2024-05-21 RX ADMIN — SODIUM CHLORIDE 1000 ML: 0.9 INJECTION, SOLUTION INTRAVENOUS at 19:30

## 2024-05-21 RX ADMIN — AZELASTINE HYDROCHLORIDE 1 SPRAY: 137 SPRAY, METERED NASAL at 23:35

## 2024-05-21 RX ADMIN — SODIUM CHLORIDE 1000 ML: 0.9 INJECTION, SOLUTION INTRAVENOUS at 20:33

## 2024-05-21 RX ADMIN — SODIUM CHLORIDE 1750 MG: 0.9 INJECTION, SOLUTION INTRAVENOUS at 23:32

## 2024-05-21 RX ADMIN — IBUPROFEN 400 MG: 400 TABLET ORAL at 18:17

## 2024-05-21 RX ADMIN — CEFTRIAXONE SODIUM 1000 MG: 10 INJECTION, POWDER, FOR SOLUTION INTRAVENOUS at 20:06

## 2024-05-21 RX ADMIN — FAMOTIDINE 40 MG: 40 POWDER, FOR SUSPENSION ORAL at 23:35

## 2024-05-21 RX ADMIN — AZITHROMYCIN MONOHYDRATE 500 MG: 500 INJECTION, POWDER, LYOPHILIZED, FOR SOLUTION INTRAVENOUS at 20:30

## 2024-05-21 RX ADMIN — CEFEPIME 2000 MG: 2 INJECTION, POWDER, FOR SOLUTION INTRAVENOUS at 22:55

## 2024-05-21 RX ADMIN — ACETAMINOPHEN 1000 MG: 10 INJECTION INTRAVENOUS at 19:30

## 2024-05-21 RX ADMIN — GABAPENTIN 600 MG: 300 CAPSULE ORAL at 23:34

## 2024-05-21 NOTE — PROGRESS NOTES
[facility]        NAME: Mohamud Grimm Sr. is a 59 y.o. male  : 1965    MRN: [id number]  DATE: May 21, 2024  TIME: 7:26 PM    Assessment and Plan   Fever, unspecified fever cause [R50.9]  1. Fever, unspecified fever cause  ibuprofen (MOTRIN) tablet 400 mg    Poct Covid 19 Rapid Antigen Test    Transfer to other facility      2. Low oxygen saturation  Transfer to other facility      3. History of airway aspiration  Transfer to other facility        EMS activated.     Pt placed on 2 LPM via NC upon rooming d/t decreased O2 sat's. During examination O2 sat's remained 92-94% on O2.     Pt positioned in an upright 90 degree angle, given a small sip of water, tolerated well, no immediate coughing noted.     Pt administer Ibuprofen 400 mg po x1 dose by RN and completed drinking approx an additional 120 ml. Tolerated fairly.     POCT COVID 19 Rapid Antigen Test: Negative    Patient Instructions     Please proceed to the ER for further evaluation and treatment.     Chief Complaint     Chief Complaint   Patient presents with    Cold Like Symptoms     Started few days on and off with fatigue, fever (105 at home?), pulse ox in 80's.  No chest pain, shortness of breath.  Cough is a chronic condition. Oxygen placed NC 2 LPM     History of Present Illness   Pt is a 58 y/o M who presents to the clinic with a CC of increased fatigue since yesterday and new onset fever today.     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    Pt was recently presented to [facility] on 4/[facility]/2024 for increased cough  and gargling sound in his throat with possible aspiration. Pt was admitted for Aspiration pneumonitis. XR chest 1 view performed on 4/13/2024: IMPRESSION: Dense bibasilar airspace opacities may represent atelectasis, aspiration, or pneumonia. 4/14/2024 CT chest with contrast performed: IMPRESSION: Regions of dependent consolidation and groundglass opacification involving   the bilateral lungs favoring the lung bases, findings which can be seen in setting of multifocal pneumonia or aspiration. A component of atelectasis is present. Mild mediastinal lymphadenopathy.   Aortic and coronary arthrosclerosis. CT soft tissue neck with IV contrast: IMPRESSION: Mild stranding and soft tissue thickening along the vallecula/base of the epiglottis which could relate to posttreatment change in this patient with history of base of tongue cancer. A mild localized inflammatory or infectious process cannot be excluded. No suspicious enhancing nodular   focus to suggest recurrent disease. Comparison with prior outside examination is recommended to assess for the chronicity of these findings. Soft tissue thickening along the bilateral carotid spaces and left lateral soft tissues of the neck, findings which are favored relate to posttreatment change. No suspicious enhancing nodular lesion. Comparison with prior outside examination is recommended to assess for the chronicity of these findings. Small groundglass opacities along the dependent portions of the lungs, findings which can be seen in the setting of atelectasis or infection/inflammation. Unless specified, incidental findings do not require additional dedicated   imaging follow-up. Pt's ETOH level was 95 mg/dL on presentation. Pt was treated with Zosyn and discharged on Augmentin. Dysphagia was addressed, pt refused to have a speech evaluation, wanted to follow with his ENT, GI was consulted, EGD was performed with showed congestion and swelling proxima to vocal cord.  "    Fever  This is a new problem. The current episode started today. The problem has been gradually worsening. Associated symptoms include chills, coughing, fatigue, a fever (Temp at home was noted to be 105, unsure if temp was accurate or not, pt brought to clinic) and myalgias. Pertinent negatives include no abdominal pain, chest pain, congestion, diaphoresis, headaches, nausea, sore throat or vomiting. He has tried nothing for the symptoms.       Review of Systems   Review of Systems   Constitutional:  Positive for activity change (\"feeling more tired\"), chills, fatigue and fever (Temp at home was noted to be 105, unsure if temp was accurate or not, pt brought to clinic). Negative for diaphoresis.   HENT:  Negative for congestion, ear discharge, ear pain, postnasal drip, rhinorrhea, sinus pressure, sinus pain and sore throat.    Respiratory:  Positive for cough. Negative for shortness of breath and wheezing.    Cardiovascular:  Negative for chest pain.   Gastrointestinal:  Negative for abdominal pain, constipation, diarrhea, nausea and vomiting.   Musculoskeletal:  Positive for myalgias.   Skin:  Negative for color change and wound.   Neurological:  Negative for headaches.     Current Medications     No current facility-administered medications for this visit.    Current Outpatient Medications:     amLODIPine (NORVASC) 5 mg tablet, TAKE 1 TABLET (5 MG TOTAL) BY MOUTH DAILY., Disp: 90 tablet, Rfl: 1    azelastine (ASTELIN) 0.1 % nasal spray, 1 spray into each nostril 2 (two) times a day, Disp: 30 mL, Rfl: 11    famotidine (PEPCID) 20 mg/2.5 mL oral suspension, TAKE 5 ML (40 MG TOTAL) BY MOUTH DAILY AT BEDTIME, Disp: 450 mL, Rfl: 3    fluticasone (FLONASE) 50 mcg/act nasal spray, 1 SPRAY INTO EACH NOSTRIL DAILY AS NEEDED FOR RHINITIS, Disp: 48 mL, Rfl: 2    gabapentin (Neurontin) 300 mg capsule, Take 2 capsules (600 mg total) by mouth 2 (two) times a day, Disp: 120 capsule, Rfl: 6    levothyroxine 100 mcg tablet, " TAKE 1 TABLET BY MOUTH EVERY DAY, Disp: 90 tablet, Rfl: 1    multivitamin (THERAGRAN) TABS, Take 1 tablet by mouth daily, Disp: , Rfl:     PENTOXIFYLLINE PO, Take by mouth, Disp: , Rfl:     tadalafil (CIALIS) 5 MG tablet, Take 1 tablet (5 mg total) by mouth daily, Disp: 90 tablet, Rfl: 3    Facility-Administered Medications Ordered in Other Visits:     acetaminophen (Ofirmev) injection 1,000 mg, 1,000 mg, Intravenous, Once, Elvin Downs MD    Current Allergies     Allergies as of 05/21/2024    (No Known Allergies)            The following portions of the patient's history were reviewed and updated as appropriate: allergies, current medications, past family history, past medical history, past social history, past surgical history and problem list.     Past Medical History:   Diagnosis Date    Cancer of base of tongue (HCC)     excision    Cough     CPAP (continuous positive airway pressure) dependence     waiting on consult    Disease of thyroid gland     hypo    Dysphagia     ED (erectile dysfunction)     History of pneumonia     Hypertension     Leukopenia     Peyronie's disease     PONV (postoperative nausea and vomiting)     Psoriasis     Sleep apnea     Tongue cancer (HCC)     Weight loss, abnormal        Past Surgical History:   Procedure Laterality Date    ESOPHAGOSCOPY N/A 9/16/2022    Procedure: ESOPHAGOSCOPY FLEXIBLE;  Surgeon: Devonte Singh MD;  Location: [facility];  Service: ENT    ESOPHAGOSCOPY N/A 8/25/2023    Procedure: TRANSNASAL ESOPHAGOSCOPY WITH BALLOON DILATION/ ESOPHAGEAL DILATION (BALLOON);;  Surgeon: Devonet Singh MD;  Location: [facility];  Service: ENT    OTHER SURGICAL HISTORY      infusion port inserted and removed    PEG TUBE PLACEMENT      and removal    KY COLONOSCOPY FLX DX W/COLLJ SPEC WHEN PFRMD N/A 6/27/2017    Procedure: COLONOSCOPY with polypectomy x2;  Surgeon: Janet Willard MD;  Location: [facility];  Service: General    KY LARGSC W/NJX VOCAL CORD THER  "W/MICRO/TELESCOPE Bilateral 9/16/2022    Procedure: micro direct laryngoscopy, vocal fold injection, biopsy epiglottis lesion, posterior pharyngeal wall injection, flexible esophagoscopy with dilation;  Surgeon: Devonte Singh MD;  Location: [facility];  Service: ENT    TONGUE BIOPSY / EXCISION      Floor mouth excision of malignant tumor       Family History   Problem Relation Age of Onset    Other Mother         reactive airway dysfunction    Coronary artery disease Father     Hypertension Father     Psoriasis Father     Thyroid disease unspecified Neg Hx          Medications have been verified.        Objective   BP (!) 180/86   Pulse (!) 109   Temp (!) 102.4 °F (39.1 °C) (Temporal)   Resp 18   Ht 5' 9\" (1.753 m)   Wt 74.4 kg (164 lb)   SpO2 (!) 89%   BMI 24.22 kg/m²        Physical Exam     Physical Exam  Vitals and nursing note reviewed. Exam conducted with a chaperone present.   Constitutional:       General: He is in acute distress.      Appearance: He is ill-appearing.   HENT:      Head: Normocephalic.   Cardiovascular:      Rate and Rhythm: Regular rhythm. Tachycardia present.      Pulses: Normal pulses.      Heart sounds: Normal heart sounds. No murmur heard.  Pulmonary:      Effort: Tachypnea present.      Breath sounds: Examination of the left-lower field reveals rales. Rales present.      Comments: Strong moist non-productive cough noted throughout exam  Skin:     General: Skin is warm.      Comments: Flushed and jazmín appearance to skin   Neurological:      Mental Status: He is alert.   Psychiatric:         Mood and Affect: Affect is flat.                   "

## 2024-05-21 NOTE — ED PROVIDER NOTES
History  Chief Complaint   Patient presents with    Fever     Pt comes via EMS from from [facility] [place]. Pt went there for fly-like s/s. EMS was called for bradycardia and hypoxia. Pt denies any SOB currently. 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  Patient states that he went he went Because he was having 1 day history of fevers, cough and on evaluation was noted to have bibasilar Rales with hypoxia who was sent to the emergency department for evaluation.  Was noted to be 80% on pulse ox when and requiring oxygen therapy via nasal cannula.  Denies sick contacts.  Denies recent antibiotic use.  Endorses fevers, fatigue, generalized weakness, shortness of breath.  Denies chest pain, nausea, vomiting, diaphoresis, abdominal pain, dysuria, frequency, urgency, changes in urinary or bowel habits, leg swelling, leg pain, hemoptysis, productive cough.      Fever      Prior to Admission Medications   Prescriptions Last Dose Informant Patient Reported? Taking?   PENTOXIFYLLINE PO   Yes No   Sig: Take by mouth Unsure of dose, takes TID   amLODIPine (NORVASC) 5 mg tablet 2024  No Yes   Sig: TAKE 1 TABLET (5 MG TOTAL) BY MOUTH DAILY.   azelastine (ASTELIN) 0.1 % nasal spray 2024  No Yes   Si spray into each nostril 2 (two) times a day   famotidine (PEPCID) 20 mg/2.5 mL oral suspension 2024  No Yes   Sig: TAKE 5 ML (40 MG TOTAL) BY MOUTH DAILY AT BEDTIME   fluticasone (FLONASE) 50 mcg/act nasal spray Past Week Self No Yes   Si SPRAY INTO EACH NOSTRIL DAILY AS NEEDED FOR RHINITIS   gabapentin (Neurontin) 300 mg capsule 2024  No Yes   Sig: Take 2 capsules (600 mg total) by mouth 2 (two) times a day   levothyroxine 100 mcg tablet 2024  No Yes   Sig: TAKE 1 TABLET BY MOUTH EVERY DAY   multivitamin (THERAGRAN) TABS 2024 Self Yes Yes   Sig: Take 1 tablet by mouth daily   tadalafil (CIALIS) 5 MG tablet Past Week  Self No Yes   Sig: Take 1 tablet (5 mg total) by mouth daily      Facility-Administered Medications Last Administration Doses Remaining   ibuprofen (MOTRIN) tablet 400 mg 5/21/2024  6:17 PM 0          Past Medical History:   Diagnosis Date    Cancer of base of tongue (HCC)     excision    Cough     CPAP (continuous positive airway pressure) dependence     waiting on consult    Disease of thyroid gland     hypo    Dysphagia     ED (erectile dysfunction)     History of pneumonia     Hypertension     Leukopenia     Peyronie's disease     PONV (postoperative nausea and vomiting)     Psoriasis     Sleep apnea     Tongue cancer (HCC)     Weight loss, abnormal        Past Surgical History:   Procedure Laterality Date    ESOPHAGOSCOPY N/A 9/16/2022    Procedure: ESOPHAGOSCOPY FLEXIBLE;  Surgeon: Devonte Singh MD;  Location: [facility];  Service: ENT    ESOPHAGOSCOPY N/A 8/25/2023    Procedure: TRANSNASAL ESOPHAGOSCOPY WITH BALLOON DILATION/ ESOPHAGEAL DILATION (BALLOON);;  Surgeon: Devonte Singh MD;  Location: [facility];  Service: ENT    OTHER SURGICAL HISTORY      infusion port inserted and removed    PEG TUBE PLACEMENT      and removal    IN COLONOSCOPY FLX DX W/COLLJ SPEC WHEN PFRMD N/A 6/27/2017    Procedure: COLONOSCOPY with polypectomy x2;  Surgeon: Janet Willard MD;  Location: [facility];  Service: General    IN LARGSC W/NJX VOCAL CORD THER W/MICRO/TELESCOPE Bilateral 9/16/2022    Procedure: micro direct laryngoscopy, vocal fold injection, biopsy epiglottis lesion, posterior pharyngeal wall injection, flexible esophagoscopy with dilation;  Surgeon: Devonte Singh MD;  Location: [facility];  Service: ENT    TONGUE BIOPSY / EXCISION      Floor mouth excision of malignant tumor       Family History   Problem Relation Age of Onset    Other Mother         reactive airway dysfunction    Coronary artery disease Father     Hypertension Father     Psoriasis Father     Thyroid disease unspecified Neg Hx      I have  reviewed and agree with the history as documented.    E-Cigarette/Vaping    E-Cigarette Use Never User      E-Cigarette/Vaping Substances    Nicotine No     THC No     CBD No     Flavoring No     Other No     Unknown No      Social History     Tobacco Use    Smoking status: Former     Current packs/day: 0.00     Average packs/day: 0.3 packs/day for 20.0 years (5.0 ttl pk-yrs)     Types: Cigarettes     Start date: 1991     Quit date: 2011     Years since quittin.3    Smokeless tobacco: Former     Quit date:     Tobacco comments:     pt quit with dx of tongue base cancer, per allscripts   Vaping Use    Vaping status: Never Used   Substance Use Topics    Alcohol use: Yes     Comment: rare    Drug use: No        Review of Systems   All other systems reviewed and are negative.      Physical Exam  ED Triage Vitals   Temperature Pulse Respirations Blood Pressure SpO2   24 1902 24 1902 24 1902 24 19024 190   (!) 102.8 °F (39.3 °C) 92 20 127/73 90 %      Temp Source Heart Rate Source Patient Position - Orthostatic VS BP Location FiO2 (%)   24 1902 24 190 -- 24 --   Oral Monitor  Right arm       Pain Score       24 2143       No Pain             Orthostatic Vital Signs  Vitals:    24 2045 24 2143 24 2245 24 2350   BP: 126/71 109/67  108/57   Pulse: 76 77 80 70       Physical Exam  Vitals and nursing note reviewed.   Constitutional:       General: He is not in acute distress.     Appearance: Normal appearance. He is normal weight. He is ill-appearing and diaphoretic. He is not toxic-appearing.   HENT:      Head: Normocephalic and atraumatic.      Right Ear: External ear normal.      Left Ear: External ear normal.      Nose: Nose normal.      Mouth/Throat:      Mouth: Mucous membranes are moist.      Pharynx: Oropharynx is clear.   Eyes:      General: No scleral icterus.        Right eye: No discharge.         Left eye: No  discharge.      Extraocular Movements: Extraocular movements intact.   Cardiovascular:      Rate and Rhythm: Normal rate and regular rhythm.      Pulses: Normal pulses.      Heart sounds: Normal heart sounds. No murmur heard.  Pulmonary:      Effort: Pulmonary effort is normal. No respiratory distress.      Breath sounds: No stridor. Rhonchi (Bibasilar) present. No wheezing or rales.   Chest:      Chest wall: No tenderness.   Abdominal:      General: There is no distension.      Palpations: Abdomen is soft. There is no mass.      Tenderness: There is no abdominal tenderness. There is no right CVA tenderness, left CVA tenderness, guarding or rebound.      Hernia: No hernia is present.   Musculoskeletal:         General: No swelling, tenderness, deformity or signs of injury. Normal range of motion.      Cervical back: Normal range of motion. Rigidity (Previous radiation therapy on the left side of the neck with tense tissue and rigidity.  Patient states baseline.) present. No tenderness.      Right lower leg: Edema present.      Left lower leg: Edema present.   Lymphadenopathy:      Cervical: No cervical adenopathy.   Skin:     General: Skin is warm.      Capillary Refill: Capillary refill takes less than 2 seconds.      Coloration: Skin is not cyanotic, jaundiced or pale.      Findings: No bruising, erythema, lesion, petechiae or rash.   Neurological:      General: No focal deficit present.      Mental Status: He is alert and oriented to person, place, and time. Mental status is at baseline.   Psychiatric:         Mood and Affect: Mood normal.         Behavior: Behavior normal.         ED Medications  Medications   amLODIPine (NORVASC) tablet 5 mg (has no administration in time range)   azelastine (ASTELIN) 0.1 % nasal spray 1 spray (1 spray Each Nare Given 5/21/24 2335)   famotidine (PEPCID) oral suspension 40 mg (40 mg Oral Given 5/21/24 2335)   gabapentin (NEURONTIN) capsule 600 mg (600 mg Oral Given 5/21/24 2334)    fluticasone (FLONASE) 50 mcg/act nasal spray 1 spray (has no administration in time range)   levothyroxine tablet 100 mcg (has no administration in time range)   pentoxifylline (TRENtal) ER tablet 400 mg (has no administration in time range)   acetaminophen (TYLENOL) tablet 650 mg (has no administration in time range)   dextromethorphan-guaiFENesin (ROBITUSSIN DM) oral syrup 10 mL (has no administration in time range)   enoxaparin (LOVENOX) subcutaneous injection 40 mg (has no administration in time range)   ceFEPime (MAXIPIME) 2,000 mg in dextrose 5 % 50 mL IVPB (2,000 mg Intravenous New Bag 5/21/24 2255)   vancomycin (VANCOCIN) 1,750 mg in sodium chloride 0.9 % 500 mL IVPB (1,750 mg Intravenous New Bag 5/21/24 2332)   thiamine tablet 100 mg (has no administration in time range)   folic acid (FOLVITE) tablet 1 mg (has no administration in time range)   multivitamin-minerals (CENTRUM) tablet 1 tablet (has no administration in time range)   vancomycin (VANCOCIN) 1,750 mg in sodium chloride 0.9 % 500 mL IVPB (has no administration in time range)   acetaminophen (Ofirmev) injection 1,000 mg (0 mg Intravenous Stopped 5/21/24 1950)   sodium chloride 0.9 % bolus 1,000 mL (0 mL Intravenous Stopped 5/21/24 2046)   ceftriaxone (ROCEPHIN) 1 g/50 mL in dextrose IVPB (0 mg Intravenous Stopped 5/21/24 2030)   azithromycin (ZITHROMAX) 500 mg in sodium chloride 0.9% 250mL IVPB 500 mg (500 mg Intravenous New Bag 5/21/24 2030)   sodium chloride 0.9 % bolus 1,000 mL (1,000 mL Intravenous New Bag 5/21/24 2033)       Diagnostic Studies  Results Reviewed       Procedure Component Value Units Date/Time    Blood culture #1 [[id number]] Collected: 05/21/24 1950    Lab Status: Preliminary result Specimen: Blood from Arm, Left Updated: 05/22/24 0001     Blood Culture Received in Microbiology Lab. Culture in Progress.    Blood culture #2 [[id number]] Collected: 05/21/24 1913    Lab Status: Preliminary result Specimen: Blood from Arm, Right  Updated: 05/22/24 0001     Blood Culture Received in Microbiology Lab. Culture in Progress.    FLU/RSV/COVID - if FLU/RSV clinically relevant [[id number]]  (Normal) Collected: 05/21/24 1950    Lab Status: Final result Specimen: Nares from Nose Updated: 05/21/24 2034     SARS-CoV-2 Negative     INFLUENZA A PCR Negative     INFLUENZA B PCR Negative     RSV PCR Negative    Narrative:      FOR PEDIATRIC PATIENTS - copy/paste COVID Guidelines URL to browser: [email or web address]    SARS-CoV-2 assay is a Nucleic Acid Amplification assay intended for the  qualitative detection of nucleic acid from SARS-CoV-2 in nasopharyngeal  swabs. Results are for the presumptive identification of SARS-CoV-2 RNA.    Positive results are indicative of infection with SARS-CoV-2, the virus  causing COVID-19, but do not rule out bacterial infection or co-infection  with other viruses. Laboratories within the [place] and its  territories are required to report all positive results to the appropriate  public health authorities. Negative results do not preclude SARS-CoV-2  infection and should not be used as the sole basis for treatment or other  patient management decisions. Negative results must be combined with  clinical observations, patient history, and epidemiological information.  This test has not been FDA cleared or approved.    This test has been authorized by FDA under an Emergency Use Authorization  (EUA). This test is only authorized for the duration of time the  declaration that circumstances exist justifying the authorization of the  emergency use of an in vitro diagnostic tests for detection of SARS-CoV-2  virus and/or diagnosis of COVID-19 infection under section 564(b)(1) of  the Act, 21 U.S.C. 360bbb-3(b)(1), unless the authorization is terminated  or revoked sooner. The test has been validated but independent review by FDA  and CLIA is pending.    Test performed  using [employer]: This RT-PCR assay targets N2,  a region unique to SARS-CoV-2. A conserved region in the E-gene was chosen  for pan-Sarbecovirus detection which includes SARS-CoV-2.    According to CMS-2020-01-R, this platform meets the definition of high-throughput technology.    UA w Reflex to Microscopic w Reflex to Culture [[id number]] Collected: 05/21/24 2007    Lab Status: Final result Specimen: Urine, Clean Catch Updated: 05/21/24 2016     Color, UA Light Yellow     Clarity, UA Clear     Specific Gravity, UA 1.010     pH, UA 7.0     Leukocytes, UA Negative     Nitrite, UA Negative     Protein, UA Negative mg/dl      Glucose, UA Negative mg/dl      Ketones, UA Negative mg/dl      Urobilinogen, UA <2.0 mg/dl      Bilirubin, UA Negative     Occult Blood, UA Negative    Procalcitonin [[id number]]  (Abnormal) Collected: 05/21/24 1913    Lab Status: Final result Specimen: Blood from Arm, Right Updated: 05/21/24 1948     Procalcitonin 1.80 ng/ml     Comprehensive metabolic panel [[id number]]  (Abnormal) Collected: 05/21/24 1913    Lab Status: Final result Specimen: Blood from Arm, Right Updated: 05/21/24 1939     Sodium 134 mmol/L      Potassium 3.7 mmol/L      Chloride 97 mmol/L      CO2 29 mmol/L      ANION GAP 8 mmol/L      BUN 9 mg/dL      Creatinine 0.52 mg/dL      Glucose 118 mg/dL      Calcium 9.1 mg/dL      Corrected Calcium 9.7 mg/dL      AST 15 U/L      ALT 11 U/L      Alkaline Phosphatase 80 U/L      Total Protein 7.2 g/dL      Albumin 3.3 g/dL      Total Bilirubin 0.61 mg/dL      eGFR 115 ml/min/1.73sq m     Narrative:      National Kidney Disease Foundation guidelines for Chronic Kidney Disease (CKD):     Stage 1 with normal or high GFR (GFR > 90 mL/min/1.73 square meters)    Stage 2 Mild CKD (GFR = 60-89 mL/min/1.73 square meters)    Stage 3A Moderate CKD (GFR = 45-59 mL/min/1.73 square meters)    Stage 3B Moderate CKD (GFR = 30-44 mL/min/1.73 square meters)    Stage 4 Severe CKD (GFR = 15-29  mL/min/1.73 square meters)    Stage 5 End Stage CKD (GFR <15 mL/min/1.73 square meters)  Note: GFR calculation is accurate only with a steady state creatinine    Lactic acid, plasma (w/reflex if result > 2.0) [[id number]]  (Normal) Collected: 05/21/24 1913    Lab Status: Final result Specimen: Blood from Arm, Right Updated: 05/21/24 1938     LACTIC ACID 0.7 mmol/L     Narrative:      Result may be elevated if tourniquet was used during collection.    APTT [[id number]]  (Normal) Collected: 05/21/24 1913    Lab Status: Final result Specimen: Blood from Arm, Right Updated: 05/21/24 1937     PTT 30 seconds     Protime-INR [[id number]]  (Normal) Collected: 05/21/24 1913    Lab Status: Final result Specimen: Blood from Arm, Right Updated: 05/21/24 1937     Protime 13.6 seconds      INR 0.99    CBC and differential [[id number]]  (Abnormal) Collected: 05/21/24 1913    Lab Status: Final result Specimen: Blood from Arm, Right Updated: 05/21/24 1922     WBC 8.15 Thousand/uL      RBC 4.20 Million/uL      Hemoglobin 12.4 g/dL      Hematocrit 37.7 %      MCV 90 fL      MCH 29.5 pg      MCHC 32.9 g/dL      RDW 13.8 %      MPV 8.7 fL      Platelets 306 Thousands/uL      nRBC 0 /100 WBCs      Segmented % 86 %      Immature Grans % 1 %      Lymphocytes % 6 %      Monocytes % 7 %      Eosinophils Relative 0 %      Basophils Relative 0 %      Absolute Neutrophils 6.97 Thousands/µL      Absolute Immature Grans 0.04 Thousand/uL      Absolute Lymphocytes 0.49 Thousands/µL      Absolute Monocytes 0.59 Thousand/µL      Eosinophils Absolute 0.03 Thousand/µL      Basophils Absolute 0.03 Thousands/µL                    XR chest 1 view portable   ED Interpretation by Elvin Downs MD (05/21 1945)   Multifocal pneumonia.            Procedures  Procedures      ED Course  ED Course as of 05/22/24 0036   Tue May 21, 2024   2019 EKG normal sinus rhythm rate of 82, normal [place] interval, normal QRS interval, QTc 455, normal axis, no ST elevation, no ST  depressions, no ischemic changes or STEMI, T wave flattening in 3 and V6.                          Initial Sepsis Screening       Row Name 05/21/24 1944                Is the patient's history suggestive of a new or worsening infection? Yes (Proceed)  -AB        Suspected source of infection pneumonia  -AB        Indicate SIRS criteria Hyperthemia > 38.3C (100.9F) OR Hypothermia <36C (96.8F);Tachycardia > 90 bpm  -AB        Are two or more of the above signs & symptoms of infection both present and new to the patient? Yes (Proceed)  -AB        Assess for evidence of organ dysfunction: Are any of the below criteria present within 6 hours of suspected infection and SIRS criteria that are NOT considered to be chronic conditions? --  No  -AB                  User Key  (r) = Recorded By, (t) = Taken By, (c) = Cosigned By      Initials Name Provider Type    AB Elvin Downs MD Resident                              Medical Decision Making  59-year-old male with history of oral cancer presents with fevers and cough.  Bilateral rhonchi on exam.  Sepsis workup  Chest x-ray notable for multifocal pneumonia.  Started on ceftriaxone and azithromycin and given IVF.  Discussed case with internal medicine team and patient admitted for multifocal pneumonia.    Amount and/or Complexity of Data Reviewed  Labs: ordered.  Radiology: ordered and independent interpretation performed.    Risk  Prescription drug management.  Decision regarding hospitalization.          Disposition  Final diagnoses:   Multifocal pneumonia   Fever     Time reflects when diagnosis was documented in both MDM as applicable and the Disposition within this note       Time User Action Codes Description Comment    5/21/2024  7:52 PM Elvin Downs Add [J18.9] Multifocal pneumonia     5/21/2024  7:58 PM Elvin Downs Add [R50.9] Fever     5/21/2024 10:16 PM Mirella Mccurdy Add [J96.01] Acute hypoxemic respiratory failure (HCC)     5/21/2024 10:16 PM Kaz  Mirella Recinos [J18.9,  A41.9] Sepsis due to aspiration pneumonia (HCC)     5/21/2024 10:16 PM Mirella Mccurdy [Z85.89] Hx of squamous cell carcinoma-BOT, s/p chemo/RT 2012     5/21/2024 10:17 PM Mirella Mccurdy [R13.10] Dysphagia           ED Disposition       ED Disposition   Admit    Condition   Stable    Date/Time   Tue May 21, 2024  7:58 PM    Comment   Case was discussed with [place] and the patient's admission status was agreed to be Admission Status: inpatient status to the service of Dr. Quinn .               Follow-up Information    None         Current Discharge Medication List        CONTINUE these medications which have NOT CHANGED    Details   amLODIPine (NORVASC) 5 mg tablet TAKE 1 TABLET (5 MG TOTAL) BY MOUTH DAILY.  Qty: 90 tablet, Refills: 1    Associated Diagnoses: Essential hypertension      azelastine (ASTELIN) 0.1 % nasal spray 1 spray into each nostril 2 (two) times a day  Qty: 30 mL, Refills: 11    Associated Diagnoses: Allergic rhinitis, unspecified seasonality, unspecified trigger      famotidine (PEPCID) 20 mg/2.5 mL oral suspension TAKE 5 ML (40 MG TOTAL) BY MOUTH DAILY AT BEDTIME  Qty: 450 mL, Refills: 3    Associated Diagnoses: Gastroesophageal reflux disease, unspecified whether esophagitis present      fluticasone (FLONASE) 50 mcg/act nasal spray 1 SPRAY INTO EACH NOSTRIL DAILY AS NEEDED FOR RHINITIS  Qty: 48 mL, Refills: 2    Associated Diagnoses: Shortness of breath; Chronic cough; Post-nasal drip      gabapentin (Neurontin) 300 mg capsule Take 2 capsules (600 mg total) by mouth 2 (two) times a day  Qty: 120 capsule, Refills: 6    Associated Diagnoses: Chronic cough      levothyroxine 100 mcg tablet TAKE 1 TABLET BY MOUTH EVERY DAY  Qty: 90 tablet, Refills: 1    Associated Diagnoses: Hypothyroidism, unspecified type      multivitamin (THERAGRAN) TABS Take 1 tablet by mouth daily      tadalafil (CIALIS) 5 MG tablet Take 1 tablet (5 mg total) by mouth daily  Qty: 90  tablet, Refills: 3    Associated Diagnoses: Erectile dysfunction, unspecified erectile dysfunction type      PENTOXIFYLLINE PO Take by mouth Unsure of dose, takes TID           No discharge procedures on file.    PDMP Review         Value Time User    PDMP Reviewed  Yes 9/16/2022 10:23 AM Devonte Singh MD             ED Provider  Attending physically available and evaluated Mohamud Grimm Sr.. I managed the patient along with the ED Attending.    Electronically Signed by           Elvin Downs MD  05/22/24 0036

## 2024-05-21 NOTE — PROGRESS NOTES
[facility] Now        NAME: Mohamud Grimm Sr. is a 59 y.o. male  : 1965    MRN: [id number]  DATE: May 21, 2024  TIME: 6:01 PM    Assessment and Plan   No primary diagnosis found.  No diagnosis found.      Patient Instructions       Follow up with PCP in 3-5 days.  Proceed to  ER if symptoms worsen.    If tests are performed, our office will contact you with results only if changes need to made to the care plan discussed with you at the visit. You can review your full results on [facility].    Chief Complaint     Chief Complaint   Patient presents with   • Cold Like Symptoms     Started few days on and off with fatigue, fever (105 at home?), pulse ox in 80's.  No chest pain, shortness of breath.  Cough is a chronic condition. Oxygen placed NC 2 LPM         History of Present Illness       HPI    Review of Systems   Review of Systems      Current Medications       Current Outpatient Medications:   •  amLODIPine (NORVASC) 5 mg tablet, TAKE 1 TABLET (5 MG TOTAL) BY MOUTH DAILY., Disp: 90 tablet, Rfl: 1  •  azelastine (ASTELIN) 0.1 % nasal spray, 1 spray into each nostril 2 (two) times a day, Disp: 30 mL, Rfl: 11  •  famotidine (PEPCID) 20 mg/2.5 mL oral suspension, TAKE 5 ML (40 MG TOTAL) BY MOUTH DAILY AT BEDTIME, Disp: 450 mL, Rfl: 3  •  fluticasone (FLONASE) 50 mcg/act nasal spray, 1 SPRAY INTO EACH NOSTRIL DAILY AS NEEDED FOR RHINITIS, Disp: 48 mL, Rfl: 2  •  gabapentin (Neurontin) 300 mg capsule, Take 2 capsules (600 mg total) by mouth 2 (two) times a day, Disp: 120 capsule, Rfl: 6  •  levothyroxine 100 mcg tablet, TAKE 1 TABLET BY MOUTH EVERY DAY, Disp: 90 tablet, Rfl: 1  •  multivitamin (THERAGRAN) TABS, Take 1 tablet by mouth daily, Disp: , Rfl:   •  PENTOXIFYLLINE PO, Take by mouth, Disp: , Rfl:   •  tadalafil (CIALIS) 5 MG tablet, Take 1 tablet (5 mg total) by mouth daily, Disp: 90 tablet, Rfl: 3  •  albuterol (PROVENTIL HFA,VENTOLIN HFA) 90 mcg/act inhaler, Inhale 2 puffs every 6 (six)  hours as needed for wheezing (Patient not taking: Reported on 5/21/2024), Disp: , Rfl:   •  montelukast (SINGULAIR) 10 mg tablet, TAKE 1 TABLET (10 MG TOTAL) BY MOUTH IN THE MORNING (Patient not taking: Reported on 5/21/2024), Disp: 90 tablet, Rfl: 2    Current Allergies     Allergies as of 05/21/2024   • (No Known Allergies)            The following portions of the patient's history were reviewed and updated as appropriate: allergies, current medications, past family history, past medical history, past social history, past surgical history and problem list.     Past Medical History:   Diagnosis Date   • Cancer of base of tongue (HCC)     excision   • Cough    • CPAP (continuous positive airway pressure) dependence     waiting on consult   • Disease of thyroid gland     hypo   • Dysphagia    • ED (erectile dysfunction)    • History of pneumonia    • Hypertension    • Leukopenia    • Peyronie's disease    • PONV (postoperative nausea and vomiting)    • Psoriasis    • Sleep apnea    • Tongue cancer (HCC)    • Weight loss, abnormal        Past Surgical History:   Procedure Laterality Date   • ESOPHAGOSCOPY N/A 9/16/2022    Procedure: ESOPHAGOSCOPY FLEXIBLE;  Surgeon: Devonte Singh MD;  [facility];  Service: ENT   • ESOPHAGOSCOPY N/A 8/25/2023    Procedure: TRANSNASAL ESOPHAGOSCOPY WITH BALLOON DILATION/ ESOPHAGEAL DILATION (BALLOON);;  Surgeon: Devonte Singh MD;  [facility];  Service: ENT   • OTHER SURGICAL HISTORY      infusion port inserted and removed   • PEG TUBE PLACEMENT      and removal   • CA COLONOSCOPY FLX DX W/COLLJ SPEC WHEN PFRMD N/A 6/27/2017    Procedure: COLONOSCOPY with polypectomy x2;  Surgeon: Janet Willard MD;  [facility]: [facility];  Service: General   • CA LARGSC W/NJX VOCAL CORD THER W/MICRO/TELESCOPE Bilateral 9/16/2022    Procedure: micro direct laryngoscopy, vocal fold injection, biopsy epiglottis lesion, posterior pharyngeal wall injection, flexible esophagoscopy with  "dilation;  Surgeon: Devonte Singh MD;  [facility];  Service: ENT   • TONGUE BIOPSY / EXCISION      Floor mouth excision of malignant tumor       Family History   Problem Relation Age of Onset   • Other Mother         reactive airway dysfunction   • Coronary artery disease Father    • Hypertension Father    • Psoriasis Father    • Thyroid disease unspecified Neg Hx          Medications have been verified.        Objective   BP (!) 180/86   Pulse (!) 109   Temp (!) 102.4 °F (39.1 °C) (Temporal)   Resp 18   Ht 5' 9\" (1.753 m)   Wt 74.4 kg (164 lb)   SpO2 (!) 89%   BMI 24.22 kg/m²        Physical Exam     Physical Exam              "

## 2024-05-21 NOTE — SEPSIS NOTE
Sepsis Note   Mohamud Grimm Sr. 59 y.o. male MRN: [id number]  Unit/Bed#: ED-24 Encounter: [id number]       Initial Sepsis Screening       Row Name 05/21/24 1944                Is the patient's history suggestive of a new or worsening infection? Yes (Proceed)  -AB        Suspected source of infection pneumonia  -AB        Indicate SIRS criteria Hyperthemia > 38.3C (100.9F) OR Hypothermia <36C (96.8F);Tachycardia > 90 bpm  -AB        Are two or more of the above signs & symptoms of infection both present and new to the patient? Yes (Proceed)  -AB        Assess for evidence of organ dysfunction: Are any of the below criteria present within 6 hours of suspected infection and SIRS criteria that are NOT considered to be chronic conditions? --  No  -AB                  User Key  (r) = Recorded By, (t) = Taken By, (c) = Cosigned By      Initials Name Provider Type    AB Elvin Downs MD Resident                        There is no height or weight on file to calculate BMI.  Wt Readings from Last 1 Encounters:   05/21/24 74.4 kg (164 lb)     IBW (Ideal Body Weight): 70.7 kg    Ideal body weight: 70.7 kg (155 lb 13.8 oz)  Adjusted ideal body weight: 72.2 kg (159 lb 1.9 oz)

## 2024-05-22 ENCOUNTER — APPOINTMENT (INPATIENT)
Dept: RADIOLOGY | Facility: HOSPITAL | Age: 59
DRG: 871 | End: 2024-05-22
Payer: COMMERCIAL

## 2024-05-22 LAB
ANION GAP SERPL CALCULATED.3IONS-SCNC: 6 MMOL/L (ref 4–13)
BASOPHILS # BLD AUTO: 0.02 THOUSANDS/ÂΜL (ref 0–0.1)
BASOPHILS NFR BLD AUTO: 0 % (ref 0–1)
BUN SERPL-MCNC: 8 MG/DL (ref 5–25)
CALCIUM SERPL-MCNC: 7.8 MG/DL (ref 8.4–10.2)
CHLORIDE SERPL-SCNC: 106 MMOL/L (ref 96–108)
CO2 SERPL-SCNC: 28 MMOL/L (ref 21–32)
CREAT SERPL-MCNC: 0.46 MG/DL (ref 0.6–1.3)
EOSINOPHIL # BLD AUTO: 0.22 THOUSAND/ÂΜL (ref 0–0.61)
EOSINOPHIL NFR BLD AUTO: 4 % (ref 0–6)
ERYTHROCYTE [DISTWIDTH] IN BLOOD BY AUTOMATED COUNT: 14.1 % (ref 11.6–15.1)
GFR SERPL CREATININE-BSD FRML MDRD: 121 ML/MIN/1.73SQ M
GLUCOSE SERPL-MCNC: 84 MG/DL (ref 65–140)
HCT VFR BLD AUTO: 34.1 % (ref 36.5–49.3)
HGB BLD-MCNC: 10.6 G/DL (ref 12–17)
IMM GRANULOCYTES # BLD AUTO: 0.03 THOUSAND/UL (ref 0–0.2)
IMM GRANULOCYTES NFR BLD AUTO: 1 % (ref 0–2)
L PNEUMO1 AG UR QL IA.RAPID: NEGATIVE
LYMPHOCYTES # BLD AUTO: 0.81 THOUSANDS/ÂΜL (ref 0.6–4.47)
LYMPHOCYTES NFR BLD AUTO: 15 % (ref 14–44)
MAGNESIUM SERPL-MCNC: 1.8 MG/DL (ref 1.9–2.7)
MCH RBC QN AUTO: 28.9 PG (ref 26.8–34.3)
MCHC RBC AUTO-ENTMCNC: 31.1 G/DL (ref 31.4–37.4)
MCV RBC AUTO: 93 FL (ref 82–98)
MONOCYTES # BLD AUTO: 0.51 THOUSAND/ÂΜL (ref 0.17–1.22)
MONOCYTES NFR BLD AUTO: 9 % (ref 4–12)
NEUTROPHILS # BLD AUTO: 3.83 THOUSANDS/ÂΜL (ref 1.85–7.62)
NEUTS SEG NFR BLD AUTO: 71 % (ref 43–75)
NRBC BLD AUTO-RTO: 0 /100 WBCS
PLATELET # BLD AUTO: 243 THOUSANDS/UL (ref 149–390)
PMV BLD AUTO: 9.5 FL (ref 8.9–12.7)
POTASSIUM SERPL-SCNC: 3.3 MMOL/L (ref 3.5–5.3)
PROCALCITONIN SERPL-MCNC: 1.96 NG/ML
RBC # BLD AUTO: 3.67 MILLION/UL (ref 3.88–5.62)
S PNEUM AG UR QL: NEGATIVE
SODIUM SERPL-SCNC: 140 MMOL/L (ref 135–147)
WBC # BLD AUTO: 5.42 THOUSAND/UL (ref 4.31–10.16)

## 2024-05-22 PROCEDURE — 99221 1ST HOSP IP/OBS SF/LOW 40: CPT

## 2024-05-22 PROCEDURE — 83735 ASSAY OF MAGNESIUM: CPT | Performed by: INTERNAL MEDICINE

## 2024-05-22 PROCEDURE — 92610 EVALUATE SWALLOWING FUNCTION: CPT

## 2024-05-22 PROCEDURE — 80048 BASIC METABOLIC PNL TOTAL CA: CPT | Performed by: PHYSICIAN ASSISTANT

## 2024-05-22 PROCEDURE — 85025 COMPLETE CBC W/AUTO DIFF WBC: CPT | Performed by: PHYSICIAN ASSISTANT

## 2024-05-22 PROCEDURE — 31575 DIAGNOSTIC LARYNGOSCOPY: CPT

## 2024-05-22 PROCEDURE — 71045 X-RAY EXAM CHEST 1 VIEW: CPT

## 2024-05-22 PROCEDURE — 94760 N-INVAS EAR/PLS OXIMETRY 1: CPT

## 2024-05-22 PROCEDURE — 84145 PROCALCITONIN (PCT): CPT | Performed by: PHYSICIAN ASSISTANT

## 2024-05-22 PROCEDURE — 87449 NOS EACH ORGANISM AG IA: CPT | Performed by: PHYSICIAN ASSISTANT

## 2024-05-22 PROCEDURE — 99232 SBSQ HOSP IP/OBS MODERATE 35: CPT | Performed by: INTERNAL MEDICINE

## 2024-05-22 RX ORDER — HYDROCODONE POLISTIREX AND CHLORPHENIRAMINE POLISTIREX 10; 8 MG/5ML; MG/5ML
5 SUSPENSION, EXTENDED RELEASE ORAL EVERY 12 HOURS
Status: DISCONTINUED | OUTPATIENT
Start: 2024-05-22 | End: 2024-05-24 | Stop reason: HOSPADM

## 2024-05-22 RX ORDER — HYDROCODONE POLISTIREX AND CHLORPHENIRAMINE POLISTIREX 10; 8 MG/5ML; MG/5ML
5 SUSPENSION, EXTENDED RELEASE ORAL EVERY 12 HOURS PRN
Status: DISCONTINUED | OUTPATIENT
Start: 2024-05-22 | End: 2024-05-22

## 2024-05-22 RX ORDER — POTASSIUM CHLORIDE 20 MEQ/1
40 TABLET, EXTENDED RELEASE ORAL ONCE
Status: COMPLETED | OUTPATIENT
Start: 2024-05-22 | End: 2024-05-22

## 2024-05-22 RX ADMIN — POTASSIUM CHLORIDE 40 MEQ: 1500 TABLET, EXTENDED RELEASE ORAL at 10:42

## 2024-05-22 RX ADMIN — GABAPENTIN 600 MG: 300 CAPSULE ORAL at 17:49

## 2024-05-22 RX ADMIN — HYDROCODONE POLISTIREX AND CHLORPHENIRAMINE POLISTIREX 5 ML: 10; 8 SUSPENSION, EXTENDED RELEASE ORAL at 22:19

## 2024-05-22 RX ADMIN — VANCOMYCIN HYDROCHLORIDE 1750 MG: 10 INJECTION, POWDER, LYOPHILIZED, FOR SOLUTION INTRAVENOUS at 06:09

## 2024-05-22 RX ADMIN — CEFEPIME 2000 MG: 2 INJECTION, POWDER, FOR SOLUTION INTRAVENOUS at 13:46

## 2024-05-22 RX ADMIN — AZELASTINE HYDROCHLORIDE 1 SPRAY: 137 SPRAY, METERED NASAL at 08:03

## 2024-05-22 RX ADMIN — Medication 100 MG: at 08:02

## 2024-05-22 RX ADMIN — MULTIPLE VITAMINS W/ MINERALS TAB 1 TABLET: TAB ORAL at 08:00

## 2024-05-22 RX ADMIN — GABAPENTIN 600 MG: 300 CAPSULE ORAL at 08:00

## 2024-05-22 RX ADMIN — VANCOMYCIN HYDROCHLORIDE 1750 MG: 10 INJECTION, POWDER, LYOPHILIZED, FOR SOLUTION INTRAVENOUS at 19:59

## 2024-05-22 RX ADMIN — ACETAMINOPHEN 650 MG: 325 TABLET, FILM COATED ORAL at 17:50

## 2024-05-22 RX ADMIN — PENTOXIFYLLINE 400 MG: 400 TABLET, EXTENDED RELEASE ORAL at 16:01

## 2024-05-22 RX ADMIN — GUAIFENESIN AND DEXTROMETHORPHAN 10 ML: 100; 10 SYRUP ORAL at 10:42

## 2024-05-22 RX ADMIN — FAMOTIDINE 40 MG: 40 POWDER, FOR SUSPENSION ORAL at 23:01

## 2024-05-22 RX ADMIN — FOLIC ACID 1 MG: 1 TABLET ORAL at 08:00

## 2024-05-22 RX ADMIN — HYDROCODONE POLISTIREX AND CHLORPHENIRAMINE POLISTIREX 5 ML: 10; 8 SUSPENSION, EXTENDED RELEASE ORAL at 11:24

## 2024-05-22 RX ADMIN — CEFEPIME 2000 MG: 2 INJECTION, POWDER, FOR SOLUTION INTRAVENOUS at 05:32

## 2024-05-22 RX ADMIN — LEVOTHYROXINE SODIUM 100 MCG: 100 TABLET ORAL at 05:08

## 2024-05-22 RX ADMIN — AMLODIPINE BESYLATE 5 MG: 5 TABLET ORAL at 08:00

## 2024-05-22 RX ADMIN — CEFEPIME 2000 MG: 2 INJECTION, POWDER, FOR SOLUTION INTRAVENOUS at 23:06

## 2024-05-22 NOTE — CONSULTS
OTOLARYNGOLOGY CONSULT    Date of Service: 5/22/2024    Reason for consult: Dysphagia      ASSESSMENT/PLAN:  Mohamud Grimm Sr. is a 59 y.o. male who we are consulted on for dysphagia in the setting of aspiration pneumonia.     -continue IV abx for suspected aspiration pneumonia   -no acute ENT intervention   -will follow-up with Dr. Singh outpatient to discuss repeat dilation   -rest of care per primary    HPI  This patient is a 60 yo M PMH BOT SCC s/p CXRT 2012 with multiple cranial neuropathies and L VF paresis and recurrent aspiration pneumonia. He under went balloon dilation 9/2022 and 8/2023 with improvement in swallowing. He notices his swallowing is worsening and had trouble eating crunchy perogies the other day and feels as though this could have been the start of this episode. Reports yesterday he noticed he was feeling fatigued. His wife took his vitals and he had an O2 sat in the 80's and a fever. He presented to the ED and met sepsis criteria. CXR showed bilateral basilar patchy infiltrates. He was started on Vanc and Cefipime.     CURRENT HOSPITAL MEDICATIONS  Current Facility-Administered Medications   Medication Dose Route Frequency Provider Last Rate Last Admin    acetaminophen (TYLENOL) tablet 650 mg  650 mg Oral Q6H PRN Mirella Mccurdy PA-C        amLODIPine (NORVASC) tablet 5 mg  5 mg Oral Daily Mirella Mccurdy PA-C        azelastine (ASTELIN) 0.1 % nasal spray 1 spray  1 spray Each Nare BID Mirella Mccurdy PA-C   1 spray at 05/21/24 2335    ceFEPime (MAXIPIME) 2,000 mg in dextrose 5 % 50 mL IVPB  2,000 mg Intravenous Q8H Mirella Mccurdy PA-C 100 mL/hr at 05/22/24 0532 2,000 mg at 05/22/24 0532    dextromethorphan-guaiFENesin (ROBITUSSIN DM) oral syrup 10 mL  10 mL Oral Q4H PRN Mirella cMcurdy PA-C        enoxaparin (LOVENOX) subcutaneous injection 40 mg  40 mg Subcutaneous Daily Mirella Mccurdy PA-C        famotidine (PEPCID) oral suspension 40 mg  40 mg Oral HS  Mirella Mccurdy PA-C   40 mg at 05/21/24 2335    fluticasone (FLONASE) 50 mcg/act nasal spray 1 spray  1 spray Nasal Daily PRN Mirella Mccurdy PA-C        folic acid (FOLVITE) tablet 1 mg  1 mg Oral Daily Mirella Mccurdy PA-C        gabapentin (NEURONTIN) capsule 600 mg  600 mg Oral BID Mirella Mccurdy PA-C   600 mg at 05/21/24 2334    levothyroxine tablet 100 mcg  100 mcg Oral Early Morning Mirella Mccurdy PA-C   100 mcg at 05/22/24 0508    multivitamin-minerals (CENTRUM) tablet 1 tablet  1 tablet Oral Daily Mirella Mccurdy PA-C        pentoxifylline (TRENtal) ER tablet 400 mg  400 mg Oral TID With Meals Mirella Mccurdy PA-C        thiamine tablet 100 mg  100 mg Oral Daily Mirella Mccurdy PA-C        vancomycin (VANCOCIN) 1,750 mg in sodium chloride 0.9 % 500 mL IVPB  1,750 mg Intravenous Q12H Yu Quinn  mL/hr at 05/22/24 0609 1,750 mg at 05/22/24 0609       REVIEW OF SYSTEMS  As above    HISTORIES  PMH:  Past Medical History:   Diagnosis Date    Cancer of base of tongue (HCC)     excision    Cough     CPAP (continuous positive airway pressure) dependence     waiting on consult    Disease of thyroid gland     hypo    Dysphagia     ED (erectile dysfunction)     History of pneumonia     Hypertension     Leukopenia     Peyronie's disease     PONV (postoperative nausea and vomiting)     Psoriasis     Sleep apnea     Tongue cancer (HCC)     Weight loss, abnormal        PSH:  Past Surgical History:   Procedure Laterality Date    ESOPHAGOSCOPY N/A 9/16/2022    Procedure: ESOPHAGOSCOPY FLEXIBLE;  Surgeon: Devonte Singh MD;  [facility];  Service: ENT    ESOPHAGOSCOPY N/A 8/25/2023    Procedure: TRANSNASAL ESOPHAGOSCOPY WITH BALLOON DILATION/ ESOPHAGEAL DILATION (BALLOON);;  Surgeon: Devonte Singh MD;  [facility];  Service: ENT    OTHER SURGICAL HISTORY      infusion port inserted and removed    PEG TUBE PLACEMENT      and removal    CA  "COLONOSCOPY FLX DX W/COLLJ SPEC WHEN PFRMD N/A 2017    Procedure: COLONOSCOPY with polypectomy x2;  Surgeon: Janet Willard MD;  [facility];  Service: General    KS LARGSC W/NJX VOCAL CORD THER W/MICRO/TELESCOPE Bilateral 2022    Procedure: micro direct laryngoscopy, vocal fold injection, biopsy epiglottis lesion, posterior pharyngeal wall injection, flexible esophagoscopy with dilation;  Surgeon: Devonte Singh MD;  [facility];  Service: ENT    TONGUE BIOPSY / EXCISION      Floor mouth excision of malignant tumor       SocHx:  Social History     Tobacco Use    Smoking status: Former     Current packs/day: 0.00     Average packs/day: 0.3 packs/day for 20.0 years (5.0 ttl pk-yrs)     Types: Cigarettes     Start date: 1991     Quit date: 2011     Years since quittin.3    Smokeless tobacco: Former     Quit date:     Tobacco comments:     pt quit with dx of tongue base cancer, per allscripts   Vaping Use    Vaping status: Never Used   Substance Use Topics    Alcohol use: Yes     Comment: rare    Drug use: No       FH:  Family History   Problem Relation Age of Onset    Other Mother         reactive airway dysfunction    Coronary artery disease Father     Hypertension Father     Psoriasis Father     Thyroid disease unspecified Neg Hx        ALLERGIES:  No Known Allergies    PHYSICAL EXAM  Visit Vitals  /61 (BP Location: Right arm)   Pulse 55   Temp 97.5 °F (36.4 °C) (Axillary)   Resp 18   Ht 5' 9\" (1.753 m)   Wt 74.9 kg (165 lb 2 oz)   SpO2 96%   BMI 24.38 kg/m²   Smoking Status Former   BSA 1.9 m²       General: NAD  Ears:  External ears normal in appearance   Nose:  External appearance normal, no septal deviation  Oral cavity:  No trismus, no mass/lesions  Neck: Trachea is midline; no thyroid nodules, diffuse radiation changes   Lymph:  No cervical lymphadenopathy  Skin:  No obvious facial lesions  Lungs:  Normal work of breathing, symmetrical chest " expansion  Vascular: Well perfused      LABORATORY  Reviewed    PROCEDURES  PROCEDURE: Flexible Laryngoscopy  Indication:  Aspiration, Dysphagia   Verbal consent obtained  Surgeon: Devonte Singh MD  Anesthesia: 2% lidocaine, oxymetazoline  Scope passed through nasal cavity  Nasopharynx: unremarkable  Oropharynx: unremarkable  Hypopharynx/Larynx:   Vocal fold mobility = L>R paresis   Laryngeal edema  = moderate edema L>R   Laryngeal erythema = moderate with post-radiation changes   Vocal folds = mild edema    Other findings = diffuse post-radiation changes, thick secretions   Scope was removed  Patient tolerated procedure well without complications     RADIOLOGY  CXR: pending official read    Bilateral patchy basilar opacification     Patient Active Problem List    Diagnosis Date Noted    Aspiration into airway 04/14/2024    PEG (percutaneous endoscopic gastrostomy) adjustment/replacement/removal ([facility]) 12/14/2023    Dysphagia 12/14/2023    Gastroesophageal reflux disease 12/11/2023    Chronic cough 12/11/2023    At risk for aspiration 12/11/2023    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    Orofacial dyskinesia 10/10/2023    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    ERIK (obstructive sleep apnea) 09/20/2023    S/P percutaneous endoscopic gastrostomy (PEG) tube placement ([facility]) 07/07/2023    Alcohol use 07/07/2023    History of tongue cancer 07/06/2023    Acute hypoxemic respiratory failure ([facility]) 06/17/2023    Altered mental status 06/17/2023    Difficult airway for intubation 09/16/2022    Facial weakness 09/15/2022    Hx of head and neck radiation 09/15/2022    Hx of malignant neoplasm of head and neck 09/15/2022    Scar of neck 09/15/2022    Cranial neuropathies, multiple 09/15/2022    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 09/15/2022    Dysphonia 09/15/2022    Laryngeal edema 09/15/2022    Glottic insufficiency 09/15/2022    Muscle tension dysphonia 09/15/2022    Shortness of breath 09/15/2022    Velopharyngeal  insufficiency, acquired 09/15/2022    Radiation-induced brachial plexopathy 10/07/2021    Cervical radiculopathy at C8     Carpal tunnel syndrome     Paresthesias 07/22/2021    Shoulder weakness 07/22/2021    Winged scapula of both sides 07/22/2021    Obstructive sleep apnea syndrome     Witnessed episode of apnea 05/10/2021    Allergic rhinitis 09/03/2020    Severe swallowing dysfunction 04/26/2019    Hyperbilirubinemia 04/21/2019    Sepsis due to aspiration pneumonia (HCC) 04/20/2019    Pharyngoesophageal dysphagia 04/20/2019    ED (erectile dysfunction) of organic origin 04/30/2018    Peyronie's disease 04/30/2018    Arthralgia 09/25/2017    Hx of squamous cell carcinoma-BOT, s/p chemo/RT 2012 05/24/2016    Hypothyroidism 11/11/2013    Essential hypertension 09/19/2012    Psoriasis 09/19/2012       Vilma Diggs, PGY2  Otolaryngology- Head and Neck Surgery  Please contact Reji Chávez ENT resident role

## 2024-05-22 NOTE — ASSESSMENT & PLAN NOTE
Patient with history of tongue cancer s/p radiation/chemotherapy 2012 and subsequent radiation injury with pharyngoesophageal dysphagia and partial vocal cord paralysis presents via EMS from urgent care due to hypoxia 80% on room air with fever. Patient noted to have fever and tachycardia  CXR showed  bibasilar pleural-parenchymal opacities concerning for recurrent aspiration pneumonia.    Patient has a history of recurrent aspiration pneumonia. He was previously intubated with PEG tube placement 06/2023 in [place] 2/2 septic shock from aspiration pneumonia. Peg tube removed 12/18/23.   Recently admitted to hospital in [place] 04/13/24 for aspiration pneumonitis in [place]- was treated with Zosyn then discharged with Augmentin for 7 days. Recent repeat EGD last month showed no severe esophagitis.  No significant esophageal stricturing.  Possible mild candidal esophagitis was noted and treated with diflucan.  Continue IV Cefepime and Vancomycin for now given recent hospital admission and treatment for pneumonia -- deescalate as able based off sputum and MRSA culture.  Aspiration precautions   Appreciate swallow eval  Monitor fever curve/hemodynamics   Outpatient follow-up with Dr. Singh to discuss repeat dilation.

## 2024-05-22 NOTE — ASSESSMENT & PLAN NOTE
Patient with longstanding swallowing difficulty due to radiation injury following treatment for his tongue cancer   Has had multiple hospital admissions for aspiration pneumonia   Follows with ENT for partial vocal cord paralysis, dysphagia    Previously had a PEG tube which was reversed 12/18/23.   S/p MDL/vocal fold steroid injection, posterior pharyngeal wall injection, esophagoscopy with dilation 09/16/22 and balloon dilation of UES 08/25/2023 with subsequent improvement in dysphagia symptoms. Patient was discharged from speech therapy 02/2024 as his swallowing returned to baseline.   Patient had EGD performed during recent [place] hospital admission which showed congestion and swelling proximal to the vocal cord.   Speech recommend regular diet  Patient would benefit from outpatient speech referral  Will f/u with Dr. Singh in the office for consideration for repeat dilation

## 2024-05-22 NOTE — H&P
[facility]  H&P  Name: Mohamud Grimm Sr. 59 y.o. male I MRN: [id number]  Unit/Bed#: [facility] I Date of Admission: 5/21/2024   Date of Service: 5/21/2024 I Hospital Day: 0      Assessment & Plan   * Sepsis due to aspiration pneumonia (HCC)  Assessment & Plan  Patient with history of tongue cancer s/p radiation/chemotherapy 2012 and subsequent radiation injury with pharyngoesophageal dysphagia and partial vocal cord paralysis presents via EMS from urgent care due to hypoxia 80% on room air with fever.   Patient noted to have fever and tachycardia  CXR pending formal read -- shows bilateral bibasilar pneumonia on my review   Procalcitonin elevated 1.8   Patient has a history of recurrent aspiration pneumonia. He was previously intubated with PEG tube placement 06/2023 in [place] 2/2 septic shock from aspiration pneumonia. Peg tube removed 12/18/23.   Recently admitted to hospital in [place] 04/13/24 for aspiration pneumonitis in [place]- was treated with Zosyn then discharged with Augmentin for 7 days. Possible CXR findings are residual from recent pneumonia- unable to see images.    IV Cefepime and Vancomycin given recent hospital admission and treatment for pneumonia -- deescalate as able based off sputum culture  Check MRSA swab   Trend procal   Obtain urine strep and legionella   Check sputum culture   Aspiration precautions   Speech swallow evaluation -- was previously following with speech therapy and discharged as his swallow had returned to baseline per ST note, is currently on regular diet  Monitor fever curve/hemodynamics     Acute hypoxemic respiratory failure (HCC)  Assessment & Plan  Hypoxic 80% at Urgent Care currently requiring 2L NC   2/2 aspiration pneumonia   Management as stated above   Continue to wean off oxygen to maintain oxygen saturation >90%  Check formal ambulatory pulse ox study prior to discharge    Dysphagia  Assessment & Plan  Patient with longstanding  swallowing difficulty due to radiation injury following treatment for his tongue cancer   Has had multiple hospital admissions for aspiration pneumonia   Follows with ENT for partial vocal cord paralysis, dysphagia    Previously had a PEG tube which was reversed 12/18/23.   S/p MDL/vocal fold steroid injection, posterior pharyngeal wall injection, esophagoscopy with dilation 09/16/22 and balloon dilation of UES 08/25/2023 with subsequent improvement in dysphagia symptoms. Patient was discharged from speech therapy 02/2024 as his swallowing returned to baseline.   Patient had EGD performed during recent [place] hospital admission which showed congestion and swelling proximal to the vocal cord. Patient decline speech therapy evaluation at that time and requested to follow up with his ENT doctor.   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 Singh with ENT last seen 01/29/24  Continue gabapentin 600mg BID for cough -- patient reports concern regarding sedation from this medication   Continue famotidine qhs for reflux laryngitis   Speech therapy   ENT consult     Alcohol use  Assessment & Plan  Hx of chronic alcohol use   Patient drinks 4-5 beers daily  Monitor CIWA  Thiamine, folic acid, multivitamin     ERIK (obstructive sleep apnea)  Assessment & Plan  CPAP qhs    Hypothyroidism  Assessment & Plan  Continue levothyroxine    Essential hypertension  Assessment & Plan  BP currently stable   Continue Norvasc         VTE  Pharmacologic Prophylaxis: VTE Score: 6 High Risk (Score >/= 5) - Pharmacological DVT Prophylaxis Ordered: enoxaparin (Lovenox). Sequential Compression Devices Ordered.  Code Status: Level 1 - Full Code   Discussion with family: Updated  (wife) at bedside.    Anticipated Length of Stay: Patient will be admitted on an inpatient basis with an anticipated length of stay of greater than 2 midnights secondary to sepsis 2/2 aspiration pneumonia, acute hypoxic respiratory failure.    Total Time Spent on Date of Encounter in care of patient: 60 mins. This time was spent on one or more of the following: performing physical exam; counseling and coordination of care; obtaining or reviewing history; documenting in the medical record; reviewing/ordering tests, medications or procedures; communicating with other healthcare professionals and discussing with patient's family/caregivers.    Chief Complaint: fever/chills, worsening cough x 1 week     History of Present Illness:  Mohamud Grimm Sr. is a 59 y.o. male with a PMH of SCC base of tongue s/p chemo/RT with subsequent radiation injury with partial focal cord paralysis and dysphagia, HTN, hypothyroidism, ERIK, alcohol use who presents via EMS from urgent care due to hypoxia 80% on room air. Patient endorsing fever/chills, malaise, worsening cough, congestion, SOB x 1 week. Of note, patient has a history of pharyngoesophageal dysphagia, partial vocal cord paralysis 2/2 radiation injury after RT for his tongue cancer. He follows with Dr. Singh with ENT and was last seen 01/29/24. He has had vocal fold steroid injection/MDL/posterior pharyngeal wall injection, esophagoscopy with dilation 09/16/2022 and balloon dilation of UES 08/25/2023 which patient states he feels helped with his dysphagia. Patient and his wife visit [place] frequently to visit kids/grandkids and was recently down there in April and patient ended up being hospitalized for aspiration pneumonitis.  He was treated with IV Zosyn and discharged with Augmentin. He had an EGD during that time which showed congestion and swelling proximal to the vocal cord. It was recommended for the patient to have speech therapy evaluation but he preferred to follow up with ENT instead which has not been done yet. The patient has had issues with aspiration pneumonia in 2023 requiring intubation and PEG tube placement this was 06/17/23 in [place]. He subsequently had PEG tube removed 12/18/2023. Patient states that since then, his dysphagia had progressively improved and he didn't have any issues with aspiration/aspiration pneumonia until recently 04/2024 so they are concerned that there is something else going on. He was following with speech therapy and was discharged 02/2024 as per the notes his swallowing had returned to baseline. Mohamud states his main issue is his persistent cough that he has for which he is taking gabapentin 600mg BID for. He expressed concern in regards to the sedative properties of the gabapentin and does not really feel like its helping with his cough. He states that he is currently     Review of Systems:  Review of Systems   Constitutional:  Positive for chills, fatigue and fever.   HENT:  Positive for congestion and trouble swallowing.    Respiratory:  Positive for cough and shortness of breath.    Cardiovascular:  Negative for chest pain, palpitations and leg swelling.   Gastrointestinal:  Negative for abdominal pain, nausea and vomiting.   Genitourinary:  Negative for dysuria.   Neurological:  Negative for weakness.   Psychiatric/Behavioral:  Negative for confusion.         Past Medical and Surgical History:   Past Medical History:   Diagnosis Date    Cancer of base of tongue (HCC)     excision    Cough     CPAP (continuous positive airway pressure) dependence     waiting on consult    Disease of thyroid gland     hypo    Dysphagia     ED (erectile dysfunction)     History of pneumonia      Hypertension     Leukopenia     Peyronie's disease     PONV (postoperative nausea and vomiting)     Psoriasis     Sleep apnea     Tongue cancer (HCC)     Weight loss, abnormal        Past Surgical History:   Procedure Laterality Date    ESOPHAGOSCOPY N/A 9/16/2022    Procedure: ESOPHAGOSCOPY FLEXIBLE;  Surgeon: Devonte Singh MD;  Location: [facility];  Service: ENT    ESOPHAGOSCOPY N/A 8/25/2023    Procedure: TRANSNASAL ESOPHAGOSCOPY WITH BALLOON DILATION/ ESOPHAGEAL DILATION (BALLOON);;  Surgeon: Devonte Singh MD;  Location: [facility];  Service: ENT    OTHER SURGICAL HISTORY      infusion port inserted and removed    PEG TUBE PLACEMENT      and removal    NJ COLONOSCOPY FLX DX W/COLLJ SPEC WHEN PFRMD N/A 6/27/2017    Procedure: COLONOSCOPY with polypectomy x2;  Surgeon: Janet Willard MD;  Location: [facility];  Service: General    NJ LARGSC W/NJX VOCAL CORD THER W/MICRO/TELESCOPE Bilateral 9/16/2022    Procedure: micro direct laryngoscopy, vocal fold injection, biopsy epiglottis lesion, posterior pharyngeal wall injection, flexible esophagoscopy with dilation;  Surgeon: Devonte Singh MD;  Location: [facility];  Service: ENT    TONGUE BIOPSY / EXCISION      Floor mouth excision of malignant tumor       Meds/Allergies:  Prior to Admission medications    Medication Sig Start Date End Date Taking? Authorizing Provider   amLODIPine (NORVASC) 5 mg tablet TAKE 1 TABLET (5 MG TOTAL) BY MOUTH DAILY. 5/20/24   Raphael Lopez MD   azelastine (ASTELIN) 0.1 % nasal spray 1 spray into each nostril 2 (two) times a day 3/27/24   Devonte Singh MD   famotidine (PEPCID) 20 mg/2.5 mL oral suspension TAKE 5 ML (40 MG TOTAL) BY MOUTH DAILY AT BEDTIME 3/27/24 5/21/24  Devonte Singh MD   fluticasone (FLONASE) 50 mcg/act nasal spray 1 SPRAY INTO EACH NOSTRIL DAILY AS NEEDED FOR RHINITIS 11/15/23 5/21/24  Tequila Allen MD   gabapentin (Neurontin) 300 mg capsule Take 2 capsules (600 mg total) by  mouth 2 (two) times a day 24  Devonte Singh MD   levothyroxine 100 mcg tablet TAKE 1 TABLET BY MOUTH EVERY DAY 3/1/24   Raphael Lopze MD   multivitamin (THERAGRAN) TABS Take 1 tablet by mouth daily    Historical Provider, MD   PENTOXIFYLLINE PO Take by mouth    Historical Provider, MD   tadalafil (CIALIS) 5 MG tablet Take 1 tablet (5 mg total) by mouth daily 8/10/22   Nereida Miller PA-C   albuterol (PROVENTIL HFA,VENTOLIN HFA) 90 mcg/act inhaler Inhale 2 puffs every 6 (six) hours as needed for wheezing  Patient not taking: Reported on 2024  Historical Provider, MD   montelukast (SINGULAIR) 10 mg tablet TAKE 1 TABLET (10 MG TOTAL) BY MOUTH IN THE MORNING  Patient not taking: Reported on 2024 11/15/23 5/21/24  Tequila Allen MD     I have reviewed home medications with patient personally.    Allergies: No Known Allergies    Social History:  Marital Status: /Civil Union   Patient Pre-hospital Living Situation: Home  Patient Pre-hospital Level of Mobility: walks  Patient Pre-hospital Diet Restrictions: regular  Substance Use History:   Social History     Substance and Sexual Activity   Alcohol Use Yes    Comment: rare     Social History     Tobacco Use   Smoking Status Former    Current packs/day: 0.00    Average packs/day: 0.3 packs/day for 20.0 years (5.0 ttl pk-yrs)    Types: Cigarettes    Start date: 1991    Quit date: 2011    Years since quittin.3   Smokeless Tobacco Former    Quit date:    Tobacco Comments    pt quit with dx of tongue base cancer, per allscripts     Social History     Substance and Sexual Activity   Drug Use No       Family History:  Family History   Problem Relation Age of Onset    Other Mother         reactive airway dysfunction    Coronary artery disease Father     Hypertension Father     Psoriasis Father     Thyroid disease unspecified Neg Hx        Physical Exam:     Vitals:   Blood Pressure:  "109/67 (05/21/24 2143)  Pulse: 80 (05/21/24 2245)  Temperature: 99 °F (37.2 °C) (05/21/24 2143)  Temp Source: Oral (05/21/24 2143)  Respirations: 18 (05/21/24 2245)  Height: 5' 9\" (175.3 cm) (05/21/24 2020)  Weight - Scale: 74.9 kg (165 lb 2 oz) (05/21/24 2020)  SpO2: 94 % (05/21/24 2245)    Physical Exam  Constitutional:       General: He is not in acute distress.  HENT:      Mouth/Throat:      Mouth: Mucous membranes are moist.   Eyes:      General: No scleral icterus.  Cardiovascular:      Rate and Rhythm: Normal rate and regular rhythm.      Heart sounds: Normal heart sounds.   Pulmonary:      Effort: No respiratory distress.      Breath sounds: Rhonchi and rales present. No wheezing.      Comments: Bibasilar rales/rhonchi; 2L NC  Abdominal:      General: Abdomen is flat. Bowel sounds are normal.      Palpations: Abdomen is soft.      Tenderness: There is no abdominal tenderness.   Musculoskeletal:      Right lower leg: No edema.      Left lower leg: No edema.   Skin:     General: Skin is warm and dry.   Neurological:      General: No focal deficit present.      Mental Status: He is alert and oriented to person, place, and time.   Psychiatric:         Mood and Affect: Mood normal.          Additional Data:     Lab Results:  Results from last 7 days   Lab Units 05/21/24 1913   WBC Thousand/uL 8.15   HEMOGLOBIN g/dL 12.4   HEMATOCRIT % 37.7   PLATELETS Thousands/uL 306   SEGS PCT % 86*   LYMPHO PCT % 6*   MONO PCT % 7   EOS PCT % 0     Results from last 7 days   Lab Units 05/21/24 1913   SODIUM mmol/L 134*   POTASSIUM mmol/L 3.7   CHLORIDE mmol/L 97   CO2 mmol/L 29   BUN mg/dL 9   CREATININE mg/dL 0.52*   ANION GAP mmol/L 8   CALCIUM mg/dL 9.1   ALBUMIN g/dL 3.3*   TOTAL BILIRUBIN mg/dL 0.61   ALK PHOS U/L 80   ALT U/L 11   AST U/L 15   GLUCOSE RANDOM mg/dL 118     Results from last 7 days   Lab Units 05/21/24 1913   INR  0.99             Results from last 7 days   Lab Units 05/21/24 1913   LACTIC ACID mmol/L " 0.7   PROCALCITONIN ng/ml 1.80*       Lines/Drains:  Invasive Devices       Peripheral Intravenous Line  Duration             Peripheral IV 05/21/24 Dorsal (posterior);Right Forearm <1 day    Peripheral IV 05/21/24 Left;Proximal;Ventral (anterior) Forearm <1 day                        Imaging: Reviewed radiology reports from this admission including: chest xray  XR chest 1 view portable   ED Interpretation by Elvin Downs MD (05/21 1945)   Multifocal pneumonia.          EKG and Other Studies Reviewed on Admission:   EKG: NSR. HR 87.    ** Please Note: This note has been constructed using a voice recognition system. **

## 2024-05-22 NOTE — ASSESSMENT & PLAN NOTE
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 Singh with ENT last seen 01/29/24  Continue gabapentin 600mg BID for cough -- patient reports concern regarding sedation from this medication   Continue famotidine qhs for reflux laryngitis   Speech therapy   ENT consult

## 2024-05-22 NOTE — PLAN OF CARE
Problem: PAIN - ADULT  Goal: Verbalizes/displays adequate comfort level or baseline comfort level  Description: Interventions:  - Encourage patient to monitor pain and request assistance  - Assess pain using appropriate pain scale  - Administer analgesics based on type and severity of pain and evaluate response  - Implement non-pharmacological measures as appropriate and evaluate response  - Consider cultural and social influences on pain and pain management  - Notify physician/advanced practitioner if interventions unsuccessful or patient reports new pain  Outcome: Progressing     Problem: INFECTION - ADULT  Goal: Absence or prevention of progression during hospitalization  Description: INTERVENTIONS:  - Assess and monitor for signs and symptoms of infection  - Monitor lab/diagnostic results  - Monitor all insertion sites, i.e. indwelling lines, tubes, and drains  - Monitor endotracheal if appropriate and nasal secretions for changes in amount and color  - [place] appropriate cooling/warming therapies per order  - Administer medications as ordered  - Instruct and encourage patient and family to use good hand hygiene technique  - Identify and instruct in appropriate isolation precautions for identified infection/condition  Outcome: Progressing     Problem: SAFETY ADULT  Goal: Patient will remain free of falls  Description: INTERVENTIONS:  - Educate patient/family on patient safety including physical limitations  - Instruct patient to call for assistance with activity   - Consult OT/PT to assist with strengthening/mobility   - Keep Call bell within reach  - Keep bed low and locked with side rails adjusted as appropriate  - Keep care items and personal belongings within reach  - Initiate and maintain comfort rounds  - Make Fall Risk Sign visible to staff  - Offer Toileting every 2 Hours, in advance of need  - Initiate/Maintain bed alarm  - Obtain necessary fall risk management equipment  - Apply yellow socks and  bracelet for high fall risk patients  - Consider moving patient to room near nurses station  Outcome: Progressing     Problem: DISCHARGE PLANNING  Goal: Discharge to home or other facility with appropriate resources  Description: INTERVENTIONS:  - Identify barriers to discharge w/patient and caregiver  - Arrange for needed discharge resources and transportation as appropriate  - Identify discharge learning needs (meds, wound care, etc.)  - Arrange for interpretive services to assist at discharge as needed  - Refer to Case Management Department for coordinating discharge planning if the patient needs post-hospital services based on physician/advanced practitioner order or complex needs related to functional status, cognitive ability, or social support system  Outcome: Progressing     Problem: Knowledge Deficit  Goal: Patient/family/caregiver demonstrates understanding of disease process, treatment plan, medications, and discharge instructions  Description: Complete learning assessment and assess knowledge base.  Interventions:  - Provide teaching at level of understanding  - Provide teaching via preferred learning methods  Outcome: Progressing     Problem: RESPIRATORY - ADULT  Goal: Achieves optimal ventilation and oxygenation  Description: INTERVENTIONS:  - Assess for changes in respiratory status  - Assess for changes in mentation and behavior  - Position to facilitate oxygenation and minimize respiratory effort  - Oxygen administered by appropriate delivery if ordered  - Initiate smoking cessation education as indicated  - Encourage broncho-pulmonary hygiene including cough, deep breathe, Incentive Spirometry  - Assess the need for suctioning and aspirate as needed  - Assess and instruct to report SOB or any respiratory difficulty  - Respiratory Therapy support as indicated  Outcome: Progressing

## 2024-05-22 NOTE — PROGRESS NOTES
[facility]  Progress Note  Name: Mohamud Grimm SrGeovanny I  MRN: [id number]  Unit/Bed#: -01 I Date of Admission: 5/21/2024   Date of Service: 5/22/2024 I Hospital Day: 1    Assessment & Plan   * Sepsis due to aspiration pneumonia (HCC)  Assessment & Plan  Patient with history of tongue cancer s/p radiation/chemotherapy 2012 and subsequent radiation injury with pharyngoesophageal dysphagia and partial vocal cord paralysis presents via EMS from urgent care due to hypoxia 80% on room air with fever. Patient noted to have fever and tachycardia  CXR showed  bibasilar pleural-parenchymal opacities concerning for recurrent aspiration pneumonia.    Patient has a history of recurrent aspiration pneumonia. He was previously intubated with PEG tube placement 06/2023 in [place] 2/2 septic shock from aspiration pneumonia. Peg tube removed 12/18/23.   Recently admitted to hospital in [place] 04/13/24 for aspiration pneumonitis in [place]- was treated with Zosyn then discharged with Augmentin for 7 days. Recent repeat EGD last month showed no severe esophagitis.  No significant esophageal stricturing.  Possible mild candidal esophagitis was noted and treated with diflucan.  Continue IV Cefepime and Vancomycin for now given recent hospital admission and treatment for pneumonia -- deescalate as able based off sputum and MRSA culture.  Aspiration precautions   Appreciate swallow eval  Monitor fever curve/hemodynamics   Outpatient follow-up with Dr. Singh to discuss repeat dilation.      Dysphagia  Assessment & Plan  Patient with longstanding swallowing difficulty due to radiation injury following treatment for his tongue cancer   Has had multiple hospital admissions for aspiration pneumonia   Follows with ENT for partial vocal cord paralysis, dysphagia    Previously had a PEG tube which was reversed 12/18/23.   S/p MDL/vocal fold steroid injection, posterior pharyngeal wall injection, esophagoscopy with  dilation 09/16/22 and balloon dilation of UES 08/25/2023 with subsequent improvement in dysphagia symptoms. Patient was discharged from speech therapy 02/2024 as his swallowing returned to baseline.   Patient had EGD performed during recent [place] hospital admission which showed congestion and swelling proximal to the vocal cord.   Speech recommend regular diet  Patient would benefit from outpatient speech referral  Will f/u with Dr. Singh in the office for consideration for repeat dilation    Chronic cough  Assessment & Plan  Continue gabapentin, flonase, tessalon  Trial of tussionex q12 hours    ERIK (obstructive sleep apnea)  Assessment & Plan  CPAP qhs    Alcohol use  Assessment & Plan  Hx of chronic alcohol use   Patient drinks 4-5 beers daily  Monitor CIWA  Thiamine, folic acid, multivitamin     Acute hypoxemic respiratory failure (HCC)  Assessment & Plan  Hypoxic PTA 80% at Urgent Care currently requiring 2L NC .  Improving, now on RA 95%.      Hypothyroidism  Assessment & Plan  Continue levothyroxine    Essential hypertension  Assessment & Plan  BP currently stable   Continue Norvasc           VTE Pharmacologic Prophylaxis: VTE Score: 6 High Risk (Score >/= 5) - Pharmacological DVT Prophylaxis Ordered: enoxaparin (Lovenox). Sequential Compression Devices Ordered.    Mobility:   Basic Mobility Inpatient Raw Score: 24  JH-HLM Goal: 8: Walk 250 feet or more  JH-HLM Achieved: 7: Walk 25 feet or more  JH-HLM Goal NOT achieved. Continue with multidisciplinary rounding and encourage appropriate mobility to improve upon JH-HLM goals.    Patient Centered Rounds: I performed bedside rounds with nursing staff today.   Discussions with Specialists or Other Care Team Provider: ENT note reviewed    Education and Discussions with Family / Patient: Updated  (wife) via phone.    Total Time Spent on Date of Encounter in care of patient: 30 mins. This time was spent on one or more of the following: performing  physical exam; counseling and coordination of care; obtaining or reviewing history; documenting in the medical record; reviewing/ordering tests, medications or procedures; communicating with other healthcare professionals and discussing with patient's family/caregivers.    Current Length of Stay: 1 day(s)  Current Patient Status: Inpatient   Certification Statement: The patient will continue to require additional inpatient hospital stay due to additional 24-48 hours of IV abx  Discharge Plan: Anticipate discharge tomorrow to home.    Code Status: Level 1 - Full Code    Subjective:   No acute events overnight.  + dry cough.  Tmax 102.8 yesterday evening though now afebrile.  Voices concern about staying in the hospital overnight since he has an important meeting tomorrow morning.      Objective:     Vitals:   Temp (24hrs), Av.3 °F (37.4 °C), Min:97.5 °F (36.4 °C), Max:102.8 °F (39.3 °C)    Temp:  [97.5 °F (36.4 °C)-102.8 °F (39.3 °C)] 98 °F (36.7 °C)  HR:  [] 65  Resp:  [18-20] 20  BP: ()/(57-86) 99/62  SpO2:  [89 %-97 %] 92 %  Body mass index is 24.38 kg/m².     Input and Output Summary (last 24 hours):     Intake/Output Summary (Last 24 hours) at 2024 1428  Last data filed at 2024 0453  Gross per 24 hour   Intake 1950 ml   Output 1000 ml   Net 950 ml       Physical Exam:   Physical Exam  Vitals reviewed.   Constitutional:       Appearance: He is ill-appearing. He is not toxic-appearing.   Cardiovascular:      Rate and Rhythm: Normal rate and regular rhythm.   Pulmonary:      Effort: No respiratory distress.      Breath sounds: Rhonchi present. No rales.   Abdominal:      General: There is no distension.      Palpations: There is no mass.      Tenderness: There is no abdominal tenderness. There is no guarding.   Musculoskeletal:         General: No swelling.   Skin:     General: Skin is warm and dry.   Neurological:      Mental Status: Mental status is at baseline.          Additional Data:      Labs:  Results from last 7 days   Lab Units 05/22/24 0432   WBC Thousand/uL 5.42   HEMOGLOBIN g/dL 10.6*   HEMATOCRIT % 34.1*   PLATELETS Thousands/uL 243   SEGS PCT % 71   LYMPHO PCT % 15   MONO PCT % 9   EOS PCT % 4     Results from last 7 days   Lab Units 05/22/24  0432 05/21/24 1913   SODIUM mmol/L 140 134*   POTASSIUM mmol/L 3.3* 3.7   CHLORIDE mmol/L 106 97   CO2 mmol/L 28 29   BUN mg/dL 8 9   CREATININE mg/dL 0.46* 0.52*   ANION GAP mmol/L 6 8   CALCIUM mg/dL 7.8* 9.1   ALBUMIN g/dL  --  3.3*   TOTAL BILIRUBIN mg/dL  --  0.61   ALK PHOS U/L  --  80   ALT U/L  --  11   AST U/L  --  15   GLUCOSE RANDOM mg/dL 84 118     Results from last 7 days   Lab Units 05/21/24 1913   INR  0.99             Results from last 7 days   Lab Units 05/22/24  0432 05/21/24 1913   LACTIC ACID mmol/L  --  0.7   PROCALCITONIN ng/ml 1.96* 1.80*       Lines/Drains:  Invasive Devices       Peripheral Intravenous Line  Duration             Peripheral IV 05/21/24 Dorsal (posterior);Right Forearm <1 day    Peripheral IV 05/21/24 Left;Proximal;Ventral (anterior) Forearm <1 day                    Imaging: No pertinent imaging reviewed.    Recent Cultures (last 7 days):   Results from last 7 days   Lab Units 05/22/24  0448 05/21/24  2341 05/21/24  1950 05/21/24 1913   BLOOD CULTURE   --   --  Received in Microbiology Lab. Culture in Progress. Received in Microbiology Lab. Culture in Progress.   GRAM STAIN RESULT   --  3+ Polys*  1+ Gram positive cocci in pairs*  No Epithelial cells seen*  --   --    LEGIONELLA URINARY ANTIGEN  Negative  --   --   --        Last 24 Hours Medication List:   Current Facility-Administered Medications   Medication Dose Route Frequency Provider Last Rate    acetaminophen  650 mg Oral Q6H PRN Mirella Mccurdy PA-C      amLODIPine  5 mg Oral Daily Mirella Mccurdy PA-C      azelastine  1 spray Each Nare BID Mirella Mccurdy PA-C      cefepime  2,000 mg Intravenous Q8H Mirella Mccurdy PA-C  2,000 mg (05/22/24 1346)    dextromethorphan-guaiFENesin  10 mL Oral Q4H PRN Mirella Mccurdy PA-C      enoxaparin  40 mg Subcutaneous Daily Mirella Mccurdy PA-C      famotidine  40 mg Oral HS Mirella Mccurdy PA-C      fluticasone  1 spray Nasal Daily PRN Mirella Mccurdy PA-C      folic acid  1 mg Oral Daily Mirella Mccurdy PA-C      gabapentin  600 mg Oral BID Mirella Mccurdy PA-C      Hydrocod Andrei-Chlorphe Andrei ER  5 mL Oral Q12H Dany Huber MD      levothyroxine  100 mcg Oral Early Morning Mirella Mccurdy PA-C      multivitamin-minerals  1 tablet Oral Daily Mirella Mccurdy PA-C      pentoxifylline  400 mg Oral TID With Meals Mirella Mccurdy PA-C      thiamine  100 mg Oral Daily Mirella Mccurdy PA-C      vancomycin  1,750 mg Intravenous Q12H Yu Quinn MD 1,750 mg (05/22/24 0609)        Today, Patient Was Seen By: Dany Huber MD    **Please Note: This note may have been constructed using a voice recognition system.**

## 2024-05-22 NOTE — UTILIZATION REVIEW
Initial Clinical Review    Admission: Date/Time/Statement:   Admission Orders (From admission, onward)       Ordered        05/21/24 1958  INPATIENT ADMISSION  Once                          Orders Placed This Encounter   Procedures    INPATIENT ADMISSION     Standing Status:   Standing     Number of Occurrences:   1     Order Specific Question:   Level of Care     Answer:   Med Surg [16]     Order Specific Question:   Estimated length of stay     Answer:   Not Applicable     ED Arrival Information       Expected   5/21/2024     Arrival   5/21/2024 18:57    Acuity   Emergent              Means of arrival   Ambulance    Escorted by   [facility]    Service   Hospitalist    Admission type   Emergency              Arrival complaint   Fever, unspecified fever cause             Chief Complaint   Patient presents with    Fever     Pt comes via EMS from from Care now in [place]. Pt went there for fly-like s/s. EMS was called for bradycardia and hypoxia. Pt denies any SOB currently. Hx Rhonchi       Initial Presentation: 59 y.o. male with hx SCC base of tongue s/p chemo/RT with subsequent radiation injury with partial focal cord paralysis and dysphagia, HTN, hypothyroidism, ERIK on CPAP at HS , alcohol use4-5 beers/day , aspiration pneumonia 06/2023 requiring intubation and PEG tube placement   , hospitalized 04/2024 in [place] for aspiration pneumonitis (had an EGD during that time which showed congestion and swelling proximal to the vocal cord , has not yet f/u with ENT as recommended )  who presents to ED via EMS from urgent care due to hypoxia 80% on room air. Patient endorsing fever/chills, malaise, worsening cough, congestion, SOB x 1 week .Reports persistent cough that he has for which he is taking gabapentin 600mg BID hat pt feels is sedating and not helping cough .  On exam, febrile 102.8 F, bibasilar rhonchi.O2 sat 95 % on 2 L O2nc . Labs procal 1.80 . CXR shows multifocal PNA. Pt given IV Tylenol, IVF,  IV abx in ED. Admitted as Inpatient with sepsis due to aspiration pneumonia, acute hypoxemic resp failure . Dysphagia. Plan - IV abx- Cefepime and vanco. F/U sputum and blood cx . Trend procal. Urine antigens . Aspiration monitoring. SLP eval. Monitor fever curve. Supplemental O2- wean to keep sat >90 %. ENT consult. CIWA . Thiamine, folic acid, multivitamin     Anticipated Length of Stay/Certification Statement: Patient will be admitted on an inpatient basis with an anticipated length of stay of greater than 2 midnights secondary to sepsis 2/2 aspiration pneumonia, acute hypoxic respiratory failure.     Date: 5/22   Day 2:     CIWA 0 this am .O2 sat low to mid 90's on RA . Breath sounds coarse, diminished . Continues IV abx . K 3.3 - repletion ordered. CBC, BMP, procal in am tomorrow.  .    ENT consult- for dysphagia in the setting of aspiration pneumonia. Pt under went balloon dilation 9/2022 and 8/2023 with improvement in swallowing. He notices his swallowing is worsening and had trouble eating crunchy perogies the other day and feels as though this could have been the start of this episode . Continue IV abx for suspected aspiration pneumonia. No acute ENT intervention . F/U as outpt to discuss repeat dilation .   SLP -  Pt reports that he will not used thickened liquids, and is not interested in inpatient swallow therapy or inpatient VBS . Pt noted with consistent wet cough with thin liquids. Pt known to have silent aspiration on thin liquids as per prior VBS's. Educated on the utilization of compensatory strategies for swallow, the benefits of thickened liquids, and was educated on the importance of f/u with outpatient swallow therapy as well as ENT. Pt was given nectar thick liquids to try, pt stated he would consider.    ED Triage Vitals   Temperature Pulse Respirations Blood Pressure SpO2   05/21/24 1902 05/21/24 1902 05/21/24 1902 05/21/24 1902 05/21/24 1902   (!) 102.8 °F (39.3 °C) 92 20 127/73 90 %       Temp Source Heart Rate Source Patient Position - Orthostatic VS BP Location FiO2 (%)   05/21/24 1902 05/21/24 1902 05/22/24 0300 05/21/24 1902 --   Oral Monitor Lying Right arm       Pain Score       05/21/24 2143       No Pain          Wt Readings from Last 1 Encounters:   05/21/24 74.9 kg (165 lb 2 oz)     Additional Vital Signs:   Date/Time Temp Pulse Resp BP MAP (mmHg) SpO2 Calculated FIO2 (%) - Nasal Cannula Nasal Cannula O2 Flow Rate (L/min) O2 Device O2 Interface Device   05/22/24 0700 98.2 °F (36.8 °C) 54 Abnormal  18 126/69 93 95 % -- -- None (Room air) --   05/22/24 0300 97.5 °F (36.4 °C) 55 18 102/61 76 96 % -- -- CPAP --   05/21/24 2350 98.1 °F (36.7 °C) 70 18 108/57 76 91 % -- -- None (Room air) --   05/21/24 2349 -- -- -- -- -- 94 % -- -- -- Under the nose mask   05/21/24 2252 -- -- -- -- -- -- -- -- None (Room air) --   05/21/24 2245 -- 80 18 -- -- 94 % -- -- None (Room air) --   05/21/24 2212 -- -- -- -- -- -- -- -- None (Room air) --   05/21/24 2143 99 °F (37.2 °C) 77 18 109/67 -- 92 % -- -- Nasal cannula --   05/21/24 2045 -- 76 18 126/71 94 97 % 24 1 L/min Nasal cannula --   05/21/24 2033 98.7 °F (37.1 °C) -- -- -- -- -- -- -- -- --   05/21/24 2030 -- 80 20 135/68 95 96 % 28 2 L/min Nasal cannula --   05/21/24 2015 -- 82 20 127/75 95 95 % 28 2 L/min Nasal cannula --       CIWA-Ar Score    Row Name 05/22/24 0700 05/22/24 0300 05/21/24 2350 05/21/24 2245 05/21/24 2143   CIWA-Ar   /69  -/61  -/57  -AC -- 109/67  -TD   Pulse 54 Abnormal   -SB 55  -AC 70  -AC 80  -TK 77  -TD   Nausea and Vomiting 0  -EN 0  -MA 0  -MA -- --   Tactile Disturbances 0  -EN 0  -MA 0  -MA -- --   Tremor 0  -EN 0  -MA 0  -MA -- --   Auditory Disturbances 0  -EN 0  -MA 0  -MA -- --   Paroxysmal Sweats 0  -EN 0  -MA 0  -MA -- --   Visual Disturbances 0  -EN 0  -MA 0  -MA -- --   Anxiety 0  -EN 0  -MA 0  -MA -- --   Headache, Fullness in Head 0  -EN 0  -MA 0  -MA -- --   Agitation 0  -EN 0  -MA 0  -MA -- --    Orientation and Clouding of Sensorium 0  -EN 0  -MA 0  -MA -- --   CIWA-Ar Total 0  -EN 0  -MA 0  -MA           Pertinent Labs/Diagnostic Test Results:   XR chest 1 view portable   ED Interpretation by Elvin Downs MD (05/21 1945)   Multifocal pneumonia.        Results from last 7 days   Lab Units 05/21/24 1950   SARS-COV-2  Negative     Results from last 7 days   Lab Units 05/22/24 0432 05/21/24 1913   WBC Thousand/uL 5.42 8.15   HEMOGLOBIN g/dL 10.6* 12.4   HEMATOCRIT % 34.1* 37.7   PLATELETS Thousands/uL 243 306   TOTAL NEUT ABS Thousands/µL 3.83 6.97         Results from last 7 days   Lab Units 05/22/24 0432 05/21/24 1913   SODIUM mmol/L 140 134*   POTASSIUM mmol/L 3.3* 3.7   CHLORIDE mmol/L 106 97   CO2 mmol/L 28 29   ANION GAP mmol/L 6 8   BUN mg/dL 8 9   CREATININE mg/dL 0.46* 0.52*   EGFR ml/min/1.73sq m 121 115   CALCIUM mg/dL 7.8* 9.1     Results from last 7 days   Lab Units 05/21/24 1913   AST U/L 15   ALT U/L 11   ALK PHOS U/L 80   TOTAL PROTEIN g/dL 7.2   ALBUMIN g/dL 3.3*   TOTAL BILIRUBIN mg/dL 0.61         Results from last 7 days   Lab Units 05/22/24  0432 05/21/24 1913   GLUCOSE RANDOM mg/dL 84 118               Results from last 7 days   Lab Units 05/21/24 1913   PROTIME seconds 13.6   INR  0.99   PTT seconds 30         Results from last 7 days   Lab Units 05/22/24  0432 05/21/24 1913   PROCALCITONIN ng/ml 1.96* 1.80*     Results from last 7 days   Lab Units 05/21/24 1913   LACTIC ACID mmol/L 0.7                       Results from last 7 days   Lab Units 05/21/24 2007   CLARITY UA  Clear   COLOR UA  Light Yellow   SPEC GRAV UA  1.010   PH UA  7.0   GLUCOSE UA mg/dl Negative   KETONES UA mg/dl Negative   BLOOD UA  Negative   PROTEIN UA mg/dl Negative   NITRITE UA  Negative   BILIRUBIN UA  Negative   UROBILINOGEN UA (BE) mg/dl <2.0   LEUKOCYTES UA  Negative     Results from last 7 days   Lab Units 05/21/24  1950   INFLUENZA A PCR  Negative   INFLUENZA B PCR  Negative   RSV PCR   Negative                             Results from last 7 days   Lab Units 05/21/24  2341 05/21/24 1950 05/21/24  1913   BLOOD CULTURE   --  Received in Microbiology Lab. Culture in Progress. Received in Microbiology Lab. Culture in Progress.   GRAM STAIN RESULT  3+ Polys*  1+ Gram positive cocci in pairs*  No Epithelial cells seen*  --   --                    ED Treatment:   Medication Administration from 05/21/2024 1839 to 05/21/2024 2141         Date/Time Order Dose Route Action     05/21/2024 1950 EDT acetaminophen (Ofirmev) injection 1,000 mg 0 mg Intravenous Stopped     05/21/2024 1930 EDT acetaminophen (Ofirmev) injection 1,000 mg 1,000 mg Intravenous New Bag     05/21/2024 2046 EDT sodium chloride 0.9 % bolus 1,000 mL 0 mL Intravenous Stopped     05/21/2024 1930 EDT sodium chloride 0.9 % bolus 1,000 mL 1,000 mL Intravenous New Bag     05/21/2024 2030 EDT ceftriaxone (ROCEPHIN) 1 g/50 mL in dextrose IVPB 0 mg Intravenous Stopped     05/21/2024 2006 EDT ceftriaxone (ROCEPHIN) 1 g/50 mL in dextrose IVPB 1,000 mg Intravenous New Bag     05/21/2024 2030 EDT azithromycin (ZITHROMAX) 500 mg in sodium chloride 0.9% 250mL IVPB 500 mg 500 mg Intravenous New Bag     05/21/2024 2033 EDT sodium chloride 0.9 % bolus 1,000 mL 1,000 mL Intravenous New Bag          Past Medical History:   Diagnosis Date    Cancer of base of tongue (HCC)     excision    Cough     CPAP (continuous positive airway pressure) dependence     waiting on consult    Disease of thyroid gland     hypo    Dysphagia     ED (erectile dysfunction)     History of pneumonia     Hypertension     Leukopenia     Peyronie's disease     PONV (postoperative nausea and vomiting)     Psoriasis     Sleep apnea     Tongue cancer (HCC)     Weight loss, abnormal      Present on Admission:   Sepsis due to aspiration pneumonia (HCC)   Acute hypoxemic respiratory failure (HCC)   Alcohol use   Dysphagia   Essential hypertension   History of tongue cancer    Hypothyroidism   ERIK (obstructive sleep apnea)      Admitting Diagnosis: Fever [R50.9]  Multifocal pneumonia [J18.9]  Age/Sex: 59 y.o. male  Admission Orders:  Scheduled Medications:  amLODIPine, 5 mg, Oral, Daily  azelastine, 1 spray, Each Nare, BID  cefepime, 2,000 mg, Intravenous, Q8H  enoxaparin, 40 mg, Subcutaneous, Daily  famotidine, 40 mg, Oral, HS  folic acid, 1 mg, Oral, Daily  gabapentin, 600 mg, Oral, BID  levothyroxine, 100 mcg, Oral, Early Morning  multivitamin-minerals, 1 tablet, Oral, Daily  pentoxifylline, 400 mg, Oral, TID With Meals  thiamine, 100 mg, Oral, Daily  vancomycin, 1,750 mg, Intravenous, Q12H    potassium chloride (Klor-Con M20) CR tablet 40 mEq  Dose: 40 mEq  Freq: Once Route: PO x1 5/22 @ 1042   Continuous IV Infusions:     PRN Meds:  acetaminophen, 650 mg, Oral, Q6H PRN  dextromethorphan-guaiFENesin, 10 mL, Oral, Q4H PRN x1 5/22   fluticasone, 1 spray, Nasal, Daily PRN      Reg diet    NC O2 to keep sat at least 94 %    HOB elevation   OOB as elio   CIWA    Continuous pulse ox    SCD        IP CONSULT TO ENT  IP CONSULT TO PHARMACY    Network Utilization Review Department  ATTENTION: Please call with any questions or concerns to [phone] and carefully listen to the prompts so that you are directed to the right person. All voicemails are confidential.   For Discharge needs, contact Care Management DC Support Team at [phone] opt. [phone]  Send all requests for admission clinical reviews, approved or denied determinations and any other requests to dedicated fax number below belonging to the campus where the patient is receiving treatment. List of dedicated fax numbers for the Facilities:  FACILITY NAME UR FAX NUMBER   ADMISSION DENIALS (Administrative/Medical Necessity) [phone]   DISCHARGE SUPPORT TEAM (NETWORK) [phone]   PARENT CHILD HEALTH (Maternity/NICU/Pediatrics) [phone]   [facility] [phone]   [facility]  [phone]   [facility] [phone]   [facility] [phone]   [facility] [phone]   [facility] [phone]   [facility] [phone]   [facility] [phone]   [facility] [phone]   [facility] [phone]   [facility] [phone]   [facility] [phone]

## 2024-05-22 NOTE — ED ATTENDING ATTESTATION
5/21/2024  I, Guillermo Toledo DO, saw and evaluated the patient. I have discussed the patient with the resident/non-physician practitioner and agree with the resident's/non-physician practitioner's findings, Plan of Care, and MDM as documented in the resident's/non-physician practitioner's note, except where noted. All available labs and Radiology studies were reviewed.  I was present for key portions of any procedure(s) performed by the resident/non-physician practitioner and I was immediately available to provide assistance.       At this point I agree with the current assessment done in the Emergency Department.  I have conducted an independent evaluation of this patient a history and physical is as follows:    60 yo M in the ED with cough, fever, sob, hypoxia to 80% on RA.  Sent in from urgent care by EMS.    PE:  The patient is ill appearing, diaphoretic Head: normocephalic, atraumatic. HEENT: mucous membranes moist.  Lungs: wheezing and rales basilar b/l, no resp distress. Heart: RRR. No M/R/G. Abdomen: NT, ND, no R/R/G. Neuro: CN2-12 intact, GCS 15. Normal strength and sensation, normal speech and gait. Cap refill < 2 sec, skin warm and dry. No rashes or lesions.    Given neb treatments, IV abx, + sepsis criteria, ceftriaxone and zithromax given. Hypoxia with O2 requirement - admit for further treatment.          ED Course         Critical Care Time  CriticalCare Time    Date/Time: 5/22/2024 1:20 AM    Performed by: Guillermo Toledo DO  Authorized by: Guillermo Toledo DO    Critical care provider statement:     Critical care time (minutes):  37    Critical care time was exclusive of:  Separately billable procedures and treating other patients and teaching time    Critical care was necessary to treat or prevent imminent or life-threatening deterioration of the following conditions:  Respiratory failure and sepsis    Critical care was time spent personally by me on the following activities:  Obtaining history  from patient or surrogate, development of treatment plan with patient or surrogate, discussions with primary provider, evaluation of patient's response to treatment, examination of patient, review of old charts, re-evaluation of patient's condition, ordering and review of radiographic studies, ordering and review of laboratory studies and ordering and performing treatments and interventions    I assumed direction of critical care for this patient from another provider in my specialty: no

## 2024-05-22 NOTE — ASSESSMENT & PLAN NOTE
Patient with longstanding swallowing difficulty due to radiation injury following treatment for his tongue cancer   Has had multiple hospital admissions for aspiration pneumonia   Follows with ENT for partial vocal cord paralysis, dysphagia    Previously had a PEG tube which was reversed 12/18/23.   S/p MDL/vocal fold steroid injection, posterior pharyngeal wall injection, esophagoscopy with dilation 09/16/22 and balloon dilation of UES 08/25/2023 with subsequent improvement in dysphagia symptoms. Patient was discharged from speech therapy 02/2024 as his swallowing returned to baseline.   Patient had EGD performed during recent [place] hospital admission which showed congestion and swelling proximal to the vocal cord. Patient decline speech therapy evaluation at that time and requested to follow up with his ENT doctor.   Will consult speech therapy   Aspiration precautions   ENT consult appreciated

## 2024-05-22 NOTE — SPEECH THERAPY NOTE
Speech-Language Pathology Bedside Swallow Evaluation        Patient Name: Mohamud Grimm Sr.    Today's Date: 5/22/2024     Problem List  Principal Problem:    Sepsis due to aspiration pneumonia (HCC)  Active Problems:    Essential hypertension    Hypothyroidism    Acute hypoxemic respiratory failure (HCC)    History of tongue cancer    Alcohol use    ERIK (obstructive sleep apnea)    Dysphagia         Past Medical History  Past Medical History:   Diagnosis Date    Cancer of base of tongue (HCC)     excision    Cough     CPAP (continuous positive airway pressure) dependence     waiting on consult    Disease of thyroid gland     hypo    Dysphagia     ED (erectile dysfunction)     History of pneumonia     Hypertension     Leukopenia     Peyronie's disease     PONV (postoperative nausea and vomiting)     Psoriasis     Sleep apnea     Tongue cancer (HCC)     Weight loss, abnormal        Past Surgical History  Past Surgical History:   Procedure Laterality Date    ESOPHAGOSCOPY N/A 9/16/2022    Procedure: ESOPHAGOSCOPY FLEXIBLE;  Surgeon: Devonte Singh MD;  Location: [facility];  Service: ENT    ESOPHAGOSCOPY N/A 8/25/2023    Procedure: TRANSNASAL ESOPHAGOSCOPY WITH BALLOON DILATION/ ESOPHAGEAL DILATION (BALLOON);;  Surgeon: Devonte Singh MD;  Location: [facility];  Service: ENT    OTHER SURGICAL HISTORY      infusion port inserted and removed    PEG TUBE PLACEMENT      and removal    MS COLONOSCOPY FLX DX W/COLLJ SPEC WHEN PFRMD N/A 6/27/2017    Procedure: COLONOSCOPY with polypectomy x2;  Surgeon: Janet Willard MD;  Location: [facility];  Service: General    MS LARGSC W/NJX VOCAL CORD THER W/MICRO/TELESCOPE Bilateral 9/16/2022    Procedure: micro direct laryngoscopy, vocal fold injection, biopsy epiglottis lesion, posterior pharyngeal wall injection, flexible esophagoscopy with dilation;  Surgeon: Devonte Singh MD;  Location: [facility];  Service: ENT    TONGUE BIOPSY / EXCISION      Floor mouth excision of malignant  "tumor       Summary   Pt presents with s/s moderate-severe oropharyngeal dysphagia at baseline c/b long term pmhx of recurrent aspiration PNA (with multiple hospitalizations) 2/2 non-compliance with diet consistency modifications s/p oral cancer w chemo in 2012. Pt reports that he will not used thickened liquids, and is not interested in inpatient swallow therapy or inpatient VBS. Pt stated \"I know what my results will be, it's always the same. I did not work this hard and come this far to give up and drink thick liquids. I think about my quality of life before diet modifications. My quality of life is more important to me\". Pt seen at bedside with breakfast tray and noted with consistent wet cough with thin liquids. Pt known to have silent aspiration on thin liquids as per prior VBS's. Pt was educated on the utilization of compensatory strategies for swallow, the benefits of thickened liquids, and was educated on the importance of f/u with outpatient swallow therapy as well as ENT. Pt was given nectar thick liquids to try, pt stated he would consider. Pt reports he regularly follows up with his ENT, who recently visited him this hospitalization.     Recommendations:   Diet: regular diet and thin liquids as per pt preference  Meds:  as tolerated    Frequent Oral care: 4x/day  Aspiration precautions and compensatory swallowing strategies: upright posture, slow rate of feeding, small bites/sips, and alternating bites and sips  Other Recommendations/ considerations: F/u with outpatient swallow therapy, f/u with ENT     Current Medical Status  Pt is a 59 y.o. male who presented to [facility]  with hx SCC base of tongue s/p chemo/RT with subsequent radiation injury with partial vocal cord paralysis and dysphagia, HTN, hypothyroidism, ERIK on CPAP at  , alcohol use4-5 beers/day , aspiration pneumonia 06/2023 requiring intubation and PEG tube placement   , hospitalized 04/2024 in [place] for aspiration " pneumonitis (had an EGD during that time which showed congestion and swelling proximal to the vocal cord , has not yet f/u with ENT as recommended )  who presents to ED via EMS from urgent care due to hypoxia 80% on room air. Patient endorsing fever/chills, malaise, worsening cough, congestion, SOB x 1 week .Reports persistent cough that he has for which he is taking gabapentin 600mg BID hat pt feels is sedating and not helping cough .  On exam, febrile 102.8 F, bibasilar rhonchi.O2 sat 95 % on 2 L O2nc . Labs procal 1.80 . CXR shows multifocal PNA. Pt given IV Tylenol, IVF, IV abx in ED. Admitted as Inpatient with sepsi s due to aspiration pneumonia, acute hypoxemic resp failure . Dysphagia. .    Past medical history:   Please see H&P for details    Special Studies:  -Chest xray 05/22/2024: Bibasilar pleural-parenchymal opacities. In conjunction with patient's presentation and clinical history, findings are most consistent with aspiration pneumonia.    Social/Education/Vocational Hx:  Pt lives alone and with family.    Swallow Information   Prior speech/swallowing tx: Pt is very well known to speech and swallow services. Most recent VBS done on July 5th, 2024: Pt presents with mild oral and severe-profound pharyngeal dysphagia characterized by reduced AP transport, delayed swallow initiation with significantly reduced/minimal hyolaryngeal rise, reduced base of tongue retraction, absent epiglottic inversion and absent pharyngeal stripping wave. Airway protection/closure was minimal, and silent aspiration occurred with all liquid consistencies. Pt was unable to swallow puree due to lack of driving force on the bolus and had to regurgitate/expectorate the puree from his vallecula. Strategies were not effective in eliminating aspiration. Note: Images are available for review in PACS as desired.   Current Risks for Dysphagia & Aspiration: known history of dysphagia  Current Symptoms/Concerns: coughing with po, wet vocal  quality, and change in respiratory status  Current Diet: regular diet and thin liquids   Baseline Diet: regular diet and thin liquids  Takes pills-whole with thin liquids    Baseline Assessment   Behavior/Cognition: alert  Speech/Language Status: able to participate in conversation and able to follow commands  Patient Positioning: upright in bed     Swallow Mechanism Exam   Facial: symmetrical  Labial: WFL  Lingual: decreased coordination  Velum: unable to visualize  Mandible:  decreased ROM  Dentition: adequate  Vocal quality:weak, hoarse, and gurgly   Volitional Cough: weak   Respiratory: Wet vocal quality, productive cough, pt with hypoxia on arrival to ED    Consistencies Assessed and Performance   Consistencies Administered: thin liquids and hard solids    Oral Stage: Pt with fair utensil stripping/bolus retrieval/labial seal with thin liquids consistencies and regular solids via spoon and cup sip. Labial/lingual/jaw ROM/coordination/strength decreased. Natural teeth intact.. fair oral control/bolus manipulation with regular solids with increased mastication cycle. Pt with fair suck-swallow technique during straw sip trials with thin liquids. decreased A-P transport with all trialed consistencies.      Pharyngeal Stage: Swallow trigger noted as moderately decreased as evidenced via visual/tactile assessment of hyolaryngeal elevation/excursion, suspected inadequate hyolaryngeal elevation/excursion upon palpation. Significant overt clinical s/s pen/asp observed. Significant coughing/throat clearing observed during PO intake. Vocal quality hoarse s/p swallow.      Esophageal Concerns: none reported      Results Reviewed with: patient, RN, and MD Mecca Young MS, CCC-SLP  Speech Pathologist  Available via [employer]

## 2024-05-22 NOTE — ASSESSMENT & PLAN NOTE
Hypoxic 80% at Urgent Care currently requiring 2L NC   2/2 aspiration pneumonia   Management as stated above   Continue to wean off oxygen to maintain oxygen saturation >90%  Check formal ambulatory pulse ox study prior to discharge

## 2024-05-22 NOTE — RESPIRATORY THERAPY NOTE
05/22/24 1814   Respiratory Assessment   Resp Comments Called to room by RN. Pt spo2 85% on 5L. Checked for accuracy of reading, pleth was good and dinamap reading matched. Pt denied any SOB or WOB at this time. Placed pt on MFNC and titrated until spo2 of 90% was reached. Pt does not appear to be in any respiratory distress at this time. RN notifiying MD of oxygen requirement change.   O2 Device MFNC   Oxygen Therapy/Pulse Ox   O2 Device Mid flow nasal cannula   Nasal Cannula O2 Flow Rate (L/min) 10 L/min   Calculated FIO2 (%) - Nasal Cannula 60   SpO2 90 %   SpO2 Activity At Rest   $ Pulse Oximetry Spot Check Charge Completed

## 2024-05-22 NOTE — ASSESSMENT & PLAN NOTE
Patient with history of tongue cancer s/p radiation chemotherapy and subsequent pharyngoesophageal dysphagia presents via EMS from urgent care due to hypoxia 80% on room air with fever.   Patient noted to have fever and tachycardia  CXR pending formal read -- shows bilateral bibasilar pneumonia on my review   Procalcitonin elevated 1.8   Patient has a history of recurrent aspiration pneumonia. He was previously intubated with PEG tube placement 06/2023 in [place] 2/2 septic shock from aspiration pneumonia. Peg tube removed 12/18/23.   Recently admitted to hospital in [place] 04/13/24 for aspiration pneumonitis in [place]- was treated with Zosyn then discharged with Augmentin for 7 days. Possible CXR findings are residual from recent pneumonia- unable to see images.    IV Cefepime and Vancomycin given recent hospital admission and treatment for pneumonia -- deescalate as able based off sputum culture  Check MRSA swab   Trend procal   Obtain urine strep and legionella   Check sputum culture   Aspiration precautions   Speech swallow evaluation -- was previously following with speech therapy and discharged as his swallow had returned to baseline per ST note, is currently on regular diet  Monitor fever curve/hemodynamics

## 2024-05-22 NOTE — ASSESSMENT & PLAN NOTE
Hx of chronic alcohol use   Patient drinks 4-5 beers daily  Monitor CIWA  Thiamine, folic acid, multivitamin

## 2024-05-23 LAB
ANION GAP SERPL CALCULATED.3IONS-SCNC: 6 MMOL/L (ref 4–13)
ANION GAP SERPL CALCULATED.3IONS-SCNC: 8 MMOL/L (ref 4–13)
BASOPHILS # BLD AUTO: 0.03 THOUSANDS/ÂΜL (ref 0–0.1)
BASOPHILS NFR BLD AUTO: 1 % (ref 0–1)
BUN SERPL-MCNC: 5 MG/DL (ref 5–25)
BUN SERPL-MCNC: 5 MG/DL (ref 5–25)
CALCIUM SERPL-MCNC: 8.1 MG/DL (ref 8.4–10.2)
CALCIUM SERPL-MCNC: 8.2 MG/DL (ref 8.4–10.2)
CHLORIDE SERPL-SCNC: 103 MMOL/L (ref 96–108)
CHLORIDE SERPL-SCNC: 103 MMOL/L (ref 96–108)
CO2 SERPL-SCNC: 26 MMOL/L (ref 21–32)
CO2 SERPL-SCNC: 28 MMOL/L (ref 21–32)
CREAT SERPL-MCNC: 0.48 MG/DL (ref 0.6–1.3)
CREAT SERPL-MCNC: 0.52 MG/DL (ref 0.6–1.3)
EOSINOPHIL # BLD AUTO: 0.24 THOUSAND/ÂΜL (ref 0–0.61)
EOSINOPHIL NFR BLD AUTO: 4 % (ref 0–6)
ERYTHROCYTE [DISTWIDTH] IN BLOOD BY AUTOMATED COUNT: 13.8 % (ref 11.6–15.1)
GFR SERPL CREATININE-BSD FRML MDRD: 115 ML/MIN/1.73SQ M
GFR SERPL CREATININE-BSD FRML MDRD: 118 ML/MIN/1.73SQ M
GLUCOSE SERPL-MCNC: 82 MG/DL (ref 65–140)
GLUCOSE SERPL-MCNC: 90 MG/DL (ref 65–140)
HCT VFR BLD AUTO: 33.8 % (ref 36.5–49.3)
HGB BLD-MCNC: 10.6 G/DL (ref 12–17)
IMM GRANULOCYTES # BLD AUTO: 0.03 THOUSAND/UL (ref 0–0.2)
IMM GRANULOCYTES NFR BLD AUTO: 1 % (ref 0–2)
LYMPHOCYTES # BLD AUTO: 0.62 THOUSANDS/ÂΜL (ref 0.6–4.47)
LYMPHOCYTES NFR BLD AUTO: 10 % (ref 14–44)
MAGNESIUM SERPL-MCNC: 1.7 MG/DL (ref 1.9–2.7)
MCH RBC QN AUTO: 29.1 PG (ref 26.8–34.3)
MCHC RBC AUTO-ENTMCNC: 31.4 G/DL (ref 31.4–37.4)
MCV RBC AUTO: 93 FL (ref 82–98)
MONOCYTES # BLD AUTO: 0.43 THOUSAND/ÂΜL (ref 0.17–1.22)
MONOCYTES NFR BLD AUTO: 7 % (ref 4–12)
MRSA NOSE QL CULT: NORMAL
NEUTROPHILS # BLD AUTO: 4.74 THOUSANDS/ÂΜL (ref 1.85–7.62)
NEUTS SEG NFR BLD AUTO: 77 % (ref 43–75)
NRBC BLD AUTO-RTO: 0 /100 WBCS
PLATELET # BLD AUTO: 252 THOUSANDS/UL (ref 149–390)
PMV BLD AUTO: 9.2 FL (ref 8.9–12.7)
POTASSIUM SERPL-SCNC: 4 MMOL/L (ref 3.5–5.3)
POTASSIUM SERPL-SCNC: 4 MMOL/L (ref 3.5–5.3)
PROCALCITONIN SERPL-MCNC: 1.6 NG/ML
RBC # BLD AUTO: 3.64 MILLION/UL (ref 3.88–5.62)
SODIUM SERPL-SCNC: 137 MMOL/L (ref 135–147)
SODIUM SERPL-SCNC: 137 MMOL/L (ref 135–147)
VANCOMYCIN SERPL-MCNC: 19.1 UG/ML (ref 10–20)
WBC # BLD AUTO: 6.09 THOUSAND/UL (ref 4.31–10.16)

## 2024-05-23 PROCEDURE — 83735 ASSAY OF MAGNESIUM: CPT | Performed by: INTERNAL MEDICINE

## 2024-05-23 PROCEDURE — 85025 COMPLETE CBC W/AUTO DIFF WBC: CPT | Performed by: INTERNAL MEDICINE

## 2024-05-23 PROCEDURE — 80202 ASSAY OF VANCOMYCIN: CPT | Performed by: INTERNAL MEDICINE

## 2024-05-23 PROCEDURE — 84145 PROCALCITONIN (PCT): CPT | Performed by: INTERNAL MEDICINE

## 2024-05-23 PROCEDURE — 80048 BASIC METABOLIC PNL TOTAL CA: CPT | Performed by: INTERNAL MEDICINE

## 2024-05-23 PROCEDURE — 94760 N-INVAS EAR/PLS OXIMETRY 1: CPT

## 2024-05-23 PROCEDURE — 99232 SBSQ HOSP IP/OBS MODERATE 35: CPT | Performed by: INTERNAL MEDICINE

## 2024-05-23 RX ORDER — ECHINACEA PURPUREA EXTRACT 125 MG
2 TABLET ORAL EVERY 4 HOURS
Status: DISCONTINUED | OUTPATIENT
Start: 2024-05-23 | End: 2024-05-24 | Stop reason: HOSPADM

## 2024-05-23 RX ORDER — AMLODIPINE BESYLATE 2.5 MG/1
2.5 TABLET ORAL DAILY
Status: DISCONTINUED | OUTPATIENT
Start: 2024-05-24 | End: 2024-05-24 | Stop reason: HOSPADM

## 2024-05-23 RX ADMIN — HYDROCODONE POLISTIREX AND CHLORPHENIRAMINE POLISTIREX 5 ML: 10; 8 SUSPENSION, EXTENDED RELEASE ORAL at 12:20

## 2024-05-23 RX ADMIN — MULTIPLE VITAMINS W/ MINERALS TAB 1 TABLET: TAB ORAL at 08:50

## 2024-05-23 RX ADMIN — LEVOTHYROXINE SODIUM 100 MCG: 100 TABLET ORAL at 06:03

## 2024-05-23 RX ADMIN — AZELASTINE HYDROCHLORIDE 1 SPRAY: 137 SPRAY, METERED NASAL at 08:58

## 2024-05-23 RX ADMIN — FOLIC ACID 1 MG: 1 TABLET ORAL at 08:50

## 2024-05-23 RX ADMIN — GABAPENTIN 600 MG: 300 CAPSULE ORAL at 17:39

## 2024-05-23 RX ADMIN — CEFEPIME 2000 MG: 2 INJECTION, POWDER, FOR SOLUTION INTRAVENOUS at 22:05

## 2024-05-23 RX ADMIN — CEFEPIME 2000 MG: 2 INJECTION, POWDER, FOR SOLUTION INTRAVENOUS at 14:49

## 2024-05-23 RX ADMIN — AMLODIPINE BESYLATE 5 MG: 5 TABLET ORAL at 08:51

## 2024-05-23 RX ADMIN — PENTOXIFYLLINE 400 MG: 400 TABLET, EXTENDED RELEASE ORAL at 17:29

## 2024-05-23 RX ADMIN — SALINE NASAL SPRAY 2 SPRAY: 1.5 SOLUTION NASAL at 17:37

## 2024-05-23 RX ADMIN — Medication 100 MG: at 08:50

## 2024-05-23 RX ADMIN — FAMOTIDINE 40 MG: 40 POWDER, FOR SUSPENSION ORAL at 22:05

## 2024-05-23 RX ADMIN — AZELASTINE HYDROCHLORIDE 1 SPRAY: 137 SPRAY, METERED NASAL at 17:38

## 2024-05-23 RX ADMIN — HYDROCODONE POLISTIREX AND CHLORPHENIRAMINE POLISTIREX 5 ML: 10; 8 SUSPENSION, EXTENDED RELEASE ORAL at 23:01

## 2024-05-23 RX ADMIN — PENTOXIFYLLINE 400 MG: 400 TABLET, EXTENDED RELEASE ORAL at 12:20

## 2024-05-23 RX ADMIN — PENTOXIFYLLINE 400 MG: 400 TABLET, EXTENDED RELEASE ORAL at 08:50

## 2024-05-23 RX ADMIN — VANCOMYCIN HYDROCHLORIDE 1250 MG: 10 INJECTION, POWDER, LYOPHILIZED, FOR SOLUTION INTRAVENOUS at 12:20

## 2024-05-23 RX ADMIN — SALINE NASAL SPRAY 2 SPRAY: 1.5 SOLUTION NASAL at 12:29

## 2024-05-23 RX ADMIN — SALINE NASAL SPRAY 2 SPRAY: 1.5 SOLUTION NASAL at 22:05

## 2024-05-23 RX ADMIN — GABAPENTIN 600 MG: 300 CAPSULE ORAL at 08:50

## 2024-05-23 RX ADMIN — CEFEPIME 2000 MG: 2 INJECTION, POWDER, FOR SOLUTION INTRAVENOUS at 06:08

## 2024-05-23 NOTE — ASSESSMENT & PLAN NOTE
Continue gabapentin, flonase, tessalon  Ttussionex q12 hours  Schedule nasal saline spray q4 hours

## 2024-05-23 NOTE — PLAN OF CARE
Problem: PAIN - ADULT  Goal: Verbalizes/displays adequate comfort level or baseline comfort level  Description: Interventions:  - Encourage patient to monitor pain and request assistance  - Assess pain using appropriate pain scale  - Administer analgesics based on type and severity of pain and evaluate response  - Implement non-pharmacological measures as appropriate and evaluate response  - Consider cultural and social influences on pain and pain management  - Notify physician/advanced practitioner if interventions unsuccessful or patient reports new pain  Outcome: Progressing     Problem: INFECTION - ADULT  Goal: Absence or prevention of progression during hospitalization  Description: INTERVENTIONS:  - Assess and monitor for signs and symptoms of infection  - Monitor lab/diagnostic results  - Monitor all insertion sites, i.e. indwelling lines, tubes, and drains  - Monitor endotracheal if appropriate and nasal secretions for changes in amount and color  - [place] appropriate cooling/warming therapies per order  - Administer medications as ordered  - Instruct and encourage patient and family to use good hand hygiene technique  - Identify and instruct in appropriate isolation precautions for identified infection/condition  Outcome: Progressing     Problem: SAFETY ADULT  Goal: Patient will remain free of falls  Description: INTERVENTIONS:  - Educate patient/family on patient safety including physical limitations  - Instruct patient to call for assistance with activity   - Consult OT/PT to assist with strengthening/mobility   - Keep Call bell within reach  - Keep bed low and locked with side rails adjusted as appropriate  - Keep care items and personal belongings within reach  - Initiate and maintain comfort rounds  - Make Fall Risk Sign visible to staff  - Offer Toileting every  Hours, in advance of need  - Initiate/Maintain alarm  - Obtain necessary fall risk management equipment: Apply yellow socks and bracelet  for high fall risk patients  - Consider moving patient to room near nurses station  Outcome: Progressing     Problem: DISCHARGE PLANNING  Goal: Discharge to home or other facility with appropriate resources  Description: INTERVENTIONS:  - Identify barriers to discharge w/patient and caregiver  - Arrange for needed discharge resources and transportation as appropriate  - Identify discharge learning needs (meds, wound care, etc.)  - Arrange for interpretive services to assist at discharge as needed  - Refer to Case Management Department for coordinating discharge planning if the patient needs post-hospital services based on physician/advanced practitioner order or complex needs related to functional status, cognitive ability, or social support system  Outcome: Progressing     Problem: Knowledge Deficit  Goal: Patient/family/caregiver demonstrates understanding of disease process, treatment plan, medications, and discharge instructions  Description: Complete learning assessment and assess knowledge base.  Interventions:  - Provide teaching at level of understanding  - Provide teaching via preferred learning methods  Outcome: Progressing     Problem: RESPIRATORY - ADULT  Goal: Achieves optimal ventilation and oxygenation  Description: INTERVENTIONS:  - Assess for changes in respiratory status  - Assess for changes in mentation and behavior  - Position to facilitate oxygenation and minimize respiratory effort  - Oxygen administered by appropriate delivery if ordered  - Initiate smoking cessation education as indicated  - Encourage broncho-pulmonary hygiene including cough, deep breathe, Incentive Spirometry  - Assess the need for suctioning and aspirate as needed  - Assess and instruct to report SOB or any respiratory difficulty  - Respiratory Therapy support as indicated  Outcome: Progressing

## 2024-05-23 NOTE — PROGRESS NOTES
Counts include 234 beds at the [facility]  Progress Note  Name: Mohamud Grimm Sr. I  MRN: [id number]  Unit/Bed#: W -01 I Date of Admission: 5/21/2024   Date of Service: 5/23/2024 I Hospital Day: 2    Assessment & Plan   * Sepsis due to aspiration pneumonia (HCC)  Assessment & Plan  Patient with history of tongue cancer s/p radiation/chemotherapy 2012 and subsequent radiation injury with pharyngoesophageal dysphagia and partial vocal cord paralysis presents via EMS from urgent care due to hypoxia 80% on room air with fever. Patient noted to have fever and tachycardia  CXR showed  bibasilar pleural-parenchymal opacities concerning for recurrent aspiration pneumonia.    Patient has a history of recurrent aspiration pneumonia. He was previously intubated with PEG tube placement 06/2023 in [place] 2/2 septic shock from aspiration pneumonia. Peg tube removed 12/18/23.   Recently admitted to hospital in [place] 04/13/24 for aspiration pneumonitis in [place]- was treated with Zosyn then discharged with Augmentin for 7 days. Recent repeat EGD last month showed no severe esophagitis.  No significant esophageal stricturing.  Possible mild candidal esophagitis was noted and treated with diflucan.  Continue IV Cefepime.  MRSA negative.  DC Vanc.  F/u sputum cx, gram stain with 3+ polys and 1+ gram+ cocci in pairs.  Strep pneumo Ag negative.  Aspiration precautions   Appreciate swallow eval  Monitor fever curve/hemodynamics   Outpatient follow-up with Dr. Singh to discuss repeat dilation.      Dysphagia  Assessment & Plan  Patient with longstanding swallowing difficulty due to radiation injury following treatment for his tongue cancer   Has had multiple hospital admissions for aspiration pneumonia   Follows with ENT for partial vocal cord paralysis, dysphagia    Previously had a PEG tube which was reversed 12/18/23.   S/p MDL/vocal fold steroid injection, posterior pharyngeal wall injection, esophagoscopy with dilation  09/16/22 and balloon dilation of UES 08/25/2023 with subsequent improvement in dysphagia symptoms. Patient was discharged from speech therapy 02/2024 as his swallowing returned to baseline.   Patient had EGD performed during recent [place] hospital admission which showed congestion and swelling proximal to the vocal cord.   Speech recommend regular diet.  Ideally recommending nectar thick liquids though patient not amenable to thickening liquids.  He is asked to utilize swallowing mechanisms he has been taught.    Patient would benefit from outpatient speech referral  Will f/u with Dr. Singh in the office for consideration for repeat dilation    Chronic cough  Assessment & Plan  Continue gabapentin, flonase, tessalon  Ttussionex q12 hours  Schedule nasal saline spray q4 hours    ERIK (obstructive sleep apnea)  Assessment & Plan  CPAP qhs  Should have repeat PAP study as outpatient for determination for adding oxygen therapy to CPAP    Alcohol use  Assessment & Plan  Hx of chronic alcohol use   Patient drinks 4-5 beers daily  Monitor CIWA  Thiamine, folic acid, multivitamin     Acute hypoxemic respiratory failure (HCC)  Assessment & Plan  Hypoxic PTA 80% at Urgent Care currently requiring 2L NC .  Improving, now sats low 90s on 1.5L-RA.  Will need home 02 eval prior to DC      Hypothyroidism  Assessment & Plan  Continue levothyroxine    Essential hypertension  Assessment & Plan  Decrease Norvasc to 2.5 mg daily.           VTE Pharmacologic Prophylaxis: VTE Score: 6 High Risk (Score >/= 5) - Pharmacological DVT Prophylaxis Ordered: enoxaparin (Lovenox). Sequential Compression Devices Ordered.    Mobility:   Basic Mobility Inpatient Raw Score: 24  JH-HLM Goal: 8: Walk 250 feet or more  JH-HLM Achieved: 7: Walk 25 feet or more  JH-HLM Goal NOT achieved. Continue with multidisciplinary rounding and encourage appropriate mobility to improve upon JH-HLM goals.    Patient Centered Rounds: I performed bedside rounds with  nursing staff today.   Discussions with Specialists or Other Care Team Provider: KASH    Education and Discussions with Family / Patient: Attempted to update  (wife) via phone. Unable to contact.    Total Time Spent on Date of Encounter in care of patient: 30 mins. This time was spent on one or more of the following: performing physical exam; counseling and coordination of care; obtaining or reviewing history; documenting in the medical record; reviewing/ordering tests, medications or procedures; communicating with other healthcare professionals and discussing with patient's family/caregivers.    Current Length of Stay: 2 day(s)  Current Patient Status: Inpatient   Certification Statement: The patient will continue to require additional inpatient hospital stay due to IV abx and eval for home 02  Discharge Plan: Anticipate discharge in 24-48 hrs to home.    Code Status: Level 1 - Full Code    Subjective:   + cough, clear sputum.  Seems to be coughing more with meals and liquids.  Not using a liquid modifier as recommended by speech therapy.  Afebrile.      Objective:     Vitals:   Temp (24hrs), Av.1 °F (37.3 °C), Min:98.4 °F (36.9 °C), Max:99.9 °F (37.7 °C)    Temp:  [98.4 °F (36.9 °C)-99.9 °F (37.7 °C)] 98.4 °F (36.9 °C)  HR:  [61-82] 65  Resp:  [17-18] 18  BP: ()/(54-88) 98/54  SpO2:  [90 %-99 %] 92 %  Body mass index is 24.38 kg/m².     Input and Output Summary (last 24 hours):     Intake/Output Summary (Last 24 hours) at 2024 1902  Last data filed at 2024 0855  Gross per 24 hour   Intake 600 ml   Output --   Net 600 ml       Physical Exam:   Physical Exam  Vitals reviewed.   Constitutional:       Appearance: He is ill-appearing. He is not toxic-appearing.   Cardiovascular:      Rate and Rhythm: Normal rate and regular rhythm.   Pulmonary:      Effort: No respiratory distress.      Breath sounds: Rhonchi present. No wheezing or rales.   Abdominal:      General: There is no  distension.      Palpations: There is no mass.      Tenderness: There is no abdominal tenderness. There is no guarding.   Skin:     General: Skin is warm and dry.   Neurological:      Mental Status: Mental status is at baseline.          Additional Data:     Labs:  Results from last 7 days   Lab Units 05/23/24  0507   WBC Thousand/uL 6.09   HEMOGLOBIN g/dL 10.6*   HEMATOCRIT % 33.8*   PLATELETS Thousands/uL 252   SEGS PCT % 77*   LYMPHO PCT % 10*   MONO PCT % 7   EOS PCT % 4     Results from last 7 days   Lab Units 05/23/24  0507 05/22/24 0432 05/21/24 1913   SODIUM mmol/L 137  137   < > 134*   POTASSIUM mmol/L 4.0  4.0   < > 3.7   CHLORIDE mmol/L 103  103   < > 97   CO2 mmol/L 26  28   < > 29   BUN mg/dL 5  5   < > 9   CREATININE mg/dL 0.48*  0.52*   < > 0.52*   ANION GAP mmol/L 8  6   < > 8   CALCIUM mg/dL 8.2*  8.1*   < > 9.1   ALBUMIN g/dL  --   --  3.3*   TOTAL BILIRUBIN mg/dL  --   --  0.61   ALK PHOS U/L  --   --  80   ALT U/L  --   --  11   AST U/L  --   --  15   GLUCOSE RANDOM mg/dL 82  90   < > 118    < > = values in this interval not displayed.     Results from last 7 days   Lab Units 05/21/24 1913   INR  0.99             Results from last 7 days   Lab Units 05/23/24  0507 05/22/24 0432 05/21/24 1913   LACTIC ACID mmol/L  --   --  0.7   PROCALCITONIN ng/ml 1.60* 1.96* 1.80*       Lines/Drains:  Invasive Devices       Peripheral Intravenous Line  Duration             Peripheral IV 05/21/24 Dorsal (posterior);Right Forearm 1 day    Peripheral IV 05/21/24 Left;Proximal;Ventral (anterior) Forearm 1 day                    Imaging: No pertinent imaging reviewed.    Recent Cultures (last 7 days):   Results from last 7 days   Lab Units 05/22/24  0448 05/21/24  2341 05/21/24  1950 05/21/24 1913   BLOOD CULTURE   --   --  No Growth at 24 hrs. No Growth at 24 hrs.   SPUTUM CULTURE   --  Culture too young- will reincubate  --   --    GRAM STAIN RESULT   --  3+ Polys*  1+ Gram positive cocci in pairs*   No Epithelial cells seen*  --   --    LEGIONELLA URINARY ANTIGEN  Negative  --   --   --        Last 24 Hours Medication List:   Current Facility-Administered Medications   Medication Dose Route Frequency Provider Last Rate    acetaminophen  650 mg Oral Q6H PRN Mirella Mccurdy PA-C      [START ON 5/24/2024] amLODIPine  2.5 mg Oral Daily Dany Huber MD      azelastine  1 spray Each Nare BID Mirella Mccurdy PA-C      cefepime  2,000 mg Intravenous Q8H Mirella Mccurdy PA-C 2,000 mg (05/23/24 1449)    dextromethorphan-guaiFENesin  10 mL Oral Q4H PRN Mirella Mccurdy PA-C      enoxaparin  40 mg Subcutaneous Daily Mirella Mccurdy PA-C      famotidine  40 mg Oral HS Mirella Mccurdy PA-C      fluticasone  1 spray Nasal Daily PRN Mirella Mccurdy PA-C      folic acid  1 mg Oral Daily Mirella Mccurdy PA-C      gabapentin  600 mg Oral BID Mirella Mccurdy PA-C      Hydrocod Andrei-Chlorphe Andrei ER  5 mL Oral Q12H Dany Huber MD      levothyroxine  100 mcg Oral Early Morning Mirella Mccurdy PA-C      multivitamin-minerals  1 tablet Oral Daily Mirella Mccurdy PA-C      pentoxifylline  400 mg Oral TID With Meals Mirella Mccurdy PA-C      sodium chloride  2 spray Each Nare Q4H Dany Huber MD      thiamine  100 mg Oral Daily Mirella Mccurdy PA-C          Today, Patient Was Seen By: Dany Huber MD    **Please Note: This note may have been constructed using a voice recognition system.**

## 2024-05-23 NOTE — PLAN OF CARE
Problem: PAIN - ADULT  Goal: Verbalizes/displays adequate comfort level or baseline comfort level  Description: Interventions:  - Encourage patient to monitor pain and request assistance  - Assess pain using appropriate pain scale  - Administer analgesics based on type and severity of pain and evaluate response  - Implement non-pharmacological measures as appropriate and evaluate response  - Consider cultural and social influences on pain and pain management  - Notify physician/advanced practitioner if interventions unsuccessful or patient reports new pain  Outcome: Progressing     Problem: INFECTION - ADULT  Goal: Absence or prevention of progression during hospitalization  Description: INTERVENTIONS:  - Assess and monitor for signs and symptoms of infection  - Monitor lab/diagnostic results  - Monitor all insertion sites, i.e. indwelling lines, tubes, and drains  - Monitor endotracheal if appropriate and nasal secretions for changes in amount and color  - Riner appropriate cooling/warming therapies per order  - Administer medications as ordered  - Instruct and encourage patient and family to use good hand hygiene technique  - Identify and instruct in appropriate isolation precautions for identified infection/condition  Outcome: Progressing     Problem: SAFETY ADULT  Goal: Patient will remain free of falls  Description: INTERVENTIONS:  - Educate patient/family on patient safety including physical limitations  - Instruct patient to call for assistance with activity   - Consult OT/PT to assist with strengthening/mobility   - Keep Call bell within reach  - Keep bed low and locked with side rails adjusted as appropriate  - Keep care items and personal belongings within reach  - Initiate and maintain comfort rounds  - Make Fall Risk Sign visible to staff  - Obtain necessary fall risk management equipment  - Apply yellow socks and bracelet for high fall risk patients  - Consider moving patient to room near nurses  station  Outcome: Progressing     Problem: DISCHARGE PLANNING  Goal: Discharge to home or other facility with appropriate resources  Description: INTERVENTIONS:  - Identify barriers to discharge w/patient and caregiver  - Arrange for needed discharge resources and transportation as appropriate  - Identify discharge learning needs (meds, wound care, etc.)  - Arrange for interpretive services to assist at discharge as needed  - Refer to Case Management Department for coordinating discharge planning if the patient needs post-hospital services based on physician/advanced practitioner order or complex needs related to functional status, cognitive ability, or social support system  Outcome: Progressing     Problem: Knowledge Deficit  Goal: Patient/family/caregiver demonstrates understanding of disease process, treatment plan, medications, and discharge instructions  Description: Complete learning assessment and assess knowledge base.  Interventions:  - Provide teaching at level of understanding  - Provide teaching via preferred learning methods  Outcome: Progressing     Problem: RESPIRATORY - ADULT  Goal: Achieves optimal ventilation and oxygenation  Description: INTERVENTIONS:  - Assess for changes in respiratory status  - Assess for changes in mentation and behavior  - Position to facilitate oxygenation and minimize respiratory effort  - Oxygen administered by appropriate delivery if ordered  - Initiate smoking cessation education as indicated  - Encourage broncho-pulmonary hygiene including cough, deep breathe, Incentive Spirometry  - Assess the need for suctioning and aspirate as needed  - Assess and instruct to report SOB or any respiratory difficulty  - Respiratory Therapy support as indicated  Outcome: Progressing

## 2024-05-23 NOTE — ASSESSMENT & PLAN NOTE
Patient with longstanding swallowing difficulty due to radiation injury following treatment for his tongue cancer   Has had multiple hospital admissions for aspiration pneumonia   Follows with ENT for partial vocal cord paralysis, dysphagia    Previously had a PEG tube which was reversed 12/18/23.   S/p MDL/vocal fold steroid injection, posterior pharyngeal wall injection, esophagoscopy with dilation 09/16/22 and balloon dilation of UES 08/25/2023 with subsequent improvement in dysphagia symptoms. Patient was discharged from speech therapy 02/2024 as his swallowing returned to baseline.   Patient had EGD performed during recent [place] hospital admission which showed congestion and swelling proximal to the vocal cord.   Speech recommend regular diet.  Ideally recommending nectar thick liquids though patient not amenable to thickening liquids.  He is asked to utilize swallowing mechanisms he has been taught.    Patient would benefit from outpatient speech referral  Will f/u with Dr. Singh in the office for consideration for repeat dilation

## 2024-05-23 NOTE — QUICK NOTE
Attempted to contact wife for medical update.  Unable to reach or leave a voicemail update; will try again on 5/24/24

## 2024-05-23 NOTE — ASSESSMENT & PLAN NOTE
Hypoxic PTA 80% at Urgent Care currently requiring 2L NC .  Improving, now sats low 90s on 1.5L-RA.  Will need home 02 eval prior to DC

## 2024-05-23 NOTE — PROGRESS NOTES
Mohamud CH Sirisha Dash is a 59 y.o. male who is currently ordered Vancomycin IV with management by the Pharmacy Consult service.  Relevant clinical data and objective / subjective history reviewed.  Vancomycin Assessment:  Indication and Goal AUC/Trough: Pneumonia (goal -600, trough >10)  Clinical Status: stable  Micro:     Renal Function:  SCr: 0.52 mg/dL  CrCl: 148.8 mL/min  Renal replacement: Not on dialysis  Days of Therapy: 2  Current Dose: 1750 mg IV q12h  Vancomycin Plan:  New Dosin mg IV q12h  Estimated AUC: 462 mcg*hr/mL  Estimated Trough: 10.9 mcg/mL  Next Level: / at 0600  Renal Function Monitoring: Daily BMP and UOP  Pharmacy will continue to follow closely for s/sx of nephrotoxicity, infusion reactions and appropriateness of therapy.  BMP and CBC will be ordered per protocol. We will continue to follow the patient’s culture results and clinical progress daily.    Terra Barger, Pharmacist

## 2024-05-23 NOTE — ASSESSMENT & PLAN NOTE
Patient with history of tongue cancer s/p radiation/chemotherapy 2012 and subsequent radiation injury with pharyngoesophageal dysphagia and partial vocal cord paralysis presents via EMS from urgent care due to hypoxia 80% on room air with fever. Patient noted to have fever and tachycardia  CXR showed  bibasilar pleural-parenchymal opacities concerning for recurrent aspiration pneumonia.    Patient has a history of recurrent aspiration pneumonia. He was previously intubated with PEG tube placement 06/2023 in [place] 2/2 septic shock from aspiration pneumonia. Peg tube removed 12/18/23.   Recently admitted to hospital in [place] 04/13/24 for aspiration pneumonitis in [place]- was treated with Zosyn then discharged with Augmentin for 7 days. Recent repeat EGD last month showed no severe esophagitis.  No significant esophageal stricturing.  Possible mild candidal esophagitis was noted and treated with diflucan.  Continue IV Cefepime.  MRSA negative.  DC Vanc.  F/u sputum cx, gram stain with 3+ polys and 1+ gram+ cocci in pairs.  Strep pneumo Ag negative.  Aspiration precautions   Appreciate swallow eval  Monitor fever curve/hemodynamics   Outpatient follow-up with Dr. Singh to discuss repeat dilation.

## 2024-05-23 NOTE — ASSESSMENT & PLAN NOTE
CPAP qhs  Should have repeat PAP study as outpatient for determination for adding oxygen therapy to CPAP

## 2024-05-24 VITALS
DIASTOLIC BLOOD PRESSURE: 74 MMHG | BODY MASS INDEX: 24.46 KG/M2 | HEIGHT: 69 IN | WEIGHT: 165.12 LBS | TEMPERATURE: 98.1 F | OXYGEN SATURATION: 95 % | HEART RATE: 59 BPM | SYSTOLIC BLOOD PRESSURE: 124 MMHG | RESPIRATION RATE: 18 BRPM

## 2024-05-24 PROBLEM — J96.01 ACUTE HYPOXEMIC RESPIRATORY FAILURE (HCC): Status: RESOLVED | Noted: 2023-06-17 | Resolved: 2024-05-24

## 2024-05-24 LAB
ANION GAP SERPL CALCULATED.3IONS-SCNC: 6 MMOL/L (ref 4–13)
BACTERIA SPT RESP CULT: ABNORMAL
BASOPHILS # BLD AUTO: 0.02 THOUSANDS/ÂΜL (ref 0–0.1)
BASOPHILS NFR BLD AUTO: 1 % (ref 0–1)
BUN SERPL-MCNC: 5 MG/DL (ref 5–25)
CALCIUM SERPL-MCNC: 8.1 MG/DL (ref 8.4–10.2)
CHLORIDE SERPL-SCNC: 101 MMOL/L (ref 96–108)
CO2 SERPL-SCNC: 28 MMOL/L (ref 21–32)
CREAT SERPL-MCNC: 0.46 MG/DL (ref 0.6–1.3)
EOSINOPHIL # BLD AUTO: 0.22 THOUSAND/ÂΜL (ref 0–0.61)
EOSINOPHIL NFR BLD AUTO: 5 % (ref 0–6)
ERYTHROCYTE [DISTWIDTH] IN BLOOD BY AUTOMATED COUNT: 13.7 % (ref 11.6–15.1)
GFR SERPL CREATININE-BSD FRML MDRD: 121 ML/MIN/1.73SQ M
GLUCOSE SERPL-MCNC: 80 MG/DL (ref 65–140)
GRAM STN SPEC: ABNORMAL
HCT VFR BLD AUTO: 34.9 % (ref 36.5–49.3)
HGB BLD-MCNC: 11 G/DL (ref 12–17)
IMM GRANULOCYTES # BLD AUTO: 0.02 THOUSAND/UL (ref 0–0.2)
IMM GRANULOCYTES NFR BLD AUTO: 1 % (ref 0–2)
LYMPHOCYTES # BLD AUTO: 0.77 THOUSANDS/ÂΜL (ref 0.6–4.47)
LYMPHOCYTES NFR BLD AUTO: 18 % (ref 14–44)
MCH RBC QN AUTO: 29.1 PG (ref 26.8–34.3)
MCHC RBC AUTO-ENTMCNC: 31.5 G/DL (ref 31.4–37.4)
MCV RBC AUTO: 92 FL (ref 82–98)
MONOCYTES # BLD AUTO: 0.43 THOUSAND/ÂΜL (ref 0.17–1.22)
MONOCYTES NFR BLD AUTO: 10 % (ref 4–12)
NEUTROPHILS # BLD AUTO: 2.74 THOUSANDS/ÂΜL (ref 1.85–7.62)
NEUTS SEG NFR BLD AUTO: 65 % (ref 43–75)
NRBC BLD AUTO-RTO: 0 /100 WBCS
PLATELET # BLD AUTO: 256 THOUSANDS/UL (ref 149–390)
PMV BLD AUTO: 9.7 FL (ref 8.9–12.7)
POTASSIUM SERPL-SCNC: 3.4 MMOL/L (ref 3.5–5.3)
PROCALCITONIN SERPL-MCNC: 0.97 NG/ML
RBC # BLD AUTO: 3.78 MILLION/UL (ref 3.88–5.62)
SODIUM SERPL-SCNC: 135 MMOL/L (ref 135–147)
WBC # BLD AUTO: 4.2 THOUSAND/UL (ref 4.31–10.16)

## 2024-05-24 PROCEDURE — 85025 COMPLETE CBC W/AUTO DIFF WBC: CPT | Performed by: INTERNAL MEDICINE

## 2024-05-24 PROCEDURE — 99239 HOSP IP/OBS DSCHRG MGMT >30: CPT | Performed by: PHYSICIAN ASSISTANT

## 2024-05-24 PROCEDURE — 80048 BASIC METABOLIC PNL TOTAL CA: CPT | Performed by: INTERNAL MEDICINE

## 2024-05-24 PROCEDURE — 84145 PROCALCITONIN (PCT): CPT | Performed by: INTERNAL MEDICINE

## 2024-05-24 RX ORDER — LANOLIN ALCOHOL/MO/W.PET/CERES
800 CREAM (GRAM) TOPICAL ONCE
Status: COMPLETED | OUTPATIENT
Start: 2024-05-24 | End: 2024-05-24

## 2024-05-24 RX ORDER — LANOLIN ALCOHOL/MO/W.PET/CERES
100 CREAM (GRAM) TOPICAL DAILY
Start: 2024-05-25

## 2024-05-24 RX ORDER — POTASSIUM CHLORIDE 20 MEQ/1
20 TABLET, EXTENDED RELEASE ORAL ONCE
Status: COMPLETED | OUTPATIENT
Start: 2024-05-24 | End: 2024-05-24

## 2024-05-24 RX ORDER — AMOXICILLIN AND CLAVULANATE POTASSIUM 875; 125 MG/1; MG/1
1 TABLET, FILM COATED ORAL EVERY 12 HOURS SCHEDULED
Qty: 8 TABLET | Refills: 0 | Status: SHIPPED | OUTPATIENT
Start: 2024-05-24 | End: 2024-05-29

## 2024-05-24 RX ORDER — FOLIC ACID 1 MG/1
1 TABLET ORAL DAILY
Start: 2024-05-25

## 2024-05-24 RX ADMIN — AZELASTINE HYDROCHLORIDE 1 SPRAY: 137 SPRAY, METERED NASAL at 09:23

## 2024-05-24 RX ADMIN — PENTOXIFYLLINE 400 MG: 400 TABLET, EXTENDED RELEASE ORAL at 11:29

## 2024-05-24 RX ADMIN — CEFEPIME 2000 MG: 2 INJECTION, POWDER, FOR SOLUTION INTRAVENOUS at 05:51

## 2024-05-24 RX ADMIN — SALINE NASAL SPRAY 2 SPRAY: 1.5 SOLUTION NASAL at 05:51

## 2024-05-24 RX ADMIN — MULTIPLE VITAMINS W/ MINERALS TAB 1 TABLET: TAB ORAL at 09:22

## 2024-05-24 RX ADMIN — AMLODIPINE BESYLATE 2.5 MG: 2.5 TABLET ORAL at 09:22

## 2024-05-24 RX ADMIN — GABAPENTIN 600 MG: 300 CAPSULE ORAL at 09:22

## 2024-05-24 RX ADMIN — Medication 100 MG: at 09:22

## 2024-05-24 RX ADMIN — PENTOXIFYLLINE 400 MG: 400 TABLET, EXTENDED RELEASE ORAL at 09:22

## 2024-05-24 RX ADMIN — LEVOTHYROXINE SODIUM 100 MCG: 100 TABLET ORAL at 05:51

## 2024-05-24 RX ADMIN — SALINE NASAL SPRAY 2 SPRAY: 1.5 SOLUTION NASAL at 09:23

## 2024-05-24 RX ADMIN — POTASSIUM CHLORIDE 20 MEQ: 1500 TABLET, EXTENDED RELEASE ORAL at 09:22

## 2024-05-24 RX ADMIN — FOLIC ACID 1 MG: 1 TABLET ORAL at 09:22

## 2024-05-24 RX ADMIN — SALINE NASAL SPRAY 2 SPRAY: 1.5 SOLUTION NASAL at 14:00

## 2024-05-24 RX ADMIN — Medication 800 MG: at 12:47

## 2024-05-24 RX ADMIN — HYDROCODONE POLISTIREX AND CHLORPHENIRAMINE POLISTIREX 5 ML: 10; 8 SUSPENSION, EXTENDED RELEASE ORAL at 11:28

## 2024-05-24 NOTE — ASSESSMENT & PLAN NOTE
Hx of chronic alcohol use   Patient drinks 4-5 beers daily  Encourage ETOH cessation   CIWA score negative x 72 hours   Continue thiamine, folic acid, multivitamin

## 2024-05-24 NOTE — DISCHARGE INSTR - AVS FIRST PAGE
Please follow up with your primary care provider within one week   Please follow up with ENT for possible repeat dilatation   Consider thickened liquids if possible to avoid further aspiration   Continue saline nasal spray at home   Complete antibiotic course in full   You will need repeat chest imaging in 4-6 weeks to ensure resolution of your pneumonia, this can be set up by your family doctor outpatient   If you begin to have any worsening shortness of breath, low oxygen levels, fevers, chills please come back to the hospital for further evaluation

## 2024-05-24 NOTE — ASSESSMENT & PLAN NOTE
Hypoxic PTA 80% at Urgent Care currently requiring 2L NC .    Improving, now on RA, ambulatory pulse ox performed by nursing staff without any acute desaturations, no indication for supplemental O2 on discharge   Resolved, continue PO abx treatment outpatient   Will need repeat imaging outpatient in 4-6 weeks to ensure resolution of PNA

## 2024-05-24 NOTE — ASSESSMENT & PLAN NOTE
Patient with history of tongue cancer s/p radiation/chemotherapy 2012 and subsequent radiation injury with pharyngoesophageal dysphagia and partial vocal cord paralysis presented via EMS from urgent care due to hypoxia 80% on room air with fever. Patient noted to have fever and tachycardia  CXR showed  bibasilar pleural-parenchymal opacities concerning for recurrent aspiration pneumonia.    Patient has a history of recurrent aspiration pneumonia. He was previously intubated with PEG tube placement 06/2023 in [place] 2/2 septic shock from aspiration pneumonia. Peg tube removed 12/18/23.   Recently admitted to hospital in [place] 04/13/24 for aspiration pneumonitis- was treated with Zosyn then discharged with Augmentin for 7 days. Recent repeat EGD last month showed no severe esophagitis.  No significant esophageal stricturing.  Possible mild candidal esophagitis was noted and treated with diflucan.  S/p 3 days of IV Cefepime, transition to PO Augmentin to complete total 7 day course of treatment  MRSA negative. Urine antigens negative. Sputum culture with mixed respiratory jhon   Aspiration precautions   Appreciate swallow recommendations   Outpatient follow-up with Dr. Singh to discuss repeat dilation.    Resolved

## 2024-05-24 NOTE — ASSESSMENT & PLAN NOTE
Some intermittent soft pressures noted here, but overall stabilized   Continue decreased Norvasc 2.5 mg daily on discharge

## 2024-05-24 NOTE — DISCHARGE SUMMARY
[facility]  Discharge- Mohamud Grimm Sr. 1965, 59 y.o. male MRN: [id number]  Unit/Bed#: W -01 Encounter: [id number]  Primary Care Provider: Raphael Lopez MD   Date and time admitted to hospital: 5/21/2024  6:57 PM      DOS: 5/24/2024  * Sepsis due to aspiration pneumonia (HCC)  Assessment & Plan  Patient with history of tongue cancer s/p radiation/chemotherapy 2012 and subsequent radiation injury with pharyngoesophageal dysphagia and partial vocal cord paralysis presented via EMS from urgent care due to hypoxia 80% on room air with fever. Patient noted to have fever and tachycardia  CXR showed  bibasilar pleural-parenchymal opacities concerning for recurrent aspiration pneumonia.    Patient has a history of recurrent aspiration pneumonia. He was previously intubated with PEG tube placement 06/2023 in [place] 2/2 septic shock from aspiration pneumonia. Peg tube removed 12/18/23.   Recently admitted to hospital in [place] 04/13/24 for aspiration pneumonitis- was treated with Zosyn then discharged with Augmentin for 7 days. Recent repeat EGD last month showed no severe esophagitis.  No significant esophageal stricturing.  Possible mild candidal esophagitis was noted and treated with diflucan.  S/p 3 days of IV Cefepime, transition to PO Augmentin to complete total 7 day course of treatment  MRSA negative. Urine antigens negative. Sputum culture with mixed respiratory jhon   Aspiration precautions   Appreciate swallow recommendations   Outpatient follow-up with Dr. Singh to discuss repeat dilation.    Resolved     Dysphagia  Assessment & Plan  Patient with longstanding swallowing difficulty due to radiation injury following treatment for his tongue cancer   Has had multiple hospital admissions for aspiration pneumonia   Follows with ENT for partial vocal cord paralysis, dysphagia    Previously had a PEG tube which was reversed 12/18/23.   S/p MDL/vocal fold steroid  injection, posterior pharyngeal wall injection, esophagoscopy with dilation 09/16/22 and balloon dilation of UES 08/25/2023 with subsequent improvement in dysphagia symptoms. Patient was discharged from speech therapy 02/2024 as his swallowing returned to baseline.   Patient had EGD performed during recent [place] hospital admission which showed congestion and swelling proximal to the vocal cord.   Speech recommend regular diet.  Ideally recommending nectar thick liquids though patient not amenable to thickening liquids.  He is asked to utilize swallowing mechanisms he has been taught.    Patient would benefit from outpatient speech referral  Will f/u with Dr. 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 Singh with ENT last seen 01/29/24  Continue gabapentin 600mg BID for cough -- patient reports concern regarding sedation from this medication   Continue famotidine qhs for reflux laryngitis   Speech therapy   ENT evaluated here, recommend follow up outpatient for discussion of  repeat dilation      Acute hypoxemic respiratory failure (HCC)  Assessment & Plan  Hypoxic PTA 80% at Urgent Care currently requiring 2L NC .    Improving, now on RA, ambulatory pulse ox performed by nursing staff without any acute desaturations, no indication for supplemental O2 on discharge   Resolved, continue PO abx treatment outpatient   Will need repeat imaging outpatient in 4-6 weeks to ensure resolution of PNA      Hypothyroidism  Assessment & Plan  Continue levothyroxine 100 mcg daily     Essential hypertension  Assessment & Plan  Some intermittent soft pressures noted here, but overall stabilized   Likely in setting of sepsis which is now resolved   Continue home Norvasc 5 mg daily outpatient       Medical Problems       Resolved Problems  Date Reviewed: 5/24/2024   None       Discharging Physician / Practitioner: Maru Del Castillo PA-C  PCP: Raphael Lopez MD  Admission Date:   Admission Orders (From admission, onward)       Ordered        05/21/24 1958  INPATIENT ADMISSION  Once                          Discharge Date: 05/24/24    Consultations During Hospital Stay:  ENT    Procedures Performed:   CXR 5/21 - Bibasilar pleural-parenchymal opacities. In conjunction with patient's presentation and clinical history, findings most consistent with aspiration pneumonia   CXR 5/22 - Low lung findings with bibasilar opacities not significantly changed from prior     Significant Findings / Test Results:   Improvement in procal   Mild hypokalemia, repleted prior to discharge   Mild hypomag repleted prior to discharge  UA negative   Strep pneumo/legionella negative   MRSA negative   Blood cultures negative x 48 hours   COVID/flu/RSV negative    Sputum culture with mixed respiratory jhon     Incidental Findings:   none   N/A     Test Results Pending at Discharge (will require follow up):   Final blood culture results      Outpatient Tests Requested:  Per PCP    Complications:  None     Reason for Admission:  "Hypoxia, fevers, cough, shortness of breath     Hospital Course:   Mohamud Grimm Sr. is a 59 y.o. male patient with significant past medical history of tongue cancer and dysphagia, s/p chemo/RT in 2012, ETOH use, ERIK, hypothyroidism, HTN who originally presented to the hospital on 5/21/2024 due to hypoxia, fever and tachycardia. Noted to have pneumonia on chest imaging and was placed on IV abx therapy. Pt was seen by ENT as well due to his history and it was recommended for him to follow up outpatient for possible repeat dilation. He was continued on the IV abx with significant improvement and he was weaned back to RA prior to discharge without any evidence of desaturations. He did not require supplemental O2 on discharge and was discharged in stable condition. For additional information please refer to medical records.   Medication changes include: Addition of Augmentin to complete total 7 day course of treatment, PRN saline nasal spray, folate and thiamine supplementation     Discussed with patient plan of care and recommendations to discharge on PO abx, also advised continuation of saline nasal sprays outpatient. He is in agreement with plan.     Please see above list of diagnoses and related plan for additional information.     Condition at Discharge: stable    Discharge Day Visit / Exam:   Subjective:  Pt reports that he is doing well today. Denies any chest pain or shortness of breath. Has no other complaints at this time. Reports his appetite has been good and no issues with bowel or bladder dysfunction.   Vitals: Blood Pressure: 123/74 (05/24/24 0738)  Pulse: 59 (05/24/24 0738)  Temperature: 98.1 °F (36.7 °C) (05/24/24 0738)  Temp Source: Oral (05/22/24 1843)  Respirations: 18 (05/23/24 1554)  Height: 5' 9\" (175.3 cm) (05/21/24 2020)  Weight - Scale: 74.9 kg (165 lb 2 oz) (05/21/24 2020)  SpO2: 95 % (05/24/24 0738)  Exam:   Physical Exam  Vitals reviewed.   Constitutional:       Comments: Pt is in no acute " distress lying in his hospital bed resting comfortably   Saturating low to mid 90s on RA   Cardiovascular:      Rate and Rhythm: Normal rate and regular rhythm.   Pulmonary:      Effort: No respiratory distress.      Breath sounds: No wheezing.      Comments: Minimal course breath sounds bilaterally, good airspace movement   Musculoskeletal:      Right lower leg: No edema.      Left lower leg: No edema.   Skin:     General: Skin is warm and dry.   Neurological:      Mental Status: He is alert.   Psychiatric:         Mood and Affect: Mood normal.          Discussion with Family: Patient declined call to .     Discharge instructions/Information to patient and family:   See after visit summary for information provided to patient and family.      Provisions for Follow-Up Care:  See after visit summary for information related to follow-up care and any pertinent home health orders.      Mobility at time of Discharge:   Basic Mobility Inpatient Raw Score: 24  JH-HLM Goal: 8: Walk 250 feet or more  JH-HLM Achieved: 7: Walk 25 feet or more  HLM Goal achieved. Continue to encourage appropriate mobility.     Disposition:   Home    Planned Readmission: None      Discharge Statement:  I spent 45 minutes discharging the patient. This time was spent on the day of discharge. I had direct contact with the patient on the day of discharge. Greater than 50% of the total time was spent examining patient, answering all patient questions, arranging and discussing plan of care with patient as well as directly providing post-discharge instructions.  Additional time then spent on discharge activities.    Discharge Medications:  See after visit summary for reconciled discharge medications provided to patient and/or family.      **Please Note: This note may have been constructed using a voice recognition system**

## 2024-05-24 NOTE — ASSESSMENT & PLAN NOTE
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 Singh with ENT last seen 01/29/24  Continue gabapentin 600mg BID for cough -- patient reports concern regarding sedation from this medication   Continue famotidine qhs for reflux laryngitis   Speech therapy   ENT evaluated here, recommend follow up outpatient for discussion of repeat dilation

## 2024-05-26 LAB
BACTERIA BLD CULT: NORMAL
BACTERIA BLD CULT: NORMAL

## 2024-05-27 LAB
ATRIAL RATE: 82 BPM
P AXIS: 38 DEGREES
PR INTERVAL: 154 MS
QRS AXIS: 50 DEGREES
QRSD INTERVAL: 106 MS
QT INTERVAL: 390 MS
QTC INTERVAL: 455 MS
T WAVE AXIS: 42 DEGREES
VENTRICULAR RATE: 82 BPM

## 2024-05-27 PROCEDURE — 93010 ELECTROCARDIOGRAM REPORT: CPT | Performed by: INTERNAL MEDICINE

## 2024-05-28 ENCOUNTER — TELEPHONE (OUTPATIENT)
Dept: FAMILY MEDICINE CLINIC | Facility: CLINIC | Age: 59
End: 2024-05-28

## 2024-05-28 ENCOUNTER — TRANSITIONAL CARE MANAGEMENT (OUTPATIENT)
Dept: FAMILY MEDICINE CLINIC | Facility: CLINIC | Age: 59
End: 2024-05-28

## 2024-05-28 NOTE — TELEPHONE ENCOUNTER
Patient needs a TCM.  Only one appt. For 6/6, but patient already has an appt. That day.  Where can we fit him in?  Please call patient's wife with date at [phone]

## 2024-05-28 NOTE — UTILIZATION REVIEW
NOTIFICATION OF ADMISSION DISCHARGE   This is a Notification of Discharge from [facility]. Please be advised that this patient has been discharge from our facility. Below you will find the admission and discharge date and time including the patient’s disposition.   UTILIZATION REVIEW CONTACT:  Genie Mireles  Utilization   Network Utilization Review Department  Phone: [phone] [phone] carefully listen to the prompts. All voicemails are confidential.  Email: [email or web address]     ADMISSION INFORMATION  PRESENTATION DATE: 5/21/2024  6:57 PM  OBERVATION ADMISSION DATE:   INPATIENT ADMISSION DATE: 5/21/24  7:58 PM   DISCHARGE DATE: 5/24/2024  5:23 PM   DISPOSITION:Home/Self Care    Network Utilization Review Department  ATTENTION: Please call with any questions or concerns to [phone] and carefully listen to the prompts so that you are directed to the right person. All voicemails are confidential.   For Discharge needs, contact Care Management DC Support Team at [phone] opt. [phone]  Send all requests for admission clinical reviews, approved or denied determinations and any other requests to dedicated fax number below belonging to the campus where the patient is receiving treatment. List of dedicated fax numbers for the Facilities:  FACILITY NAME UR FAX NUMBER   ADMISSION DENIALS (Administrative/Medical Necessity) [phone]   DISCHARGE SUPPORT TEAM ([facility]) [phone]   PARENT CHILD HEALTH (Maternity/NICU/Pediatrics) [phone]   [facility] [phone]   [facility] [phone]   [facility] [phone]   [facility] [phone]   [facility] [phone]   [facility] [phone]   [facility] [phone]   [facility] [phone]    [facility] [phone]   [facility] [phone]   [facility] [phone]   [facility] [phone]

## 2024-05-28 NOTE — PROGRESS NOTES
Appointment made.   
Left message for wife, [phone]     Per Pogo   4:30 pm on  5/29 TCM appointment if patient wanting TCM. Needs TCM questions.  
Home

## 2024-05-28 NOTE — TELEPHONE ENCOUNTER
Patient's wife wanted an update on the TCM appt. Technical issues with calling office. Please contact patient's wife and advise. Thank you for your help.

## 2024-05-29 ENCOUNTER — OFFICE VISIT (OUTPATIENT)
Dept: FAMILY MEDICINE CLINIC | Facility: CLINIC | Age: 59
End: 2024-05-29
Payer: COMMERCIAL

## 2024-05-29 VITALS
HEIGHT: 69 IN | SYSTOLIC BLOOD PRESSURE: 160 MMHG | WEIGHT: 158.6 LBS | BODY MASS INDEX: 23.49 KG/M2 | TEMPERATURE: 97.5 F | DIASTOLIC BLOOD PRESSURE: 118 MMHG | OXYGEN SATURATION: 93 % | HEART RATE: 88 BPM

## 2024-05-29 DIAGNOSIS — K13.79 VELOPHARYNGEAL INSUFFICIENCY, ACQUIRED: ICD-10-CM

## 2024-05-29 DIAGNOSIS — R05.3 CHRONIC COUGH: ICD-10-CM

## 2024-05-29 DIAGNOSIS — T17.908D ASPIRATION INTO AIRWAY, SUBSEQUENT ENCOUNTER: ICD-10-CM

## 2024-05-29 DIAGNOSIS — I10 ESSENTIAL HYPERTENSION: ICD-10-CM

## 2024-05-29 DIAGNOSIS — G24.4 OROFACIAL DYSKINESIA: ICD-10-CM

## 2024-05-29 DIAGNOSIS — C01 CANCER OF BASE OF TONGUE (HCC): ICD-10-CM

## 2024-05-29 DIAGNOSIS — Z76.89 ENCOUNTER FOR SUPPORT AND COORDINATION OF TRANSITION OF CARE: Primary | ICD-10-CM

## 2024-05-29 DIAGNOSIS — J69.0 ASPIRATION PNEUMONIA OF BOTH LUNGS, UNSPECIFIED ASPIRATION PNEUMONIA TYPE, UNSPECIFIED PART OF LUNG (HCC): ICD-10-CM

## 2024-05-29 DIAGNOSIS — R13.10 SEVERE SWALLOWING DYSFUNCTION: ICD-10-CM

## 2024-05-29 PROCEDURE — 99496 TRANSJ CARE MGMT HIGH F2F 7D: CPT | Performed by: FAMILY MEDICINE

## 2024-05-29 RX ORDER — BENZONATATE 200 MG/1
200 CAPSULE ORAL 3 TIMES DAILY PRN
Qty: 30 CAPSULE | Refills: 2 | Status: SHIPPED | OUTPATIENT
Start: 2024-05-29

## 2024-05-29 NOTE — PROGRESS NOTES
Transition of Care Visit  Name: Mohamud Grimm .      : 1965      MRN: [id number]  Encounter Provider: Raphael Lopez MD  Encounter Date: 2024   Encounter department: [facility]    Assessment & Plan   1. Encounter for support and coordination of transition of care  2. Aspiration pneumonia of both lungs, unspecified aspiration pneumonia type, unspecified part of lung (HCC)  3. Chronic cough  -     benzonatate (TESSALON) 200 MG capsule; Take 1 capsule (200 mg total) by mouth 3 (three) times a day as needed for cough  -     XR chest pa & lateral; Future; Expected date: 2024  4. Cancer of base of tongue (HCC)  5. Velopharyngeal insufficiency, acquired  6. Severe swallowing dysfunction  7. Orofacial dyskinesia  8. Aspiration into airway, subsequent encounter  -     XR chest pa & lateral; Future; Expected date: 2024     Discussion:  I reviewed all with pt.   -Regards to his aspiration pneumonia, this is his third or fourth episode.  Explained to patient carefully each episode can lead to further damage to his lungs, resulting in breathing issues down the road.  Or, next episode could result in sepsis severe enough to be life-threatening.  Despite this, patient continues to refuse intervention other than following up with ENT.  Recommend repeating chest x-ray in 3 to 4 weeks and follow-up in 6 weeks  - In regards to his tongue base cancer, no evidence of recurrence though present issues seem to be secondary to treatment he has received in the past.  Continue to monitor.  Follow-up with ENT  - Regards to severe swallowing dysfunction, patient did have some improvement after a previous dilation of a stenotic pharyngeal esophageal area.  Patient will follow-up with ENT to further evaluate, and for possible treatment  - Regards to his orofacial dyskinesia, slightly worse.  Did not appreciate any signs of food pouching-will continue to monitor  - In regards to his  hypertension, blood pressure was elevated today.  Review of chart does show that it was controlled from the hospital however.  Will continue his present medications and recheck in 4 to 6 weeks.    I will review labs with patient.  He will follow-up as above.  Patient to call for problems or concerns in the interim      History of Present Illness     Transitional Care Management Review:   Mohamud Grimm Sr. is a 59 y.o. male here for TCM follow up.     During the TCM phone call patient stated:  TCM Call     Date and time call was made  5/28/2024  5:40 PM    Hospital care reviewed  Records reviewed    Patient was hospitialized at  [facility]    Date of Admission  05/21/24    Date of discharge  05/24/24    Diagnosis  Sepsis due to aspiration pneumonia    Disposition  Home    Were the patients medications reviewed and updated  Yes    Current Symptoms  Cough    Cough Severity  Moderate      TCM Call     Should patient be enrolled in anticoag monitoring?  No    Scheduled for follow up?  Yes    Patients specialists  Other (comment)    Other specialists names  Candelario Borges ( oncology )    Did you obtain your prescribed medications  Yes    Do you need help managing your prescriptions or medications  No    Is transportation to your appointment needed  No    I have advised the patient to call PCP with any new or worsening symptoms  AMMY Sewell    Living Arrangements  Spouse or Significiant other    Are you recieving any outpatient services  No    Are you recieving home care services  No    Are you using any community resources  No        Pt here for TCM   - 58yo male with a history of squamous cell cancer of the tongue base status post radiation therapy and chemotherapy, with chronic postradiation issues including vocal cord paralysis and dysphagia, as well as history of aspiration pneumonia, noted mild URI symptoms and cough for 6 to 7 days prior to admission.  Patient states that he does not feel  particularly poorly, the wife thought that he was becoming progressively short of breath.  At home, on 5/21, O2 sat was found to be 80% on room air, so patient went to [place] ED for evaluation.  In the ED, patient was found to have an elevated temperature with low O2 sat, with a chest x-ray showing bilateral pleural-parenchymal opacities suggestive of aspiration pneumonia.  WBC was normal though it did reveal 86% segmented cells and 1% immature white blood cells.  Chemistry was relatively unremarkable.  Procalcitonin was elevated though lactic acid was normal.  Patient was started on broad-spectrum antibiotics, supplemental O2, and admitted.  ENT was consulted due to  recent onset of worsening dysphagia.  Patient improved quickly over the next 2 days.  He was weaned off of O2 and transitioned onto oral Augmentin.  ENT recommended follow-up as an outpatient for further evaluation and possible repeat dilation.  Speech therapy was consulted though patient refused thickened liquids or other interventions.  Patient here for transition of care visit  - Since discharge, patient states that he is returned to baseline.  He has chronic dry mouth for which he drinks large amounts of water.  He still has some dysphagia but has appointment with ENT.  He continues to refuse thickened liquids or other interventions for his swallowing dysfunction and dysphagia.  Patient understands that he is at risk for further aspiration pneumonia (this is his third episode).  Patient denies any other new problems or concerns  - I reviewed available hospital records, lab results and study reports with pt      Review of Systems   Constitutional:  Positive for fatigue. Negative for activity change, appetite change, chills and fever.   HENT:  Positive for trouble swallowing. Negative for ear discharge, ear pain, sinus pressure and sinus pain.         Chronic dry mouth   Eyes: Negative.    Respiratory:  Positive for cough (chronic - was occurring  "prior to onset of recent illness).    Cardiovascular: Negative.    Gastrointestinal: Negative.    Genitourinary: Negative.    Musculoskeletal: Negative.    Skin: Negative.    Neurological: Negative.    Psychiatric/Behavioral: Negative.       Objective     BP (!) 160/118   Pulse 88   Temp 97.5 °F (36.4 °C)   Ht 5' 9\" (1.753 m)   Wt 71.9 kg (158 lb 9.6 oz)   SpO2 93%   BMI 23.42 kg/m²     Physical Exam  Vitals reviewed.   Constitutional:       Comments: Sl tired appearing male in NAD   HENT:      Head: Normocephalic and atraumatic.      Right Ear: Tympanic membrane, ear canal and external ear normal.      Left Ear: Tympanic membrane, ear canal and external ear normal.      Nose: Nose normal.      Mouth/Throat:      Mouth: Mucous membranes are dry.      Pharynx: No oropharyngeal exudate or posterior oropharyngeal erythema.   Eyes:      General: No scleral icterus.     Extraocular Movements: Extraocular movements intact.      Conjunctiva/sclera: Conjunctivae normal.      Pupils: Pupils are equal, round, and reactive to light.   Neck:      Comments: Radiation induced changes to L neck   Cardiovascular:      Rate and Rhythm: Normal rate and regular rhythm.   Pulmonary:      Effort: Pulmonary effort is normal.      Breath sounds: No rhonchi or rales.   Abdominal:      General: There is no distension.      Palpations: There is no mass.      Tenderness: There is no abdominal tenderness.   Musculoskeletal:         General: No tenderness or deformity.      Cervical back: Normal range of motion. No tenderness.      Right lower leg: No edema.      Left lower leg: No edema.   Lymphadenopathy:      Cervical: No cervical adenopathy.   Skin:     Comments: L neck with post radiation changes   Neurological:      Mental Status: He is alert and oriented to person, place, and time.      Comments: Mild L facial dyskinesia   Psychiatric:         Mood and Affect: Mood normal.       Medications have been reviewed by provider in current " encounter    Administrative Statements

## 2024-06-11 ENCOUNTER — EVALUATION (OUTPATIENT)
Dept: SPEECH THERAPY | Facility: CLINIC | Age: 59
End: 2024-06-11
Payer: COMMERCIAL

## 2024-06-11 DIAGNOSIS — Z85.89 HX OF SQUAMOUS CELL CARCINOMA: ICD-10-CM

## 2024-06-11 DIAGNOSIS — R13.12 DYSPHAGIA, OROPHARYNGEAL PHASE: ICD-10-CM

## 2024-06-11 DIAGNOSIS — R49.0 DYSPHONIA: ICD-10-CM

## 2024-06-11 DIAGNOSIS — J38.3 GLOTTIC INSUFFICIENCY: Primary | ICD-10-CM

## 2024-06-11 DIAGNOSIS — Z85.89 HX OF MALIGNANT NEOPLASM OF HEAD AND NECK: ICD-10-CM

## 2024-06-11 DIAGNOSIS — Z92.3 HX OF HEAD AND NECK RADIATION: ICD-10-CM

## 2024-06-11 DIAGNOSIS — C01 CANCER OF BASE OF TONGUE (HCC): ICD-10-CM

## 2024-06-11 DIAGNOSIS — R05.3 CHRONIC COUGH: ICD-10-CM

## 2024-06-11 DIAGNOSIS — R13.14 PHARYNGOESOPHAGEAL DYSPHAGIA: ICD-10-CM

## 2024-06-11 DIAGNOSIS — Z91.89 AT RISK FOR ASPIRATION: ICD-10-CM

## 2024-06-11 PROCEDURE — 92610 EVALUATE SWALLOWING FUNCTION: CPT | Performed by: SPEECH-LANGUAGE PATHOLOGIST

## 2024-06-11 NOTE — PROGRESS NOTES
Speech-Language Pathology Initial Evaluation    Today's date: 2024   Patient’s name: Mohamud Grimm Sr.  : 1965  MRN: [id number]  Safety measures: H/o laryngeal ca s/o XRT, dysphagia/aspiration  Referring provider: Devonte Singh MD    Encounter Diagnosis     ICD-10-CM    1. 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 Cancer of base of tongue (HCC)  C01       3. Dysphagia, oropharyngeal phase  R13.12           Assessment:    Summary   Pts oropharyngeal swallow function is suspected to be relatively unchanged from most recent swallow study/FEES- complicated by dysgeusia, reflux/dysmotility, radiation fibrosis, and mucositis. Repeat VBS is recommended to assess current swallow function and determine benefit from any additional strategies or modifications. Patient also with significant cervical/laryngeal tension/stiffness reducing ROM and likely impacting overall swallow and voice as well as pain when transitioning from upright to supine- often requiring additional support.  He reports he has never participated in MFR previously but has occ massaged himself and gone to accupunture with some benefit. Patient likely continues with high risk for aspiration which he is aware of.     Education was provided in detail on A&P of the swallow today as well as results of most recent VBS ( with image review) and the components impacting overall swallow. We also discussed most recent weight loss and strategies to monitor.      Recommendations: Likely ongoing silent aspiration- clinically should most likely be NPO with repeat VBS (further information will be provided on upcoming VBS) however patient is aware of his risks but would like to remain on current diet for improved QOL- information provided on ways to mitigate risk today    Aspiration precautions and compensatory swallowing strategies: upright posture, slow rate of feeding, small bites/sips, head turn left, effortful swallow, cough every 1-2 bites/sips, and alternating  bites and sips    Due to patient's current symptoms he may benefit from Myofascial Release (MFR) treatment for voice and swallowing. This is a manual therapy technique through myofascial release (stimulation/pressure/stretch) to address patient's symptoms, and will be completed unless otherwise contraindicated.    Consider PT for dry needling.      Short-term goals:  -Patient will be educated on and report understanding and compliance of appropriate diet, aspiration risks, diagnosis and related pathology, to be achieved in 4-6 weeks.    Patient will improve lingual strength and ROM via exercises, functional tasks and stretching, to be achieved in 4-6 weeks.    -Patient will improve PSFS by 2 points for improved overall QOL, to be achieved in 4-6 weeks.    - Patient will demonstrate the ability to adequately self-monitor swallowing skills and perform appropriate compensatory techniques to reduce overt s/sx of penetration/aspiration to safely tolerate least restrictive food/liquid consistencies, to be achieved in 4-6 weeks.    Long-term goals:  -Patient will receive a videofluoroscopic swallow study (VFSS) to fully assess physiology and anatomy of the swallow and to determine the appropriate diet and/or rehabilitation exercises by discharge.     -Patient will maintain adequate hydration and nutrition with optimum safety and efficacy of swallowing function on P.O. intake with reduced s/sx of penetration/ aspiration by discharge.       Plan:  Patient would benefit from outpatient skilled Speech Therapy services: Voice therapy and Dysphagia therapy    Frequency: 1-2x weekly  Duration: 4-6 weeks    Intervention certification from: 6/11/2024  Intervention certification to: 7/23/24      Subjective:  History of present illness: Patient is a 59 y.o. male who was referred to outpatient skilled Speech Therapy services for a dysphagia evaluation. 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  Treated for this 10 yrs  ago (chemo/RT at [facility]).  No surgery for this (other than biopsy).  Swallow issues occurred since that time (shortly after treatment). He has a hx of prior aspiration PNAs as well as silent aspiration and previous speech therapy to address his deficits. Primary concerns today include neck stiffness and aspiration risk. He does report desire for reduced aspiration risk but would also like to maintain QOL and not have to drink thick liquids ( as previously recommended). He follows with Dr. Singh (laryngologist) and has participated in multiple swallow assessments. (See below) Most recent VBS completed during a hospital stay approx 1 year ago with recommendations for NPO with PEG. Was most recently admitted during a trip to [place] for aspiration PNA requiring intubation with similar rec although VBS was not completed due to patient refusing. He report he been consuming reg/thin. Recent weight loss ( approx nearly 10 lbs) contributed to by coughing/choking with intake, frustration, lack of appetite.  Has had a PEG x2 previously- once during cancer treatment and once last June for a few months but reports he has no desire for any repeat PEG placements at this time. Has recently restarted supplemental protein shakes.    He is S/p transnasal esophagoscopy with infiinity balloon dilation of UES 8/25/23. S/p MDL, vf steroid injection, biopsy epiglottis, posterior pharyngeal wall injection, esophagoscopy w dilation 9/16/22. He is being treated for multiple comorbidities. Future plan includes potential Follow up Dr. Keita re: botox to facial muscles as well as 2nd staged balloon dilation of UES.    He characterizes his dysphagia by globus sensation/retention (mid upper pharynx - likely vallecula) requiring multiple swallows as well as coughing and choking and nasal regurgitation ( couple times weekly-primarily with solids). He primarily only utilizes liquid wash strategies.     He reports he has never participated in MFR but had  "attended acupuncture with benefit.     Patient's goal(s): Improve the swallowing. Maybe make this a little more pliable ( neck)    Home environment/lifestyle: lives home with his wife and youngest child ( 6 children in total)  Vocational status: Owns/runs [employer]      Objective:  HEAD & NECK CANCER DYSPHAGIA EVALUATION:    -Reason for referral: Weight loss, Signs/symptoms of dysphagia, and History of aspiration pneumonia    -Subjective report of swallowing difficulty: Poor secretion management , Coughing, Choking, and Regurgitation of food    -Difficulty swallowing: Solids, Liquids, and Pills      Head and Neck Cancer Checklist:  *Patient indicated that he is experiencing difficulties in the following areas:    SWALLOWING: Needing to swallow many times to clear food from the mouth and/or throat, Coughing/choking when swallowing, Throat clearing after swallowing, Gurgly, \"wet-sounding\" voice after swallowing, Loss of liquids out of nose, Bringing up food after the swallow, Decreased appetite, Weight loss, and Recent pneumonia    SPEECH: none    VOICE: Hoarse/husky voice, Decreased vocal loudness      Functional Outcome Measurements    1. Functional Oral Intake Scale (FOIS): 6 - total oral intake with no special preparation, but must avoid specific foods or liquid items    2. Performance Status Scale For Head and Neck Cancer Patients (PSS-HN):  -Normalcy of Diet (0-100): 60  -Public Eating (0-100): 75  -Understandability of Speech (0-100): 100     3. Eating Assessment Tool (EAT-10) score:  23    4. PSFS:  6   1. Swallow 4/10   2. Neck stiffness: 8/10    RSI- not completed- Reflux Finding  Score: see below( from ENT note)     5. The Cough Severity Index (CSI) is a self-administered, 10-item outcomes instrument to quantify a patient's symptoms of chronic cough of upper airway origin. The total score ranges from a 0 to 40 based on the sum of responses to 10 questions on a 5-point scale ranging from “never a " problem” to “always a problem.” A score of 3 or below is considered normal. A score higher than 3 means that the cough may be impacting quality of life. Patient obtained a score of 26/40. (see scanned document)     -Current diet (solids): Regular ( avoids some foods)  -Current diet (liquids): Thin  -Current pill intake method: whole with thin liquids    -Alternative Feeding Method?: No    -Facial appearance Left facial droop and Asymmetric smile   -Dentition Adequate   -Labial function Decreased ROM (left side) and Decreased strength (left side)   -Lingual function Reduced strength bilaterally + slight deviation to the right- adequate ROM   -Velar function Min reduced rise   -Trismus (i.e., limited jaw opening) Absent ( not formally assessed- pt denies- none noted)   -Lymphedema No obvious external lymphedema- patient denies hx   -Xerostomia  Absent   -Neoplastic pain Present ( posterior cervical when transitioning to supine 5/10)   -Odynophagia (i.e., pain with swallowing) Absent    -Mucositis  Present   -Dysgeusia (i.e., altered taste)  Absent     Special Studies:  6/6:   Flexible laryngoscopy     Pre-Operative Diagnosis:  Dysphonia   Dysphagia   Hx SCCA BOT s/p chemo/RT ~2012 [facility]  Left vf immobility  Glottic insufficiency  Left hemilarynx with mod edema/fibrosis  Min erythema  No pooling of secretions  Narrow/crowding throughout  No laryngeal sensation left hemilarynx, right side intact  VPI  Pooling saliva vallecula/piriforms  Hx Small lesion 4-5mm laryngeal surface of epiglottis right of midline, leukoplakic/hypervascular  S/p MDL, vf steroid injection, biopsy epiglottis, posterior pharyngeal wall injection, esophagoscopy w dilation 9/16/22     Post-Operative Diagnosis:    SAME  Stable glottic airway  Mod reflux laryngitis  Left vf stiffness  Left false fold overlap     Surgeon:   Devonte Singh MD     Anesthesia:     -Lidocaine 2%  -Oxymetazoline  *helped with the cough     Stroboscope:  Atmos  Flexible  Endoscope:    chip tip  Rigid endoscope:       Operative Details:  The procedure was performed in the endoscopy special procedures room.  A high resolution video laryngoscope was inserted transnasally or orally and suspended from the video system for magnification and documentation.       Voice: mild raspy, some gravel/clearing    Supraglottic Hyperfunction:    present, mild ant/post  Arytenoid Joint Movement:    Normal  Dysdiadochokinesis:     Absent  Arytenoids:   mild erythema, mod edema L>R  Posterior Cobblestoning:  present        Right Vocal Fold:  Abduction:    Normal  Adduction:    Normal  Longitudinal Tension:   Normal     Left Vocal Fold:  Abduction:    absent  Adduction:    absent  Longitudinal Tension:   dec     Glottic Closure:    incomplete     Vocal Process Height:    Equal     Vocal Fold Color:  Right: Normal  Left: Normal     Masses/Vibratory Margin Irregularities: Mild Valerio's edema. Left hemilarynx edema/fibrosis  Other Lesions:      The procedure was concluded without complication and the laryngoscope was removed.     Reflux Finding Score (RFS)  Subglottic Edema:   2  Ventricular Obliteration:   2-4 L>R    Erythema/Hyperemia:    2 min  Vocal Fold Edema:   1  Diffuse Laryngeal Edema:   1-2 L>R   Posterior Commissure Hypertrophy:   2     Granuloma/Granulation:   0  Thick Endolaryngeal Mucus:  0     Total: 10    +nocturnal reflux  Reflux laryngitis on laryngoscopy today  Famotidine 40 qhs  Reflux gourmet      VBS 7/5/23: Pt presents with mild oral and severe-profound pharyngeal dysphagia characterized by reduced AP transport, delayed swallow initiation with significantly reduced/minimal hyolaryngeal rise, reduced base of tongue retraction, absent epiglottic inversion and absent pharyngeal stripping wave. Airway protection/closure was minimal, and silent aspiration occurred with all liquid consistencies. Pt was unable to swallow puree due to lack of driving force on the bolus and had to  regurgitate/expectorate the puree from his vallecula. Strategies were not effective in eliminating aspiration. Note: Images are available for review in PACS as desired.     VBS 6/21/23: Presenting w/ mild-mod oral and SEVERE pharyngeal dysphagia characterized by SILENT aspiration of thin and mod thick during and after the swallow, unable to clear w/ cued cough. Mod-severe stasis w/ inconsistent clearance. Significant retrograde flow observed. Further trials deferred given aspiration and high risk for re-intubation.     VBS 3/4/21:  Pt presents w/ severe pharyngeal dysphagia characterized by no epiglottic inversion, minimal BOT retraction, minimal hyolaryngeal elevation, reduced airway entrance closure, and trace amounts of penetration and silent aspiration during and after swallowing thick and thin liquids. Pt is at high risk of developing aspiration pneumonia. Lengthy discussion was had w/ patient to review results of VBS and recommendations.     VBS 8/14/19:  Mild oral/moderate pharyngeal dysphagia due to decreased tongue base retraction, decreased epiglottic inversion and airway entrance closure w/ deep transient penetration of all consistencies,  And passive silent aspiration of pharyngeal residue and thin liquids.      VBS 10/25/12(Radiologist report): Aspiration of thin liquid barium and nectar thick barium. The patient did not have a spontaneous cough. Penetration of honey thick barium, applesauce, and mixed with barium.     Baseline Assessment   Behavior/Cognition: alert    Speech/Language Status: able to participate in conversation and able to follow commands    Patient Positioning: upright in chair       Consistencies Assessed and Performance   Consistencies Administered: thin liquids, puree, and mixed consistency  Specific materials administered included: applesauce, mixed fruit, thin water    *Prior to any po trials patient noted with baseline cough noted intermittently with wet vocal quality ( likely  penetration/aspiration of secretions)- this appeared to increased with head turn R ( with AND without PO)    Oral Stage:   Mastication was adequate with the materials administered today.  Bolus formation and transfer were functional with no significant oral residue noted. Cannot r/o premature spill today.    Pharyngeal Stage:   Swallowing initiation appeared pat least mildly delayed at times ( variable).  Laryngeal rise was palpated and judged to be significantly reduced. Intermittent wet vocal quality, coughing and throat clearing noted throughout- slightly increased from baseline.     Esophageal Concerns: globus sensation          Following patient's verbal consent to treat, manual therapy technique through myofascial release was applied to patient's .     Region(s) 6/11         Hyoid    x         Anterior Cervical   x       SCM 3mins         Palpatory examination revealed moderate mechanosensitivity (i.e., sensitivity to mechanical pressure/stretch) through the lateral cervical region. A mild-moderate palpatory pressure was suspected to show a severe area of apparent thickening that, with stimulation/pressure/stretch, reproduced patient's complaints/correlated to his primary issue of stiffness/reduced ROM/pain. Patient verbalized that technique/pressure, through triggering his symptoms, was helpful.    Initially increased neuropathy reported with more SCM/slightly anterior to this- with repositioning to just on/posterior to SCM improved benefit was noted with no further peripheral neuropathy noted.     Notes:  -upright position (sitting)  (Consider addition of heat next visit)      Visit Tracking:  POC   Expires   7/28/24        Visit/Unit Tracking:  Date 6/11   Used 1   Remaining          Sheree Muse M.S., CCC-SLP  Speech Language Pathologist   Available via [employer]  [place] #[id number]  [place] #[id number]

## 2024-06-13 NOTE — PROGRESS NOTES
Speech Treatment Note    Today's date: 2024  Patient name: Mohamud Grimm Sr.  : 1965  MRN: [id number]  Referring provider: Devonte Singh MD  Dx:   Encounter Diagnosis     ICD-10-CM    1. Cancer of base of tongue (HCC)  C01       2. Dysphagia, oropharyngeal phase  R13.12                 Visit Tracking:  POC   Expires   24        Visit/Unit Tracking:  Date    Used 1 2   Remaining            Subjective/Behavioral:  Patient is scheduled for VBS next week   Reports voice has been worse recently    Short-term goals:  -Patient will be educated on and report understanding and compliance of appropriate diet, aspiration risks, diagnosis and related pathology, to be achieved in 4-6 weeks.    Patient was able to identify etiology of dysphagia as well as some of the components of his dysphagia. Education was provided on post radiation fibrosis, dysphagia, aspiration risks and POC. Reciprocal comprehension was verbally expressed.     Patient will improve lingual strength and ROM via exercises, functional tasks and stretching, to be achieved in 4-6 weeks.  Tongue base tension noted/reported - improvement with MFR- see below.     Additionally, he was given cervical and shoulder stretches to utilize at home. We discussed the importance of ongoing daily stretching/ROM and massage to inhibit further sxs of post radiation fibrosis.    -Patient will improve PSFS by 2 points for improved overall QOL, to be achieved in 4-6 weeks.  Patient continues to report significant limitation/impact on QOL - primarily due to dysphagia/cough, voice, and tension.    - Patient will demonstrate the ability to adequately self-monitor swallowing skills and perform appropriate compensatory techniques to reduce overt s/sx of penetration/aspiration to safely tolerate least restrictive food/liquid consistencies, to be achieved in 4-6 weeks.  See above.       Following patient's verbal consent to treat, manual therapy technique  through myofascial release was applied to patient's L anterolateral cervical region.     Region(s) 6/18         L SCM   15mins         Anterior Cervical   15mins       Oral (Tongue) 10mins         Palpatory examination revealed moderate mechanosensitivity (i.e., sensitivity to mechanical pressure/stretch) through the anterolateral cervical region. A mild palpatory pressure was suspected to show a severe area of apparent thickening that, with stimulation/pressure/stretch, reproduced patient's complaints/correlated to his primary issue of reduced ROM /stiffness. Patient verbalized that technique/pressure, through triggering his symptoms, was helpful.    Patient was able to identify regions of myofascial sensitivity to reduce overall tension. No exercises completed today- some mild changes in voice noted with L tongue base region.     Notes:  -upright position (sitting) and lotion    Other:Patient was provided with home exercises/ activies to target goals in plan of care. and Reviewed testing and plan of care with patient.Patient is in agreement with POC at this time.  Recommendations:Continue with Plan of Care    Sheree Muse M.S., CCC-SLP  Speech Language Pathologist   Available via [employer]  [place] #[id number]  [place] #[id number]

## 2024-06-18 ENCOUNTER — OFFICE VISIT (OUTPATIENT)
Dept: SPEECH THERAPY | Facility: CLINIC | Age: 59
End: 2024-06-18
Payer: COMMERCIAL

## 2024-06-18 DIAGNOSIS — R13.12 DYSPHAGIA, OROPHARYNGEAL PHASE: ICD-10-CM

## 2024-06-18 DIAGNOSIS — C01 CANCER OF BASE OF TONGUE (HCC): Primary | ICD-10-CM

## 2024-06-18 PROCEDURE — 92526 ORAL FUNCTION THERAPY: CPT | Performed by: SPEECH-LANGUAGE PATHOLOGIST

## 2024-06-20 PROBLEM — J18.9 SEPSIS DUE TO PNEUMONIA (HCC): Status: RESOLVED | Noted: 2019-04-20 | Resolved: 2024-06-20

## 2024-06-20 PROBLEM — A41.9 SEPSIS DUE TO PNEUMONIA (HCC): Status: RESOLVED | Noted: 2019-04-20 | Resolved: 2024-06-20

## 2024-06-25 ENCOUNTER — APPOINTMENT (OUTPATIENT)
Dept: URGENT CARE | Facility: MEDICAL CENTER | Age: 59
End: 2024-06-25
Payer: COMMERCIAL

## 2024-06-25 ENCOUNTER — APPOINTMENT (OUTPATIENT)
Dept: RADIOLOGY | Facility: MEDICAL CENTER | Age: 59
End: 2024-06-25
Payer: COMMERCIAL

## 2024-06-25 DIAGNOSIS — R05.3 CHRONIC COUGH: ICD-10-CM

## 2024-06-25 DIAGNOSIS — T17.908D ASPIRATION INTO AIRWAY, SUBSEQUENT ENCOUNTER: ICD-10-CM

## 2024-06-25 PROCEDURE — 71046 X-RAY EXAM CHEST 2 VIEWS: CPT

## 2024-06-26 NOTE — PROCEDURES
Speech-Language Pathology Video Barium Swallow Study        Patient Name: Mohamud Grimm Sr.    Today's Date: 6/26/2024     Problem List  Patient Active Problem List   Diagnosis    ED (erectile dysfunction) of organic origin    Peyronie's disease    Arthralgia    Hx of squamous cell carcinoma-BOT, s/p chemo/RT 2012    Essential hypertension    Hypothyroidism    Psoriasis    Pharyngoesophageal dysphagia    Hyperbilirubinemia    Severe swallowing dysfunction    Allergic rhinitis    Witnessed episode of apnea    Obstructive sleep apnea syndrome    Paresthesias    Shoulder weakness    Winged scapula of both sides    Cervical radiculopathy at C8    Carpal tunnel syndrome    Radiation-induced brachial plexopathy    Facial weakness    Hx of head and neck radiation    Hx of malignant neoplasm of head and neck    Scar of neck    Cranial neuropathies, multiple    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    Dysphonia    Laryngeal edema    Glottic insufficiency    Muscle tension dysphonia    Shortness of breath    Velopharyngeal insufficiency, acquired    Difficult airway for intubation    Altered mental status    History of tongue cancer    Aspiration pneumonia of both lungs (HCC)    S/P percutaneous endoscopic gastrostomy (PEG) tube placement (HCC)    Alcohol use    ERIK (obstructive sleep apnea)    Orofacial dystonia    Orofacial dyskinesia    Blepharospasm of both eyes    Gastroesophageal reflux disease    Chronic cough    At risk for aspiration    PEG (percutaneous endoscopic gastrostomy) adjustment/replacement/removal (HCC)    Dysphagia    Aspiration into airway    Cancer of base of tongue (HCC)       Past Medical History  Past Medical History:   Diagnosis Date    Cancer of base of tongue (HCC)     excision    Cough     CPAP (continuous positive airway pressure) dependence     waiting on consult    Disease of thyroid gland     hypo    Dysphagia     ED (erectile dysfunction)      History of pneumonia     Hypertension     Leukopenia     Peyronie's disease     PONV (postoperative nausea and vomiting)     Psoriasis     Sleep apnea     Tongue cancer (HCC)     Weight loss, abnormal        Past Surgical History  Past Surgical History:   Procedure Laterality Date    ESOPHAGOSCOPY N/A 9/16/2022    Procedure: ESOPHAGOSCOPY FLEXIBLE;  Surgeon: Devonte Singh MD;  Location: [facility];  Service: ENT    ESOPHAGOSCOPY N/A 8/25/2023    Procedure: TRANSNASAL ESOPHAGOSCOPY WITH BALLOON DILATION/ ESOPHAGEAL DILATION (BALLOON);;  Surgeon: Devonte Singh MD;  Location: [facility];  Service: ENT    OTHER SURGICAL HISTORY      infusion port inserted and removed    PEG TUBE PLACEMENT      and removal    AK COLONOSCOPY FLX DX W/COLLJ SPEC WHEN PFRMD N/A 6/27/2017    Procedure: COLONOSCOPY with polypectomy x2;  Surgeon: Janet Willard MD;  Location: [facility];  Service: General    AK LARGSC W/NJX VOCAL CORD THER W/MICRO/TELESCOPE Bilateral 9/16/2022    Procedure: micro direct laryngoscopy, vocal fold injection, biopsy epiglottis lesion, posterior pharyngeal wall injection, flexible esophagoscopy with dilation;  Surgeon: Devonte Singh MD;  Location: [facility];  Service: ENT    TONGUE BIOPSY / EXCISION      Floor mouth excision of malignant tumor         General Information;  Pt is a 59 y.o. male who was referred for OP VBS to further assess current swallow function and any potential benefit from strategies. He is known to this department and myself personally from OP. He has a hx of aspiration PNA with multiple prior VBS studies as well as prior PEG x2. He has been having dysphagia for 10 yrs since he was initially diagnosed with SCCA of tongue base with metastatic cervical LAD with subsequent chemotherapy and XRT. He is aware of current aspiration risk and currently does not restrict his diet or utilize any strategies. He recently restarted op ST for MFR and goal of repeat study is to determine current swallow  "function and trial strategies to reduce risk. He does report requiring use of liquid wash, significant coughing with po intake as well as new sensation of \"pulling\" with swallow which he indicates is at the level of the cricoid. He admits that eating has been exhausting for him.    Previous VBS:    VBS 7/5/23: Pt presents with mild oral and severe-profound pharyngeal dysphagia characterized by reduced AP transport, delayed swallow initiation with significantly reduced/minimal hyolaryngeal rise, reduced base of tongue retraction, absent epiglottic inversion and absent pharyngeal stripping wave. Airway protection/closure was minimal, and silent aspiration occurred with all liquid consistencies. Pt was unable to swallow puree due to lack of driving force on the bolus and had to regurgitate/expectorate the puree from his vallecula. Strategies were not effective in eliminating aspiration. Note: Images are available for review in PACS as desired.      VBS 6/21/23: Presenting w/ mild-mod oral and SEVERE pharyngeal dysphagia characterized by SILENT aspiration of thin and mod thick during and after the swallow, unable to clear w/ cued cough. Mod-severe stasis w/ inconsistent clearance. Significant retrograde flow observed. Further trials deferred given aspiration and high risk for re-intubation.      VBS 3/4/21:  Pt presents w/ severe pharyngeal dysphagia characterized by no epiglottic inversion, minimal BOT retraction, minimal hyolaryngeal elevation, reduced airway entrance closure, and trace amounts of penetration and silent aspiration during and after swallowing thick and thin liquids. Pt is at high risk of developing aspiration pneumonia. Lengthy discussion was had w/ patient to review results of VBS and recommendations.      VBS 8/14/19:  Mild oral/moderate pharyngeal dysphagia due to decreased tongue base retraction, decreased epiglottic inversion and airway entrance closure w/ deep transient penetration of all " consistencies,  And passive silent aspiration of pharyngeal residue and thin liquids.      VBS 10/25/12(Radiologist report): Aspiration of thin liquid barium and nectar thick barium. The patient did not have a spontaneous cough. Penetration of honey thick barium, applesauce, and mixed with barium.     Consistencies Assessed and Performance   Pt was seen in radiology for a Video Barium Swallow Study, seated in the upright position and viewed laterally with the following consistencies:     Administered: thin liquids, nectar thick, honey thick, puree, and soft solids  Specific materials administered: Barium pudding, honey thick, nectar thick, thin liquids, 13mm barium pill. Liquids were administered by cup and tsp.    Results are as follows:     **Images are available for review on PACS    Oral stage:  Lip closure: adequate  Mastication: adequate  Bolus formation: min reduced  Bolus control: reduced ( liquids > puree/solids)  Transfer: reduced- required liquid wash with puree/solids  Residue:mod tongue base retention with return of bolus to oral cavity    Pharyngeal stage:  Swallow initiation was variable ( prompt- mod) with reduced hyolaryngeal rise and little-no epiglottic inversion. (Shortened/thickened epiglottis c/w h/o XRT). Premature spill posteriorly noted across liquids.     Pharyngeal constriction and tongue base retraction continues to be significantly reduced ( puree/solids unable to pass through hypopharynx without liquid wash. Barium tablet retention in the valleculae- unable to pass through hypopharynx with subsequent redirection to the oral cavity as well as expectoration.)    Reduced mechanics resulted in reduced relaxation of cricopharyngeus with mod-significant post swallow retention and return to oral cavity across all consistencies requiring multiple swallows to clear ( retention+ solids/puree> HTL>NTL>thin). Unfortunately laryngeal squeeze ( and airway closure) was also reduced. Penetration and  subsequent silent aspiration ( delayed cough noted) noted across majority of consistencies. Osiel aspiration noted with thin and nectar thick liquids. Episodic aspiration on subsequent swallows noted with puree and honey thick liquids. Intermittent epiglottic undercoat and laryngeal retention noted across consistencies.     Thinner liquids appeared to have improved passage through the UES with reduced passage with puree and soft solids. Likely cervical esophageal fibrosis in addition to reduced swallow mechanics.       The following strategies were attempted today:  Chin tuck- reduced depth of initial penetration as well as premature spill but did not eliminate aspiration risk or subsequent aspiration  R head turn - increased aspiration  L head turn with chin tuck - increased aspiration  L head turn- reduced depth of penetration and post swallow laryngeal retention  Breath hold- no benefit  Prep set- limited benefit  Multiple effortful swallows- aided in retention but did not reduce penetr/aspiration  Smaller presentations- reduced amount of penetration/aspiration/retention    Screening of Esophageal stage:  A brief screening revealed min retention in the distal esophagus with delayed emptying. Suspected cervical esophageal fibrosis. No obvious stricture or retrograde flow noted. This is not a formal assessment and limited to small amounts in the upright position.     Penetration/Aspiration:  Thin: PAS - 7/8  Nectar: PAS- 7/8  Honey: PAS- 8 (5 with strategies)  Puree: PAS- 8  Solid: PAS- 5  Response to Aspiration: none- although delayed coughing noted throughout   Strategies/Efficacy: See above           8-Point Penetration-Aspiration Scale   1 Material does not enter the airway   2 Material enters the airway, remains above the vocal folds, and is ejected  from the  airway    3 Material enters the airway, remains above the vocal folds, and is not ejected from the airway   4 Material enters the airway, contacts the  "vocal folds, and is ejected from the airway   5 Material enters the airway, contacts the vocal folds, and is not ejected from the airway    6 Material enters the airway, passes below the vocal folds and is ejected into the larynx or out of the airway    7 Material enters the airway, passes below the vocal folds, and is not ejected from the trachea despite effort    8 Material enters the airway, passes below the vocal folds, and no effort is made to eject            Assessment Summary;  Pt continues to present with mild oral and significant pharyngeal dysphagia with primary concerns including reduced swallow mechanics, driving forces and airway closure/protection as well as fibrosis resulting in reduced relaxation/passage through UES with post swallow retention, penetration and aspiration. A majority of the aspiration was silent however he did demonstrate increasing delayed cough as study progressed.     He did appear to benefit somewhat from L head turn as well as chin tuck however these did not eliminate aspiration risk and when completed together increased aspiration was noted. Fortunately he did appear to have a strong cough which was beneficial in clearing a majority of penetrated material and some aspirated material.     In addition to significant aspiration risk he presents with increased risk for malnutrition given, frustration with coughing and that he requires extra time for multiple swallows required per bolus- patient also admitted that eating was \"exhausting.\"    The patient did aspirate a significant amount of barium today - referring provider was notified. Consider CXR/ABX.    Recommendations;  Clinically the patient should be NPO with small amounts of honey thick with strategies and SLP however given that the patient desires and plans to continue a regular diet with thin liquids- he appears to be safest with tsps and use of moisteners with solids as well as EITHER L head turn or chin tuck "   Recommended Form of Meds: crushed with puree and non-oral if possible     Aspiration precautions   Reflux Precautions    Results reviewed with patient.      Continue f/u with OP SLP and ENT    Sheree Muse M.S., CCC-SLP  Speech Language Pathologist   Available via [employer]  [place] #[id number]  [place] #[id number]

## 2024-06-27 ENCOUNTER — HOSPITAL ENCOUNTER (OUTPATIENT)
Dept: RADIOLOGY | Facility: HOSPITAL | Age: 59
End: 2024-06-27
Attending: OTOLARYNGOLOGY
Payer: COMMERCIAL

## 2024-06-27 DIAGNOSIS — R13.14 PHARYNGOESOPHAGEAL DYSPHAGIA: ICD-10-CM

## 2024-06-27 DIAGNOSIS — Z85.89 HX OF MALIGNANT NEOPLASM OF HEAD AND NECK: ICD-10-CM

## 2024-06-27 DIAGNOSIS — R05.3 CHRONIC COUGH: ICD-10-CM

## 2024-06-27 DIAGNOSIS — Z91.89 AT RISK FOR ASPIRATION: ICD-10-CM

## 2024-06-27 DIAGNOSIS — Z85.89 HX OF SQUAMOUS CELL CARCINOMA: ICD-10-CM

## 2024-06-27 DIAGNOSIS — Z92.3 HX OF HEAD AND NECK RADIATION: ICD-10-CM

## 2024-06-27 PROCEDURE — 74230 X-RAY XM SWLNG FUNCJ C+: CPT

## 2024-06-27 PROCEDURE — 92611 MOTION FLUOROSCOPY/SWALLOW: CPT | Performed by: SPEECH-LANGUAGE PATHOLOGIST

## 2024-06-29 ENCOUNTER — APPOINTMENT (OUTPATIENT)
Dept: RADIOLOGY | Facility: MEDICAL CENTER | Age: 59
End: 2024-06-29
Payer: COMMERCIAL

## 2024-06-29 DIAGNOSIS — R05.3 CHRONIC COUGH: ICD-10-CM

## 2024-06-29 PROCEDURE — 71046 X-RAY EXAM CHEST 2 VIEWS: CPT

## 2024-07-02 ENCOUNTER — TELEPHONE (OUTPATIENT)
Age: 59
End: 2024-07-02

## 2024-07-02 ENCOUNTER — EVALUATION (OUTPATIENT)
Dept: PHYSICAL THERAPY | Facility: CLINIC | Age: 59
End: 2024-07-02
Payer: COMMERCIAL

## 2024-07-02 ENCOUNTER — OFFICE VISIT (OUTPATIENT)
Dept: SPEECH THERAPY | Facility: CLINIC | Age: 59
End: 2024-07-02
Payer: COMMERCIAL

## 2024-07-02 DIAGNOSIS — R13.12 DYSPHAGIA, OROPHARYNGEAL PHASE: ICD-10-CM

## 2024-07-02 DIAGNOSIS — M62.81 MUSCLE WEAKNESS OF LEFT ARM: Primary | ICD-10-CM

## 2024-07-02 DIAGNOSIS — C01 CANCER OF BASE OF TONGUE (HCC): Primary | ICD-10-CM

## 2024-07-02 DIAGNOSIS — C01 CANCER OF BASE OF TONGUE (HCC): ICD-10-CM

## 2024-07-02 DIAGNOSIS — R49.0 DYSPHONIA: ICD-10-CM

## 2024-07-02 DIAGNOSIS — Z92.3 HX OF HEAD AND NECK RADIATION: ICD-10-CM

## 2024-07-02 PROBLEM — J69.0 ASPIRATION PNEUMONIA OF BOTH LUNGS (HCC): Status: RESOLVED | Noted: 2023-07-07 | Resolved: 2024-07-02

## 2024-07-02 PROCEDURE — 97161 PT EVAL LOW COMPLEX 20 MIN: CPT

## 2024-07-02 PROCEDURE — 92526 ORAL FUNCTION THERAPY: CPT | Performed by: SPEECH-LANGUAGE PATHOLOGIST

## 2024-07-02 NOTE — PROGRESS NOTES
Speech Treatment Note    Today's date: 2024  Patient name: Mohamud Grimm Sr.  : 1965  MRN: [id number]  Referring provider: Devonte Singh MD  Dx:   Encounter Diagnosis     ICD-10-CM    1. Cancer of base of tongue (HCC)  C01       2. Dysphagia, oropharyngeal phase  R13.12                   Visit Tracking:  POC   Expires   24        Visit/Unit Tracking:  Date    Used 1 2 3   Remaining             Subjective/Behavioral:  Patient reports benefit from MFR for a few days after last visit    Recent chest imaging with Persistent increased bilateral atelectasis and/or infiltrates, right greater.     Repeat CXR ( post VBS) pending      Short-term goals:  -Patient will be educated on and report understanding and compliance of appropriate diet, aspiration risks, diagnosis and related pathology, to be achieved in 4-6 weeks.    Education was provided in detail about VBS results ( with image review) as well as A&P of the swallow, aspiration risks and beneficial strategies. (? L head turn OR chin tuck)- he was encouraged to try these SEPARATELY to determine any perceived benefit to reduce cough.  Reciprocal comprehension was verbally expressed.     He understands his aspiration risks with current regular/thin as well as what can increase and what can reduce aspiration risks.     Patient will improve lingual strength and ROM via exercises, functional tasks and stretching, to be achieved in 4-6 weeks.  Initiated IOPI today    IOPI PEAK MEASUREMENT WEEKLY GRID      /           Tongue tip  (Goal = 56) 41           Tongue back  (Goal = 50) 33               -Patient will improve PSFS by 2 points for improved overall QOL, to be achieved in 4-6 weeks.    - Patient will demonstrate the ability to adequately self-monitor swallowing skills and perform appropriate compensatory techniques to reduce overt s/sx of penetration/aspiration to safely tolerate least restrictive food/liquid consistencies, to be achieved  in 4-6 weeks.      Following patient's verbal consent to treat, manual therapy technique through myofascial release was applied to patient's L anterolateral cervical region.     Region(s) 6/18 7/2        L SCM   15mins 5mins        Anterior Cervical   15mins 5mins      Oral (Tongue) 10mins x        Palpatory examination revealed moderate mechanosensitivity (i.e., sensitivity to mechanical pressure/stretch) through the anterolateral cervical region. A mild-mod palpatory pressure was suspected to show a severe area of apparent thickening that, with stimulation/pressure/stretch, reproduced patient's complaints/correlated to his primary issue of reduced ROM /stiffness. Patient verbalized that technique/pressure, through triggering his symptoms, was helpful.    Continues to present with shooting sensation along L UE through hand initially requiring posterior focus to begin- working anteriorly through the area.    Patient was able to identify regions of myofascial sensitivity to reduce overall tension. No exercises completed today- some mild changes in voice noted with L tongue base region.     Notes:  -upright position (sitting) and lotion    Other:Patient was provided with home exercises/ activies to target goals in plan of care. and Reviewed testing and plan of care with patient.Patient is in agreement with POC at this time.  Recommendations:Continue with Plan of Care    Will consider increasing visit time to target exercises in addition to MFR    Review cervical/ROM stretches at next visit    Sheree Muse M.S., CCC-SLP  Speech Language Pathologist   Available via [employer]  [place] #[id number]  [place] #[id number]

## 2024-07-02 NOTE — PROGRESS NOTES
PT Evaluation   Today's date: 7/3/2024  Patient name: Mohamud Grimm Sr.  : 1965  MRN: [id number]  Referring provider: Devonte Singh MD  Dx:   Encounter Diagnosis     ICD-10-CM    1. Muscle weakness of left arm  M62.81       2. Cancer of base of tongue (HCC)  C01 Ambulatory Referral to Physical Therapy      3. Dysphagia, oropharyngeal phase  R13.12 Ambulatory Referral to Physical Therapy      4. Dysphonia  R49.0 Ambulatory Referral to Physical Therapy      5. Hx of head and neck radiation  Z92.3 Ambulatory Referral to Physical Therapy              Assessment  Assessment details: Patient is a 59 y.o. Male who presents with referring diagnoses of cancer of base of dysphagia, dysphonia, hx of head and neck radiation. Patient's greatest concern is future ill health (and wanting to prevent it).    Primary movement impairment diagnosis of neck pain with mobility deficits (along with fibrosis secondary to radiation) resulting in pathoanatomical symptoms of restricted range of motion, muscular power deficits, nerve entrapment, motor control deficits, and functional limitations. The aforementioned impairments have limited the patient's ability to raise his arm, look over his shoulder, and perform ADLs without restriction. No further referral appears necessary at this time based upon examination results.    Patient's presentation in multifaceted in nature, including significant fibrosis, nerve damage and subsequent muscular atrophy secondary to radiation. Patient will benefit from multifaceted approach to treatment including mobility work to cervical spine, myofascial release, and dry needling to address impairments.     Dry needling performed during today's session. Dry needling consent for reviewed and signed by patient. Pt educated on dry needling to include but not limited to: risks/benefits/aftercare instructions. Provided with educational handout  on DN. All questions and concerns answered and addressed.    Pt verbally consented to dry needling treatment session. Risks/benefits/aftercare instructions reviewed. All questions/concerns addressed.  Pt in R side-lying (semi recumbent) position. Hand hygiene performed pre and post DN treatment session. Placement of needles in pathological tissue.   Cervicothoracic junction, L shoulder, L lateral neck, suboccipitals, zygomatic hole, ears (needle location).  Total treatment duration to include set up/break down/in situ: 20 minutes. Patient was supervised by clinician throughout entirety of treatment session to include when DN in situ; clinician next to patient. All needles counted upon insertion and removal-- 28 needles. All accounted for and disposed in appropriate sharp container.     No adverse reaction to treatment. Pt advised may experience soreness, fatigue, bruising. Pt verbalized understanding.         Patient education performed during today's session included: HEP consisting of passive cervical retraction    Primary movement impairments:  1) Mobility deficits  2) Nerve entrapment    Etiological factors include: history of cancer and radiation        Impairments: Abnormal coordination, Abnormal muscle tone, Abnormal or restricted ROM, Activity intolerance, Impaired balance, Impaired physical strength, Lacks appropriate HEP, Poor posture, Poor body mechanics, Pain with function, Scapular dyskinesis, Abnormal movement, and Abnormal muscle firing  Understanding of Dx/Px/POC: Good  Prognosis: Good   Positive prognostic factors: motivation for improvement, active lifestyle   Negative prognostic factors: chronicity of symptoms, comorbid conditions    Patient verbalized understanding of POC.         Please contact me if you have any questions or recommendations. Thank you for the referral and the opportunity to share in Mohamud Grimm Sr.'s care.        Plan  Patient would benefit from: PT Eval and Skilled  "PT  Planned modality interventions: Biofeedback, Cryotherapy, TENS, Thermotherapy: Hydrocollator Packs, and Traction  Planned therapy interventions: Abdominal trunk stabilization, ADL training, Body mechanics training, Coordination, Dry Needling, Functional ROM exercises, HEP, Joint mobilization, Manual therapy, Lamb taping, Motor coordination training, Neuromuscular re-education, Patient education, Postural training, Strengthening, Stretching, Therapeutic activities, Therapeutic exercises, and Activity modification  Frequency: 2x/wk  Duration in weeks: 10  Plan of Care beginning date: 7/3/2024  Plan of Care expiration date: 10 weeks - 2024  Treatment plan discussed with: Patient       Goals  Short Term Goals (5 weeks):    - Patient will be independent in basic HEP 2-3 weeks  - Patient will report >50% reduction in pain  - Patient will deny radicular symptoms into left thumb    Long Term Goals (10 weeks):  - Patient will be independent in a comprehensive home exercise program  - Patient FOTO score will improve to >66/100  - Patient will self-report >50% improvement in function  - Patient will report 30% improvement in neck mobility      Subjective    History of Present Illness  - Mechanism of injury: Patient presents to PT for trial of dry needling. Patient has significant medical history including radiation for cancer that caused stiffness, fibrosis about his cervical spine, nerve damage, chronic cough. States that nerve damage caused significant muscle atrophy and significant difficulty raising his arm.    States that he does get acupuncture, most recently 2 weeks ago. States that acupuncture has helped with his appetite issues.    Reports tingling through L LE, extending into thumb and pointer finger. States this started \"a few months ago.\"      Pain  - Current pain ratin/10  - At best pain ratin/10  - At worst pain ratin/10      Objective      Postural Assessment  -Posture in Sitting: " forward head/shoulders    Sensation  - Light touch: grossly intact and equal bilaterally    UE MMT  LEFT  RIGHT  - Shoulder Flexion (C4): 1+/5  WNL  - Shoulder Abduction (C5):  1+/5  WNL   - Elbow Flexion (C6):  4-/5  WNL  - Elbow Extension (C7): 4/5  WNL    Cervical Spine Range of Motion  Flexion:  Moderately limited  without pain  Extension:  Moderately limited  without pain  Lateral Flexion - Left:  Severely limited  without pain  Lateral Flexion - Right:  Severely limited  without pain  Rotation - Left:  Moderately limited  without pain  Rotation - Right:  Moderately limited  without pain    Mechanical Assessment  Comparable Sign: Radicular symptoms into left thumb  - Retraction: Decreased                 Insurance:  AMA/CMS Eval/ Re-eval POC expires FOTO Auth #/ Referral # Total units  Start date  Expiration date Extension  Visit limitation?  PT only or  PT+OT? Co-Insurance   [employer]: AMA 7/3/24 9/11/24       yes 120 PT/OT 20% once ded met (not met)                                                               Date 7/3/24        Visit Number IE        Manual         Dry needling insertion 10 min                                            TherEx         Mechanical exam Rev cerv retraction for HEP        Scap retraction                                                               Neuro Re-Ed                                                                                 TherAct         Patient education Pt edu, PT POC, HEP 10 min                                            Gait Training                                    Modalities                      Precautions:   Past Medical History:   Diagnosis Date    Cancer of base of tongue (HCC)     excision    Cough     CPAP (continuous positive airway pressure) dependence     waiting on consult    Disease of thyroid gland     hypo    Dysphagia     ED (erectile dysfunction)     History of pneumonia     Hypertension     Leukopenia     Peyronie's disease     PONV  (postoperative nausea and vomiting)     Psoriasis     Sleep apnea     Tongue cancer (HCC)     Weight loss, abnormal

## 2024-07-02 NOTE — TELEPHONE ENCOUNTER
Prabha is calling to see if [facility] results are in for his [place] Barium study and Xray . Attempt to reach RAD reading room but no available agents.  Please advise and return call when results are in   Thank you!

## 2024-07-03 DIAGNOSIS — R05.3 CHRONIC COUGH: ICD-10-CM

## 2024-07-03 DIAGNOSIS — J69.0 ASPIRATION PNEUMONIA OF BOTH LUNGS, UNSPECIFIED ASPIRATION PNEUMONIA TYPE, UNSPECIFIED PART OF LUNG (HCC): Primary | ICD-10-CM

## 2024-07-04 ENCOUNTER — PATIENT MESSAGE (OUTPATIENT)
Dept: FAMILY MEDICINE CLINIC | Facility: CLINIC | Age: 59
End: 2024-07-04

## 2024-07-04 DIAGNOSIS — J69.0 ASPIRATION PNEUMONIA, UNSPECIFIED ASPIRATION PNEUMONIA TYPE, UNSPECIFIED LATERALITY, UNSPECIFIED PART OF LUNG (HCC): Primary | ICD-10-CM

## 2024-07-08 ENCOUNTER — OFFICE VISIT (OUTPATIENT)
Dept: PULMONOLOGY | Facility: CLINIC | Age: 59
End: 2024-07-08
Payer: COMMERCIAL

## 2024-07-08 VITALS
BODY MASS INDEX: 21.83 KG/M2 | DIASTOLIC BLOOD PRESSURE: 86 MMHG | OXYGEN SATURATION: 96 % | SYSTOLIC BLOOD PRESSURE: 132 MMHG | HEART RATE: 76 BPM | WEIGHT: 147.8 LBS | TEMPERATURE: 98.9 F

## 2024-07-08 DIAGNOSIS — J96.11 CHRONIC HYPOXEMIC RESPIRATORY FAILURE (HCC): Primary | ICD-10-CM

## 2024-07-08 DIAGNOSIS — G47.33 OBSTRUCTIVE SLEEP APNEA SYNDROME: ICD-10-CM

## 2024-07-08 DIAGNOSIS — J69.0 ASPIRATION PNEUMONIA, UNSPECIFIED ASPIRATION PNEUMONIA TYPE, UNSPECIFIED LATERALITY, UNSPECIFIED PART OF LUNG (HCC): ICD-10-CM

## 2024-07-08 LAB

## 2024-07-08 PROCEDURE — 94618 PULMONARY STRESS TESTING: CPT | Performed by: STUDENT IN AN ORGANIZED HEALTH CARE EDUCATION/TRAINING PROGRAM

## 2024-07-08 PROCEDURE — 94010 BREATHING CAPACITY TEST: CPT | Performed by: STUDENT IN AN ORGANIZED HEALTH CARE EDUCATION/TRAINING PROGRAM

## 2024-07-08 PROCEDURE — 99204 OFFICE O/P NEW MOD 45 MIN: CPT | Performed by: STUDENT IN AN ORGANIZED HEALTH CARE EDUCATION/TRAINING PROGRAM

## 2024-07-08 RX ORDER — LEVALBUTEROL TARTRATE 45 UG/1
2 AEROSOL, METERED ORAL EVERY 6 HOURS PRN
Qty: 45 G | Refills: 3 | Status: SHIPPED | OUTPATIENT
Start: 2024-07-08

## 2024-07-08 NOTE — PROGRESS NOTES
Pulmonary Outpatient Consultation Note   Mohamud Grimm Sr. 59 y.o. male MRN: [id number]  7/8/2024    Referring provider:   Sahil Garcia Md  [address] [facility]  [address]  [place] [address]     Reason for Consultation: Recurrent aspiration    Assessment:    Recurrent aspiration with recent hospitalization for respiratory failure secondary to aspiration, coarse rhonchi bilaterally, significant coughing on exam.  This is likely from radiation to his upper airway.  He follows with speech pathology and uses a modified diet.  Follows with ENT, tried gabapentin for chronic cough.  Going forward I advised him there is not much we can do to stop the aspiration but at least limit its effects.  I gave him a sample of Breztri to see if the inhaled corticosteroid can at least help limit the cough.  Also gave him a flutter valve to help expectorate though he does cough enough on his own.  We will perform a walk test on him to see if he qualifies for oxygen.  His wife states when he was in the hospital he did not qualify for oxygen his saturations remained in the 90s.  He is on Pepcid nightly 40 mg.  Given the recurrent aspiration concern would be UIP if he continues to aspirate.  Mild ERIK on CPAP, reviewed sleep study.  He feels comfortable using CPAP there is no extra coughing while on it.  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  His inspiratory flow loop pattern is normal, no fixed defect in the upper airway.  The expiratory flow volume loop is normal with some coughing variance  Sample of Breztri to see if the inhaled steroid has any effect on his cough  Rescue inhaler sent to pharmacy  No utility in bronchoscopy as this will likely recur  Continue ERIK nightly for now  Discussed with patient's wife on the phone  Follow-up in 3 months.  At the next appointment we can consider obtaining full PFTs      History of Present Illness   HPI:    59-year-old gentleman here for evaluation for recurrent aspiration pneumonia.  Patient follows with ENT and is status post transnasal esophagoscopy with balloon dilation, follows with allergy, and speech pathology.  History of squamous cell of the base of the tongue    Patient here for evaluation, was recently in the hospital in [place] where he was intubated for respiratory failure secondary to aspiration, was on the verge of tracheostomy but eventually was extubated.  Since then does have significant coughing, feels like he has reflux.  Difficult time swallowing food, denies food particles in his sputum.  Mildly short of breath, not on any inhalers, continues to refrain from smoking.    Video barium swallow 2023- weak pharyngeal squeeze with silent aspiration    Home sleep study September 2023 recommended PAP therapy with severe hypoxemia and mild ERIK    No echocardiogram    Chest x-ray June 2024 compared to April similar in appearance with chronic bibasilar consolidations    CT chest April 2024 dependent consolidations with groundglass opacifications in the lung bases concern for aspiration with mild mediastinal lymphadenopathy    Review of Systems   Constitutional:  Positive for fever. Negative for appetite change.   HENT:  Positive for trouble swallowing. Negative  for ear pain, postnasal drip, rhinorrhea, sneezing and sore throat.    Eyes: Negative.    Respiratory:  Positive for cough, choking and shortness of breath. Negative for wheezing.    Cardiovascular:  Negative for chest pain.   Gastrointestinal: Negative.    Endocrine: Negative.    Genitourinary: Negative.    Musculoskeletal: Negative.  Negative for myalgias.   Skin: Negative.    Allergic/Immunologic: Positive for immunocompromised state.   Neurological:  Positive for headaches.   Hematological: Negative.    Psychiatric/Behavioral: Negative.           Historical Information   Past Medical History:   Diagnosis Date   • Cancer of base of tongue (HCC)     excision   • Cough    • CPAP (continuous positive airway pressure) dependence     waiting on consult   • Disease of thyroid gland     hypo   • Dysphagia    • ED (erectile dysfunction)    • History of pneumonia    • Hypertension    • Leukopenia    • Peyronie's disease    • PONV (postoperative nausea and vomiting)    • Psoriasis    • Sleep apnea    • Tongue cancer (HCC)    • Weight loss, abnormal      Past Surgical History:   Procedure Laterality Date   • ESOPHAGOSCOPY N/A 9/16/2022    Procedure: ESOPHAGOSCOPY FLEXIBLE;  Surgeon: Devonte Singh MD;  Location: [facility];  Service: ENT   • ESOPHAGOSCOPY N/A 8/25/2023    Procedure: TRANSNASAL ESOPHAGOSCOPY WITH BALLOON DILATION/ ESOPHAGEAL DILATION (BALLOON);;  Surgeon: Devonte Singh MD;  Location: [facility];  Service: ENT   • OTHER SURGICAL HISTORY      infusion port inserted and removed   • PEG TUBE PLACEMENT      and removal   • HI COLONOSCOPY FLX DX W/COLLJ SPEC WHEN PFRMD N/A 6/27/2017    Procedure: COLONOSCOPY with polypectomy x2;  Surgeon: Janet Willard MD;  Location: [facility];  Service: General   • HI LARGSC W/NJX VOCAL CORD THER W/MICRO/TELESCOPE Bilateral 9/16/2022    Procedure: micro direct laryngoscopy, vocal fold injection, biopsy epiglottis lesion, posterior pharyngeal wall injection, flexible  esophagoscopy with dilation;  Surgeon: Devonte Singh MD;  Location: [facility];  Service: ENT   • TONGUE BIOPSY / EXCISION      Floor mouth excision of malignant tumor     Family History   Problem Relation Age of Onset   • Other Mother         reactive airway dysfunction   • Coronary artery disease Father    • Hypertension Father    • Psoriasis Father    • Thyroid disease unspecified Neg Hx        Occupational History: works at a furniture store    Social History     Tobacco Use   Smoking Status Former   • Current packs/day: 0.00   • Average packs/day: 0.3 packs/day for 20.0 years (5.0 ttl pk-yrs)   • Types: Cigarettes   • Start date: 1991   • Quit date: 2011   • Years since quittin.5   • Passive exposure: Past   Smokeless Tobacco Former   • Quit date:    Tobacco Comments    pt quit with dx of tongue base cancer, per allscripts       Meds/Allergies     Current Outpatient Medications:   •  amLODIPine (NORVASC) 5 mg tablet, TAKE 1 TABLET (5 MG TOTAL) BY MOUTH DAILY., Disp: 90 tablet, Rfl: 1  •  benzonatate (TESSALON) 200 MG capsule, Take 1 capsule (200 mg total) by mouth 3 (three) times a day as needed for cough, Disp: 30 capsule, Rfl: 2  •  levalbuterol (XOPENEX HFA) 45 mcg/act inhaler, Inhale 2 puffs every 6 (six) hours as needed for wheezing, Disp: 45 g, Rfl: 3  •  levothyroxine 100 mcg tablet, TAKE 1 TABLET BY MOUTH EVERY DAY, Disp: 90 tablet, Rfl: 1  •  multivitamin (THERAGRAN) TABS, Take 1 tablet by mouth daily, Disp: , Rfl:   •  PENTOXIFYLLINE PO, Take by mouth Unsure of dose, takes TID, Disp: , Rfl:   •  tadalafil (CIALIS) 5 MG tablet, Take 1 tablet (5 mg total) by mouth daily, Disp: 90 tablet, Rfl: 3  •  azelastine (ASTELIN) 0.1 % nasal spray, 1 spray into each nostril 2 (two) times a day, Disp: 30 mL, Rfl: 11  •  famotidine (PEPCID) 20 mg/2.5 mL oral suspension, TAKE 5 ML (40 MG TOTAL) BY MOUTH DAILY AT BEDTIME, Disp: 450 mL, Rfl: 3  •  fluticasone (FLONASE) 50 mcg/act nasal spray, 1 SPRAY  INTO EACH NOSTRIL DAILY AS NEEDED FOR RHINITIS, Disp: 48 mL, Rfl: 2  •  folic acid (FOLVITE) 1 mg tablet, Take 1 tablet (1 mg total) by mouth daily, Disp: , Rfl:   •  thiamine 100 MG tablet, Take 1 tablet (100 mg total) by mouth daily, Disp: , Rfl:   No Known Allergies    Vitals: Blood pressure 132/86, pulse 76, temperature 98.9 °F (37.2 °C), temperature source Tympanic, weight 67 kg (147 lb 12.8 oz), SpO2 96%., Body mass index is 21.83 kg/m². Oxygen Therapy  SpO2: 96 %    Physical Exam:    GEN: alert and oriented x 3, pleasant and cooperative   HEENT:  Normocephalic, atraumatic, anicteric  NECK: No JVD, erythema at L neck  HEART: Rate, normal S1 and S2  LUNGS: course rhonchi bilaterally, L > R  ABDOMEN:  Normoactive bowel sounds, soft, no tenderness, no distention  EXTREMITIES: no edema  NEURO: no gross focal findings  SKIN:  Dry, intact, warm to touch    Labs: I have personally reviewed pertinent lab results.  Cr 0.46  Lab Results   Component Value Date     09/02/2020       Imaging and other studies: I have personally reviewed pertinent films in PACS  Chest x-ray with bibasilar consolidations    Pulmonary function testing:  Personally interpreted by me  Restriction based on FVC    Other Studies: I have personally reviewed pertinent films in PACS  ENT notes, speech pathology notes, Care Everywhere    Chris Rivera MD  Pulmonary and Critical Care Medicine     Answers submitted by the patient for this visit:  Pulmonology Questionnaire (Submitted on 7/8/2024)  Chief Complaint: Primary symptoms  Do you experience frequent throat clearing?: Yes  Do you have a hoarse voice?: Yes  Do you have a wet cough?: Yes  Chronicity: chronic  When did you first notice your symptoms?: more than 1 year ago  How often do your symptoms occur?: constantly  Since you first noticed this problem, how has it changed?: waxing and waning  Do you have shortness of breath that occurs with effort or exertion?: Yes  Do you have ear  congestion?: No  Do you have heartburn?: No  Do you have fatigue?: Yes  Do you have nasal congestion?: No  Do you have shortness of breath when lying flat?: No  Do you have shortness of breath when you wake up?: No  Do you have sweats?: Yes  Have you experienced weight loss?: Yes

## 2024-07-08 NOTE — PATIENT INSTRUCTIONS
It was a pleasure seeing you today!    Lets try rescue albuterol as needed  Sample of Breztri 2 puffs twice a day  Use flutter valve three times a day  Walk test and spirometry today -qualifies for oxygen  Follow-up in 3 months we can obtain PFTs      Chris Rivear MD  Pulmonary and Critical Care Medicine

## 2024-07-08 NOTE — PROGRESS NOTES
Speech Treatment Note    Today's date: 2024  Patient name: Mohamud Grimm Sr.  : 1965  MRN: [id number]  Referring provider: Devonte Singh MD  Dx:   Encounter Diagnosis     ICD-10-CM    1. Cancer of base of tongue (HCC)  C01       2. Dysphagia, oropharyngeal phase  R13.12                 Visit Tracking:  POC   Expires   24        Visit/Unit Tracking:  Date    Used 1 2 3 4   Remaining              Subjective/Behavioral:  Patient continues to report ongoing benefit from MFR    Repeat CXR with persistent infiltrates- he has a third CXR pending from PCP- patient could not recall why that was ordered    He reports he saw Dr. Singh this morning and is pending repeat dilation with ?injections and likely antibiotics    Reports he is going to [place] tomorrow for a week. He reports he has not been doing any stretches.     Short-term goals:  -Patient will be educated on and report understanding and compliance of appropriate diet, aspiration risks, diagnosis and related pathology, to be achieved in 4-6 weeks.    Discussed radiation fibrosis and the importance of carryover of daily stretches.  Reciprocal comprehension was verbally expressed.     Patient will improve lingual strength and ROM via exercises, functional tasks and stretching, to be achieved in 4-6 weeks.        IOPI PEAK MEASUREMENT WEEKLY GRID                Tongue tip  (Goal = 56) 41 50          Tongue back  (Goal = 50) 33 33                      Tongue tip   (Reps) 30x reps @60%          Tongue back  (Reps)  30x reps @60%          Tongue tip  (Goal 15-30 secs) Endurance length ( in secs): 12, 8, 5, 12, 16 Effort level: 60% for then 50% Target for treatment: 30   Tongue back  (Goal 15-30 secs) Endurance length ( in secs): 15, 12, 16, 9, 11 Effort level: 60% Target for treatment: 20         -Patient will improve PSFS by 2 points for improved overall QOL, to be achieved in 4-6 weeks.    - Patient will demonstrate the ability to  adequately self-monitor swallowing skills and perform appropriate compensatory techniques to reduce overt s/sx of penetration/aspiration to safely tolerate least restrictive food/liquid consistencies, to be achieved in 4-6 weeks.    He admits that he feels as though L head turn has been beneficial in reduced coughing and that he is more mindful of utilizing this now      Following patient's verbal consent to treat, manual therapy technique through myofascial release was applied to patient's L anterolateral cervical region.     Region(s) 6/18 7/2 7/9       L SCM   15mins 5mins [id number]       Anterior Cervical   15mins 5mins x     Tongue base 10mins x x5mins     Splenius Capitus x x x5mins     Scalenes   x10     Trapezius   x5mins       Palpatory examination revealed moderate-significant mechanosensitivity (i.e., sensitivity to mechanical pressure/stretch) through the anterolateral cervical region. A mod palpatory pressure was suspected to show a severe area of apparent thickening that, with stimulation/pressure/stretch, reproduced patient's complaints/correlated to his primary issue of reduced ROM /stiffness. Patient verbalized that technique/pressure, through triggering his symptoms, was helpful.    Continues to present with shooting sensation along L UE through hand initially requiring posterior focus to begin- working anteriorly through the area with improved tolerance.    Patient was able to identify regions of myofascial sensitivity to reduce overall tension.     Notes:  -supine position (laying) and lotion    Other:Reviewed testing and plan of care with patient.Patient is in agreement with POC at this time.  Recommendations:Continue with Plan of Care    Ongoing stretches, aspiration precautions    Sheree Muse M.S., CCC-SLP  Speech Language Pathologist   Available via [employer]  [place] #[id number]  [place] #[id number]

## 2024-07-09 ENCOUNTER — OFFICE VISIT (OUTPATIENT)
Dept: PHYSICAL THERAPY | Facility: CLINIC | Age: 59
End: 2024-07-09
Payer: COMMERCIAL

## 2024-07-09 ENCOUNTER — OFFICE VISIT (OUTPATIENT)
Dept: SPEECH THERAPY | Facility: CLINIC | Age: 59
End: 2024-07-09
Payer: COMMERCIAL

## 2024-07-09 DIAGNOSIS — R13.12 DYSPHAGIA, OROPHARYNGEAL PHASE: ICD-10-CM

## 2024-07-09 DIAGNOSIS — Z92.3 HX OF HEAD AND NECK RADIATION: ICD-10-CM

## 2024-07-09 DIAGNOSIS — R49.0 DYSPHONIA: ICD-10-CM

## 2024-07-09 DIAGNOSIS — C01 CANCER OF BASE OF TONGUE (HCC): ICD-10-CM

## 2024-07-09 DIAGNOSIS — M62.81 MUSCLE WEAKNESS OF LEFT ARM: Primary | ICD-10-CM

## 2024-07-09 DIAGNOSIS — C01 CANCER OF BASE OF TONGUE (HCC): Primary | ICD-10-CM

## 2024-07-09 PROCEDURE — 97140 MANUAL THERAPY 1/> REGIONS: CPT

## 2024-07-09 PROCEDURE — 97530 THERAPEUTIC ACTIVITIES: CPT

## 2024-07-09 PROCEDURE — 92526 ORAL FUNCTION THERAPY: CPT | Performed by: SPEECH-LANGUAGE PATHOLOGIST

## 2024-07-09 NOTE — PROGRESS NOTES
"Daily Note     Today's date: 2024  Patient name: Mohamud Grimm Sr.  : 1965  MRN: [id number]  Referring provider: Devonte Singh MD  Dx:   Encounter Diagnosis     ICD-10-CM    1. Muscle weakness of left arm  M62.81       2. Cancer of base of tongue (HCC)  C01       3. Dysphagia, oropharyngeal phase  R13.12       4. Dysphonia  R49.0       5. Hx of head and neck radiation  Z92.3           Start Time: 1420  Stop Time: 1515  Total time in clinic (min): 55 minutes    Subjective: \"I think we're onto something.\"      Objective: See treatment diary below      Assessment: Tolerated treatment well. Patient demo minimal change in intensity of numbness/tingling about left hand following trial of cervical retraction/extension.    Pt verbally consented to dry needling treatment session. Risks/benefits/aftercare instructions reviewed. All questions/concerns addressed.  Pt in right side lying position. Hand hygiene performed pre and post DN treatment session. Placement of needles in pathological tissue.   Left ear, cervicothoracic junction, scarring about left side of neck, left scapula, left arm (needle location).  Total treatment duration to include set up/break down/in situ: 50 minutes. Patient was supervised by clinician throughout entirety of treatment session to include when DN in situ; clinician next to patient. All needles counted upon insertion and removal-- 52 needles. All accounted for and disposed in appropriate sharp container.     No adverse reaction to treatment. Pt advised may experience bruising, soreness, fatigue. Pt verbalized understanding.       Plan: Continue per plan of care.      Insurance:  AMA/CMS Eval/ Re-eval POC expires FOTO Auth #/ Referral # Total units  Start date  Expiration date Extension  Visit limitation?  PT only or  PT+OT? Co-Insurance   [employer]: AMA 7/3/24 9/11/24       yes 120 PT/OT 20% once ded met (not met)                                                               Date 7/3/24 " 7/9/24       Visit Number IE 2       Manual         Dry needling insertion 10 min 20 min                                           TherEx         Mechanical exam Rev cerv retraction for HEP Trial cerv retraction, extension       Scap retraction                                                               Neuro Re-Ed                                                                                 TherAct         Patient education Pt edu, PT POC, HEP 10 min Pt edu, PT POC, HEP 10 min                                           Gait Training                                    Modalities                      Precautions:   Past Medical History:   Diagnosis Date    Cancer of base of tongue (HCC)     excision    Cough     CPAP (continuous positive airway pressure) dependence     waiting on consult    Disease of thyroid gland     hypo    Dysphagia     ED (erectile dysfunction)     History of pneumonia     Hypertension     Leukopenia     Peyronie's disease     PONV (postoperative nausea and vomiting)     Psoriasis     Sleep apnea     Tongue cancer (HCC)     Weight loss, abnormal

## 2024-07-16 ENCOUNTER — APPOINTMENT (OUTPATIENT)
Dept: SPEECH THERAPY | Facility: CLINIC | Age: 59
End: 2024-07-16
Payer: COMMERCIAL

## 2024-07-16 DIAGNOSIS — R13.10 SEVERE SWALLOWING DYSFUNCTION: ICD-10-CM

## 2024-07-16 DIAGNOSIS — R63.4 WEIGHT LOSS: ICD-10-CM

## 2024-07-16 DIAGNOSIS — J69.0 ASPIRATION PNEUMONIA, UNSPECIFIED ASPIRATION PNEUMONIA TYPE, UNSPECIFIED LATERALITY, UNSPECIFIED PART OF LUNG (HCC): Primary | ICD-10-CM

## 2024-07-16 DIAGNOSIS — G24.4 OROFACIAL DYSKINESIA: ICD-10-CM

## 2024-07-22 ENCOUNTER — OFFICE VISIT (OUTPATIENT)
Dept: FAMILY MEDICINE CLINIC | Facility: CLINIC | Age: 59
End: 2024-07-22
Payer: COMMERCIAL

## 2024-07-22 VITALS
WEIGHT: 146 LBS | OXYGEN SATURATION: 96 % | HEIGHT: 68 IN | DIASTOLIC BLOOD PRESSURE: 84 MMHG | SYSTOLIC BLOOD PRESSURE: 124 MMHG | TEMPERATURE: 97.2 F | HEART RATE: 87 BPM | BODY MASS INDEX: 22.13 KG/M2

## 2024-07-22 DIAGNOSIS — I10 ESSENTIAL HYPERTENSION: ICD-10-CM

## 2024-07-22 DIAGNOSIS — Z12.5 SCREENING FOR MALIGNANT NEOPLASM OF PROSTATE: ICD-10-CM

## 2024-07-22 DIAGNOSIS — E03.9 ACQUIRED HYPOTHYROIDISM: ICD-10-CM

## 2024-07-22 DIAGNOSIS — Z00.00 ANNUAL PHYSICAL EXAM: Primary | ICD-10-CM

## 2024-07-22 DIAGNOSIS — Z92.3 HX OF HEAD AND NECK RADIATION: ICD-10-CM

## 2024-07-22 DIAGNOSIS — R13.14 PHARYNGOESOPHAGEAL DYSPHAGIA: ICD-10-CM

## 2024-07-22 DIAGNOSIS — G54.0 RADIATION-INDUCED BRACHIAL PLEXOPATHY: ICD-10-CM

## 2024-07-22 DIAGNOSIS — Z01.818 PREOPERATIVE CLEARANCE: ICD-10-CM

## 2024-07-22 PROCEDURE — 99396 PREV VISIT EST AGE 40-64: CPT | Performed by: FAMILY MEDICINE

## 2024-07-22 PROCEDURE — 99214 OFFICE O/P EST MOD 30 MIN: CPT | Performed by: FAMILY MEDICINE

## 2024-07-22 PROCEDURE — 93000 ELECTROCARDIOGRAM COMPLETE: CPT | Performed by: FAMILY MEDICINE

## 2024-07-22 NOTE — PROGRESS NOTES
"Adult Annual Physical  Name: Mohamud Grimm Sr.      : 1965      MRN: [id number]  Encounter Provider: Raphael Lopez MD  Encounter Date: 2024   Encounter department: [facility]    Assessment & Plan   1. Annual physical exam  2. Preoperative clearance  -     POCT ECG  3. Pharyngoesophageal dysphagia  Assessment & Plan:  For \"stretching\" of pharyngeal/esophageal area. ECG unchanged. Pt cleared for procedure  4. Essential hypertension  Assessment & Plan:  Well controlled. Cont present treatment. Monitor labs. Recheck 6m    Orders:  -     POCT ECG  -     CBC and differential; Future  -     Comprehensive metabolic panel; Future  -     Lipid panel; Future  5. Hx of head and neck radiation  -     TSH, 3rd generation with Free T4 reflex; Future  6. Acquired hypothyroidism  Assessment & Plan:  Secondary to prior neck RT. Check labs.   7. Radiation-induced brachial plexopathy  Assessment & Plan:  Slowly worsening. Continue to work on ROM and strength exercises. Monitor. Recheck 6m  8. Screening for malignant neoplasm of prostate  -     PSA, Total Screen; Future  Immunizations and preventive care screenings were discussed with patient today. Appropriate education was printed on patient's after visit summary.    Discussed risks and benefits of prostate cancer screening. We discussed the controversial history of PSA screening for prostate cancer in the [place] as well as the risk of over detection and over treatment of prostate cancer by way of PSA screening.  The patient understands that PSA blood testing is an imperfect way to screen for prostate cancer and that elevated PSA levels in the blood may also be caused by infection, inflammation, prostatic trauma or manipulation, urological procedures, or by benign prostatic enlargement.    The role of the digital rectal examination in prostate cancer screening was also discussed and I discussed with him that there is large " "interobserver variability in the findings of digital rectal examination.    Counseling:  Exercise: the importance of regular exercise/physical activity was discussed. Recommend exercise 3-5 times per week for at least 30 minutes.          History of Present Illness     Adult Annual Physical:  Patient presents for annual physical.   - pt still dealing with a cough that is \"all the time\".  No worsening with eating. Pt notes that he is sleeping better, and feels better in the morning if he only uses O2 at night, rather than using CPAP. Also notes that using Breztri has not resulted in improvement of symptoms  - otherwise breathing is OK  - pt to have upper esopahgus \"stretched\" 8/9.  Needs ECG prior to procedure  - still with chronic cough related to throat irritation. This is monitored by ENT  .     Diet and Physical Activity:  - Diet/Nutrition: well balanced diet. at risk for aspiration  - Exercise: no formal exercise.    Depression Screening:  - PHQ-2 Score: 0    General Health:  - Sleep: 7-8 hours of sleep on average. using O2 without CPAP at present  - Hearing: normal hearing bilateral ears.  - Vision: vision problems and goes for regular eye exams.  - Dental: regular dental visits and brushes teeth twice daily. chronic dry mouth secondary to radiation treatment     Health:  - History of STDs: no.   - Urinary symptoms: none.     Advanced Care Planning:  - Has an advanced directive?: no    - Has a durable medical POA?: no    - ACP document given to patient?: no      Review of Systems   Constitutional: Negative.    HENT:  Positive for voice change.    Eyes: Negative.    Respiratory:  Positive for cough and choking. Negative for chest tightness and wheezing.    Cardiovascular: Negative.  Negative for chest pain, palpitations and leg swelling.   Gastrointestinal: Negative.    Endocrine: Negative.    Genitourinary: Negative.    Musculoskeletal:  Positive for arthralgias, myalgias and neck pain.        Decreased ROM " of L shoulder.   Skin: Negative.    Allergic/Immunologic: Negative.    Neurological:  Positive for weakness (L shoulder and upper arm).   Hematological: Negative.    Psychiatric/Behavioral: Negative.       Past Medical History   Past Medical History:   Diagnosis Date   • Cancer of base of tongue (HCC)     excision   • Cough    • CPAP (continuous positive airway pressure) dependence     waiting on consult   • Disease of thyroid gland     hypo   • Dysphagia    • ED (erectile dysfunction)    • History of pneumonia    • Hypertension    • Leukopenia    • Peyronie's disease    • PONV (postoperative nausea and vomiting)    • Psoriasis    • Sleep apnea    • Tongue cancer (HCC)    • Weight loss, abnormal      Past Surgical History:   Procedure Laterality Date   • ESOPHAGOSCOPY N/A 9/16/2022    Procedure: ESOPHAGOSCOPY FLEXIBLE;  Surgeon: Devonte Singh MD;  Location: [facility];  Service: ENT   • ESOPHAGOSCOPY N/A 8/25/2023    Procedure: TRANSNASAL ESOPHAGOSCOPY WITH BALLOON DILATION/ ESOPHAGEAL DILATION (BALLOON);;  Surgeon: Devonte Singh MD;  Location: [facility];  Service: ENT   • OTHER SURGICAL HISTORY      infusion port inserted and removed   • PEG TUBE PLACEMENT      and removal   • NH COLONOSCOPY FLX DX W/COLLJ SPEC WHEN PFRMD N/A 6/27/2017    Procedure: COLONOSCOPY with polypectomy x2;  Surgeon: Janet Willard MD;  Location: [facility];  Service: General   • NH LARGSC W/NJX VOCAL CORD THER W/MICRO/TELESCOPE Bilateral 9/16/2022    Procedure: micro direct laryngoscopy, vocal fold injection, biopsy epiglottis lesion, posterior pharyngeal wall injection, flexible esophagoscopy with dilation;  Surgeon: Devonte Singh MD;  Location: [facility];  Service: ENT   • TONGUE BIOPSY / EXCISION      Floor mouth excision of malignant tumor     Family History   Problem Relation Age of Onset   • Other Mother         reactive airway dysfunction   • Coronary artery disease Father    • Hypertension Father    • Psoriasis Father    •  Thyroid disease unspecified Neg Hx      Current Outpatient Medications on File Prior to Visit   Medication Sig Dispense Refill   • amLODIPine (NORVASC) 5 mg tablet TAKE 1 TABLET (5 MG TOTAL) BY MOUTH DAILY. 90 tablet 1   • benzonatate (TESSALON) 200 MG capsule Take 1 capsule (200 mg total) by mouth 3 (three) times a day as needed for cough 30 capsule 2   • levalbuterol (XOPENEX HFA) 45 mcg/act inhaler Inhale 2 puffs every 6 (six) hours as needed for wheezing 45 g 3   • levothyroxine 100 mcg tablet TAKE 1 TABLET BY MOUTH EVERY DAY 90 tablet 1   • multivitamin (THERAGRAN) TABS Take 1 tablet by mouth daily     • PENTOXIFYLLINE PO Take by mouth Unsure of dose, takes TID     • tadalafil (CIALIS) 5 MG tablet Take 1 tablet (5 mg total) by mouth daily 90 tablet 3   • famotidine (PEPCID) 20 mg/2.5 mL oral suspension TAKE 5 ML (40 MG TOTAL) BY MOUTH DAILY AT BEDTIME 450 mL 3     No current facility-administered medications on file prior to visit.   No Known Allergies   Current Outpatient Medications on File Prior to Visit   Medication Sig Dispense Refill   • amLODIPine (NORVASC) 5 mg tablet TAKE 1 TABLET (5 MG TOTAL) BY MOUTH DAILY. 90 tablet 1   • benzonatate (TESSALON) 200 MG capsule Take 1 capsule (200 mg total) by mouth 3 (three) times a day as needed for cough 30 capsule 2   • levalbuterol (XOPENEX HFA) 45 mcg/act inhaler Inhale 2 puffs every 6 (six) hours as needed for wheezing 45 g 3   • levothyroxine 100 mcg tablet TAKE 1 TABLET BY MOUTH EVERY DAY 90 tablet 1   • multivitamin (THERAGRAN) TABS Take 1 tablet by mouth daily     • PENTOXIFYLLINE PO Take by mouth Unsure of dose, takes TID     • tadalafil (CIALIS) 5 MG tablet Take 1 tablet (5 mg total) by mouth daily 90 tablet 3   • famotidine (PEPCID) 20 mg/2.5 mL oral suspension TAKE 5 ML (40 MG TOTAL) BY MOUTH DAILY AT BEDTIME 450 mL 3     No current facility-administered medications on file prior to visit.      Social History     Tobacco Use   • Smoking status: Former  "    Current packs/day: 0.00     Average packs/day: 0.3 packs/day for 20.0 years (5.0 ttl pk-yrs)     Types: Cigarettes     Start date: 1991     Quit date: 2011     Years since quittin.5     Passive exposure: Past   • Smokeless tobacco: Former     Quit date:    • Tobacco comments:     pt quit with dx of tongue base cancer, per allscripts   Vaping Use   • Vaping status: Never Used   Substance and Sexual Activity   • Alcohol use: Yes     Alcohol/week: 12.0 standard drinks of alcohol     Types: 12 Cans of beer per week     Comment: rare   • Drug use: No   • Sexual activity: Yes     Partners: Female     Birth control/protection: Post-menopausal       Objective     /84 (BP Location: Left arm, Patient Position: Sitting, Cuff Size: Adult)   Pulse 87   Temp (!) 97.2 °F (36.2 °C)   Ht 5' 8.25\" (1.734 m)   Wt 66.2 kg (146 lb)   SpO2 96%   BMI 22.04 kg/m²     Physical Exam  Vitals reviewed.   Constitutional:       Comments: Sl tired appearing male in NAD   HENT:      Head: Normocephalic and atraumatic.      Right Ear: Tympanic membrane, ear canal and external ear normal.      Left Ear: Tympanic membrane, ear canal and external ear normal.      Nose: Nose normal.      Mouth/Throat:      Mouth: Mucous membranes are dry.      Pharynx: No oropharyngeal exudate or posterior oropharyngeal erythema.   Eyes:      General: No scleral icterus.     Extraocular Movements: Extraocular movements intact.      Conjunctiva/sclera: Conjunctivae normal.      Pupils: Pupils are equal, round, and reactive to light.      Comments: Hoarse, sl laryngitic voice   Neck:      Comments: L neck with post-radiation skin and subdermal changes  Cardiovascular:      Rate and Rhythm: Normal rate and regular rhythm.      Pulses: Normal pulses.   Pulmonary:      Effort: Pulmonary effort is normal.      Breath sounds: No rhonchi or rales (scattere bilat bases - improved with deep breaths).   Abdominal:      General: There is no " distension.      Palpations: There is no mass.      Tenderness: There is no abdominal tenderness.   Musculoskeletal:         General: Deformity (L neck with post radiation therapy changes) present. No tenderness.      Cervical back: Normal range of motion. No tenderness.      Right lower leg: No edema.      Left lower leg: No edema.      Comments: Decreased ROM of L shoulder with abduction or anterior flexion   Lymphadenopathy:      Cervical: No cervical adenopathy.   Skin:     General: Skin is warm.      Comments: L neck with post radiation changes   Neurological:      Mental Status: He is alert and oriented to person, place, and time.      Sensory: No sensory deficit.      Motor: Weakness present.      Gait: Gait normal.      Deep Tendon Reflexes: Reflexes normal.      Comments: Mild L facial dyskinesia.  L shoulder with decreased ROM and weakness. L upper arm with moderate weakness   Psychiatric:         Mood and Affect: Mood normal.      Comments: PHQ-2/9 Depression Screening    Little interest or pleasure in doing things: 0 - not at all  Feeling down, depressed, or hopeless: 0 - not at all  PHQ-2 Score: 0  PHQ-2 Interpretation: Negative depression screen

## 2024-07-23 ENCOUNTER — OFFICE VISIT (OUTPATIENT)
Dept: SPEECH THERAPY | Facility: CLINIC | Age: 59
End: 2024-07-23
Payer: COMMERCIAL

## 2024-07-23 ENCOUNTER — OFFICE VISIT (OUTPATIENT)
Dept: PHYSICAL THERAPY | Facility: CLINIC | Age: 59
End: 2024-07-23
Payer: COMMERCIAL

## 2024-07-23 DIAGNOSIS — R49.0 DYSPHONIA: ICD-10-CM

## 2024-07-23 DIAGNOSIS — M62.81 MUSCLE WEAKNESS OF LEFT ARM: Primary | ICD-10-CM

## 2024-07-23 DIAGNOSIS — C01 CANCER OF BASE OF TONGUE (HCC): ICD-10-CM

## 2024-07-23 DIAGNOSIS — C01 CANCER OF BASE OF TONGUE (HCC): Primary | ICD-10-CM

## 2024-07-23 DIAGNOSIS — R13.12 DYSPHAGIA, OROPHARYNGEAL PHASE: ICD-10-CM

## 2024-07-23 DIAGNOSIS — Z92.3 HX OF HEAD AND NECK RADIATION: ICD-10-CM

## 2024-07-23 PROCEDURE — 97530 THERAPEUTIC ACTIVITIES: CPT

## 2024-07-23 PROCEDURE — 92526 ORAL FUNCTION THERAPY: CPT | Performed by: SPEECH-LANGUAGE PATHOLOGIST

## 2024-07-23 PROCEDURE — 97140 MANUAL THERAPY 1/> REGIONS: CPT

## 2024-07-23 NOTE — PROGRESS NOTES
Daily Note     Today's date: 2024  Patient name: Mohamud Grimm Sr.  : 1965  MRN: [id number]  Referring provider: Devonte Singh MD  Dx:   Encounter Diagnosis     ICD-10-CM    1. Muscle weakness of left arm  M62.81       2. Cancer of base of tongue (HCC)  C01       3. Dysphagia, oropharyngeal phase  R13.12       4. Dysphonia  R49.0       5. Hx of head and neck radiation  Z92.3           Start Time: 1505  Stop Time: 1550  Total time in clinic (min): 45 minutes    Subjective: Patient reports experiencing increased stiffness about L UE following previous session.      Objective: See treatment diary below      Assessment: Tolerated treatment well. Pt verbally consented to dry needling treatment session. Risks/benefits/aftercare instructions reviewed. All questions/concerns addressed.  Pt in R SL position. Hand hygiene performed pre and post DN treatment session. Placement of needles in pathological tissue.   Fibrotic tissue about L side of neck, UT insertion, SCM insertion, L UE, L jeffrey scap region (needle location).  Total treatment duration to include set up/break down/in situ: 40 minutes. Patient was supervised by clinician throughout entirety of treatment session to include when DN in situ; clinician next to patient. All needles counted upon insertion and removal-- 39 needles. All accounted for and disposed in appropriate sharp container.     No adverse reaction to treatment. Pt advised may experience soreness, fatigue, bruising. Discussed timing of interventions during today's session as patient has been having PT and ST services back to back; discussed possibly trialing services on different days to assess symptom response after increased stiffness following previous session. Pt verbalized understanding.         Plan: Continue per plan of care.      Insurance:  AMA/CMS Eval/ Re-eval POC expires FOTO Auth #/ Referral # Total units  Start date  Expiration date Extension  Visit limitation?  PT only  or  PT+OT? Co-Insurance   [employer]: AMA 7/3/24 9/11/24       yes 120 PT/OT 20% once ded met (not met)                                                               Date 7/3/24 7/9/24 7/23/24      Visit Number IE 2 3      Manual         Dry needling insertion 10 min 20 min 20 min                                          TherEx         Mechanical exam Rev cerv retraction for HEP Trial cerv retraction, extension       Scap retraction                                                               Neuro Re-Ed            NMES w/ DN                                                                     TherAct         Patient education Pt edu, PT POC, HEP 10 min Pt edu, PT POC, HEP 10 min Pt edu, PT POC, HEP 10 min                                          Gait Training                                    Modalities                      Precautions:   Past Medical History:   Diagnosis Date    Cancer of base of tongue (HCC)     excision    Cough     CPAP (continuous positive airway pressure) dependence     waiting on consult    Disease of thyroid gland     hypo    Dysphagia     ED (erectile dysfunction)     History of pneumonia     Hypertension     Leukopenia     Peyronie's disease     PONV (postoperative nausea and vomiting)     Psoriasis     Sleep apnea     Tongue cancer (HCC)     Weight loss, abnormal

## 2024-07-23 NOTE — PROGRESS NOTES
"Speech Treatment Note    Today's date: 2024  Patient name: Mohamud Grimm Sr.  : 1965  MRN: [id number]  Referring provider: Devonte Singh MD  Dx:   Encounter Diagnosis     ICD-10-CM    1. Cancer of base of tongue (HCC)  C01       2. Dysphagia, oropharyngeal phase  R13.12               Visit Tracking:  POC   Expires   24        Visit/Unit Tracking:  Date    Used 1 2 3 4 5   Remaining               Subjective/Behavioral:  Patient continues to report ongoing benefit from MFR    Patient reports he is scheduled for repeat dilation in August- unsure of exact date    Reports limited carryover at home/on vacation but did speak with his wife about helping him     He reports he was seen by PCP and surprised that recs are for NPO with thickened liquids    He reports reduced UE ROM/strength ( discussed with PT)    He also admitted today \"I think I'm in denial\"    Short-term goals:  -Patient will be educated on and report understanding and compliance of appropriate diet, aspiration risks, diagnosis and related pathology, to be achieved in 4-6 weeks.    Discussed radiation fibrosis/long-term effects as well as reiterated VBS results, risks and recommendations.  Reciprocal comprehension was verbally expressed.     Patient will improve lingual strength and ROM via exercises, functional tasks and stretching, to be achieved in 4-6 weeks.    DNT      -Patient will improve PSFS by 2 points for improved overall QOL, to be achieved in 4-6 weeks.    - Patient will demonstrate the ability to adequately self-monitor swallowing skills and perform appropriate compensatory techniques to reduce overt s/sx of penetration/aspiration to safely tolerate least restrictive food/liquid consistencies, to be achieved in 4-6 weeks.    He admits that he feels as though L head turn has been beneficial in reduced coughing and that he is more mindful of utilizing this now      Following patient's verbal consent to " "treat, manual therapy technique through myofascial release was applied to patient's L anterolateral cervical region.     Region(s) 6/18 7/2 7/9 7/23      L SCM   15mins 5mins [id number]      Anterior Cervical   15mins 5mins x x5mins    Tongue base 10mins x x5mins x    Splenius Capitus x x x5mins x    Scalenes   x10 x5mins    Trapezius   x5mins x      Palpatory examination revealed moderate mechanosensitivity (i.e., sensitivity to mechanical pressure/stretch) through the anterolateral cervical region. A mod palpatory pressure was suspected to show a severe area of apparent thickening that, with stimulation/pressure/stretch, reproduced patient's complaints/correlated to his primary issue of reduced ROM /stiffness. Patient verbalized that technique/pressure, through triggering his symptoms, was helpful.    Continues to present with shooting sensation along L UE through hand initially requiring posterior focus to begin (very minimal today)- working anteriorly through the area with improved tolerance.    Patient was able to identify regions of myofascial sensitivity to reduce overall tension.     Notes:  -supine position (laying) and lotion    *He expressed frustration with his hx and disease progression as well as admitting feeling as though he is \"in denial\". I provided encouragement and discussed consideration for Palliative care.     Other:Reviewed testing and plan of care with patient.Patient is in agreement with POC at this time.  Recommendations:Continue with Plan of Care    Ongoing stretches, aspiration precautions    Consider Palliative care consult    Sheree Muse M.S., CCC-SLP  Speech Language Pathologist   Available via [employer]  [place] #[id number]  [place] #[id number]    "

## 2024-07-30 ENCOUNTER — OFFICE VISIT (OUTPATIENT)
Dept: PHYSICAL THERAPY | Facility: CLINIC | Age: 59
End: 2024-07-30
Payer: COMMERCIAL

## 2024-07-30 ENCOUNTER — EVALUATION (OUTPATIENT)
Dept: SPEECH THERAPY | Facility: CLINIC | Age: 59
End: 2024-07-30
Payer: COMMERCIAL

## 2024-07-30 DIAGNOSIS — C01 CANCER OF BASE OF TONGUE (HCC): Primary | ICD-10-CM

## 2024-07-30 DIAGNOSIS — Z92.3 HX OF HEAD AND NECK RADIATION: ICD-10-CM

## 2024-07-30 DIAGNOSIS — C01 CANCER OF BASE OF TONGUE (HCC): ICD-10-CM

## 2024-07-30 DIAGNOSIS — R13.12 DYSPHAGIA, OROPHARYNGEAL PHASE: ICD-10-CM

## 2024-07-30 DIAGNOSIS — R49.0 DYSPHONIA: ICD-10-CM

## 2024-07-30 DIAGNOSIS — M62.81 MUSCLE WEAKNESS OF LEFT ARM: Primary | ICD-10-CM

## 2024-07-30 PROCEDURE — 97530 THERAPEUTIC ACTIVITIES: CPT

## 2024-07-30 PROCEDURE — 92526 ORAL FUNCTION THERAPY: CPT | Performed by: SPEECH-LANGUAGE PATHOLOGIST

## 2024-07-30 PROCEDURE — 97140 MANUAL THERAPY 1/> REGIONS: CPT

## 2024-07-30 NOTE — PROGRESS NOTES
Speech-Language Pathology Re-Evaluation    Today's date: 2024   Patient’s name: Mohamud Grimm Sr.  : 1965  MRN: [id number]  Safety measures: aspiration   Referring provider: Devonte Singh MD    Encounter Diagnosis     ICD-10-CM    1. Cancer of base of tongue (HCC)  C01       2. Dysphagia, oropharyngeal phase  R13.12           Assessment:   Pt continues to present with oropharyngeal swallow and dysphonia complicated by dysgeusia, reflux/dysmotility, radiation fibrosis, and mucositis. He has demonstrated excellent attendance and shown progress/improvement in most areas. His overall knowledge and understanding has improved however he has admitted to feeling as though he is in denial and frustrated about his circumstances but continues to report ongoing motivation for progress. He is pending repeat dilation next week but admit to subjective improved voice and swallow since IE. He has also been working with PT for dry needling with perceived benefit.      Recommendations: Likely ongoing silent aspiration- clinically should most likely be NPO with repeat VBS (further information will be provided on upcoming VBS) however patient is aware of his risks but would like to remain on current diet for improved QOL- information provided on ways to mitigate risk today    Aspiration precautions and compensatory swallowing strategies: upright posture, slow rate of feeding, small bites/sips, head turn left, effortful swallow, cough every 1-2 bites/sips, and alternating bites and sips    Date Weight Taken    147.8  In office                                       Short-term goals:  -Patient will be educated on and report understanding and compliance of appropriate diet, aspiration risks, diagnosis and related pathology, to be achieved in 4-6 weeks. PROGRESSING    Ongoing education provided re: need for daily stretching/massage- discussed consideration for help from wife who is welcome to come to therapy for improved  "understanding/carryover. Reciprocal comprehension was verbally expressed.     Patient will improve lingual strength and ROM via exercises, functional tasks and stretching, to be achieved in 4-6 weeks. PROGRESSING  DNT    -Patient will improve PSFS by 2 points for improved overall QOL, to be achieved in 4-6 weeks. PROGRESSING  Swallow - 5 (improved from 4 on IE)  Neck stiffness-  patient unable to rate today- will review again at next visit.    - Patient will demonstrate the ability to adequately self-monitor swallowing skills and perform appropriate compensatory techniques to reduce overt s/sx of penetration/aspiration to safely tolerate least restrictive food/liquid consistencies, to be achieved in 4-6 weeks PROGRESSING    Hasn't needed use of L head turn over the last week \"Things are going down\"- reports reduced coughing/overall sxs of aspiration      Following patient's verbal consent to treat, manual therapy technique through myofascial release was applied to patient's L anterolateral cervical region.     Region(s) 6/18 7/2 7/9 7/23 7/30     L SCM   15mins 5mins [id number] x2mins     Anterior Cervical   15mins 5mins x x5mins x5mins   Tongue base 10mins x x5mins x [id number]   Splenius Capitus x x x5mins x x2mins   Scalenes   x10 x5mins x2mins   Trapezius   x5mins x x     Palpatory examination revealed moderate mechanosensitivity (i.e., sensitivity to mechanical pressure/stretch) through the anterolateral cervical region. A mod palpatory pressure was suspected to show a mod- severe area of apparent thickening that, with stimulation/pressure/stretch, reproduced patient's complaints/correlated to his primary issue of reduced ROM /stiffness. Patient verbalized that technique/pressure, through triggering his symptoms, was helpful.    No shooting sensation along L UE through hand initially today which is an overall improvement. He is also reporting reduced overall site and tension which was noted on palpation " "today.    Patient was able to identify regions of myofascial sensitivity to reduce overall tension.     Notes:  -upright ( in chair) and lotion    Reviewed Palliative care and rationale for previous recommendation. Reciprocal comprehension was verbally expressed. He reports he would like to discuss with his wife.       Long-term goals:  -Patient will receive a videofluoroscopic swallow study (VFSS) to fully assess physiology and anatomy of the swallow and to determine the appropriate diet and/or rehabilitation exercises by discharge.  GOAL MET    -Patient will maintain adequate hydration and nutrition with optimum safety and efficacy of swallowing function on P.O. intake with reduced s/sx of penetration/ aspiration by discharge. Patient admits to suspected weight loss but does not regularly weigh himself- initiated this today.  He is also scheduled with RD next month.      Plan:  Patient would benefit from ongoing outpatient skilled Speech Therapy services: Voice therapy and Dysphagia therapy    Frequency: 1-2x weekly  Duration: 6-8 weeks    Intervention certification from: 7/30/2024  Intervention certification to: 9/24/24    Subjective:  Patient continues to report ongoing benefit from MFR    Patient reports he is scheduled for repeat dilation in August- unsure of exact date      Objective:    DYSPHAGIA EVALUATION:      Head and Neck Cancer Checklist:  *Patient indicated that he is experiencing difficulties in the following areas:    SWALLOWING: Needing to swallow many times to clear food from the mouth and/or throat, Coughing/choking when swallowing, Throat clearing after swallowing, Gurgly, \"wet-sounding\" voice after swallowing, Loss of liquids out of nose, Bringing up food after the swallow, Decreased appetite, Weight loss, and Recent pneumonia    SPEECH: none    VOICE: Hoarse/husky voice, Decreased vocal loudness (stronger-louder and less raspy per patient)      Functional Outcome Measurements    1. Functional " Oral Intake Scale (FOIS): 6 - total oral intake with no special preparation, but must avoid specific foods or liquid items (unchanged)    2. Performance Status Scale For Head and Neck Cancer Patients (PSS-HN):  -Normalcy of Diet (0-100): 60 (unchanged)  -Public Eating (0-100): 100 ( improved from 75)  -Understandability of Speech (0-100): 100 -    3. Eating Assessment Tool (EAT-10) score:  22 (improved/unchanged from 23)    4. PSFS:  6   1. Swallow 5/10 (improved from 4)   2. Neck stiffness:  DNT (IE- 2)    5. The Cough Severity Index (CSI) is a self-administered, 10-item outcomes instrument to quantify a patient's symptoms of chronic cough of upper airway origin. The total score ranges from a 0 to 40 based on the sum of responses to 10 questions on a 5-point scale ranging from “never a problem” to “always a problem.” A score of 3 or below is considered normal. A score higher than 3 means that the cough may be impacting quality of life. Patient obtained a score of 23/40 (improved from 26).     -Current diet (solids): Regular ( avoids some foods)  -Current diet (liquids): Thin  -Current pill intake method: whole with thin liquids    -Alternative Feeding Method?: No  Special Studies:  6/6:   Flexible laryngoscopy     Pre-Operative Diagnosis:  Dysphonia   Dysphagia   Hx SCCA BOT s/p chemo/RT ~2012 [facility]  Left vf immobility  Glottic insufficiency  Left hemilarynx with mod edema/fibrosis  Min erythema  No pooling of secretions  Narrow/crowding throughout  No laryngeal sensation left hemilarynx, right side intact  VPI  Pooling saliva vallecula/piriforms  Hx Small lesion 4-5mm laryngeal surface of epiglottis right of midline, leukoplakic/hypervascular  S/p MDL, vf steroid injection, biopsy epiglottis, posterior pharyngeal wall injection, esophagoscopy w dilation 9/16/22     Post-Operative Diagnosis:    SAME  Stable glottic airway  Mod reflux laryngitis  Left vf stiffness  Left false fold overlap     Surgeon:   Devonte  MD Francisco     Anesthesia:     -Lidocaine 2%  -Oxymetazoline  *helped with the cough     Stroboscope:  Atmos  Flexible Endoscope:    chip tip  Rigid endoscope:       Operative Details:  The procedure was performed in the endoscopy special procedures room.  A high resolution video laryngoscope was inserted transnasally or orally and suspended from the video system for magnification and documentation.       Voice: mild raspy, some gravel/clearing    Supraglottic Hyperfunction:    present, mild ant/post  Arytenoid Joint Movement:    Normal  Dysdiadochokinesis:     Absent  Arytenoids:   mild erythema, mod edema L>R  Posterior Cobblestoning:  present        Right Vocal Fold:  Abduction:    Normal  Adduction:    Normal  Longitudinal Tension:   Normal     Left Vocal Fold:  Abduction:    absent  Adduction:    absent  Longitudinal Tension:   dec     Glottic Closure:    incomplete     Vocal Process Height:    Equal     Vocal Fold Color:  Right: Normal  Left: Normal     Masses/Vibratory Margin Irregularities: Mild Valerio's edema. Left hemilarynx edema/fibrosis  Other Lesions:      The procedure was concluded without complication and the laryngoscope was removed.     Reflux Finding Score (RFS)  Subglottic Edema:   2  Ventricular Obliteration:   2-4 L>R    Erythema/Hyperemia:    2 min  Vocal Fold Edema:   1  Diffuse Laryngeal Edema:   1-2 L>R   Posterior Commissure Hypertrophy:   2     Granuloma/Granulation:   0  Thick Endolaryngeal Mucus:  0     Total: 10    +nocturnal reflux  Reflux laryngitis on laryngoscopy today  Famotidine 40 qhs  Reflux gourmet      VBS 7/5/23: Pt presents with mild oral and severe-profound pharyngeal dysphagia characterized by reduced AP transport, delayed swallow initiation with significantly reduced/minimal hyolaryngeal rise, reduced base of tongue retraction, absent epiglottic inversion and absent pharyngeal stripping wave. Airway protection/closure was minimal, and silent aspiration occurred with  all liquid consistencies. Pt was unable to swallow puree due to lack of driving force on the bolus and had to regurgitate/expectorate the puree from his vallecula. Strategies were not effective in eliminating aspiration. Note: Images are available for review in PACS as desired.     VBS 6/21/23: Presenting w/ mild-mod oral and SEVERE pharyngeal dysphagia characterized by SILENT aspiration of thin and mod thick during and after the swallow, unable to clear w/ cued cough. Mod-severe stasis w/ inconsistent clearance. Significant retrograde flow observed. Further trials deferred given aspiration and high risk for re-intubation.     VBS 3/4/21:  Pt presents w/ severe pharyngeal dysphagia characterized by no epiglottic inversion, minimal BOT retraction, minimal hyolaryngeal elevation, reduced airway entrance closure, and trace amounts of penetration and silent aspiration during and after swallowing thick and thin liquids. Pt is at high risk of developing aspiration pneumonia. Lengthy discussion was had w/ patient to review results of VBS and recommendations.     VBS 8/14/19:  Mild oral/moderate pharyngeal dysphagia due to decreased tongue base retraction, decreased epiglottic inversion and airway entrance closure w/ deep transient penetration of all consistencies,  And passive silent aspiration of pharyngeal residue and thin liquids.      VBS 10/25/12(Radiologist report): Aspiration of thin liquid barium and nectar thick barium. The patient did not have a spontaneous cough. Penetration of honey thick barium, applesauce, and mixed with barium.     Treatment:  See above      Visit Tracking:  POC   Expires   9/24/24        Visit/Unit Tracking:  Date 6/11 6/18 7/2 7/9 7/23 7/30   Used 1 2 3 4 5 6   Remaining               Sheree Muse M.S., CCC-SLP  Speech Language Pathologist   Available via [employer]  [place] #[id number]  [place] #[id number]

## 2024-07-30 NOTE — PROGRESS NOTES
Daily Note     Today's date: 2024  Patient name: Mohamud Grimm Sr.  : 1965  MRN: [id number]  Referring provider: Devonte Singh MD  Dx:   Encounter Diagnosis     ICD-10-CM    1. Muscle weakness of left arm  M62.81       2. Cancer of base of tongue (HCC)  C01       3. Dysphagia, oropharyngeal phase  R13.12       4. Dysphonia  R49.0       5. Hx of head and neck radiation  Z92.3           Start Time: 1505  Stop Time: 1545  Total time in clinic (min): 40 minutes    Subjective: Patient notes that he wasn't as stiff following previous session. States that his L UE was sore after weed whacking over the weekend.       Objective: See treatment diary below      Assessment: Tolerated treatment well. Pt verbally consented to dry needling treatment session. Risks/benefits/aftercare instructions reviewed. All questions/concerns addressed.  Pt in seated position. Hand hygiene performed pre and post DN treatment session. Placement of needles in pathological tissue.   Ear, left sided fibrosis, cervicothoracic junction, suboccipitals, B UE (L > R) (needle location).  Total treatment duration to include set up/break down/in situ: 40 minutes. Patient was supervised by clinician throughout entirety of treatment session to include when DN in situ; clinician next to patient. All needles counted upon insertion and removal-- 49 needles. All accounted for and disposed in appropriate sharp container. Patient demo fasciculations about left biceps with needling interventions, decreased with time, pt notes this is common.    No adverse reaction to treatment. Pt advised may experience soreness, fatigue, bruising. Pt verbalized understanding.         Plan: Continue per plan of care.      Insurance:  AMA/CMS Eval/ Re-eval POC expires FOTO Auth #/ Referral # Total units  Start date  Expiration date Extension  Visit limitation?  PT only or  PT+OT? Co-Insurance   [employer]: AMA 7/3/24 9/11/24       yes 120 PT/OT 20% once ded met (not met)                                                                Date 7/3/24 7/9/24 7/23/24 7/30/24     Visit Number IE 2 3 4     Manual         Dry needling insertion 10 min 20 min 20 min 20 min                                         TherEx         Mechanical exam Rev cerv retraction for HEP Trial cerv retraction, extension       Scap retraction                                                               Neuro Re-Ed            NMES w/ DN                                                                     TherAct         Patient education Pt edu, PT POC, HEP 10 min Pt edu, PT POC, HEP 10 min Pt edu, PT POC, HEP 10 min PT POC, muscular atrophy, pt edu 10 min                                         Gait Training                                    Modalities                      Precautions:   Past Medical History:   Diagnosis Date    Cancer of base of tongue (HCC)     excision    Cough     CPAP (continuous positive airway pressure) dependence     waiting on consult    Disease of thyroid gland     hypo    Dysphagia     ED (erectile dysfunction)     History of pneumonia     Hypertension     Leukopenia     Peyronie's disease     PONV (postoperative nausea and vomiting)     Psoriasis     Sleep apnea     Tongue cancer (HCC)     Weight loss, abnormal

## 2024-08-01 NOTE — PROGRESS NOTES
Speech Treatment Note    Today's date: 2024  Patient name: Mohamud Grimm Sr.  : 1965  MRN: [id number]  Referring provider: Devonte Singh MD  Dx:   Encounter Diagnosis     ICD-10-CM    1. Dysphagia, oropharyngeal phase  R13.12       2. Cancer of base of tongue (HCC)  C01                      Visit Tracking:  POC   Expires   24        Visit/Unit Tracking:  Date  8/6   Used 1 2 3 4 5 6 7   Remaining              Subjective/Behavioral:  Patient continues to report ongoing benefit from MFR    Date Weight Taken    147.8  In office                                   Reports improvement in voice    Short-term goals:  -Patient will be educated on and report understanding and compliance of appropriate diet, aspiration risks, diagnosis and related pathology, to be achieved in 4-6 weeks.     Ongoing education provided re: need for daily stretching/massage- discussed consideration for help from wife who is welcome to come to therapy for improved understanding/carryover. Reciprocal comprehension was verbally expressed.     Patient will improve lingual strength and ROM via exercises, functional tasks and stretching, to be achieved in 4-6 weeks. DNT    -Patient will improve PSFS by 2 points for improved overall QOL, to be achieved in 4-6 weeks.   DNT    - Patient will demonstrate the ability to adequately self-monitor swallowing skills and perform appropriate compensatory techniques to reduce overt s/sx of penetration/aspiration to safely tolerate least restrictive food/liquid consistencies, to be achieved in 4-6 weeks     Following patient's verbal consent to treat, manual therapy technique through myofascial release was applied to patient's L anterolateral cervical region.     Region(s)  8/6     L SCM   15mins 5mins [id number] x2mins x5mins     Anterior Cervical   15mins 5mins x x5mins x5mins [id number]   Tongue base 10mins x x5mins x [id number]    Splenius Capitus x x x5mins x x2mins x   Scalenes   x10 x5mins x2mins x   Trapezius   x5mins x x x     Palpatory examination revealed moderate mechanosensitivity (i.e., sensitivity to mechanical pressure/stretch) through the anterolateral cervical region. A mod palpatory pressure was suspected to show a mod- severe area of apparent thickening that, with stimulation/pressure/stretch, reproduced patient's complaints/correlated to his primary issue of reduced ROM /stiffness. Patient verbalized that technique/pressure, through triggering his symptoms, was helpful.    Patient was able to identify regions of myofascial sensitivity to reduce overall tension.     Notes:  -upright ( in chair) and lotion    Reviewed Palliative care and rationale for previous recommendation. Reciprocal comprehension was verbally expressed. He reports he would like to discuss with his wife.       Other:Patient was provided with home exercises/ activies to target goals in plan of care.  Recommendations:Continue with Plan of Care    Sheree Muse M.S., CCC-SLP  Speech Language Pathologist   Available via [employer]  [place] #[id number]  [place] #[id number]

## 2024-08-06 ENCOUNTER — OFFICE VISIT (OUTPATIENT)
Dept: SPEECH THERAPY | Facility: CLINIC | Age: 59
End: 2024-08-06
Payer: COMMERCIAL

## 2024-08-06 ENCOUNTER — OFFICE VISIT (OUTPATIENT)
Dept: PHYSICAL THERAPY | Facility: CLINIC | Age: 59
End: 2024-08-06
Payer: COMMERCIAL

## 2024-08-06 DIAGNOSIS — R49.0 DYSPHONIA: ICD-10-CM

## 2024-08-06 DIAGNOSIS — R13.12 DYSPHAGIA, OROPHARYNGEAL PHASE: Primary | ICD-10-CM

## 2024-08-06 DIAGNOSIS — R13.12 DYSPHAGIA, OROPHARYNGEAL PHASE: ICD-10-CM

## 2024-08-06 DIAGNOSIS — C01 CANCER OF BASE OF TONGUE (HCC): ICD-10-CM

## 2024-08-06 DIAGNOSIS — M62.81 MUSCLE WEAKNESS OF LEFT ARM: Primary | ICD-10-CM

## 2024-08-06 DIAGNOSIS — Z92.3 HX OF HEAD AND NECK RADIATION: ICD-10-CM

## 2024-08-06 PROCEDURE — 97140 MANUAL THERAPY 1/> REGIONS: CPT

## 2024-08-06 PROCEDURE — 92507 TX SP LANG VOICE COMM INDIV: CPT | Performed by: SPEECH-LANGUAGE PATHOLOGIST

## 2024-08-06 PROCEDURE — 97530 THERAPEUTIC ACTIVITIES: CPT

## 2024-08-06 NOTE — PROGRESS NOTES
Progress Note     Today's date: 2024  Patient name: Mohamud Grimm Sr.  : 1965  MRN: [id number]  Referring provider: Devonte Singh MD  Dx:   Encounter Diagnosis     ICD-10-CM    1. Muscle weakness of left arm  M62.81       2. Cancer of base of tongue (HCC)  C01       3. Dysphagia, oropharyngeal phase  R13.12       4. Dysphonia  R49.0       5. Hx of head and neck radiation  Z92.3           Start Time: 1500  Stop Time: 1545  Total time in clinic (min): 45 minutes    Subjective: Patient reports modest improvements in neck ROM and tightness since starting dry needling and physical therapy. States he continues to note numbness into left thumb/hand. Denies changes in L UE strength/motion. States he would like to continue with PT to address continued symptoms.      Objective: See treatment diary below    Short Term Goals (5 weeks):  - all progressed at  on 24  - Patient will be independent in basic HEP 2-3 weeks  - Patient will report >50% reduction in pain  - Patient will deny radicular symptoms into left thumb    Long Term Goals (10 weeks): - all progressed at  on 24  - Patient will be independent in a comprehensive home exercise program  - Patient FOTO score will improve to >66/100  - Patient will self-report >50% improvement in function  - Patient will report 30% improvement in neck mobility    Postural Assessment  -Posture in Sitting: forward head/shoulders    Sensation  - Light touch: grossly intact and equal bilaterally (24: grossly similar)    UE MMT  LEFT    RIGHT  - Shoulder Flexion (C4): 1+/5 (24: 1+/5)  WNL  - Shoulder Abduction (C5):  1+/5 (24: 1+/5)  WNL   - Elbow Flexion (C6):  4-/5 (24: 4/5)  WNL  - Elbow Extension (C7): 4/5 (24: 4/5)  WNL    Cervical Spine Range of Motion  Flexion:  Moderately limited  without pain (24: minimally limited without pain)  Extension:  Moderately limited  without pain (24: minimally limited without pain)  Lateral Flexion -  Left:  Severely limited  without pain (8/6/24: moderately limited without pain)  Lateral Flexion - Right:  Severely limited  without pain (8/6/24: severely limited without pain)  Rotation - Left:  Moderately limited  without pain (8/6/24: moderately limited without pain)  Rotation - Right:  Moderately limited  without pain (8/6/24: moderately limited without pain)            Assessment: Patient has made modest gains in regards to cervical spine range of motion and functional mobility since starting physical therapy emphasizing dry needling. Patient had made improvement in mobility about fibrosis on left side of cervical spine. Due to improvements made and chronicity of symptoms, patient remains a strong candidate to continue to benefit from skilled PT services. PT POC and HEP were updated during today's session and patient expressed no questions/concerns with updates made to POC.      Pt verbally consented to dry needling treatment session. Risks/benefits/aftercare instructions reviewed. All questions/concerns addressed.  Pt in seated position. Hand hygiene performed pre and post DN treatment session. Placement of needles in pathological tissue.   Fibrosis about lateral neck, left ear, suboccipitals, L UE, cervicothoracic junction (needle location).  Total treatment duration to include set up/break down/in situ: 45 minutes. Patient was supervised by clinician throughout entirety of treatment session to include when DN in situ; clinician next to patient. All needles counted upon insertion and removal-- 50 needles. All accounted for and disposed in appropriate sharp container.     No adverse reaction to treatment. Pt advised may experience soreness, fatigue, bruising. Pt verbalized understanding.           Plan: Continue per plan of care.      Insurance:  AMA/CMS Eval/ Re-eval POC expires FOTO Auth #/ Referral # Total units  Start date  Expiration date Extension  Visit limitation?  PT only or  PT+OT? Co-Insurance    [employer]: AMA 7/3/24 9/11/24       yes 120 PT/OT 20% once ded met (not met)                                                               Date 7/3/24 7/9/24 7/23/24 7/30/24 8/6/24    Visit Number IE 2 3 4 5    Manual         Dry needling insertion 10 min 20 min 20 min 20 min 20 min                                        TherEx         Mechanical exam Rev cerv retraction for HEP Trial cerv retraction, extension       Scap retraction                                                               Neuro Re-Ed            TENS w/ DN                                                                     TherAct         Patient education Pt edu, PT POC, HEP 10 min Pt edu, PT POC, HEP 10 min Pt edu, PT POC, HEP 10 min PT POC, muscular atrophy, pt edu 10 min PT POC, pt edu, re-eval, FOTO, pt edu 20 min                                        Gait Training                                    Modalities                      Precautions:   Past Medical History:   Diagnosis Date    Cancer of base of tongue (HCC)     excision    Cough     CPAP (continuous positive airway pressure) dependence     waiting on consult    Disease of thyroid gland     hypo    Dysphagia     ED (erectile dysfunction)     History of pneumonia     Hypertension     Leukopenia     Peyronie's disease     PONV (postoperative nausea and vomiting)     Psoriasis     Sleep apnea     Tongue cancer (HCC)     Weight loss, abnormal

## 2024-08-12 NOTE — PROGRESS NOTES
Speech Treatment Note    Today's date: 2024  Patient name: Mohamud Grimm Sr.  : 1965  MRN: [id number]  Referring provider: Devonte Singh MD  Dx:   Encounter Diagnosis     ICD-10-CM    1. Dysphagia, oropharyngeal phase  R13.12       2. Cancer of base of tongue (HCC)  C01                  Visit Tracking:  POC   Expires   24        Visit/Unit Tracking:  Date     Used 1 2 3 4 5 6 7 8    Remaining                Subjective/Behavioral:  Patient continues to report ongoing benefit from MFR    Date Weight Taken    147.8  In office                                   Reports improvement in voice    Reports he has not been compliant with any stretches/pressure/massage at home.    Short-term goals:  -Patient will be educated on and report understanding and compliance of appropriate diet, aspiration risks, diagnosis and related pathology, to be achieved in 4-6 weeks.     Ongoing education provided re: need for daily stretching/massage for optimal benefit- handouts provided again with review of how to complete as patient reports he lost them. Reciprocal comprehension was verbally expressed.     Patient will improve lingual strength and ROM via exercises, functional tasks and stretching, to be achieved in 4-6 weeks.       IOPI PEAK MEASUREMENT WEEKLY GRID               Tongue tip  (Goal = 56) 41 50 42         Tongue back  (Goal = 50) 33 33 35                    Tongue tip   (Reps) 30x reps @60%   30x reps @65%       Tongue back  (Reps)  30x reps @60% 30x reps @65%         Tongue tip  (Goal 15-30 secs) Endurance length ( in secs): 17, 12, 19, 9, 14 Effort level: 50% Target for treatment: 21   Tongue back  (Goal 15-30 secs) Endurance length ( in secs): 10, 14, 17, 13, 13 Effort level: 50% Target for treatment: 18         -Patient will improve PSFS by 2 points for improved overall QOL, to be achieved in 4-6 weeks.   DNT    - Patient will demonstrate the  ability to adequately self-monitor swallowing skills and perform appropriate compensatory techniques to reduce overt s/sx of penetration/aspiration to safely tolerate least restrictive food/liquid consistencies, to be achieved in 4-6 weeks     Following patient's verbal consent to treat, manual therapy technique through myofascial release was applied to patient's L anterolateral cervical region.     Region(s) 8/13          L SCM   x5mins          Anterior Cervical   x5mins        Tongue base x5mins        Splenius Capitus x5mins        Scalenes x        Trapezius [id number]          Palpatory examination revealed moderate mechanosensitivity (i.e., sensitivity to mechanical pressure/stretch) through the anterolateral cervical region. A mod palpatory pressure was suspected to show a mod- severe area of apparent thickening that, with stimulation/pressure/stretch, reproduced patient's complaints/correlated to his primary issue of reduced ROM /stiffness. Patient verbalized that technique/pressure, through triggering his symptoms, was helpful.    Patient was able to identify regions of myofascial sensitivity to reduce overall tension.     Notes:  -upright ( in chair),  lotion, and heat    Heat was applied to the area for approx 10mins- patient reports this was beneficial.      Other:Patient was provided with home exercises/ activies to target goals in plan of care.  Recommendations:Continue with Plan of Care    Sheree Muse M.S., CCC-SLP  Speech Language Pathologist   Available via [employer]  [place] #[id number]  [place] #[id number]

## 2024-08-13 ENCOUNTER — OFFICE VISIT (OUTPATIENT)
Dept: SPEECH THERAPY | Facility: CLINIC | Age: 59
End: 2024-08-13
Payer: COMMERCIAL

## 2024-08-13 ENCOUNTER — OFFICE VISIT (OUTPATIENT)
Dept: PHYSICAL THERAPY | Facility: CLINIC | Age: 59
End: 2024-08-13
Payer: COMMERCIAL

## 2024-08-13 DIAGNOSIS — R13.12 DYSPHAGIA, OROPHARYNGEAL PHASE: ICD-10-CM

## 2024-08-13 DIAGNOSIS — M62.81 MUSCLE WEAKNESS OF LEFT ARM: Primary | ICD-10-CM

## 2024-08-13 DIAGNOSIS — Z92.3 HX OF HEAD AND NECK RADIATION: ICD-10-CM

## 2024-08-13 DIAGNOSIS — C01 CANCER OF BASE OF TONGUE (HCC): ICD-10-CM

## 2024-08-13 DIAGNOSIS — R49.0 DYSPHONIA: ICD-10-CM

## 2024-08-13 DIAGNOSIS — R13.12 DYSPHAGIA, OROPHARYNGEAL PHASE: Primary | ICD-10-CM

## 2024-08-13 PROCEDURE — 97140 MANUAL THERAPY 1/> REGIONS: CPT

## 2024-08-13 PROCEDURE — 92526 ORAL FUNCTION THERAPY: CPT | Performed by: SPEECH-LANGUAGE PATHOLOGIST

## 2024-08-13 PROCEDURE — 97112 NEUROMUSCULAR REEDUCATION: CPT

## 2024-08-13 NOTE — PROGRESS NOTES
Daily Note     Today's date: 2024  Patient name: Mohamud Grimm Sr.  : 1965  MRN: [id number]  Referring provider: Devonte Singh MD  Dx:   Encounter Diagnosis     ICD-10-CM    1. Muscle weakness of left arm  M62.81       2. Cancer of base of tongue (HCC)  C01       3. Dysphagia, oropharyngeal phase  R13.12       4. Dysphonia  R49.0       5. Hx of head and neck radiation  Z92.3           Start Time: 1508  Stop Time: 1555  Total time in clinic (min): 47 minutes    Subjective: Patient notes overall improvement in mobility and neck tightness since starting dry needling. Notes continued numbness into his left thumb, along with continued difficulty in ability to raise her arm.      Objective: See treatment diary below      Assessment: Tolerated treatment well. Patient with inc hypertonicity about left UT with dry needling during today's session.    Pt verbally consented to dry needling treatment session. Risks/benefits/aftercare instructions reviewed. All questions/concerns addressed.  Pt in seated position. Hand hygiene performed pre and post DN treatment session. Placement of needles in pathological tissue.   Left sided fibrosis, bilateral ears, bilateral cervicothoracic junction, B UT insertion, L UE (needle location).  Total treatment duration to include set up/break down/in situ: 50 minutes. Patient was supervised by clinician throughout entirety of treatment session to include when DN in situ; clinician next to patient. All needles counted upon insertion and removal-- 59 needles. All accounted for and disposed in appropriate sharp container.     No adverse reaction to treatment. Pt advised may experience soreness, fatigue, bruising. Pt verbalized understanding.       Plan: Continue per plan of care.      Insurance:  AMA/CMS Eval/ Re-eval POC expires FOTO Auth #/ Referral # Total units  Start date  Expiration date Extension  Visit limitation?  PT only or  PT+OT? Co-Insurance   Aetna: AMA 7/3/24 9/11/24        yes 120 PT/OT 20% once ded met (not met)                                                               Date 7/3/24 7/9/24 7/23/24 7/30/24 8/6/24 8/13/24   Visit Number IE 2 3 4 5 6   Manual         Dry needling insertion 10 min 20 min 20 min 20 min 20 min 20 min                                       TherEx         Mechanical exam Rev cerv retraction for HEP Trial cerv retraction, extension       Scap retraction                                                               Neuro Re-Ed            TENS w/ DN   TENS w/ DN                                                                  TherAct         Patient education Pt edu, PT POC, HEP 10 min Pt edu, PT POC, HEP 10 min Pt edu, PT POC, HEP 10 min PT POC, muscular atrophy, pt edu 10 min PT POC, pt edu, re-eval, FOTO, pt edu 20 min Pt edu, PT POC 15 min                                       Gait Training                                    Modalities                      Precautions:   Past Medical History:   Diagnosis Date    Cancer of base of tongue (HCC)     excision    Cough     CPAP (continuous positive airway pressure) dependence     waiting on consult    Disease of thyroid gland     hypo    Dysphagia     ED (erectile dysfunction)     History of pneumonia     Hypertension     Leukopenia     Peyronie's disease     PONV (postoperative nausea and vomiting)     Psoriasis     Sleep apnea     Tongue cancer (HCC)     Weight loss, abnormal

## 2024-08-19 NOTE — PROGRESS NOTES
"Speech Treatment Note    Today's date: 2024  Patient name: Mohamud Grimm Sr.  : 1965  MRN: [id number]  Referring provider: Devonte Singh MD  Dx:   Encounter Diagnosis     ICD-10-CM    1. Dysphagia, oropharyngeal phase  R13.12       2. Cancer of base of tongue (HCC)  C01                    Visit Tracking:  POC   Expires   24        Visit/Unit Tracking:  Date     Used 1 2 3 4 5 6 7 8 9    Remaining                 Subjective/Behavioral:  Patient continues to report ongoing benefit from MFR    Date Weight Taken    147.8  In office                                   Reports improvement in voice and swallow  \"improved movement and stronger voice\"    Reports he has not been compliant with any stretches/pressure/massage at home.    Reports he had a uncrustables prior to today's visit with pharyngeal retention during todays visit.      Short-term goals:  -Patient will be educated on and report understanding and compliance of appropriate diet, aspiration risks, diagnosis and related pathology, to be achieved in 4-6 weeks.     Ongoing education provided re: need for daily stretching/massage for optimal benefit- handouts provided again with review of how to complete as patient forgot them at last visit. Reciprocal comprehension was verbally expressed.     Patient will improve lingual strength and ROM via exercises, functional tasks and stretching, to be achieved in 4-6 weeks.       IOPI PEAK MEASUREMENT WEEKLY GRID              Tongue tip  (Goal = 56) 41 50 42  39       Tongue back  (Goal = 50) 33 33 35  32                 Tongue tip   (Reps) 30x reps @60%   30x reps @65% 30x reps @70%      Tongue back  (Reps)  30x reps @60% 30x reps @65% 30x reps @75%        Tongue tip  (Goal 15-30 secs) Endurance length ( in secs): 24, 18, 21,  16, 17 Effort level: 60% Target for treatment: 23   Tongue back  (Goal 15-30 secs) Endurance length ( in " "secs): 15, 16, 15, 10, 18 Effort level: 60% Target for treatment: 19     Heat applied during exercises.     -Patient will improve PSFS by 2 points for improved overall QOL, to be achieved in 4-6 weeks.   DNT    - Patient will demonstrate the ability to adequately self-monitor swallowing skills and perform appropriate compensatory techniques to reduce overt s/sx of penetration/aspiration to safely tolerate least restrictive food/liquid consistencies, to be achieved in 4-6 weeks     Following patient's verbal consent to treat, manual therapy technique through myofascial release was applied to patient's L anterolateral cervical region.     Region(s) 8/13 8/20         L SCM   x5mins x10         Anterior Cervical   x5mins x5mins       Tongue base x5mins x5mins       Splenius Capitus x5mins x       Scalenes x x       Trapezius [id number] x         Palpatory examination revealed moderate mechanosensitivity (i.e., sensitivity to mechanical pressure/stretch) through the anterolateral cervical region. A mod palpatory pressure was suspected to show a mod- severe area of apparent thickening that, with stimulation/pressure/stretch, reproduced patient's complaints/correlated to his primary issue of reduced ROM /stiffness. Patient verbalized that technique/pressure, through triggering his symptoms, was helpful.    Patient was able to identify regions of myofascial sensitivity to reduce overall tension..    He reports and is noted to have reduced visual margins of fibrosis as well as generally reduced stiffness on palpation as well as subjectively reported by patient. He reports benefit lasts from session to session.     No shooting neuropathy with brachial plexxus today but with increased anterior cervical/ tongue base work he reports \"flashing lights\" in vision. No dizziness reported.     Notes:  -upright ( in chair),  lotion, and heat    Heat was applied to the area for approx 15mins prior to MFR today- patient reports this was " beneficial.      Other:Patient was provided with home exercises/ activies to target goals in plan of care.  Recommendations:Continue with Plan of Care    Sheree Muse M.S., CCC-SLP  Speech Language Pathologist   Available via [employer]  [place] #[id number]  [place] #[id number]

## 2024-08-20 ENCOUNTER — OFFICE VISIT (OUTPATIENT)
Dept: SPEECH THERAPY | Facility: CLINIC | Age: 59
End: 2024-08-20
Payer: COMMERCIAL

## 2024-08-20 DIAGNOSIS — R13.12 DYSPHAGIA, OROPHARYNGEAL PHASE: Primary | ICD-10-CM

## 2024-08-20 DIAGNOSIS — C01 CANCER OF BASE OF TONGUE (HCC): ICD-10-CM

## 2024-08-20 PROCEDURE — 92526 ORAL FUNCTION THERAPY: CPT | Performed by: SPEECH-LANGUAGE PATHOLOGIST

## 2024-08-27 ENCOUNTER — OFFICE VISIT (OUTPATIENT)
Dept: SPEECH THERAPY | Facility: CLINIC | Age: 59
End: 2024-08-27
Payer: COMMERCIAL

## 2024-08-27 DIAGNOSIS — C01 CANCER OF BASE OF TONGUE (HCC): ICD-10-CM

## 2024-08-27 DIAGNOSIS — R13.12 DYSPHAGIA, OROPHARYNGEAL PHASE: Primary | ICD-10-CM

## 2024-08-27 PROCEDURE — 92526 ORAL FUNCTION THERAPY: CPT | Performed by: SPEECH-LANGUAGE PATHOLOGIST

## 2024-08-27 NOTE — PROGRESS NOTES
"Speech Treatment Note    Today's date: 2024  Patient name: Mohamud Grimm Sr.  : 1965  MRN: [id number]  Referring provider: Devonte Singh MD  Dx:   Encounter Diagnosis     ICD-10-CM    1. Dysphagia, oropharyngeal phase  R13.12       2. Cancer of base of tongue (HCC)  C01                    Visit Tracking:  POC   Expires   24        Visit/Unit Tracking:  Date    Used 1 2 3 4 5 6 7 8 9 10   Remaining                 Subjective/Behavioral:  Patient continues to report ongoing benefit from MFR    Date Weight Taken    147.8  In office    150.4 In office                              Reports improvement in voice and swallow  \"improved movement and stronger voice\"    Reports improved compliance with stretches/pressure/massage at home.    Reports pharyngeal retention seems slightly better    Reports he ordered something for \"chronic cough\" ( homeopathic remedy)    Reports he went to acupuncturist who indicated improved ability to place needles/ reduced tissue fibrosis    Reports he has not been able to get atropine drop from Dr. BARAJAS- likely still pending approval     Short-term goals:  -Patient will be educated on and report understanding and compliance of appropriate diet, aspiration risks, diagnosis and related pathology, to be achieved in 4-6 weeks.     DNT    Patient will improve lingual strength and ROM via exercises, functional tasks and stretching, to be achieved in 4-6 weeks.       IOPI PEAK MEASUREMENT WEEKLY GRID            Tongue tip  (Goal = 56) 41 50 42  39 44      Tongue back  (Goal = 50) 33 33 35  32 35               Tongue tip   (Reps) 30x reps @60%   30x reps @65% 30x reps @70% 30x reps @ 75%     Tongue back  (Reps)  30x reps @60% 30x reps @65% 30x reps @75% 30x reps @75%       Tongue tip  (Goal 15-30 secs) Endurance length ( in secs): 17, 17, 17, 12, 14 Effort level: 60% Target for treatment: 26 "   Tongue back  (Goal 15-30 secs) Endurance length ( in secs): 17, 17, 14, 15, 16 Effort level: 60% Target for treatment: 21     Heat applied during exercises.     Increase rep percentage at next visit.    -Patient will improve PSFS by 2 points for improved overall QOL, to be achieved in 4-6 weeks.   DNT    - Patient will demonstrate the ability to adequately self-monitor swallowing skills and perform appropriate compensatory techniques to reduce overt s/sx of penetration/aspiration to safely tolerate least restrictive food/liquid consistencies, to be achieved in 4-6 weeks     Following patient's verbal consent to treat, manual therapy technique through myofascial release was applied to patient's L anterolateral cervical region.     Region(s) 8/13 8/20 8/27        L SCM   x5mins x10 x5mins        Anterior Cervical   x5mins x5mins x5mins      Tongue base x5mins x5mins x5min      Splenius Capitus x5mins x x      Scalenes x x x      Trapezius [id number] x x2mins        Palpatory examination revealed moderate mechanosensitivity (i.e., sensitivity to mechanical pressure/stretch) through the anterolateral cervical region. A mod palpatory pressure was suspected to show a mod- severe area of apparent thickening that, with stimulation/pressure/stretch, reproduced patient's complaints/correlated to his primary issue of reduced ROM /stiffness. Patient verbalized that technique/pressure, through triggering his symptoms, was helpful.    Patient was able to identify regions of myofascial sensitivity to reduce overall tension.    He reports and is noted to have reduced visual margins of fibrosis as well as generally reduced stiffness on palpation as well as subjectively reported by patient. He reports benefit continues to lasts from session to session.     No shooting neuropathy with brachial plexxus today or flashing lights.     Notes:  -upright ( in chair),  lotion, and heat    Heat was applied to the area for approx 15mins prior to  MFR today- patient reports this was beneficial.      Other:Patient was provided with home exercises/ activies to target goals in plan of care.  Recommendations:Continue with Plan of Care    Sheree Muse M.S., CCC-SLP  Speech Language Pathologist   Available via [employer]  [place] #[id number]  [place] #[id number]

## 2024-09-03 ENCOUNTER — OFFICE VISIT (OUTPATIENT)
Dept: SPEECH THERAPY | Facility: CLINIC | Age: 59
End: 2024-09-03
Payer: COMMERCIAL

## 2024-09-03 ENCOUNTER — OFFICE VISIT (OUTPATIENT)
Dept: PHYSICAL THERAPY | Facility: CLINIC | Age: 59
End: 2024-09-03
Payer: COMMERCIAL

## 2024-09-03 DIAGNOSIS — R13.12 DYSPHAGIA, OROPHARYNGEAL PHASE: Primary | ICD-10-CM

## 2024-09-03 DIAGNOSIS — C01 CANCER OF BASE OF TONGUE (HCC): ICD-10-CM

## 2024-09-03 DIAGNOSIS — R13.12 DYSPHAGIA, OROPHARYNGEAL PHASE: ICD-10-CM

## 2024-09-03 DIAGNOSIS — R49.0 DYSPHONIA: ICD-10-CM

## 2024-09-03 DIAGNOSIS — Z92.3 HX OF HEAD AND NECK RADIATION: ICD-10-CM

## 2024-09-03 DIAGNOSIS — M62.81 MUSCLE WEAKNESS OF LEFT ARM: Primary | ICD-10-CM

## 2024-09-03 PROCEDURE — 92526 ORAL FUNCTION THERAPY: CPT | Performed by: SPEECH-LANGUAGE PATHOLOGIST

## 2024-09-03 PROCEDURE — 97530 THERAPEUTIC ACTIVITIES: CPT

## 2024-09-03 PROCEDURE — 97140 MANUAL THERAPY 1/> REGIONS: CPT

## 2024-09-03 NOTE — PROGRESS NOTES
"Speech Treatment Note    Today's date: 9/3/2024  Patient name: Mohamud Grimm Sr.  : 1965  MRN: [id number]  Referring provider: Devonte Sinhg MD  Dx:   Encounter Diagnosis     ICD-10-CM    1. Dysphagia, oropharyngeal phase  R13.12       2. Cancer of base of tongue (HCC)  C01                    Visit Tracking:  POC   Expires   24        Visit/Unit Tracking:  Date 6/11 6/18 7/2 7/9 7/23 7/30 8/6 8/13 8/20 8/27 9/3   Used 1 2 3 4 5 6 7 8 9 10 11   Remaining                  Subjective/Behavioral:  Patient continues to report ongoing benefit from MFR    Date Weight Taken    147.8  In office    150.4 In office                              Reports improvement in voice and swallow  \"improved movement and stronger voice\"    Reports pharyngeal retention seems slightly better    Reports he received and started the homeopathic remedy Friday- no noticeable changes at this point    Reports he has not been able to get atropine drop from Dr. BARAJAS- likely still pending approval    Will be going to [place] one week after his dilation on      Short-term goals:  -Patient will be educated on and report understanding and compliance of appropriate diet, aspiration risks, diagnosis and related pathology, to be achieved in 4-6 weeks.     DNT    Patient will improve lingual strength and ROM via exercises, functional tasks and stretching, to be achieved in 4-6 weeks.       IOPI PEAK MEASUREMENT WEEKLY GRID      7/2 7/9 8/13 8/20  8/27  9/3    Tongue tip  (Goal = 56) 41 50 42  39 44   41   Tongue back  (Goal = 50) 33 33 35  32 35   33       7/9 8/13 8/20 8/27 9/3    Tongue tip   (Reps) 30x reps @60%   30x reps @65% 30x reps @70% 30x reps @ 75% 60x reps @ 85%    Tongue back  (Reps)  30x reps @60% 30x reps @65% 30x reps @75% 30x reps @75% 60x reps @ 85%      Tongue tip  (Goal 15-30 secs) Endurance length ( in secs): 15, 14, 20, 20, 21 Effort level: 60% Target for treatment: 25   Tongue back  (Goal 15-30 secs) Endurance length ( " in secs): 19, 19, 15, 18, 17 Effort level: 60% Target for treatment: 20     Heat applied during exercises.     -Patient will improve PSFS by 2 points for improved overall QOL, to be achieved in 4-6 weeks.   DNT    - Patient will demonstrate the ability to adequately self-monitor swallowing skills and perform appropriate compensatory techniques to reduce overt s/sx of penetration/aspiration to safely tolerate least restrictive food/liquid consistencies, to be achieved in 4-6 weeks     Following patient's verbal consent to treat, manual therapy technique through myofascial release was applied to patient's L anterolateral cervical region.     Region(s) 8/13 8/20 8/27 9/3       L SCM   x5mins x10 x5mins x10       Anterior Cervical   x5mins x5mins x5mins      Tongue base x5mins x5mins x5min      Splenius Capitus x5mins x x x     Scalenes x x x x     Trapezius [id number] x x2mins x     L submandibular x x x [id number]       Palpatory examination revealed moderate mechanosensitivity (i.e., sensitivity to mechanical pressure/stretch) through the anterolateral cervical region. A mod palpatory pressure was suspected to show a mod- severe area of apparent thickening that, with stimulation/pressure/stretch, reproduced patient's complaints/correlated to his primary issue of reduced ROM /stiffness. Patient verbalized that technique/pressure, through triggering his symptoms, was helpful.    Patient was able to identify regions of myofascial sensitivity to reduce overall tension.    He reports and is noted to have reduced visual margins of fibrosis as well as generally reduced stiffness on palpation as well as subjectively reported by patient. He reports benefit continues to lasts from session to session.     No shooting neuropathy with brachial plexxus today or auras reported today.     Notes:  -upright ( in chair),  lotion, and heat    Heat was applied to the area for approx 15mins prior to MFR today- patient reports this was  beneficial.      Other:Patient was provided with home exercises/ activies to target goals in plan of care.  Recommendations:Continue with Plan of Care    Sheree Muse M.S., CCC-SLP  Speech Language Pathologist   Available via [employer]  [place] #[id number]  [place] #[id number]

## 2024-09-03 NOTE — PROGRESS NOTES
Daily Note     Today's date: 2024  Patient name: Mohamud Grimm Sr.  : 1965  MRN: [id number]  Referring provider: Devonte Singh MD  Dx:   Encounter Diagnosis     ICD-10-CM    1. Muscle weakness of left arm  M62.81       2. Cancer of base of tongue (HCC)  C01       3. Dysphagia, oropharyngeal phase  R13.12       4. Dysphonia  R49.0       5. Hx of head and neck radiation  Z92.3           Start Time: 1500  Stop Time: 1543  Total time in clinic (min): 43 minutes    Subjective: Patient reports overall improvement in tightness about lateral neck since starting PT. Denies changes in shoulder motion. Reports continued tingling into left hand/thumb.      Objective: See treatment diary below      Assessment: Tolerated treatment well. Pt verbally consented to dry needling treatment session. Risks/benefits/aftercare instructions reviewed. All questions/concerns addressed.  Pt in seated position. Hand hygiene performed pre and post DN treatment session. Placement of needles in pathological tissue.   Cranial sutures, UT insertion, levator scap origin, over scap body, cervicothoracic junction, deltoid insertion, fibrosis (needle location).  Total treatment duration to include set up/break down/in situ: 25 minutes. Patient was supervised by clinician throughout entirety of treatment session to include when DN in situ; clinician next to patient. All needles counted upon insertion and removal-- 29 needles. All accounted for and disposed in appropriate sharp container.     No adverse reaction to treatment. Pt advised may experience soreness, fatigue, bruising. Pt verbalized understanding.     Heavily discussed goals of DN and reduction in tissue tension vs improvement in muscular power during today's session following subjective reporting. Patient's numbness into left thumb changes with interventions targeting spine, although unable to reduce/centralize during today's session. Hypomobility noted about 1st and 2nd ribs that  improved following MT.      Plan: Continue per plan of care.  NMES w/ DN nv.     Insurance:  AMA/CMS Eval/ Re-eval POC expires FOTO Auth #/ Referral # Total units  Start date  Expiration date Extension  Visit limitation?  PT only or  PT+OT? Co-Insurance   [employer]na: AMA 7/3/24 9/11/24       yes 120 PT/OT 20% once ded met (not met)                                                               Date 7/9/24 7/23/24 7/30/24 8/6/24 8/13/24 9/3/24   Visit Number 2 3 4 5 6 7   Manual         Dry needling insertion 20 min 20 min 20 min 20 min 20 min 15 min         1st and 2nd inf rib mobs gr II-V                              TherEx         Mechanical exam Trial cerv retraction, extension     Seated t/s ext x15 - no effect    T/S w/ hands behind back - increased    Cervical retraction - returned to baseline   Scap retraction                                                               Neuro Re-Ed           TENS w/ DN   TENS w/ DN TENS w/ DN                                                                  TherAct         Patient education Pt edu, PT POC, HEP 10 min Pt edu, PT POC, HEP 10 min PT POC, muscular atrophy, pt edu 10 min PT POC, pt edu, re-eval, FOTO, pt edu 20 min Pt edu, PT POC 15 min Pt edu, PT POC, HEP 15 min                                       Gait Training                                    Modalities                      Precautions:   Past Medical History:   Diagnosis Date    Cancer of base of tongue (HCC)     excision    Cough     CPAP (continuous positive airway pressure) dependence     waiting on consult    Disease of thyroid gland     hypo    Dysphagia     ED (erectile dysfunction)     History of pneumonia     Hypertension     Leukopenia     Peyronie's disease     PONV (postoperative nausea and vomiting)     Psoriasis     Sleep apnea     Tongue cancer (HCC)     Weight loss, abnormal                          Simple: Patient demonstrates quick and easy understanding

## 2024-09-05 NOTE — PROGRESS NOTES
"Speech Treatment Note    Today's date: 2024  Patient name: Mohamud Grimm Sr.  : 1965  MRN: [id number]  Referring provider: Devonte Singh MD  Dx:   Encounter Diagnosis     ICD-10-CM    1. Cancer of base of tongue (HCC)  C01       2. Dysphagia, oropharyngeal phase  R13.12                    Visit Tracking:  POC   Expires   24        Visit/Unit Tracking:  Date 6/11 6/18 7/2 7/9 7/23 7/30 8/6 8/13 8/20 8/27 9/3 9/10   Used 1 2 3 4 5 6 7 8 9 10 11 12   Remaining                   Subjective/Behavioral:  Patient continues to report ongoing benefit from MFR    Date Weight Taken    147.8  In office    150.4 In office                              Reports improvement in voice and swallow  \"improved movement and stronger voice\"    Reports pharyngeal retention seems slightly better    Reports no changes with use of homeopathic remedy    Reports he has not been able to get atropine drop from Dr. BARAJAS- likely still pending approval- now scheduled with him for preop next week-  8am    Will be going to [place] one week after his dilation on  and will be gone for the month of Oct     Short-term goals:  -Patient will be educated on and report understanding and compliance of appropriate diet, aspiration risks, diagnosis and related pathology, to be achieved in 4-6 weeks.     DNT    Patient will improve lingual strength and ROM via exercises, functional tasks and stretching, to be achieved in 4-6 weeks.       IOPI PEAK MEASUREMENT WEEKLY GRID      7/2 7/9 8/13 8/20  8/27  9/3  9/10   Tongue tip  (Goal = 56) 41 50 42  39 44   41 49   Tongue back  (Goal = 50) 33 33 35  32 35   33 35       7/9 8/13 8/20 8/27 9/3 9/10   Tongue tip   (Reps) 30x reps @60%   30x reps @65% 30x reps @70% 30x reps @ 75% 60x reps @ 85% 60x reps @ 85%   Tongue back  (Reps)  30x reps @60% 30x reps @65% 30x reps @75% 30x reps @75% 60x reps @ 85% 60x reps @ 85%     Tongue tip  (Goal 15-30 secs) Endurance length ( in secs): 20, 16, 14, " 12, 12 Effort level: 60% Target for treatment: 29   Tongue back  (Goal 15-30 secs) Endurance length ( in secs): 18, 17, 18, 20, 21 Effort level: 60% Target for treatment: 21     Heat applied during exercises- approx 20mins.     -Patient will improve PSFS by 2 points for improved overall QOL, to be achieved in 4-6 weeks.   DNT    - Patient will demonstrate the ability to adequately self-monitor swallowing skills and perform appropriate compensatory techniques to reduce overt s/sx of penetration/aspiration to safely tolerate least restrictive food/liquid consistencies, to be achieved in 4-6 weeks     Following patient's verbal consent to treat, manual therapy technique through myofascial release was applied to patient's L anterolateral cervical region.     Region(s) 8/13 8/20 8/27 9/3 9/10      L SCM   x5mins x10 x5mins x10 x5mins      Anterior Cervical   x5mins x5mins x5mins  x    Tongue base x5mins x5mins x5min  x5mins    Splenius Capitus x5mins x x x x    Scalenes x x x x x    Trapezius [id number] x x2mins x x    L submandibular x x x [id number]      Palpatory examination revealed moderate mechanosensitivity (i.e., sensitivity to mechanical pressure/stretch) through the anterolateral cervical region. A mod palpatory pressure was suspected to show a mod- severe area of apparent thickening that, with stimulation/pressure/stretch, reproduced patient's complaints/correlated to his primary issue of reduced ROM /stiffness. Patient verbalized that technique/pressure, through triggering his symptoms, was helpful.    Patient was able to identify regions of myofascial sensitivity to reduce overall tension.    He reports and is noted to have reduced visual margins of fibrosis as well as generally reduced stiffness on palpation as well as subjectively reported by patient. He reports benefit continues to lasts from session to session.     No shooting neuropathy with brachial plexxus today or auras reported today.      Notes:  -upright ( in chair),  lotion, and heat    Heat was applied to the area for approx 20mins prior to MFR today- patient reports this was beneficial.      Other:Patient was provided with home exercises/ activies to target goals in plan of care.  Recommendations:Continue with Plan of Care    Sheree Muse M.S., CCC-SLP  Speech Language Pathologist   Available via [employer]  [place] #[id number]  [place] #[id number]

## 2024-09-09 ENCOUNTER — APPOINTMENT (OUTPATIENT)
Dept: PHYSICAL THERAPY | Facility: CLINIC | Age: 59
End: 2024-09-09
Payer: COMMERCIAL

## 2024-09-10 ENCOUNTER — OFFICE VISIT (OUTPATIENT)
Dept: PHYSICAL THERAPY | Facility: CLINIC | Age: 59
End: 2024-09-10
Payer: COMMERCIAL

## 2024-09-10 ENCOUNTER — OFFICE VISIT (OUTPATIENT)
Dept: SPEECH THERAPY | Facility: CLINIC | Age: 59
End: 2024-09-10
Payer: COMMERCIAL

## 2024-09-10 DIAGNOSIS — R13.12 DYSPHAGIA, OROPHARYNGEAL PHASE: ICD-10-CM

## 2024-09-10 DIAGNOSIS — C01 CANCER OF BASE OF TONGUE (HCC): ICD-10-CM

## 2024-09-10 DIAGNOSIS — M62.81 MUSCLE WEAKNESS OF LEFT ARM: Primary | ICD-10-CM

## 2024-09-10 DIAGNOSIS — R49.0 DYSPHONIA: ICD-10-CM

## 2024-09-10 DIAGNOSIS — Z92.3 HX OF HEAD AND NECK RADIATION: ICD-10-CM

## 2024-09-10 DIAGNOSIS — C01 CANCER OF BASE OF TONGUE (HCC): Primary | ICD-10-CM

## 2024-09-10 PROCEDURE — 97140 MANUAL THERAPY 1/> REGIONS: CPT

## 2024-09-10 PROCEDURE — 92526 ORAL FUNCTION THERAPY: CPT | Performed by: SPEECH-LANGUAGE PATHOLOGIST

## 2024-09-10 PROCEDURE — 97110 THERAPEUTIC EXERCISES: CPT

## 2024-09-10 NOTE — PROGRESS NOTES
Daily Note     Today's date: 2024  Patient name: Mohamud Grimm Sr.  : 1965  MRN: [id number]  Referring provider: Devonte Singh MD  Dx:   Encounter Diagnosis     ICD-10-CM    1. Muscle weakness of left arm  M62.81       2. Cancer of base of tongue (HCC)  C01       3. Dysphagia, oropharyngeal phase  R13.12       4. Dysphonia  R49.0       5. Hx of head and neck radiation  Z92.3           Start Time: 1425  Stop Time: 1540  Total time in clinic (min): 75 minutes    Subjective: Patient notes he is pleased with improvement in his neck mobility/motion since starting physical therapy/dry needling. Notes that he continues to experience difficulty raising his left arm.      Objective: See treatment diary below    Significant muscle power deficits noted with shoulder flexion, external rotation on left  Moderate loss of power with left elbow flexion      Assessment: Tolerated treatment well. Patient provided with HEP to promote neural input and muscular activation about left shoulder during today's session. Heavy education provided regarding frequency of HEP, with particular emphasis on performing sets to fatigue due to significant weakness/atrophy. Plan to review nv prior to DN as patient's priority for PT cont to be DN.    Pt verbally consented to dry needling treatment session. Risks/benefits/aftercare instructions reviewed. All questions/concerns addressed.  Pt in seated position. Hand hygiene performed pre and post DN treatment session. Placement of needles in pathological tissue.   Area of fibrosis about left side of neck, zygomatic space on left, left brachium, cervicothoracic junction, suboccipitals, and jeffrey scap region on left (needle location).  Total treatment duration to include set up/break down/in situ: 40 minutes. Patient was supervised by clinician throughout entirety of treatment session to include when DN in situ; clinician next to patient. All needles counted upon insertion and removal-- 32  needles. All accounted for and disposed in appropriate sharp container.     No adverse reaction to treatment. Pt advised may experience soreness, fatigue, bruising. Pt verbalized understanding.           Plan: Continue per plan of care.      Insurance:  AMA/CMS Eval/ Re-eval POC expires FOTO Auth #/ Referral # Total units  Start date  Expiration date Extension  Visit limitation?  PT only or  PT+OT? Co-Insurance   [employer]: AMA 7/3/24 9/11/24       yes 120 PT/OT 20% once ded met (not met)                                                               Date 7/23/24 7/30/24 8/6/24 8/13/24 9/3/24 9/10/24   Visit Number 3 4 5 6 7 8   Manual         Dry needling insertion 20 min 20 min 20 min 20 min 15 min 15 min        1st and 2nd inf rib mobs gr II-V                               TherEx         Mechanical exam     Seated t/s ext x15 - no effect    T/S w/ hands behind back - increased    Cervical retraction - returned to baseline    Scap retraction         Supine shld flexion      Hands clasped, emphasis on ecc lower x40 total   Bent over row      x30   Biceps curls      Supine, 2x10   Shld ER      Supine, AROM x30                     Neuro Re-Ed          TENS w/ DN   TENS w/ DN TENS w/ DN TENS w/ DN                                                                  TherAct         Patient education Pt edu, PT POC, HEP 10 min PT POC, muscular atrophy, pt edu 10 min PT POC, pt edu, re-eval, FOTO, pt edu 20 min Pt edu, PT POC 15 min Pt edu, PT POC, HEP 15 min Pt edu, HEP 15 min                                       Gait Training                                    Modalities                      Precautions:   Past Medical History:   Diagnosis Date    Cancer of base of tongue (HCC)     excision    Cough     CPAP (continuous positive airway pressure) dependence     waiting on consult    Disease of thyroid gland     hypo    Dysphagia     ED (erectile dysfunction)     History of pneumonia     Hypertension     Leukopenia      Peyronie's disease     PONV (postoperative nausea and vomiting)     Psoriasis     Sleep apnea     Tongue cancer (HCC)     Weight loss, abnormal

## 2024-09-12 DIAGNOSIS — E03.9 HYPOTHYROIDISM, UNSPECIFIED TYPE: ICD-10-CM

## 2024-09-12 RX ORDER — LEVOTHYROXINE SODIUM 100 UG/1
TABLET ORAL
Qty: 90 TABLET | Refills: 1 | Status: SHIPPED | OUTPATIENT
Start: 2024-09-12

## 2024-09-12 NOTE — PROGRESS NOTES
Speech-Language Pathology Re-Evaluation    Today's date: 2024   Patient’s name: Mohamud Grimm Sr.  : 1965  MRN: [id number]  Safety measures: dysphagia, aspiration  Referring provider: Devonte Singh MD    Encounter Diagnosis     ICD-10-CM    1. Cancer of base of tongue (HCC)  C01       2. Dysphagia, oropharyngeal phase  R13.12           Assessment:   Pt continues to present with oropharyngeal dysphagia and dysphonia complicated by dysgeusia, reflux/dysmotility, radiation fibrosis, and mucositis. He has demonstrated excellent attendance and shown progress/improvement in most areas, meeting some goals. His overall knowledge and understanding continues to improve and remains motivated to continue given results thus far. He is pending repeat dilation next week. He has also been working with PT for dry needling with some perceived benefit.    He has some some improvement with anterior lingual strength with minimal changes in posterior lingual strength- suspect this is due to fibrotic changes limiting muscle growth however we will continue to monitor as our MFR targets are moving more anteriorly near the tongue base/suprhyoid region on the L. Overall lingual endurance has improved.    Recommendations: Likely ongoing silent aspiration- clinically should most likely be NPO with repeat VBS (further information will be provided on upcoming VBS) however patient is aware of his risks but would like to remain on current diet for improved QOL- information provided on ways to mitigate risk today    Aspiration precautions and compensatory swallowing strategies: upright posture, slow rate of feeding, small bites/sips, head turn left, effortful swallow, cough every 1-2 bites/sips, and alternating bites and sips      Short-term goals:  -Patient will be educated on and report understanding and compliance of appropriate diet, aspiration risks, diagnosis and related pathology, to be achieved in 4-6 weeks.  PROGRESSING    Patient will improve lingual strength and ROM via exercises, functional tasks and stretching, to be achieved in 4-6 weeks. PROGRESSING      IOPI PEAK MEASUREMENT WEEKLY GRID      7/2 7/9 8/13 8/20  8/27  9/3  9/10 9/18   Tongue tip  (Goal = 56) 41 50 42  39 44   41 49 48   Tongue back  (Goal = 50) 33 33 35  32 35   33 35 34       7/9 8/13 8/20 8/27 9/3 9/10 9/18   Tongue tip   (Reps) 30x reps @60%   30x reps @65% 30x reps @70% 30x reps @ 75% 60x reps @ 85% 60x reps @ 85% 60x reps @ 85%   Tongue back  (Reps)  30x reps @60% 30x reps @65% 30x reps @75% 30x reps @75% 60x reps @ 85% 60x reps @ 85% 60x reps @ 85%     Tongue tip  (Goal 15-30 secs) Endurance length ( in secs): 20, 16, 14, 12, 12 Effort level: 60% Target for treatment: 29   Tongue back  (Goal 15-30 secs) Endurance length ( in secs): 18, 17, 18, 20, 21 Effort level: 60% Target for treatment: 21     Heat applied during exercises- approx 20mins.     -Patient will improve PSFS by 2 points for improved overall QOL, to be achieved in 4-6 weeks.  GOAL MET    - Patient will demonstrate the ability to adequately self-monitor swallowing skills and perform appropriate compensatory techniques to reduce overt s/sx of penetration/aspiration to safely tolerate least restrictive food/liquid consistencies, to be achieved in 4-6 weeks  PROGRESSING    Following patient's verbal consent to treat, manual therapy technique through myofascial release was applied to patient's L anterolateral cervical region.     Region(s) 8/13 8/20 8/27 9/3 9/10 9/18     L SCM   x5mins x10 x5mins x10 x5mins x     Anterior Cervical   x5mins x5mins x5mins  x x5mins   Tongue base x5mins x5mins x5min  x5mins [id number]   Splenius Capitus x5mins x x x x x   Scalenes x x x x x x   Trapezius [id number] x x2mins x x x   L submandibular x x x [id number] x5mins     Palpatory examination revealed moderate mechanosensitivity (i.e., sensitivity to mechanical pressure/stretch) through the  "anterolateral cervical region. A mod palpatory pressure was suspected to show a mod- severe area of apparent thickening that, with stimulation/pressure/stretch, reproduced patient's complaints/correlated to his primary issue of reduced ROM /stiffness. Patient verbalized that technique/pressure, through triggering his symptoms, was helpful.    Patient was able to identify regions of myofascial sensitivity to reduce overall tension.    He reports and is noted to have reduced visual margins of fibrosis as well as generally reduced stiffness on palpation as well as subjectively reported by patient. He reports benefit continues to lasts from session to session.     Sooting neuropathy was noted with L lateral neck today.     Notes:  -upright ( in chair),  lotion, and heat    Heat was applied to the area for approx 20mins prior to MFR today- patient reports this was beneficial.      Plan:  Patient would benefit from outpatient skilled Speech Therapy services: Dysphagia therapy    Frequency: 1x weekly  Duration: 6-8 weeks    Intervention certification from: 9/18/2024  Intervention certification to: 11/13/24      Subjective:  Patient continues to report ongoing benefit from MFR    Date Weight Taken   7/30 147.8  In office   8/27 150.4 In office                              Reports improvement in voice and swallow  \"improved movement and stronger voice\"    Reports pharyngeal retention seems slightly better    Started atropine drop today no obvious benefit as of yet- dilation next friday    Will be going to [place] one week after his dilation on 9/27 and will be gone for the month of Oct      Objective:    DYSPHAGIA EVALUATION:      Head and Neck Cancer Checklist:  *Patient indicated that he is experiencing difficulties in the following areas:    SWALLOWING: Needing to swallow many times to clear food from the mouth and/or throat, Coughing/choking when swallowing, Throat clearing after swallowing, Gurgly, \"wet-sounding\" voice after " swallowing, Loss of liquids out of nose, Bringing up food after the swallow, Decreased appetite, Weight loss, and Recent pneumonia    SPEECH: none    VOICE: Hoarse/husky voice, Decreased vocal loudness (stronger-louder and less raspy per patient)      Functional Outcome Measurements    1. Functional Oral Intake Scale (FOIS): 6 - total oral intake with no special preparation, but must avoid specific foods or liquid items (unchanged)    2. Performance Status Scale For Head and Neck Cancer Patients (PSS-HN):  -Normalcy of Diet (0-100): 70 (improved from 60)  -Public Eating (0-100): 100  GOAL MET  -Understandability of Speech (0-100): 100 - GOAL MET    3. Eating Assessment Tool (EAT-10) score:  23 (unchanged from 22)    4. PSFS:  11 ( improved from 6)   1. Swallow 5/10 (unchanged)   2. Neck stiffness:  6 (improved from 2)    5. The Cough Severity Index (CSI) is a self-administered, 10-item outcomes instrument to quantify a patient's symptoms of chronic cough of upper airway origin. The total score ranges from a 0 to 40 based on the sum of responses to 10 questions on a 5-point scale ranging from “never a problem” to “always a problem.” A score of 3 or below is considered normal. A score higher than 3 means that the cough may be impacting quality of life. Patient obtained a score of 22/40 (generally unchanged from 23).     -Current diet (solids): Regular ( avoids some foods)  -Current diet (liquids): Thin  -Current pill intake method: whole with thin liquids    -Alternative Feeding Method?: No  Special Studies:  6/6:   Flexible laryngoscopy     Pre-Operative Diagnosis:  Dysphonia   Dysphagia   Hx SCCA BOT s/p chemo/RT ~2012 [facility]  Left vf immobility  Glottic insufficiency  Left hemilarynx with mod edema/fibrosis  Min erythema  No pooling of secretions  Narrow/crowding throughout  No laryngeal sensation left hemilarynx, right side intact  VPI  Pooling saliva vallecula/piriforms  Hx Small lesion 4-5mm laryngeal surface of  epiglottis right of midline, leukoplakic/hypervascular  S/p MDL, vf steroid injection, biopsy epiglottis, posterior pharyngeal wall injection, esophagoscopy w dilation 9/16/22     Post-Operative Diagnosis:    SAME  Stable glottic airway  Mod reflux laryngitis  Left vf stiffness  Left false fold overlap     Surgeon:   Devonte Singh MD     Anesthesia:     -Lidocaine 2%  -Oxymetazoline  *helped with the cough     Stroboscope:  Atmos  Flexible Endoscope:    chip tip  Rigid endoscope:       Operative Details:  The procedure was performed in the endoscopy special procedures room.  A high resolution video laryngoscope was inserted transnasally or orally and suspended from the video system for magnification and documentation.       Voice: mild raspy, some gravel/clearing    Supraglottic Hyperfunction:    present, mild ant/post  Arytenoid Joint Movement:    Normal  Dysdiadochokinesis:     Absent  Arytenoids:   mild erythema, mod edema L>R  Posterior Cobblestoning:  present        Right Vocal Fold:  Abduction:    Normal  Adduction:    Normal  Longitudinal Tension:   Normal     Left Vocal Fold:  Abduction:    absent  Adduction:    absent  Longitudinal Tension:   dec     Glottic Closure:    incomplete     Vocal Process Height:    Equal     Vocal Fold Color:  Right: Normal  Left: Normal     Masses/Vibratory Margin Irregularities: Mild Valerio's edema. Left hemilarynx edema/fibrosis  Other Lesions:      The procedure was concluded without complication and the laryngoscope was removed.     Reflux Finding Score (RFS)  Subglottic Edema:   2  Ventricular Obliteration:   2-4 L>R    Erythema/Hyperemia:    2 min  Vocal Fold Edema:   1  Diffuse Laryngeal Edema:   1-2 L>R   Posterior Commissure Hypertrophy:   2     Granuloma/Granulation:   0  Thick Endolaryngeal Mucus:  0     Total: 10    +nocturnal reflux  Reflux laryngitis on laryngoscopy today  Famotidine 40 qhs  Reflux gourmet      VBS 7/5/23: Pt presents with mild oral and  severe-profound pharyngeal dysphagia characterized by reduced AP transport, delayed swallow initiation with significantly reduced/minimal hyolaryngeal rise, reduced base of tongue retraction, absent epiglottic inversion and absent pharyngeal stripping wave. Airway protection/closure was minimal, and silent aspiration occurred with all liquid consistencies. Pt was unable to swallow puree due to lack of driving force on the bolus and had to regurgitate/expectorate the puree from his vallecula. Strategies were not effective in eliminating aspiration. Note: Images are available for review in PACS as desired.     VBS 6/21/23: Presenting w/ mild-mod oral and SEVERE pharyngeal dysphagia characterized by SILENT aspiration of thin and mod thick during and after the swallow, unable to clear w/ cued cough. Mod-severe stasis w/ inconsistent clearance. Significant retrograde flow observed. Further trials deferred given aspiration and high risk for re-intubation.     VBS 3/4/21:  Pt presents w/ severe pharyngeal dysphagia characterized by no epiglottic inversion, minimal BOT retraction, minimal hyolaryngeal elevation, reduced airway entrance closure, and trace amounts of penetration and silent aspiration during and after swallowing thick and thin liquids. Pt is at high risk of developing aspiration pneumonia. Lengthy discussion was had w/ patient to review results of VBS and recommendations.     VBS 8/14/19:  Mild oral/moderate pharyngeal dysphagia due to decreased tongue base retraction, decreased epiglottic inversion and airway entrance closure w/ deep transient penetration of all consistencies,  And passive silent aspiration of pharyngeal residue and thin liquids.      VBS 10/25/12(Radiologist report): Aspiration of thin liquid barium and nectar thick barium. The patient did not have a spontaneous cough. Penetration of honey thick barium, applesauce, and mixed with barium.     Visit Tracking:  POC   Expires   11/13/24         Visit/Unit Tracking:  Date 6/11 6/18 7/2 7/9 7/23 7/30 8/6 8/13 8/20 8/27 9/3 9/10 9/18   Used 1 2 3 4 5 6 7 8 9 10 11 12 1   Remaining                    Sheree Muse M.S., CCC-SLP  Speech Language Pathologist   Available via [employer]  [place] #[id number]  [place] #[id number]

## 2024-09-17 ENCOUNTER — APPOINTMENT (OUTPATIENT)
Dept: PHYSICAL THERAPY | Facility: CLINIC | Age: 59
End: 2024-09-17
Payer: COMMERCIAL

## 2024-09-17 ENCOUNTER — APPOINTMENT (OUTPATIENT)
Dept: LAB | Facility: MEDICAL CENTER | Age: 59
End: 2024-09-17
Payer: COMMERCIAL

## 2024-09-17 ENCOUNTER — APPOINTMENT (OUTPATIENT)
Dept: SPEECH THERAPY | Facility: CLINIC | Age: 59
End: 2024-09-17
Payer: COMMERCIAL

## 2024-09-17 DIAGNOSIS — K22.9 DISORDER OF UPPER ESOPHAGEAL SPHINCTER: ICD-10-CM

## 2024-09-17 DIAGNOSIS — Z92.3 HX OF HEAD AND NECK RADIATION: ICD-10-CM

## 2024-09-17 DIAGNOSIS — R13.10 DYSPHAGIA, UNSPECIFIED TYPE: ICD-10-CM

## 2024-09-17 DIAGNOSIS — I10 ESSENTIAL HYPERTENSION: ICD-10-CM

## 2024-09-17 DIAGNOSIS — E03.9 HYPOTHYROIDISM, UNSPECIFIED TYPE: ICD-10-CM

## 2024-09-17 DIAGNOSIS — Z12.5 SCREENING FOR MALIGNANT NEOPLASM OF PROSTATE: ICD-10-CM

## 2024-09-17 LAB
ALBUMIN SERPL BCG-MCNC: 3.6 G/DL (ref 3.5–5)
ALP SERPL-CCNC: 73 U/L (ref 34–104)
ALT SERPL W P-5'-P-CCNC: 16 U/L (ref 7–52)
ANION GAP SERPL CALCULATED.3IONS-SCNC: 8 MMOL/L (ref 4–13)
AST SERPL W P-5'-P-CCNC: 21 U/L (ref 13–39)
BASOPHILS # BLD AUTO: 0.03 THOUSANDS/ΜL (ref 0–0.1)
BASOPHILS NFR BLD AUTO: 1 % (ref 0–1)
BILIRUB SERPL-MCNC: 0.62 MG/DL (ref 0.2–1)
BUN SERPL-MCNC: 7 MG/DL (ref 5–25)
CALCIUM SERPL-MCNC: 9 MG/DL (ref 8.4–10.2)
CHLORIDE SERPL-SCNC: 98 MMOL/L (ref 96–108)
CHOLEST SERPL-MCNC: 185 MG/DL
CO2 SERPL-SCNC: 33 MMOL/L (ref 21–32)
CREAT SERPL-MCNC: 0.5 MG/DL (ref 0.6–1.3)
EOSINOPHIL # BLD AUTO: 0.21 THOUSAND/ΜL (ref 0–0.61)
EOSINOPHIL NFR BLD AUTO: 5 % (ref 0–6)
ERYTHROCYTE [DISTWIDTH] IN BLOOD BY AUTOMATED COUNT: 14.4 % (ref 11.6–15.1)
GFR SERPL CREATININE-BSD FRML MDRD: 116 ML/MIN/1.73SQ M
GLUCOSE P FAST SERPL-MCNC: 84 MG/DL (ref 65–99)
HCT VFR BLD AUTO: 42.2 % (ref 36.5–49.3)
HDLC SERPL-MCNC: 68 MG/DL
HGB BLD-MCNC: 13.5 G/DL (ref 12–17)
IMM GRANULOCYTES # BLD AUTO: 0.03 THOUSAND/UL (ref 0–0.2)
IMM GRANULOCYTES NFR BLD AUTO: 1 % (ref 0–2)
LDLC SERPL CALC-MCNC: 105 MG/DL (ref 0–100)
LYMPHOCYTES # BLD AUTO: 1.5 THOUSANDS/ΜL (ref 0.6–4.47)
LYMPHOCYTES NFR BLD AUTO: 34 % (ref 14–44)
MCH RBC QN AUTO: 29.4 PG (ref 26.8–34.3)
MCHC RBC AUTO-ENTMCNC: 32 G/DL (ref 31.4–37.4)
MCV RBC AUTO: 92 FL (ref 82–98)
MONOCYTES # BLD AUTO: 0.52 THOUSAND/ΜL (ref 0.17–1.22)
MONOCYTES NFR BLD AUTO: 12 % (ref 4–12)
NEUTROPHILS # BLD AUTO: 2.17 THOUSANDS/ΜL (ref 1.85–7.62)
NEUTS SEG NFR BLD AUTO: 47 % (ref 43–75)
NONHDLC SERPL-MCNC: 117 MG/DL
NRBC BLD AUTO-RTO: 0 /100 WBCS
PLATELET # BLD AUTO: 322 THOUSANDS/UL (ref 149–390)
PMV BLD AUTO: 9.4 FL (ref 8.9–12.7)
POTASSIUM SERPL-SCNC: 3.7 MMOL/L (ref 3.5–5.3)
PROT SERPL-MCNC: 7.5 G/DL (ref 6.4–8.4)
PSA SERPL-MCNC: 0.52 NG/ML (ref 0–4)
RBC # BLD AUTO: 4.59 MILLION/UL (ref 3.88–5.62)
SODIUM SERPL-SCNC: 139 MMOL/L (ref 135–147)
T4 FREE SERPL-MCNC: 1.29 NG/DL (ref 0.61–1.12)
T4 FREE SERPL-MCNC: 1.4 NG/DL (ref 0.61–1.12)
TRIGL SERPL-MCNC: 60 MG/DL
TSH SERPL DL<=0.05 MIU/L-ACNC: 7.4 UIU/ML (ref 0.45–4.5)
WBC # BLD AUTO: 4.46 THOUSAND/UL (ref 4.31–10.16)

## 2024-09-17 PROCEDURE — 85025 COMPLETE CBC W/AUTO DIFF WBC: CPT

## 2024-09-17 PROCEDURE — 84439 ASSAY OF FREE THYROXINE: CPT

## 2024-09-17 PROCEDURE — G0103 PSA SCREENING: HCPCS

## 2024-09-17 PROCEDURE — 36415 COLL VENOUS BLD VENIPUNCTURE: CPT

## 2024-09-17 PROCEDURE — 84443 ASSAY THYROID STIM HORMONE: CPT

## 2024-09-17 PROCEDURE — 80061 LIPID PANEL: CPT

## 2024-09-17 PROCEDURE — 80053 COMPREHEN METABOLIC PANEL: CPT

## 2024-09-17 PROCEDURE — 87205 SMEAR GRAM STAIN: CPT | Performed by: OTOLARYNGOLOGY

## 2024-09-17 PROCEDURE — 87070 CULTURE OTHR SPECIMN AEROBIC: CPT | Performed by: OTOLARYNGOLOGY

## 2024-09-17 NOTE — H&P (VIEW-ONLY)
Otolaryngology - Head and Neck Surgery  Return Visit      Mohamud JING Grimm Sr. is a 59 y.o. who returned for evaluation of swallow.     HPI:    Cough remains and persistent/bothersome  Sitting more upright at night helps with the cough severity    Nasopharyngeal regurgitation when swallowing at times    Breztri - no relief  Famotidine qhs - taking  reflux gourmet- has not tried  Has attended SLP, MFR has helped neck/swallow  Mucinex mildly helpful with cough  Gabapentin - not very helpful  UACS mgt not very helpful  Tessalon perles PRN helpful (200mg dose)    Last esophageal dilation was 8/2023 (23mm balloon)    Hx mild ERIK    CXR 6/29/24  Mod bibasilar consolidation  Chronic aspiration    VBS 6/27/24  Oral - reduced control/residue  Pharyngeal - reduced, premature spill, reduced constriction, tablet retained in vallecula, silent aspiration, reduced CP relaxation  Esophageal - loss of pliability (cervical esophageal fibrosis)      Prior Hx:   S/p transnasal esophagoscopy with infiinity balloon dilation of UES 8/25/23.     Swallowing pills- he thinks it is gone but then he will bring it back up/slides back up.      Chlorpheniramine - no benefit  Gabapentin wasn't very helpful  Back to zyrtec  Hydrates a lot (water)  Mucinex     Cannot tolerate pentoxyphyline (pill too large)    CPAP    Free T4, TSH 1/8/24 wnl      Prior hx:  Saw Dr. Allen (Allergy). Started Flonase, Zyrtec, Singulair. Estimates 40% improvement.  Has not tried first generation antihistamine  Taking famotidine 40 mg prn (stopped famotidine 40 bid)      Prior hx:  He was in [place] about a month ago and was intubated for aspiration PNA, they were considering trach if they were not able to extubate but fortunately he was able to avoid trach. He did have a PEG placed due to aspiration risk..      Overall doing ok feels he is back to baseline before [place] trip  Chewing slowly     VBS done 7/5/23  Oral: diminished bolus manipulation  Oropharyngeal: weak  pharyngeal squeeze silent aspiration with liquids and decreased entry into UES  Esophageal: not commented, but appears with tight closure or premature closure of UES   Upon personal review evidence of aspiration with thin liquids improved with thick liquids     Sensation of regurgitation/water brash at times when bending over  Not currently on reflux medications  Did not tolerate restech placement attempt previously (gag)      Prior hx  Sometimes when swallowing, it can go the wrong direction (NP), gets hung up for a bit then lets down    Got flu and lost a few lbs; dec appetite, fever, headache    Prior hx:  S/p MDL, vf steroid injection, biopsy epiglottis, posterior pharyngeal wall injection, esophagoscopy w dilation 9/16/22    Voice is better, still with some gravel   Swallow is better     MRI brain w wo 9/7/22   White matter changes suggestive of chronic microangiopathy.  No acute intracranial pathology.  Tortuosity of the distal vertebral arteries and proximal basilar artery.  The left vertebral artery rest immediately anterior to the 7th, 8th and 9th nerves as they exit the brainstem.    Labs:  Normal- CCP ab, RF, AChR panel  abnl - FLY 1:160 speckled, chromatin Ab elev    Pathology 9/16/22    A. Esophagus, Mid-Esophagus, biopsy:  - Benign esophageal squamous mucosa with non-specific reactive changes. See Note.  - No active esophagitis; no intraepithelial eosinophils.  - Special stain for fungus (GMS) negative.  - Negative for dysplasia and malignancy on multiple examined levels.    B. Epiglottis, epiglottis lesion, biopsy:  - Portion of traumatized squamous mucosa with reactive basilar atypia, acutely inflamed. See Note.  -- Associated hemorrhage, fibrin deposition and organized hematoma.  -- Special stain for fungus (GMS) negative.  - Negative for dysplasia and malignancy on multiple examined levels    Prior hx:  Labs  Normal - TSH  Not done - AChR ab panel, RF, FLY    Taking trental    MRI brain/facial  "nerve scheduled 9/7    Carotid doppler 8/30/22  <50% stenosis bilaterally  Vertebral art flow is not identified  BP was elevated (210-20/110-20)    SLP? Not yet but scheduled    Labs  TSH wnl   Others not done yet    Restech - unable to tolerate placement, therefore not completed    Mild ERIK on sleep study, CPAP was recommended    He knows Haylie Shah, whom I know.    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  Treated for this 10 yrs ago (chemo/RT at [facility]).  No surgery for this (other than biopsy).    Swallow issues occurred since that time (shortly after treatment).  Attributed to the treatment.    Followed by Dr. Marinelli over the years.        Left side of neck is scarred, left eye twitching for the past 1-1.5 yrs.  Left arm neuropathies/radiculopathies as well.  Cannot raise left arm above shoulder.     Solids - takes multiple swallows to go down, liquid wash  Some foods can clog like breads; but ok with chips  Thin or toasted bread works better  Liquids - no cough/choke  Drinks a lot of water daily (\"a gallon\")  Saliva gets more dry at night; he \"always feels cold\"  Gum chewing helps as well  After taking a drink, if bending over after; will have regurgitation through nasal cavity    2 cups coffee/day    Evals/workup - swallow studies, last one was approx 1-2 yrs.    Tried swallow therapy with SLP ([facility], swallow exercises, nothing manual/MFR type); not too much progress with this.     Allergy- min seasonal symptoms;  Mild SOB, had inhaler for this.     Reflux- no HB/regurgitation;  No globus;  Some mucous/throat clearing worse in AM; occasional dry cough, worse with talking; some hoarseness, worse with talking;  No PND;  No sore or dry throat;  No sour/metallic taste     GI eval: colonoscopy; due for another within the year(?);  No EGD before.    Dairy - avoids; more mucous/bothers throat  Gluten - no symptoms    Lyme? No hx    Trying to put on more weight; used " to take weight gain/protein shakes.    Runs [employer]  Has 6 children      Attended acupuncture -which may have helped swallow      VBS 3/4/2021  Oral- near normal  Pharyngeal- back and forth pharyngeal retropulsion, delayed swallow initiation, min BOT retraction, min hyolaryngeal elevation, reduced airway entrance closure  Transient penetration with puree, silent aspiration  Retention vallecula, posterior pharyngeal wall  Chin tuck helped somewhat  Effortful swallow increased amt of silent aspiration  Esophageal- no abnormalities    CXR 3/21/22  Resolution of right pneumonia  RML scar chronic    MRI cspine 8/24/21  Fatty marrow infiltration within the cervical spine and the proximal upper thoracic ribs with persistent asymmetric left lower cervical and upper thoracic muscular signal abnormality without a discrete soft tissue mass lesion.  These changes raise the   question of prior cervical radiation therapy and possibly chronic left brachial plexitis with secondary muscular atrophy.  These changes were also noted on the prior MRI brachial plexus study of November 28, 2018.  Mild noncompressive degenerative discogenic disease at C5-C6.    TSH 5/4/21 elevated  Takes synthroid 100mcg daily; no change was made in dose reportedly    FEES 11/20/23  Results:   Consistencies used- apple sauce+methylene blue, water+methylene blue  Premature spillage- yes  Pooling/residue:     Vallecula - yes     Piriform sinus - yes  Cleared with subsequent swallows? no  Cleared with water? yes  Penetration observed? yes  Aspiration observed? No, but remains at risk (decreased sensation)  Cough response - yes  Improvement with left head turn? No - retention of bolus in valleculae, left worse than right; head turn left attempted with some benefit      VBS 6/26/24    Assessment Summary;  Pt continues to present with mild oral and significant pharyngeal dysphagia with primary concerns including reduced swallow mechanics,  "driving forces and airway closure/protection as well as fibrosis resulting in reduced relaxation/passage through UES with post swallow retention, penetration and aspiration. A majority of the aspiration was silent however he did demonstrate increasing delayed cough as study progressed.      He did appear to benefit somewhat from L head turn as well as chin tuck however these did not eliminate aspiration risk and when completed together increased aspiration was noted. Fortunately he did appear to have a strong cough which was beneficial in clearing a majority of penetrated material and some aspirated material.      In addition to significant aspiration risk he presents with increased risk for malnutrition given, frustration with coughing and that he requires extra time for multiple swallows required per bolus- patient also admitted that eating was \"exhausting.\"    The patient did aspirate a significant amount of barium today - referring provider was notified. Consider CXR/ABX.     Recommendations;  Clinically the patient should be NPO with small amounts of honey thick with strategies and SLP however given that the patient desires and plans to continue a regular diet with thin liquids- he appears to be safest with tsps and use of moisteners with solids as well as EITHER L head turn or chin tuck   Recommended Form of Meds: crushed with puree and non-oral if possible      Aspiration precautions   Reflux Precautions    Historical Information   Past Medical History:   Diagnosis Date    Cancer of base of tongue (HCC)     excision    Cough     CPAP (continuous positive airway pressure) dependence     waiting on consult    Disease of thyroid gland     hypo    Dysphagia     ED (erectile dysfunction)     History of pneumonia     Hypertension     Leukopenia     Peyronie's disease     PONV (postoperative nausea and vomiting)     Psoriasis     Sleep apnea     Tongue cancer (HCC)     Weight loss, abnormal      Past Surgical " History:   Procedure Laterality Date    ESOPHAGOSCOPY N/A 2022    Procedure: ESOPHAGOSCOPY FLEXIBLE;  Surgeon: Devonte Singh MD;  Location: [facility];  Service: ENT    ESOPHAGOSCOPY N/A 2023    Procedure: TRANSNASAL ESOPHAGOSCOPY WITH BALLOON DILATION/ ESOPHAGEAL DILATION (BALLOON);;  Surgeon: Devonte Singh MD;  Location: [facility];  Service: ENT    OTHER SURGICAL HISTORY      infusion port inserted and removed    PEG TUBE PLACEMENT      and removal    VA COLONOSCOPY FLX DX W/COLLJ SPEC WHEN PFRMD N/A 2017    Procedure: COLONOSCOPY with polypectomy x2;  Surgeon: Janet Willard MD;  Location: [facility];  Service: General    VA LARGSC W/NJX VOCAL CORD THER W/MICRO/TELESCOPE Bilateral 2022    Procedure: micro direct laryngoscopy, vocal fold injection, biopsy epiglottis lesion, posterior pharyngeal wall injection, flexible esophagoscopy with dilation;  Surgeon: Devonte Singh MD;  Location: [facility];  Service: ENT    TONGUE BIOPSY / EXCISION      Floor mouth excision of malignant tumor     Social History   Social History     Substance and Sexual Activity   Alcohol Use Yes    Alcohol/week: 12.0 standard drinks of alcohol    Types: 12 Cans of beer per week    Comment: rare     Social History     Substance and Sexual Activity   Drug Use No     Social History     Tobacco Use   Smoking Status Former    Current packs/day: 0.00    Average packs/day: 0.3 packs/day for 20.0 years (5.0 ttl pk-yrs)    Types: Cigarettes    Start date: 1991    Quit date: 2011    Years since quittin.7    Passive exposure: Past   Smokeless Tobacco Former    Quit date:    Tobacco Comments    pt quit with dx of tongue base cancer, per allscripts     Family History   Problem Relation Age of Onset    Other Mother         reactive airway dysfunction    Coronary artery disease Father     Hypertension Father     Psoriasis Father     Thyroid disease unspecified Neg Hx        Meds/Allergies     Current Outpatient  "Medications on File Prior to Visit   Medication Sig Dispense Refill    amLODIPine (NORVASC) 5 mg tablet TAKE 1 TABLET (5 MG TOTAL) BY MOUTH DAILY. 90 tablet 1    benzonatate (TESSALON) 200 MG capsule Take 1 capsule (200 mg total) by mouth 3 (three) times a day as needed for cough 30 capsule 2    levalbuterol (XOPENEX HFA) 45 mcg/act inhaler Inhale 2 puffs every 6 (six) hours as needed for wheezing 45 g 3    levothyroxine 100 mcg tablet TAKE 1 TABLET BY MOUTH EVERY DAY 90 tablet 1    multivitamin (THERAGRAN) TABS Take 1 tablet by mouth daily      tadalafil (CIALIS) 5 MG tablet Take 1 tablet (5 mg total) by mouth daily 90 tablet 3    famotidine (PEPCID) 20 mg/2.5 mL oral suspension TAKE 5 ML (40 MG TOTAL) BY MOUTH DAILY AT BEDTIME 450 mL 3    PENTOXIFYLLINE PO Take by mouth Unsure of dose, takes TID       No current facility-administered medications on file prior to visit.       No Known Allergies      Physical exam:     Ht 5' 8.25\" (1.734 m)   Wt 66.2 kg (146 lb)   BMI 22.04 kg/m²     Gen: NAD, cooperative, well nourished  Voice: mild raspy, gravel/throat clearing, cough  Head: normocephalic, atraumatic  Face: mild asymmetry  Balance assessment: Gait steady.   Nose: External nose without lesions.  Nares patent. No pus. No polyps. Nasal septum mildly deviated. Inferior turbinates pink and without hypertrophy.    Oral cavity: No lesions/masses. Tongue mobile and soft. No abnormality of parotid ducts.  Oropharynx: No lesions/masses.  mild cobblestoning.  Tonsils without ulceration or exudate.   Neck: No LAD. No masses.  Left side diffusely stiff/fibrotic with dec ROM  Salivary glands: Parotids nontender, non-enlarged. Submandibular glands nontender, non-enlarged.  Thyroid: WIthout hypertrophy.  No palpable nodules.  Nontender  Neuro: Alert  CN III-VI, XII intact; difficulty raising left arm above shoulder, left facial weakness, dec elevation soft palate  Pulm:  CTAB nonlabored, no stridor  CV: RRR  Extremities " warm/perfused  Abd soft/nontender      Procedure    Flexible laryngoscopy    Pre-Operative Diagnosis:  Dysphonia   Dysphagia   Hx SCCA BOT s/p chemo/RT ~2012 [facility]  Left vf immobility  Glottic insufficiency  Left hemilarynx with mod edema/fibrosis  Min erythema  No pooling of secretions  Narrow/crowding throughout  No laryngeal sensation left hemilarynx, right side intact  VPI  Pooling saliva vallecula/piriforms  Hx Small lesion 4-5mm laryngeal surface of epiglottis right of midline, leukoplakic/hypervascular  S/p MDL, vf steroid injection, biopsy epiglottis, posterior pharyngeal wall injection, esophagoscopy w dilation 9/16/22    Post-Operative Diagnosis:    SAME  Inc secretions throughout, yellow thin mucous in vallecula/hypopharynx  Stable glottic airway with left>>right dec vf mobility  Mild-mod edema/reflux findings    Surgeon:   Devonte Singh MD    Anesthesia:     -Lidocaine 2%  -Oxymetazoline    Stroboscope:  Atmos  Flexible Endoscope:    chip tip  Rigid endoscope:      Operative Details:  The procedure was performed in the endoscopy special procedures room.  A high resolution video laryngoscope was inserted transnasally or orally and suspended from the video system for magnification and documentation.      Voice: raspy, gravel/clearing, cough    Supraglottic Hyperfunction:    present, mild ant/post  Arytenoid Joint Movement:    Normal  Dysdiadochokinesis:     Absent  Arytenoids:   mild erythema, mod edema L>R  Posterior Cobblestoning:  present      Right Vocal Fold:  Abduction:    Normal  Adduction:    Normal  Longitudinal Tension:   Normal    Left Vocal Fold:  Abduction:    absent  Adduction:    absent  Longitudinal Tension:   dec    Glottic Closure:    incomplete    Vocal Process Height:    Equal    Vocal Fold Color:  Right: Normal  Left: Normal    Masses/Vibratory Margin Irregularities: Mild Valerio's edema. Left hemilarynx edema/fibrosis  Other Lesions:     The procedure was concluded without complication  and the laryngoscope was removed.    Reflux Finding Score (RFS)  Subglottic Edema:   2  Ventricular Obliteration:   2-4 L>R    Erythema/Hyperemia:    2 min  Vocal Fold Edema:   1  Diffuse Laryngeal Edema:   1-2 L>R   Posterior Commissure Hypertrophy:   2     Granuloma/Granulation:   0  Thick Endolaryngeal Mucus:  0     Total: 10    Assessment:    Diagnosis ICD-10-CM Associated Orders   1. Pharyngoesophageal dysphagia  R13.14       2. Chronic cough  R05.3 atropine (ISOPTO ATROPINE) 1 % ophthalmic solution     Wound culture and Gram stain     azelastine (ASTELIN) 0.1 % nasal spray     levofloxacin (LEVAQUIN) 500 mg tablet      3. Sialorrhea  K11.7 atropine (ISOPTO ATROPINE) 1 % ophthalmic solution      4. Laryngitis  J04.0 Wound culture and Gram stain      5. Chronic rhinitis  J31.0 azelastine (ASTELIN) 0.1 % nasal spray      6. Aspiration into airway, sequela  T17.908S levofloxacin (LEVAQUIN) 500 mg tablet      7. Dysphonia  R49.0       8. At risk for aspiration  Z91.89       9. Acquired hypothyroidism  E03.9       10. Cranial neuropathies, multiple  G52.7       11. Glottic insufficiency  J38.3       12. Scar of neck  L90.5       13. Vagus nerve disorder [G52.2]  G52.2       14. Facial weakness  R29.810       15. Hx of head and neck radiation  Z92.3       16. Gastroesophageal reflux disease, unspecified whether esophagitis present  K21.9       17. Hx of squamous cell carcinoma  Z85.89       18. Difficult airway for intubation, sequela  T88.4XXS       19. ERIK (obstructive sleep apnea)  G47.33       20. Velopharyngeal insufficiency, acquired  K13.79       21. 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 Disorder of upper esophageal sphincter  K22.9                   Plan:    Sequelae from hx of scca BOT treated with chemo/RT in 2012  Multiple cranial neuropathies  Left vf paralysis/immobility and left hemilaryngeal anesthesia  Velopharyngeal insufficiency- improved with injection posterior  pharyngeal wall  S/p MDL, vf steroid injection, biopsy epiglottis, posterior pharyngeal wall injection, esophagoscopy w dilation 9/16/22  S/p balloon dilation of UES (OR) infinity balloon (23 mm) 8/25/23    Persistent cough with chronic silent aspiration on VBS and sequelae noted on CXR  Mucinex   Atropine sublingual drops  Azelastine nasal spray trial    Inhaler PRN   Nasal saline  Hydrate     +nocturnal reflux  Famotidine 40 qhs  Reflux gourmet    SLP for MFR/swallow  Can try turning head to the left for swallow per SLP recommendation    +/- CPAP    F/u culture larynx/hypopharynx    Discussed 2nd staged balloon dilation of UES- would consider 32 mm infinity balloon dilation; Will consider vocal fold injection at same time (Restylane), posterior pharyngeal wall injection.  Risks discussed include but not limited to hoarseness, need for additional surgery, pain, bleeding, infection, airway obstruction, oral/pharyngeal/esophageal i\jury, tongue pain/swelling/numbness.  -consider SLN block     He will return to my office postop

## 2024-09-18 ENCOUNTER — OFFICE VISIT (OUTPATIENT)
Dept: PHYSICAL THERAPY | Facility: CLINIC | Age: 59
End: 2024-09-18
Payer: COMMERCIAL

## 2024-09-18 ENCOUNTER — OFFICE VISIT (OUTPATIENT)
Dept: SPEECH THERAPY | Facility: CLINIC | Age: 59
End: 2024-09-18
Payer: COMMERCIAL

## 2024-09-18 DIAGNOSIS — R13.12 DYSPHAGIA, OROPHARYNGEAL PHASE: ICD-10-CM

## 2024-09-18 DIAGNOSIS — R49.0 DYSPHONIA: ICD-10-CM

## 2024-09-18 DIAGNOSIS — E03.9 HYPOTHYROIDISM, UNSPECIFIED TYPE: Primary | ICD-10-CM

## 2024-09-18 DIAGNOSIS — C01 CANCER OF BASE OF TONGUE (HCC): Primary | ICD-10-CM

## 2024-09-18 DIAGNOSIS — C01 CANCER OF BASE OF TONGUE (HCC): ICD-10-CM

## 2024-09-18 DIAGNOSIS — M62.81 MUSCLE WEAKNESS OF LEFT ARM: Primary | ICD-10-CM

## 2024-09-18 DIAGNOSIS — Z92.3 HX OF HEAD AND NECK RADIATION: ICD-10-CM

## 2024-09-18 PROCEDURE — 92526 ORAL FUNCTION THERAPY: CPT | Performed by: SPEECH-LANGUAGE PATHOLOGIST

## 2024-09-18 PROCEDURE — 97140 MANUAL THERAPY 1/> REGIONS: CPT

## 2024-09-18 PROCEDURE — 97530 THERAPEUTIC ACTIVITIES: CPT

## 2024-09-18 NOTE — PRE-PROCEDURE INSTRUCTIONS
Pre-Surgery Instructions:   Medication Instructions    amLODIPine (NORVASC) 5 mg tablet Take day of surgery.    atropine (ISOPTO ATROPINE) 1 % ophthalmic solution Hold day of surgery.    azelastine (ASTELIN) 0.1 % nasal spray Hold day of surgery.    benzonatate (TESSALON) 200 MG capsule Hold day of surgery.    famotidine (PEPCID) 20 mg/2.5 mL oral suspension Hold day of surgery.    levalbuterol (XOPENEX HFA) 45 mcg/act inhaler Uses PRN- OK to take day of surgery    levothyroxine 100 mcg tablet Take day of surgery.    multivitamin (THERAGRAN) TABS Stop taking 7 days prior to surgery.    tadalafil (CIALIS) 5 MG tablet Hold day of surgery.   Medication instructions for day surgery reviewed. Please use only a sip of water to take your instructed medications. Avoid all over the counter vitamins, supplements and NSAIDS for one week prior to surgery per anesthesia guidelines. Tylenol is ok to take as needed.     You will receive a call one business day prior to surgery with an arrival time and hospital directions. If your surgery is scheduled on a Monday, the hospital will be calling you on the Friday prior to your surgery. If you have not heard from anyone by 8pm, please call the hospital supervisor through the hospital  at [phone]. ([place] [phone] or [place] [phone]).    Do not eat or drink anything after midnight the night before your surgery, including candy, mints, lifesavers, or chewing gum. Do not drink alcohol 24hrs before your surgery. Try not to smoke at least 24hrs before your surgery.       Follow the pre surgery showering instructions as listed in the “My Surgical Experience Booklet” or otherwise provided by your surgeon's office. Do not use a blade to shave the surgical area 1 week before surgery. It is okay to use a clean electric clippers up to 24 hours before surgery. Do not apply any lotions, creams, including makeup, cologne, deodorant, or perfumes after showering on the day of  your surgery. Do not use dry shampoo, hair spray, hair gel, or any type of hair products.     No contact lenses, eye make-up, or artificial eyelashes. Remove nail polish, including gel polish, and any artificial, gel, or acrylic nails if possible. Remove all jewelry including rings and body piercing jewelry.     Wear causal clothing that is easy to take on and off. Consider your type of surgery.    Keep any valuables, jewelry, piercings at home. Please bring any specially ordered equipment (sling, braces) if indicated.    Arrange for a responsible person to drive you to and from the hospital on the day of your surgery. Please confirm the visitor policy for the day of your procedure when you receive your phone call with an arrival time.     Call the surgeon's office with any new illnesses, exposures, or additional questions prior to surgery.    Please reference your “My Surgical Experience Booklet” for additional information to prepare for your upcoming surgery.

## 2024-09-18 NOTE — PROGRESS NOTES
Daily Note     Today's date: 2024  Patient name: Mohamud Grimm Sr.  : 1965  MRN: [id number]  Referring provider: Devonte Singh MD  Dx:   Encounter Diagnosis     ICD-10-CM    1. Muscle weakness of left arm  M62.81       2. Cancer of base of tongue (HCC)  C01       3. Dysphagia, oropharyngeal phase  R13.12       4. Dysphonia  R49.0       5. Hx of head and neck radiation  Z92.3           Start Time: 1625  Stop Time: 1705  Total time in clinic (min): 40 minutes    Subjective: Patient states he is pleased with his progress in regards to his cervical mobility since starting PT. Reports inconsistent compliance with strengthening based HEP.       Objective: See treatment diary below      Assessment: Tolerated treatment well.     Pt verbally consented to dry needling treatment session. Risks/benefits/aftercare instructions reviewed. All questions/concerns addressed.  Pt in seated position. Hand hygiene performed pre and post DN treatment session. Placement of needles in pathological tissue.   Fibrosis about left cervical spine, cervicothoracic junction (needle location).  Total treatment duration to include set up/break down/in situ: 40 minutes. Patient was supervised by clinician throughout entirety of treatment session to include when DN in situ; clinician next to patient. All needles counted upon insertion and removal-- 26 needles. All accounted for and disposed in appropriate sharp container.     No adverse reaction to treatment. Pt advised may experience soreness, fatigue, bruising. Pt verbalized understanding.         Plan: Continue per plan of care.  Potential d/c nv pending progression and secondary to upcoming surgery.     Insurance:  AMA/CMS Eval/ Re-eval POC expires FOTO Auth #/ Referral # Total units  Start date  Expiration date Extension  Visit limitation?  PT only or  PT+OT? Co-Insurance   [employer]: AMA 7/3/24 9/11/24       yes 120 PT/OT 20% once ded met (not met)                                                                Date 7/30/24 8/6/24 8/13/24 9/3/24 9/10/24 9/18/24   Visit Number 4 [phone] 9   Manual         Dry needling insertion 20 min 20 min 20 min 15 min 15 min 15 min       1st and 2nd inf rib mobs gr II-V                                TherEx         Mechanical exam    Seated t/s ext x15 - no effect    T/S w/ hands behind back - increased    Cervical retraction - returned to baseline     Scap retraction         Supine shld flexion     Hands clasped, emphasis on ecc lower x40 total    Bent over row     x30    Biceps curls     Supine, 2x10    Shld ER     Supine, AROM x30                      Neuro Re-Ed            TENS w/ DN TENS w/ DN TENS w/ DN                                                                   TherAct         Patient education PT POC, muscular atrophy, pt edu 10 min PT POC, pt edu, re-eval, FOTO, pt edu 20 min Pt edu, PT POC 15 min Pt edu, PT POC, HEP 15 min Pt edu, HEP 15 min Rev HEP, PT POC 10 min                                       Gait Training                                    Modalities                      Precautions:   Past Medical History:   Diagnosis Date    Cancer of base of tongue (HCC)     excision    Cough     CPAP (continuous positive airway pressure) dependence     waiting on consult    Disease of thyroid gland     hypo    Dysphagia     ED (erectile dysfunction)     History of pneumonia     Hypertension     Leukopenia     Peyronie's disease     PONV (postoperative nausea and vomiting)     Psoriasis     Sleep apnea     Tongue cancer (HCC)     Weight loss, abnormal

## 2024-09-24 ENCOUNTER — APPOINTMENT (OUTPATIENT)
Dept: PHYSICAL THERAPY | Facility: CLINIC | Age: 59
End: 2024-09-24
Payer: COMMERCIAL

## 2024-09-24 ENCOUNTER — OFFICE VISIT (OUTPATIENT)
Dept: SPEECH THERAPY | Facility: CLINIC | Age: 59
End: 2024-09-24
Payer: COMMERCIAL

## 2024-09-24 DIAGNOSIS — R13.12 DYSPHAGIA, OROPHARYNGEAL PHASE: Primary | ICD-10-CM

## 2024-09-24 DIAGNOSIS — C01 CANCER OF BASE OF TONGUE (HCC): ICD-10-CM

## 2024-09-24 PROCEDURE — 92526 ORAL FUNCTION THERAPY: CPT | Performed by: SPEECH-LANGUAGE PATHOLOGIST

## 2024-09-24 NOTE — PROGRESS NOTES
"Speech Treatment Note    Today's date: 2024  Patient name: Mohamud Grimm Sr.  : 1965  MRN: [id number]  Referring provider: Devonte Singh MD  Dx:   Encounter Diagnosis     ICD-10-CM    1. Dysphagia, oropharyngeal phase  R13.12       2. Cancer of base of tongue (HCC)  C01                    Visit Tracking:  POC   Expires   24        Visit/Unit Trackin/120      Subjective/Behavioral:  Patient continues to report ongoing benefit from MFR    Date Weight Taken    147.8  In office    150.4 In office                              Reports improvement in voice and swallow  \"improved movement and stronger voice\"    Reports pharyngeal retention seems slightly better    Unsure if atropine drop has been beneficial yet    Will be going to [place] one week after his dilation on  and will be gone for the month of Oct    Admits he \"had a rough night\"- was febrile 102.3 last night with SPO2 reported to have been in the mid-low 80s. Reports he is not feeling well today    Did have cultures from last visit with Dr. Singh- ? infection    SPO2 89  HR 79     Short-term goals:  -Patient will be educated on and report understanding and compliance of appropriate diet, aspiration risks, diagnosis and related pathology, to be achieved in 4-6 weeks.     R    Patient will improve lingual strength and ROM via exercises, functional tasks and stretching, to be achieved in 4-6 weeks.   DNT    -Patient will improve PSFS by 2 points for improved overall QOL, to be achieved in 4-6 weeks.    DNT    - Patient will demonstrate the ability to adequately self-monitor swallowing skills and perform appropriate compensatory techniques to reduce overt s/sx of penetration/aspiration to safely tolerate least restrictive food/liquid consistencies, to be achieved in 4-6 weeks      Following patient's verbal consent to treat, manual therapy technique through myofascial release was applied to patient's L anterolateral cervical region. " "    Region(s) 8/13 8/20 8/27 9/3 9/10 9/18 9/24     L SCM   x5mins x10 x5mins x10 x5mins x x5mins     Anterior Cervical   x5mins x5mins x5mins  x x5mins x5mins   Tongue base x5mins x5mins x5min  x5mins [id number] x5mins   Splenius Capitus x5mins x x x x x x   Scalenes x x x x x x x   Trapezius [id number] x x2mins x x x x   L submandibular x x x [id number] [id number] x5mins [id number]     Palpatory examination revealed moderate mechanosensitivity (i.e., sensitivity to mechanical pressure/stretch) through the anterolateral cervical region. A mod palpatory pressure was suspected to show a mod- severe area of apparent thickening that, with stimulation/pressure/stretch, reproduced patient's complaints/correlated to his primary issue of reduced ROM /stiffness. Patient verbalized that technique/pressure, through triggering his symptoms, was helpful.    Patient was able to identify regions of myofascial sensitivity to reduce overall tension.    He reports and is noted to have reduced visual margins of fibrosis as well as generally reduced stiffness on palpation as well as subjectively reported by patient. He reports benefit continues to lasts from session to session.     Shooting neuropathy was noted with L lateral neck today.     Notes:  -upright ( in chair),  lotion, and heat    Heat was applied to the area for approx 10mins prior to MFR today- patient reports this was beneficial.      *At the end of todays visit the patient reported change in sensation in the R arm \"feels like its dropping\" and admits to feeling \"off\"  Vitals taken:  Somewhat hypotensive at 103/69 ( he reports he tends to run on the higher side typically)- denies any dizziness  SPO2 82 which resolved to 94 after a few minutes  HR remained in 70s    We discussed concerns of sensation changes and that he should be assessed. Patient decided to return home- was encouraged to f/u with PCP and retake vitals at home.     Other:Patient was provided with home exercises/ " activies to target goals in plan of care.  Recommendations:Continue with Plan of Care    Sheree Muse M.S., CCC-SLP  Speech Language Pathologist   Available via [employer]  [place] #[id number]  [place] #[id number]

## 2024-09-25 ENCOUNTER — APPOINTMENT (OUTPATIENT)
Dept: LAB | Facility: MEDICAL CENTER | Age: 59
End: 2024-09-25
Payer: COMMERCIAL

## 2024-09-25 DIAGNOSIS — R29.898 SHOULDER WEAKNESS: ICD-10-CM

## 2024-09-25 DIAGNOSIS — G24.5 BLEPHAROSPASM: ICD-10-CM

## 2024-09-25 DIAGNOSIS — G52.7 CRANIAL NEUROPATHIES, MULTIPLE: ICD-10-CM

## 2024-09-25 DIAGNOSIS — G52.2 VAGUS NERVE DISORDER: ICD-10-CM

## 2024-09-25 DIAGNOSIS — Z85.89 HX OF MALIGNANT NEOPLASM OF HEAD AND NECK: ICD-10-CM

## 2024-09-25 DIAGNOSIS — Z92.3 HX OF HEAD AND NECK RADIATION: ICD-10-CM

## 2024-09-25 DIAGNOSIS — E03.9 HYPOTHYROIDISM, UNSPECIFIED TYPE: ICD-10-CM

## 2024-09-25 DIAGNOSIS — G24.5 BLEPHAROSPASM OF BOTH EYES: ICD-10-CM

## 2024-09-25 DIAGNOSIS — Z85.89 HX OF SQUAMOUS CELL CARCINOMA: ICD-10-CM

## 2024-09-25 LAB
ANION GAP SERPL CALCULATED.3IONS-SCNC: 10 MMOL/L (ref 4–13)
BUN SERPL-MCNC: 5 MG/DL (ref 5–25)
CALCIUM SERPL-MCNC: 8.9 MG/DL (ref 8.4–10.2)
CHLORIDE SERPL-SCNC: 97 MMOL/L (ref 96–108)
CO2 SERPL-SCNC: 31 MMOL/L (ref 21–32)
CREAT SERPL-MCNC: 0.45 MG/DL (ref 0.6–1.3)
GFR SERPL CREATININE-BSD FRML MDRD: 122 ML/MIN/1.73SQ M
GLUCOSE SERPL-MCNC: 103 MG/DL (ref 65–140)
POTASSIUM SERPL-SCNC: 3.3 MMOL/L (ref 3.5–5.3)
SODIUM SERPL-SCNC: 138 MMOL/L (ref 135–147)
T4 FREE SERPL-MCNC: 1.46 NG/DL (ref 0.61–1.12)
TSH SERPL DL<=0.05 MIU/L-ACNC: 4.75 UIU/ML (ref 0.45–4.5)

## 2024-09-25 PROCEDURE — 84439 ASSAY OF FREE THYROXINE: CPT

## 2024-09-25 PROCEDURE — 36415 COLL VENOUS BLD VENIPUNCTURE: CPT

## 2024-09-25 PROCEDURE — 84443 ASSAY THYROID STIM HORMONE: CPT

## 2024-09-25 PROCEDURE — 80048 BASIC METABOLIC PNL TOTAL CA: CPT

## 2024-09-26 ENCOUNTER — ANESTHESIA EVENT (OUTPATIENT)
Dept: PERIOP | Facility: HOSPITAL | Age: 59
End: 2024-09-26
Payer: COMMERCIAL

## 2024-09-27 ENCOUNTER — HOSPITAL ENCOUNTER (OUTPATIENT)
Facility: HOSPITAL | Age: 59
Setting detail: OUTPATIENT SURGERY
Discharge: HOME | End: 2024-09-27
Attending: OTOLARYNGOLOGY | Admitting: OTOLARYNGOLOGY
Payer: COMMERCIAL

## 2024-09-27 ENCOUNTER — ANESTHESIA (OUTPATIENT)
Dept: PERIOP | Facility: HOSPITAL | Age: 59
End: 2024-09-27
Payer: COMMERCIAL

## 2024-09-27 VITALS
WEIGHT: 145.28 LBS | BODY MASS INDEX: 22.02 KG/M2 | OXYGEN SATURATION: 95 % | RESPIRATION RATE: 18 BRPM | HEIGHT: 68 IN | HEART RATE: 73 BPM | DIASTOLIC BLOOD PRESSURE: 83 MMHG | SYSTOLIC BLOOD PRESSURE: 113 MMHG | TEMPERATURE: 97.7 F

## 2024-09-27 DIAGNOSIS — N52.9 ERECTILE DYSFUNCTION, UNSPECIFIED ERECTILE DYSFUNCTION TYPE: ICD-10-CM

## 2024-09-27 PROCEDURE — L8607 INJ VOCAL CORD BULKING AGENT: HCPCS | Performed by: OTOLARYNGOLOGY

## 2024-09-27 PROCEDURE — C1726 CATH, BAL DIL, NON-VASCULAR: HCPCS | Performed by: OTOLARYNGOLOGY

## 2024-09-27 PROCEDURE — 43214 ESOPHAGOSC DILATE BALLOON 30: CPT | Performed by: OTOLARYNGOLOGY

## 2024-09-27 RX ORDER — EPHEDRINE SULFATE 50 MG/ML
INJECTION INTRAVENOUS AS NEEDED
Status: DISCONTINUED | OUTPATIENT
Start: 2024-09-27 | End: 2024-09-27

## 2024-09-27 RX ORDER — ONDANSETRON 2 MG/ML
INJECTION INTRAMUSCULAR; INTRAVENOUS AS NEEDED
Status: DISCONTINUED | OUTPATIENT
Start: 2024-09-27 | End: 2024-09-27

## 2024-09-27 RX ORDER — LIDOCAINE HYDROCHLORIDE 10 MG/ML
INJECTION, SOLUTION EPIDURAL; INFILTRATION; INTRACAUDAL; PERINEURAL AS NEEDED
Status: DISCONTINUED | OUTPATIENT
Start: 2024-09-27 | End: 2024-09-27

## 2024-09-27 RX ORDER — GLYCOPYRROLATE 0.2 MG/ML
INJECTION INTRAMUSCULAR; INTRAVENOUS AS NEEDED
Status: DISCONTINUED | OUTPATIENT
Start: 2024-09-27 | End: 2024-09-27

## 2024-09-27 RX ORDER — DEXAMETHASONE SODIUM PHOSPHATE 4 MG/ML
INJECTION, SOLUTION INTRA-ARTICULAR; INTRALESIONAL; INTRAMUSCULAR; INTRAVENOUS; SOFT TISSUE AS NEEDED
Status: DISCONTINUED | OUTPATIENT
Start: 2024-09-27 | End: 2024-09-27

## 2024-09-27 RX ORDER — ALBUTEROL SULFATE 0.83 MG/ML
2.5 SOLUTION RESPIRATORY (INHALATION) ONCE AS NEEDED
Status: DISCONTINUED | OUTPATIENT
Start: 2024-09-27 | End: 2024-09-27 | Stop reason: HOSPADM

## 2024-09-27 RX ORDER — KETAMINE HYDROCHLORIDE 50 MG/ML
INJECTION, SOLUTION INTRAMUSCULAR; INTRAVENOUS AS NEEDED
Status: DISCONTINUED | OUTPATIENT
Start: 2024-09-27 | End: 2024-09-27

## 2024-09-27 RX ORDER — FENTANYL CITRATE/PF 50 MCG/ML
25 SYRINGE (ML) INJECTION
Status: DISCONTINUED | OUTPATIENT
Start: 2024-09-27 | End: 2024-09-27 | Stop reason: HOSPADM

## 2024-09-27 RX ORDER — METOCLOPRAMIDE HYDROCHLORIDE 5 MG/ML
10 INJECTION INTRAMUSCULAR; INTRAVENOUS ONCE AS NEEDED
Status: DISCONTINUED | OUTPATIENT
Start: 2024-09-27 | End: 2024-09-27 | Stop reason: HOSPADM

## 2024-09-27 RX ORDER — PHENYLEPHRINE HCL IN 0.9% NACL 1 MG/10 ML
SYRINGE (ML) INTRAVENOUS AS NEEDED
Status: DISCONTINUED | OUTPATIENT
Start: 2024-09-27 | End: 2024-09-27

## 2024-09-27 RX ORDER — MIDAZOLAM HYDROCHLORIDE 2 MG/2ML
INJECTION, SOLUTION INTRAMUSCULAR; INTRAVENOUS AS NEEDED
Status: DISCONTINUED | OUTPATIENT
Start: 2024-09-27 | End: 2024-09-27

## 2024-09-27 RX ORDER — FENTANYL CITRATE 50 UG/ML
INJECTION, SOLUTION INTRAMUSCULAR; INTRAVENOUS AS NEEDED
Status: DISCONTINUED | OUTPATIENT
Start: 2024-09-27 | End: 2024-09-27

## 2024-09-27 RX ORDER — ONDANSETRON 2 MG/ML
4 INJECTION INTRAMUSCULAR; INTRAVENOUS ONCE AS NEEDED
Status: DISCONTINUED | OUTPATIENT
Start: 2024-09-27 | End: 2024-09-27 | Stop reason: HOSPADM

## 2024-09-27 RX ORDER — SODIUM CHLORIDE, SODIUM LACTATE, POTASSIUM CHLORIDE, CALCIUM CHLORIDE 600; 310; 30; 20 MG/100ML; MG/100ML; MG/100ML; MG/100ML
INJECTION, SOLUTION INTRAVENOUS CONTINUOUS PRN
Status: DISCONTINUED | OUTPATIENT
Start: 2024-09-27 | End: 2024-09-27

## 2024-09-27 RX ORDER — PROPOFOL 10 MG/ML
INJECTION, EMULSION INTRAVENOUS AS NEEDED
Status: DISCONTINUED | OUTPATIENT
Start: 2024-09-27 | End: 2024-09-27

## 2024-09-27 RX ADMIN — MIDAZOLAM 0.5 MG: 1 INJECTION INTRAMUSCULAR; INTRAVENOUS at 07:55

## 2024-09-27 RX ADMIN — EPHEDRINE SULFATE 10 MG: 50 INJECTION INTRAVENOUS at 08:15

## 2024-09-27 RX ADMIN — DEXMEDETOMIDINE 8 MCG: 100 INJECTION, SOLUTION INTRAVENOUS at 07:58

## 2024-09-27 RX ADMIN — LIDOCAINE HYDROCHLORIDE 30 MG: 10 INJECTION, SOLUTION EPIDURAL; INFILTRATION; INTRACAUDAL; PERINEURAL at 07:55

## 2024-09-27 RX ADMIN — DEXAMETHASONE SODIUM PHOSPHATE 8 MG: 4 INJECTION INTRA-ARTICULAR; INTRALESIONAL; INTRAMUSCULAR; INTRAVENOUS; SOFT TISSUE at 08:10

## 2024-09-27 RX ADMIN — DEXMEDETOMIDINE 8 MCG: 100 INJECTION, SOLUTION INTRAVENOUS at 07:44

## 2024-09-27 RX ADMIN — PROPOFOL 30 MG: 10 INJECTION, EMULSION INTRAVENOUS at 08:18

## 2024-09-27 RX ADMIN — GLYCOPYRROLATE 0.2 MG: 0.2 INJECTION INTRAMUSCULAR; INTRAVENOUS at 07:42

## 2024-09-27 RX ADMIN — PROPOFOL 20 MG: 10 INJECTION, EMULSION INTRAVENOUS at 07:59

## 2024-09-27 RX ADMIN — PROPOFOL 20 MG: 10 INJECTION, EMULSION INTRAVENOUS at 08:01

## 2024-09-27 RX ADMIN — Medication 100 MCG: at 08:18

## 2024-09-27 RX ADMIN — MIDAZOLAM 0.5 MG: 1 INJECTION INTRAMUSCULAR; INTRAVENOUS at 07:44

## 2024-09-27 RX ADMIN — TOPICAL ANESTHETIC 2 SPRAY: 200 SPRAY DENTAL; PERIODONTAL at 07:55

## 2024-09-27 RX ADMIN — MIDAZOLAM 0.5 MG: 1 INJECTION INTRAMUSCULAR; INTRAVENOUS at 07:50

## 2024-09-27 RX ADMIN — FENTANYL CITRATE 50 MCG: 50 INJECTION INTRAMUSCULAR; INTRAVENOUS at 08:09

## 2024-09-27 RX ADMIN — MIDAZOLAM 0.5 MG: 1 INJECTION INTRAMUSCULAR; INTRAVENOUS at 07:40

## 2024-09-27 RX ADMIN — Medication 100 MCG: at 08:15

## 2024-09-27 RX ADMIN — ONDANSETRON 4 MG: 2 INJECTION INTRAMUSCULAR; INTRAVENOUS at 08:15

## 2024-09-27 RX ADMIN — KETAMINE HYDROCHLORIDE 30 MG: 50 INJECTION INTRAMUSCULAR; INTRAVENOUS at 07:59

## 2024-09-27 RX ADMIN — SODIUM CHLORIDE, SODIUM LACTATE, POTASSIUM CHLORIDE, AND CALCIUM CHLORIDE: .6; .31; .03; .02 INJECTION, SOLUTION INTRAVENOUS at 07:33

## 2024-09-27 RX ADMIN — PROPOFOL 30 MG: 10 INJECTION, EMULSION INTRAVENOUS at 07:58

## 2024-09-27 RX ADMIN — PROPOFOL 30 MG: 10 INJECTION, EMULSION INTRAVENOUS at 08:06

## 2024-09-27 NOTE — ANESTHESIA PREPROCEDURE EVALUATION
Procedure:  trans nasal endoscopy, infinity balloon dilation upper esophageal sphincter(UES) (Esophagus)    Relevant Problems   CARDIO   (+) Essential hypertension      ENDO   (+) Hypothyroidism      GI/HEPATIC   (+) Dysphagia   (+) Gastroesophageal reflux disease   (+) Pharyngoesophageal dysphagia      MUSCULOSKELETAL   (+) Blepharospasm of both eyes   (+) Velopharyngeal insufficiency, acquired      NEURO/PSYCH   (+) Paresthesias      PULMONARY   (+) Chronic hypoxemic respiratory failure (HCC)   (+) ERIK (obstructive sleep apnea)   (+) Obstructive sleep apnea syndrome   (+) Shortness of breath        Physical Exam    Airway    Mallampati score: III  TM Distance: >3 FB  Neck ROM: limited     Dental   No notable dental hx     Cardiovascular  Cardiovascular exam normal    Pulmonary  Pulmonary exam normal     Other Findings        Anesthesia Plan  ASA Score- 3     Anesthesia Type- general with ASA Monitors.         Additional Monitors:     Airway Plan: ETT.    Comment:  Placement Date: 06/23/23; Placement Time: 1526 (created via procedure documentation); Mask Ventilation: 1; 6/23:/23: Technique: Video laryngoscopy; Type: ETT - single; Single Lumen Tube Size: 5.5 mm; Cuffed: Yes; Location: Oral; Grade View: Grade I; Removal Date: 06/23/23 .       Plan Factors-    Chart reviewed.                      Induction- intravenous.    Postoperative Plan-         Informed Consent- Anesthetic plan and risks discussed with patient.  I personally reviewed this patient with the CRNA. Discussed and agreed on the Anesthesia Plan with the CRNA..

## 2024-09-27 NOTE — INTERIM OP NOTE
trans nasal endoscopy, infinity balloon dilation upper esophageal sphincter(UES) AND POSTERIOR WALL INJECTION  Postoperative Note  PATIENT NAME: Mohamud Grimm Sr.  : 1965  MRN: [id number]  [facility] OR ROOM 02    Surgery Date: 2024    Preop Diagnosis:  Dysphagia, pharyngoesophageal [R13.10]  Disorder of upper esophageal sphincter [K22.9]  Velopharyngeal insufficiency    Post-Op Diagnosis Codes:  SAME    Procedure(s) (LRB):  Trans nasal esophagoscopy with infinity balloon dilation (32mm) upper esophageal sphincter(UES)   Posterior pharyngeal wall injection    Surgeons and Role:     * Devonte Singh MD - Primary    Specimens:  * No specimens in log *    Estimated Blood Loss:   Minimal    Anesthesia Type:   Conscious sedation    Findings:   32 mm infinity balloon to 4 catherine, snug, minimal bleeding, more patent passage of endoscopy post dilation  Narrow AP distance in oropharynx with base of tongue collapse against posterior wall requiring tongue/jaw thrust  Bulking of posterior pharyngeal wall with Prolaryn Plus via transoral approach      Complications:   None      SIGNATURE: Devonte Singh MD   DATE: 2024   TIME: 8:26 AM

## 2024-09-27 NOTE — DISCHARGE INSTR - AVS FIRST PAGE
Gradually resume previous diet  Continue antibiotics as prescribed  Tylenol and/or ibuprofen as needed for pain    Incentive spirometer every morning and evening  Notify ENT/go to ER if increasing chest pain, shortness of breath, bleeding, worsening pain    Follow up Dr. Singh in 1-2 weeks

## 2024-09-27 NOTE — INTERVAL H&P NOTE
H&P reviewed. After examining the patient I find no changes in the patients condition since the H&P had been written.    Vitals:    09/27/24 0634   BP: 160/96   Pulse: 68   Resp: 18   Temp: 97.7 °F (36.5 °C)   SpO2: 96%

## 2024-09-27 NOTE — OP NOTE
OPERATIVE REPORT  PATIENT NAME: Mohamud Grimm Sr.    :  1965  MRN: [id number]  Pt Location: [facility] OR ROOM 02    SURGERY DATE: 2024    Surgeons and Role:     * Devonte Singh MD - Primary    Preop Diagnosis:  Dysphagia, unspecified type [R13.10]  Disorder of upper esophageal sphincter [K22.9]    Post-Op Diagnosis Codes:     * Dysphagia, unspecified type [R13.10]     * Disorder of upper esophageal sphincter [K22.9]    Procedure(s):  trans nasal endoscopy. infinity balloon dilation upper esophageal sphincter(UES) AND POSTERIOR WALL INJECTION    Specimen(s):  * No specimens in log *    Estimated Blood Loss:   Minimal    Drains:  * No LDAs found *    Anesthesia Type:   General    Operative Indications:  Dysphagia, unspecified type [R13.10]  Disorder of upper esophageal sphincter [K22.9]  ***    Operative Findings:  ***      Complications:   {Post Op Complications:20577}    Procedure and Technique:  ***   {Op note attestation:00224}    Patient Disposition:  {op note disposition:13457}             SIGNATURE: Devonte Singh MD  DATE: 2024  TIME: 8:29 AM                 was completed to the posterior pharyngeal wall at level of soft palate closure with Daniel needle. Once completed, care of patient was returned to anesthesia for awakening.       I was present for the entire procedure.    Patient Disposition:  PACU       SIGNATURE: Devonte Singh MD  DATE: September 27, 2024  TIME: 8:29 AM

## 2024-09-27 NOTE — ANESTHESIA POSTPROCEDURE EVALUATION
Post-Op Assessment Note    CV Status:  Stable  Pain Score: 0    Pain management: adequate       Mental Status:  Alert and awake   Hydration Status:  Euvolemic and stable   PONV Controlled:  Controlled   Airway Patency:  Patent and adequate     Post Op Vitals Reviewed: Yes    No anethesia notable event occurred.    Staff: MELL               /73 (09/27/24 0830)    Temp (!) 97.2 °F (36.2 °C) (09/27/24 0830)    Pulse 65   Resp (!) 24 (09/27/24 0830)    SpO2 99 % (09/27/24 0830)

## 2024-09-30 ENCOUNTER — OFFICE VISIT (OUTPATIENT)
Dept: PULMONOLOGY | Facility: CLINIC | Age: 59
End: 2024-09-30
Payer: COMMERCIAL

## 2024-09-30 VITALS
BODY MASS INDEX: 22.43 KG/M2 | TEMPERATURE: 97.2 F | DIASTOLIC BLOOD PRESSURE: 80 MMHG | OXYGEN SATURATION: 95 % | SYSTOLIC BLOOD PRESSURE: 160 MMHG | WEIGHT: 148 LBS | HEART RATE: 94 BPM | HEIGHT: 68 IN

## 2024-09-30 DIAGNOSIS — J69.0 ASPIRATION PNEUMONIA, UNSPECIFIED ASPIRATION PNEUMONIA TYPE, UNSPECIFIED LATERALITY, UNSPECIFIED PART OF LUNG (HCC): Primary | ICD-10-CM

## 2024-09-30 PROCEDURE — 99214 OFFICE O/P EST MOD 30 MIN: CPT | Performed by: STUDENT IN AN ORGANIZED HEALTH CARE EDUCATION/TRAINING PROGRAM

## 2024-09-30 NOTE — PATIENT INSTRUCTIONS
It was a pleasure seeing you today!    I will reach out to sleep medicine about an oral appliance and the inspire device  Use flutter valve 2-3 times a day each time blow into a 10 times  No need for further inhaler use  Obtain chest x-ray  Follow up 6 months     Chris Rivera MD  Pulmonary and Critical Care Medicine

## 2024-09-30 NOTE — PROGRESS NOTES
Pulmonary Outpatient Progress Note   Mohamud Grimm Sr. 59 y.o. 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  He did receive radiation, now undergoes EGD dilation with ENT.  In the past he has tried gabapentin with no significant improvements.  We tried Breztri but no significant treatment.  He follows aspiration precautions but unfortunately does have occasional issues. He follows with speech pathology and uses a modified diet.  Continue Pepcid   Occasional night sweats fevers, last CXR was June with bibasilar infiltrates. If he continues to have fevers, consider updating CXR +- bronch for BAL  Mild ERIK on CPAP, reviewed sleep study.  Uses CPAP, 93% of the time.  He will continue using CPAP going forward.  His BMI is 22, he may be a candidate for inspire device, unsure as he did have radiation to the neck.  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 treated at [facility] with chemotherapy and radiation around 2012  Chronic hypoxemic respiratory failure, requires 2 L nasal cannula with exertion and nightly    Plan:    Update CXR to evaluate for parenchymal opacities and will consider bronchoscopy  We gave him a flutter valve in clinic and showed him on to use it  Aspiration precautions, head of bed elevated  Continue Pepcid  Continue oxygen nightly  I will discuss with sleep medicine team about inspire and dental appliance  Continue ERIK nightly for now  Discussed with patient's wife on the phone  Follow-up in 6 months  I have spent a total time of 36 minutes in  caring for this patient on the day of the visit/encounter including Diagnostic results, Prognosis, Risks and benefits of tx options, Instructions for management, Patient and family education, Importance of tx compliance, Risk factor reductions, Impressions, Counseling / Coordination of care, Documenting in the medical record, Reviewing / ordering tests, medicine, procedures  , and Obtaining or reviewing history  .      History of Present Illness   HPI:    59-year-old gentleman here for follow-up for recurrent aspiration pneumonia.  Patient follows with ENT and is status post transnasal esophagoscopy with balloon dilation, follows with allergy, and speech pathology.  History of squamous cell of the base of the tongue and ex smoker.     Since last seen in July he has been doing well just had another dilation by ENT.  Uses oxygen nightly.  Wife on the phone discussed about alternative options for CPAP as this may be worsening his aspiration.  Other ideas could be inspire device versus dental appliance.    Continues to not smoke, uses oxygen, we gave him a sample of Breztri that was not helpful.  In the past he was on gabapentin that was not helpful either.    He occasionally has nocturnal fevers and night sweats.  His last CT was in April 2024 which was interpreted as dependent consolidation and groundglass likely for multifocal pneumonia versus aspiration.  I doubt this is MAC or TB this is something to keep in mind.     Video barium swallow 2023- weak pharyngeal squeeze with silent aspiration    Home sleep study September 2023 recommended PAP therapy with severe hypoxemia and mild ERIK    No echocardiogram    Review of Systems   Constitutional:  Positive for fever. Negative for appetite change.   HENT:  Positive for trouble swallowing. Negative for ear pain, postnasal drip, rhinorrhea, sneezing and sore throat.    Eyes: Negative.    Respiratory:  Positive for cough, choking and shortness of breath. Negative for  wheezing.    Cardiovascular:  Negative for chest pain.   Gastrointestinal: Negative.    Endocrine: Negative.    Genitourinary: Negative.    Musculoskeletal: Negative.  Negative for myalgias.   Skin: Negative.    Allergic/Immunologic: Positive for immunocompromised state.   Neurological:  Positive for headaches.   Hematological: Negative.    Psychiatric/Behavioral: Negative.           Historical Information   Past Medical History:   Diagnosis Date    Cancer of base of tongue (HCC)     excision    Cough     CPAP (continuous positive airway pressure) dependence     no currently    Disease of thyroid gland     hypo    Dysphagia     ED (erectile dysfunction)     GERD (gastroesophageal reflux disease)     History of pneumonia     Hypertension     Leukopenia     Peyronie's disease     Pneumonia     PONV (postoperative nausea and vomiting)     Psoriasis     Sleep apnea     Tongue cancer (HCC)     Weight loss, abnormal      Past Surgical History:   Procedure Laterality Date    ESOPHAGOSCOPY N/A 9/16/2022    Procedure: ESOPHAGOSCOPY FLEXIBLE;  Surgeon: Devonte Singh MD;  Location: [facility];  Service: ENT    ESOPHAGOSCOPY N/A 8/25/2023    Procedure: TRANSNASAL ESOPHAGOSCOPY WITH BALLOON DILATION/ ESOPHAGEAL DILATION (BALLOON);;  Surgeon: Devonte Singh MD;  Location[facility];  Service: ENT    OTHER SURGICAL HISTORY      infusion port inserted and removed    PEG TUBE PLACEMENT      and removal    ME COLONOSCOPY FLX DX W/COLLJ SPEC WHEN PFRMD N/A 6/27/2017    Procedure: COLONOSCOPY with polypectomy x2;  Surgeon: Janet Willard MD;  Location: [facility];  Service: General    ME ESOPHAGOSCOPY DILATE ESOPHAGUS BALLOON 30 MM N/A 9/27/2024    Procedure: trans nasal endoscopy, infinity balloon dilation upper esophageal sphincter(UES) AND POSTERIOR WALL INJECTION;  Surgeon: Devonte Singh MD;  [facility];  Service: ENT    ME LARGSC W/NJX VOCAL CORD THER W/MICRO/TELESCOPE Bilateral 9/16/2022    Procedure: micro direct  laryngoscopy, vocal fold injection, biopsy epiglottis lesion, posterior pharyngeal wall injection, flexible esophagoscopy with dilation;  Surgeon: Devonte Singh MD;  Location: [facility];  Service: ENT    TONGUE BIOPSY / EXCISION      Floor mouth excision of malignant tumor     Family History   Problem Relation Age of Onset    Other Mother         reactive airway dysfunction    Coronary artery disease Father     Hypertension Father     Psoriasis Father     Thyroid disease unspecified Neg Hx        Occupational History: works at a furniture store    Social History     Tobacco Use   Smoking Status Former    Current packs/day: 0.00    Average packs/day: 0.3 packs/day for 20.0 years (5.0 ttl pk-yrs)    Types: Cigarettes    Start date: 1991    Quit date: 2011    Years since quittin.7    Passive exposure: Past   Smokeless Tobacco Former    Quit date:    Tobacco Comments    pt quit with dx of tongue base cancer, per allscripts       Meds/Allergies     Current Outpatient Medications:     amLODIPine (NORVASC) 5 mg tablet, TAKE 1 TABLET (5 MG TOTAL) BY MOUTH DAILY., Disp: 90 tablet, Rfl: 1    atropine (ISOPTO ATROPINE) 1 % ophthalmic solution, 2-3 drops sublingual as needed for increased saliva/cough, Disp: 5 mL, Rfl: 11    azelastine (ASTELIN) 0.1 % nasal spray, 1 spray into each nostril 2 (two) times a day, Disp: 30 mL, Rfl: 11    benzonatate (TESSALON) 200 MG capsule, Take 1 capsule (200 mg total) by mouth 3 (three) times a day as needed for cough, Disp: 30 capsule, Rfl: 2    levalbuterol (XOPENEX HFA) 45 mcg/act inhaler, Inhale 2 puffs every 6 (six) hours as needed for wheezing, Disp: 45 g, Rfl: 3    levofloxacin (LEVAQUIN) 500 mg tablet, Take 1 tablet (500 mg total) by mouth every 24 hours for 7 days, Disp: 7 tablet, Rfl: 0    levothyroxine 100 mcg tablet, TAKE 1 TABLET BY MOUTH EVERY DAY, Disp: 90 tablet, Rfl: 1    multivitamin (THERAGRAN) TABS, Take 1 tablet by mouth daily, Disp: , Rfl:     tadalafil  "(CIALIS) 5 MG tablet, Take 1 tablet (5 mg total) by mouth daily (Patient taking differently: Take 5 mg by mouth daily as needed), Disp: 90 tablet, Rfl: 3    famotidine (PEPCID) 20 mg/2.5 mL oral suspension, TAKE 5 ML (40 MG TOTAL) BY MOUTH DAILY AT BEDTIME, Disp: 450 mL, Rfl: 3  No Known Allergies    Vitals: Blood pressure 160/80, pulse 94, temperature (!) 97.2 °F (36.2 °C), temperature source Tympanic, height 5' 8\" (1.727 m), weight 67.1 kg (148 lb), SpO2 95%., Body mass index is 22.5 kg/m². Oxygen Therapy  SpO2: 95 %    Physical Exam:    GEN: alert and oriented x 3, pleasant and cooperative   HEENT:  Normocephalic, atraumatic, anicteric  NECK: No JVD, erythema at L neck  HEART: Rate, normal S1 and S2  LUNGS: course rhonchi bilaterally  ABDOMEN:  Normoactive bowel sounds, soft, no tenderness, no distention  EXTREMITIES: no edema  NEURO: no gross focal findings  SKIN:  Dry, intact, warm to touch    Labs: I have personally reviewed pertinent lab results.  Cr 0.46  Lab Results   Component Value Date     09/02/2020       Imaging and other studies: I have personally reviewed pertinent films in PACS  Chest x-ray with bibasilar consolidations in June    Pulmonary function testing:  Personally interpreted by me  Restriction based on FVC    Other Studies: I have personally reviewed pertinent films in PACS  ENT notes, speech pathology notes, Care Everywhere    Chris Rivera MD  Pulmonary and Critical Care Medicine     Answers submitted by the patient for this visit:  Pulmonology Questionnaire (Submitted on 7/8/2024)  Chief Complaint: Primary symptoms  Do you experience frequent throat clearing?: Yes  Do you have a hoarse voice?: Yes  Do you have a wet cough?: Yes  Chronicity: chronic  When did you first notice your symptoms?: more than 1 year ago  How often do your symptoms occur?: constantly  Since you first noticed this problem, how has it changed?: waxing and waning  Do you have shortness of breath that occurs with " effort or exertion?: Yes  Do you have ear congestion?: No  Do you have heartburn?: No  Do you have fatigue?: Yes  Do you have nasal congestion?: No  Do you have shortness of breath when lying flat?: No  Do you have shortness of breath when you wake up?: No  Do you have sweats?: Yes  Have you experienced weight loss?: Yes    Answers submitted by the patient for this visit:  Pulmonology Questionnaire (Submitted on 9/23/2024)  Chief Complaint: Primary symptoms  Do you experience frequent throat clearing?: Yes  Do you have a hoarse voice?: Yes  Do you have a wet cough?: Yes  Chronicity: chronic  When did you first notice your symptoms?: more than 1 year ago  How often do your symptoms occur?: constantly  Since you first noticed this problem, how has it changed?: unchanged  Do you have shortness of breath that occurs with effort or exertion?: Yes  Do you have heartburn?: No  Do you have fatigue?: Yes  Do you have nasal congestion?: Yes  Do you have shortness of breath when lying flat?: No  Do you have shortness of breath when you wake up?: No  Do you have sweats?: Yes  Have you experienced weight loss?: No  Which of the following makes your symptoms worse?: eating  Which of the following makes your symptoms better?: nothing

## 2024-10-01 ENCOUNTER — HOSPITAL ENCOUNTER (OUTPATIENT)
Dept: RADIOLOGY | Facility: HOSPITAL | Age: 59
Discharge: HOME | End: 2024-10-01
Attending: OTOLARYNGOLOGY
Payer: COMMERCIAL

## 2024-10-01 ENCOUNTER — HOSPITAL ENCOUNTER (OUTPATIENT)
Dept: RADIOLOGY | Facility: HOSPITAL | Age: 59
Discharge: HOME | End: 2024-10-01
Payer: COMMERCIAL

## 2024-10-01 DIAGNOSIS — Z85.89 HX OF MALIGNANT NEOPLASM OF HEAD AND NECK: ICD-10-CM

## 2024-10-01 DIAGNOSIS — G52.2 VAGUS NERVE DISORDER: ICD-10-CM

## 2024-10-01 DIAGNOSIS — G52.7 CRANIAL NEUROPATHIES, MULTIPLE: ICD-10-CM

## 2024-10-01 DIAGNOSIS — Z92.3 HX OF HEAD AND NECK RADIATION: ICD-10-CM

## 2024-10-01 DIAGNOSIS — G24.5 BLEPHAROSPASM OF BOTH EYES: ICD-10-CM

## 2024-10-01 DIAGNOSIS — R29.898 SHOULDER WEAKNESS: ICD-10-CM

## 2024-10-01 DIAGNOSIS — J69.0 ASPIRATION PNEUMONIA, UNSPECIFIED ASPIRATION PNEUMONIA TYPE, UNSPECIFIED LATERALITY, UNSPECIFIED PART OF LUNG (HCC): ICD-10-CM

## 2024-10-01 DIAGNOSIS — Z85.89 HX OF SQUAMOUS CELL CARCINOMA: ICD-10-CM

## 2024-10-01 DIAGNOSIS — G24.5 BLEPHAROSPASM: ICD-10-CM

## 2024-10-01 PROCEDURE — 71046 X-RAY EXAM CHEST 2 VIEWS: CPT

## 2024-10-01 PROCEDURE — 70543 MRI ORBT/FAC/NCK W/O &W/DYE: CPT

## 2024-10-01 PROCEDURE — A9585 GADOBUTROL INJECTION: HCPCS | Performed by: OTOLARYNGOLOGY

## 2024-10-01 RX ORDER — GADOBUTROL 604.72 MG/ML
6 INJECTION INTRAVENOUS
Status: COMPLETED | OUTPATIENT
Start: 2024-10-01 | End: 2024-10-01

## 2024-10-01 RX ADMIN — GADOBUTROL 6 ML: 604.72 INJECTION INTRAVENOUS at 16:28

## 2024-10-22 DIAGNOSIS — R05.3 CHRONIC COUGH: ICD-10-CM

## 2024-10-24 RX ORDER — BENZONATATE 200 MG/1
200 CAPSULE ORAL 3 TIMES DAILY PRN
Qty: 30 CAPSULE | Refills: 0 | Status: SHIPPED | OUTPATIENT
Start: 2024-10-24

## 2024-11-13 ENCOUNTER — NURSE TRIAGE (OUTPATIENT)
Age: 59
End: 2024-11-13

## 2024-11-13 DIAGNOSIS — I10 ESSENTIAL HYPERTENSION: ICD-10-CM

## 2024-11-13 RX ORDER — AMLODIPINE BESYLATE 5 MG/1
5 TABLET ORAL DAILY
Qty: 90 TABLET | Refills: 1 | Status: SHIPPED | OUTPATIENT
Start: 2024-11-13

## 2024-11-13 NOTE — TELEPHONE ENCOUNTER
"LOV 12/14/23 Dr. Vila, patient returning home from [place], [facility], aspiration pneumonia (records available to view under Care Everywhere). Requesting appointment to discuss feeding tube upon return to [place] the weekend of the 22nd. They will be driving up from [place]. She does note he continues to lose weight at this time and he has received information on the button feeding tube and feels more comfortable with that since less it would be less noticeable.     Could you please review and advise on placing on schedule since next urgent at [place] is in December and in [place] is January. Patient would be willing to have procedure scheduled without ov if approved. Please advise.        Answer Assessment - Initial Assessment Questions  1. REASON FOR CALL: \"What is the main reason for your call?\" or \"How can I best help you?\"      Post hospital discharge appt to discuss feeding tube  2. SYMPTOMS : \"Do you have any symptoms?\"       Diagnosed with aspiration pneumonia  3. OTHER QUESTIONS: \"Do you have any other questions?\"      no    Protocols used: Information Only Call - No Triage-Adult-[place]    "

## 2024-11-13 NOTE — TELEPHONE ENCOUNTER
Regarding: feeding tube  ----- Message from Tosha ALEMAN sent at 11/13/2024  1:29 PM EST -----  Pt's wife Prabha called to schedule the soonest appt w/ Dr Vila. He is currently in the hospital in [place] being treated for aspiration. He will be coming back to [place] on 11/22/24. He would like to discuss getting the button feeding tube

## 2024-11-15 NOTE — TELEPHONE ENCOUNTER
I spoke with Mrs. Grimm I did review Dr. Otero's response noted below:  We would put a regular peg in and change it in 6 weeks to a button peg.      Advised patient scheduled with Dr. Ma 12/2/24 for consult for PEG/button per Dr. Otero's response and on wait list for earlier if any openings. Patient will be back in [place] 11/23/24. They are planning to return to [place] in January if possible.

## 2024-11-24 ENCOUNTER — HOSPITAL ENCOUNTER (INPATIENT)
Facility: HOSPITAL | Age: 59
LOS: 4 days | Discharge: HOME | DRG: 871 | End: 2024-11-28
Attending: EMERGENCY MEDICINE | Admitting: INTERNAL MEDICINE
Payer: COMMERCIAL

## 2024-11-24 ENCOUNTER — APPOINTMENT (EMERGENCY)
Dept: RADIOLOGY | Facility: HOSPITAL | Age: 59
DRG: 871 | End: 2024-11-24
Payer: COMMERCIAL

## 2024-11-24 DIAGNOSIS — R13.12 OROPHARYNGEAL DYSPHAGIA: Primary | ICD-10-CM

## 2024-11-24 DIAGNOSIS — T17.908D ASPIRATION INTO AIRWAY, SUBSEQUENT ENCOUNTER: ICD-10-CM

## 2024-11-24 DIAGNOSIS — Z93.1 S/P PERCUTANEOUS ENDOSCOPIC GASTROSTOMY (PEG) TUBE PLACEMENT (HCC): ICD-10-CM

## 2024-11-24 DIAGNOSIS — J69.0 ASPIRATION PNEUMONIA (HCC): ICD-10-CM

## 2024-11-24 DIAGNOSIS — A41.9 SEPSIS (HCC): ICD-10-CM

## 2024-11-24 DIAGNOSIS — J18.9 PNEUMONIA: ICD-10-CM

## 2024-11-24 DIAGNOSIS — R13.14 PHARYNGOESOPHAGEAL DYSPHAGIA: ICD-10-CM

## 2024-11-24 LAB
2HR DELTA HS TROPONIN: -3 NG/L
ALBUMIN SERPL BCG-MCNC: 3.6 G/DL (ref 3.5–5)
ALP SERPL-CCNC: 136 U/L (ref 34–104)
ALT SERPL W P-5'-P-CCNC: 19 U/L (ref 7–52)
ANION GAP SERPL CALCULATED.3IONS-SCNC: 9 MMOL/L (ref 4–13)
AST SERPL W P-5'-P-CCNC: 19 U/L (ref 13–39)
BASOPHILS # BLD AUTO: 0.04 THOUSANDS/ΜL (ref 0–0.1)
BASOPHILS NFR BLD AUTO: 0 % (ref 0–1)
BILIRUB SERPL-MCNC: 0.7 MG/DL (ref 0.2–1)
BUN SERPL-MCNC: 21 MG/DL (ref 5–25)
CALCIUM SERPL-MCNC: 9.4 MG/DL (ref 8.4–10.2)
CARDIAC TROPONIN I PNL SERPL HS: 17 NG/L (ref ?–50)
CARDIAC TROPONIN I PNL SERPL HS: 20 NG/L (ref ?–50)
CHLORIDE SERPL-SCNC: 93 MMOL/L (ref 96–108)
CO2 SERPL-SCNC: 29 MMOL/L (ref 21–32)
CREAT SERPL-MCNC: 0.52 MG/DL (ref 0.6–1.3)
EOSINOPHIL # BLD AUTO: 0.01 THOUSAND/ΜL (ref 0–0.61)
EOSINOPHIL NFR BLD AUTO: 0 % (ref 0–6)
ERYTHROCYTE [DISTWIDTH] IN BLOOD BY AUTOMATED COUNT: 13.7 % (ref 11.6–15.1)
GFR SERPL CREATININE-BSD FRML MDRD: 115 ML/MIN/1.73SQ M
GLUCOSE SERPL-MCNC: 166 MG/DL (ref 65–140)
HCT VFR BLD AUTO: 36.8 % (ref 36.5–49.3)
HGB BLD-MCNC: 12.1 G/DL (ref 12–17)
IMM GRANULOCYTES # BLD AUTO: 0.03 THOUSAND/UL (ref 0–0.2)
IMM GRANULOCYTES NFR BLD AUTO: 0 % (ref 0–2)
LACTATE SERPL-SCNC: 0.7 MMOL/L (ref 0.5–2)
LYMPHOCYTES # BLD AUTO: 0.87 THOUSANDS/ΜL (ref 0.6–4.47)
LYMPHOCYTES NFR BLD AUTO: 9 % (ref 14–44)
MCH RBC QN AUTO: 29 PG (ref 26.8–34.3)
MCHC RBC AUTO-ENTMCNC: 32.9 G/DL (ref 31.4–37.4)
MCV RBC AUTO: 88 FL (ref 82–98)
MONOCYTES # BLD AUTO: 0.66 THOUSAND/ΜL (ref 0.17–1.22)
MONOCYTES NFR BLD AUTO: 7 % (ref 4–12)
NEUTROPHILS # BLD AUTO: 8.24 THOUSANDS/ΜL (ref 1.85–7.62)
NEUTS SEG NFR BLD AUTO: 84 % (ref 43–75)
NRBC BLD AUTO-RTO: 0 /100 WBCS
PLATELET # BLD AUTO: 451 THOUSANDS/UL (ref 149–390)
PMV BLD AUTO: 9.1 FL (ref 8.9–12.7)
POTASSIUM SERPL-SCNC: 4.1 MMOL/L (ref 3.5–5.3)
PROT SERPL-MCNC: 8.2 G/DL (ref 6.4–8.4)
RBC # BLD AUTO: 4.17 MILLION/UL (ref 3.88–5.62)
SODIUM SERPL-SCNC: 131 MMOL/L (ref 135–147)
WBC # BLD AUTO: 9.85 THOUSAND/UL (ref 4.31–10.16)

## 2024-11-24 PROCEDURE — 96368 THER/DIAG CONCURRENT INF: CPT

## 2024-11-24 PROCEDURE — 96365 THER/PROPH/DIAG IV INF INIT: CPT

## 2024-11-24 PROCEDURE — 87040 BLOOD CULTURE FOR BACTERIA: CPT

## 2024-11-24 PROCEDURE — 71045 X-RAY EXAM CHEST 1 VIEW: CPT

## 2024-11-24 PROCEDURE — 36415 COLL VENOUS BLD VENIPUNCTURE: CPT

## 2024-11-24 PROCEDURE — 80053 COMPREHEN METABOLIC PANEL: CPT | Performed by: EMERGENCY MEDICINE

## 2024-11-24 PROCEDURE — 83605 ASSAY OF LACTIC ACID: CPT

## 2024-11-24 PROCEDURE — 99284 EMERGENCY DEPT VISIT MOD MDM: CPT

## 2024-11-24 PROCEDURE — 93005 ELECTROCARDIOGRAM TRACING: CPT

## 2024-11-24 PROCEDURE — 85025 COMPLETE CBC W/AUTO DIFF WBC: CPT | Performed by: EMERGENCY MEDICINE

## 2024-11-24 PROCEDURE — 99285 EMERGENCY DEPT VISIT HI MDM: CPT | Performed by: EMERGENCY MEDICINE

## 2024-11-24 PROCEDURE — 84484 ASSAY OF TROPONIN QUANT: CPT | Performed by: EMERGENCY MEDICINE

## 2024-11-24 RX ORDER — METRONIDAZOLE 500 MG/100ML
500 INJECTION, SOLUTION INTRAVENOUS EVERY 8 HOURS
Status: DISCONTINUED | OUTPATIENT
Start: 2024-11-24 | End: 2024-11-25

## 2024-11-24 RX ADMIN — SODIUM CHLORIDE 1000 ML: 0.9 INJECTION, SOLUTION INTRAVENOUS at 22:57

## 2024-11-24 RX ADMIN — CEFEPIME 2000 MG: 2 INJECTION, POWDER, FOR SOLUTION INTRAVENOUS at 23:03

## 2024-11-24 RX ADMIN — METRONIDAZOLE 500 MG: 500 INJECTION, SOLUTION INTRAVENOUS at 22:57

## 2024-11-24 RX ADMIN — VANCOMYCIN HYDROCHLORIDE 1750 MG: 5 INJECTION, POWDER, LYOPHILIZED, FOR SOLUTION INTRAVENOUS at 23:45

## 2024-11-25 ENCOUNTER — APPOINTMENT (INPATIENT)
Dept: RADIOLOGY | Facility: HOSPITAL | Age: 59
DRG: 871 | End: 2024-11-25
Payer: COMMERCIAL

## 2024-11-25 PROBLEM — R13.12 OROPHARYNGEAL DYSPHAGIA: Status: ACTIVE | Noted: 2023-12-14

## 2024-11-25 LAB
4HR DELTA HS TROPONIN: -3 NG/L
ANION GAP SERPL CALCULATED.3IONS-SCNC: 6 MMOL/L (ref 4–13)
ATRIAL RATE: 100 BPM
BUN SERPL-MCNC: 10 MG/DL (ref 5–25)
CALCIUM SERPL-MCNC: 8.6 MG/DL (ref 8.4–10.2)
CARDIAC TROPONIN I PNL SERPL HS: 17 NG/L (ref ?–50)
CHLORIDE SERPL-SCNC: 101 MMOL/L (ref 96–108)
CO2 SERPL-SCNC: 28 MMOL/L (ref 21–32)
CREAT SERPL-MCNC: 0.33 MG/DL (ref 0.6–1.3)
ERYTHROCYTE [DISTWIDTH] IN BLOOD BY AUTOMATED COUNT: 13.7 % (ref 11.6–15.1)
GFR SERPL CREATININE-BSD FRML MDRD: 138 ML/MIN/1.73SQ M
GLUCOSE SERPL-MCNC: 82 MG/DL (ref 65–140)
HCT VFR BLD AUTO: 30.5 % (ref 36.5–49.3)
HGB BLD-MCNC: 9.9 G/DL (ref 12–17)
MAGNESIUM SERPL-MCNC: 1.9 MG/DL (ref 1.9–2.7)
MCH RBC QN AUTO: 28.9 PG (ref 26.8–34.3)
MCHC RBC AUTO-ENTMCNC: 32.5 G/DL (ref 31.4–37.4)
MCV RBC AUTO: 89 FL (ref 82–98)
P AXIS: 51 DEGREES
PLATELET # BLD AUTO: 328 THOUSANDS/UL (ref 149–390)
PMV BLD AUTO: 9.1 FL (ref 8.9–12.7)
POTASSIUM SERPL-SCNC: 3.6 MMOL/L (ref 3.5–5.3)
PR INTERVAL: 138 MS
PROCALCITONIN SERPL-MCNC: 0.35 NG/ML
QRS AXIS: 77 DEGREES
QRSD INTERVAL: 98 MS
QT INTERVAL: 344 MS
QTC INTERVAL: 443 MS
RBC # BLD AUTO: 3.43 MILLION/UL (ref 3.88–5.62)
SODIUM SERPL-SCNC: 135 MMOL/L (ref 135–147)
T WAVE AXIS: 42 DEGREES
VENTRICULAR RATE: 100 BPM
WBC # BLD AUTO: 6.91 THOUSAND/UL (ref 4.31–10.16)

## 2024-11-25 PROCEDURE — 93010 ELECTROCARDIOGRAM REPORT: CPT | Performed by: INTERNAL MEDICINE

## 2024-11-25 PROCEDURE — 71045 X-RAY EXAM CHEST 1 VIEW: CPT

## 2024-11-25 PROCEDURE — 36415 COLL VENOUS BLD VENIPUNCTURE: CPT

## 2024-11-25 PROCEDURE — 87205 SMEAR GRAM STAIN: CPT

## 2024-11-25 PROCEDURE — 80048 BASIC METABOLIC PNL TOTAL CA: CPT

## 2024-11-25 PROCEDURE — 92610 EVALUATE SWALLOWING FUNCTION: CPT

## 2024-11-25 PROCEDURE — 83735 ASSAY OF MAGNESIUM: CPT

## 2024-11-25 PROCEDURE — 87070 CULTURE OTHR SPECIMN AEROBIC: CPT

## 2024-11-25 PROCEDURE — 84484 ASSAY OF TROPONIN QUANT: CPT

## 2024-11-25 PROCEDURE — 85027 COMPLETE CBC AUTOMATED: CPT

## 2024-11-25 PROCEDURE — 87077 CULTURE AEROBIC IDENTIFY: CPT

## 2024-11-25 PROCEDURE — 84145 PROCALCITONIN (PCT): CPT | Performed by: INTERNAL MEDICINE

## 2024-11-25 PROCEDURE — 94668 MNPJ CHEST WALL SBSQ: CPT

## 2024-11-25 PROCEDURE — 99255 IP/OBS CONSLTJ NEW/EST HI 80: CPT | Performed by: INTERNAL MEDICINE

## 2024-11-25 PROCEDURE — 94664 DEMO&/EVAL PT USE INHALER: CPT

## 2024-11-25 PROCEDURE — 99223 1ST HOSP IP/OBS HIGH 75: CPT | Performed by: INTERNAL MEDICINE

## 2024-11-25 RX ORDER — ATROPINE SULFATE 10 MG/ML
2 SOLUTION/ DROPS OPHTHALMIC ONCE AS NEEDED
Status: DISCONTINUED | OUTPATIENT
Start: 2024-11-25 | End: 2024-11-28 | Stop reason: HOSPADM

## 2024-11-25 RX ORDER — SODIUM CHLORIDE 9 MG/ML
50 INJECTION, SOLUTION INTRAVENOUS CONTINUOUS
Status: DISPENSED | OUTPATIENT
Start: 2024-11-25 | End: 2024-11-26

## 2024-11-25 RX ORDER — AZELASTINE 1 MG/ML
1 SPRAY, METERED NASAL 2 TIMES DAILY
Status: DISCONTINUED | OUTPATIENT
Start: 2024-11-25 | End: 2024-11-28 | Stop reason: HOSPADM

## 2024-11-25 RX ORDER — ONDANSETRON 2 MG/ML
4 INJECTION INTRAMUSCULAR; INTRAVENOUS EVERY 6 HOURS PRN
Status: DISCONTINUED | OUTPATIENT
Start: 2024-11-25 | End: 2024-11-28 | Stop reason: HOSPADM

## 2024-11-25 RX ORDER — ACETAMINOPHEN 325 MG/1
650 TABLET ORAL EVERY 6 HOURS PRN
Status: DISCONTINUED | OUTPATIENT
Start: 2024-11-25 | End: 2024-11-28 | Stop reason: HOSPADM

## 2024-11-25 RX ORDER — ENOXAPARIN SODIUM 100 MG/ML
40 INJECTION SUBCUTANEOUS DAILY
Status: DISCONTINUED | OUTPATIENT
Start: 2024-11-25 | End: 2024-11-28 | Stop reason: HOSPADM

## 2024-11-25 RX ORDER — ALBUTEROL SULFATE 90 UG/1
1 INHALANT RESPIRATORY (INHALATION) EVERY 6 HOURS PRN
Status: DISCONTINUED | OUTPATIENT
Start: 2024-11-25 | End: 2024-11-28 | Stop reason: HOSPADM

## 2024-11-25 RX ORDER — BENZONATATE 100 MG/1
200 CAPSULE ORAL 3 TIMES DAILY PRN
Status: DISCONTINUED | OUTPATIENT
Start: 2024-11-25 | End: 2024-11-28 | Stop reason: HOSPADM

## 2024-11-25 RX ORDER — LEVOTHYROXINE SODIUM 100 UG/1
100 TABLET ORAL
Status: DISCONTINUED | OUTPATIENT
Start: 2024-11-25 | End: 2024-11-28 | Stop reason: HOSPADM

## 2024-11-25 RX ORDER — FAMOTIDINE 40 MG/5ML
40 POWDER, FOR SUSPENSION ORAL
Status: DISCONTINUED | OUTPATIENT
Start: 2024-11-25 | End: 2024-11-28 | Stop reason: HOSPADM

## 2024-11-25 RX ORDER — AMLODIPINE BESYLATE 5 MG/1
5 TABLET ORAL DAILY
Status: DISCONTINUED | OUTPATIENT
Start: 2024-11-25 | End: 2024-11-28 | Stop reason: HOSPADM

## 2024-11-25 RX ADMIN — METRONIDAZOLE 500 MG: 500 INJECTION, SOLUTION INTRAVENOUS at 07:32

## 2024-11-25 RX ADMIN — FAMOTIDINE 40 MG: 40 POWDER, FOR SUSPENSION ORAL at 21:47

## 2024-11-25 RX ADMIN — LEVOTHYROXINE SODIUM 100 MCG: 100 TABLET ORAL at 09:02

## 2024-11-25 RX ADMIN — AMLODIPINE BESYLATE 5 MG: 5 TABLET ORAL at 10:11

## 2024-11-25 RX ADMIN — AZELASTINE HYDROCHLORIDE 1 SPRAY: 137 SPRAY, METERED NASAL at 10:15

## 2024-11-25 RX ADMIN — CEFTRIAXONE 1000 MG: 2 INJECTION, POWDER, FOR SOLUTION INTRAMUSCULAR; INTRAVENOUS at 04:46

## 2024-11-25 RX ADMIN — SODIUM CHLORIDE 100 ML/HR: 0.9 INJECTION, SOLUTION INTRAVENOUS at 04:45

## 2024-11-25 RX ADMIN — AZELASTINE HYDROCHLORIDE 1 SPRAY: 137 SPRAY, METERED NASAL at 17:11

## 2024-11-25 NOTE — ASSESSMENT & PLAN NOTE
Past medical history significant for SCC of the tongue s/p chemo/radiation in 2012.  Complicated by partial vocal cord paralysis and recurrent aspiration pneumonia  VBS has shown minimal airway protection and silent aspiration with all liquid consistencies - diet modification at home has not mitigated aspiration risk  Has undergone dilation of upper esophageal sphincter with ENT in September 2024  Previously had G tube placed during admission for aspiration pneumonia but was removed in December 2023 due to patient resuming oral feeding  BMI within normal range and no evidence of protein malnutrition on metabolic panel, family expressing and patient expressing desire for replacement of G tube due to repeated aspiration and recent weight loss and ongoing lack of nutrition  Patient and wife understand that placement of G tube does not necessarily mitigate the risk of aspiration and express desire to move forward with procedure    Plan  Continue NPO status  Plan for G tube placement tomorrow   Nutrition following

## 2024-11-25 NOTE — SPEECH THERAPY NOTE
Speech-Language Pathology Bedside Swallow Evaluation        Patient Name: Mohamud Grimm Sr.    Today's Date: 11/25/2024     Problem List  Principal Problem:    Aspiration pneumonia (HCC)  Active Problems:    Essential hypertension    Sepsis (HCC)    Severe swallowing dysfunction    Oropharyngeal dysphagia         Summary    Pt presents with hx of significant dysphagia w/ known silent aspiration of po consistencies. Pt has declined thickened liquids/modified diets in the past due to QOL.  Pt now considering PEG due to recurrent aspiration pneumonia and for nutritional support. GI has been consulted, PEG tentatively planned for tomorrow.      Recommendations:   Diet: puree/level 1 diet and thin liquids   Meds: whole with liquid, crushed with puree, and as tolerated     Frequent Oral care: 4x/day  Aspiration precautions-alternate bites and sips, effortful swallows  Other Recommendations/ considerations: will cont to follow for diet tolerance        Current Medical Status  Pt is a 59 y.o. male who presented to [facility]  with aspriation pneumonia. Pt with a PMH of recurrent aspiration pneumonia, squamous cell carcinoma of the tongue status post radiation and chemotherapy in 2012, pharyngoesophageal dysphagia, partial vocal cord paralysis, chronic hypoxemic respiratory failure on 2L, ERIK, hypertension, and hypothyroidism who presents with persistent fevers, diaphoresis, and chills over the past week. The patient lives at home with his wife who reports that the patient has been having fevers as high as 101 to 102 F. She had also noticed that the patient's oxygen levels had dropped to 78% through pulse oximetry and had placed him on 2 L supplemental oxygen.   He was recently hospitalized in [place] with a diagnosis of aspiration pneumonia for which he was told to complete a course of Augmentin and oral vancomycin. He was told to continue oral vancomycin but uncertain as to why as there were no C. Diff concerns.  The spouse expresses concern over the patient's inability to presently meet daily caloric intake goals and noted that he has been having trouble tolerating even liquids.     Past medical history:   Please see H&P for details    Special Studies:  CXR: 11/25/24 Redemonstration of right greater than left basilar patchy opacities and trace effusions again concerning for infiltrates including aspiration pneumonitis.     Social/Education/Vocational Hx:  Pt lives with family    Swallow Information   Prior speech/swallowing tx: rec'd outpt speech/swallow tx until sept 2024, then he left for [place]. Now returned from [place]   Current Risks for Dysphagia & Aspiration: known history of dysphagia, known history of aspiration, and VC paralysis   Current Symptoms/Concerns:  recurrent aspiration pneumonia   Current Diet: NPO   Baseline Diet: regular diet and thin liquids  Takes pills- whole     Baseline Assessment   Behavior/Cognition: alert  Speech/Language Status: able to participate in conversation and able to follow commands  Patient Positioning: upright in bed     Swallow Mechanism Exam   Facial: symmetrical  Labial: decreased ROM left side  Lingual: decreased strength left side  Velum: unable to visualize  Mandible: adequate ROM  Dentition: adequate  Vocal quality:hoarse   Volitional Cough: strong/productive -self suctioning   Respiratory: NC    Consistencies Assessed and Performance   Consistencies Administered: thin liquids, puree, and mechanical soft solids    Oral Stage: pt  w/ adequate bolus retrieval from spoon & cup, able to bite shortbread cookie. Mastication/manipulation appeared effective. Slow bolus transfer, alternated with sips of water.     Pharyngeal Stage: swallow initiation appeared WFL, but with decreased hyolaryngeal mvt. Multiple swallows noted per bolus. Delayed cough x1 after thin liquids, increased coughing noted with mech soft/solids.       Esophageal Concerns: none reported      Results Reviewed with:  patient and MD   Dysphagia Goals: pt will tolerate puree with thin liquids without s/s of aspiration x2-4 sessions      Sarah Pugh MA CCC-SLP  Speech Pathologist  [place] license # [id number]  [place] license # [id number]  Available via [employer]

## 2024-11-25 NOTE — H&P
H&P - Hospitalist   Name: Mohamud Grimm Sr. 59 y.o. male I MRN: [id number]  Unit/Bed#: ED-17 I Date of Admission: 11/24/2024   Date of Service: 11/25/2024 I Hospital Day: 1     Assessment & Plan  Aspiration pneumonia (HCC)  Presenting for recurrent aspiration pneumonia with symptoms of persistent fever over the past week (temp max of 101-102 F) along with hypoxia as low as 78% as reported by patient's spouse.   He and spouse state he was diagnosed with another episode of aspiration pneumonia on 11/11 while in [place] and was prescribed Augmentin and oral vancomycin. Spouse is unsure of why oral vancomycin was continued as there were no concerns for C. Diff.  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  Recurrent episodes of aspiration pneumonia for which he follows with aspiration precautions.   He was previously intubated with PEG tube placement 06/2023 in [place] secondary to septic shock from aspiration pneumonia. PEG tube removed on 12/18/23.   Has been following with ENT for EGD balloon dilations, which last occurred on 9/27.  Last hospitalized on 11/11 for aspiration pneumonia.  Prior MRSA cultures including more recently on 11/12 negative in addition to urine antigens for atypical patogens. Sputum culture with mixed respiratory jhon.  CXR this admission with evidence of worsening right lower lobe infiltrates suggestive of aspiration.  Currently on baseline oxygen requirements of 2 L NC.      Plan:  IV ceftriaxone 1 g daily  No present need for MRSA or anaerobic coverage  Sputum culture and gram stain  Follow-up blood culture  Aspiration precautions  NPO pending speech and swallow evaluation  Monitor oxygen requirements  Severe swallowing dysfunction  Secondary to septic shock from aspiration  pneumonia in 6/2023, patient had been intubated and was receiving nutrition through PEG tube.  Given recurrence of aspiration pneumonia and spouse's concern for the patient not being able to maintain caloric diet with his dysphagia, outpatient gastroenterology visit was scheduled for December 2nd to discuss the possibility of PEG tube again. Spouse would like for patient to be evaluated sooner by GI.  Patient is agreeable to PEG tube at this time.    Plan:  Inpatient consult to gastroenterology  NPO with sips with meds  Aspiration precautions  Consult nutrition service  Sepsis (HCC)  Meets sepsis criteria on presentation with tachycardia, tachypnea, and source of infection being aspiration pneumonia  No leukocytosis and normal lactic acid  Given 1 L bolus of normal saline, cefepime, vancomycin, and Flagyl in the ED.    Plan:  Management of aspiration pneumonia as above  Continue IV hydration with normal saline at 100 mL/hr  Monitor CBC  Essential hypertension  POA blood pressures stable  Continue amlodipine 5 mg daily  Monitor blood pressures      VTE Pharmacologic Prophylaxis: VTE Score: 8 High Risk (Score >/= 5) - Pharmacological DVT Prophylaxis Ordered: enoxaparin (Lovenox). Sequential Compression Devices Ordered.  Code Status: Level 1 - Full Code   Discussion with family: Updated  (wife) at bedside.    Anticipated Length of Stay: Patient will be admitted on an inpatient basis with an anticipated length of stay of greater than 2 midnights secondary to aspiration pneumonia.    History of Present Illness   Chief Complaint: Fever    Mohamud Grimm  is a 59 y.o. male with a PMH of recurrent aspiration pneumonia, squamous cell carcinoma of the tongue status post radiation and chemotherapy in 2012, pharyngoesophageal dysphagia, partial vocal cord paralysis, chronic hypoxemic respiratory failure on 2L, ERIK, hypertension, and hypothyroidism who presents with persistent fevers, diaphoresis, and chills over  the past week. The patient lives at home with his wife who reports that the patient has been having fevers as high as 101 to 102 F. She had also noticed that the patient's oxygen levels had dropped to 78% through pulse oximetry and had placed him on 2 L supplemental oxygen, which he normally only uses as needed at bedtime now due to recommendations to avoid CPAP. He denies any chest pain, dyspnea at rest or exertion, but continues to deal with a chronic cough. He was recently hospitalized in [place] with a diagnosis of aspiration pneumonia for which he was told to complete a course of Augmentin and oral vancomycin. He was told to continue oral vancomycin but uncertain as to why as there were no C. Diff concerns. The spouse expresses concern over the patient's inability to presently meet daily caloric intake goals and noted that he has been having trouble tolerating even liquids. In the ED, patient met sepsis criteria and was given a fluid bolus and broad antibiotic coverage with cefepime, vancomycin, and metronidazole. Chest x-ray appears suggestive of worsening aspiration pneumonia involving the right lower lobe. Admitted to [facility] for further management of recurrent aspiration pneumonia and nutrition evaluation.    Review of Systems   Constitutional:  Positive for diaphoresis and fever. Negative for chills.   HENT:  Positive for trouble swallowing. Negative for ear pain and sore throat.    Eyes:  Negative for pain and visual disturbance.   Respiratory:  Positive for cough. Negative for shortness of breath.    Cardiovascular:  Negative for chest pain and palpitations.   Gastrointestinal:  Negative for abdominal pain, nausea and vomiting.   Genitourinary:  Negative for dysuria and hematuria.   Musculoskeletal:  Negative for arthralgias and back pain.   Skin:  Negative for color change and rash.   Neurological:  Negative for syncope and light-headedness.   Psychiatric/Behavioral:  Negative for agitation and confusion.     All other systems reviewed and are negative.      Historical Information   Past Medical History:   Diagnosis Date    Cancer of base of tongue (HCC)     excision    Cough     CPAP (continuous positive airway pressure) dependence     no currently    Disease of thyroid gland     hypo    Dysphagia     ED (erectile dysfunction)     GERD (gastroesophageal reflux disease)     History of pneumonia     Hypertension     Leukopenia     Peyronie's disease     Pneumonia     PONV (postoperative nausea and vomiting)     Psoriasis     Sleep apnea     Tongue cancer (HCC)     Weight loss, abnormal      Past Surgical History:   Procedure Laterality Date    ESOPHAGOSCOPY N/A 9/16/2022    Procedure: ESOPHAGOSCOPY FLEXIBLE;  Surgeon: Devonte Singh MD;  Location: [facility];  Service: ENT    ESOPHAGOSCOPY N/A 8/25/2023    Procedure: TRANSNASAL ESOPHAGOSCOPY WITH BALLOON DILATION/ ESOPHAGEAL DILATION (BALLOON);;  Surgeon: Devonte Singh MD;  Location: [facility];  Service: ENT    OTHER SURGICAL HISTORY      infusion port inserted and removed    PEG TUBE PLACEMENT      and removal    RI COLONOSCOPY FLX DX W/COLLJ SPEC WHEN PFRMD N/A 6/27/2017    Procedure: COLONOSCOPY with polypectomy x2;  Surgeon: Janet Willard MD;  Location: [facility];  Service: General    RI ESOPHAGOSCOPY DILATE ESOPHAGUS BALLOON 30 MM N/A 9/27/2024    Procedure: trans nasal endoscopy, infinity balloon dilation upper esophageal sphincter(UES) AND POSTERIOR WALL INJECTION;  Surgeon: Devonte Singh MD;  Location: [facility];  Service: ENT    RI LARGSC W/NJX VOCAL CORD THER W/MICRO/TELESCOPE Bilateral 9/16/2022    Procedure: micro direct laryngoscopy, vocal fold injection, biopsy epiglottis lesion, posterior pharyngeal wall injection, flexible esophagoscopy with dilation;  Surgeon: Devonte Singh MD;  Location: [facility];  Service: ENT    TONGUE BIOPSY / EXCISION      Floor mouth excision of malignant tumor     Social History     Tobacco Use    Smoking status: Former      Current packs/day: 0.00     Average packs/day: 0.3 packs/day for 20.0 years (5.0 ttl pk-yrs)     Types: Cigarettes     Start date: 1991     Quit date: 2011     Years since quittin.9     Passive exposure: Past    Smokeless tobacco: Former     Quit date:     Tobacco comments:     pt quit with dx of tongue base cancer, per allscripts   Vaping Use    Vaping status: Never Used   Substance and Sexual Activity    Alcohol use: Yes     Alcohol/week: 12.0 standard drinks of alcohol     Types: 12 Cans of beer per week    Drug use: Never    Sexual activity: Yes     Partners: Female     Birth control/protection: Post-menopausal     E-Cigarette/Vaping    E-Cigarette Use Never User      E-Cigarette/Vaping Substances    Nicotine No     THC No     CBD No     Flavoring No     Other No     Unknown No      Family History   Problem Relation Age of Onset    Other Mother         reactive airway dysfunction    Coronary artery disease Father     Hypertension Father     Psoriasis Father     Thyroid disease unspecified Neg Hx      Social History:  Marital Status: /Civil Union   Occupation: N/A  Patient Pre-hospital Living Situation: Home  Patient Pre-hospital Level of Mobility: walks  Patient Pre-hospital Diet Restrictions: N/A    Meds/Allergies   I have reviewed home medications with patient family member.  Prior to Admission medications    Medication Sig Start Date End Date Taking? Authorizing Provider   amLODIPine (NORVASC) 5 mg tablet TAKE 1 TABLET (5 MG TOTAL) BY MOUTH DAILY. 24   Raphael Lopez MD   atropine (ISOPTO ATROPINE) 1 % ophthalmic solution 2-3 drops sublingual as needed for increased saliva/cough 24   Devonte Singh MD   Azelastine HCl 137 MCG/SPRAY SOLN SPRAY 1 SPRAY INTO EACH NOSTRIL TWICE A DAY 10/11/24   Devonte Singh MD   benzonatate (TESSALON) 200 MG capsule Take 1 capsule (200 mg total) by mouth 3 (three) times a day as needed for cough 10/24/24   Raphael  MD Jessica   famotidine (PEPCID) 20 mg/2.5 mL oral suspension TAKE 5 ML (40 MG TOTAL) BY MOUTH DAILY AT BEDTIME 3/27/24 9/27/24  Devonte Singh MD   levalbuterol (XOPENEX HFA) 45 mcg/act inhaler Inhale 2 puffs every 6 (six) hours as needed for wheezing 7/8/24   Chris Rivera MD   levothyroxine 100 mcg tablet TAKE 1 TABLET BY MOUTH EVERY DAY 9/12/24   Raphael Lopez MD   multivitamin (THERAGRAN) TABS Take 1 tablet by mouth daily    Historical Provider, MD   tadalafil (CIALIS) 5 MG tablet Take 1 tablet (5 mg total) by mouth daily  Patient taking differently: Take 5 mg by mouth daily as needed 8/10/22   Nereida Miller PA-C     No Known Allergies    Objective :  Temp:  [99.1 °F (37.3 °C)] 99.1 °F (37.3 °C)  HR:  [] 78  BP: (144-154)/(80-95) 144/80  Resp:  [22-32] 22  SpO2:  [93 %-98 %] 98 %  O2 Device: Nasal cannula  Nasal Cannula O2 Flow Rate (L/min):  [2 L/min] 2 L/min    Physical Exam  Vitals and nursing note reviewed.   Constitutional:       General: He is not in acute distress.     Appearance: Normal appearance. He is well-developed. He is cachectic. He is not ill-appearing.   HENT:      Head: Normocephalic and atraumatic.      Right Ear: External ear normal.      Left Ear: External ear normal.      Nose: Nose normal.   Eyes:      General: Lids are normal.      Extraocular Movements: Extraocular movements intact.      Conjunctiva/sclera: Conjunctivae normal.   Cardiovascular:      Rate and Rhythm: Normal rate and regular rhythm.      Pulses: Normal pulses.      Heart sounds: Normal heart sounds, S1 normal and S2 normal. No murmur heard.  Pulmonary:      Effort: Pulmonary effort is normal. No respiratory distress.      Breath sounds: Normal breath sounds.   Abdominal:      General: Abdomen is flat. Bowel sounds are normal. There is no distension.      Palpations: Abdomen is soft.      Tenderness: There is no abdominal tenderness. There is no guarding or rebound.   Musculoskeletal:          General: No swelling.      Cervical back: Neck supple.      Right lower leg: No edema.      Left lower leg: No edema.   Skin:     General: Skin is warm and dry.      Capillary Refill: Capillary refill takes less than 2 seconds.   Neurological:      General: No focal deficit present.      Mental Status: He is alert and oriented to person, place, and time.   Psychiatric:         Attention and Perception: Attention normal.         Mood and Affect: Mood normal.         Speech: Speech normal.         Behavior: Behavior normal. Behavior is cooperative.          Lines/Drains:            Lab Results: I have reviewed the following results:  Results from last 7 days   Lab Units 11/24/24 1951   WBC Thousand/uL 9.85   HEMOGLOBIN g/dL 12.1   HEMATOCRIT % 36.8   PLATELETS Thousands/uL 451*   SEGS PCT % 84*   LYMPHO PCT % 9*   MONO PCT % 7   EOS PCT % 0     Results from last 7 days   Lab Units 11/24/24 1951   SODIUM mmol/L 131*   POTASSIUM mmol/L 4.1   CHLORIDE mmol/L 93*   CO2 mmol/L 29   BUN mg/dL 21   CREATININE mg/dL 0.52*   ANION GAP mmol/L 9   CALCIUM mg/dL 9.4   ALBUMIN g/dL 3.6   TOTAL BILIRUBIN mg/dL 0.70   ALK PHOS U/L 136*   ALT U/L 19   AST U/L 19   GLUCOSE RANDOM mg/dL 166*             Lab Results   Component Value Date    HGBA1C 5.3 07/14/2018    HGBA1C 5.4 08/11/2017    HGBA1C 5.3 08/15/2016     Results from last 7 days   Lab Units 11/24/24  2256   LACTIC ACID mmol/L 0.7       Imaging Results Review: I reviewed radiology reports from this admission including: chest xray.  Other Study Results Review: EKG was reviewed.     Administrative Statements   I have spent a total time of 60 minutes in caring for this patient on the day of the visit/encounter including Diagnostic results, Prognosis, Risks and benefits of tx options, Instructions for management, Patient and family education, Importance of tx compliance, Risk factor reductions, Impressions, Counseling / Coordination of care, Documenting in the medical record,  Reviewing / ordering tests, medicine, procedures  , Obtaining or reviewing history  , and Communicating with other healthcare professionals .    ** Please Note: This note has been constructed using a voice recognition system. **

## 2024-11-25 NOTE — ED PROVIDER NOTES
Time reflects when diagnosis was documented in both MDM as applicable and the Disposition within this note       Time User Action Codes Description Comment    11/24/2024 11:34 PM Alex Payne Add [J18.9] Pneumonia     11/24/2024 11:34 PM Alex Payne Add [A41.9] Sepsis (HCC)     11/25/2024 12:25 AM LeslyeChitra Add [R13.14] Pharyngoesophageal dysphagia     11/25/2024 12:25 AM Chitra Gavin Remove [R13.14] Pharyngoesophageal dysphagia     11/25/2024 12:25 AM Chitra Gavin Add [Z93.1] S/P percutaneous endoscopic gastrostomy (PEG) tube placement (HCC)           ED Disposition       ED Disposition   Admit    Condition   Stable    Date/Time   Sun Nov 24, 2024 11:34 PM    Comment   Case was discussed with MERLIN and the patient's admission status was agreed to be Admission Status: inpatient status to the service of Dr. Meza .               Assessment & Plan       Medical Decision Making  Differential diagnose includes but not limited to: Aspiration pneumonia, pneumonia, pneumothorax, ACS, MI    Mohamud came to the ER due to fever and diaphoresis at night.  He has a history of recurrent aspiration pneumonia due to esophageal insufficiency.  Currently being treated for the pneumonia.  Discussions were had of getting patient a PEG tube.  On arrival he appeared well aside from being chronically ill.  Did need an increase to 2 L nasal cannula of oxygen.  For which he usually only wears at night.  Lung sounds and rhonchi bilaterally in the lower lobes.  Chest x-ray significant for aspiration pneumonia.  Patient given IV antibiotics here which included cefepime, vancomycin, Flagyl.  Patient was discussed with Merlin for admission for refractory aspiration pneumonia.  Patient admitted in stable condition.    Amount and/or Complexity of Data Reviewed  Labs: ordered.  Radiology: ordered and independent interpretation performed.    Risk  Prescription drug management.  Decision regarding hospitalization.              Medications   vancomycin (VANCOCIN) 1,750 mg in sodium chloride 0.9 % 500 mL IVPB (1,750 mg Intravenous New Bag 11/24/24 2345)   metroNIDAZOLE (FLAGYL) IVPB (premix) 500 mg 100 mL (0 mg Intravenous Stopped 11/24/24 2341)   sodium chloride 0.9 % infusion (has no administration in time range)   acetaminophen (TYLENOL) tablet 650 mg (has no administration in time range)   ondansetron (ZOFRAN) injection 4 mg (has no administration in time range)   enoxaparin (LOVENOX) subcutaneous injection 40 mg (has no administration in time range)   ceftriaxone (ROCEPHIN) 1 g/50 mL in dextrose IVPB (has no administration in time range)   cefepime (MAXIPIME) 2 g/50 mL dextrose IVPB (0 mg Intravenous Stopped 11/24/24 2341)   sodium chloride 0.9 % bolus 1,000 mL (0 mL Intravenous Stopped 11/24/24 2357)       ED Risk Strat Scores                                               History of Present Illness       Chief Complaint   Patient presents with    Fever     Pt states has been having ongoing fevers over the past week, family also states o2 levels at home were 78%. Pt currently on PO vancomycin for PNA.        Past Medical History:   Diagnosis Date    Cancer of base of tongue (HCC)     excision    Cough     CPAP (continuous positive airway pressure) dependence     no currently    Disease of thyroid gland     hypo    Dysphagia     ED (erectile dysfunction)     GERD (gastroesophageal reflux disease)     History of pneumonia     Hypertension     Leukopenia     Peyronie's disease     Pneumonia     PONV (postoperative nausea and vomiting)     Psoriasis     Sleep apnea     Tongue cancer (HCC)     Weight loss, abnormal       Past Surgical History:   Procedure Laterality Date    ESOPHAGOSCOPY N/A 9/16/2022    Procedure: ESOPHAGOSCOPY FLEXIBLE;  Surgeon: Devonte Singh MD;  Location: [facility];  Service: ENT    ESOPHAGOSCOPY N/A 8/25/2023    Procedure: TRANSNASAL ESOPHAGOSCOPY WITH BALLOON DILATION/ ESOPHAGEAL DILATION (BALLOON);;  Surgeon:  Devonte Singh MD;  Location: [facility];  Service: ENT    OTHER SURGICAL HISTORY      infusion port inserted and removed    PEG TUBE PLACEMENT      and removal    MI COLONOSCOPY FLX DX W/COLLJ SPEC WHEN PFRMD N/A 2017    Procedure: COLONOSCOPY with polypectomy x2;  Surgeon: Janet Willard MD;  Location: [facility];  Service: General    MI ESOPHAGOSCOPY DILATE ESOPHAGUS BALLOON 30 MM N/A 2024    Procedure: trans nasal endoscopy, infinity balloon dilation upper esophageal sphincter(UES) AND POSTERIOR WALL INJECTION;  Surgeon: Devonte Singh MD;  Location: [facility];  Service: ENT    MI LARGSC W/NJX VOCAL CORD THER W/MICRO/TELESCOPE Bilateral 2022    Procedure: micro direct laryngoscopy, vocal fold injection, biopsy epiglottis lesion, posterior pharyngeal wall injection, flexible esophagoscopy with dilation;  Surgeon: Devonte Singh MD;  Location: [facility];  Service: ENT    TONGUE BIOPSY / EXCISION      Floor mouth excision of malignant tumor      Family History   Problem Relation Age of Onset    Other Mother         reactive airway dysfunction    Coronary artery disease Father     Hypertension Father     Psoriasis Father     Thyroid disease unspecified Neg Hx       Social History     Tobacco Use    Smoking status: Former     Current packs/day: 0.00     Average packs/day: 0.3 packs/day for 20.0 years (5.0 ttl pk-yrs)     Types: Cigarettes     Start date: 1991     Quit date: 2011     Years since quittin.9     Passive exposure: Past    Smokeless tobacco: Former     Quit date:     Tobacco comments:     pt quit with dx of tongue base cancer, per allscripts   Vaping Use    Vaping status: Never Used   Substance Use Topics    Alcohol use: Yes     Alcohol/week: 12.0 standard drinks of alcohol     Types: 12 Cans of beer per week    Drug use: Never      E-Cigarette/Vaping    E-Cigarette Use Never User       E-Cigarette/Vaping Substances    Nicotine No     THC No     CBD No     Flavoring No      Other No     Unknown No       I have reviewed and agree with the history as documented.     59-year-old male with history of pharyngeal insufficiency and subsequent recurrent aspiration pneumonia, presents with fevers and diaphoresis worsening over the last 2 days.  He states he was diagnosed with another bout of aspiration pneumonia on 11/12, and put on oral vancomycin and Augmentin.  Symptoms worsening over the last couple days, denies chest pain, shortness of breath.  Wife, who is bedside, states that she took his pulse ox at night and it was in the 70s.  Patient does wear 2 L nasal cannula at night as needed, and is in need of 2 L nasal cannula here.  Denies nausea/vomiting, any other symptoms.        Review of Systems   Constitutional:  Positive for chills, diaphoresis and fever.   HENT:  Negative for congestion.    Respiratory:  Negative for chest tightness and shortness of breath.    Cardiovascular:  Negative for chest pain.   Gastrointestinal:  Negative for abdominal pain, constipation, diarrhea, nausea and vomiting.   Genitourinary:  Negative for difficulty urinating.   Neurological:  Negative for light-headedness and headaches.           Objective       ED Triage Vitals [11/24/24 1944]   Temperature Pulse Blood Pressure Respirations SpO2 Patient Position - Orthostatic VS   99.1 °F (37.3 °C) (!) 111 150/95 (!) 32 93 % --      Temp Source Heart Rate Source BP Location FiO2 (%) Pain Score    Oral Monitor Right arm -- --      Vitals      Date and Time Temp Pulse SpO2 Resp BP Pain Score FACES Pain Rating User   11/24/24 2330 -- 78 98 % 22 144/80 -- -- MM   11/24/24 2321 -- 79 98 % -- 154/81 -- -- PFG   11/24/24 1944 99.1 °F (37.3 °C) 111 93 % 32 150/95 -- -- PS            Physical Exam  Vitals and nursing note reviewed.   Constitutional:       General: He is not in acute distress.     Appearance: Normal appearance. He is ill-appearing (chronic).   HENT:      Right Ear: External ear normal.      Left Ear:  External ear normal.      Nose: Nose normal.      Mouth/Throat:      Mouth: Mucous membranes are dry.   Eyes:      Extraocular Movements: Extraocular movements intact.   Cardiovascular:      Rate and Rhythm: Regular rhythm. Tachycardia present.      Pulses: Normal pulses.      Heart sounds: Normal heart sounds.   Pulmonary:      Effort: Pulmonary effort is normal. No respiratory distress.      Breath sounds: Rhonchi (bilateral lower lobes) present.   Abdominal:      Palpations: Abdomen is soft.      Tenderness: There is no abdominal tenderness.   Musculoskeletal:         General: Normal range of motion.   Skin:     General: Skin is warm and dry.      Capillary Refill: Capillary refill takes less than 2 seconds.   Neurological:      General: No focal deficit present.      Mental Status: He is alert.         Results Reviewed       Procedure Component Value Units Date/Time    Sputum culture and Gram stain [[id number]]     Lab Status: No result Specimen: Expectorated Sputum     HS Troponin I 4hr [[id number]]     Lab Status: No result Specimen: Blood     HS Troponin I 2hr [[id number]]  (Normal) Collected: 11/24/24 2256    Lab Status: Final result Specimen: Blood from Arm, Left Updated: 11/24/24 2329     hs TnI 2hr 17 ng/L      Delta 2hr hsTnI -3 ng/L     Lactic acid [[id number]]  (Normal) Collected: 11/24/24 2256    Lab Status: Final result Specimen: Blood from Arm, Left Updated: 11/24/24 2321     LACTIC ACID 0.7 mmol/L     Narrative:      Result may be elevated if tourniquet was used during collection.    Blood culture #2 [[id number]] Collected: 11/24/24 2256    Lab Status: In process Specimen: Blood from Arm, Left Updated: 11/24/24 2305    HS Troponin 0hr (reflex protocol) [[id number]]  (Normal) Collected: 11/24/24 1951    Lab Status: Final result Specimen: Blood from Arm, Right Updated: 11/24/24 2025     hs TnI 0hr 20 ng/L     Comprehensive metabolic panel [[id number]]  (Abnormal) Collected: 11/24/24 1951    Lab Status:  Final result Specimen: Blood from Arm, Right Updated: 11/24/24 2023     Sodium 131 mmol/L      Potassium 4.1 mmol/L      Chloride 93 mmol/L      CO2 29 mmol/L      ANION GAP 9 mmol/L      BUN 21 mg/dL      Creatinine 0.52 mg/dL      Glucose 166 mg/dL      Calcium 9.4 mg/dL      AST 19 U/L      ALT 19 U/L      Alkaline Phosphatase 136 U/L      Total Protein 8.2 g/dL      Albumin 3.6 g/dL      Total Bilirubin 0.70 mg/dL      eGFR 115 ml/min/1.73sq m     Narrative:      [employer] guidelines for Chronic Kidney Disease (CKD):     Stage 1 with normal or high GFR (GFR > 90 mL/min/1.73 square meters)    Stage 2 Mild CKD (GFR = 60-89 mL/min/1.73 square meters)    Stage 3A Moderate CKD (GFR = 45-59 mL/min/1.73 square meters)    Stage 3B Moderate CKD (GFR = 30-44 mL/min/1.73 square meters)    Stage 4 Severe CKD (GFR = 15-29 mL/min/1.73 square meters)    Stage 5 End Stage CKD (GFR <15 mL/min/1.73 square meters)  Note: GFR calculation is accurate only with a steady state creatinine    CBC and differential [[id number]]  (Abnormal) Collected: 11/24/24 1951    Lab Status: Final result Specimen: Blood from Arm, Right Updated: 11/24/24 2002     WBC 9.85 Thousand/uL      RBC 4.17 Million/uL      Hemoglobin 12.1 g/dL      Hematocrit 36.8 %      MCV 88 fL      MCH 29.0 pg      MCHC 32.9 g/dL      RDW 13.7 %      MPV 9.1 fL      Platelets 451 Thousands/uL      nRBC 0 /100 WBCs      Segmented % 84 %      Immature Grans % 0 %      Lymphocytes % 9 %      Monocytes % 7 %      Eosinophils Relative 0 %      Basophils Relative 0 %      Absolute Neutrophils 8.24 Thousands/µL      Absolute Immature Grans 0.03 Thousand/uL      Absolute Lymphocytes 0.87 Thousands/µL      Absolute Monocytes 0.66 Thousand/µL      Eosinophils Absolute 0.01 Thousand/µL      Basophils Absolute 0.04 Thousands/µL             XR chest 1 view portable   ED Interpretation by Alex Payne DO (11/24 2257)   Bilateral pneumonia suspicious for  aspiration          ECG 12 Lead Documentation Only    Date/Time: 2024 11:05 PM    Performed by: Alex Payne DO  Authorized by: Alex Payne DO    Indications / Diagnosis:  SOB  Patient location:  ED  Interpretation:     Interpretation: abnormal    Rate:     ECG rate:  100    ECG rate assessment: tachycardic    Rhythm:     Rhythm: sinus rhythm    Ectopy:     Ectopy: none    QRS:     QRS axis:  Normal    QRS intervals:  Normal  Conduction:     Conduction: normal    ST segments:     ST segments:  Normal  T waves:     T waves: normal    Other findings:     Other findings: LAE        ED Medication and Procedure Management   Prior to Admission Medications   Prescriptions Last Dose Informant Patient Reported? Taking?   Azelastine HCl 137 MCG/SPRAY SOLN   No No   Sig: SPRAY 1 SPRAY INTO EACH NOSTRIL TWICE A DAY   amLODIPine (NORVASC) 5 mg tablet   No No   Sig: TAKE 1 TABLET (5 MG TOTAL) BY MOUTH DAILY.   atropine (ISOPTO ATROPINE) 1 % ophthalmic solution  Self No No   Si-3 drops sublingual as needed for increased saliva/cough   benzonatate (TESSALON) 200 MG capsule   No No   Sig: Take 1 capsule (200 mg total) by mouth 3 (three) times a day as needed for cough   famotidine (PEPCID) 20 mg/2.5 mL oral suspension   No No   Sig: TAKE 5 ML (40 MG TOTAL) BY MOUTH DAILY AT BEDTIME   levalbuterol (XOPENEX HFA) 45 mcg/act inhaler  Self No No   Sig: Inhale 2 puffs every 6 (six) hours as needed for wheezing   levothyroxine 100 mcg tablet  Self No No   Sig: TAKE 1 TABLET BY MOUTH EVERY DAY   multivitamin (THERAGRAN) TABS  Self Yes No   Sig: Take 1 tablet by mouth daily   tadalafil (CIALIS) 5 MG tablet  Self No No   Sig: Take 1 tablet (5 mg total) by mouth daily   Patient taking differently: Take 5 mg by mouth daily as needed      Facility-Administered Medications: None     Patient's Medications   Discharge Prescriptions    No medications on file     No discharge procedures on file.  ED SEPSIS  DOCUMENTATION   Time reflects when diagnosis was documented in both MDM as applicable and the Disposition within this note       Time User Action Codes Description Comment    11/24/2024 11:34 PM Alex Payne Add [J18.9] Pneumonia     11/24/2024 11:34 PM Alex Payne Add [A41.9] Sepsis (HCC)     11/25/2024 12:25 AM Chitra Gavin Add [R13.14] Pharyngoesophageal dysphagia     11/25/2024 12:25 AM Chitra Gavin Remove [R13.14] Pharyngoesophageal dysphagia     11/25/2024 12:25 AM Chitra Gavin Add [Z93.1] S/P percutaneous endoscopic gastrostomy (PEG) tube placement (HCC)                  Alex Payne DO  11/25/24 0105

## 2024-11-25 NOTE — UTILIZATION REVIEW
Initial Clinical Review    Admission: Date/Time/Statement:   Admission Orders (From admission, onward)       Ordered        11/24/24 2335  INPATIENT ADMISSION  Once                          Orders Placed This Encounter   Procedures    INPATIENT ADMISSION     Standing Status:   Standing     Number of Occurrences:   1     Level of Care:   Med Surg [16]     Estimated length of stay:   More than 2 Midnights     Certification:   I certify that inpatient services are medically necessary for this patient for a duration of greater than two midnights. See H&P and MD Progress Notes for additional information about the patient's course of treatment.     ED Arrival Information       Expected   -    Arrival   11/24/2024 19:39    Acuity   Emergent              Means of arrival   Walk-In    Escorted by   Spouse    Service   Hospitalist    Admission type   Emergency              Arrival complaint   low oxygen levels, with fever             Chief Complaint   Patient presents with    Fever     Pt states has been having ongoing fevers over the past week, family also states o2 levels at home were 78%. Pt currently on PO vancomycin for PNA.        Initial Presentation: 59 y.o. 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 Reports Tmax 102F, O2 sat dropped to 78%, placed on 2L NC home O2 w/c he usually uses at bedtime only. Reports chronic cough. He is having difficulty tolerating po intake even liquids.   Pt was dx w/ aspiration PNA on 11/11 while in [place] and was Augmentin and oral vancomycin.   In the ED, T 99.1, , RR 32. On 2L NC.   Chest x-ray appears suggestive of worsening aspiration pneumonia involving the right lower lobe.   Given IV  Cefepime, IV Vanco, 1L IVF bolus, IV Flagyl.     Admit as Inpatient for evaluation and treatment of aspiration Pneumonia, sepsis.  Plan: IV ceftriaxone 1 g daily. Sputum culture and gram stain. Follow-up blood culture. Aspiration precautions. NPO pending speech and swallow evaluation. Monitor oxygen requirements. GI consult. IVF. Mon CBC.     Anticipated Length of Stay/Certification Statement: Patient will be admitted on an inpatient basis with an anticipated length of stay of greater than 2 midnights secondary to aspiration pneumonia.     Date: 11/25   Day 2: Pt's HR and RR improved. On 2-4L NC. Cont IV Ceftriaxone. F/u cxs. Mon resp status. NPO pending speech and swallow eval. IVFs    Speech Bedside swallow eval:   Recommendations:   Diet: puree/level 1 diet and thin liquids   Meds: whole with liquid, crushed with puree, and as tolerated       GI Consult: Oropharyngeal dysphagia   VBS has shown minimal airway protection and silent aspiration with all liquid consistencies - diet modification at home has not mitigated aspiration risk. Has undergone dilation of upper esophageal sphincter with ENT in September 2024. Previously had G tube placed during admission for aspiration pneumonia but was removed in December 2023 due to patient resuming oral feeding  Plan: Continue NPO status. Plan for G tube placement tomorrow. Nutrition following       ED Treatment-Medication Administration from 11/24/2024 1939 to 11/25/2024 1541         Date/Time Order Dose Route Action     11/24/2024 2303 cefepime (MAXIPIME) 2 g/50 mL dextrose IVPB 2,000 mg Intravenous New Bag     11/24/2024 2345 vancomycin (VANCOCIN) 1,750 mg in sodium chloride 0.9 % 500 mL IVPB 1,750 mg Intravenous New Bag     11/24/2024 2257 sodium chloride 0.9 % bolus 1,000 mL 1,000 mL Intravenous New Bag     11/24/2024 2257 metroNIDAZOLE (FLAGYL) IVPB (premix) 500 mg 100 mL 500 mg Intravenous New Bag     11/25/2024 0732 metroNIDAZOLE (FLAGYL) IVPB (premix) 500 mg 100 mL  500 mg Intravenous New Bag     11/25/2024 1011 amLODIPine (NORVASC) tablet 5 mg 5 mg Oral Given     11/25/2024 1015 azelastine (ASTELIN) 0.1 % nasal spray 1 spray 1 spray Each Nare Given     11/25/2024 0902 levothyroxine tablet 100 mcg 100 mcg Oral Given     11/25/2024 0445 sodium chloride 0.9 % infusion 100 mL/hr Intravenous New Bag     11/25/2024 1012 sodium chloride 0.9 % infusion 50 mL/hr Intravenous Rate/Dose Change     11/25/2024 0446 ceftriaxone (ROCEPHIN) 1 g/50 mL in dextrose IVPB 1,000 mg Intravenous New Bag            Scheduled Medications:  amLODIPine, 5 mg, Oral, Daily  azelastine, 1 spray, Each Nare, BID  cefTRIAXone, 1,000 mg, Intravenous, Q24H  enoxaparin, 40 mg, Subcutaneous, Daily  famotidine, 40 mg, Oral, HS  levothyroxine, 100 mcg, Oral, Early Morning  multivitamin stress formula, 1 tablet, Oral, Daily      Continuous IV Infusions:  sodium chloride, 50 mL/hr, Intravenous, Continuous      PRN Meds:  acetaminophen, 650 mg, Oral, Q6H PRN  albuterol, 1 puff, Inhalation, Q6H PRN  atropine, 2 drop, Sublingual, Once PRN  benzonatate, 200 mg, Oral, TID PRN  ondansetron, 4 mg, Intravenous, Q6H PRN      ED Triage Vitals   Temperature Pulse Respirations Blood Pressure SpO2 Pain Score   11/24/24 1944 11/24/24 1944 11/24/24 1944 11/24/24 1944 11/24/24 1944 11/25/24 0515   99.1 °F (37.3 °C) (!) 111 (!) 32 150/95 93 % No Pain     Weight (last 2 days)       Date/Time Weight    11/25/24 1521 66.1 (145.72)    11/25/24 0515 66.1 (145.72)            Vital Signs (last 3 days)       Date/Time Temp Pulse Resp BP MAP (mmHg) SpO2 Calculated FIO2 (%) - Nasal Cannula Nasal Cannula O2 Flow Rate (L/min) O2 Device Patient Position - Orthostatic VS Pain    11/25/24 15:51:27 98.4 °F (36.9 °C) 77 18 144/78 100 96 % -- -- -- -- No Pain    11/25/24 1304 -- -- -- -- -- 97 %  28 2 L/min  Nasal cannula  -- --    11/25/24 1300 -- 79 -- -- -- 86 %  -- -- None (Room air)  -- --    11/25/24 1230 98.5 °F (36.9 °C) 75 18 146/83 -- 96 % --  -- None (Room air) Sitting --    11/25/24 1136 -- 76 20 -- -- 94 % -- -- -- -- --    11/25/24 1030 -- 80 -- -- -- 96 % 28 2 L/min -- -- --    11/25/24 1011 -- -- -- 126/82 -- -- -- -- -- -- --    11/25/24 0745 -- 68 -- 135/79 103 99 % -- -- -- -- --    11/25/24 0645 -- 79 22 159/88 114 96 % 36 4 L/min Nasal cannula -- No Pain    11/25/24 0515 -- 81 22 128/70 93 97 % 36 4 L/min Nasal cannula Sitting No Pain    11/24/24 2330 -- 78 22 144/80 106 98 % 28 2 L/min Nasal cannula -- --    11/24/24 2321 -- 79 -- 154/81 112 98 % -- -- -- -- --    11/24/24 1944 99.1 °F (37.3 °C) 111 32 150/95 117 93 % 28 2 L/min Nasal cannula -- --              Pertinent Labs/Diagnostic Test Results:   Radiology:  XR chest portable   Final Interpretation by Scotty Agustin MD (11/25 0836)   Redemonstration of right greater than left basilar patchy opacities and trace effusions again concerning for infiltrates including aspiration pneumonitis.            Workstation performed: [id number]         XR chest 1 view portable   ED Interpretation by Alex Payne DO (11/24 2257)   Bilateral pneumonia suspicious for aspiration      Final Interpretation by Scotty Agustin MD (11/25 0898)   Small bibasilar pleural effusions with right greater than left patchy airspace opacities concerning for infiltrates including aspiration pneumonitis.      Findings concur with ED results as provided in PACS viewer/EPIC at the time of interpretation.               Workstation performed: [id number]           Cardiology:  No orders to display     GI:  No orders to display           Results from last 7 days   Lab Units 11/25/24  0722 11/24/24 1951   WBC Thousand/uL 6.91 9.85   HEMOGLOBIN g/dL 9.9* 12.1   HEMATOCRIT % 30.5* 36.8   PLATELETS Thousands/uL 328 451*   TOTAL NEUT ABS Thousands/µL  --  8.24*         Results from last 7 days   Lab Units 11/25/24  0722 11/24/24 1951   SODIUM mmol/L 135 131*   POTASSIUM mmol/L 3.6 4.1   CHLORIDE mmol/L 101 93*   CO2  "mmol/L 28 29   ANION GAP mmol/L 6 9   BUN mg/dL 10 21   CREATININE mg/dL 0.33* 0.52*   EGFR ml/min/1.73sq m 138 115   CALCIUM mg/dL 8.6 9.4   MAGNESIUM mg/dL 1.9  --      Results from last 7 days   Lab Units 11/24/24 1951   AST U/L 19   ALT U/L 19   ALK PHOS U/L 136*   TOTAL PROTEIN g/dL 8.2   ALBUMIN g/dL 3.6   TOTAL BILIRUBIN mg/dL 0.70         Results from last 7 days   Lab Units 11/25/24  0722 11/24/24 1951   GLUCOSE RANDOM mg/dL 82 166*             No results found for: \"BETA-HYDROXYBUTYRATE\"                   Results from last 7 days   Lab Units 11/25/24 0127 11/24/24  2256 11/24/24 1951   HS TNI 0HR ng/L  --   --  20   HS TNI 2HR ng/L  --  17  --    HSTNI D2 ng/L  --  -3  --    HS TNI 4HR ng/L 17  --   --    HSTNI D4 ng/L -3  --   --                  Results from last 7 days   Lab Units 11/25/24 0722   PROCALCITONIN ng/ml 0.35*     Results from last 7 days   Lab Units 11/24/24 2256   LACTIC ACID mmol/L 0.7                                                                                 Results from last 7 days   Lab Units 11/24/24 2256   BLOOD CULTURE  Received in Microbiology Lab. Culture in Progress.                   Past Medical History:   Diagnosis Date    Cancer of base of tongue (HCC)     excision    Cough     CPAP (continuous positive airway pressure) dependence     no currently    Disease of thyroid gland     hypo    Dysphagia     ED (erectile dysfunction)     GERD (gastroesophageal reflux disease)     History of pneumonia     Hypertension     Leukopenia     Peyronie's disease     Pneumonia     PONV (postoperative nausea and vomiting)     Psoriasis     Sleep apnea     Tongue cancer (HCC)     Weight loss, abnormal      Present on Admission:   Aspiration pneumonia (HCC)   Essential hypertension   Severe swallowing dysfunction   Sepsis (HCC)   Oropharyngeal dysphagia      Admitting Diagnosis: Pneumonia [J18.9]  Pharyngoesophageal dysphagia [R13.14]  Sepsis (HCC) [A41.9]  S/P percutaneous " endoscopic gastrostomy (PEG) tube placement (HCC) [Z93.1]  Age/Sex: 59 y.o. male    Network Utilization Review Department  ATTENTION: Please call with any questions or concerns to [phone] and carefully listen to the prompts so that you are directed to the right person. All voicemails are confidential.   For Discharge needs, contact Care Management DC Support Team at [phone] opt. 2  Send all requests for admission clinical reviews, approved or denied determinations and any other requests to dedicated fax number below belonging to the [place] where the patient is receiving treatment. List of dedicated fax numbers for the Facilities:  FACILITY NAME UR FAX NUMBER   ADMISSION DENIALS (Administrative/Medical Necessity) [phone]   DISCHARGE SUPPORT TEAM (NETWORK) [phone]   PARENT CHILD HEALTH (Maternity/NICU/Pediatrics) [phone]   [facility] [phone]   [facility] [phone]   [facility] [phone]   [facility] [phone]   [facility] [phone]   [facility] [phone]   [facility] [phone]   [facility] [phone]   [facility] [phone]   [facility] [phone]   [facility] [phone]   [facility] [phone]

## 2024-11-25 NOTE — ASSESSMENT & PLAN NOTE
Meets sepsis criteria on presentation with tachycardia, tachypnea, and source of infection being aspiration pneumonia  No leukocytosis and normal lactic acid  Given 1 L bolus of normal saline, cefepime, vancomycin, and Flagyl in the ED.    Plan:  Management of aspiration pneumonia as above  Continue IV hydration with normal saline at 100 mL/hr  Monitor CBC

## 2024-11-25 NOTE — ASSESSMENT & PLAN NOTE
Secondary to septic shock from aspiration pneumonia in 6/2023, patient had been intubated and was receiving nutrition through PEG tube.  Given recurrence of aspiration pneumonia and spouse's concern for the patient not being able to maintain caloric diet with his dysphagia, outpatient gastroenterology visit was scheduled for December 2nd to discuss the possibility of PEG tube again. Spouse would like for patient to be evaluated sooner by GI.  Patient is agreeable to PEG tube at this time.    Plan:  Inpatient consult to gastroenterology  NPO with sips with meds  Aspiration precautions  Consult nutrition service

## 2024-11-25 NOTE — ED ATTENDING ATTESTATION
11/24/2024  ICynthia MD, saw and evaluated the patient. I have discussed the patient with the resident/non-physician practitioner and agree with the resident's/non-physician practitioner's findings, Plan of Care, and MDM as documented in the resident's/non-physician practitioner's note, except where noted. All available labs and Radiology studies were reviewed.  I was present for key portions of any procedure(s) performed by the resident/non-physician practitioner and I was immediately available to provide assistance.       At this point I agree with the current assessment done in the Emergency Department.  I have conducted an independent evaluation of this patient a history and physical is as follows:    59-year-old presenting to the ER with fevers at home, cough, hypoxia.  Patient with recurrent aspiration pneumonia episodes due to aspiration from previous neck radiation.    Reviewed chest x-ray in ER.  Worsening aspiration pneumonia on x-ray.  Agree with IV antibiotics.  Admission to hospital.    ED Course         Critical Care Time  Procedures

## 2024-11-25 NOTE — CONSULTS
Consultation - Gastroenterology   Name: Mohamud Grimm Sr. 59 y.o. male I MRN: [id number]  Unit/Bed#: ED-17 I Date of Admission: 11/24/2024   Date of Service: 11/25/2024 I Hospital Day: 1   Inpatient consult to gastroenterology  Consult performed by: Lio Bustamante MD  Consult ordered by: Chitra Gavin DO        Physician Requesting Evaluation: Dasia Meza, *   Reason for Evaluation / Principal Problem: Oropharyngeal Dysphagia    Assessment & Plan  Oropharyngeal dysphagia  Past medical history significant for SCC of the tongue s/p chemo/radiation in 2012.  Complicated by partial vocal cord paralysis and recurrent aspiration pneumonia  VBS has shown minimal airway protection and silent aspiration with all liquid consistencies - diet modification at home has not mitigated aspiration risk  Has undergone dilation of upper esophageal sphincter with ENT in September 2024  Previously had G tube placed during admission for aspiration pneumonia but was removed in December 2023 due to patient resuming oral feeding  BMI within normal range and no evidence of protein malnutrition on metabolic panel, family expressing and patient expressing desire for replacement of G tube due to repeated aspiration and recent weight loss and ongoing lack of nutrition  Patient and wife understand that placement of G tube does not necessarily mitigate the risk of aspiration and express desire to move forward with procedure    Plan  Continue NPO status  Plan for G tube placement tomorrow   Nutrition following    Aspiration pneumonia (HCC)  Likely result of dysphagia as outlined above  Being treated with antibiotics per primary team      History of Present Illness   HPI:  Mohamud Grimm Sr. is a 59 y.o. male who presents with fevers and chills over the past week.  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  He  has had a G tube placed 2023 after an admission for aspiration pneumonia requiring intubation and ventilation but it was removed later in the year due to resumed oral feeding.  Other medical hx includes HTN, ERIK, and hypothyroidism.  Patient and his wife express concern for aspiration events occurring more frequently and malnutrition as he has reportedly lost weight in the last couple weeks.  They are requesting that G tube be replaced in hopes that the patient can improve his nutrition and strength and hopefully avoid further aspiration events.  Patient says he eats mostly soup and crab cakes. He has tried drinking ensures but is not very fond of them.  He has tried different variations of dysphagia diets and none have been successful in preventing him from aspirating.  He has an appointment on December 2 with gastroenterology to further discuss G tube replacement.     Review of Systems   Constitutional:  Positive for chills, fever and unexpected weight change.   HENT:  Positive for trouble swallowing and voice change. Negative for ear pain and sore throat.    Eyes:  Negative for pain and visual disturbance.   Respiratory:  Positive for cough. Negative for shortness of breath.    Cardiovascular:  Negative for chest pain and palpitations.   Gastrointestinal:  Negative for abdominal pain and vomiting.   Genitourinary:  Negative for dysuria and hematuria.   Musculoskeletal:  Negative for arthralgias and back pain.   Skin:  Negative for color change and rash.   Neurological:  Negative for seizures and syncope.   All other systems reviewed and are negative.    I have reviewed the patient's PMH, PSH, Social History, Family History, Meds, and Allergies    Objective :  Temp:  [98.5 °F (36.9 °C)-99.1 °F (37.3 °C)] 98.5 °F (36.9 °C)  HR:  [] 79  BP: (126-159)/(70-95) 146/83  Resp:  [18-32] 18  SpO2:  [86 %-99 %] 97 %  O2 Device: Nasal cannula  Nasal Cannula O2 Flow Rate (L/min):  [2 L/min-4 L/min] 2 L/min    Physical  Exam  Vitals and nursing note reviewed.   Constitutional:       Appearance: He is not ill-appearing.   HENT:      Head: Normocephalic and atraumatic.      Nose: Nose normal.      Mouth/Throat:      Mouth: Mucous membranes are moist.      Pharynx: Oropharynx is clear.   Eyes:      General: No scleral icterus.     Extraocular Movements: Extraocular movements intact.      Pupils: Pupils are equal, round, and reactive to light.   Cardiovascular:      Rate and Rhythm: Normal rate and regular rhythm.      Pulses: Normal pulses.      Heart sounds: Normal heart sounds.   Pulmonary:      Effort: Pulmonary effort is normal.      Comments: Bibasilar crackles present, more prominent on right than left  Abdominal:      General: Abdomen is flat. Bowel sounds are normal. There is no distension.      Palpations: Abdomen is soft.      Tenderness: There is no abdominal tenderness.   Musculoskeletal:      Right lower leg: No edema.      Left lower leg: No edema.   Neurological:      General: No focal deficit present.      Mental Status: He is alert and oriented to person, place, and time. Mental status is at baseline.   Psychiatric:         Mood and Affect: Mood normal.         Behavior: Behavior normal.           Lab Results: I have reviewed the following results:CBC/BMP:   .     11/25/24  0722   WBC 6.91   HGB 9.9*   HCT 30.5*      SODIUM 135   K 3.6      CO2 28   BUN 10   CREATININE 0.33*   GLUC 82   MG 1.9    , Troponin,BNP:  .     11/24/24 1951 11/24/24  2256   HSTNI0 20  --    HSTNI2  --  17     CMP     Imaging Results Review: I reviewed radiology reports from this admission including: chest xray.

## 2024-11-25 NOTE — HOSPITAL COURSE
Patient is a 59-year-old male with a past medical history of squamous cell carcinoma of the tongue status post chemotherapy and radiation, subsequent pharyngeal esophageal dysphagia, recurrent aspiration pneumonia who presented due to a several day history of self-reported fevers.

## 2024-11-25 NOTE — CONSULTS
If PEG placed s/p GI consult initiate Jevity 1.5 @ 10 ml/hr x initial 24 hours and reconsult for goal TF recs.

## 2024-11-25 NOTE — ASSESSMENT & PLAN NOTE
Presenting for recurrent aspiration pneumonia with symptoms of persistent fever over the past week (temp max of 101-102 F) along with hypoxia as low as 78% as reported by patient's spouse.   He and spouse state he was diagnosed with another episode of aspiration pneumonia on 11/11 while in [place] and was prescribed Augmentin and oral vancomycin. Spouse is unsure of why oral vancomycin was continued as there were no concerns for C. Diff.  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  Recurrent episodes of aspiration pneumonia for which he follows with aspiration precautions.   He was previously intubated with PEG tube placement 06/2023 in [place] secondary to septic shock from aspiration pneumonia. PEG tube removed on 12/18/23.   Has been following with ENT for EGD balloon dilations, which last occurred on 9/27.  Last hospitalized on 11/11 for aspiration pneumonia.  Prior MRSA cultures including more recently on 11/12 negative in addition to urine antigens for atypical patogens. Sputum culture with mixed respiratory jhon.  CXR this admission with evidence of worsening right lower lobe infiltrates suggestive of aspiration.  Currently on baseline oxygen requirements of 2 L NC.      Plan:  IV ceftriaxone 1 g daily  No present need for MRSA or anaerobic coverage  Sputum culture and gram stain  Follow-up blood culture  Aspiration precautions  NPO pending speech and swallow evaluation  Monitor oxygen requirements

## 2024-11-26 ENCOUNTER — ANESTHESIA EVENT (OUTPATIENT)
Dept: ANESTHESIOLOGY | Facility: HOSPITAL | Age: 59
End: 2024-11-26

## 2024-11-26 ENCOUNTER — ANESTHESIA (OUTPATIENT)
Dept: ANESTHESIOLOGY | Facility: HOSPITAL | Age: 59
End: 2024-11-26

## 2024-11-26 LAB
ANION GAP SERPL CALCULATED.3IONS-SCNC: 8 MMOL/L (ref 4–13)
BASOPHILS # BLD AUTO: 0.03 THOUSANDS/ΜL (ref 0–0.1)
BASOPHILS NFR BLD AUTO: 1 % (ref 0–1)
BUN SERPL-MCNC: 7 MG/DL (ref 5–25)
CALCIUM SERPL-MCNC: 8.8 MG/DL (ref 8.4–10.2)
CHLORIDE SERPL-SCNC: 100 MMOL/L (ref 96–108)
CO2 SERPL-SCNC: 30 MMOL/L (ref 21–32)
CREAT SERPL-MCNC: 0.32 MG/DL (ref 0.6–1.3)
EOSINOPHIL # BLD AUTO: 0.15 THOUSAND/ΜL (ref 0–0.61)
EOSINOPHIL NFR BLD AUTO: 3 % (ref 0–6)
ERYTHROCYTE [DISTWIDTH] IN BLOOD BY AUTOMATED COUNT: 13.4 % (ref 11.6–15.1)
GFR SERPL CREATININE-BSD FRML MDRD: 140 ML/MIN/1.73SQ M
GLUCOSE SERPL-MCNC: 76 MG/DL (ref 65–140)
HCT VFR BLD AUTO: 33.1 % (ref 36.5–49.3)
HGB BLD-MCNC: 10.5 G/DL (ref 12–17)
IMM GRANULOCYTES # BLD AUTO: 0.01 THOUSAND/UL (ref 0–0.2)
IMM GRANULOCYTES NFR BLD AUTO: 0 % (ref 0–2)
LYMPHOCYTES # BLD AUTO: 1.1 THOUSANDS/ΜL (ref 0.6–4.47)
LYMPHOCYTES NFR BLD AUTO: 24 % (ref 14–44)
MCH RBC QN AUTO: 28.2 PG (ref 26.8–34.3)
MCHC RBC AUTO-ENTMCNC: 31.7 G/DL (ref 31.4–37.4)
MCV RBC AUTO: 89 FL (ref 82–98)
MONOCYTES # BLD AUTO: 0.53 THOUSAND/ΜL (ref 0.17–1.22)
MONOCYTES NFR BLD AUTO: 12 % (ref 4–12)
NEUTROPHILS # BLD AUTO: 2.76 THOUSANDS/ΜL (ref 1.85–7.62)
NEUTS SEG NFR BLD AUTO: 60 % (ref 43–75)
NRBC BLD AUTO-RTO: 0 /100 WBCS
PLATELET # BLD AUTO: 332 THOUSANDS/UL (ref 149–390)
PMV BLD AUTO: 9.4 FL (ref 8.9–12.7)
POTASSIUM SERPL-SCNC: 3.5 MMOL/L (ref 3.5–5.3)
PROCALCITONIN SERPL-MCNC: 0.24 NG/ML
RBC # BLD AUTO: 3.72 MILLION/UL (ref 3.88–5.62)
SODIUM SERPL-SCNC: 138 MMOL/L (ref 135–147)
WBC # BLD AUTO: 4.58 THOUSAND/UL (ref 4.31–10.16)

## 2024-11-26 PROCEDURE — 80048 BASIC METABOLIC PNL TOTAL CA: CPT

## 2024-11-26 PROCEDURE — 84145 PROCALCITONIN (PCT): CPT

## 2024-11-26 PROCEDURE — 99232 SBSQ HOSP IP/OBS MODERATE 35: CPT | Performed by: INTERNAL MEDICINE

## 2024-11-26 PROCEDURE — 85025 COMPLETE CBC W/AUTO DIFF WBC: CPT

## 2024-11-26 RX ADMIN — FAMOTIDINE 40 MG: 40 POWDER, FOR SUSPENSION ORAL at 22:40

## 2024-11-26 RX ADMIN — AMLODIPINE BESYLATE 5 MG: 5 TABLET ORAL at 09:04

## 2024-11-26 RX ADMIN — AZELASTINE HYDROCHLORIDE 1 SPRAY: 137 SPRAY, METERED NASAL at 09:04

## 2024-11-26 RX ADMIN — AZELASTINE HYDROCHLORIDE 1 SPRAY: 137 SPRAY, METERED NASAL at 18:02

## 2024-11-26 RX ADMIN — LEVOTHYROXINE SODIUM 100 MCG: 100 TABLET ORAL at 05:58

## 2024-11-26 RX ADMIN — CEFTRIAXONE 1000 MG: 2 INJECTION, POWDER, FOR SOLUTION INTRAMUSCULAR; INTRAVENOUS at 04:55

## 2024-11-26 NOTE — PROGRESS NOTES
Progress Note - Hospitalist   Name: Mohamud Grimm Sr. 59 y.o. male I MRN: [id number]  Unit/Bed#: W -01 I Date of Admission: 11/24/2024   Date of Service: 11/26/2024 I Hospital Day: 2    Assessment & Plan  Aspiration pneumonia (HCC)  Presenting for recurrent aspiration pneumonia with symptoms of persistent fever over the past week (temp max of 101-102 F) along with hypoxia as low as 78% as reported by patient's spouse.   He and spouse state he was diagnosed with another episode of aspiration pneumonia on 11/11 while in [place] and was prescribed Augmentin and oral vancomycin. Spouse is unsure of why oral vancomycin was continued as there were no concerns for C. Diff.  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  Recurrent episodes of aspiration pneumonia for which he follows with aspiration precautions.   He was previously intubated with PEG tube placement 06/2023 in [place] secondary to septic shock from aspiration pneumonia. PEG tube removed on 12/18/23.   Has been following with ENT for EGD balloon dilations, which last occurred on 9/27.  Last hospitalized on 11/11 for aspiration pneumonia.  Prior MRSA cultures including more recently on 11/12 negative in addition to urine antigens for atypical patogens. Sputum culture with mixed respiratory jhon.  CXR this admission with evidence of worsening right lower lobe infiltrates suggestive of aspiration.  Currently on baseline oxygen requirements of 2 L NC.      Plan:  IV ceftriaxone 1 g daily  No present need for MRSA or anaerobic coverage  Sputum culture and gram stain  Follow-up blood culture  Aspiration precautions  NPO pending speech and swallow evaluation  Monitor oxygen requirements  Severe swallowing dysfunction  Secondary to septic shock  from aspiration pneumonia in 6/2023, patient had been intubated and was receiving nutrition through PEG tube.  Given recurrence of aspiration pneumonia and spouse's concern for the patient not being able to maintain caloric diet with his dysphagia, outpatient gastroenterology visit was scheduled for December 2nd to discuss the possibility of PEG tube again. Spouse would like for patient to be evaluated sooner by GI.  Patient is agreeable to PEG tube at this time.    Plan:  Inpatient consult to gastroenterology  NPO with sips with meds for possible PEG tube placement tomorrow  Aspiration precautions  Consult nutrition service  Sepsis (HCC)  Meets sepsis criteria on presentation with tachycardia, tachypnea, and source of infection being aspiration pneumonia  No leukocytosis and normal lactic acid  Given 1 L bolus of normal saline, cefepime, vancomycin, and Flagyl in the ED.    Plan:  Management of aspiration pneumonia as above  Continue IV hydration with normal saline at 100 mL/hr  Monitor CBC  Essential hypertension  POA blood pressures stable  Continue amlodipine 5 mg daily  Monitor blood pressures  Oropharyngeal dysphagia      VTE Pharmacologic Prophylaxis: VTE Score: 8 High Risk (Score >/= 5) - Pharmacological DVT Prophylaxis Ordered: enoxaparin (Lovenox). Sequential Compression Devices Ordered.    Mobility:   Basic Mobility Inpatient Raw Score: 24  JH-HLM Goal: 8: Walk 250 feet or more  JH-HLM Achieved: 7: Walk 25 feet or more  JH-HLM Goal achieved. Continue to encourage appropriate mobility.    Patient Centered Rounds: I performed bedside rounds with nursing staff today.   Discussions with Specialists or Other Care Team Provider: Gastroenterology    Education and Discussions with Family / Patient: Updated  (wife) via phone.    Current Length of Stay: 2 day(s)  Current Patient Status: Inpatient   Certification Statement: The patient will continue to require additional inpatient hospital stay due to  treatment of aspiration pneumonia, PEG tube placement  Discharge Plan: Anticipate discharge in 24-48 hrs to home.    Code Status: Level 1 - Full Code    Subjective   Patient seen and evaluated bedside, no overnight events.  He denies fevers, chills, worsening cough or production of sputum. He is currently on his baseline oxygen requirement of 2L. He is pending leave of absence this morning to attend a family .    Objective :  Temp:  [97.5 °F (36.4 °C)-99.3 °F (37.4 °C)] 99.3 °F (37.4 °C)  HR:  [81-82] 82  BP: (111-145)/(67-87) 111/67  Resp:  [17-20] 20  SpO2:  [95 %] 95 %  O2 Device: None (Room air)    Body mass index is 22.16 kg/m².     Input and Output Summary (last 24 hours):     Intake/Output Summary (Last 24 hours) at 2024 1837  Last data filed at 2024 1807  Gross per 24 hour   Intake 1954.17 ml   Output --   Net 1954.17 ml       Physical Exam  Vitals and nursing note reviewed.   Constitutional:       Appearance: He is not ill-appearing.   HENT:      Head: Normocephalic and atraumatic.      Nose: Nose normal.      Mouth/Throat:      Mouth: Mucous membranes are moist.      Pharynx: Oropharynx is clear.   Eyes:      General: No scleral icterus.     Extraocular Movements: Extraocular movements intact.      Pupils: Pupils are equal, round, and reactive to light.   Cardiovascular:      Rate and Rhythm: Normal rate and regular rhythm.      Pulses: Normal pulses.      Heart sounds: Normal heart sounds.   Pulmonary:      Effort: Pulmonary effort is normal.      Comments: Bibasilar crackles present, more prominent on right than left  Abdominal:      General: Abdomen is flat. Bowel sounds are normal. There is no distension.      Palpations: Abdomen is soft.      Tenderness: There is no abdominal tenderness.   Musculoskeletal:      Right lower leg: No edema.      Left lower leg: No edema.   Neurological:      General: No focal deficit present.      Mental Status: He is alert and oriented to person,  place, and time. Mental status is at baseline.   Psychiatric:         Mood and Affect: Mood normal.         Behavior: Behavior normal.       Lines/Drains:              Lab Results: I have reviewed the following results:   Results from last 7 days   Lab Units 11/26/24  0443   WBC Thousand/uL 4.58   HEMOGLOBIN g/dL 10.5*   HEMATOCRIT % 33.1*   PLATELETS Thousands/uL 332   SEGS PCT % 60   LYMPHO PCT % 24   MONO PCT % 12   EOS PCT % 3     Results from last 7 days   Lab Units 11/26/24 0443 11/25/24  0722 11/24/24  1951   SODIUM mmol/L 138   < > 131*   POTASSIUM mmol/L 3.5   < > 4.1   CHLORIDE mmol/L 100   < > 93*   CO2 mmol/L 30   < > 29   BUN mg/dL 7   < > 21   CREATININE mg/dL 0.32*   < > 0.52*   ANION GAP mmol/L 8   < > 9   CALCIUM mg/dL 8.8   < > 9.4   ALBUMIN g/dL  --   --  3.6   TOTAL BILIRUBIN mg/dL  --   --  0.70   ALK PHOS U/L  --   --  136*   ALT U/L  --   --  19   AST U/L  --   --  19   GLUCOSE RANDOM mg/dL 76   < > 166*    < > = values in this interval not displayed.                 Results from last 7 days   Lab Units 11/26/24  0443 11/25/24  0722 11/24/24  2256   LACTIC ACID mmol/L  --   --  0.7   PROCALCITONIN ng/ml 0.24 0.35*  --        Recent Cultures (last 7 days):   Results from last 7 days   Lab Units 11/25/24  1507 11/24/24  2256   BLOOD CULTURE   --  No Growth at 24 hrs.   SPUTUM CULTURE  Culture too young- will reincubate  --    GRAM STAIN RESULT  Rare Epithelial cells per low power field  No Polys or Bacteria seen  --        Last 24 Hours Medication List:     Current Facility-Administered Medications:     acetaminophen (TYLENOL) tablet 650 mg, Q6H PRN    albuterol (PROVENTIL HFA,VENTOLIN HFA) inhaler 1 puff, Q6H PRN    amLODIPine (NORVASC) tablet 5 mg, Daily    atropine (ISOPTO ATROPINE) 1 % ophthalmic solution 2 drop, Once PRN    azelastine (ASTELIN) 0.1 % nasal spray 1 spray, BID    benzonatate (TESSALON PERLES) capsule 200 mg, TID PRN    ceftriaxone (ROCEPHIN) 1 g/50 mL in dextrose IVPB,  Q24H, Last Rate: Stopped (11/26/24 0525)    enoxaparin (LOVENOX) subcutaneous injection 40 mg, Daily    famotidine (PEPCID) oral suspension 40 mg, HS    levothyroxine tablet 100 mcg, Early Morning    multivitamin stress formula tablet 1 tablet, Daily    ondansetron (ZOFRAN) injection 4 mg, Q6H PRN    Administrative Statements   Today, Patient Was Seen By: Casey Delacruz MD    **Please Note: This note may have been constructed using a voice recognition system.**

## 2024-11-26 NOTE — PLAN OF CARE
Problem: Nutrition/Hydration-ADULT  Goal: Nutrient/Hydration intake appropriate for improving, restoring or maintaining nutritional needs  Description: Monitor and assess patient's nutrition/hydration status for malnutrition. Collaborate with interdisciplinary team and initiate plan and interventions as ordered.  Monitor patient's weight and dietary intake as ordered or per policy. Utilize nutrition screening tool and intervene as necessary. Determine patient's food preferences and provide high-protein, high-caloric foods as appropriate.     INTERVENTIONS:  - Monitor oral intake, urinary output, labs, and treatment plans  - Assess nutrition and hydration status and recommend course of action  - Evaluate amount of meals eaten  - Assist patient with eating if necessary   - Allow adequate time for meals  - Recommend/ encourage appropriate diets, oral nutritional supplements, and vitamin/mineral supplements  - Order, calculate, and assess calorie counts as needed  - Recommend, monitor, and adjust tube feedings and TPN/PPN based on assessed needs  - Assess need for intravenous fluids  - Provide specific nutrition/hydration education as appropriate  - Include patient/family/caregiver in decisions related to nutrition  11/25/2024 2329 by Suzi Baker RN  Outcome: Progressing  11/25/2024 2328 by Suzi Baker RN  Outcome: Progressing     Problem: PAIN - ADULT  Goal: Verbalizes/displays adequate comfort level or baseline comfort level  Description: Interventions:  - Encourage patient to monitor pain and request assistance  - Assess pain using appropriate pain scale  - Administer analgesics based on type and severity of pain and evaluate response  - Implement non-pharmacological measures as appropriate and evaluate response  - Consider cultural and social influences on pain and pain management  - Notify physician/advanced practitioner if interventions unsuccessful or patient reports new pain  Outcome: Progressing      Problem: INFECTION - ADULT  Goal: Absence or prevention of progression during hospitalization  Description: INTERVENTIONS:  - Assess and monitor for signs and symptoms of infection  - Monitor lab/diagnostic results  - Monitor all insertion sites, i.e. indwelling lines, tubes, and drains  - Monitor endotracheal if appropriate and nasal secretions for changes in amount and color  - Hilton appropriate cooling/warming therapies per order  - Administer medications as ordered  - Instruct and encourage patient and family to use good hand hygiene technique  - Identify and instruct in appropriate isolation precautions for identified infection/condition  Outcome: Progressing  Goal: Absence of fever/infection during neutropenic period  Description: INTERVENTIONS:  - Monitor WBC    Outcome: Progressing     Problem: SAFETY ADULT  Goal: Patient will remain free of falls  Description: INTERVENTIONS:  - Educate patient/family on patient safety including physical limitations  - Instruct patient to call for assistance with activity   - Consult OT/PT to assist with strengthening/mobility   - Keep Call bell within reach  - Keep bed low and locked with side rails adjusted as appropriate  - Keep care items and personal belongings within reach  - Initiate and maintain comfort rounds  - Make Fall Risk Sign visible to staff  - Offer Toileting every  Hours, in advance of need  - Initiate/Maintain alarm  - Obtain necessary fall risk management equipment:  - Apply yellow socks and bracelet for high fall risk patients  - Consider moving patient to room near nurses station  Outcome: Progressing  Goal: Maintain or return to baseline ADL function  Description: INTERVENTIONS:  -  Assess patient's ability to carry out ADLs; assess patient's baseline for ADL function and identify physical deficits which impact ability to perform ADLs (bathing, care of mouth/teeth, toileting, grooming, dressing, etc.)  - Assess/evaluate cause of self-care  deficits   - Assess range of motion  - Assess patient's mobility; develop plan if impaired  - Assess patient's need for assistive devices and provide as appropriate  - Encourage maximum independence but intervene and supervise when necessary  - Involve family in performance of ADLs  - Assess for home care needs following discharge   - Consider OT consult to assist with ADL evaluation and planning for discharge  - Provide patient education as appropriate  Outcome: Progressing  Goal: Maintains/Returns to pre admission functional level  Description: INTERVENTIONS:  - Perform AM-PAC 6 Click Basic Mobility/ Daily Activity assessment daily.  - Set and communicate daily mobility goal to care team and patient/family/caregiver.   - Collaborate with rehabilitation services on mobility goals if consulted  - Perform Range of Motion  times a day.  - Reposition patient every  hours.  - Dangle patient  times a day  - Stand patient times a day  - Ambulate patient  times a day  - Out of bed to chair times a day   - Out of bed for meals times a day  - Out of bed for toileting  - Record patient progress and toleration of activity level   Outcome: Progressing     Problem: DISCHARGE PLANNING  Goal: Discharge to home or other facility with appropriate resources  Description: INTERVENTIONS:  - Identify barriers to discharge w/patient and caregiver  - Arrange for needed discharge resources and transportation as appropriate  - Identify discharge learning needs (meds, wound care, etc.)  - Arrange for interpretive services to assist at discharge as needed  - Refer to Case Management Department for coordinating discharge planning if the patient needs post-hospital services based on physician/advanced practitioner order or complex needs related to functional status, cognitive ability, or social support system  Outcome: Progressing     Problem: Knowledge Deficit  Goal: Patient/family/caregiver demonstrates understanding of disease process,  treatment plan, medications, and discharge instructions  Description: Complete learning assessment and assess knowledge base.  Interventions:  - Provide teaching at level of understanding  - Provide teaching via preferred learning methods  Outcome: Progressing

## 2024-11-26 NOTE — PROGRESS NOTES
Progress Note - Gastroenterology   Name: Mohamud Grimm Sr. 59 y.o. male I MRN: [id number]  Unit/Bed#: W -01 I Date of Admission: 2024   Date of Service: 2024 I Hospital Day: 2    Assessment & Plan  Oropharyngeal dysphagia  -Past medical history significant for SCC of the tongue s/p chemo/radiation in .  Complicated by partial vocal cord paralysis and recurrent aspiration pneumonia  -VBS has shown minimal airway protection and silent aspiration with all liquid consistencies - diet modification at home has not mitigated aspiration risk  -Has undergone dilation of upper esophageal sphincter with ENT in 2024  -Previously had G tube placed during admission for aspiration pneumonia but was removed in 2023 due to patient resuming oral feeding, also had a G tube 12 years ago when first diagnosed with cancer.  -BMI within normal range and no evidence of protein malnutrition on metabolic panel, family expressing and patient expressing desire for replacement of G tube due to repeated aspiration and recent weight loss and ongoing lack of nutrition  -Patient and wife understand that placement of G tube does not necessarily mitigate the risk of aspiration and express desire to move forward with procedure  -original plan was for attempt at PEG placement today however patient is getting a day pass to attend a family member's   -can consider PEG placement tomorrow  -will confirm nutrition recs with nutritionist, will need bolus tube feeding recs and confirm if there is a concern for refeeding syndrome.   -discussed that a laura-key tube cannot be placed immediately and would need to replace standard tube as an outpatient in at least 6 weeks.     Aspiration pneumonia (HCC)  Likely result of dysphagia as outlined above  Being treated with antibiotics per primary team        Subjective   Patient is feeling well. Still with cough     Objective :  Temp:  [97.5 °F (36.4 °C)-98.5 °F (36.9 °C)]  97.5 °F (36.4 °C)  HR:  [75-82] 82  BP: (126-146)/(78-87) 131/87  Resp:  [17-20] 18  SpO2:  [86 %-97 %] 95 %  O2 Device: None (Room air)  Nasal Cannula O2 Flow Rate (L/min):  [2 L/min] 2 L/min    Physical Exam  Constitutional:       General: He is not in acute distress.     Appearance: Normal appearance. He is well-developed.   HENT:      Head: Normocephalic and atraumatic.      Mouth/Throat:      Mouth: Mucous membranes are moist.   Eyes:      General: No scleral icterus.  Neck:      Trachea: No tracheal deviation.   Cardiovascular:      Rate and Rhythm: Normal rate and regular rhythm.      Heart sounds: Normal heart sounds. No murmur heard.  Pulmonary:      Effort: Pulmonary effort is normal. No respiratory distress.      Breath sounds: Examination of the right-lower field reveals rales. Examination of the left-lower field reveals rales. Rales present. No wheezing.   Chest:      Chest wall: No tenderness.   Abdominal:      General: Bowel sounds are normal. There is no distension.      Palpations: Abdomen is soft. There is no mass.      Tenderness: There is no abdominal tenderness. There is no guarding or rebound.   Musculoskeletal:         General: Normal range of motion.      Cervical back: Normal range of motion and neck supple.   Skin:     General: Skin is warm and dry.      Coloration: Skin is not pale.      Findings: No rash.   Neurological:      Mental Status: He is alert and oriented to person, place, and time. Mental status is at baseline.   Psychiatric:         Mood and Affect: Mood normal.         Behavior: Behavior normal.           Lab Results: I have reviewed the following results:

## 2024-11-26 NOTE — PLAN OF CARE
Problem: Nutrition/Hydration-ADULT  Goal: Nutrient/Hydration intake appropriate for improving, restoring or maintaining nutritional needs  Description: Monitor and assess patient's nutrition/hydration status for malnutrition. Collaborate with interdisciplinary team and initiate plan and interventions as ordered.  Monitor patient's weight and dietary intake as ordered or per policy. Utilize nutrition screening tool and intervene as necessary. Determine patient's food preferences and provide high-protein, high-caloric foods as appropriate.     INTERVENTIONS:  - Monitor oral intake, urinary output, labs, and treatment plans  - Assess nutrition and hydration status and recommend course of action  - Evaluate amount of meals eaten  - Assist patient with eating if necessary   - Allow adequate time for meals  - Recommend/ encourage appropriate diets, oral nutritional supplements, and vitamin/mineral supplements  - Order, calculate, and assess calorie counts as needed  - Recommend, monitor, and adjust tube feedings and TPN/PPN based on assessed needs  - Assess need for intravenous fluids  - Provide specific nutrition/hydration education as appropriate  - Include patient/family/caregiver in decisions related to nutrition  Outcome: Progressing      DISCHARGE

## 2024-11-26 NOTE — ASSESSMENT & PLAN NOTE
Secondary to septic shock from aspiration pneumonia in 6/2023, patient had been intubated and was receiving nutrition through PEG tube.  Given recurrence of aspiration pneumonia and spouse's concern for the patient not being able to maintain caloric diet with his dysphagia, outpatient gastroenterology visit was scheduled for December 2nd to discuss the possibility of PEG tube again. Spouse would like for patient to be evaluated sooner by GI.  Patient is agreeable to PEG tube at this time.    Plan:  Inpatient consult to gastroenterology  NPO with sips with meds for possible PEG tube placement tomorrow  Aspiration precautions  Consult nutrition service

## 2024-11-26 NOTE — ANESTHESIA PREPROCEDURE EVALUATION
Procedure:  PRE-OP ONLY    Relevant Problems   CARDIO   (+) Essential hypertension      ENDO   (+) Hypothyroidism      GI/HEPATIC   (+) Gastroesophageal reflux disease   (+) Oropharyngeal dysphagia   (+) Pharyngoesophageal dysphagia      MUSCULOSKELETAL   (+) Blepharospasm of both eyes   (+) Velopharyngeal insufficiency, acquired      NEURO/PSYCH   (+) Paresthesias      PULMONARY   (+) Aspiration pneumonia (HCC)   (+) Chronic hypoxemic respiratory failure (HCC)   (+) ERIK (obstructive sleep apnea)   (+) Obstructive sleep apnea syndrome   (+) Shortness of breath        Physical Exam    Airway    Mallampati score: II  TM Distance: >3 FB  Neck ROM: full     Dental   No notable dental hx     Cardiovascular  Cardiovascular exam normal    Pulmonary  Pulmonary exam normal     Other Findings    Hx difficult intubation      History of Present Illness  HPI:  Mohamud Grimm Sr. is a 59 y.o. male who presents with fevers and chills over the past week.  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  He has had a G tube placed 2023 after an admission for aspiration pneumonia requiring intubation and ventilation but it was removed later in the year due to resumed oral feeding.  Other medical hx includes HTN, ERIK, and hypothyroidism.  Patient and his wife express concern for aspiration events occurring more frequently and malnutrition as he has reportedly lost weight in the last couple weeks.      Anesthesia Plan  ASA Score- 3     Anesthesia Type- IV sedation with anesthesia with ASA Monitors.         Additional Monitors:     Airway Plan:            Plan Factors-Exercise tolerance (METS): >4 METS.    Chart reviewed.  Imaging results reviewed. Existing labs reviewed. Patient summary reviewed.    Patient is not a current smoker.      There is medical exclusion for perioperative obstructive sleep apnea risk  education.        Induction- intravenous.    Postoperative Plan-     Perioperative Resuscitation Plan - Level 1 - Full Code.       Informed Consent-

## 2024-11-26 NOTE — ASSESSMENT & PLAN NOTE
-Past medical history significant for SCC of the tongue s/p chemo/radiation in .  Complicated by partial vocal cord paralysis and recurrent aspiration pneumonia  -VBS has shown minimal airway protection and silent aspiration with all liquid consistencies - diet modification at home has not mitigated aspiration risk  -Has undergone dilation of upper esophageal sphincter with ENT in 2024  -Previously had G tube placed during admission for aspiration pneumonia but was removed in 2023 due to patient resuming oral feeding, also had a G tube 12 years ago when first diagnosed with cancer.  -BMI within normal range and no evidence of protein malnutrition on metabolic panel, family expressing and patient expressing desire for replacement of G tube due to repeated aspiration and recent weight loss and ongoing lack of nutrition  -Patient and wife understand that placement of G tube does not necessarily mitigate the risk of aspiration and express desire to move forward with procedure  -original plan was for attempt at PEG placement today however patient is getting a day pass to attend a family member's   -can consider PEG placement tomorrow  -will confirm nutrition recs with nutritionist, will need bolus tube feeding recs and confirm if there is a concern for refeeding syndrome.   -discussed that a laura-key tube cannot be placed immediately and would need to replace standard tube as an outpatient in at least 6 weeks.

## 2024-11-27 ENCOUNTER — ANESTHESIA EVENT (INPATIENT)
Dept: GASTROENTEROLOGY | Facility: HOSPITAL | Age: 59
DRG: 871 | End: 2024-11-27
Payer: COMMERCIAL

## 2024-11-27 ENCOUNTER — ANESTHESIA (INPATIENT)
Dept: GASTROENTEROLOGY | Facility: HOSPITAL | Age: 59
DRG: 871 | End: 2024-11-27
Payer: COMMERCIAL

## 2024-11-27 ENCOUNTER — TELEPHONE (OUTPATIENT)
Dept: GASTROENTEROLOGY | Facility: AMBULARY SURGERY CENTER | Age: 59
End: 2024-11-27

## 2024-11-27 ENCOUNTER — APPOINTMENT (INPATIENT)
Dept: GASTROENTEROLOGY | Facility: HOSPITAL | Age: 59
DRG: 871 | End: 2024-11-27
Payer: COMMERCIAL

## 2024-11-27 PROBLEM — E43 SEVERE PROTEIN-CALORIE MALNUTRITION (HCC): Status: ACTIVE | Noted: 2024-11-27

## 2024-11-27 LAB
ANION GAP SERPL CALCULATED.3IONS-SCNC: 6 MMOL/L (ref 4–13)
BACTERIA SPT RESP CULT: ABNORMAL
BACTERIA SPT RESP CULT: ABNORMAL
BUN SERPL-MCNC: 5 MG/DL (ref 5–25)
CALCIUM SERPL-MCNC: 8.6 MG/DL (ref 8.4–10.2)
CHLORIDE SERPL-SCNC: 98 MMOL/L (ref 96–108)
CO2 SERPL-SCNC: 33 MMOL/L (ref 21–32)
CREAT SERPL-MCNC: 0.42 MG/DL (ref 0.6–1.3)
DME PARACHUTE DELIVERY DATE ACTUAL: NORMAL
DME PARACHUTE DELIVERY DATE EXPECTED: NORMAL
DME PARACHUTE DELIVERY DATE REQUESTED: NORMAL
DME PARACHUTE ITEM DESCRIPTION: NORMAL
DME PARACHUTE ORDER STATUS: NORMAL
DME PARACHUTE SUPPLIER NAME: NORMAL
DME PARACHUTE SUPPLIER PHONE: NORMAL
ERYTHROCYTE [DISTWIDTH] IN BLOOD BY AUTOMATED COUNT: 13.5 % (ref 11.6–15.1)
GFR SERPL CREATININE-BSD FRML MDRD: 125 ML/MIN/1.73SQ M
GLUCOSE SERPL-MCNC: 84 MG/DL (ref 65–140)
GRAM STN SPEC: ABNORMAL
GRAM STN SPEC: ABNORMAL
HCT VFR BLD AUTO: 36 % (ref 36.5–49.3)
HGB BLD-MCNC: 11.6 G/DL (ref 12–17)
MCH RBC QN AUTO: 28.5 PG (ref 26.8–34.3)
MCHC RBC AUTO-ENTMCNC: 32.2 G/DL (ref 31.4–37.4)
MCV RBC AUTO: 89 FL (ref 82–98)
PLATELET # BLD AUTO: 371 THOUSANDS/UL (ref 149–390)
PMV BLD AUTO: 9.1 FL (ref 8.9–12.7)
POTASSIUM SERPL-SCNC: 3.6 MMOL/L (ref 3.5–5.3)
RBC # BLD AUTO: 4.07 MILLION/UL (ref 3.88–5.62)
SODIUM SERPL-SCNC: 137 MMOL/L (ref 135–147)
WBC # BLD AUTO: 6.14 THOUSAND/UL (ref 4.31–10.16)

## 2024-11-27 PROCEDURE — 80048 BASIC METABOLIC PNL TOTAL CA: CPT

## 2024-11-27 PROCEDURE — 0DH63UZ INSERTION OF FEEDING DEVICE INTO STOMACH, PERCUTANEOUS APPROACH: ICD-10-PCS | Performed by: INTERNAL MEDICINE

## 2024-11-27 PROCEDURE — 99232 SBSQ HOSP IP/OBS MODERATE 35: CPT | Performed by: INTERNAL MEDICINE

## 2024-11-27 PROCEDURE — 43246 EGD PLACE GASTROSTOMY TUBE: CPT | Performed by: INTERNAL MEDICINE

## 2024-11-27 PROCEDURE — 85027 COMPLETE CBC AUTOMATED: CPT

## 2024-11-27 RX ORDER — LIDOCAINE HYDROCHLORIDE 10 MG/ML
INJECTION, SOLUTION EPIDURAL; INFILTRATION; INTRACAUDAL; PERINEURAL AS NEEDED
Status: DISCONTINUED | OUTPATIENT
Start: 2024-11-27 | End: 2024-11-27

## 2024-11-27 RX ORDER — PROPOFOL 10 MG/ML
INJECTION, EMULSION INTRAVENOUS CONTINUOUS PRN
Status: DISCONTINUED | OUTPATIENT
Start: 2024-11-27 | End: 2024-11-27

## 2024-11-27 RX ORDER — SODIUM CHLORIDE, SODIUM LACTATE, POTASSIUM CHLORIDE, CALCIUM CHLORIDE 600; 310; 30; 20 MG/100ML; MG/100ML; MG/100ML; MG/100ML
INJECTION, SOLUTION INTRAVENOUS CONTINUOUS PRN
Status: DISCONTINUED | OUTPATIENT
Start: 2024-11-27 | End: 2024-11-27

## 2024-11-27 RX ORDER — SODIUM CHLORIDE, SODIUM GLUCONATE, SODIUM ACETATE, POTASSIUM CHLORIDE, MAGNESIUM CHLORIDE, SODIUM PHOSPHATE, DIBASIC, AND POTASSIUM PHOSPHATE .53; .5; .37; .037; .03; .012; .00082 G/100ML; G/100ML; G/100ML; G/100ML; G/100ML; G/100ML; G/100ML
100 INJECTION, SOLUTION INTRAVENOUS CONTINUOUS
Status: DISCONTINUED | OUTPATIENT
Start: 2024-11-27 | End: 2024-11-28

## 2024-11-27 RX ORDER — PROPOFOL 10 MG/ML
INJECTION, EMULSION INTRAVENOUS AS NEEDED
Status: DISCONTINUED | OUTPATIENT
Start: 2024-11-27 | End: 2024-11-27

## 2024-11-27 RX ORDER — GUAIFENESIN 600 MG/1
600 TABLET, EXTENDED RELEASE ORAL EVERY 12 HOURS SCHEDULED
Status: DISCONTINUED | OUTPATIENT
Start: 2024-11-27 | End: 2024-11-28 | Stop reason: HOSPADM

## 2024-11-27 RX ADMIN — SODIUM CHLORIDE, SODIUM LACTATE, POTASSIUM CHLORIDE, AND CALCIUM CHLORIDE: .6; .31; .03; .02 INJECTION, SOLUTION INTRAVENOUS at 13:12

## 2024-11-27 RX ADMIN — CEFTRIAXONE 1000 MG: 2 INJECTION, POWDER, FOR SOLUTION INTRAMUSCULAR; INTRAVENOUS at 05:11

## 2024-11-27 RX ADMIN — PROPOFOL 100 MCG/KG/MIN: 10 INJECTION, EMULSION INTRAVENOUS at 14:01

## 2024-11-27 RX ADMIN — SODIUM CHLORIDE, SODIUM GLUCONATE, SODIUM ACETATE, POTASSIUM CHLORIDE, MAGNESIUM CHLORIDE, SODIUM PHOSPHATE, DIBASIC, AND POTASSIUM PHOSPHATE 100 ML/HR: .53; .5; .37; .037; .03; .012; .00082 INJECTION, SOLUTION INTRAVENOUS at 16:33

## 2024-11-27 RX ADMIN — AZELASTINE HYDROCHLORIDE 1 SPRAY: 137 SPRAY, METERED NASAL at 10:06

## 2024-11-27 RX ADMIN — LIDOCAINE HYDROCHLORIDE 80 MG: 10 INJECTION, SOLUTION EPIDURAL; INFILTRATION; INTRACAUDAL; PERINEURAL at 13:58

## 2024-11-27 RX ADMIN — PROPOFOL 80 MG: 10 INJECTION, EMULSION INTRAVENOUS at 13:59

## 2024-11-27 NOTE — UTILIZATION REVIEW
Continued Stay Review    Date: 11/27/24                          Current Patient Class: Inpatient  Current Level of Care: Med Surg    HPI:59 y.o. male initially admitted on 11/24     Assessment/Plan: oropharyngeal dysphagia secondary to chemoradiation/surgery for cancer of tongue. Admitted due to aspiration pneumonia and being treated with IV antibiotics. Patient is scheduled for PEG feeding tube placement today.     Medications:   Scheduled Medications:  amLODIPine, 5 mg, Oral, Daily  azelastine, 1 spray, Each Nare, BID  cefTRIAXone, 1,000 mg, Intravenous, Q24H  [Held by provider] enoxaparin, 40 mg, Subcutaneous, Daily  famotidine, 40 mg, Oral, HS  levothyroxine, 100 mcg, Oral, Early Morning  multivitamin stress formula, 1 tablet, Oral, Daily      Continuous IV Infusions:     PRN Meds:  acetaminophen, 650 mg, Oral, Q6H PRN  albuterol, 1 puff, Inhalation, Q6H PRN  atropine, 2 drop, Sublingual, Once PRN  benzonatate, 200 mg, Oral, TID PRN  ondansetron, 4 mg, Intravenous, Q6H PRN      Discharge Plan: TBD    Vital Signs (last 3 days)       Date/Time Temp Pulse Resp BP MAP (mmHg) SpO2 Calculated FIO2 (%) - Nasal Cannula Nasal Cannula O2 Flow Rate (L/min) O2 Device Patient Position - Orthostatic VS Pain    11/27/24 1229 -- -- -- 185/104 -- -- -- -- -- -- --    11/27/24 1225 98 °F (36.7 °C) 85 18 -- -- 98 % -- -- -- -- No Pain    11/27/24 0900 -- -- -- -- -- -- -- -- -- -- No Pain    11/27/24 07:50:08 97.9 °F (36.6 °C) 80 18 113/82 92 87 % -- -- -- -- --    11/27/24 0000 -- -- -- -- -- -- -- -- -- -- No Pain    11/26/24 23:06:45 98.8 °F (37.1 °C) 84 16 154/99 117 96 % 28 2 L/min Nasal cannula Lying --    11/26/24 1533 99.3 °F (37.4 °C) -- 20 -- -- -- -- -- None (Room air) Lying --    11/26/24 15:24:34 99.3 °F (37.4 °C) -- 20 111/67 82 -- -- -- -- -- --    11/26/24 0900 -- -- -- -- -- -- -- -- -- -- No Pain    11/26/24 07:47:30 97.5 °F (36.4 °C) 82 18 131/87 102 95 % -- -- -- -- --    11/25/24 23:36:10 97.6 °F (36.4 °C) 81  17 145/82 103 95 % -- -- -- -- --    11/25/24 2100 -- -- -- -- -- -- -- -- -- -- No Pain    11/25/24 15:51:27 98.4 °F (36.9 °C) 77 18 144/78 100 96 % -- -- None (Room air) -- No Pain    11/25/24 1304 -- -- -- -- -- 97 %  28 2 L/min  Nasal cannula  -- --    11/25/24 1300 -- 79 -- -- -- 86 %  -- -- None (Room air)  -- --    11/25/24 1230 98.5 °F (36.9 °C) 75 18 146/83 -- 96 % -- -- None (Room air) Sitting --    11/25/24 1136 -- 76 20 -- -- 94 % -- -- -- -- --    11/25/24 1030 -- 80 -- -- -- 96 % 28 2 L/min -- -- --    11/25/24 1011 -- -- -- 126/82 -- -- -- -- -- -- --    11/25/24 0745 -- 68 -- 135/79 103 99 % -- -- -- -- --    11/25/24 0645 -- 79 22 159/88 114 96 % 36 4 L/min Nasal cannula -- No Pain    11/25/24 0515 -- 81 22 128/70 93 97 % 36 4 L/min Nasal cannula Sitting No Pain    11/24/24 2330 -- 78 22 144/80 106 98 % 28 2 L/min Nasal cannula -- --    11/24/24 2321 -- 79 -- 154/81 112 98 % -- -- -- -- --    11/24/24 1944 99.1 °F (37.3 °C) 111 32 150/95 117 93 % 28 2 L/min Nasal cannula -- --          Weight (last 2 days)       Date/Time Weight    11/25/24 1521 66.1 (145.72)    11/25/24 0515 66.1 (145.72)            Pertinent Labs/Diagnostic Results:   Radiology:  XR chest portable   Final Interpretation by Scotty Agustin MD (11/25 0867)   Redemonstration of right greater than left basilar patchy opacities and trace effusions again concerning for infiltrates including aspiration pneumonitis.            Workstation performed: [id number]         XR chest 1 view portable   ED Interpretation by Alex Payne DO (11/24 3097)   Bilateral pneumonia suspicious for aspiration      Final Interpretation by Scotty Agustin MD (11/25 0106)   Small bibasilar pleural effusions with right greater than left patchy airspace opacities concerning for infiltrates including aspiration pneumonitis.      Findings concur with ED results as provided in PACS viewer/EPIC at the time of interpretation.               Workstation  performed: [id number]           Cardiology:  No orders to display     GI:  No orders to display           Results from last 7 days   Lab Units 11/27/24  0511 11/26/24 0443 11/25/24 0722 11/24/24 1951   WBC Thousand/uL 6.14 4.58 6.91 9.85   HEMOGLOBIN g/dL 11.6* 10.5* 9.9* 12.1   HEMATOCRIT % 36.0* 33.1* 30.5* 36.8   PLATELETS Thousands/uL 371 332 328 451*   TOTAL NEUT ABS Thousands/µL  --  2.76  --  8.24*         Results from last 7 days   Lab Units 11/27/24 0511 11/26/24 0443 11/25/24 0722 11/24/24 1951   SODIUM mmol/L 137 138 135 131*   POTASSIUM mmol/L 3.6 3.5 3.6 4.1   CHLORIDE mmol/L 98 100 101 93*   CO2 mmol/L 33* 30 28 29   ANION GAP mmol/L 6 8 6 9   BUN mg/dL 5 7 10 21   CREATININE mg/dL 0.42* 0.32* 0.33* 0.52*   EGFR ml/min/1.73sq m 125 140 138 115   CALCIUM mg/dL 8.6 8.8 8.6 9.4   MAGNESIUM mg/dL  --   --  1.9  --      Results from last 7 days   Lab Units 11/24/24 1951   AST U/L 19   ALT U/L 19   ALK PHOS U/L 136*   TOTAL PROTEIN g/dL 8.2   ALBUMIN g/dL 3.6   TOTAL BILIRUBIN mg/dL 0.70         Results from last 7 days   Lab Units 11/27/24  0511 11/26/24 0443 11/25/24 0722 11/24/24 1951   GLUCOSE RANDOM mg/dL 84 76 82 166*           Results from last 7 days   Lab Units 11/25/24  0127 11/24/24 2256 11/24/24 1951   HS TNI 0HR ng/L  --   --  20   HS TNI 2HR ng/L  --  17  --    HSTNI D2 ng/L  --  -3  --    HS TNI 4HR ng/L 17  --   --    HSTNI D4 ng/L -3  --   --                  Results from last 7 days   Lab Units 11/26/24  0443 11/25/24  0722   PROCALCITONIN ng/ml 0.24 0.35*     Results from last 7 days   Lab Units 11/24/24  2256   LACTIC ACID mmol/L 0.7           Results from last 7 days   Lab Units 11/25/24  1507 11/24/24  2256   BLOOD CULTURE   --  No Growth at 48 hrs.   SPUTUM CULTURE  2+ Growth of Candida albicans*  1+ Growth of  --    GRAM STAIN RESULT  Rare Epithelial cells per low power field  No Polys or Bacteria seen  --          Network Utilization Review Department  ATTENTION:  Please call with any questions or concerns to [phone] and carefully listen to the prompts so that you are directed to the right person. All voicemails are confidential.   For Discharge needs, contact Care Management DC Support Team at [phone] opt. [phone]  Send all requests for admission clinical reviews, approved or denied determinations and any other requests to dedicated fax number below belonging to the campus where the patient is receiving treatment. List of dedicated fax numbers for the Facilities:  FACILITY NAME UR FAX NUMBER   ADMISSION DENIALS (Administrative/Medical Necessity) [phone]   DISCHARGE SUPPORT TEAM (NETWORK) [phone]   PARENT CHILD HEALTH (Maternity/NICU/Pediatrics) [phone]   [facility] [phone]   [facility] [phone]   [facility] [phone]   [facility] [phone]   [facility] [phone]   [facility] [phone]   [facility] [phone]   [facility] [phone]   [facility] [phone]   [facility] [phone]   [facility] [phone]   [facility] [phone]

## 2024-11-27 NOTE — PROGRESS NOTES
Progress Note - Hospitalist   Name: Mohamud Grimm Sr. 59 y.o. male I MRN: [id number]  Unit/Bed#: [facility] 409-01 I Date of Admission: 11/24/2024   Date of Service: 11/27/2024 I Hospital Day: 3    Assessment & Plan  Aspiration pneumonia (HCC)  Presenting for recurrent aspiration pneumonia with symptoms of persistent fever over the past week (temp max of 101-102 F) along with hypoxia as low as 78% as reported by patient's spouse.   He and spouse state he was diagnosed with another episode of aspiration pneumonia on 11/11 while in [place] and was prescribed Augmentin and oral vancomycin. Spouse is unsure of why oral vancomycin was continued as there were no concerns for C. Diff.  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  Recurrent episodes of aspiration pneumonia for which he follows with aspiration precautions.   He was previously intubated with PEG tube placement 06/2023 in [place] secondary to septic shock from aspiration pneumonia. PEG tube removed on 12/18/23.   Has been following with ENT for EGD balloon dilations, which last occurred on 9/27.  Last hospitalized on 11/11 for aspiration pneumonia.  Prior MRSA cultures including more recently on 11/12 negative in addition to urine antigens for atypical patogens. Sputum culture with mixed respiratory jhon.  CXR this admission with evidence of worsening right lower lobe infiltrates suggestive of aspiration.  Currently on baseline oxygen requirements of 2 L NC.  Sputum cultures to young will reincubate    Plan:  Day 3 of IV ceftriaxone 1 g daily   No present need for MRSA or anaerobic coverage  Sputum culture and gram stain  Follow-up blood culture  Aspiration precautions  On dysphagia 1 diet  Monitor oxygen requirements  Severe swallowing  dysfunction  Secondary to septic shock from aspiration pneumonia in 6/2023, patient had been intubated and was receiving nutrition through PEG tube.  Given recurrence of aspiration pneumonia and spouse's concern for the patient not being able to maintain caloric diet with his dysphagia, outpatient gastroenterology visit was scheduled for December 2nd to discuss the possibility of PEG tube again. Spouse would like for patient to be evaluated sooner by GI.  Patient is agreeable to PEG tube at this time.    Plan:  Inpatient consult to gastroenterology  NPO for PEG tube placement today  Holding lovenox  Aspiration precautions  Consult nutrition service  Sepsis (HCC)  Meets sepsis criteria on presentation with tachycardia, tachypnea, and source of infection being aspiration pneumonia  No leukocytosis and normal lactic acid  Given 1 L bolus of normal saline, cefepime, vancomycin, and Flagyl in the ED.    Plan:  Management of aspiration pneumonia as above  Monitor CBC  Essential hypertension  POA blood pressures stable  Continue amlodipine 5 mg daily  Monitor blood pressures  Oropharyngeal dysphagia    Severe protein-calorie malnutrition (HCC)  Malnutrition Findings:   Adult Malnutrition type: Chronic illness  Adult Degree of Malnutrition: Other severe protein calorie malnutrition  Malnutrition Characteristics: Muscle loss, Weight loss                  360 Statement: Protein calorie malnutrition r/t catabolic illness as evidenced by an unintentional 12% weight loss over 6 months, and muscle wasting present to clavicle; currently treated with PEG tube placement + initiation of TF.    BMI Findings:           Body mass index is 22.16 kg/m².       VTE Pharmacologic Prophylaxis: VTE Score: 8 High Risk (Score >/= 5) - Pharmacological DVT Prophylaxis Contraindicated. Sequential Compression Devices Ordered.    Mobility:   Basic Mobility Inpatient Raw Score: 24  JH-HLM Goal: 8: Walk 250 feet or more  JH-HLM Achieved: 8: Walk 250  feet ot more  JH-HLM Goal achieved. Continue to encourage appropriate mobility.    Patient Centered Rounds: I performed bedside rounds with nursing staff today.   Discussions with Specialists or Other Care Team Provider: Gastroenterology    Education and Discussions with Family / Patient: Updated  (wife) via phone.    Current Length of Stay: 3 day(s)  Current Patient Status: Inpatient   Certification Statement: The patient will continue to require additional inpatient hospital stay due to PEG tube placement  Discharge Plan: Anticipate discharge tomorrow to home.    Code Status: Level 1 - Full Code    Subjective   Patient seen and evaluated bedside.  He denies any overnight events.  He denies any fevers, chills, worsening cough, or sputum production.  He denies any abdominal pain nausea or vomiting.  Is scheduled for PEG tube placement later today.  Denies any active complaints.    Objective :  Temp:  [97.9 °F (36.6 °C)-99.3 °F (37.4 °C)] 98 °F (36.7 °C)  HR:  [80-85] 85  BP: (111-185)/() 185/104  Resp:  [16-20] 18  SpO2:  [87 %-98 %] 98 %  O2 Device: EtCO2 mask  Nasal Cannula O2 Flow Rate (L/min):  [2 L/min] 2 L/min    Body mass index is 22.16 kg/m².     Input and Output Summary (last 24 hours):     Intake/Output Summary (Last 24 hours) at 11/27/2024 1344  Last data filed at 11/27/2024 0900  Gross per 24 hour   Intake 420 ml   Output --   Net 420 ml       Physical Exam  Vitals and nursing note reviewed.   Constitutional:       Appearance: He is not ill-appearing.   HENT:      Head: Normocephalic and atraumatic.      Nose: Nose normal.      Mouth/Throat:      Mouth: Mucous membranes are moist.      Pharynx: Oropharynx is clear.   Eyes:      General: No scleral icterus.     Extraocular Movements: Extraocular movements intact.      Pupils: Pupils are equal, round, and reactive to light.   Cardiovascular:      Rate and Rhythm: Normal rate and regular rhythm.      Pulses: Normal pulses.      Heart  sounds: Normal heart sounds.   Pulmonary:      Effort: Pulmonary effort is normal.      Comments: Bibasilar crackles present, more prominent on right than left  Abdominal:      General: Abdomen is flat. Bowel sounds are normal. There is no distension.      Palpations: Abdomen is soft.      Tenderness: There is no abdominal tenderness.   Musculoskeletal:      Right lower leg: No edema.      Left lower leg: No edema.   Neurological:      General: No focal deficit present.      Mental Status: He is alert and oriented to person, place, and time. Mental status is at baseline.   Psychiatric:         Mood and Affect: Mood normal.         Behavior: Behavior normal.         Lines/Drains:              Lab Results: I have reviewed the following results:   Results from last 7 days   Lab Units 11/27/24  0511 11/26/24 0443   WBC Thousand/uL 6.14 4.58   HEMOGLOBIN g/dL 11.6* 10.5*   HEMATOCRIT % 36.0* 33.1*   PLATELETS Thousands/uL 371 332   SEGS PCT %  --  60   LYMPHO PCT %  --  24   MONO PCT %  --  12   EOS PCT %  --  3     Results from last 7 days   Lab Units 11/27/24  0511 11/25/24 0722 11/24/24  1951   SODIUM mmol/L 137   < > 131*   POTASSIUM mmol/L 3.6   < > 4.1   CHLORIDE mmol/L 98   < > 93*   CO2 mmol/L 33*   < > 29   BUN mg/dL 5   < > 21   CREATININE mg/dL 0.42*   < > 0.52*   ANION GAP mmol/L 6   < > 9   CALCIUM mg/dL 8.6   < > 9.4   ALBUMIN g/dL  --   --  3.6   TOTAL BILIRUBIN mg/dL  --   --  0.70   ALK PHOS U/L  --   --  136*   ALT U/L  --   --  19   AST U/L  --   --  19   GLUCOSE RANDOM mg/dL 84   < > 166*    < > = values in this interval not displayed.                 Results from last 7 days   Lab Units 11/26/24  0443 11/25/24  0722 11/24/24 2256   LACTIC ACID mmol/L  --   --  0.7   PROCALCITONIN ng/ml 0.24 0.35*  --        Recent Cultures (last 7 days):   Results from last 7 days   Lab Units 11/25/24  1507 11/24/24 2256   BLOOD CULTURE   --  No Growth at 48 hrs.   SPUTUM CULTURE  2+ Growth of Candida albicans*   1+ Growth of  --    GRAM STAIN RESULT  Rare Epithelial cells per low power field  No Polys or Bacteria seen  --          Last 24 Hours Medication List:     Current Facility-Administered Medications:     acetaminophen (TYLENOL) tablet 650 mg, Q6H PRN    albuterol (PROVENTIL HFA,VENTOLIN HFA) inhaler 1 puff, Q6H PRN    amLODIPine (NORVASC) tablet 5 mg, Daily    atropine (ISOPTO ATROPINE) 1 % ophthalmic solution 2 drop, Once PRN    azelastine (ASTELIN) 0.1 % nasal spray 1 spray, BID    benzonatate (TESSALON PERLES) capsule 200 mg, TID PRN    ceftriaxone (ROCEPHIN) 1 g/50 mL in dextrose IVPB, Q24H, Last Rate: 1,000 mg (11/27/24 0511)    [Held by provider] enoxaparin (LOVENOX) subcutaneous injection 40 mg, Daily    famotidine (PEPCID) oral suspension 40 mg, HS    levothyroxine tablet 100 mcg, Early Morning    multivitamin stress formula tablet 1 tablet, Daily    ondansetron (ZOFRAN) injection 4 mg, Q6H PRN    Facility-Administered Medications Ordered in Other Encounters:     lactated ringers infusion, Continuous PRN    Administrative Statements   Today, Patient Was Seen By: Casey Delacruz MD    **Please Note: This note may have been constructed using a voice recognition system.**

## 2024-11-27 NOTE — SPEECH THERAPY NOTE
Speech Language/Pathology    Speech/Language Pathology Cancel Note    Patient Name: Mohamud Grimm Sr.  Today's Date: 11/27/2024     Attempted to see pt for dysphagia tx. Pt currently NPO status pending PEG tube placement today. Will f/u as appropriate.      Mecca Young MS, CCC-SLP  Speech Pathologist  Available via Epic Chat

## 2024-11-27 NOTE — ASSESSMENT & PLAN NOTE
Meets sepsis criteria on presentation with tachycardia, tachypnea, and source of infection being aspiration pneumonia  No leukocytosis and normal lactic acid  Given 1 L bolus of normal saline, cefepime, vancomycin, and Flagyl in the ED.    Plan:  Management of aspiration pneumonia as above  Monitor CBC

## 2024-11-27 NOTE — NUTRITION
Once PEG tube is placed and okay to use recommend initiating bolus feeds of Jevity 1.5, 120 ml bolus given X first two feeds with 90 ml water flushes pre and post bolus. If tolerating increase to goal bolus amount of 237 ml after that. Oral diet per SLP as tolerated.       Goal TF recs for discharge: Bolus feeds of Jevity 1.5, 1 carton (237 ml) given 6 times per day. Water flushes of 90 ml before and after TF administered. Total volume 1422 ml, 2133 total kcals, 91 g protein, and 2161 ml total water from formula + flushes.

## 2024-11-27 NOTE — PLAN OF CARE
Problem: Nutrition/Hydration-ADULT  Goal: Nutrient/Hydration intake appropriate for improving, restoring or maintaining nutritional needs  Description: Monitor and assess patient's nutrition/hydration status for malnutrition. Collaborate with interdisciplinary team and initiate plan and interventions as ordered.  Monitor patient's weight and dietary intake as ordered or per policy. Utilize nutrition screening tool and intervene as necessary. Determine patient's food preferences and provide high-protein, high-caloric foods as appropriate.     INTERVENTIONS:  - Monitor oral intake, urinary output, labs, and treatment plans  - Assess nutrition and hydration status and recommend course of action  - Evaluate amount of meals eaten  - Assist patient with eating if necessary   - Allow adequate time for meals  - Recommend/ encourage appropriate diets, oral nutritional supplements, and vitamin/mineral supplements  - Order, calculate, and assess calorie counts as needed  - Recommend, monitor, and adjust tube feedings and TPN/PPN based on assessed needs  - Assess need for intravenous fluids  - Provide specific nutrition/hydration education as appropriate  - Include patient/family/caregiver in decisions related to nutrition  Outcome: Progressing     Problem: PAIN - ADULT  Goal: Verbalizes/displays adequate comfort level or baseline comfort level  Description: Interventions:  - Encourage patient to monitor pain and request assistance  - Assess pain using appropriate pain scale  - Administer analgesics based on type and severity of pain and evaluate response  - Implement non-pharmacological measures as appropriate and evaluate response  - Consider cultural and social influences on pain and pain management  - Notify physician/advanced practitioner if interventions unsuccessful or patient reports new pain  Outcome: Progressing     Problem: INFECTION - ADULT  Goal: Absence or prevention of progression during  hospitalization  Description: INTERVENTIONS:  - Assess and monitor for signs and symptoms of infection  - Monitor lab/diagnostic results  - Monitor all insertion sites, i.e. indwelling lines, tubes, and drains  - Monitor endotracheal if appropriate and nasal secretions for changes in amount and color  - [place] appropriate cooling/warming therapies per order  - Administer medications as ordered  - Instruct and encourage patient and family to use good hand hygiene technique  - Identify and instruct in appropriate isolation precautions for identified infection/condition  Outcome: Progressing  Goal: Absence of fever/infection during neutropenic period  Description: INTERVENTIONS:  - Monitor WBC    Outcome: Progressing     Problem: SAFETY ADULT  Goal: Patient will remain free of falls  Description: INTERVENTIONS:  - Educate patient/family on patient safety including physical limitations  - Instruct patient to call for assistance with activity   - Consult OT/PT to assist with strengthening/mobility   - Keep Call bell within reach  - Keep bed low and locked with side rails adjusted as appropriate  - Keep care items and personal belongings within reach  - Initiate and maintain comfort rounds  - Make Fall Risk Sign visible to staff  - Offer Toileting every  Hours, in advance of need  - Initiate/Maintain alarm  - Obtain necessary fall risk management equipment:  - Apply yellow socks and bracelet for high fall risk patients  - Consider moving patient to room near nurses station  Outcome: Progressing  Goal: Maintain or return to baseline ADL function  Description: INTERVENTIONS:  -  Assess patient's ability to carry out ADLs; assess patient's baseline for ADL function and identify physical deficits which impact ability to perform ADLs (bathing, care of mouth/teeth, toileting, grooming, dressing, etc.)  - Assess/evaluate cause of self-care deficits   - Assess range of motion  - Assess patient's mobility; develop plan if  impaired  - Assess patient's need for assistive devices and provide as appropriate  - Encourage maximum independence but intervene and supervise when necessary  - Involve family in performance of ADLs  - Assess for home care needs following discharge   - Consider OT consult to assist with ADL evaluation and planning for discharge  - Provide patient education as appropriate  Outcome: Progressing  Goal: Maintains/Returns to pre admission functional level  Description: INTERVENTIONS:  - Perform AM-PAC 6 Click Basic Mobility/ Daily Activity assessment daily.  - Set and communicate daily mobility goal to care team and patient/family/caregiver.   - Collaborate with rehabilitation services on mobility goals if consulted  - Perform Range of Motion  times a day.  - Reposition patient every  hours.  - Dangle patient  times a day  - Stand patient times a day  - Ambulate patient  times a day  - Out of bed to chair times a day   - Out of bed for meals times a day  - Out of bed for toileting  - Record patient progress and toleration of activity level   Outcome: Progressing     Problem: DISCHARGE PLANNING  Goal: Discharge to home or other facility with appropriate resources  Description: INTERVENTIONS:  - Identify barriers to discharge w/patient and caregiver  - Arrange for needed discharge resources and transportation as appropriate  - Identify discharge learning needs (meds, wound care, etc.)  - Arrange for interpretive services to assist at discharge as needed  - Refer to Case Management Department for coordinating discharge planning if the patient needs post-hospital services based on physician/advanced practitioner order or complex needs related to functional status, cognitive ability, or social support system  Outcome: Progressing     Problem: Knowledge Deficit  Goal: Patient/family/caregiver demonstrates understanding of disease process, treatment plan, medications, and discharge instructions  Description: Complete learning  assessment and assess knowledge base.  Interventions:  - Provide teaching at level of understanding  - Provide teaching via preferred learning methods  Outcome: Progressing

## 2024-11-27 NOTE — ASSESSMENT & PLAN NOTE
Secondary to septic shock from aspiration pneumonia in 6/2023, patient had been intubated and was receiving nutrition through PEG tube.  Given recurrence of aspiration pneumonia and spouse's concern for the patient not being able to maintain caloric diet with his dysphagia, outpatient gastroenterology visit was scheduled for December 2nd to discuss the possibility of PEG tube again. Spouse would like for patient to be evaluated sooner by GI.  Patient is agreeable to PEG tube at this time.    Plan:  Inpatient consult to gastroenterology  NPO for PEG tube placement today  Holding lovenox  Aspiration precautions  Consult nutrition service

## 2024-11-27 NOTE — ANESTHESIA PREPROCEDURE EVALUATION
Procedure:  EGD    Relevant Problems   CARDIO   (+) Essential hypertension      ENDO   (+) Hypothyroidism      GI/HEPATIC   (+) Gastroesophageal reflux disease   (+) Oropharyngeal dysphagia   (+) Pharyngoesophageal dysphagia      MUSCULOSKELETAL   (+) Blepharospasm of both eyes   (+) Velopharyngeal insufficiency, acquired      NEURO/PSYCH   (+) Paresthesias      PULMONARY   (+) Aspiration pneumonia (HCC)   (+) Chronic hypoxemic respiratory failure (HCC)   (+) ERIK (obstructive sleep apnea)   (+) Obstructive sleep apnea syndrome   (+) Shortness of breath        Physical Exam    Airway    Mallampati score: II  TM Distance: >3 FB  Neck ROM: full     Dental   No notable dental hx     Cardiovascular  Cardiovascular exam normal    Pulmonary  Pulmonary exam normal     Other Findings    Hx difficult intubation      History of Present Illness  HPI:  Mohamud Grmim Sr. is a 59 y.o. male who presents with fevers and chills over the past week.  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  He has had a G tube placed 2023 after an admission for aspiration pneumonia requiring intubation and ventilation but it was removed later in the year due to resumed oral feeding.  Other medical hx includes HTN, ERIK, and hypothyroidism.  Patient and his wife express concern for aspiration events occurring more frequently and malnutrition as he has reportedly lost weight in the last couple weeks.      Anesthesia Plan  ASA Score- 3     Anesthesia Type- IV sedation with anesthesia with ASA Monitors.         Additional Monitors:     Airway Plan:            Plan Factors-Exercise tolerance (METS): >4 METS.    Chart reviewed.  Imaging results reviewed. Existing labs reviewed. Patient summary reviewed.    Patient is not a current smoker.      There is medical exclusion for perioperative obstructive sleep apnea risk  education.        Induction- intravenous.    Postoperative Plan-     Perioperative Resuscitation Plan - Level 1 - Full Code.       Informed Consent- Anesthetic plan and risks discussed with patient.  I personally reviewed this patient with the CRNA. Discussed and agreed on the Anesthesia Plan with the CRNA..

## 2024-11-27 NOTE — ANESTHESIA POSTPROCEDURE EVALUATION
Post-Op Assessment Note    Last Filed PACU Vitals:  Vitals Value Taken Time   Temp 97.4 °F (36.3 °C) 11/27/24 1432   Pulse 81 11/27/24 1452   /91 11/27/24 1452   Resp 18 11/27/24 1452   SpO2 96 % 11/27/24 1452       Modified Adarsh:  Activity: 2 (11/27/2024  2:52 PM)  Respiration: 2 (11/27/2024  2:52 PM)  Circulation: 2 (11/27/2024  2:52 PM)  Consciousness: 1 (11/27/2024  2:52 PM)  Oxygen Saturation: 2 (11/27/2024  2:52 PM)  Modified Adarsh Score: 9 (11/27/2024  2:52 PM)

## 2024-11-27 NOTE — ANESTHESIA POSTPROCEDURE EVALUATION
Post-Op Assessment Note    CV Status:  Stable    Pain management: adequate       Mental Status:  Alert and awake   Hydration Status:  Euvolemic   PONV Controlled:  Controlled   Airway Patency:  Patent     Post Op Vitals Reviewed: Yes    No anethesia notable event occurred.            Last Filed PACU Vitals:  Vitals Value Taken Time   Temp     Pulse 87    /91    Resp 16    SpO2 97        Modified Adarsh:  No data recorded

## 2024-11-27 NOTE — ASSESSMENT & PLAN NOTE
Malnutrition Findings:   Adult Malnutrition type: Chronic illness  Adult Degree of Malnutrition: Other severe protein calorie malnutrition  Malnutrition Characteristics: Muscle loss, Weight loss                  360 Statement: Protein calorie malnutrition r/t catabolic illness as evidenced by an unintentional 12% weight loss over 6 months, and muscle wasting present to clavicle; currently treated with PEG tube placement + initiation of TF.    BMI Findings:           Body mass index is 22.16 kg/m².

## 2024-11-27 NOTE — MALNUTRITION/BMI
This medical record reflects one or more clinical indicators suggestive of malnutrition and/or morbid obesity.    Malnutrition Findings:   Adult Malnutrition type: Chronic illness  Adult Degree of Malnutrition: Other severe protein calorie malnutrition  Malnutrition Characteristics: Muscle loss, Weight loss              360 Statement: Protein calorie malnutrition r/t catabolic illness as evidenced by an unintentional 12% weight loss over 6 months, and muscle wasting present to clavicle; currently treated with PEG tube placement + initiation of TF.    BMI Findings:           Body mass index is 22.16 kg/m².     See Nutrition note dated 11/27/2024  for additional details.  Completed nutrition assessment is viewable in the nutrition documentation.

## 2024-11-27 NOTE — QUICK NOTE
Saw patient, satting 97% on 2 L, reports cough is stable and at baseline. Denies shortness of breath. Discussed with TWIN as well.     Will tentatively plan for PEG placement today, he is aware he needs to be monitored overnight.  Nutrition will put in recs for bolus feeding.     Discussed with case management about getting supplies for patient, he does have 60 cc syringes at home and does not need visiting nurse as his wife is a nurse and this will be his 3rd PEG tube.      Can replace PEG with Marshal-key in 6 weeks as outpatient.

## 2024-11-27 NOTE — ASSESSMENT & PLAN NOTE
Presenting for recurrent aspiration pneumonia with symptoms of persistent fever over the past week (temp max of 101-102 F) along with hypoxia as low as 78% as reported by patient's spouse.   He and spouse state he was diagnosed with another episode of aspiration pneumonia on 11/11 while in [place] and was prescribed Augmentin and oral vancomycin. Spouse is unsure of why oral vancomycin was continued as there were no concerns for C. Diff.  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  Recurrent episodes of aspiration pneumonia for which he follows with aspiration precautions.   He was previously intubated with PEG tube placement 06/2023 in [place] secondary to septic shock from aspiration pneumonia. PEG tube removed on 12/18/23.   Has been following with ENT for EGD balloon dilations, which last occurred on 9/27.  Last hospitalized on 11/11 for aspiration pneumonia.  Prior MRSA cultures including more recently on 11/12 negative in addition to urine antigens for atypical patogens. Sputum culture with mixed respiratory jhon.  CXR this admission with evidence of worsening right lower lobe infiltrates suggestive of aspiration.  Currently on baseline oxygen requirements of 2 L NC.  Sputum cultures to young will reincubate    Plan:  Day 3 of IV ceftriaxone 1 g daily   No present need for MRSA or anaerobic coverage  Sputum culture and gram stain  Follow-up blood culture  Aspiration precautions  On dysphagia 1 diet  Monitor oxygen requirements

## 2024-11-28 VITALS
BODY MASS INDEX: 22.09 KG/M2 | RESPIRATION RATE: 16 BRPM | OXYGEN SATURATION: 90 % | SYSTOLIC BLOOD PRESSURE: 176 MMHG | HEIGHT: 68 IN | HEART RATE: 87 BPM | WEIGHT: 145.72 LBS | TEMPERATURE: 98.5 F | DIASTOLIC BLOOD PRESSURE: 108 MMHG

## 2024-11-28 LAB
ANION GAP SERPL CALCULATED.3IONS-SCNC: 11 MMOL/L (ref 4–13)
BASOPHILS # BLD AUTO: 0.03 THOUSANDS/ΜL (ref 0–0.1)
BASOPHILS NFR BLD AUTO: 1 % (ref 0–1)
BUN SERPL-MCNC: 10 MG/DL (ref 5–25)
CALCIUM SERPL-MCNC: 8.4 MG/DL (ref 8.4–10.2)
CHLORIDE SERPL-SCNC: 98 MMOL/L (ref 96–108)
CO2 SERPL-SCNC: 29 MMOL/L (ref 21–32)
CREAT SERPL-MCNC: 0.33 MG/DL (ref 0.6–1.3)
EOSINOPHIL # BLD AUTO: 0.11 THOUSAND/ΜL (ref 0–0.61)
EOSINOPHIL NFR BLD AUTO: 3 % (ref 0–6)
ERYTHROCYTE [DISTWIDTH] IN BLOOD BY AUTOMATED COUNT: 13.2 % (ref 11.6–15.1)
GFR SERPL CREATININE-BSD FRML MDRD: 138 ML/MIN/1.73SQ M
GLUCOSE SERPL-MCNC: 56 MG/DL (ref 65–140)
HCT VFR BLD AUTO: 38.9 % (ref 36.5–49.3)
HGB BLD-MCNC: 12.3 G/DL (ref 12–17)
IMM GRANULOCYTES # BLD AUTO: 0.01 THOUSAND/UL (ref 0–0.2)
IMM GRANULOCYTES NFR BLD AUTO: 0 % (ref 0–2)
LYMPHOCYTES # BLD AUTO: 1.26 THOUSANDS/ΜL (ref 0.6–4.47)
LYMPHOCYTES NFR BLD AUTO: 35 % (ref 14–44)
MCH RBC QN AUTO: 28 PG (ref 26.8–34.3)
MCHC RBC AUTO-ENTMCNC: 31.6 G/DL (ref 31.4–37.4)
MCV RBC AUTO: 89 FL (ref 82–98)
MONOCYTES # BLD AUTO: 0.39 THOUSAND/ΜL (ref 0.17–1.22)
MONOCYTES NFR BLD AUTO: 11 % (ref 4–12)
NEUTROPHILS # BLD AUTO: 1.85 THOUSANDS/ΜL (ref 1.85–7.62)
NEUTS SEG NFR BLD AUTO: 50 % (ref 43–75)
NRBC BLD AUTO-RTO: 0 /100 WBCS
PLATELET # BLD AUTO: 358 THOUSANDS/UL (ref 149–390)
PMV BLD AUTO: 9 FL (ref 8.9–12.7)
POTASSIUM SERPL-SCNC: 3.6 MMOL/L (ref 3.5–5.3)
RBC # BLD AUTO: 4.39 MILLION/UL (ref 3.88–5.62)
SODIUM SERPL-SCNC: 138 MMOL/L (ref 135–147)
WBC # BLD AUTO: 3.65 THOUSAND/UL (ref 4.31–10.16)

## 2024-11-28 PROCEDURE — 92526 ORAL FUNCTION THERAPY: CPT | Performed by: SPEECH-LANGUAGE PATHOLOGIST

## 2024-11-28 PROCEDURE — 80048 BASIC METABOLIC PNL TOTAL CA: CPT

## 2024-11-28 PROCEDURE — 99239 HOSP IP/OBS DSCHRG MGMT >30: CPT | Performed by: INTERNAL MEDICINE

## 2024-11-28 PROCEDURE — 99232 SBSQ HOSP IP/OBS MODERATE 35: CPT | Performed by: INTERNAL MEDICINE

## 2024-11-28 PROCEDURE — 85025 COMPLETE CBC W/AUTO DIFF WBC: CPT

## 2024-11-28 RX ORDER — DEXTROSE MONOHYDRATE AND SODIUM CHLORIDE 5; .9 G/100ML; G/100ML
100 INJECTION, SOLUTION INTRAVENOUS CONTINUOUS
Status: DISCONTINUED | OUTPATIENT
Start: 2024-11-28 | End: 2024-11-28 | Stop reason: HOSPADM

## 2024-11-28 RX ORDER — CEFDINIR 300 MG/1
300 CAPSULE ORAL EVERY 12 HOURS SCHEDULED
Qty: 2 CAPSULE | Refills: 0 | Status: SHIPPED | OUTPATIENT
Start: 2024-11-29 | End: 2024-11-30

## 2024-11-28 RX ORDER — GUAIFENESIN 600 MG/1
600 TABLET, EXTENDED RELEASE ORAL EVERY 12 HOURS SCHEDULED
Qty: 14 TABLET | Refills: 0 | Status: SHIPPED | OUTPATIENT
Start: 2024-11-28 | End: 2024-12-05

## 2024-11-28 RX ORDER — DEXTROSE MONOHYDRATE 25 G/50ML
25 INJECTION, SOLUTION INTRAVENOUS ONCE
Status: COMPLETED | OUTPATIENT
Start: 2024-11-28 | End: 2024-11-28

## 2024-11-28 RX ADMIN — CEFTRIAXONE 1000 MG: 2 INJECTION, POWDER, FOR SOLUTION INTRAMUSCULAR; INTRAVENOUS at 04:17

## 2024-11-28 RX ADMIN — SODIUM CHLORIDE, SODIUM GLUCONATE, SODIUM ACETATE, POTASSIUM CHLORIDE, MAGNESIUM CHLORIDE, SODIUM PHOSPHATE, DIBASIC, AND POTASSIUM PHOSPHATE 100 ML/HR: .53; .5; .37; .037; .03; .012; .00082 INJECTION, SOLUTION INTRAVENOUS at 04:17

## 2024-11-28 RX ADMIN — DEXTROSE MONOHYDRATE 25 ML: 25 INJECTION, SOLUTION INTRAVENOUS at 08:01

## 2024-11-28 RX ADMIN — DEXTROSE AND SODIUM CHLORIDE 100 ML/HR: 5; .9 INJECTION, SOLUTION INTRAVENOUS at 08:08

## 2024-11-28 NOTE — ASSESSMENT & PLAN NOTE
--Status post PEG placement yesterday for dysphagia  -Bumper was at 2.5 cm, loosened to 3.5 cm, PEG is freely rotatable  -Will start regular diet by mouth today  -Patient to return in 6 weeks for transition to Eliseo PEG tube  -Care instructions to be provided by visiting nurse  -From GI standpoint patient can be discharged home

## 2024-11-28 NOTE — PROGRESS NOTES
Progress Note - Gastroenterology   Name: Mohamud Grimm Sr. 59 y.o. male I MRN: [id number]  Unit/Bed#: W -01 I Date of Admission: 11/24/2024   Date of Service: 11/28/2024 I Hospital Day: 4      59-year-old gent with a remote history of head neck cancer status post radiation therapy, vocal cord paralysis, aspiration pneumonia, on dysphagia 1 diet and thin liquids malnutrition, weight loss PEG placed yesterday.  Assessment & Plan  Oropharyngeal dysphagia  --Status post PEG placement yesterday for dysphagia  -Bumper was at 2.5 cm, loosened to 3.5 cm, PEG is freely rotatable  -Will start regular diet by mouth today  -Patient to return in 6 weeks for transition to Eliseo PEG tube  -Care instructions to be provided by visiting nurse  -From GI standpoint patient can be discharged home    Aspiration pneumonia (HCC)  Likely result of dysphagia as outlined above  Being treated with antibiotics per primary team  Severe protein-calorie malnutrition (HCC)  Malnutrition Findings:   -PEG tube placed for additional supplemental feeds        Body mass index is 22.16 kg/m².           Subjective   Patient doing well overnight, no complications after PEG placement.    Objective :  Temp:  [97.4 °F (36.3 °C)-98.1 °F (36.7 °C)] 98 °F (36.7 °C)  HR:  [69-93] 73  BP: (115-185)/() 115/76  Resp:  [14-18] 16  SpO2:  [89 %-98 %] 93 %  O2 Device: None (Room air)  Nasal Cannula O2 Flow Rate (L/min):  [2 L/min-6 L/min] 2 L/min    Physical Exam  GEN: WN/WD, no acute distress  HEENT: anicteric, MMM, no cervical lymphadenopathy, scarring in the left lateral neck  CV: RRR, no m/r/g  CHEST: CTA b/l, no WRR  ABD: +BS, soft NT/ND, no hepatosplenomegaly, PEG tube identified, nontender, no bleeding.  The bumper was at 2.5, loosened to 3.5cm  and freely otatable.  EXT: no C/C/E  NEURO: AAOx3  SKIN: no rashes      Lab Results: I have reviewed the following results:

## 2024-11-28 NOTE — PLAN OF CARE
Problem: Nutrition/Hydration-ADULT  Goal: Nutrient/Hydration intake appropriate for improving, restoring or maintaining nutritional needs  Description: Monitor and assess patient's nutrition/hydration status for malnutrition. Collaborate with interdisciplinary team and initiate plan and interventions as ordered.  Monitor patient's weight and dietary intake as ordered or per policy. Utilize nutrition screening tool and intervene as necessary. Determine patient's food preferences and provide high-protein, high-caloric foods as appropriate.     INTERVENTIONS:  - Monitor oral intake, urinary output, labs, and treatment plans  - Assess nutrition and hydration status and recommend course of action  - Evaluate amount of meals eaten  - Assist patient with eating if necessary   - Allow adequate time for meals  - Recommend/ encourage appropriate diets, oral nutritional supplements, and vitamin/mineral supplements  - Order, calculate, and assess calorie counts as needed  - Recommend, monitor, and adjust tube feedings and TPN/PPN based on assessed needs  - Assess need for intravenous fluids  - Provide specific nutrition/hydration education as appropriate  - Include patient/family/caregiver in decisions related to nutrition  11/28/2024 1258 by Michelle Fernández RN  Outcome: Adequate for Discharge  11/28/2024 1115 by Michelle Fernández RN  Outcome: Progressing     Problem: PAIN - ADULT  Goal: Verbalizes/displays adequate comfort level or baseline comfort level  Description: Interventions:  - Encourage patient to monitor pain and request assistance  - Assess pain using appropriate pain scale  - Administer analgesics based on type and severity of pain and evaluate response  - Implement non-pharmacological measures as appropriate and evaluate response  - Consider cultural and social influences on pain and pain management  - Notify physician/advanced practitioner if interventions unsuccessful or patient reports new pain  11/28/2024  1258 by Michelle Fernández RN  Outcome: Adequate for Discharge  11/28/2024 1115 by Michelle Fernández RN  Outcome: Progressing     Problem: INFECTION - ADULT  Goal: Absence or prevention of progression during hospitalization  Description: INTERVENTIONS:  - Assess and monitor for signs and symptoms of infection  - Monitor lab/diagnostic results  - Monitor all insertion sites, i.e. indwelling lines, tubes, and drains  - Monitor endotracheal if appropriate and nasal secretions for changes in amount and color  - [place] appropriate cooling/warming therapies per order  - Administer medications as ordered  - Instruct and encourage patient and family to use good hand hygiene technique  - Identify and instruct in appropriate isolation precautions for identified infection/condition  11/28/2024 1258 by Michelle Fernández RN  Outcome: Adequate for Discharge  11/28/2024 1115 by Michelle Fernández RN  Outcome: Progressing  Goal: Absence of fever/infection during neutropenic period  Description: INTERVENTIONS:  - Monitor WBC    11/28/2024 1258 by Michelle Fernández RN  Outcome: Adequate for Discharge  11/28/2024 1115 by Michelle Fernández RN  Outcome: Progressing     Problem: SAFETY ADULT  Goal: Patient will remain free of falls  Description: INTERVENTIONS:  - Educate patient/family on patient safety including physical limitations  - Instruct patient to call for assistance with activity   - Consult OT/PT to assist with strengthening/mobility   - Keep Call bell within reach  - Keep bed low and locked with side rails adjusted as appropriate  - Keep care items and personal belongings within reach  - Initiate and maintain comfort rounds  - Make Fall Risk Sign visible to staff  - Offer Toileting every  Hours, in advance of need  - Initiate/Maintain alarm  - Obtain necessary fall risk management equipment:  - Apply yellow socks and bracelet for high fall risk patients  - Consider moving patient to room near nurses station  11/28/2024 1258 by  Michelle Fernández RN  Outcome: Adequate for Discharge  11/28/2024 1115 by Michelle Fernández RN  Outcome: Progressing  Goal: Maintain or return to baseline ADL function  Description: INTERVENTIONS:  -  Assess patient's ability to carry out ADLs; assess patient's baseline for ADL function and identify physical deficits which impact ability to perform ADLs (bathing, care of mouth/teeth, toileting, grooming, dressing, etc.)  - Assess/evaluate cause of self-care deficits   - Assess range of motion  - Assess patient's mobility; develop plan if impaired  - Assess patient's need for assistive devices and provide as appropriate  - Encourage maximum independence but intervene and supervise when necessary  - Involve family in performance of ADLs  - Assess for home care needs following discharge   - Consider OT consult to assist with ADL evaluation and planning for discharge  - Provide patient education as appropriate  11/28/2024 1258 by Michelle Fernández RN  Outcome: Adequate for Discharge  11/28/2024 1115 by Michelle Fernández RN  Outcome: Progressing  Goal: Maintains/Returns to pre admission functional level  Description: INTERVENTIONS:  - Perform AM-PAC 6 Click Basic Mobility/ Daily Activity assessment daily.  - Set and communicate daily mobility goal to care team and patient/family/caregiver.   - Collaborate with rehabilitation services on mobility goals if consulted  - Perform Range of Motion  times a day.  - Reposition patient every  hours.  - Dangle patient  times a day  - Stand patient times a day  - Ambulate patient  times a day  - Out of bed to chair times a day   - Out of bed for meals times a day  - Out of bed for toileting  - Record patient progress and toleration of activity level   11/28/2024 1258 by Michelle Fernández RN  Outcome: Adequate for Discharge  11/28/2024 1115 by Michelle Fernández RN  Outcome: Progressing     Problem: DISCHARGE PLANNING  Goal: Discharge to home or other facility with appropriate  resources  Description: INTERVENTIONS:  - Identify barriers to discharge w/patient and caregiver  - Arrange for needed discharge resources and transportation as appropriate  - Identify discharge learning needs (meds, wound care, etc.)  - Arrange for interpretive services to assist at discharge as needed  - Refer to Case Management Department for coordinating discharge planning if the patient needs post-hospital services based on physician/advanced practitioner order or complex needs related to functional status, cognitive ability, or social support system  11/28/2024 1258 by Michelle Fernández RN  Outcome: Adequate for Discharge  11/28/2024 1115 by Michelle Fernández RN  Outcome: Progressing     Problem: Knowledge Deficit  Goal: Patient/family/caregiver demonstrates understanding of disease process, treatment plan, medications, and discharge instructions  Description: Complete learning assessment and assess knowledge base.  Interventions:  - Provide teaching at level of understanding  - Provide teaching via preferred learning methods  11/28/2024 1258 by Michelle Fernández RN  Outcome: Adequate for Discharge  11/28/2024 1115 by Michelle Fernández RN  Outcome: Progressing

## 2024-11-28 NOTE — ASSESSMENT & PLAN NOTE
Meets sepsis criteria on presentation with tachycardia, tachypnea, and source of infection being aspiration pneumonia  No leukocytosis and normal lactic acid  Given 1 L bolus of normal saline, cefepime, vancomycin, and Flagyl in the ED.  Resolved after admission

## 2024-11-28 NOTE — SPEECH THERAPY NOTE
Speech Language/Pathology    Speech/Language Pathology Progress Note    Patient Name: Mohamud Grimm Sr.  Today's Date: 11/28/2024     Subjective:  Patient s/p PEG placement. He reports he plans to continue with somewhat modified diet at home.    Objective:  Patient seen upright in bed with lunch tray. He consumed pureed macaroni and cheese with thin liquids.  He continues to present w/ adequate bolus retrieval and anterior containment. Bolus formation and transfer were adequate appearing- no oral retention. Swallow initiation was somewhat effortful- min delayed? With reduced hyolaryngeal excursion. Immediate and delayed intermittent coughing noted across po. Likely known retention/airway invasion. Somewhat less with L head turn/chin tuck.     Reviewed results of prior VBS and A&P of the swallow as well as risks and recommendations. Reciprocal comprehension was verbally expressed.     Assessment:  Patient continues with significant risk for aspiration across po ( known baseline) PEG placed to assist in nutrition intake. He understands risks of ongoing po intake as well as strategies for reducing risk.     Plan/Recommendations:  Clinically patient should be NPO but choosing to have some po  Safest would to puree/thin with strategies: tsps and use of moisteners with solids as well as EITHER L head turn or chin tuck    Sheree Muse M.S., CCC-SLP  Speech Language Pathologist   Available via Secure Chat  [place] #[id number]  [place] #[id number]

## 2024-11-28 NOTE — PLAN OF CARE
Problem: Nutrition/Hydration-ADULT  Goal: Nutrient/Hydration intake appropriate for improving, restoring or maintaining nutritional needs  Description: Monitor and assess patient's nutrition/hydration status for malnutrition. Collaborate with interdisciplinary team and initiate plan and interventions as ordered.  Monitor patient's weight and dietary intake as ordered or per policy. Utilize nutrition screening tool and intervene as necessary. Determine patient's food preferences and provide high-protein, high-caloric foods as appropriate.     INTERVENTIONS:  - Monitor oral intake, urinary output, labs, and treatment plans  - Assess nutrition and hydration status and recommend course of action  - Evaluate amount of meals eaten  - Assist patient with eating if necessary   - Allow adequate time for meals  - Recommend/ encourage appropriate diets, oral nutritional supplements, and vitamin/mineral supplements  - Order, calculate, and assess calorie counts as needed  - Recommend, monitor, and adjust tube feedings and TPN/PPN based on assessed needs  - Assess need for intravenous fluids  - Provide specific nutrition/hydration education as appropriate  - Include patient/family/caregiver in decisions related to nutrition  Outcome: Progressing     Problem: PAIN - ADULT  Goal: Verbalizes/displays adequate comfort level or baseline comfort level  Description: Interventions:  - Encourage patient to monitor pain and request assistance  - Assess pain using appropriate pain scale  - Administer analgesics based on type and severity of pain and evaluate response  - Implement non-pharmacological measures as appropriate and evaluate response  - Consider cultural and social influences on pain and pain management  - Notify physician/advanced practitioner if interventions unsuccessful or patient reports new pain  Outcome: Progressing     Problem: INFECTION - ADULT  Goal: Absence or prevention of progression during  hospitalization  Description: INTERVENTIONS:  - Assess and monitor for signs and symptoms of infection  - Monitor lab/diagnostic results  - Monitor all insertion sites, i.e. indwelling lines, tubes, and drains  - Monitor endotracheal if appropriate and nasal secretions for changes in amount and color  - [place] appropriate cooling/warming therapies per order  - Administer medications as ordered  - Instruct and encourage patient and family to use good hand hygiene technique  - Identify and instruct in appropriate isolation precautions for identified infection/condition  Outcome: Progressing  Goal: Absence of fever/infection during neutropenic period  Description: INTERVENTIONS:  - Monitor WBC    Outcome: Progressing     Problem: SAFETY ADULT  Goal: Patient will remain free of falls  Description: INTERVENTIONS:  - Educate patient/family on patient safety including physical limitations  - Instruct patient to call for assistance with activity   - Consult OT/PT to assist with strengthening/mobility   - Keep Call bell within reach  - Keep bed low and locked with side rails adjusted as appropriate  - Keep care items and personal belongings within reach  - Initiate and maintain comfort rounds  - Make Fall Risk Sign visible to staff  - Offer Toileting every  Hours, in advance of need  - Initiate/Maintain alarm  - Obtain necessary fall risk management equipment:  - Apply yellow socks and bracelet for high fall risk patients  - Consider moving patient to room near nurses station  Outcome: Progressing  Goal: Maintain or return to baseline ADL function  Description: INTERVENTIONS:  -  Assess patient's ability to carry out ADLs; assess patient's baseline for ADL function and identify physical deficits which impact ability to perform ADLs (bathing, care of mouth/teeth, toileting, grooming, dressing, etc.)  - Assess/evaluate cause of self-care deficits   - Assess range of motion  - Assess patient's mobility; develop plan if  impaired  - Assess patient's need for assistive devices and provide as appropriate  - Encourage maximum independence but intervene and supervise when necessary  - Involve family in performance of ADLs  - Assess for home care needs following discharge   - Consider OT consult to assist with ADL evaluation and planning for discharge  - Provide patient education as appropriate  Outcome: Progressing  Goal: Maintains/Returns to pre admission functional level  Description: INTERVENTIONS:  - Perform AM-PAC 6 Click Basic Mobility/ Daily Activity assessment daily.  - Set and communicate daily mobility goal to care team and patient/family/caregiver.   - Collaborate with rehabilitation services on mobility goals if consulted  - Perform Range of Motion  times a day.  - Reposition patient every  hours.  - Dangle patient  times a day  - Stand patient times a day  - Ambulate patient  times a day  - Out of bed to chair times a day   - Out of bed for meals times a day  - Out of bed for toileting  - Record patient progress and toleration of activity level   Outcome: Progressing     Problem: DISCHARGE PLANNING  Goal: Discharge to home or other facility with appropriate resources  Description: INTERVENTIONS:  - Identify barriers to discharge w/patient and caregiver  - Arrange for needed discharge resources and transportation as appropriate  - Identify discharge learning needs (meds, wound care, etc.)  - Arrange for interpretive services to assist at discharge as needed  - Refer to Case Management Department for coordinating discharge planning if the patient needs post-hospital services based on physician/advanced practitioner order or complex needs related to functional status, cognitive ability, or social support system  Outcome: Progressing     Problem: Knowledge Deficit  Goal: Patient/family/caregiver demonstrates understanding of disease process, treatment plan, medications, and discharge instructions  Description: Complete learning  assessment and assess knowledge base.  Interventions:  - Provide teaching at level of understanding  - Provide teaching via preferred learning methods  Outcome: Progressing

## 2024-11-28 NOTE — ASSESSMENT & PLAN NOTE
Presenting for recurrent aspiration pneumonia with symptoms of persistent fever over the past week (temp max of 101-102 F) along with hypoxia as low as 78% as reported by patient's spouse.   He and spouse state he was diagnosed with another episode of aspiration pneumonia on 11/11 while in [place] and was prescribed Augmentin and oral vancomycin. Spouse is unsure of why oral vancomycin was continued as there were no concerns for C. Diff.  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  Recurrent episodes of aspiration pneumonia for which he follows with aspiration precautions.   He was previously intubated with PEG tube placement 06/2023 in [place] secondary to septic shock from aspiration pneumonia. PEG tube removed on 12/18/23.   Has been following with ENT for EGD balloon dilations, which last occurred on 9/27.  Last hospitalized on 11/11 for aspiration pneumonia.  Prior MRSA cultures including more recently on 11/12 negative in addition to urine antigens for atypical patogens. Sputum culture with mixed respiratory jhon.  CXR this admission with evidence of worsening right lower lobe infiltrates suggestive of aspiration.  Currently on baseline oxygen requirements of 2 L NC.  Sputum cultures to young will reincubate    Plan:  Continue cefdinir 300 mg every 12 hours 1 more day to complete 5-day course of antibiotic  Aspiration precautions  Follow-up with PCP in 1 week  Continue 2 L nasal cannula nightly

## 2024-11-28 NOTE — ASSESSMENT & PLAN NOTE
Malnutrition Findings:   -PEG tube placed for additional supplemental feeds        Body mass index is 22.16 kg/m².

## 2024-11-28 NOTE — DISCHARGE INSTR - AVS FIRST PAGE
Dear Mohamud Grimm Sr.,     It was our pleasure to care for you here at [facility].  It is our hope that we were always able to exceed the expected standards for your care during your stay.  You were hospitalized due to aspiration pneumonia.  You were cared for on the 4th floor by Casey Delacruz MD under the service of Bonnie Stafford MD with the [facility] who covers for your primary care physician (PCP), Raphael Lopez MD, while you were hospitalized.  If you have any questions or concerns related to this hospitalization, you may contact us at .  For follow up as well as any medication refills, we recommend that you follow up with your primary care physician.  A registered nurse will reach out to you by phone within a few days after your discharge to answer any additional questions that you may have after going home.  However, at this time we provide for you here, the most important instructions / recommendations at discharge:     Notable Medication Adjustments -   You may take Mucinex 600 mg every 12 hours as needed for excess mucus.  Please take cefdinir 300 mg one tablet every 12 hours starting tomorrow morning for 2 doses.    Testing Required after Discharge -   None  ** Please contact your PCP to request testing orders for any of the testing recommended here **  Important follow up information -   Please follow-up with your primary care provider within 1 week of discharge.  Plan to return in 6 weeks for transition to Eliseo PEG tube.  If you do not receive a call to make an appointment within the next 10 days please call the following number: [phone]   Other Instructions -   We have arranged for a visiting nurse appointment to come and assist you with your G-tube and tube feeds.  If you experience any worsening fevers, chills, shortness of breath, abdominal pain nausea or vomiting.  Please return to the emergency  "department.  Please review this entire after visit summary as additional general instructions including medication list, appointments, activity, diet, any pertinent wound care, and other additional recommendations from your care team that may be provided for you.      Sincerely,     Casey Delacruz MD     What are G tubes and G buttons?   Gastrostomy tubes and buttons (also called \"G tubes\" and \"G buttons\") are types of feeding tubes . They are ways of getting food and nutrients when you cannot get enough nutrition from eating and drinking by mouth. The tube lets you feed yourself liquid food or formula through the feeding tube. This liquid food has all of the nutrients you need. You might also use the tube to give yourself medicines.  A G tube is inserted through the stomach wall and guided into the stomach during a surgery. In some cases, the tube extends into the small intestine. When this happens, it is called a \"GJ\" tube. Often, the doctor will use a special tool called an \"endoscope\" to help them place the tube. Tubes put in this way are called \"percutaneous endoscopic gastrostomy\" (\"PEG\") tubes.  After a G tube is placed:  ?Some tubing might stick out of the belly. This part is usually about 6 to 12 inches (15 to 30 cm) long.  ?Some people get something called a \"G button.\" With a G button, there is no tubing sticking out of the body. Instead, you have a \"button\" that is mostly flat against your skin. When you need to feed yourself though a G button, you connect an \"extension set\" of tubing to the button opening.  How do I care for myself at home?   Ask the doctor or nurse what you should do when you go home. Make sure that you understand exactly what you need to do to care for yourself. Ask questions if there is anything you do not understand.  After the tube is put in, your doctor or nurse will tell you how to care for your incision (cut) and what to expect as it heals. They will also talk to you about the " "kind of tube you have and how to care for it. The exact steps might be different depending on the type of tube.  To keep the feeding tube clean:  ?Wash your hands before and after touching the tube or the area where it enters your body.  ?Clean the skin around the tube each day using mild soap and water. The rest of the time, keep the skin around the tube clean and dry.  ?If you have a dressing or bandage, change it each day or any time it gets dirty or wet.  ?Your doctor might tell you to turn the tube a little each time you clean it. This keeps the tube turning freely. Your doctor or nurse will let you know if you need to turn the tube.  ?For some types of tubes, you cover the opening and area around the tube with a small square of gauze. Put the gauze on top of the small disc where the tube comes out of your skin, not between the disc and your skin.  To make sure that the tube is secure and in the right place:  ?Know how far out the tube should be - Most tubes or buttons can move in or out a little bit (about 1/2 to 1 inch, or 1 to 2 cm). They should not move more than that. Put a yuki on the tube where it comes out of the skin and make sure that it doesn't move too much. Some tubes might come with markings already on them.  ?You might want to tape the tube to your skin - This can keep it from pulling or moving too much.  ?Some tubes have a \"balloon bolster\" inside the stomach to help keep them in the right place. If the tube has a balloon, you sometimes might need to add more fluid to it to keep it inflated. Your doctor or nurse will tell you if you need to do this and show you how.  How do I use the G tube or G button to feed myself?   Your doctor or nurse will talk to you about the exact amount and kind of liquid food or formula to use, and show you how to give it. Follow your doctor's or nurse's instructions.  What else should I know?   Even if you don't eat or drink anything by mouth, it's still important to " take care of your mouth and teeth. Brush 2 to 3 times a day, and floss regularly.  When should I call the doctor?   Call for advice if:  ?The tube gets clogged - Try flushing the tube with 30 mL (1 ounce) of water to clear the clog. Do not try to use a wire or anything else to remove the clog in the tube without talking to your doctor. Your doctor or nurse might give you a special medicine or a plastic brush to unclog the tube.  ?The tube comes out accidentally.  ?The tube has moved too much - If you see more or less of the tube than expected, call the doctor before you give a feeding or any medicine through the tube.  ?You think that you might have an infection. Signs of infection might include:  Redness, swelling, bleeding, and soreness around the tube  Drainage or pus around the tube  A bad smell coming from the tube  Problems with your tube, such as leaking, clogging, falling out, moving too much, or being too loose or too tight  Fever of 100.4°F (38°C) or higher  Swelling or pain in the belly  Loose or hard stools  Nausea or vomiting

## 2024-11-28 NOTE — DISCHARGE SUMMARY
Discharge Summary - Hospitalist   Name: Mohamud Grimm Sr. 59 y.o. male I MRN: [id number]  Unit/Bed#: W -01 I Date of Admission: 11/24/2024   Date of Service: 11/28/2024 I Hospital Day: 4     Assessment & Plan  Aspiration pneumonia (HCC)  Presenting for recurrent aspiration pneumonia with symptoms of persistent fever over the past week (temp max of 101-102 F) along with hypoxia as low as 78% as reported by patient's spouse.   He and spouse state he was diagnosed with another episode of aspiration pneumonia on 11/11 while in [place] and was prescribed Augmentin and oral vancomycin. Spouse is unsure of why oral vancomycin was continued as there were no concerns for C. Diff.  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  Recurrent episodes of aspiration pneumonia for which he follows with aspiration precautions.   He was previously intubated with PEG tube placement 06/2023 in [place] secondary to septic shock from aspiration pneumonia. PEG tube removed on 12/18/23.   Has been following with ENT for EGD balloon dilations, which last occurred on 9/27.  Last hospitalized on 11/11 for aspiration pneumonia.  Prior MRSA cultures including more recently on 11/12 negative in addition to urine antigens for atypical patogens. Sputum culture with mixed respiratory jhon.  CXR this admission with evidence of worsening right lower lobe infiltrates suggestive of aspiration.  Currently on baseline oxygen requirements of 2 L NC.  Sputum cultures to young will reincubate    Plan:  Continue cefdinir 300 mg every 12 hours 1 more day to complete 5-day course of antibiotic  Aspiration precautions  Follow-up with PCP in 1 week  Continue 2 L nasal cannula nightly  Severe swallowing dysfunction  Secondary to septic shock  from aspiration pneumonia in 6/2023, patient had been intubated and was receiving nutrition through PEG tube.  Given recurrence of aspiration pneumonia and spouse's concern for the patient not being able to maintain caloric diet with his dysphagia, outpatient gastroenterology visit was scheduled for December 2nd to discuss the possibility of PEG tube again. Spouse would like for patient to be evaluated sooner by GI.  Patient status post peg tube placement on 11/27/2024    Plan:  Status post PEG tube placement  Will arrange for visiting nurse to assist with tube feed education  Nutrition services recommend Bolus feeds of Jevity 1.5, 1 carton (237 ml) given 6 times per day. Water flushes of 90 ml before and after TF administered. Total volume 1422 ml, 2133 total kcals, 91 g protein, and 2161 ml total water from formula + flushes.   Sepsis (HCC)  Meets sepsis criteria on presentation with tachycardia, tachypnea, and source of infection being aspiration pneumonia  No leukocytosis and normal lactic acid  Given 1 L bolus of normal saline, cefepime, vancomycin, and Flagyl in the ED.  Resolved after admission  Essential hypertension  POA blood pressures stable  Continue amlodipine 5 mg daily  Monitor blood pressures  Oropharyngeal dysphagia    Severe protein-calorie malnutrition (HCC)  Malnutrition Findings:   Adult Malnutrition type: Chronic illness  Adult Degree of Malnutrition: Other severe protein calorie malnutrition  Malnutrition Characteristics: Muscle loss, Weight loss                  360 Statement: Protein calorie malnutrition r/t catabolic illness as evidenced by an unintentional 12% weight loss over 6 months, and muscle wasting present to clavicle; currently treated with PEG tube placement + initiation of TF.    BMI Findings:           Body mass index is 22.16 kg/m².        Medical Problems       Resolved Problems  Date Reviewed: 11/24/2024   None       Discharging Physician / Practitioner: Casey Delacruz  MD  PCP: Raphael Lopez MD  Admission Date:   Admission Orders (From admission, onward)       Ordered        11/24/24 2335  INPATIENT ADMISSION  Once                          Discharge Date: 11/28/24    Consultations During Hospital Stay:  Gastroenterology    Procedures Performed:   PEG tube placement on 11/27/2024    Significant Findings / Test Results:   Chest x-ray showing right greater than left patchy airspace opacities concerning for infiltrates including aspiration pneumonitis and small bibasilar pleural effusions    Incidental Findings:   None    Test Results Pending at Discharge (will require follow up):   None     Outpatient Tests Requested:  None    Complications: None    Reason for Admission: Recurrent aspiration pneumonia    Hospital Course:   Mohamud Grimm Sr. is a 59 y.o. male patient who originally presented to the hospital on 11/24/2024 due to recurrent aspiration pneumonia.  In the emergency department patient was afebrile, however met sepsis criteria due to tachycardia and tachypnea.  CMP significant for hyponatremia but no leukocytosis.  Chest x-ray showed bilateral patchy airspace opacities concerning for infiltrates versus aspiration pneumonitis.  Patient was given fluid bolus in the ED and received broad-spectrum antibiotics.  Antibiotics were subsequently changed to ceftriaxone once daily.  Patient also had a history of squamous cell carcinoma of the tongue status postchemotherapy and radiation complicated by pharyngoesophageal dysphagia and recurrent aspiration pneumonia for which she has been hospitalized multiple times.  Gastroenterology was consulted for possible PEG tube placement.  PEG tube was placed on 11/27/2024 to good effect, no complications.  Patient was evaluated on the day of discharge and deemed medically stable to go home, nutritionist recommended 6 boluses of Jevity daily.  Patient will have a visiting nurse to assist with tube feed education.  He will continue  "cefdinir for 1 more day to complete a 5-day course of antibiotics for suspected aspiration pneumonia.  He will follow-up with gastroenterology in 6 weeks for possible transition to Eliseo PEG tube.    Please see above list of diagnoses and related plan for additional information.     Condition at Discharge: good    Discharge Day Visit / Exam:   Subjective: Patient seen and evaluate bedside, he denies any active complaints.  He denies any fevers, chills.  He does still endorse a cough, that is minimally productive of whitish-yellow sputum.  He denies any abdominal pain, nausea or vomiting.    itals: Blood Pressure: (!) 176/108 (nurse notified) (11/28/24 1129)  Pulse: 87 (11/28/24 1129)  Temperature: 98.5 °F (36.9 °C) (11/28/24 1129)  Temp Source: Oral (11/28/24 1129)  Respirations: 16 (11/28/24 1129)  Height: 5' 8\" (172.7 cm) (11/25/24 0515)  Weight - Scale: 66.1 kg (145 lb 11.6 oz) (11/25/24 1521)  SpO2: 90 % (11/28/24 1129)  Physical Exam  Vitals and nursing note reviewed.   Constitutional:       Appearance: He is not ill-appearing.   HENT:      Head: Normocephalic and atraumatic.      Nose: Nose normal.      Mouth/Throat:      Mouth: Mucous membranes are moist.      Pharynx: Oropharynx is clear.   Eyes:      General: No scleral icterus.     Extraocular Movements: Extraocular movements intact.      Pupils: Pupils are equal, round, and reactive to light.   Cardiovascular:      Rate and Rhythm: Normal rate and regular rhythm.      Pulses: Normal pulses.      Heart sounds: Normal heart sounds.   Pulmonary:      Effort: Pulmonary effort is normal.      Comments: Bibasilar crackles present, more prominent on right than left  Abdominal:      General: Abdomen is flat. Bowel sounds are normal. There is no distension.      Palpations: Abdomen is soft.      Tenderness: There is no abdominal tenderness.          Comments: PEG tube placement in left lower quadrant, no active bleed or discharge.   Musculoskeletal:      Right " lower leg: No edema.      Left lower leg: No edema.   Neurological:      General: No focal deficit present.      Mental Status: He is alert and oriented to person, place, and time. Mental status is at baseline.   Psychiatric:         Mood and Affect: Mood normal.         Behavior: Behavior normal.         Discussion with Family: Updated  (wife) via phone.    Discharge instructions/Information to patient and family:   See after visit summary for information provided to patient and family.      Provisions for Follow-Up Care:  See after visit summary for information related to follow-up care and any pertinent home health orders.      Mobility at time of Discharge:   Basic Mobility Inpatient Raw Score: 24  JH-HLM Goal: 8: Walk 250 feet or more  JH-HLM Achieved: 7: Walk 25 feet or more  HLM Goal achieved. Continue to encourage appropriate mobility.     Disposition:   Home    Planned Readmission: No    Discharge Medications:  See after visit summary for reconciled discharge medications provided to patient and/or family.      Administrative Statements   Discharge Statement:  I have spent a total time of 30 minutes in caring for this patient on the day of the visit/encounter.     **Please Note: This note may have been constructed using a voice recognition system**

## 2024-11-29 ENCOUNTER — TELEPHONE (OUTPATIENT)
Age: 59
End: 2024-11-29

## 2024-11-29 ENCOUNTER — TRANSITIONAL CARE MANAGEMENT (OUTPATIENT)
Dept: FAMILY MEDICINE CLINIC | Facility: CLINIC | Age: 59
End: 2024-11-29

## 2024-11-29 ENCOUNTER — TELEPHONE (OUTPATIENT)
Dept: FAMILY MEDICINE CLINIC | Facility: CLINIC | Age: 59
End: 2024-11-29

## 2024-11-29 NOTE — TELEPHONE ENCOUNTER
Prabha, a nurse care manager from [facility], is calling to verify if patient is still seeing Dr. Rivera. She will be faxing over a medical records request to [phone].    Please advise.

## 2024-11-29 NOTE — TELEPHONE ENCOUNTER
Patients GI provider:  Dr. Vila    Number to return call: [phone]    Reason for call: Pt wife called in regard to  peg tube being replaced to low profile laura-key tube. Pt's wife stated they will be traveling to [place] 1/17/2025. Requesting if this can be done prior to leaving.    Scheduled procedure/appointment date if applicable:  N/A

## 2024-11-30 LAB — BACTERIA BLD CULT: NORMAL

## 2024-12-02 ENCOUNTER — TELEPHONE (OUTPATIENT)
Age: 59
End: 2024-12-02

## 2024-12-02 NOTE — TELEPHONE ENCOUNTER
Wife returning nurse [facility] call. Transferred the call to [facility].

## 2024-12-02 NOTE — UTILIZATION REVIEW
NOTIFICATION OF ADMISSION DISCHARGE   This is a Notification of Discharge from [facility]. Please be advised that this patient has been discharge from our facility. Below you will find the admission and discharge date and time including the patient’s disposition.   UTILIZATION REVIEW CONTACT:  Genie Mireles  Utilization   Network Utilization Review Department  Phone: [phone] x carefully listen to the prompts. All voicemails are confidential.  Email: [email or web address]     ADMISSION INFORMATION  PRESENTATION DATE: 11/24/2024  9:14 PM  OBERVATION ADMISSION DATE: N/A  INPATIENT ADMISSION DATE: 11/24/24 11:35 PM   DISCHARGE DATE: 11/28/2024  1:02 PM   DISPOSITION:Home/Self Care    Network Utilization Review Department  ATTENTION: Please call with any questions or concerns to [phone] and carefully listen to the prompts so that you are directed to the right person. All voicemails are confidential.   For Discharge needs, contact Care Management DC Support Team at [phone] opt. 2  Send all requests for admission clinical reviews, approved or denied determinations and any other requests to dedicated fax number below belonging to the campus where the patient is receiving treatment. List of dedicated fax numbers for the Facilities:  FACILITY NAME UR FAX NUMBER   ADMISSION DENIALS (Administrative/Medical Necessity) [phone]   DISCHARGE SUPPORT TEAM ([facility]) [phone]   PARENT CHILD HEALTH (Maternity/NICU/Pediatrics) [phone]   [facility] [phone]   [facility] [phone]   [facility] [phone]   [facility] [phone]   [facility] [phone]   [facility] [phone]   [facility] [phone]   [facility] [phone]    [facility] [phone]   [facility] [phone]   [facility] [phone]   [facility] [phone]

## 2024-12-02 NOTE — TELEPHONE ENCOUNTER
Left message for wife with call back number.   Please rout return call to Bree ROYAL. I need to confirm pt Gtube information with the pt and wife. Thank you

## 2024-12-02 NOTE — TELEPHONE ENCOUNTER
Pt scheduled for tube change 1/10/25 with Dr. Vila at [facility] lab bedside.     PEG tube replaced to low-profile Marshal-Key tube

## 2024-12-03 ENCOUNTER — TELEPHONE (OUTPATIENT)
Dept: GASTROENTEROLOGY | Facility: CLINIC | Age: 59
End: 2024-12-03

## 2024-12-03 NOTE — TELEPHONE ENCOUNTER
Pt was scheduled on 1/10/25 with Dr Vila for bedside peg tube replacement, however, Dr Vila informed has never done one of these types of tubes and to reschedule with Dr Yun.  I lmom informing pt that we need to reschedule his procedure date to 1/6/25 with Dr Yun and to please call me back to let me know that he received the message.  I did already change it on the schedule and put a message to the hospital with tube info to be sire there for pt on that date.     rescheduled date of peg tube replacement(as of today): 1/6/25  Physician performing: Dr Yun  Location of : [facility]  Instructions reviewed with patient by: elizabeth sent via my chart - npo after midnight   Clearances: n/a

## 2024-12-04 NOTE — TELEPHONE ENCOUNTER
Message sent through teams make covering staff (Adina Garcia and Dhara) aware of tube size and reference number for ordering.    Pt will need 20fr GRETA-KEY either 3.0 or 3.5 cm.                   Length is provider preference.                                                     Pt currently has 20 fr PEG with what appears to enfit connections.

## 2024-12-05 ENCOUNTER — CLINICAL SUPPORT (OUTPATIENT)
Dept: NUTRITION | Facility: HOSPITAL | Age: 59
End: 2024-12-05
Attending: FAMILY MEDICINE
Payer: COMMERCIAL

## 2024-12-05 VITALS — BODY MASS INDEX: 20.95 KG/M2 | WEIGHT: 137.8 LBS

## 2024-12-05 DIAGNOSIS — R63.4 WEIGHT LOSS: Primary | ICD-10-CM

## 2024-12-05 DIAGNOSIS — G24.4 OROFACIAL DYSKINESIA: ICD-10-CM

## 2024-12-05 DIAGNOSIS — J69.0 ASPIRATION PNEUMONIA, UNSPECIFIED ASPIRATION PNEUMONIA TYPE, UNSPECIFIED LATERALITY, UNSPECIFIED PART OF LUNG (HCC): ICD-10-CM

## 2024-12-05 DIAGNOSIS — R13.10 SEVERE SWALLOWING DYSFUNCTION: ICD-10-CM

## 2024-12-05 PROCEDURE — 97802 MEDICAL NUTRITION INDIV IN: CPT | Performed by: DIETITIAN, REGISTERED

## 2024-12-05 NOTE — PROGRESS NOTES
" Nutrition Assessment Form    Patient Name: Mohamud Grimm Sr.    YOB: 1965    Sex: Male     Assessment Date: 12/5/2024  Start Time: 925 Stop Time: 1025 Total Minutes: 60     Data:  Present at session: self   Parent/Patient Concerns/reason for visit: \"I need some help on high calorie foods\"   Medical Dx/Reason for Referral: Swallowing issues and wt loss   Past Medical History:   Diagnosis Date    Cancer of base of tongue (HCC)     excision    Cough     CPAP (continuous positive airway pressure) dependence     no currently    Disease of thyroid gland     hypo    Dysphagia     ED (erectile dysfunction)     GERD (gastroesophageal reflux disease)     History of pneumonia     Hypertension     Leukopenia     Peyronie's disease     Pneumonia     PONV (postoperative nausea and vomiting)     Psoriasis     Sleep apnea     Tongue cancer (HCC)     Weight loss, abnormal        Current Outpatient Medications   Medication Sig Dispense Refill    amLODIPine (NORVASC) 5 mg tablet TAKE 1 TABLET (5 MG TOTAL) BY MOUTH DAILY. 90 tablet 1    atropine (ISOPTO ATROPINE) 1 % ophthalmic solution 2-3 drops sublingual as needed for increased saliva/cough 5 mL 11    Azelastine HCl 137 MCG/SPRAY SOLN SPRAY 1 SPRAY INTO EACH NOSTRIL TWICE A DAY 90 mL 4    benzonatate (TESSALON) 200 MG capsule Take 1 capsule (200 mg total) by mouth 3 (three) times a day as needed for cough 30 capsule 0    famotidine (PEPCID) 20 mg/2.5 mL oral suspension TAKE 5 ML (40 MG TOTAL) BY MOUTH DAILY AT BEDTIME 450 mL 3    guaiFENesin (MUCINEX) 600 mg 12 hr tablet Take 1 tablet (600 mg total) by mouth every 12 (twelve) hours for 7 days 14 tablet 0    levalbuterol (XOPENEX HFA) 45 mcg/act inhaler Inhale 2 puffs every 6 (six) hours as needed for wheezing (Patient not taking: Reported on 11/25/2024) 45 g 3    levothyroxine 100 mcg tablet TAKE 1 TABLET BY MOUTH EVERY DAY 90 tablet 1    multivitamin (THERAGRAN) TABS Take 1 tablet by mouth daily (Patient not taking: " "Reported on 2024)      tadalafil (CIALIS) 5 MG tablet Take 1 tablet (5 mg total) by mouth daily (Patient taking differently: Take 5 mg by mouth daily as needed) 90 tablet 3     No current facility-administered medications for this visit.        Additional Meds/Supplements: Probiotic- MVI   Special Learning Needs/barriers to learning/any new barriers N/a   Height: Ht Readings from Last 5 Encounters:   24 5' 8\" (1.727 m)   10/03/24 5' 8\" (1.727 m)   24 5' 8\" (1.727 m)   24 5' 8\" (1.727 m)   10/01/24 5' 8\" (1.727 m)      Weight: Wt Readings from Last 10 Encounters:   24 66.1 kg (145 lb 11.6 oz)   10/03/24 67.1 kg (148 lb)   24 67.1 kg (148 lb)   24 65.9 kg (145 lb 4.5 oz)   10/01/24 65.3 kg (144 lb)   24 64.9 kg (143 lb)   24 66.2 kg (146 lb)   24 66.2 kg (146 lb)   24 66.7 kg (147 lb)   24 67 kg (147 lb 12.8 oz)     Estimated body mass index is 22.16 kg/m² as calculated from the following:    Height as of 24: 5' 8\" (1.727 m).    Weight as of 24: 66.1 kg (145 lb 11.6 oz).   Recent Weight Change: [x]Yes     []No  Amount: 10# loss since July 6.8%      Energy Needs: 6451-9841 kcals (30-40 kcals/kg) to allow for wt gain  79-93 gms pro (1.2-1.4gms/kg)  -2644mls fluid daily (1ml/kcal)   No Known Allergies or intolerances NKFA   Social History     Substance and Sexual Activity   Alcohol Use Yes    Alcohol/week: 12.0 standard drinks of alcohol    Types: 12 Cans of beer per week    Will have a drink \"here and there\"   Social History     Tobacco Use   Smoking Status Former    Current packs/day: 0.00    Average packs/day: 0.3 packs/day for 20.0 years (5.0 ttl pk-yrs)    Types: Cigarettes    Start date: 1991    Quit date: 2011    Years since quittin.9    Passive exposure: Past   Smokeless Tobacco Former    Quit date:    Tobacco Comments    pt quit with dx of tongue base cancer, per allscripts       Who shops? patient and spouse "   Who cooks/cooking methods/Eating out/take out habits   patient and spouse  Cooking methods: bake/avery/air avery/grill/boil/other________       Exercise: N/a     Other: ie: Sleep habits/ stress level/ work habits household-lives with ?/ food security Working but not even 40 hours a week- phone Is on all the time  Wife reporting good sleep last couple days since home form hospital- minimal coughing-   bed time-  11pm falls asleep fairly quickly- within 10 mins    Awake at - 8-9-930 does not set alarm and wakes up naturally- does not take any naps  Has CAPA does not use as at night r/t can push food into lungs and cause aspiration PNA so uses 2 L oxygen   Prior Nutritional Counseling? []Yes     [x]No  When:      Why:         Diet Hx:  drinking water all day long 64-128oz to help food get down wide range but drinking all day -  one cup coffee- might have whole milk if he is having pancakes  Breakfast: Diet: sometimes eggs   Or pancakes x6 inches 2 at the most /syrup  sometimes hungry may skipping may skip 2-3x/wk  Coffee and water      Lunch: Mainly skips - only doing TB right now-      Dinner: 7pm dinner- - does not skip-  TF and crab cake last night- ate half last night but usually eats more     Snacks:  minimal snacking  AM -   PM -   HS -    Other Notes/ Initial Assessment:  Mohamud is here because he had cancer 12 years ago stating he was over radiated and over chemo- and damaged half his vocal cords, epiglottis not functioning properly, chronic cough, and some aspiration which has been increasing.  He's been hospitalized for aspiration PNA as recently as last week. He had a PEG placed about a week ago for nutrition support.  Wants suggestions on foods with calories.  He has been losing some weight as well, though BMI still WNL  He would prefer to be 150-160#.  His swallowing mechanism is compromised so he is selective on his food intake. He continues to cough in the office. He was d/c'ed from the hospital a week ago  "with aspiration PNA- still feels tired and sluggish in the morning.  He has been told previously he needs thickened liquids and then he has also been told he should be NPO. He states he is not ready to do that at his point.  He reports to still being angry and bitter about \"why me?\" And what did he do to deserve this, etc.    He is using Jevity 1.5 in his PEG- 6 cartons a day- now up to 7 cartons a day  (2.5 cartons AM and PM and 2 at lunch at work.  BM most days or every other day but not solid.    Weighing 131.4 couple days ago and this morning 133.6# he odes weight himself daily.  He is trying to eat food in between the tube feedings.   He is getting 1652 ML TF providing 2478cals and 105 gms protein.  Current TF is meeting 100% estimated calorie and protein needs, and RDI for Vitamins and miners, discussed there was no need to take MVI gummy.    Provided Eating Hints Before During and After Cancer Treatment  from [employer], [facility] Nutrition Milkshake and Smoothie recipe book,  IDDSI Thickened Liquid Nutrition Therapy, IDDSI Level 5 Minced and Moist ([place]) Nutrition Therapy, High-Calorie, High-Protein Recipes all from Nutrition Care Manual, and RD name and number for home use.  No follow up desired at this time.      Updated assessment (Follow up note only):       Nutrition Diagnosis:   Involuntary weight loss  related to Physiological causes increasing nutrient needs due to prolonged catabolic illness, trauma, malabsorption as  evidenced by Loss of subcutaneous fat and muscle stores       Any change or new dx since previous visit:     Nutrition Diagnosis:   Protein calorie malnutrition r/t catabolic illness as evidenced by an unintentional 12% weight loss over 6 months, and muscle wasting present to clavicle; currently treated with PEG tube placement + initiation of TF.       Medical Nutrition Therapy Intervention:  [x]Individualized Meal Plan- advised to follow ST recommendations to " minimize aspiration PNA []Understanding Lab Values   []Basic Pathophysiology of Disease []Food/Medication Interactions   []Food Diary []Exercise   []Lifestyle/Behavior Modification Techniques []Medication, Mechanism of Action   []Label Reading: CHO/ Na/ Fat/ other_________ []Self Blood Glucose Monitoring   [x]Weight/BMI Goals: gain/lose/maintain- goal wt of 150-160# [x]Other -11/18/24 TF recs from hospital :  Bolus feeds of Jevity 1.5, 1 carton (237 ml) given 6 times per day. Water flushes of 90 ml before and after TF administered. Total volume 1422 ml, 2133 total kcals, 91 g protein, and 2161 ml total water from formula + flushes.           Comprehension: []Excellent  [x]Very Good  []Good  []Fair   []Poor    Receptivity: []Excellent  []Very Good  []Good  [x]Fair   []Poor    Expected Compliance: []Excellent  []Very Good  [x]Good  []Fair   []Poor        Goals (initial)/ Progress made on previous goals/new goals:   Follow PO recommendations per ST    2.  Continue current TF Jevity 1.5 6 cartons a day to meet estimated nutritional needs   3.       No follow-ups on file.  Labs:  CMP  Lab Results   Component Value Date     12/05/2015    K 3.6 11/28/2024    CL 98 11/28/2024    CO2 29 11/28/2024    ANIONGAP 4 12/05/2015    BUN 10 11/28/2024    CREATININE 0.33 (L) 11/28/2024    GLUCOSE 85 12/05/2015    GLUF 84 09/17/2024    CALCIUM 8.4 11/28/2024    CORRECTEDCA 9.7 05/21/2024    AST 19 11/24/2024    ALT 19 11/24/2024    ALKPHOS 136 (H) 11/24/2024    PROT 6.4 12/05/2015    BILITOT 0.75 12/05/2015    EGFR 138 11/28/2024       BMP  Lab Results   Component Value Date    GLUCOSE 85 12/05/2015    CALCIUM 8.4 11/28/2024     12/05/2015    K 3.6 11/28/2024    CO2 29 11/28/2024    CL 98 11/28/2024    BUN 10 11/28/2024    CREATININE 0.33 (L) 11/28/2024       Lipids  Lab Results   Component Value Date    CHOL 199 12/05/2015    CHOL 190 08/13/2015    CHOL 203 07/16/2014     Lab Results   Component Value Date    HDL 68  "09/17/2024    HDL 78 03/03/2021    HDL 63 09/02/2020     Lab Results   Component Value Date    LDLCALC 105 (H) 09/17/2024    LDLCALC 111 (H) 03/03/2021    LDLCALC 130 (H) 09/02/2020     Lab Results   Component Value Date    TRIG 60 09/17/2024    TRIG 52 03/03/2021    TRIG 52 09/02/2020     No results found for: \"CHOLHDL\"    Hemoglobin A1C  Lab Results   Component Value Date    HGBA1C 5.3 07/14/2018       Fasting Glucose  Lab Results   Component Value Date    GLUF 84 09/17/2024       Insulin     Thyroid  Lab Results   Component Value Date    F2JQEAV 1.2 10/31/2023       Hepatic Function Panel  Lab Results   Component Value Date    ALT 19 11/24/2024    AST 19 11/24/2024    ALKPHOS 136 (H) 11/24/2024    BILITOT 0.75 12/05/2015       Celiac Disease Antibody Panel  No results found for: \"ENDOMYSIAL IGA\", \"GLIADIN IGA\", \"GLIADIN IGG\", \"IGA\", \"TISSUE TRANSGLUT AB\", \"TTG IGA\"   Iron  No results found for: \"IRON\", \"TIBC\", \"FERRITIN\"         Jenniffer Ling RD  [facility] CLINICAL NUTRITION [facility]  [address] [id number]    "

## 2024-12-10 ENCOUNTER — OFFICE VISIT (OUTPATIENT)
Dept: FAMILY MEDICINE CLINIC | Facility: CLINIC | Age: 59
End: 2024-12-10
Payer: COMMERCIAL

## 2024-12-10 VITALS
HEART RATE: 76 BPM | BODY MASS INDEX: 20.76 KG/M2 | SYSTOLIC BLOOD PRESSURE: 126 MMHG | TEMPERATURE: 97.2 F | HEIGHT: 69 IN | OXYGEN SATURATION: 96 % | DIASTOLIC BLOOD PRESSURE: 70 MMHG | WEIGHT: 140.2 LBS

## 2024-12-10 DIAGNOSIS — E43 SEVERE PROTEIN-CALORIE MALNUTRITION (HCC): ICD-10-CM

## 2024-12-10 DIAGNOSIS — Z85.810 HISTORY OF TONGUE CANCER: ICD-10-CM

## 2024-12-10 DIAGNOSIS — I10 ESSENTIAL HYPERTENSION: ICD-10-CM

## 2024-12-10 DIAGNOSIS — E87.1 HYPONATREMIA: ICD-10-CM

## 2024-12-10 DIAGNOSIS — Z78.9 TRANSITION OF CARE: Primary | ICD-10-CM

## 2024-12-10 DIAGNOSIS — J69.0 ASPIRATION PNEUMONIA OF RIGHT LOWER LOBE, UNSPECIFIED ASPIRATION PNEUMONIA TYPE (HCC): ICD-10-CM

## 2024-12-10 DIAGNOSIS — R13.10 SEVERE SWALLOWING DYSFUNCTION: ICD-10-CM

## 2024-12-10 DIAGNOSIS — E03.9 HYPOTHYROIDISM, UNSPECIFIED TYPE: ICD-10-CM

## 2024-12-10 DIAGNOSIS — Z93.1 S/P PERCUTANEOUS ENDOSCOPIC GASTROSTOMY (PEG) TUBE PLACEMENT (HCC): ICD-10-CM

## 2024-12-10 PROCEDURE — 99495 TRANSJ CARE MGMT MOD F2F 14D: CPT | Performed by: FAMILY MEDICINE

## 2024-12-10 NOTE — PROGRESS NOTES
Transition of Care Visit  Name: Mohamud Grimm Sr.      : 1965      MRN: [id number]  Encounter Provider: Raphael Lopez MD  Encounter Date: 12/10/2024   Encounter department: [facility]    Assessment & Plan  Transition of care       Aspiration pneumonia of right lower lobe, unspecified aspiration pneumonia type (HCC)  I reviewed with pt. Pt improving clinically. O2 sat 96% on RA, and exam had good airmovement in RLL with only rare rale.  Could recommend that pt use his Xoponex 2-3x a day over the next week to keep airways open and encourage mucous movement. Recheck CXR in 3w. To ED if fever returns or breathing/O2 sats worsen  Orders:  •  XR chest pa and lateral; Future  •  Comprehensive metabolic panel; Future  •  CBC and differential; Future    Severe swallowing dysfunction  Secondary to treatment of his tongue based cancer. Pt is taking some oral still and understands risks. He will follow up with ENT re: treatment going forward. Continue PEG feedings       History of tongue cancer         Severe protein-calorie malnutrition (HCC)  With significant weight loss over the last 6-12m.  Pt on Jevity 1.5 7 cartons a day (approx 2485 Kcal/day).  Hopefully this will improve weight and protein levels. Recheck labs in 3w. F/u 3m       S/P percutaneous endoscopic gastrostomy (PEG) tube placement (HCC)  Site is clean/dry and without erythema/discahrge. Continue tube feedings. F/u with GI re: possibly changing to a low profile (GRETA-KEY) tube       Essential hypertension  Well controlled. Cont present treatment. Monitor labs. Recheck 6m         Hyponatremia  Secondary to pneumonia?  Recheck labs in 3w  Orders:  •  Comprehensive metabolic panel; Future    Hypothyroidism, unspecified type  Appears to be stable clinically. Check TSH. Adjust meds if TSH is not at goal. Recheck 6m    Orders:  •  TSH, 3rd generation with Free T4 reflex; Future         History of Present Illness      Transitional Care Management Review:   Mohamud Grimm . is a 59 y.o. male here for TCM follow up.     During the TCM phone call patient stated:  TCM Call     Date and time call was made  11/29/2024  4:38 PM    Hospital care reviewed  Discussed with Inpatient Physician    Patient was hospitialized at  [facility]    Date of Admission  11/24/24    Date of discharge  11/28/24    Diagnosis  Aspiration pneumonia    Disposition  Home    Were the patients medications reviewed and updated  Yes    Current Symptoms  Cough    Cough Severity  Moderate      TCM Call     Post hospital issues  Reduced activity    Should patient be enrolled in anticoag monitoring?  No    Scheduled for follow up?  Yes    Patients specialists  Other (comment)    Other specialists names  Candelario Borges ( oncology )    Did you obtain your prescribed medications  Yes    Do you need help managing your prescriptions or medications  No    Is transportation to your appointment needed  No    I have advised the patient to call PCP with any new or worsening symptoms  Raisa Mcgarry MA    Living Arrangements  Spouse or Significiant other    Are you recieving any outpatient services  No    Are you recieving home care services  No    Are you using any community resources  No        Pt here for TCM visit  - 58yo male with hx of SCC of tongue base, s/p chemo and RT, who has developed swallowing dysfunction with several episodes of aspiration pneumonia/pneumonitis and subsequent chronic resp failure, on O2 2L, developed fever, diaphoresis and chills for several days prior to admission. Pt had been admitted in [place]  recently due to aspiration pneumonia. Pt was discharged on Augmentin and ?oral vanco (though pt did not have C.Diff).  At home, temp reached levels as high as 101-102 F,and O2 dropped to 78% on RA, necessitating starting home O2 ( pt uses O2 only qHS).  Pt was also having issues eating and by admission was having issues tolerating  "food or liquids. BC of worsening symptoms, pt was taken to [facility] on 11/24.  In the ED, pt found to be tachypneic and tachycardic, meeting sepsis criteria.  Labs showed  low sodium, and normal WBC but elevated segs.  CXR suggested worsening aspiration in RLL consistent with worsening aspiration pneumonia.  Pt was given IV fluid bolus, as well as IV cefepime, vancomycin and metronidazole and was subsequently admitted.  In the hospital, GI was consulted. IV antibiotics were changed to ceftriaxone. GI suggested PEG tube placement, which was done on 11/27/24.  Pt steadily improved. Dietary was consulted who recommended starting Jevity with 6 boluses daily.  By 11/28, pt was able to be discharged to home.  Pt here for TCM visit  - since discharge, pt is slowly recovering. Pt presently on Jevity 1.5 and using 7 cartons a day. He still takes some PO. His cough remains but O2 sats and overall breathing is better. He has not followed up with ENT/swallowing specialist yet. He had a brief temp elevation last weekend that resolved with Tylenol and has not returned. He denies any other worsening issues  - I reviewed available hospital records, lab results and study reports with pt      Review of Systems   Constitutional:  Positive for fatigue. Negative for activity change and appetite change.   HENT:  Positive for trouble swallowing. Negative for ear discharge, ear pain, sinus pressure, sinus pain and sore throat.         Chronic dry mouth   Eyes: Negative.    Respiratory:  Positive for cough. Negative for shortness of breath and wheezing.    Cardiovascular: Negative.    Gastrointestinal: Negative.         PEG tube in place   Genitourinary: Negative.    Musculoskeletal: Negative.    Skin: Negative.    Neurological: Negative.      Objective   /70   Pulse 76   Temp (!) 97.2 °F (36.2 °C)   Ht 5' 8.5\" (1.74 m)   Wt 63.6 kg (140 lb 3.2 oz)   SpO2 96%   BMI 21.01 kg/m²     Physical Exam  Vitals reviewed. "   Constitutional:       Comments: Thin male sitting in NAD   HENT:      Head: Normocephalic.      Mouth/Throat:      Mouth: Mucous membranes are moist.   Eyes:      General: No scleral icterus.     Extraocular Movements: Extraocular movements intact.      Conjunctiva/sclera: Conjunctivae normal.      Pupils: Pupils are equal, round, and reactive to light.   Cardiovascular:      Rate and Rhythm: Normal rate and regular rhythm.      Pulses: Normal pulses.   Pulmonary:      Breath sounds: Rales (rare RLL  Good air movement) present. No wheezing.      Comments: Deep cough  Abdominal:      General: There is no distension.      Palpations: There is no mass.      Tenderness: There is no abdominal tenderness.      Comments: PEG tube site clean/dry.  No discharge or surrounding erythema   Musculoskeletal:         General: No tenderness. Normal range of motion.      Cervical back: No tenderness.      Right lower leg: No edema.      Left lower leg: No edema.   Lymphadenopathy:      Cervical: No cervical adenopathy.   Skin:     General: Skin is warm.      Capillary Refill: Capillary refill takes less than 2 seconds.   Neurological:      General: No focal deficit present.      Mental Status: He is alert and oriented to person, place, and time.       Medications have been reviewed by provider in current encounter

## 2024-12-10 NOTE — ASSESSMENT & PLAN NOTE
I reviewed with pt. Pt improving clinically. O2 sat 96% on RA, and exam had good airmovement in RLL with only rare rale.  Could recommend that pt use his Xoponex 2-3x a day over the next week to keep airways open and encourage mucous movement. Recheck CXR in 3w. To ED if fever returns or breathing/O2 sats worsen  Orders:  •  XR chest pa and lateral; Future  •  Comprehensive metabolic panel; Future  •  CBC and differential; Future

## 2024-12-12 NOTE — ASSESSMENT & PLAN NOTE
Appears to be stable clinically. Check TSH. Adjust meds if TSH is not at goal. Recheck 6m    Orders:  •  TSH, 3rd generation with Free T4 reflex; Future

## 2024-12-12 NOTE — ASSESSMENT & PLAN NOTE
With significant weight loss over the last 6-12m.  Pt on Jevity 1.5 7 cartons a day (approx 2485 Kcal/day).  Hopefully this will improve weight and protein levels. Recheck labs in 3w. F/u 3m

## 2024-12-12 NOTE — ASSESSMENT & PLAN NOTE
Site is clean/dry and without erythema/discahrge. Continue tube feedings. F/u with GI re: possibly changing to a low profile (GRETA-KEY) tube

## 2024-12-12 NOTE — ASSESSMENT & PLAN NOTE
Secondary to treatment of his tongue based cancer. Pt is taking some oral still and understands risks. He will follow up with ENT re: treatment going forward. Continue PEG feedings

## 2024-12-13 DIAGNOSIS — E03.9 HYPOTHYROIDISM, UNSPECIFIED TYPE: ICD-10-CM

## 2024-12-13 RX ORDER — LEVOTHYROXINE SODIUM 100 UG/1
100 TABLET ORAL DAILY
Qty: 90 TABLET | Refills: 1 | Status: SHIPPED | OUTPATIENT
Start: 2024-12-13

## 2024-12-16 ENCOUNTER — TELEPHONE (OUTPATIENT)
Age: 59
End: 2024-12-16

## 2024-12-16 NOTE — TELEPHONE ENCOUNTER
Spoke with pt wife pt tube feed nutrition was increased but need for order change was not sent to nutrition nurse. I checked the chart wife is correct increase was made dec 5th. New order was placed with [employer] and sent to provider for signature.

## 2024-12-24 PROBLEM — J69.0 ASPIRATION PNEUMONIA (HCC): Status: RESOLVED | Noted: 2023-07-07 | Resolved: 2024-12-24

## 2024-12-25 PROBLEM — A41.9 SEPSIS (HCC): Status: RESOLVED | Noted: 2019-04-20 | Resolved: 2024-12-25

## 2024-12-30 ENCOUNTER — APPOINTMENT (OUTPATIENT)
Dept: RADIOLOGY | Facility: MEDICAL CENTER | Age: 59
End: 2024-12-30
Payer: COMMERCIAL

## 2024-12-30 ENCOUNTER — TELEPHONE (OUTPATIENT)
Dept: GASTROENTEROLOGY | Facility: AMBULARY SURGERY CENTER | Age: 59
End: 2024-12-30

## 2024-12-30 ENCOUNTER — RESULTS FOLLOW-UP (OUTPATIENT)
Dept: FAMILY MEDICINE CLINIC | Facility: CLINIC | Age: 59
End: 2024-12-30

## 2024-12-30 ENCOUNTER — APPOINTMENT (OUTPATIENT)
Dept: LAB | Facility: MEDICAL CENTER | Age: 59
End: 2024-12-30
Payer: COMMERCIAL

## 2024-12-30 DIAGNOSIS — J69.0 ASPIRATION PNEUMONIA OF RIGHT LOWER LOBE, UNSPECIFIED ASPIRATION PNEUMONIA TYPE (HCC): ICD-10-CM

## 2024-12-30 DIAGNOSIS — E87.1 HYPONATREMIA: ICD-10-CM

## 2024-12-30 DIAGNOSIS — J69.0 ASPIRATION PNEUMONIA OF RIGHT LOWER LOBE, UNSPECIFIED ASPIRATION PNEUMONIA TYPE (HCC): Primary | ICD-10-CM

## 2024-12-30 DIAGNOSIS — E03.9 ACQUIRED HYPOTHYROIDISM: ICD-10-CM

## 2024-12-30 DIAGNOSIS — E03.9 HYPOTHYROIDISM, UNSPECIFIED TYPE: ICD-10-CM

## 2024-12-30 LAB
ALBUMIN SERPL BCG-MCNC: 3.8 G/DL (ref 3.5–5)
ALP SERPL-CCNC: 69 U/L (ref 34–104)
ALT SERPL W P-5'-P-CCNC: 21 U/L (ref 7–52)
ANION GAP SERPL CALCULATED.3IONS-SCNC: 7 MMOL/L (ref 4–13)
AST SERPL W P-5'-P-CCNC: 18 U/L (ref 13–39)
BASOPHILS # BLD AUTO: 0.01 THOUSANDS/ΜL (ref 0–0.1)
BASOPHILS NFR BLD AUTO: 0 % (ref 0–1)
BILIRUB SERPL-MCNC: 0.57 MG/DL (ref 0.2–1)
BUN SERPL-MCNC: 14 MG/DL (ref 5–25)
CALCIUM SERPL-MCNC: 9.5 MG/DL (ref 8.4–10.2)
CHLORIDE SERPL-SCNC: 96 MMOL/L (ref 96–108)
CO2 SERPL-SCNC: 35 MMOL/L (ref 21–32)
CREAT SERPL-MCNC: 0.45 MG/DL (ref 0.6–1.3)
EOSINOPHIL # BLD AUTO: 0.15 THOUSAND/ΜL (ref 0–0.61)
EOSINOPHIL NFR BLD AUTO: 4 % (ref 0–6)
ERYTHROCYTE [DISTWIDTH] IN BLOOD BY AUTOMATED COUNT: 16 % (ref 11.6–15.1)
GFR SERPL CREATININE-BSD FRML MDRD: 122 ML/MIN/1.73SQ M
GLUCOSE P FAST SERPL-MCNC: 88 MG/DL (ref 65–99)
HCT VFR BLD AUTO: 43.8 % (ref 36.5–49.3)
HGB BLD-MCNC: 13.9 G/DL (ref 12–17)
IMM GRANULOCYTES # BLD AUTO: 0.01 THOUSAND/UL (ref 0–0.2)
IMM GRANULOCYTES NFR BLD AUTO: 0 % (ref 0–2)
LYMPHOCYTES # BLD AUTO: 1.97 THOUSANDS/ΜL (ref 0.6–4.47)
LYMPHOCYTES NFR BLD AUTO: 48 % (ref 14–44)
MCH RBC QN AUTO: 28.9 PG (ref 26.8–34.3)
MCHC RBC AUTO-ENTMCNC: 31.7 G/DL (ref 31.4–37.4)
MCV RBC AUTO: 91 FL (ref 82–98)
MONOCYTES # BLD AUTO: 0.46 THOUSAND/ΜL (ref 0.17–1.22)
MONOCYTES NFR BLD AUTO: 11 % (ref 4–12)
NEUTROPHILS # BLD AUTO: 1.51 THOUSANDS/ΜL (ref 1.85–7.62)
NEUTS SEG NFR BLD AUTO: 37 % (ref 43–75)
NRBC BLD AUTO-RTO: 0 /100 WBCS
PLATELET # BLD AUTO: 283 THOUSANDS/UL (ref 149–390)
PMV BLD AUTO: 10.5 FL (ref 8.9–12.7)
POTASSIUM SERPL-SCNC: 3.9 MMOL/L (ref 3.5–5.3)
PROT SERPL-MCNC: 7.5 G/DL (ref 6.4–8.4)
RBC # BLD AUTO: 4.81 MILLION/UL (ref 3.88–5.62)
SODIUM SERPL-SCNC: 138 MMOL/L (ref 135–147)
T4 FREE SERPL-MCNC: 1.16 NG/DL (ref 0.61–1.12)
TSH SERPL DL<=0.05 MIU/L-ACNC: 9.45 UIU/ML (ref 0.45–4.5)
WBC # BLD AUTO: 4.11 THOUSAND/UL (ref 4.31–10.16)

## 2024-12-30 PROCEDURE — 84439 ASSAY OF FREE THYROXINE: CPT

## 2024-12-30 PROCEDURE — 84480 ASSAY TRIIODOTHYRONINE (T3): CPT

## 2024-12-30 PROCEDURE — 36415 COLL VENOUS BLD VENIPUNCTURE: CPT

## 2024-12-30 PROCEDURE — 84443 ASSAY THYROID STIM HORMONE: CPT

## 2024-12-30 PROCEDURE — 80053 COMPREHEN METABOLIC PANEL: CPT

## 2024-12-30 PROCEDURE — 85025 COMPLETE CBC W/AUTO DIFF WBC: CPT

## 2024-12-30 PROCEDURE — 71046 X-RAY EXAM CHEST 2 VIEWS: CPT

## 2024-12-31 ENCOUNTER — TELEPHONE (OUTPATIENT)
Age: 59
End: 2024-12-31

## 2024-12-31 LAB — T3 SERPL-MCNC: 0.9 NG/ML (ref 0.9–1.8)

## 2024-12-31 NOTE — TELEPHONE ENCOUNTER
Patients spouse called in wanting to know the results of his blood work. Please advise. Thank you.

## 2024-12-31 NOTE — TELEPHONE ENCOUNTER
Spoke with the pt explained that the laura-key being placed should be enfit and compatible with the supplies he already has. I will order extension for his new laura-key once placement is confirmed. He is agreeable to this

## 2025-01-06 ENCOUNTER — HOSPITAL ENCOUNTER (OUTPATIENT)
Dept: GASTROENTEROLOGY | Facility: HOSPITAL | Age: 60
Discharge: HOME | End: 2025-01-06
Attending: INTERNAL MEDICINE
Payer: COMMERCIAL

## 2025-01-06 VITALS
HEART RATE: 82 BPM | OXYGEN SATURATION: 97 % | DIASTOLIC BLOOD PRESSURE: 83 MMHG | TEMPERATURE: 98 F | SYSTOLIC BLOOD PRESSURE: 169 MMHG | RESPIRATION RATE: 16 BRPM

## 2025-01-06 DIAGNOSIS — R13.12 OROPHARYNGEAL DYSPHAGIA: ICD-10-CM

## 2025-01-06 DIAGNOSIS — Z43.1 PEG (PERCUTANEOUS ENDOSCOPIC GASTROSTOMY) ADJUSTMENT/REPLACEMENT/REMOVAL (HCC): ICD-10-CM

## 2025-01-06 DIAGNOSIS — E43 SEVERE PROTEIN-CALORIE MALNUTRITION (HCC): Primary | ICD-10-CM

## 2025-01-06 DIAGNOSIS — Z91.89 AT RISK FOR ASPIRATION: ICD-10-CM

## 2025-01-06 DIAGNOSIS — T17.908S ASPIRATION INTO AIRWAY, SEQUELA: ICD-10-CM

## 2025-01-06 DIAGNOSIS — E03.9 ACQUIRED HYPOTHYROIDISM: Primary | ICD-10-CM

## 2025-01-06 PROCEDURE — 43762 RPLC GTUBE NO REVJ TRC: CPT | Performed by: INTERNAL MEDICINE

## 2025-01-06 NOTE — PERIOPERATIVE NURSING NOTE
Patient verbally consented to a g-tube exchange at bedside. Patient provided instructions and questions answered regarding tube.

## 2025-01-06 NOTE — PROCEDURES
Gastrostomy tube removal w/o sedation    Date/Time: 1/6/2025 10:48 AM    Performed by: Rose Yun MD  Authorized by: Rose Yun MD    Patient location:  Bedside  Consent:     Consent obtained:  Verbal    Consent given by:  Patient    Risks discussed:  Bleeding and pain  Pre-procedure details:     Old tube type:  Gastrostomy    Old tube size:  18 Fr    Gastrostomy site:  LUQ  Indications:     Indications: other (comment)    Procedure details:     Patient position:  Supine    Procedure type:  Replacement    Tube type:  Gastrostomy    Tube :  Marshal-KEY    Tube size:  18 Fr    Bulb inflation volume:  6 cc  Post-procedure details:     Placement difficulty:  None    Bleeding:  Minimal    Patient tolerance of procedure:  Tolerated well, no immediate complications

## 2025-01-07 ENCOUNTER — HOSPITAL ENCOUNTER (EMERGENCY)
Facility: HOSPITAL | Age: 60
Discharge: HOME | End: 2025-01-07
Attending: EMERGENCY MEDICINE | Admitting: EMERGENCY MEDICINE
Payer: COMMERCIAL

## 2025-01-07 ENCOUNTER — TELEPHONE (OUTPATIENT)
Age: 60
End: 2025-01-07

## 2025-01-07 VITALS
HEART RATE: 86 BPM | SYSTOLIC BLOOD PRESSURE: 141 MMHG | RESPIRATION RATE: 18 BRPM | OXYGEN SATURATION: 97 % | TEMPERATURE: 98.2 F | DIASTOLIC BLOOD PRESSURE: 82 MMHG

## 2025-01-07 DIAGNOSIS — T85.528A DISLODGED GASTROSTOMY TUBE: Primary | ICD-10-CM

## 2025-01-07 PROCEDURE — 99282 EMERGENCY DEPT VISIT SF MDM: CPT

## 2025-01-07 PROCEDURE — 99284 EMERGENCY DEPT VISIT MOD MDM: CPT | Performed by: EMERGENCY MEDICINE

## 2025-01-07 PROCEDURE — 43762 RPLC GTUBE NO REVJ TRC: CPT | Performed by: EMERGENCY MEDICINE

## 2025-01-07 NOTE — TELEPHONE ENCOUNTER
Patients GI provider:  Dr. Yun    Number to return call: [phone]    Reason for call: Pt calling to state he is on the way to the hospital due to tube coming out. Pt would like to speak with Dr. Yun. Pt is worried hospital will not be able to help with having tube replaced.      Scheduled procedure/appointment date if applicable: N/A

## 2025-01-08 ENCOUNTER — HOSPITAL ENCOUNTER (OUTPATIENT)
Dept: GASTROENTEROLOGY | Facility: HOSPITAL | Age: 60
Discharge: HOME | End: 2025-01-08
Payer: COMMERCIAL

## 2025-01-08 VITALS
DIASTOLIC BLOOD PRESSURE: 95 MMHG | HEART RATE: 80 BPM | RESPIRATION RATE: 17 BRPM | OXYGEN SATURATION: 98 % | TEMPERATURE: 97.5 F | SYSTOLIC BLOOD PRESSURE: 166 MMHG

## 2025-01-08 DIAGNOSIS — Z43.1 PEG (PERCUTANEOUS ENDOSCOPIC GASTROSTOMY) ADJUSTMENT/REPLACEMENT/REMOVAL (HCC): ICD-10-CM

## 2025-01-08 PROCEDURE — 43762 RPLC GTUBE NO REVJ TRC: CPT | Performed by: PHYSICIAN ASSISTANT

## 2025-01-08 NOTE — TELEPHONE ENCOUNTER
Spoke with pt wife the er only had LAURA on had to exchange with they would like to have a laura-key placed. I placed pt in urgent appointment with pa for tube swap I will confirm  with Hugo Ortega that this can be done in the office  and call pt back with answer. Wife is agreeable to this.

## 2025-01-08 NOTE — OP NOTE
OPERATIVE REPORT  PATIENT NAME: Mohamud Grimm Sr.    :  1965  MRN: [id number]  Pt Location: * No operating room entered *    SURGERY DATE: 2025        Preop Diagnosis:  Tongue cancer status post chemotherapy 2012  Gastrostomy tube malfunction    Procedure: Gastrostomy tube exchange    Specimen(s):  * No specimens in log *    Estimated Blood Loss:   none      Anesthesia Type:   No anesthesia required    Operative Indications:  Dislodged gastrostomy tube    Operative Findings:  59-year-old white male with dislodged button style gastrostomy tube (20F 3.5cm stem) with temporary 18 Hungarian standard gastrostomy tube placed in emergency room here for replacement of standard gastrostomy tube back to button style tube.  The previous 18 Hungarian standard length gastrostomy tube was removed by deflating the balloon using a 5 cc slip tip syringe.  A total of 7 cc of clear fluid was able to be withdrawn.  The 18 Hungarian tube easily slid out and a small amount of Jevity did expel from the port site.  After testing the new 20 Hungarian 3 cm stent balloon with 5 mL to ensure there were no leaks.  I was able to insert the new button style tube in with little difficulty.  The balloon was then inflated to maximum size which was 5 mL using sterile water.  A single 4 x 4 piece of gauze that was cut half-way to the center was placed around the external portion of the gastrostomy button between the button and the skin.    New tube was a Marshal key Low profile tube 20Fr 3cm stem  LOT# [id number]  Expiration date 2026    Complications:   None    Procedure and Technique:  Gastrostomy tube exchange at bedside       Patient Disposition:  hemodynamically stable  Patient told he may begin using tube immediately.         SIGNATURE: Wei Cano PA-C  DATE: 2025  TIME: 1:41 PM

## 2025-01-08 NOTE — ED PROVIDER NOTES
Time reflects when diagnosis was documented in both MDM as applicable and the Disposition within this note       Time User Action Codes Description Comment    1/7/2025  7:52 PM Mart, Halle Add [K94.23] PEG tube malfunction (HCC)     1/7/2025  7:52 PM Mart, Halle Add [T85.528A] Dislodged gastrostomy tube     1/7/2025  7:52 PM Mart, Halle Modify [K94.23] PEG tube malfunction (HCC)     1/7/2025  7:52 PM Mart, Halle Modify [T85.528A] Dislodged gastrostomy tube     1/7/2025  7:52 PM Mart, Halle Remove [K94.23] PEG tube malfunction (HCC)     1/7/2025  8:08 PM Mart, Halle Add [C44.42] Squamous cell carcinoma of neck     1/7/2025  8:08 PM Mart, Halle Remove [C44.42] Squamous cell carcinoma of neck           ED Disposition       ED Disposition   Discharge    Condition   Stable    Date/Time   Tue Jan 7, 2025  7:32 PM    Comment   Mohamud Grimm . discharge to home/self care.                   Assessment & Plan       Medical Decision Making  Mohamud Grimm Sr. is a 59 y.o. male presenting with dislodged PEG tube. Associated symptoms: none. Vitals unremarkable. Exam remarkable for dislodged PEG, small amount of gastric contents visible at site. Abdomen soft, non-tender, non-distended. Exam otherwise unremarkable.      Ddx including but not limited to: PEG dislodgement, PEG malfunction. Doubt intraabdominal pathology requiring intervention or admission at this time.    See ED course for further updates and interpretation of results.    Unable to replace Marshal tube. 18 Fr PEG tube placed at bedside with immediate return of gastric contents. No abdominal pain.    Based on these results and H&P, Patient presenting for dislodged gastric tube, replaced with 18 Fr PEG tube without difficulty. He is stable for discharge..    Results, clinical impressions, and plan were discussed with patient and family via the phone. They expressed understanding and were in agreement with plan. Patient was  given the opportunity to ask questions in ED. All questions and concerns were addressed in ED.    After evaluation and workup in the emergency department Patient appears well, is nontoxic appearing, expresses understanding and agrees with plan of care at this time.  In light of this patient would benefit from outpatient management. Discussed strict return precautions.  Discussed importance of following up with PCP in the next few days.  All questions answered.  Patient is agreeable to discharge.    Amount and/or Complexity of Data Reviewed  Independent Historian: spouse  External Data Reviewed: notes.        ED Course as of 01/08/25 0358   Tue Jan 07, 2025 1929 18 Fr Placed       Medications - No data to display    ED Risk Strat Scores                          SBIRT 22yo+      Flowsheet Row Most Recent Value   Initial Alcohol Screen: US AUDIT-C     1. How often do you have a drink containing alcohol? 0 Filed at: 01/07/2025 1849   2. How many drinks containing alcohol do you have on a typical day you are drinking?  0 Filed at: 01/07/2025 1849   3a. Male UNDER 65: How often do you have five or more drinks on one occasion? 0 Filed at: 01/07/2025 1849   Audit-C Score 0 Filed at: 01/07/2025 1849   [place]: How many times in the past year have you...    Used an illegal drug or used a prescription medication for non-medical reasons? Never Filed at: 01/07/2025 1849                            History of Present Illness       Chief Complaint   Patient presents with    Feeding Tube Problem     Pt laura tube fell out. Was just placed Monday         Past Medical History:   Diagnosis Date    Cancer of base of tongue (HCC)     excision    Cough     CPAP (continuous positive airway pressure) dependence     no currently    Disease of thyroid gland     hypo    Dysphagia     ED (erectile dysfunction)     GERD (gastroesophageal reflux disease)     History of pneumonia     Hypertension     Leukopenia     Peyronie's disease     Pneumonia      PONV (postoperative nausea and vomiting)     Psoriasis     Sleep apnea     Tongue cancer (HCC)     Weight loss, abnormal       Past Surgical History:   Procedure Laterality Date    ESOPHAGOSCOPY N/A 2022    Procedure: ESOPHAGOSCOPY FLEXIBLE;  Surgeon: Devonte Singh MD;  Location: [facility];  Service: ENT    ESOPHAGOSCOPY N/A 2023    Procedure: TRANSNASAL ESOPHAGOSCOPY WITH BALLOON DILATION/ ESOPHAGEAL DILATION (BALLOON);;  Surgeon: Devonte Singh MD;  Location[facility];  Service: ENT    OTHER SURGICAL HISTORY      infusion port inserted and removed    PEG TUBE PLACEMENT      and removal    TN COLONOSCOPY FLX DX W/COLLJ SPEC WHEN PFRMD N/A 2017    Procedure: COLONOSCOPY with polypectomy x2;  Surgeon: Janet Willard MD;  Location: [facility];  Service: General    TN ESOPHAGOSCOPY DILATE ESOPHAGUS BALLOON 30 MM N/A 2024    Procedure: trans nasal endoscopy, infinity balloon dilation upper esophageal sphincter(UES) AND POSTERIOR WALL INJECTION;  Surgeon: Devonte Singh MD;  Location[facility];  Service: ENT    TN LARGSC W/NJX VOCAL CORD THER W/MICRO/TELESCOPE Bilateral 2022    Procedure: micro direct laryngoscopy, vocal fold injection, biopsy epiglottis lesion, posterior pharyngeal wall injection, flexible esophagoscopy with dilation;  Surgeon: Devonte Singh MD;  Location: [facility];  Service: ENT    TONGUE BIOPSY / EXCISION      Floor mouth excision of malignant tumor      Family History   Problem Relation Age of Onset    Other Mother         reactive airway dysfunction    Coronary artery disease Father     Hypertension Father     Psoriasis Father     Thyroid disease unspecified Neg Hx       Social History     Tobacco Use    Smoking status: Former     Current packs/day: 0.00     Average packs/day: 0.3 packs/day for 20.0 years (5.0 ttl pk-yrs)     Types: Cigarettes     Start date: 1991     Quit date: 2011     Years since quittin.0     Passive exposure: Past    Smokeless  tobacco: Former     Quit date: 2011    Tobacco comments:     pt quit with dx of tongue base cancer, per allscripts   Vaping Use    Vaping status: Never Used   Substance Use Topics    Alcohol use: Yes     Alcohol/week: 12.0 standard drinks of alcohol     Types: 12 Cans of beer per week    Drug use: Never      E-Cigarette/Vaping    E-Cigarette Use Never User       E-Cigarette/Vaping Substances    Nicotine No     THC No     CBD No     Flavoring No     Other No     Unknown No       I have reviewed and agree with the history as documented.     HPI    Mohamud Grimm Sr. is a 59 y.o. 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    Patient reports he coughed this afternoon and felt his Marshal gastric tube fall out. Reports it was placed yesterday - had previously had a PEG placed in late November. Denies abdominal pain, nausea, vomiting. Reports he had gotten his feeds prior to this, and reports he is able to take food by mouth slowly and carefully.    No other symptoms at this time.    Review of Systems   Constitutional:  Negative for chills and fever.   HENT:  Negative for congestion, rhinorrhea and sore throat.    Eyes:  Negative for pain and visual disturbance.   Respiratory:  Negative for cough, chest tightness, shortness of breath, wheezing and stridor.    Cardiovascular:  Negative for chest pain, palpitations and leg swelling.   Gastrointestinal:  Negative for abdominal pain, constipation, diarrhea, nausea and vomiting.   Genitourinary:  Negative for dysuria and hematuria.   Musculoskeletal:  Negative for arthralgias and back pain.   Skin:  Negative for color change, pallor and rash.   Neurological:  Negative for dizziness, syncope, light-headedness, numbness and headaches.   Psychiatric/Behavioral:  Negative for behavioral problems.    All other systems reviewed and are negative.          Objective       ED Triage Vitals [01/07/25 1816]    Temperature Pulse Blood Pressure Respirations SpO2 Patient Position - Orthostatic VS   98.2 °F (36.8 °C) 86 141/82 18 97 % Sitting      Temp Source Heart Rate Source BP Location FiO2 (%) Pain Score    Oral Monitor Right arm -- --      Vitals      Date and Time Temp Pulse SpO2 Resp BP Pain Score FACES Pain Rating User   01/07/25 1816 98.2 °F (36.8 °C) 86 97 % 18 141/82 -- -- AK            Physical Exam  Vitals and nursing note reviewed.   Constitutional:       General: He is not in acute distress.     Appearance: Normal appearance. He is well-developed. He is not ill-appearing or toxic-appearing.   HENT:      Head: Normocephalic and atraumatic.      Nose: No rhinorrhea.      Mouth/Throat:      Mouth: Mucous membranes are moist.      Pharynx: Oropharynx is clear.   Eyes:      Extraocular Movements: Extraocular movements intact.      Conjunctiva/sclera: Conjunctivae normal.   Cardiovascular:      Rate and Rhythm: Normal rate and regular rhythm.      Pulses: Normal pulses.      Heart sounds: Normal heart sounds.   Pulmonary:      Effort: Pulmonary effort is normal. No respiratory distress.      Breath sounds: Normal breath sounds.   Abdominal:      General: Abdomen is flat. Bowel sounds are normal. There is no distension.      Palpations: Abdomen is soft.      Tenderness: There is no abdominal tenderness. There is no right CVA tenderness, left CVA tenderness or guarding.      Comments: G-tube site with small amount of gastric contents visible. No overlying skin changes. Other well-healing surgical scars visible.   Musculoskeletal:      Cervical back: Neck supple.      Right lower leg: No edema.      Left lower leg: No edema.   Skin:     General: Skin is warm and dry.      Capillary Refill: Capillary refill takes less than 2 seconds.   Neurological:      General: No focal deficit present.      Mental Status: He is alert and oriented to person, place, and time.   Psychiatric:         Mood and Affect: Mood normal.          Behavior: Behavior normal.         Results Reviewed       None            No orders to display       Feeding Tube    Date/Time: 1/7/2025 5:32 PM    Performed by: Halle Mart MD  Authorized by: Halle Mart MD  Universal Protocol:  Consent: Verbal consent obtained.  Risks and benefits: risks, benefits and alternatives were discussed  Consent given by: patient  Patient understanding: patient states understanding of the procedure being performed  Required items: required blood products, implants, devices, and special equipment available  Patient identity confirmed: verbally with patient and arm band    Patient location:  ED  Pre-procedure details:     Old tube type: Marshal.    Old tube size: 20 Fr.  Indications:     Indications: tube dislodged    Anesthesia (see MAR for exact dosages):     Anesthesia method:  None  Procedure details:     Patient position:  Supine    Procedure type:  Replacement    Tube type:  Gastrostomy    Tube size:  18 Fr    Bulb inflation volume:  6    Bulb inflation fluid:  Sterile water  Post-procedure details:     Placement/position confirmation:  Gastric contents aspirated    Placement difficulty:  None    Bleeding:  Minimal    Patient tolerance of procedure:  Tolerated well, no immediate complications  Comments:      Area was cleaned. Sterile lubricating gel was applied to the original Marshal 20 Fr Marshal tube. Gentle pressure was applied to the tube at the site of the tract but the tube was unable to be passed through tract. Area was cleaned again. Lubricating gel was applied to a new 18 F PEG tube, gentle pressure was once again applied with successful placement of the tube, immediate gastric contents visible in the tube. This was removed and a second attempt to place the original 20 Fr Marshal was again unsuccessful. The new 18 Fr PEG tube was once again inserted into the tract successfully, with immediate gastric contents once again visible. Balloon was inflated with 6 cc of water.  Patient tolerated the procedure well, no immediate complications.        ED Medication and Procedure Management   Prior to Admission Medications   Prescriptions Last Dose Informant Patient Reported? Taking?   Misc Natural Products (CordyMax CS-4) 525 MG CAPS   Yes No   Sig: Take by mouth   NON FORMULARY   Yes No   Sig: Ginseng + Atractylodes   amLODIPine (NORVASC) 5 mg tablet   No No   Sig: TAKE 1 TABLET (5 MG TOTAL) BY MOUTH DAILY.   atropine (ISOPTO ATROPINE) 1 % ophthalmic solution  Self No No   Si-3 drops sublingual as needed for increased saliva/cough   benzonatate (TESSALON) 200 MG capsule   No No   Sig: Take 1 capsule (200 mg total) by mouth 3 (three) times a day as needed for cough   famotidine (PEPCID) 20 mg/2.5 mL oral suspension   No No   Sig: TAKE 5 ML (40 MG TOTAL) BY MOUTH DAILY AT BEDTIME   Patient not taking: Reported on 12/10/2024   ipratropium (ATROVENT) 0.03 % nasal spray   No No   Si sprays into each nostril 3 (three) times a day as needed (nasal drip with meals)   levalbuterol (XOPENEX HFA) 45 mcg/act inhaler  Self No No   Sig: Inhale 2 puffs every 6 (six) hours as needed for wheezing   levothyroxine 100 mcg tablet   No No   Sig: TAKE 1 TABLET BY MOUTH EVERY DAY   multivitamin (THERAGRAN) TABS  Self Yes No   Sig: Take 1 tablet by mouth daily   scopolamine (TRANSDERM-SCOP) 1 mg/3 days TD 72 hr patch   No No   Sig: Place 1 patch on the skin over 72 hours every third day   tadalafil (CIALIS) 5 MG tablet  Self No No   Sig: Take 1 tablet (5 mg total) by mouth daily      Facility-Administered Medications: None     Discharge Medication List as of 2025  8:26 PM        CONTINUE these medications which have NOT CHANGED    Details   amLODIPine (NORVASC) 5 mg tablet TAKE 1 TABLET (5 MG TOTAL) BY MOUTH DAILY., Starting 2024, Normal      atropine (ISOPTO ATROPINE) 1 % ophthalmic solution 2-3 drops sublingual as needed for increased saliva/cough, Normal      benzonatate (TESSALON) 200 MG  capsule Take 1 capsule (200 mg total) by mouth 3 (three) times a day as needed for cough, Starting Thu 10/24/2024, Normal      famotidine (PEPCID) 20 mg/2.5 mL oral suspension TAKE 5 ML (40 MG TOTAL) BY MOUTH DAILY AT BEDTIME, Starting Wed 3/27/2024, Until Tue 12/10/2024, Normal      ipratropium (ATROVENT) 0.03 % nasal spray 2 sprays into each nostril 3 (three) times a day as needed (nasal drip with meals), Starting Mon 1/6/2025, Normal      levalbuterol (XOPENEX HFA) 45 mcg/act inhaler Inhale 2 puffs every 6 (six) hours as needed for wheezing, Starting Mon 7/8/2024, Normal      levothyroxine 100 mcg tablet TAKE 1 TABLET BY MOUTH EVERY DAY, Starting Fri 12/13/2024, Normal      Misc Natural Products (CordyMax CS-4) 525 MG CAPS Take by mouth, Historical Med      multivitamin (THERAGRAN) TABS Take 1 tablet by mouth daily, Historical Med      NON FORMULARY Ginseng + Atractylodes, Historical Med      scopolamine (TRANSDERM-SCOP) 1 mg/3 days TD 72 hr patch Place 1 patch on the skin over 72 hours every third day, Starting Mon 12/16/2024, Normal      tadalafil (CIALIS) 5 MG tablet Take 1 tablet (5 mg total) by mouth daily, Starting Wed 8/10/2022, Normal           No discharge procedures on file.  ED SEPSIS DOCUMENTATION   Time reflects when diagnosis was documented in both MDM as applicable and the Disposition within this note       Time User Action Codes Description Comment    1/7/2025  7:52 PM Halle Mart Add [K94.23] PEG tube malfunction (HCC)     1/7/2025  7:52 PM Halle Mart Add [T85.528A] Dislodged gastrostomy tube     1/7/2025  7:52 PM Halle Mart Modify [K94.23] PEG tube malfunction (HCC)     1/7/2025  7:52 PM MartHalle menard Modify [T85.528A] Dislodged gastrostomy tube     1/7/2025  7:52 PM Halle Mart Remove [K94.23] PEG tube malfunction (HCC)     1/7/2025  8:08 PM Halle Mart Add [C44.42] Squamous cell carcinoma of neck     1/7/2025  8:08 PM Halle Mart Remove [C44.42]  Squamous cell carcinoma of neck                  Halle Mart MD  01/08/25 9558

## 2025-01-08 NOTE — ED ATTENDING ATTESTATION
1/7/2025  ISeymour DO, saw and evaluated the patient. I have discussed the patient with the resident/non-physician practitioner and agree with the resident's/non-physician practitioner's findings, Plan of Care, and MDM as documented in the resident's/non-physician practitioner's note, except where noted. All available labs and Radiology studies were reviewed.  I was present for key portions of any procedure(s) performed by the resident/non-physician practitioner and I was immediately available to provide assistance.       At this point I agree with the current assessment done in the Emergency Department.  I have conducted an independent evaluation of this patient a history and physical is as follows:              1. Dislodged gastrostomy tube            MDM  Number of Diagnoses or Management Options  Dislodged gastrostomy tube  Diagnosis management comments:       Initial ED assessment:   59-year-old male dislodged G-tube        Initial ED plan:    Attempted to replace his smaller G-tube that he had with him unfortunately this tube would not fit, had to be size down to a 18 Kinyarwanda        Final ED summary/disposition:   After evaluation and workup in the emergency department, replaced 18 Kinyarwanda G-tube               Time reflects when diagnosis was documented in both MDM as applicable and the Disposition within this note       Time User Action Codes Description Comment    1/7/2025  7:52 PM Mart, Halle Add [K94.23] PEG tube malfunction (HCC)     1/7/2025  7:52 PM Mart, Halle Add [T85.528A] Dislodged gastrostomy tube     1/7/2025  7:52 PM Mart, Halle Modify [K94.23] PEG tube malfunction (HCC)     1/7/2025  7:52 PM Mart, Halle Modify [T85.528A] Dislodged gastrostomy tube     1/7/2025  7:52 PM Mart, Halle Remove [K94.23] PEG tube malfunction (HCC)     1/7/2025  8:08 PM Mart, Halle Add [C44.42] Squamous cell carcinoma of neck     1/7/2025  8:08 PM Mart, Halle  Remove [C44.42] Squamous cell carcinoma of neck           ED Disposition       ED Disposition   Discharge    Condition   Stable    Date/Time   Tue Jan 7, 2025  7:32 PM    Comment   Mohamud Grimm Sr. discharge to home/self care.                   Follow-up Information       Follow up With Specialties Details Why Contact Info Additional Information    Raphael Lopez MD Family Medicine Go to  Re-evaluation of symptoms [address]  [address]  [place] [id number] [phone]        [facility] Emergency Department Emergency Medicine Go to  As needed, If symptoms worsen [address] [address] [address]  [phone] [facility] Emergency Department, [facility] [place], [place], [address]                          Chief Complaint   Patient presents with    Feeding Tube Problem     Pt laura tube fell out. Was just placed Monday               59-year-old male, his G-tube dislodged, no pain no drainage, has G-tube due to malnutrition and cancer.      Feeding Tube Problem                Visit Vitals  /82 (BP Location: Right arm)   Pulse 86   Temp 98.2 °F (36.8 °C) (Oral)   Resp 18   SpO2 97%   Smoking Status Former        Physical Exam  Vitals reviewed.   Constitutional:       General: He is not in acute distress.     Appearance: He is well-developed.   HENT:      Head: Atraumatic.      Nose: Nose normal.   Eyes:      General: No scleral icterus.        Right eye: No discharge.         Left eye: No discharge.      Conjunctiva/sclera: Conjunctivae normal.   Neck:      Trachea: No tracheal deviation.   Pulmonary:      Effort: Pulmonary effort is normal. No respiratory distress.      Breath sounds: No stridor.   Musculoskeletal:         General: No deformity.      Cervical back: Normal range of motion.   Skin:     General: Skin is warm and dry.      Coloration: Skin is not pale.      Findings: No erythema or rash.      Comments: G-tube left  upper quadrant site   Neurological:      Mental Status: He is alert.      Motor: No abnormal muscle tone.      Coordination: Coordination normal.                       Medications - No data to display          Labs Reviewed - No data to display      No orders to display                  Procedures

## 2025-01-08 NOTE — DISCHARGE INSTRUCTIONS
Your tube was replaced with an 18 Fr tube  Please follow-up with your GI doctor for further evaluation

## 2025-01-08 NOTE — TELEPHONE ENCOUNTER
I spoke with patient and his wife. He is would like to have laura-Key tube placed again. They are leaving for [place] next Friday.     Mrs. Grimm is requesting training and extra supplies to have on hand. She also mentioned that on a site she was reading that other patients have had extra tubes on hand and were trained to reinsert in case something like this happens in the future. She also asked if balloon wasn't inflated properly since it dislodged after Mr. Grimm coughed. She also noted the tube is marked as 20 but the procedure notes list 18 as being placed. They would like a call back today with plan.

## 2025-01-10 LAB
DME PARACHUTE DELIVERY DATE REQUESTED: NORMAL
DME PARACHUTE ITEM DESCRIPTION: NORMAL
DME PARACHUTE ORDER STATUS: NORMAL
DME PARACHUTE SUPPLIER NAME: NORMAL
DME PARACHUTE SUPPLIER PHONE: NORMAL

## 2025-02-02 ENCOUNTER — APPOINTMENT (EMERGENCY)
Dept: RADIOLOGY | Facility: HOSPITAL | Age: 60
DRG: 871 | End: 2025-02-02
Payer: COMMERCIAL

## 2025-02-02 ENCOUNTER — HOSPITAL ENCOUNTER (INPATIENT)
Facility: HOSPITAL | Age: 60
LOS: 2 days | Discharge: HOME | DRG: 871 | End: 2025-02-06
Attending: EMERGENCY MEDICINE | Admitting: INTERNAL MEDICINE
Payer: COMMERCIAL

## 2025-02-02 DIAGNOSIS — J18.9 PNEUMONIA: ICD-10-CM

## 2025-02-02 DIAGNOSIS — R79.89 ELEVATED TROPONIN: Primary | ICD-10-CM

## 2025-02-02 DIAGNOSIS — Z85.810 HISTORY OF TONGUE CANCER: ICD-10-CM

## 2025-02-02 DIAGNOSIS — K59.00 CONSTIPATION: ICD-10-CM

## 2025-02-02 DIAGNOSIS — I10 ESSENTIAL HYPERTENSION: ICD-10-CM

## 2025-02-02 DIAGNOSIS — J96.01 ACUTE RESPIRATORY FAILURE WITH HYPOXIA (HCC): ICD-10-CM

## 2025-02-02 DIAGNOSIS — K21.9 GERD (GASTROESOPHAGEAL REFLUX DISEASE): ICD-10-CM

## 2025-02-02 DIAGNOSIS — R13.10 SEVERE SWALLOWING DYSFUNCTION: ICD-10-CM

## 2025-02-02 DIAGNOSIS — D72.819 CHRONIC LEUKOPENIA: ICD-10-CM

## 2025-02-02 LAB
ALBUMIN SERPL BCG-MCNC: 3.6 G/DL (ref 3.5–5)
ALP SERPL-CCNC: 86 U/L (ref 34–104)
ALT SERPL W P-5'-P-CCNC: 37 U/L (ref 7–52)
ANION GAP SERPL CALCULATED.3IONS-SCNC: 8 MMOL/L (ref 4–13)
APTT PPP: 34 SECONDS (ref 23–34)
AST SERPL W P-5'-P-CCNC: 34 U/L (ref 13–39)
BASOPHILS # BLD AUTO: 0 THOUSANDS/ΜL (ref 0–0.1)
BASOPHILS NFR BLD AUTO: 0 % (ref 0–1)
BILIRUB SERPL-MCNC: 0.45 MG/DL (ref 0.2–1)
BUN SERPL-MCNC: 24 MG/DL (ref 5–25)
CALCIUM SERPL-MCNC: 8.7 MG/DL (ref 8.4–10.2)
CARDIAC TROPONIN I PNL SERPL HS: 2290 NG/L (ref ?–50)
CHLORIDE SERPL-SCNC: 95 MMOL/L (ref 96–108)
CO2 SERPL-SCNC: 29 MMOL/L (ref 21–32)
CREAT SERPL-MCNC: 0.54 MG/DL (ref 0.6–1.3)
EOSINOPHIL # BLD AUTO: 0 THOUSAND/ΜL (ref 0–0.61)
EOSINOPHIL NFR BLD AUTO: 0 % (ref 0–6)
ERYTHROCYTE [DISTWIDTH] IN BLOOD BY AUTOMATED COUNT: 14.3 % (ref 11.6–15.1)
FLUAV AG UPPER RESP QL IA.RAPID: NEGATIVE
FLUBV AG UPPER RESP QL IA.RAPID: NEGATIVE
GFR SERPL CREATININE-BSD FRML MDRD: 113 ML/MIN/1.73SQ M
GLUCOSE SERPL-MCNC: 182 MG/DL (ref 65–140)
HCT VFR BLD AUTO: 37.1 % (ref 36.5–49.3)
HGB BLD-MCNC: 12.1 G/DL (ref 12–17)
IMM GRANULOCYTES # BLD AUTO: 0.03 THOUSAND/UL (ref 0–0.2)
IMM GRANULOCYTES NFR BLD AUTO: 1 % (ref 0–2)
INR PPP: 1.03 (ref 0.85–1.19)
LACTATE SERPL-SCNC: 1.4 MMOL/L (ref 0.5–2)
LYMPHOCYTES # BLD AUTO: 0.35 THOUSANDS/ΜL (ref 0.6–4.47)
LYMPHOCYTES NFR BLD AUTO: 10 % (ref 14–44)
MCH RBC QN AUTO: 28.9 PG (ref 26.8–34.3)
MCHC RBC AUTO-ENTMCNC: 32.6 G/DL (ref 31.4–37.4)
MCV RBC AUTO: 89 FL (ref 82–98)
MONOCYTES # BLD AUTO: 0.33 THOUSAND/ΜL (ref 0.17–1.22)
MONOCYTES NFR BLD AUTO: 9 % (ref 4–12)
NEUTROPHILS # BLD AUTO: 2.95 THOUSANDS/ΜL (ref 1.85–7.62)
NEUTS SEG NFR BLD AUTO: 80 % (ref 43–75)
NRBC BLD AUTO-RTO: 0 /100 WBCS
PLATELET # BLD AUTO: 223 THOUSANDS/UL (ref 149–390)
PMV BLD AUTO: 9.8 FL (ref 8.9–12.7)
POTASSIUM SERPL-SCNC: 3.8 MMOL/L (ref 3.5–5.3)
PROCALCITONIN SERPL-MCNC: 0.86 NG/ML
PROT SERPL-MCNC: 7 G/DL (ref 6.4–8.4)
PROTHROMBIN TIME: 14.2 SECONDS (ref 12.3–15)
RBC # BLD AUTO: 4.18 MILLION/UL (ref 3.88–5.62)
SARS-COV+SARS-COV-2 AG RESP QL IA.RAPID: NEGATIVE
SODIUM SERPL-SCNC: 132 MMOL/L (ref 135–147)
WBC # BLD AUTO: 3.66 THOUSAND/UL (ref 4.31–10.16)

## 2025-02-02 PROCEDURE — 99291 CRITICAL CARE FIRST HOUR: CPT | Performed by: EMERGENCY MEDICINE

## 2025-02-02 PROCEDURE — 36415 COLL VENOUS BLD VENIPUNCTURE: CPT | Performed by: EMERGENCY MEDICINE

## 2025-02-02 PROCEDURE — 87040 BLOOD CULTURE FOR BACTERIA: CPT | Performed by: EMERGENCY MEDICINE

## 2025-02-02 PROCEDURE — 96365 THER/PROPH/DIAG IV INF INIT: CPT

## 2025-02-02 PROCEDURE — 80053 COMPREHEN METABOLIC PANEL: CPT | Performed by: EMERGENCY MEDICINE

## 2025-02-02 PROCEDURE — 71046 X-RAY EXAM CHEST 2 VIEWS: CPT

## 2025-02-02 PROCEDURE — 94640 AIRWAY INHALATION TREATMENT: CPT

## 2025-02-02 PROCEDURE — 85610 PROTHROMBIN TIME: CPT | Performed by: EMERGENCY MEDICINE

## 2025-02-02 PROCEDURE — 84145 PROCALCITONIN (PCT): CPT | Performed by: EMERGENCY MEDICINE

## 2025-02-02 PROCEDURE — 94760 N-INVAS EAR/PLS OXIMETRY 1: CPT

## 2025-02-02 PROCEDURE — 93005 ELECTROCARDIOGRAM TRACING: CPT

## 2025-02-02 PROCEDURE — 85730 THROMBOPLASTIN TIME PARTIAL: CPT | Performed by: EMERGENCY MEDICINE

## 2025-02-02 PROCEDURE — 87811 SARS-COV-2 COVID19 W/OPTIC: CPT | Performed by: EMERGENCY MEDICINE

## 2025-02-02 PROCEDURE — 84484 ASSAY OF TROPONIN QUANT: CPT

## 2025-02-02 PROCEDURE — 85025 COMPLETE CBC W/AUTO DIFF WBC: CPT | Performed by: EMERGENCY MEDICINE

## 2025-02-02 PROCEDURE — 84484 ASSAY OF TROPONIN QUANT: CPT | Performed by: EMERGENCY MEDICINE

## 2025-02-02 PROCEDURE — 87081 CULTURE SCREEN ONLY: CPT

## 2025-02-02 PROCEDURE — 99285 EMERGENCY DEPT VISIT HI MDM: CPT

## 2025-02-02 PROCEDURE — 87804 INFLUENZA ASSAY W/OPTIC: CPT | Performed by: EMERGENCY MEDICINE

## 2025-02-02 PROCEDURE — 83605 ASSAY OF LACTIC ACID: CPT | Performed by: EMERGENCY MEDICINE

## 2025-02-02 RX ORDER — SODIUM CHLORIDE, SODIUM GLUCONATE, SODIUM ACETATE, POTASSIUM CHLORIDE, MAGNESIUM CHLORIDE, SODIUM PHOSPHATE, DIBASIC, AND POTASSIUM PHOSPHATE .53; .5; .37; .037; .03; .012; .00082 G/100ML; G/100ML; G/100ML; G/100ML; G/100ML; G/100ML; G/100ML
1000 INJECTION, SOLUTION INTRAVENOUS ONCE
Status: COMPLETED | OUTPATIENT
Start: 2025-02-02 | End: 2025-02-03

## 2025-02-02 RX ORDER — VANCOMYCIN HYDROCHLORIDE 1 G/200ML
15 INJECTION, SOLUTION INTRAVENOUS EVERY 12 HOURS
Status: DISCONTINUED | OUTPATIENT
Start: 2025-02-02 | End: 2025-02-02 | Stop reason: DRUGHIGH

## 2025-02-02 RX ORDER — BENZONATATE 100 MG/1
200 CAPSULE ORAL 3 TIMES DAILY PRN
Status: DISCONTINUED | OUTPATIENT
Start: 2025-02-02 | End: 2025-02-06 | Stop reason: HOSPADM

## 2025-02-02 RX ORDER — IPRATROPIUM BROMIDE 21 UG/1
2 SPRAY, METERED NASAL 3 TIMES DAILY PRN
Status: DISCONTINUED | OUTPATIENT
Start: 2025-02-02 | End: 2025-02-03

## 2025-02-02 RX ORDER — ENOXAPARIN SODIUM 100 MG/ML
40 INJECTION SUBCUTANEOUS DAILY
Status: DISCONTINUED | OUTPATIENT
Start: 2025-02-03 | End: 2025-02-06 | Stop reason: HOSPADM

## 2025-02-02 RX ORDER — CALCIUM CARBONATE 500 MG/1
1000 TABLET, CHEWABLE ORAL DAILY PRN
Status: DISCONTINUED | OUTPATIENT
Start: 2025-02-02 | End: 2025-02-06 | Stop reason: HOSPADM

## 2025-02-02 RX ORDER — ACETAMINOPHEN 325 MG/1
650 TABLET ORAL EVERY 6 HOURS PRN
Status: DISCONTINUED | OUTPATIENT
Start: 2025-02-02 | End: 2025-02-06 | Stop reason: HOSPADM

## 2025-02-02 RX ORDER — AZITHROMYCIN 250 MG/1
500 TABLET, FILM COATED ORAL EVERY 24 HOURS
Status: DISCONTINUED | OUTPATIENT
Start: 2025-02-02 | End: 2025-02-03

## 2025-02-02 RX ORDER — LEVALBUTEROL INHALATION SOLUTION 0.63 MG/3ML
0.63 SOLUTION RESPIRATORY (INHALATION)
Status: DISCONTINUED | OUTPATIENT
Start: 2025-02-02 | End: 2025-02-05

## 2025-02-02 RX ORDER — BUDESONIDE 0.5 MG/2ML
0.5 INHALANT ORAL
Status: DISCONTINUED | OUTPATIENT
Start: 2025-02-02 | End: 2025-02-05

## 2025-02-02 RX ORDER — AMLODIPINE BESYLATE 5 MG/1
5 TABLET ORAL DAILY
Status: DISCONTINUED | OUTPATIENT
Start: 2025-02-03 | End: 2025-02-06 | Stop reason: HOSPADM

## 2025-02-02 RX ORDER — TADALAFIL 5 MG/1
5 TABLET ORAL DAILY
Status: DISCONTINUED | OUTPATIENT
Start: 2025-02-03 | End: 2025-02-03

## 2025-02-02 RX ORDER — SODIUM CHLORIDE, SODIUM GLUCONATE, SODIUM ACETATE, POTASSIUM CHLORIDE, MAGNESIUM CHLORIDE, SODIUM PHOSPHATE, DIBASIC, AND POTASSIUM PHOSPHATE .53; .5; .37; .037; .03; .012; .00082 G/100ML; G/100ML; G/100ML; G/100ML; G/100ML; G/100ML; G/100ML
100 INJECTION, SOLUTION INTRAVENOUS CONTINUOUS
Status: DISCONTINUED | OUTPATIENT
Start: 2025-02-02 | End: 2025-02-02

## 2025-02-02 RX ORDER — ACETAMINOPHEN 10 MG/ML
1000 INJECTION, SOLUTION INTRAVENOUS ONCE
Status: COMPLETED | OUTPATIENT
Start: 2025-02-02 | End: 2025-02-02

## 2025-02-02 RX ORDER — ONDANSETRON 2 MG/ML
4 INJECTION INTRAMUSCULAR; INTRAVENOUS EVERY 6 HOURS PRN
Status: DISCONTINUED | OUTPATIENT
Start: 2025-02-02 | End: 2025-02-06 | Stop reason: HOSPADM

## 2025-02-02 RX ORDER — ALBUTEROL SULFATE 90 UG/1
2 INHALANT RESPIRATORY (INHALATION) EVERY 6 HOURS PRN
Status: DISCONTINUED | OUTPATIENT
Start: 2025-02-02 | End: 2025-02-06 | Stop reason: HOSPADM

## 2025-02-02 RX ORDER — SENNOSIDES 8.6 MG
1 TABLET ORAL DAILY
Status: DISCONTINUED | OUTPATIENT
Start: 2025-02-03 | End: 2025-02-06 | Stop reason: HOSPADM

## 2025-02-02 RX ORDER — LEVOTHYROXINE SODIUM 100 UG/1
100 TABLET ORAL
Status: DISCONTINUED | OUTPATIENT
Start: 2025-02-03 | End: 2025-02-06 | Stop reason: HOSPADM

## 2025-02-02 RX ADMIN — AZITHROMYCIN DIHYDRATE 500 MG: 250 TABLET ORAL at 23:06

## 2025-02-02 RX ADMIN — LEVALBUTEROL HYDROCHLORIDE 0.63 MG: 0.63 SOLUTION RESPIRATORY (INHALATION) at 23:39

## 2025-02-02 RX ADMIN — SODIUM CHLORIDE 1000 ML: 0.9 INJECTION, SOLUTION INTRAVENOUS at 20:37

## 2025-02-02 RX ADMIN — SODIUM CHLORIDE, SODIUM GLUCONATE, SODIUM ACETATE, POTASSIUM CHLORIDE, MAGNESIUM CHLORIDE, SODIUM PHOSPHATE, DIBASIC, AND POTASSIUM PHOSPHATE 1000 ML: .53; .5; .37; .037; .03; .012; .00082 INJECTION, SOLUTION INTRAVENOUS at 23:05

## 2025-02-02 RX ADMIN — CEFEPIME 2000 MG: 2 INJECTION, POWDER, FOR SOLUTION INTRAVENOUS at 23:03

## 2025-02-02 RX ADMIN — ACETAMINOPHEN 1000 MG: 10 INJECTION INTRAVENOUS at 20:38

## 2025-02-02 RX ADMIN — BUDESONIDE INHALATION 0.5 MG: 0.5 SUSPENSION RESPIRATORY (INHALATION) at 23:39

## 2025-02-02 RX ADMIN — CEFTRIAXONE SODIUM 1000 MG: 10 INJECTION, POWDER, FOR SOLUTION INTRAVENOUS at 20:39

## 2025-02-03 ENCOUNTER — APPOINTMENT (OUTPATIENT)
Dept: NON INVASIVE DIAGNOSTICS | Facility: HOSPITAL | Age: 60
DRG: 871 | End: 2025-02-03
Payer: COMMERCIAL

## 2025-02-03 LAB
2HR DELTA HS TROPONIN: 274 NG/L
4HR DELTA HS TROPONIN: -843 NG/L
ANION GAP SERPL CALCULATED.3IONS-SCNC: 3 MMOL/L (ref 4–13)
AORTIC ROOT: 3.4 CM
AORTIC VALVE MEAN VELOCITY: 12.3 M/S
ASCENDING AORTA: 4.3 CM
ATRIAL RATE: 70 BPM
ATRIAL RATE: 94 BPM
AV AREA BY CONTINUOUS VTI: 2.6 CM2
AV AREA PEAK VELOCITY: 2.4 CM2
AV LVOT MEAN GRADIENT: 2 MMHG
AV LVOT PEAK GRADIENT: 4 MMHG
AV MEAN PRESS GRAD SYS DOP V1V2: 7 MMHG
AV ORIFICE AREA US: 2.59 CM2
AV PEAK GRADIENT: 14 MMHG
AV VELOCITY RATIO: 0.57
AV VMAX SYS DOP: 1.84 M/S
BASOPHILS # BLD AUTO: 0.01 THOUSANDS/ΜL (ref 0–0.1)
BASOPHILS NFR BLD AUTO: 0 % (ref 0–1)
BSA FOR ECHO PROCEDURE: 1.83 M2
BUN SERPL-MCNC: 17 MG/DL (ref 5–25)
CALCIUM SERPL-MCNC: 8.1 MG/DL (ref 8.4–10.2)
CARDIAC TROPONIN I PNL SERPL HS: 1447 NG/L (ref ?–50)
CARDIAC TROPONIN I PNL SERPL HS: 2564 NG/L (ref ?–50)
CHLORIDE SERPL-SCNC: 100 MMOL/L (ref 96–108)
CO2 SERPL-SCNC: 34 MMOL/L (ref 21–32)
CREAT SERPL-MCNC: 0.36 MG/DL (ref 0.6–1.3)
DOP CALC AO VTI: 35.34 CM
DOP CALC LVOT AREA: 4.52 CM2
DOP CALC LVOT CARDIAC INDEX: 3.66 L/MIN/M2
DOP CALC LVOT CARDIAC OUTPUT: 6.7 L/MIN
DOP CALC LVOT DIAMETER: 2.4 CM
DOP CALC LVOT PEAK VEL VTI: 20.26 CM
DOP CALC LVOT PEAK VEL: 0.98 M/S
DOP CALC LVOT STROKE INDEX: 51.4 ML/M2
DOP CALC LVOT STROKE VOLUME: 91.61
E WAVE DECELERATION TIME: 224 MS
E/A RATIO: 0.69
EOSINOPHIL # BLD AUTO: 0 THOUSAND/ΜL (ref 0–0.61)
EOSINOPHIL NFR BLD AUTO: 0 % (ref 0–6)
ERYTHROCYTE [DISTWIDTH] IN BLOOD BY AUTOMATED COUNT: 14.4 % (ref 11.6–15.1)
FRACTIONAL SHORTENING: 34 (ref 28–44)
GFR SERPL CREATININE-BSD FRML MDRD: 133 ML/MIN/1.73SQ M
GLUCOSE P FAST SERPL-MCNC: 84 MG/DL (ref 65–99)
GLUCOSE SERPL-MCNC: 84 MG/DL (ref 65–140)
HCT VFR BLD AUTO: 35.4 % (ref 36.5–49.3)
HGB BLD-MCNC: 11.5 G/DL (ref 12–17)
IMM GRANULOCYTES # BLD AUTO: 0.01 THOUSAND/UL (ref 0–0.2)
IMM GRANULOCYTES NFR BLD AUTO: 0 % (ref 0–2)
INTERVENTRICULAR SEPTUM IN DIASTOLE (PARASTERNAL SHORT AXIS VIEW): 1.3 CM
INTERVENTRICULAR SEPTUM: 1.3 CM (ref 0.6–1.1)
L PNEUMO1 AG UR QL IA.RAPID: NEGATIVE
LAAS-AP2: 21.4 CM2
LAAS-AP4: 20.6 CM2
LEFT ATRIUM SIZE: 4 CM
LEFT ATRIUM VOLUME (MOD BIPLANE): 63 ML
LEFT ATRIUM VOLUME INDEX (MOD BIPLANE): 34.4 ML/M2
LEFT INTERNAL DIMENSION IN SYSTOLE: 3.3 CM (ref 2.1–4)
LEFT VENTRICULAR INTERNAL DIMENSION IN DIASTOLE: 5 CM (ref 3.5–6)
LEFT VENTRICULAR POSTERIOR WALL IN END DIASTOLE: 1.1 CM
LEFT VENTRICULAR STROKE VOLUME: 77 ML
LV EF US.2D.A4C+ESTIMATED: 62 %
LVSV (TEICH): 77 ML
LYMPHOCYTES # BLD AUTO: 0.62 THOUSANDS/ΜL (ref 0.6–4.47)
LYMPHOCYTES NFR BLD AUTO: 17 % (ref 14–44)
MAGNESIUM SERPL-MCNC: 2 MG/DL (ref 1.9–2.7)
MCH RBC QN AUTO: 29.7 PG (ref 26.8–34.3)
MCHC RBC AUTO-ENTMCNC: 32.5 G/DL (ref 31.4–37.4)
MCV RBC AUTO: 92 FL (ref 82–98)
MONOCYTES # BLD AUTO: 0.32 THOUSAND/ΜL (ref 0.17–1.22)
MONOCYTES NFR BLD AUTO: 9 % (ref 4–12)
MV E'TISSUE VEL-SEP: 9 CM/S
MV PEAK A VEL: 0.91 M/S
MV PEAK E VEL: 63 CM/S
MV STENOSIS PRESSURE HALF TIME: 65 MS
MV VALVE AREA P 1/2 METHOD: 3.38
NEUTROPHILS # BLD AUTO: 2.64 THOUSANDS/ΜL (ref 1.85–7.62)
NEUTS SEG NFR BLD AUTO: 74 % (ref 43–75)
NRBC BLD AUTO-RTO: 0 /100 WBCS
P AXIS: 35 DEGREES
P AXIS: 53 DEGREES
PHOSPHATE SERPL-MCNC: 2.9 MG/DL (ref 2.3–4.1)
PLATELET # BLD AUTO: 172 THOUSANDS/UL (ref 149–390)
PMV BLD AUTO: 9.4 FL (ref 8.9–12.7)
POTASSIUM SERPL-SCNC: 3.7 MMOL/L (ref 3.5–5.3)
PR INTERVAL: 152 MS
PR INTERVAL: 162 MS
PROCALCITONIN SERPL-MCNC: 0.75 NG/ML
QRS AXIS: 28 DEGREES
QRS AXIS: 61 DEGREES
QRSD INTERVAL: 102 MS
QRSD INTERVAL: 92 MS
QT INTERVAL: 366 MS
QT INTERVAL: 402 MS
QTC INTERVAL: 434 MS
QTC INTERVAL: 457 MS
RBC # BLD AUTO: 3.87 MILLION/UL (ref 3.88–5.62)
RIGHT ATRIUM AREA SYSTOLE A4C: 19.1 CM2
RIGHT VENTRICLE ID DIMENSION: 3.3 CM
S PNEUM AG UR QL: NEGATIVE
SINOTUBULAR JUNCTION: 3.3 CM
SL CV LEFT ATRIUM LENGTH A2C: 6 CM
SL CV LV EF: 60
SL CV PED ECHO LEFT VENTRICLE DIASTOLIC VOLUME (MOD BIPLANE) 2D: 120 ML
SL CV PED ECHO LEFT VENTRICLE SYSTOLIC VOLUME (MOD BIPLANE) 2D: 44 ML
SL CV SINUS OF VALSALVA 2D: 4 CM
SODIUM SERPL-SCNC: 137 MMOL/L (ref 135–147)
STJ: 3.3 CM
T WAVE AXIS: 31 DEGREES
T WAVE AXIS: 38 DEGREES
VANCOMYCIN TROUGH SERPL-MCNC: 9.2 UG/ML (ref 10–20)
VENTRICULAR RATE: 70 BPM
VENTRICULAR RATE: 94 BPM
WBC # BLD AUTO: 3.6 THOUSAND/UL (ref 4.31–10.16)

## 2025-02-03 PROCEDURE — 84484 ASSAY OF TROPONIN QUANT: CPT

## 2025-02-03 PROCEDURE — 80202 ASSAY OF VANCOMYCIN: CPT

## 2025-02-03 PROCEDURE — 36415 COLL VENOUS BLD VENIPUNCTURE: CPT

## 2025-02-03 PROCEDURE — 87205 SMEAR GRAM STAIN: CPT

## 2025-02-03 PROCEDURE — 99223 1ST HOSP IP/OBS HIGH 75: CPT | Performed by: INTERNAL MEDICINE

## 2025-02-03 PROCEDURE — 85025 COMPLETE CBC W/AUTO DIFF WBC: CPT

## 2025-02-03 PROCEDURE — 87449 NOS EACH ORGANISM AG IA: CPT

## 2025-02-03 PROCEDURE — 83735 ASSAY OF MAGNESIUM: CPT

## 2025-02-03 PROCEDURE — 80048 BASIC METABOLIC PNL TOTAL CA: CPT

## 2025-02-03 PROCEDURE — 93306 TTE W/DOPPLER COMPLETE: CPT | Performed by: INTERNAL MEDICINE

## 2025-02-03 PROCEDURE — 93306 TTE W/DOPPLER COMPLETE: CPT

## 2025-02-03 PROCEDURE — 84100 ASSAY OF PHOSPHORUS: CPT

## 2025-02-03 PROCEDURE — 87070 CULTURE OTHR SPECIMN AEROBIC: CPT

## 2025-02-03 PROCEDURE — 94664 DEMO&/EVAL PT USE INHALER: CPT

## 2025-02-03 PROCEDURE — 84145 PROCALCITONIN (PCT): CPT

## 2025-02-03 PROCEDURE — 93010 ELECTROCARDIOGRAM REPORT: CPT | Performed by: INTERNAL MEDICINE

## 2025-02-03 PROCEDURE — 94640 AIRWAY INHALATION TREATMENT: CPT

## 2025-02-03 PROCEDURE — 94760 N-INVAS EAR/PLS OXIMETRY 1: CPT

## 2025-02-03 PROCEDURE — 92610 EVALUATE SWALLOWING FUNCTION: CPT

## 2025-02-03 RX ORDER — KETOROLAC TROMETHAMINE 30 MG/ML
15 INJECTION, SOLUTION INTRAMUSCULAR; INTRAVENOUS ONCE
Status: COMPLETED | OUTPATIENT
Start: 2025-02-03 | End: 2025-02-03

## 2025-02-03 RX ADMIN — CEFEPIME 2000 MG: 2 INJECTION, POWDER, FOR SOLUTION INTRAVENOUS at 06:10

## 2025-02-03 RX ADMIN — VANCOMYCIN HYDROCHLORIDE 1750 MG: 5 INJECTION, POWDER, LYOPHILIZED, FOR SOLUTION INTRAVENOUS at 10:41

## 2025-02-03 RX ADMIN — BENZONATATE 200 MG: 100 CAPSULE ORAL at 01:06

## 2025-02-03 RX ADMIN — CEFEPIME 2000 MG: 2 INJECTION, POWDER, FOR SOLUTION INTRAVENOUS at 13:41

## 2025-02-03 RX ADMIN — LEVOTHYROXINE SODIUM 100 MCG: 0.1 TABLET ORAL at 05:49

## 2025-02-03 RX ADMIN — ACETAMINOPHEN 650 MG: 325 TABLET, FILM COATED ORAL at 19:58

## 2025-02-03 RX ADMIN — VANCOMYCIN HYDROCHLORIDE 1750 MG: 5 INJECTION, POWDER, LYOPHILIZED, FOR SOLUTION INTRAVENOUS at 00:18

## 2025-02-03 RX ADMIN — LEVALBUTEROL HYDROCHLORIDE 0.63 MG: 0.63 SOLUTION RESPIRATORY (INHALATION) at 20:52

## 2025-02-03 RX ADMIN — LEVALBUTEROL HYDROCHLORIDE 0.63 MG: 0.63 SOLUTION RESPIRATORY (INHALATION) at 13:19

## 2025-02-03 RX ADMIN — BUDESONIDE INHALATION 0.5 MG: 0.5 SUSPENSION RESPIRATORY (INHALATION) at 20:52

## 2025-02-03 RX ADMIN — BUDESONIDE INHALATION 0.5 MG: 0.5 SUSPENSION RESPIRATORY (INHALATION) at 09:51

## 2025-02-03 RX ADMIN — KETOROLAC TROMETHAMINE 15 MG: 30 INJECTION, SOLUTION INTRAMUSCULAR; INTRAVENOUS at 23:12

## 2025-02-03 RX ADMIN — AMLODIPINE BESYLATE 5 MG: 5 TABLET ORAL at 09:50

## 2025-02-03 RX ADMIN — LEVALBUTEROL HYDROCHLORIDE 0.63 MG: 0.63 SOLUTION RESPIRATORY (INHALATION) at 09:51

## 2025-02-03 RX ADMIN — CEFTRIAXONE SODIUM 1000 MG: 10 INJECTION, POWDER, FOR SOLUTION INTRAVENOUS at 20:10

## 2025-02-03 RX ADMIN — VANCOMYCIN HYDROCHLORIDE 1750 MG: 5 INJECTION, POWDER, LYOPHILIZED, FOR SOLUTION INTRAVENOUS at 21:05

## 2025-02-03 NOTE — QUICK NOTE
"Subjective:  This is a 60 yo M with past history of esophageal cancer (2012) treated with chemo and radiation, chronic dysphagia s/p gastrostomy tube placement, recurrent pneumonia (last hospitalized 3mo ago) who presents with fever, cough, and worsening SOB. He reports that he was visiting family in [place] when he started experiencing his symptoms 3 days ago. He reports that febrile to 102 several days ago. He was seen by an outpatient provider in [place], who prescribed a 10 day course of doxycycline for presumed pneumonia. The patient returned home from [place] yesterday, and experienced worsening of symptoms, leading to his ED visit. The patient states that he normally only uses 2L oxygen when he sleeps at night. SIRS positive for tachycardia and tachypnea on presentation. He denies chest pain, nausea/vomiting, dysuria, increased frequency or urgency of urination. Patient started on azithromycin, cefepime, and vancomycin.    Of note, the patient states that his grandson in [place] has been sick recently with \"the cold\". The patient also has history of chronic dysphagia and high aspiration risk due to esophageal cancer and treatment.       Objective:  Episode of hypotension to 86/56 last night, which resolved after 2 boluses of 1L NS. Otherwise afebrile, satting mid 90s on RA (was previously on 2L NC last night)    Physical exam:  Cardiac: Normal rate and rhythm; no murmurs, rubs, or gallops  Pulmonary: Left lower lobe crackles  Abdominal: Soft, nontender to palpation    Labs:  WBC 3.60 (at baseline)  CBC and BMP otherwise unremarkable  Procal: 0.75 from 0.86 last night  Legionella urine antigen negative  Strep pneumo urine antigen negative  Lactate 1.4    Troponin 2290 -> 2564 -> 1447 this AM  EKG normal    Imaging:  CXR 2/3/25 - b/l lower lobe atelectasis, probable b/l pleural effusions      Assessment:  This is a 60 yo M with history of chronic dysphagia, high aspiration risk, and recurrent pneumonia who " presents with 3 days of fever, cough, and worsening SOB. This is likely community-acquired pneumonia vs. Aspiration pneumonia. Given negative legionella urine antigen, do not need to cover this in antibiotic regimen. Pending MRSA culture. Elevated troponins likely due to type 2 MI in the setting of infection given downtrending troponin, normal EKG, and absence of chest pain.      Plan:  - d/c azithromycin  - continue cefepime + vancomycin  - pending sputum culture, blood culture, MRSA culture  - if MRSA culture negative, d/c vancomycin  - pending echo  - monitor tele  - appreciate speech therapy recs  - continue NPO  - pending nutrition consult

## 2025-02-03 NOTE — ASSESSMENT & PLAN NOTE
Troponins elevated at 2290  Patient currently not complaining of chest pain  EKG negative for ischemic changes  Suspect non-MI elevated troponin secondary to sepsis d/t demand ischemia     PLAN:  F/u on 4 hour troponin   Place on tele  Echo pending

## 2025-02-03 NOTE — ED PROVIDER NOTES
"Time reflects when diagnosis was documented in both MDM as applicable and the Disposition within this note       Time User Action Codes Description Comment    2/2/2025  9:13 PM Raphael Barger [J96.01] Acute respiratory failure with hypoxia (HCC)     2/2/2025  9:13 PM Raphael Barger Add [J18.9] Pneumonia     2/2/2025  9:14 PM Raphael Barger Add [R79.89] Elevated troponin     2/2/2025  9:14 PM Raphael Barger Add [D72.819] Chronic leukopenia     2/2/2025  9:14 PM Raphael Barger Add [Z85.810] History of tongue cancer     2/2/2025 10:27 PM MelanieYelena [R13.10] Severe swallowing dysfunction           ED Disposition       ED Disposition   Admit    Condition   Stable    Date/Time   Sun Feb 2, 2025  9:27 PM    Comment   Case was discussed with TWIN and the patient's admission status was agreed to be Admission Status: observation status to the service of Dr. Urbano .               Assessment & Plan       Medical Decision Making  Patient with history as below presented to triage with CC: \"Patient presents with:  Cough: Pt reports he is on abx for pneumonia. Family reports persistently bianka oral fevers, last ibuprofen after dinner, no tylenol today. Pt returned from [place] today via plane. Pt seen in [place] for illness and was started on doxy  \"  Hx obtained from pt and spouse    59-year-old male presenting with pneumonia with failure of outpatient treatment O2 requirement.  Lungs with bilateral rales worse in the left lower base.  Chronically ill-appearing.  No lower extremity edema or pain.  Patient hemodynamically stable on arrival.  Given history of cancer and chronic cytopenia, patient immunocompromised.  Will workup broadly with plan for admission.  Patient with history of aspiration pneumonia, but also has recent sick contacts with grandchildren raising suspicion for community-acquired pneumonia.  Will cover with broad-spectrum antibiotics.  Patient without active chest pain.  EKG without ischemic " changes.    Plan: Labs, EKG, chest x-ray, antibiotics, supplemental O2, IV fluids, admission    DDX including but not limited to: pneumonia, pleural effusion, CHF, SCAPE, PE, PTX, ACS, MI, asthma exacerbation, COPD exacerbation, COVID 19, EVALI, anemia, metabolic abnormality, renal failure, malignancy    I have independently ordered, reviewed and interpreted the following: labs and/or imaging and/or EKG studies listed below  Reviewed external records including notes, and prior labs/imaging results.    Disposition: Patient condition, stable.  Discussed need for admission with patient for further management and workup with pt and they are agreeable with plan. Discussed case with [facility] hospitalist , who agreed to admit patient to IP status.     See ED Course for further MDM.      PLEASE NOTE:  This encounter was completed utilizing the [email or web address] Direct Speech Voice Recognition Software. Grammatical errors, random word insertions, pronoun errors and incomplete sentences are occasional inherent consequences of the system due to software limitations, ambient noise and hardware issues.These may be missed by proof reading prior to affixing electronic signature. Any questions or concerns about the content, text or information contained within the body of this dictation should be directly addressed to the physician for clarification. Please do not hesitate to call me directly if you have any questions or concerns.      Amount and/or Complexity of Data Reviewed  Labs: ordered. Decision-making details documented in ED Course.  Radiology: ordered and independent interpretation performed.    Risk  Prescription drug management.  Decision regarding hospitalization.        ED Course as of 02/03/25 0002   Sun Feb 02, 2025 1959 SpO2(!): 89 %   2002 Pulse: 98   2002 SpO2(!): 89 %   2041 CBC and differential(!)  Leukopenia 3.66.  Appears to be around patient's baseline of 3-4.  Hemoglobin stable at 12.1.  Platelets WNL.  No  significant bandemia.   2100 Comprehensive metabolic panel(!)  Hyponatremia at 132.  Mild hypochloremia.  Renal function at baseline.  Hyperglycemia 182.  No acute LFT elevation.   2101 Coags WNL.   2101 LACTIC ACID: 1.4   2106 hs TnI 0hr(!): 2,290   2112 Procalcitonin(!): 0.86   2115 Discussed need for admission and further management with patient and his wife.  They are agreeable with plan.  Workup significant for bibasilar pneumonia.  Patient treated with broad-spectrum antibiotics.  Troponin elevated significantly at 2200, however patient without chest pain EKG without ischemic changes.  Suspect type II demand ischemia in the setting of acute infection.  Patient otherwise hemodynamically stable at time of admission.       Medications   acetaminophen (TYLENOL) tablet 650 mg (has no administration in time range)   calcium carbonate (TUMS) chewable tablet 1,000 mg (has no administration in time range)   ondansetron (ZOFRAN) injection 4 mg (has no administration in time range)   senna (SENOKOT) tablet 8.6 mg (has no administration in time range)   amLODIPine (NORVASC) tablet 5 mg (has no administration in time range)   benzonatate (TESSALON PERLES) capsule 200 mg (has no administration in time range)   ipratropium (ATROVENT) 0.03 % nasal spray 2 spray (has no administration in time range)   albuterol (PROVENTIL HFA,VENTOLIN HFA) inhaler 2 puff (has no administration in time range)   levothyroxine tablet 100 mcg (has no administration in time range)   tadalafil (CIALIS) tablet 5 mg (has no administration in time range)   cefepime (MAXIPIME) 2 g/50 mL dextrose IVPB (0 mg Intravenous Stopped 2/2/25 2325)     And   azithromycin (ZITHROMAX) tablet 500 mg (500 mg Oral Given 2/2/25 2306)   enoxaparin (LOVENOX) subcutaneous injection 40 mg (has no administration in time range)   vancomycin (VANCOCIN) 1,750 mg in sodium chloride 0.9 % 500 mL IVPB (has no administration in time range)   multi-electrolyte (ISOLYTE-S PH 7.4)  bolus 1,000 mL (1,000 mL Intravenous New Bag 2/2/25 2305)   levalbuterol (XOPENEX) inhalation solution 0.63 mg (0.63 mg Nebulization Given 2/2/25 2339)   budesonide (PULMICORT) inhalation solution 0.5 mg (0.5 mg Nebulization Given 2/2/25 2339)   sodium chloride 0.9 % bolus 1,000 mL (0 mL Intravenous Stopped 2/2/25 2137)   ceftriaxone (ROCEPHIN) 1 g/50 mL in dextrose IVPB (0 mg Intravenous Stopped 2/2/25 2137)   acetaminophen (Ofirmev) injection 1,000 mg (0 mg Intravenous Stopped 2/2/25 2137)       ED Risk Strat Scores   HEART Risk Score      Flowsheet Row Most Recent Value   Heart Score Risk Calculator    History 0 Filed at: 02/03/2025 0001   ECG 0 Filed at: 02/03/2025 0001   Age 1 Filed at: 02/03/2025 0001   Risk Factors 2 Filed at: 02/03/2025 0001   Troponin 2 Filed at: 02/03/2025 0001   HEART Score 5 Filed at: 02/03/2025 0001          HEART Risk Score      Flowsheet Row Most Recent Value   Heart Score Risk Calculator    History 0 Filed at: 02/03/2025 0001   ECG 0 Filed at: 02/03/2025 0001   Age 1 Filed at: 02/03/2025 0001   Risk Factors 2 Filed at: 02/03/2025 0001   Troponin 2 Filed at: 02/03/2025 0001   HEART Score 5 Filed at: 02/03/2025 0001                            SBIRT 22yo+      Flowsheet Row Most Recent Value   Initial Alcohol Screen: US AUDIT-C     1. How often do you have a drink containing alcohol? 0 Filed at: 02/02/2025 1948   2. How many drinks containing alcohol do you have on a typical day you are drinking?  0 Filed at: 02/02/2025 1948   3a. Male UNDER 65: How often do you have five or more drinks on one occasion? 0 Filed at: 02/02/2025 1948   3b. FEMALE Any Age, or MALE 65+: How often do you have 4 or more drinks on one occassion? 0 Filed at: 02/02/2025 1948   Audit-C Score 0 Filed at: 02/02/2025 1948   [place]: How many times in the past year have you...    Used an illegal drug or used a prescription medication for non-medical reasons? Never Filed at: 02/02/2025 1948                             History of Present Illness       Chief Complaint   Patient presents with    Cough     Pt reports he is on abx for pneumonia. Family reports persistently bianka oral fevers, last ibuprofen after dinner, no tylenol today. Pt returned from [place] today via plane. Pt seen in [place] for illness and was started on doxy       Past Medical History:   Diagnosis Date    Cancer of base of tongue (HCC)     excision    Cough     CPAP (continuous positive airway pressure) dependence     no currently    Disease of thyroid gland     hypo    Dysphagia     ED (erectile dysfunction)     GERD (gastroesophageal reflux disease)     History of pneumonia     Hypertension     Leukopenia     Peyronie's disease     Pneumonia     PONV (postoperative nausea and vomiting)     Psoriasis     Sleep apnea     Tongue cancer (HCC)     Weight loss, abnormal       Past Surgical History:   Procedure Laterality Date    ESOPHAGOSCOPY N/A 9/16/2022    Procedure: ESOPHAGOSCOPY FLEXIBLE;  Surgeon: Devonte Singh MD;  Location: [facility];  Service: ENT    ESOPHAGOSCOPY N/A 8/25/2023    Procedure: TRANSNASAL ESOPHAGOSCOPY WITH BALLOON DILATION/ ESOPHAGEAL DILATION (BALLOON);;  Surgeon: Devonte Singh MD;  Location: [facility];  Service: ENT    OTHER SURGICAL HISTORY      infusion port inserted and removed    PEG TUBE PLACEMENT      and removal    UT COLONOSCOPY FLX DX W/COLLJ SPEC WHEN PFRMD N/A 6/27/2017    Procedure: COLONOSCOPY with polypectomy x2;  Surgeon: Janet Willard MD;  Location: [facility];  Service: General    UT ESOPHAGOSCOPY DILATE ESOPHAGUS BALLOON 30 MM N/A 9/27/2024    Procedure: trans nasal endoscopy, infinity balloon dilation upper esophageal sphincter(UES) AND POSTERIOR WALL INJECTION;  Surgeon: Devonte Singh MD;  Location[facility];  Service: ENT    UT LARGSC W/NJX VOCAL CORD THER W/MICRO/TELESCOPE Bilateral 9/16/2022    Procedure: micro direct laryngoscopy, vocal fold injection, biopsy epiglottis lesion, posterior pharyngeal  wall injection, flexible esophagoscopy with dilation;  Surgeon: Devonte Singh MD;  Location: [facility];  Service: ENT    TONGUE BIOPSY / EXCISION      Floor mouth excision of malignant tumor      Family History   Problem Relation Age of Onset    Other Mother         reactive airway dysfunction    Coronary artery disease Father     Hypertension Father     Psoriasis Father     Thyroid disease unspecified Neg Hx       Social History     Tobacco Use    Smoking status: Former     Current packs/day: 0.00     Average packs/day: 0.3 packs/day for 20.0 years (5.0 ttl pk-yrs)     Types: Cigarettes     Start date: 1991     Quit date: 2011     Years since quittin.1     Passive exposure: Past    Smokeless tobacco: Former     Quit date:     Tobacco comments:     pt quit with dx of tongue base cancer, per allscripts   Vaping Use    Vaping status: Never Used   Substance Use Topics    Alcohol use: Not Currently     Alcohol/week: 12.0 standard drinks of alcohol     Types: 12 Cans of beer per week    Drug use: Never      E-Cigarette/Vaping    E-Cigarette Use Never User       E-Cigarette/Vaping Substances    Nicotine No     THC No     CBD No     Flavoring No     Other No     Unknown No       I have reviewed and agree with the history as documented.     59-year-old male with history of tongue cancer status post chemo/radiation, esophageal dysfunction, status post PEG tube, chronic leukopenia, ERIK presented to the ED with productive cough, increased O2 requirement and mild shortness of breath.  Patient reports symptoms started 2 to 3 days ago, was penfluridol when they occur.  Went to urgent care and was diagnosed with pneumonia and started on Doxy.  He has had persistent fever despite taking Tylenol/ibuprofen at home with Tmax 102F and increased work of breathing requiring 2 L continuously.  Previous O2 requirement is 2 L nightly/as needed.  Patient has history of aspiration pneumonia secondary to esophageal  dysfunction secondary to tongue cancer radiation/chemotherapy.  Denies chest pain, headache, neck pain, congestion, sore throat, abdominal pain, nausea, vomiting, diarrhea, constipation, bloody/tarry stool, rash, leg pain, leg swelling, back pain, urinary symptoms.        Review of Systems   Constitutional:  Positive for fatigue and fever. Negative for chills.   HENT:  Negative for congestion and sore throat.    Eyes:  Negative for photophobia, pain, redness and visual disturbance.   Respiratory:  Positive for cough and shortness of breath. Negative for chest tightness.    Cardiovascular:  Negative for chest pain, palpitations and leg swelling.   Gastrointestinal:  Negative for abdominal pain, constipation, diarrhea, nausea and vomiting.   Genitourinary:  Negative for dysuria, flank pain, frequency, hematuria, testicular pain and urgency.   Musculoskeletal:  Negative for arthralgias, back pain, neck pain and neck stiffness.   Skin:  Negative for color change and rash.   Neurological:  Negative for dizziness, seizures, syncope, light-headedness, numbness and headaches.   All other systems reviewed and are negative.          Objective       ED Triage Vitals   Temperature Pulse Blood Pressure Respirations SpO2 Patient Position - Orthostatic VS   02/02/25 1947 02/02/25 1947 02/02/25 1947 02/02/25 1947 02/02/25 1947 02/02/25 2018   99.6 °F (37.6 °C) 98 109/69 20 (!) 89 % Lying      Temp Source Heart Rate Source BP Location FiO2 (%) Pain Score    02/02/25 1947 02/02/25 1947 02/02/25 1947 -- --    Oral Monitor Right arm        Vitals      Date and Time Temp Pulse SpO2 Resp BP Pain Score FACES Pain Rating User   02/02/25 2339 -- -- 96 % -- -- -- -- JK   02/02/25 2330 -- 68 95 % 20 109/65 -- -- DP   02/02/25 2315 -- 67 95 % 20 95/58 -- -- DP   02/02/25 2300 -- 69 95 % 20 95/59 -- -- DP   02/02/25 2245 -- 71 94 % 22 97/69 -- -- DP   02/02/25 2230 -- 68 95 % 22 90/58 -- -- DP   02/02/25 2225 -- -- -- -- 86/56 -- -- DP    02/02/25 2215 -- 69 94 % 22 90/52 -- -- DP   02/02/25 2018 99.8 °F (37.7 °C) 89 95 % 22 109/65 -- -- RM   02/02/25 1947 99.6 °F (37.6 °C) 98 89 % 20 109/69 -- -- PS            Physical Exam  Vitals and nursing note reviewed.   Constitutional:       General: He is in acute distress.      Appearance: He is well-developed. He is ill-appearing. He is not toxic-appearing.   HENT:      Head: Normocephalic and atraumatic.      Right Ear: External ear normal.      Left Ear: External ear normal.      Nose: Nose normal. No congestion.      Mouth/Throat:      Mouth: Mucous membranes are moist.      Pharynx: Oropharynx is clear. No oropharyngeal exudate or posterior oropharyngeal erythema.   Eyes:      Extraocular Movements: Extraocular movements intact.      Conjunctiva/sclera: Conjunctivae normal.      Pupils: Pupils are equal, round, and reactive to light.   Cardiovascular:      Rate and Rhythm: Normal rate and regular rhythm.      Pulses: Normal pulses.      Heart sounds: Normal heart sounds. No murmur heard.     No friction rub. No gallop.   Pulmonary:      Effort: Respiratory distress present.      Breath sounds: No stridor. Rhonchi and rales present. No wheezing.   Abdominal:      General: Bowel sounds are normal.      Palpations: Abdomen is soft.      Tenderness: There is no abdominal tenderness. There is no right CVA tenderness, left CVA tenderness, guarding or rebound.   Musculoskeletal:         General: No swelling, tenderness or deformity. Normal range of motion.      Cervical back: Normal range of motion and neck supple. No rigidity or tenderness.      Right lower leg: No edema.      Left lower leg: No edema.   Lymphadenopathy:      Cervical: No cervical adenopathy.   Skin:     General: Skin is warm and dry.      Capillary Refill: Capillary refill takes less than 2 seconds.      Coloration: Skin is not jaundiced or pale.      Findings: No bruising, erythema, lesion or rash.   Neurological:      General: No focal  deficit present.      Mental Status: He is alert and oriented to person, place, and time. Mental status is at baseline.      GCS: GCS eye subscore is 4. GCS verbal subscore is 5. GCS motor subscore is 6.      Cranial Nerves: Cranial nerves 2-12 are intact. No cranial nerve deficit, dysarthria or facial asymmetry.      Sensory: Sensation is intact. No sensory deficit.      Motor: Motor function is intact. No abnormal muscle tone or pronator drift.      Coordination: Coordination is intact.      Gait: Gait is intact.   Psychiatric:         Mood and Affect: Mood normal.         Behavior: Behavior normal.         Thought Content: Thought content normal.         Results Reviewed       Procedure Component Value Units Date/Time    MRSA culture [[id number]] Collected: 02/02/25 2328    Lab Status: In process Specimen: Nares from Nose Updated: 02/02/25 2332    HS Troponin I 2hr [[id number]] Collected: 02/02/25 2303    Lab Status: In process Specimen: Blood from Arm, Right Updated: 02/02/25 2312    Platelet count [[id number]]     Lab Status: No result Specimen: Blood     HS Troponin I 4hr [[id number]]     Lab Status: No result Specimen: Blood     Strep Pneumoniae, Urine [[id number]]     Lab Status: No result Specimen: Urine     Legionella antigen, Urine [[id number]]     Lab Status: No result Specimen: Urine     Sputum culture and Gram stain [[id number]]     Lab Status: No result Specimen: Sputum     FLU/COVID Rapid Antigen (30 min. TAT) - Preferred screening test in ED [[id number]]  (Normal) Collected: 02/02/25 2024    Lab Status: Final result Specimen: Nares from Nose Updated: 02/02/25 2108     SARS COV Rapid Antigen Negative     Influenza A Rapid Antigen Negative     Influenza B Rapid Antigen Negative    Narrative:      This test has been performed using the [employer] Allison 2 FLU+SARS Antigen test under the Emergency Use Authorization (EUA). This test has been validated by the  and verified by the performing laboratory. The  Allison uses lateral flow immunofluorescent sandwich assay to detect SARS-COV, Influenza A and Influenza B Antigen.     The [employer] Allison 2 SARS Antigen test does not differentiate between SARS-CoV and SARS-CoV-2.     Negative results are presumptive and may be confirmed with a molecular assay, if necessary, for patient management. Negative results do not rule out SARS-CoV-2 or influenza infection and should not be used as the sole basis for treatment or patient management decisions. A negative test result may occur if the level of antigen in a sample is below the limit of detection of this test.     Positive results are indicative of the presence of viral antigens, but do not rule out bacterial infection or co-infection with other viruses.     All test results should be used as an adjunct to clinical observations and other information available to the provider.    FOR PEDIATRIC PATIENTS - copy/paste COVID Guidelines URL to browser: [email or web address]    Procalcitonin [[id number]]  (Abnormal) Collected: 02/02/25 2024    Lab Status: Final result Specimen: Blood from Arm, Right Updated: 02/02/25 2107     Procalcitonin 0.86 ng/ml     HS Troponin 0hr (reflex protocol) [[id number]]  (Abnormal) Collected: 02/02/25 2024    Lab Status: Final result Specimen: Blood from Arm, Right Updated: 02/02/25 2105     hs TnI 0hr 2,290 ng/L     Comprehensive metabolic panel [[id number]]  (Abnormal) Collected: 02/02/25 2024    Lab Status: Final result Specimen: Blood from Arm, Right Updated: 02/02/25 2059     Sodium 132 mmol/L      Potassium 3.8 mmol/L      Chloride 95 mmol/L      CO2 29 mmol/L      ANION GAP 8 mmol/L      BUN 24 mg/dL      Creatinine 0.54 mg/dL      Glucose 182 mg/dL      Calcium 8.7 mg/dL      AST 34 U/L      ALT 37 U/L      Alkaline Phosphatase 86 U/L      Total Protein 7.0 g/dL      Albumin 3.6 g/dL      Total Bilirubin 0.45 mg/dL      eGFR 113 ml/min/1.73sq m      Narrative:      National Kidney Disease Foundation guidelines for Chronic Kidney Disease (CKD):     Stage 1 with normal or high GFR (GFR > 90 mL/min/1.73 square meters)    Stage 2 Mild CKD (GFR = 60-89 mL/min/1.73 square meters)    Stage 3A Moderate CKD (GFR = 45-59 mL/min/1.73 square meters)    Stage 3B Moderate CKD (GFR = 30-44 mL/min/1.73 square meters)    Stage 4 Severe CKD (GFR = 15-29 mL/min/1.73 square meters)    Stage 5 End Stage CKD (GFR <15 mL/min/1.73 square meters)  Note: GFR calculation is accurate only with a steady state creatinine    Lactic acid, plasma (w/reflex if result > 2.0) [[id number]]  (Normal) Collected: 02/02/25 2024    Lab Status: Final result Specimen: Blood from Arm, Right Updated: 02/02/25 2058     LACTIC ACID 1.4 mmol/L     Narrative:      Result may be elevated if tourniquet was used during collection.    Protime-INR [[id number]]  (Normal) Collected: 02/02/25 2024    Lab Status: Final result Specimen: Blood from Arm, Right Updated: 02/02/25 2056     Protime 14.2 seconds      INR 1.03    Narrative:      INR Therapeutic Range    Indication                                             INR Range      Atrial Fibrillation                                               2.0-3.0  Hypercoagulable State                                    2.0.2.3  Left Ventricular Asist Device                            2.0-3.0  Mechanical Heart Valve                                  -    Aortic(with afib, MI, embolism, HF, LA enlargement,    and/or coagulopathy)                                     2.0-3.0 (2.5-3.5)     Mitral                                                             2.5-3.5  Prosthetic/Bioprosthetic Heart Valve               2.0-3.0  Venous thromboembolism (VTE: VT, PE        2.0-3.0    APTT [[id number]]  (Normal) Collected: 02/02/25 2024    Lab Status: Final result Specimen: Blood from Arm, Right Updated: 02/02/25 2056     PTT 34 seconds     CBC and differential [[id number]]  (Abnormal) Collected:  02/02/25 2024    Lab Status: Final result Specimen: Blood from Arm, Right Updated: 02/02/25 2040     WBC 3.66 Thousand/uL      RBC 4.18 Million/uL      Hemoglobin 12.1 g/dL      Hematocrit 37.1 %      MCV 89 fL      MCH 28.9 pg      MCHC 32.6 g/dL      RDW 14.3 %      MPV 9.8 fL      Platelets 223 Thousands/uL      nRBC 0 /100 WBCs      Segmented % 80 %      Immature Grans % 1 %      Lymphocytes % 10 %      Monocytes % 9 %      Eosinophils Relative 0 %      Basophils Relative 0 %      Absolute Neutrophils 2.95 Thousands/µL      Absolute Immature Grans 0.03 Thousand/uL      Absolute Lymphocytes 0.35 Thousands/µL      Absolute Monocytes 0.33 Thousand/µL      Eosinophils Absolute 0.00 Thousand/µL      Basophils Absolute 0.00 Thousands/µL     Blood culture #2 [[id number]] Collected: 02/02/25 2024    Lab Status: In process Specimen: Blood from Arm, Right Updated: 02/02/25 2037    Blood culture #1 [[id number]] Collected: 02/02/25 2032    Lab Status: In process Specimen: Blood from Arm, Left Updated: 02/02/25 2036            XR chest 2 views   ED Interpretation by Raphael Barger DO (02/02 2106)   Abnormal   Bibasilar left greater than right consolidations with bilateral pleural effusions.  No pneumothorax.  Increased consolidations compared to prior chest x-ray done on December 30, 2024.          CriticalCare Time    Date/Time: 2/2/2025 9:15 PM    Performed by: Raphael Barger DO  Authorized by: Raphael Barger DO    Critical care provider statement:     Critical care time was exclusive of:  Separately billable procedures and treating other patients and teaching time    Critical care was necessary to treat or prevent imminent or life-threatening deterioration of the following conditions:  Respiratory failure, sepsis and cardiac failure    Critical care was time spent personally by me on the following activities:  Obtaining history from patient or surrogate, development of treatment plan with patient or surrogate, evaluation of  patient's response to treatment, examination of patient, interpretation of cardiac output measurements, ordering and performing treatments and interventions, ordering and review of laboratory studies, ordering and review of radiographic studies, re-evaluation of patient's condition and review of old charts  ECG 12 Lead Documentation Only    Date/Time: 2025 11:46 PM    Performed by: Raphael Barger DO  Authorized by: Raphael Barger DO    Indications / Diagnosis:  Shortness of breath  ECG reviewed by me, the ED Provider: yes    Patient location:  ED  Previous ECG:     Previous ECG:  Compared to current    Similarity:  No change  Interpretation:     Interpretation: non-specific    Rate:     ECG rate:  70    ECG rate assessment: normal    Rhythm:     Rhythm: sinus rhythm    Ectopy:     Ectopy: none    QRS:     QRS axis:  Left    QRS intervals:  Normal  Conduction:     Conduction: normal    ST segments:     ST segments:  Normal  T waves:     T waves: normal        ED Medication and Procedure Management   Prior to Admission Medications   Prescriptions Last Dose Informant Patient Reported? Taking?   Misc Natural Products (CordyMax CS-4) 525 MG CAPS Past Week  Yes Yes   Sig: Take by mouth   NON FORMULARY Past Week  Yes Yes   Sig: Ginseng + Atractylodes   amLODIPine (NORVASC) 5 mg tablet 2025 Morning  No Yes   Sig: TAKE 1 TABLET (5 MG TOTAL) BY MOUTH DAILY.   atropine (ISOPTO ATROPINE) 1 % ophthalmic solution Past Week Self No Yes   Si-3 drops sublingual as needed for increased saliva/cough   benzonatate (TESSALON) 200 MG capsule Past Week  No Yes   Sig: Take 1 capsule (200 mg total) by mouth 3 (three) times a day as needed for cough   famotidine (PEPCID) 20 mg/2.5 mL oral suspension Not Taking  No No   Sig: TAKE 5 ML (40 MG TOTAL) BY MOUTH DAILY AT BEDTIME   Patient not taking: Reported on 2025   ipratropium (ATROVENT) 0.03 % nasal spray 2025 Evening  No Yes   Si sprays into each nostril 3 (three) times a  day as needed (nasal drip with meals)   levalbuterol (XOPENEX HFA) 45 mcg/act inhaler Past Week Self No Yes   Sig: Inhale 2 puffs every 6 (six) hours as needed for wheezing   levothyroxine 100 mcg tablet 2/2/2025 Morning  No Yes   Sig: TAKE 1 TABLET BY MOUTH EVERY DAY   multivitamin (THERAGRAN) TABS 2/1/2025 Self Yes Yes   Sig: Take 1 tablet by mouth daily   scopolamine (TRANSDERM-SCOP) 1 mg/3 days TD 72 hr patch Not Taking  No No   Sig: Place 1 patch on the skin over 72 hours every third day   Patient not taking: Reported on 2/2/2025   tadalafil (CIALIS) 5 MG tablet Past Week Self No Yes   Sig: Take 1 tablet (5 mg total) by mouth daily      Facility-Administered Medications: None     Patient's Medications   Discharge Prescriptions    No medications on file     No discharge procedures on file.  ED SEPSIS DOCUMENTATION   Time reflects when diagnosis was documented in both MDM as applicable and the Disposition within this note       Time User Action Codes Description Comment    2/2/2025  9:13 PM Raphael Barger [J96.01] Acute respiratory failure with hypoxia (HCC)     2/2/2025  9:13 PM Raphael Barger [J18.9] Pneumonia     2/2/2025  9:14 PM Raphael Barger [R79.89] Elevated troponin     2/2/2025  9:14 PM Raphael Barger [D72.819] Chronic leukopenia     2/2/2025  9:14 PM Raphael Barger [Z85.810] History of tongue cancer     2/2/2025 10:27 PM Yelena Navarro [R13.10] Severe swallowing dysfunction                  Raphael Barger, DO  02/02/25 2351       Raphael Barger, DO  02/03/25 0002

## 2025-02-03 NOTE — ASSESSMENT & PLAN NOTE
"Past medical history significant for esophageal and tongue cancer s/p radiation of the head and neck.  Per patient and chart review, patient has history of dysphagia and increased risk of aspiration  Chest x-ray significant for bibasilar L>R pna.  Concern for aspiration pneumonia versus community-acquired pneumonia  Diagnosed in 2011 and treated in 2012.  Does have residual dysphagia and left-sided brachial plexitis.  Decreased sensation in LUE.  Chronic LUE weakness on exam    PLAN:  See \"sepsis\" and \"at risk for aspiration\"  "

## 2025-02-03 NOTE — SPEECH THERAPY NOTE
Speech Language/Pathology  Speech/Language Pathology  Assessment    Patient Name: Mohamud Grimm Sr.  Today's Date: 2/3/2025     Problem List  Principal Problem:    Sepsis (HCC)  Active Problems:    Essential hypertension    Hypothyroidism    Obstructive sleep apnea syndrome    Hx of head and neck radiation    Gastroesophageal reflux disease    At risk for aspiration    Elevated troponin    Past Medical History  Past Medical History:   Diagnosis Date    Cancer of base of tongue (HCC)     excision    Cough     CPAP (continuous positive airway pressure) dependence     no currently    Disease of thyroid gland     hypo    Dysphagia     ED (erectile dysfunction)     GERD (gastroesophageal reflux disease)     History of pneumonia     Hypertension     Leukopenia     Peyronie's disease     Pneumonia     PONV (postoperative nausea and vomiting)     Psoriasis     Sleep apnea     Tongue cancer (HCC)     Weight loss, abnormal      Past Surgical History  Past Surgical History:   Procedure Laterality Date    ESOPHAGOSCOPY N/A 9/16/2022    Procedure: ESOPHAGOSCOPY FLEXIBLE;  Surgeon: Devonte Singh MD;  [facility]: [facility];  Service: ENT    ESOPHAGOSCOPY N/A 8/25/2023    Procedure: TRANSNASAL ESOPHAGOSCOPY WITH BALLOON DILATION/ ESOPHAGEAL DILATION (BALLOON);;  Surgeon: Devonte Singh MD;  [facility];  Service: ENT    OTHER SURGICAL HISTORY      infusion port inserted and removed    PEG TUBE PLACEMENT      and removal    WI COLONOSCOPY FLX DX W/COLLJ SPEC WHEN PFRMD N/A 6/27/2017    Procedure: COLONOSCOPY with polypectomy x2;  Surgeon: Janet Willard MD;  Location: [facility];  Service: General    WI ESOPHAGOSCOPY DILATE ESOPHAGUS BALLOON 30 MM N/A 9/27/2024    Procedure: trans nasal endoscopy, infinity balloon dilation upper esophageal sphincter(UES) AND POSTERIOR WALL INJECTION;  Surgeon: Devonte Singh MD;  [facility];  Service: ENT    WI LARGSC W/NJX VOCAL CORD THER W/MICRO/TELESCOPE Bilateral 9/16/2022     Procedure: micro direct laryngoscopy, vocal fold injection, biopsy epiglottis lesion, posterior pharyngeal wall injection, flexible esophagoscopy with dilation;  Surgeon: Devonte Singh MD;  [facility]: [facility];  Service: ENT    TONGUE BIOPSY / EXCISION      Floor mouth excision of malignant tumor   Speech-Language Pathology Bedside Swallow Evaluation        Patient Name: Mohamud Grimm Sr.    Today's Date: 2/3/2025     Problem List  Principal Problem:    Sepsis (HCC)  Active Problems:    Essential hypertension    Hypothyroidism    Obstructive sleep apnea syndrome    Hx of head and neck radiation    Gastroesophageal reflux disease    At risk for aspiration    Elevated troponin         Past Medical History  Past Medical History:   Diagnosis Date    Cancer of base of tongue (HCC)     excision    Cough     CPAP (continuous positive airway pressure) dependence     no currently    Disease of thyroid gland     hypo    Dysphagia     ED (erectile dysfunction)     GERD (gastroesophageal reflux disease)     History of pneumonia     Hypertension     Leukopenia     Peyronie's disease     Pneumonia     PONV (postoperative nausea and vomiting)     Psoriasis     Sleep apnea     Tongue cancer (HCC)     Weight loss, abnormal        Past Surgical History  Past Surgical History:   Procedure Laterality Date    ESOPHAGOSCOPY N/A 9/16/2022    Procedure: ESOPHAGOSCOPY FLEXIBLE;  Surgeon: Devonte Singh MD;  [facility];  Service: ENT    ESOPHAGOSCOPY N/A 8/25/2023    Procedure: TRANSNASAL ESOPHAGOSCOPY WITH BALLOON DILATION/ ESOPHAGEAL DILATION (BALLOON);;  Surgeon: Devonte Singh MD;  [facility];  Service: ENT    OTHER SURGICAL HISTORY      infusion port inserted and removed    PEG TUBE PLACEMENT      and removal    AZ COLONOSCOPY FLX DX W/COLLJ SPEC WHEN PFRMD N/A 6/27/2017    Procedure: COLONOSCOPY with polypectomy x2;  Surgeon: Janet Willard MD;  [facility];  Service: General    AZ ESOPHAGOSCOPY DILATE  ESOPHAGUS BALLOON 30 MM N/A 9/27/2024    Procedure: trans nasal endoscopy, infinity balloon dilation upper esophageal sphincter(UES) AND POSTERIOR WALL INJECTION;  Surgeon: Devonte Singh MD;  [facility];  Service: ENT    NE LARGSC W/NJX VOCAL CORD THER W/MICRO/TELESCOPE Bilateral 9/16/2022    Procedure: micro direct laryngoscopy, vocal fold injection, biopsy epiglottis lesion, posterior pharyngeal wall injection, flexible esophagoscopy with dilation;  Surgeon: Devonte Singh MD;  [facility];  Service: ENT    TONGUE BIOPSY / EXCISION      Floor mouth excision of malignant tumor       Summary   Pt presents with mod/severe pharyngeal dysphagia. Pt with extensive dysphagia hx and known to department secondary to hx of BOT and esophageal cancer. Pt currently with PEG tube and receives x3 bolus feeds a day. Per pt and wife report, pt has pleasure feeds at home (bloody kolby's, water, crab cakes,crackers) with known aspiration risk. Per recent VBS 6/2024 with recommendations of NPO.     Discussed with pt repeat VBS (can be done as OP) if desired, however, pt stating that he will continue to have pleasure PO despite VBS results and known aspiration risk. Spoke with pt and pt's wife via phone- wife stating she would like repeat VBS. Will f/u x1 as pt stating he desires to resume regular/thin liquid diet despite recommendations.          Recommendations:   Diet: NPO - cannot recommend diet bedside 2/2 hx of silent aspiration   Meds: non-oral if possible   Frequent Oral care: 4x/day      Current Medical Status  Pt reports he is on abx for pneumonia. Family reports persistently bianka oral fevers, last ibuprofen after dinner, no tylenol today. Pt returned from [place] today via plane. Pt seen in [place] for illness and was started on doxy       Special Studies:  Chest XR 2/2/25:  IMPRESSION:     Bibasilar consolidation may be on the basis of pneumonia or possibly aspiration. Probable small bilateral pleural  effusions.    Social/Education/Vocational Hx:  Pt lives with family    Swallow Information   Prior speech/swallowing tx:   VBS 6/26/24:  Clinically the patient should be NPO with small amounts of honey thick with strategies and SLP however given that the patient desires and plans to continue a regular diet with thin liquids- he appears to be safest with tsps and use of moisteners with solids as well as EITHER L head turn or chin tuck   Recommended Form of Meds: crushed with puree and non-oral if possible     VBS 7/5/23: Pt presents with mild oral and severe-profound pharyngeal dysphagia characterized by reduced AP transport, delayed swallow initiation with significantly reduced/minimal hyolaryngeal rise, reduced base of tongue retraction, absent epiglottic inversion and absent pharyngeal stripping wave. Airway protection/closure was minimal, and silent aspiration occurred with all liquid consistencies. Pt was unable to swallow puree due to lack of driving force on the bolus and had to regurgitate/expectorate the puree from his vallecula. Strategies were not effective in eliminating aspiration. Note: Images are available for review in PACS as desired.      VBS 6/21/23: Presenting w/ mild-mod oral and SEVERE pharyngeal dysphagia characterized by SILENT aspiration of thin and mod thick during and after the swallow, unable to clear w/ cued cough. Mod-severe stasis w/ inconsistent clearance. Significant retrograde flow observed. Further trials deferred given aspiration and high risk for re-intubation.      VBS 3/4/21:  Pt presents w/ severe pharyngeal dysphagia characterized by no epiglottic inversion, minimal BOT retraction, minimal hyolaryngeal elevation, reduced airway entrance closure, and trace amounts of penetration and silent aspiration during and after swallowing thick and thin liquids. Pt is at high risk of developing aspiration pneumonia. Lengthy discussion was had w/ patient to review results of VBS and  recommendations.      VBS 8/14/19:  Mild oral/moderate pharyngeal dysphagia due to decreased tongue base retraction, decreased epiglottic inversion and airway entrance closure w/ deep transient penetration of all consistencies,  And passive silent aspiration of pharyngeal residue and thin liquids.      VBS 10/25/12(Radiologist report): Aspiration of thin liquid barium and nectar thick barium. The patient did not have a spontaneous cough. Penetration of honey thick barium, applesauce, and mixed with barium    Current Risks for Dysphagia & Aspiration: known history of dysphagia and known history of aspiration  Current Symptoms/Concerns: coughing with po and change in respiratory status  Current Diet: NPO with tube feeds + pleasure feeds   Baseline Diet: regular diet and thin liquids  Takes pills- Via PEG    Baseline Assessment   Behavior/Cognition: alert  Speech/Language Status: able to participate in conversation  Patient Positioning: upright in bed     Swallow Mechanism Exam   Facial: symmetrical  Labial: WFL  Lingual: WFL  Velum: unable to visualize  Mandible: adequate ROM  Dentition: adequate  Vocal quality:clear/adequate   Volitional Cough: strong/productive   Respiratory: RA    Consistencies Assessed and Performance   Consistencies Administered: ice chips, thin liquids, nectar thick, honey thick, and puree    Oral Stage: WNL  Adequate seal and retrieval. Adequate transfer.     Pharyngeal Stage:   Timely pharyngeal swallow. Adequate laryngeal excursion and elevation. Intermittent coughing and throat clearing with all consistencies.     Esophageal Concerns: none reported      Results Reviewed with: patient, RN, MD, and family   Dysphagia Goals:     Dysphagia LTG  -Patient will demonstrate safe and effective oral intake (without overt s/s significant oral/pharyngeal dysphagia including s/s penetration or aspiration) for the highest appropriate diet level.     Short Term Goals:  -Patient will comply with a  Video/Modified Barium Swallow study if indicated for more complete assessment of swallowing anatomy/physiology/aspiration risk and to assess efficacy of treatment techniques      Speech Therapy Prognosis   Prognosis: poor    Prognosis Considerations: prior medical history and therapeutic potential

## 2025-02-03 NOTE — H&P
H&P - Hospitalist   Name: Mohamud Grimm Sr. 59 y.o. male I MRN: [id number]  Unit/Bed#: ED-23 I Date of Admission: 2/2/2025   Date of Service: 2/2/2025 I Hospital Day: 0     Assessment & Plan  Sepsis (HCC)  59-year-old male past medical history of esophageal and tongue cancer s/p radiation with chronic known dysphagia and risk for aspiration presents with a chief complaint of fever, cough, and worsening shortness of breath.  Patient reports compliance with 2 L oxygen nasal cannula at bedtime.  Currently requiring 2 L O2 NC while awake in ED  Started on doxycycline for pneumonia outpatient (reports taking 3 doses w/o improvement)   SIRS criteria positive for tachycardia with heart rate of 98 and white blood cell count 3.66 (at baseline).  Currently afebrile  Concern for aspiration pneumonia versus community-acquired pneumonia  Lactic acid within normal limits  Pro-Michael elevated at 0.86  Chest x-ray significant for left greater than right bibasilar PNA  COVID/flu negative  DRIP score: 4 = severe with high risk for MDR  Antibiotic use within 60 days and tube feeds  No prior history of MRSA  Does endorse recent travel to [place] this morning.  No clinical signs of DVT, chest pain, hemoptysis, or recent surgery.  No malignancy treatment within the past 6 months. Wells score 0     PLAN:  Am procal   Continue to trend CBC and fever curve   nebs for wheezing   F/u BC and sputum culture   Incentive spirometry   Respiratory protocol  Aspiration protocol   Strep pneumo, legionella, and RP2 panel pending   Given drip score of 4 with high risk for MDR, will start on cefepime, azithromycin, and vancomycin.  Consult placed to pharmacy.  Will de-escalate antibiotics as clinically indicated  MRSA pending  Isolyte 1 L bolus STAT  Continue tube feeds with no pleasure feeds at this time d/t concern for aspiration   Nutrition and speech consult pending  Essential hypertension  Continue at home Norvasc 5 mg; hold for SBP <110  BP  "109/65  Isolyte 1 L bolus  Hypothyroidism  Continue at home levothyroxine 100 mcg  Last TSH 1 month ago: 9.45  Obstructive sleep apnea syndrome  Reports he does not typically use CPAP at night.  Compliance with 2 L NC  At risk for aspiration  Continue tube feeds with Jevity 1.5 500 mL 3 times daily bolus with 100 mL H2O flushes before and after feeds  Will not continue pleasure feeds due to concern for aspiration pneumonia  Speech and nutrition consulted.  Appreciate recommendations  F/u on am phosphorus   Elevated troponin  Troponins elevated at 2290  Patient currently not complaining of chest pain  EKG negative for ischemic changes  Suspect non-MI elevated troponin secondary to sepsis d/t demand ischemia     PLAN:  F/u on 4 hour troponin   Place on tele  Echo pending   Hx of head and neck radiation  Past medical history significant for esophageal and tongue cancer s/p radiation of the head and neck.  Per patient and chart review, patient has history of dysphagia and increased risk of aspiration  Chest x-ray significant for bibasilar L>R pna.  Concern for aspiration pneumonia versus community-acquired pneumonia  Diagnosed in 2011 and treated in 2012.  Does have residual dysphagia and left-sided brachial plexitis.  Decreased sensation in LUE.  Chronic LUE weakness on exam    PLAN:  See \"sepsis\" and \"at risk for aspiration\"      VTE Pharmacologic Prophylaxis: VTE Score: 6 High Risk (Score >/= 5) - Pharmacological DVT Prophylaxis Ordered: enoxaparin (Lovenox). Sequential Compression Devices Ordered.  Code Status: level 1  Discussion with family: Updated  (wife and sister) at bedside.    Anticipated Length of Stay: Patient will be admitted on an observation basis with an anticipated length of stay of less than 2 midnights secondary to SOB, cough, fever.    History of Present Illness   Chief Complaint: cough, fever, sob    Mohamud CH Sirisha Marinelli. is a 59 y.o. male with a PMH of esophageal cancer treated in 2012 " with chemo and radiation, ERIK noncompliant with CPAP but does use 2 L NC hs, and recurrent pneumonia (last hospitalization 3 months ago) who presents with chief complaint of fever, shortness of breath, and worsening cough.  Per patient, he states he first spiked a fever of 102 °F yesterday and has had difficulty breathing and worsening cough compared to baseline. Patient states he was started on doxycycline outpatient due to concern for pneumonia and has taken a total of 3 doses without significant improvement of symptoms.  Patient endorses chronic history of left-sided weakness and dysphagia due to radiation in 2012.  States that he typically has 3 bolus tube feeds daily but does occasionally engage in pleasure feeds.  Also reports he takes his pills orally.  Denies chest pain, abdominal pain, palpitations, diarrhea, constipation, headache, vision changes, urinary symptoms, new weakness or fatigue.  Patient does say he was recently exposed to his 10-month-year-old grandson who had a recent upper respiratory infection.    Social history significant for alcohol use a couple times per week.  Patient currently is a non-smoker and quit in 2012 after cancer diagnosis.  Denies illicit drug use.    Review of Systems   Constitutional:  Positive for fever. Negative for chills.   HENT:  Negative for ear pain and sore throat.    Eyes:  Negative for pain and visual disturbance.   Respiratory:  Positive for cough and shortness of breath.    Cardiovascular:  Negative for chest pain, palpitations and leg swelling.   Gastrointestinal:  Negative for abdominal pain and vomiting.   Genitourinary:  Negative for dysuria and hematuria.   Musculoskeletal:  Negative for arthralgias and back pain.   Skin:  Negative for color change and rash.   Neurological:  Positive for weakness. Negative for seizures and syncope.   All other systems reviewed and are negative.      Historical Information   Past Medical History:   Diagnosis Date    Cancer  of base of tongue (HCC)     excision    Cough     CPAP (continuous positive airway pressure) dependence     no currently    Disease of thyroid gland     hypo    Dysphagia     ED (erectile dysfunction)     GERD (gastroesophageal reflux disease)     History of pneumonia     Hypertension     Leukopenia     Peyronie's disease     Pneumonia     PONV (postoperative nausea and vomiting)     Psoriasis     Sleep apnea     Tongue cancer (HCC)     Weight loss, abnormal      Past Surgical History:   Procedure Laterality Date    ESOPHAGOSCOPY N/A 9/16/2022    Procedure: ESOPHAGOSCOPY FLEXIBLE;  Surgeon: Devonte Singh MD;  Location: [facility];  Service: ENT    ESOPHAGOSCOPY N/A 8/25/2023    Procedure: TRANSNASAL ESOPHAGOSCOPY WITH BALLOON DILATION/ ESOPHAGEAL DILATION (BALLOON);;  Surgeon: Devonte Singh MD;  Location: [facility];  Service: ENT    OTHER SURGICAL HISTORY      infusion port inserted and removed    PEG TUBE PLACEMENT      and removal    TX COLONOSCOPY FLX DX W/COLLJ SPEC WHEN PFRMD N/A 6/27/2017    Procedure: COLONOSCOPY with polypectomy x2;  Surgeon: Janet Willard MD;  Location: [facility];  Service: General    TX ESOPHAGOSCOPY DILATE ESOPHAGUS BALLOON 30 MM N/A 9/27/2024    Procedure: trans nasal endoscopy, infinity balloon dilation upper esophageal sphincter(UES) AND POSTERIOR WALL INJECTION;  Surgeon: Devonte Singh MD;  Location: [facility];  Service: ENT    TX LARGSC W/NJX VOCAL CORD THER W/MICRO/TELESCOPE Bilateral 9/16/2022    Procedure: micro direct laryngoscopy, vocal fold injection, biopsy epiglottis lesion, posterior pharyngeal wall injection, flexible esophagoscopy with dilation;  Surgeon: Devonte Singh MD;  Location: [facility];  Service: ENT    TONGUE BIOPSY / EXCISION      Floor mouth excision of malignant tumor     Social History     Tobacco Use    Smoking status: Former     Current packs/day: 0.00     Average packs/day: 0.3 packs/day for 20.0 years (5.0 ttl pk-yrs)     Types: Cigarettes      Start date: 1991     Quit date: 2011     Years since quittin.0     Passive exposure: Past    Smokeless tobacco: Former     Quit date:     Tobacco comments:     pt quit with dx of tongue base cancer, per allscripts   Vaping Use    Vaping status: Never Used   Substance and Sexual Activity    Alcohol use: Not Currently     Alcohol/week: 12.0 standard drinks of alcohol     Types: 12 Cans of beer per week    Drug use: Never    Sexual activity: Yes     Partners: Female     Birth control/protection: Post-menopausal     E-Cigarette/Vaping    E-Cigarette Use Never User      E-Cigarette/Vaping Substances    Nicotine No     THC No     CBD No     Flavoring No     Other No     Unknown No        Social History:  Marital Status: /Civil Union   Patient Pre-hospital Living Situation: Home  Patient Pre-hospital Level of Mobility: walks  Patient Pre-hospital Diet Restrictions: tube feeds    Meds/Allergies   I have reviewed home medications with patient personally.  Prior to Admission medications    Medication Sig Start Date End Date Taking? Authorizing Provider   amLODIPine (NORVASC) 5 mg tablet TAKE 1 TABLET (5 MG TOTAL) BY MOUTH DAILY. 24  Yes Raphael Lopez MD   atropine (ISOPTO ATROPINE) 1 % ophthalmic solution 2-3 drops sublingual as needed for increased saliva/cough 24  Yes Devonte Singh MD   benzonatate (TESSALON) 200 MG capsule Take 1 capsule (200 mg total) by mouth 3 (three) times a day as needed for cough 10/24/24  Yes Raphael Lopez MD   ipratropium (ATROVENT) 0.03 % nasal spray 2 sprays into each nostril 3 (three) times a day as needed (nasal drip with meals) 25  Yes Jose Antonio Rodriguez MD   levalbuterol (XOPENEX HFA) 45 mcg/act inhaler Inhale 2 puffs every 6 (six) hours as needed for wheezing 24  Yes Chris Rivera MD   levothyroxine 100 mcg tablet TAKE 1 TABLET BY MOUTH EVERY DAY 24  Yes Raphael Lopez MD   [facility] Products (CordyMax CS-4) 525 MG  CAPS Take by mouth   Yes Historical Provider, MD   multivitamin (THERAGRAN) TABS Take 1 tablet by mouth daily   Yes Historical Provider, MD   NON FORMULARY Ginseng + Atractylodes   Yes Historical Provider, MD   tadalafil (CIALIS) 5 MG tablet Take 1 tablet (5 mg total) by mouth daily 8/10/22  Yes Nereida Miller PA-C   famotidine (PEPCID) 20 mg/2.5 mL oral suspension TAKE 5 ML (40 MG TOTAL) BY MOUTH DAILY AT BEDTIME  Patient not taking: Reported on 2/2/2025 3/27/24 2/2/25  Devnote Singh MD   scopolamine (TRANSDERM-SCOP) 1 mg/3 days TD 72 hr patch Place 1 patch on the skin over 72 hours every third day  Patient not taking: Reported on 2/2/2025 12/16/24   Devonte Singh MD     No Known Allergies    Objective :  Temp:  [99.6 °F (37.6 °C)-99.8 °F (37.7 °C)] 99.8 °F (37.7 °C)  HR:  [89-98] 89  BP: (109)/(65-69) 109/65  Resp:  [20-22] 22  SpO2:  [89 %-95 %] 95 %  O2 Device: Nasal cannula  Nasal Cannula O2 Flow Rate (L/min):  [3 L/min] 3 L/min    Physical Exam  Vitals and nursing note reviewed.   Constitutional:       General: He is not in acute distress.     Appearance: He is well-developed.   HENT:      Head: Normocephalic and atraumatic.   Eyes:      Conjunctiva/sclera: Conjunctivae normal.   Cardiovascular:      Rate and Rhythm: Regular rhythm. Tachycardia present.      Heart sounds: No murmur heard.  Pulmonary:      Effort: No respiratory distress.      Breath sounds: Wheezing present.      Comments: Inspiratory wheezes  Abdominal:      Palpations: Abdomen is soft.      Tenderness: There is no abdominal tenderness.   Musculoskeletal:         General: No swelling.      Cervical back: Neck supple.      Right lower leg: No edema.      Left lower leg: No edema.   Skin:     General: Skin is warm and dry.      Capillary Refill: Capillary refill takes less than 2 seconds.   Neurological:      Mental Status: He is alert.      Motor: Weakness present.      Comments: L arm weakness (radiation plexitis)    Psychiatric:          Mood and Affect: Mood normal.          Lines/Drains:            Lab Results: I have reviewed the following results:  Results from last 7 days   Lab Units 02/02/25 2024   WBC Thousand/uL 3.66*   HEMOGLOBIN g/dL 12.1   HEMATOCRIT % 37.1   PLATELETS Thousands/uL 223   SEGS PCT % 80*   LYMPHO PCT % 10*   MONO PCT % 9   EOS PCT % 0     Results from last 7 days   Lab Units 02/02/25 2024   SODIUM mmol/L 132*   POTASSIUM mmol/L 3.8   CHLORIDE mmol/L 95*   CO2 mmol/L 29   BUN mg/dL 24   CREATININE mg/dL 0.54*   ANION GAP mmol/L 8   CALCIUM mg/dL 8.7   ALBUMIN g/dL 3.6   TOTAL BILIRUBIN mg/dL 0.45   ALK PHOS U/L 86   ALT U/L 37   AST U/L 34   GLUCOSE RANDOM mg/dL 182*     Results from last 7 days   Lab Units 02/02/25 2024   INR  1.03         Lab Results   Component Value Date    HGBA1C 5.3 07/14/2018    HGBA1C 5.4 08/11/2017    HGBA1C 5.3 08/15/2016     Results from last 7 days   Lab Units 02/02/25 2024   LACTIC ACID mmol/L 1.4   PROCALCITONIN ng/ml 0.86*             Administrative Statements       ** Please Note: This note has been constructed using a voice recognition system. **

## 2025-02-03 NOTE — ED ATTENDING ATTESTATION
2/2/2025  I, Guillermo Toledo DO, saw and evaluated the patient. I have discussed the patient with the resident/non-physician practitioner and agree with the resident's/non-physician practitioner's findings, Plan of Care, and MDM as documented in the resident's/non-physician practitioner's note, except where noted. All available labs and Radiology studies were reviewed.  I was present for key portions of any procedure(s) performed by the resident/non-physician practitioner and I was immediately available to provide assistance.       At this point I agree with the current assessment done in the Emergency Department.  I have conducted an independent evaluation of this patient a history and physical is as follows:    58 yo M coming in for evaluation of shortness of breath, productive cough, weakness. Started on doxy recently for pneumonia while in [place], just returned home today.    PE:  The patient is ill appearing, warm to touch.  Head: normocephalic, atraumatic. HEENT: mucous membranes moist.  Lungs: mild tachypnea and retractions present. no resp distress. Heart: RRR. No M/R/G. Abdomen: NT, ND, no R/R/G. Neuro: CN2-12 intact, GCS 15. Normal strength and sensation, normal speech and gait. Cap refill < 2 sec, skin warm and dry. No rashes or lesions.    Sepsis workup, CXR shows b/l lower lobe pneumonia.  Labs and vitals - c/w severe sepsis, hypoxia. Admit, broad spec abx.      ED Course         Critical Care Time  CriticalCare Time    Date/Time: 2/2/2025 11:05 PM    Performed by: Guillermo Toledo DO  Authorized by: Guillermo Toledo DO    Critical care provider statement:     Critical care time (minutes):  40    Critical care time was exclusive of:  Separately billable procedures and treating other patients and teaching time    Critical care was necessary to treat or prevent imminent or life-threatening deterioration of the following conditions:  Respiratory failure and sepsis    Critical care was time spent personally  by me on the following activities:  Obtaining history from patient or surrogate, development of treatment plan with patient or surrogate, discussions with primary provider, evaluation of patient's response to treatment, examination of patient, review of old charts, re-evaluation of patient's condition, ordering and review of radiographic studies, ordering and review of laboratory studies and ordering and performing treatments and interventions    I assumed direction of critical care for this patient from another provider in my specialty: no

## 2025-02-03 NOTE — ASSESSMENT & PLAN NOTE
59-year-old male past medical history of esophageal and tongue cancer s/p radiation with chronic known dysphagia and risk for aspiration presents with a chief complaint of fever, cough, and worsening shortness of breath.  Patient reports compliance with 2 L oxygen nasal cannula at bedtime.  Currently requiring 2 L O2 NC while awake in ED  Started on doxycycline for pneumonia outpatient (reports taking 3 doses w/o improvement)   SIRS criteria positive for tachycardia with heart rate of 98 and white blood cell count 3.66 (at baseline).  Currently afebrile  Concern for aspiration pneumonia versus community-acquired pneumonia  Lactic acid within normal limits  Pro-Michael elevated at 0.86  Chest x-ray significant for left greater than right bibasilar PNA  COVID/flu negative  DRIP score: 4 = severe with high risk for MDR  Antibiotic use within 60 days and tube feeds  No prior history of MRSA  Does endorse recent travel to [place] this morning.  No clinical signs of DVT, chest pain, hemoptysis, or recent surgery.  No malignancy treatment within the past 6 months. Wells score 0     PLAN:  Am procal   Continue to trend CBC and fever curve   nebs for wheezing   F/u BC and sputum culture   Incentive spirometry   Respiratory protocol  Aspiration protocol   Strep pneumo, legionella, and RP2 panel pending   Given drip score of 4 with high risk for MDR, will start on cefepime, azithromycin, and vancomycin.  Consult placed to pharmacy.  Will de-escalate antibiotics as clinically indicated  MRSA pending  Isolyte 1 L bolus STAT  Continue tube feeds with no pleasure feeds at this time d/t concern for aspiration   Nutrition and speech consult pending

## 2025-02-03 NOTE — PROGRESS NOTES
Mohamud CH Sirisha Dash is a 59 y.o. male who is currently ordered Vancomycin IV with management by the Pharmacy Consult service.  Relevant clinical data and objective / subjective history reviewed.  Vancomycin Assessment:  Indication and Goal AUC/Trough:    Clinical Status: stable  Micro:     Renal Function:  SCr: 0.54 mg/dL  CrCl: 137.3 mL/min  Renal replacement: Not on dialysis  Days of Therapy: 1  Current Dose:    Vancomycin Plan:  New Dosing:    Estimated AUC: 550 mcg*hr/mL  Estimated Trough: 11.6 mcg/mL  Next Level: 2/4 0600  Renal Function Monitoring: Daily BMP and UOP  Pharmacy will continue to follow closely for s/sx of nephrotoxicity, infusion reactions and appropriateness of therapy.  BMP and CBC will be ordered per protocol. We will continue to follow the patient’s culture results and clinical progress daily.    Damion Adame, Pharmacist

## 2025-02-03 NOTE — RESPIRATORY THERAPY NOTE
RT Protocol Note  Mohamud Grimm Sr. 59 y.o. male MRN: [id number]  Unit/Bed#: ED-23 Encounter: [id number]    Assessment    Principal Problem:    Sepsis (HCC)  Active Problems:    Essential hypertension    Hypothyroidism    Obstructive sleep apnea syndrome    Hx of head and neck radiation    Gastroesophageal reflux disease    At risk for aspiration    Elevated troponin      Home Pulmonary Medications: Xopenex Inhaler PRN and Atrovent nasal spray  Home Devices/Therapy: (P) Home O2, BiPAP/CPAP (Pt have Atrovent nasal spray and Xop inhaler at home. Pt wear 2L Oxygen overnight to help with ERIK. pt haven't used CPAP in long time per pt)    Past Medical History:   Diagnosis Date    Cancer of base of tongue (HCC)     excision    Cough     CPAP (continuous positive airway pressure) dependence     no currently    Disease of thyroid gland     hypo    Dysphagia     ED (erectile dysfunction)     GERD (gastroesophageal reflux disease)     History of pneumonia     Hypertension     Leukopenia     Peyronie's disease     Pneumonia     PONV (postoperative nausea and vomiting)     Psoriasis     Sleep apnea     Tongue cancer (HCC)     Weight loss, abnormal      Social History     Socioeconomic History    Marital status: /Civil Union     Spouse name: None    Number of children: 6    Years of education: None    Highest education level: None   Occupational History    None   Tobacco Use    Smoking status: Former     Current packs/day: 0.00     Average packs/day: 0.3 packs/day for 20.0 years (5.0 ttl pk-yrs)     Types: Cigarettes     Start date: 1991     Quit date: 2011     Years since quittin.0     Passive exposure: Past    Smokeless tobacco: Former     Quit date:     Tobacco comments:     pt quit with dx of tongue base cancer, per allscripts   Vaping Use    Vaping status: Never Used   Substance and Sexual Activity    Alcohol use: Not Currently     Alcohol/week: 12.0 standard drinks of alcohol     Types: 12 Cans of  beer per week    Drug use: Never    Sexual activity: Yes     Partners: Female     Birth control/protection: Post-menopausal   Other Topics Concern    None   Social History Narrative    Daily coffee consumption, 1 cups/day    Daily cola consumption    Daily tea consumption    Dental care, regularly    Exercise: running, weight training    High school graduate    No guns in the home    Pets/animals,  Active         Social Drivers of Health     Financial Resource Strain: Low Risk  (2023)    Received from [employer]    Overall Financial Resource Strain (CARDIA)     Difficulty of Paying Living Expenses: Not hard at all   Food Insecurity: No Food Insecurity (2024)    Nursing - Inadequate Food Risk Classification     Worried About Running Out of Food in the Last Year: Never true     Ran Out of Food in the Last Year: Never true     Ran Out of Food in the Last Year: Never true   Transportation Needs: No Transportation Needs (2024)    Nursing - Transportation Risk Classification     Lack of Transportation: Not on file     Lack of Transportation: No   Physical Activity: Not on file   Stress: Stress Concern Present (2023)    Received from [employer]    [facility] - Occupational Stress Questionnaire     Feeling of Stress : To some extent   Social Connections: Not on file   Intimate Partner Violence: Unknown (2024)    Nursing IPS     Feels Physically and Emotionally Safe: Not on file     Physically Hurt by Someone: Not on file     Humiliated or Emotionally Abused by Someone: Not on file     Physically Hurt by Someone: No     Hurt or Threatened by Someone: No   Housing Stability: Unknown (2024)    Nursing: Inadequate Housing Risk Classification     Has Housing: Not on file     Worried About Losing Housing: Not on file     Unable to Get Utilities: Not on file     Unable to Pay for Housing in the Last Year: No     Has Housin  "      Subjective         Objective    Physical Exam:   Assessment Type: (P) During-treatment  General Appearance: (P) Alert, Awake  Respiratory Pattern: (P) Symmetrical, Spontaneous  Chest Assessment: (P) Chest expansion symmetrical  Bilateral Breath Sounds: (P) Diminished  Cough: (P) Productive, Strong    Vitals:  Blood pressure 109/65, pulse 68, temperature 99.8 °F (37.7 °C), temperature source Oral, resp. rate 20, height 5' 8\" (1.727 m), weight 70 kg (154 lb 5.2 oz), SpO2 96%.                    Plan    Respiratory Plan: (P) Home Bronchodilator Patient pathway        Resp Comments: (P) Pt stated he have no pulmonary history. Pt use Atrovent nasal spray and Xopenex inhaler at home. Pt use 2L Oxygen at night, Pt given treatment. Pt currently on 2L NC. Pt have productive Cough.   "

## 2025-02-03 NOTE — ED NOTES
Pt given jevity tube feeding. Pt was offered kangaroo pump but pt declined as he has his own products from home he likes to use. RN offered assistance, pt declined at this time.      Lorrie Barboza RN  02/03/25 1016

## 2025-02-03 NOTE — UTILIZATION REVIEW
Initial Clinical Review    Observation 2/2 2128 changed to inpatient 2/4 1647. Pt requiring continued stay for management of sepsis.     Admission: Date/Time/Statement:   Admission Orders (From admission, onward)       Ordered        02/04/25 1647  INPATIENT ADMISSION  Once            02/02/25 2128  Place in Observation  Once                          Orders Placed This Encounter   Procedures    INPATIENT ADMISSION     Standing Status:   Standing     Number of Occurrences:   1     Level of Care:   Med Surg [16]     Estimated length of stay:   More than 2 Midnights     Certification:   I certify that inpatient services are medically necessary for this patient for a duration of greater than two midnights. See H&P and MD Progress Notes for additional information about the patient's course of treatment.     ED Arrival Information       Expected   -    Arrival   2/2/2025 19:39    Acuity   Emergent              Means of arrival   Walk-In    Escorted by   Family Member    Service   Hospitalist    Admission type   Emergency              Arrival complaint   Cough, Fever             Chief Complaint   Patient presents with    Cough     Pt reports he is on abx for pneumonia. Family reports persistently bianka oral fevers, last ibuprofen after dinner, no tylenol today. Pt returned from [place] today via plane. Pt seen in [place] for illness and was started on doxy       Initial Presentation: 59 y.o. male with a PMH of esophageal cancer treated in 2012 with chemo and radiation, ERIK noncompliant with CPAP but does use 2 L NC hs, and recurrent pneumonia (last hospitalization 3 months ago) who presents with chief complaint of fever, shortness of breath, and worsening cough. Per patient, he states he first spiked a fever of 102 °F yesterday and has had difficulty breathing and worsening cough compared to baseline. Patient states he was started on doxycycline outpatient due to concern for pneumonia and has taken a total of 3 doses  without significant improvement of symptoms. Patient endorses chronic history of left-sided weakness and dysphagia due to radiation in 2012. States that he typically has 3 bolus tube feeds daily but does occasionally engage in pleasure feeds. Also reports he takes his pills orally. Plan: Observation for sepsis, HTN, hypothyroidism, ERIK, elevated troponin: Procal in am, trend CBC, follow blood and sputum cultures, Strep pneumo, legionella, and RP2 panel pending, IV cefepime, vanco, PO azithromycin, IV fluids, continue tube feeds, levothyroxine, Norvasc, telemetry, trend troponin, Echo.     2/3:    Internal medicine: discontinue azithromycin, continue IV cefepime and vanco. Follow blood and sputum cultures. If MRSA culture is negative, discontinue vanco. Echo pending. Telemetry. NPO.     2/4 Observation changed to inpatient.     Internal medicine: trend CBC. Negative MRSA scree-discontinue vanco. Continue IV ceftriaxone. Follow blood cultures. Continue tube feeds with no pleasure feeds at this time d/t concern for aspiration. Continue O2 and wean as tolerated. Telemetry. Continue Norvasc and levothyroxine.     Date: 2/5 Day: 2:    Internal medicine: trend CBC, continue IV ceftriaxone, follow blood cultures, continue tube feeds with no pleasure feeds due to concerns for aspiration, wean O2 as tolerated, ambulatory pulse ox, continue Norvasc and levothyroxine.     ED Treatment-Medication Administration from 02/02/2025 1939 to 02/03/2025 0838         Date/Time Order Dose Route Action     02/02/2025 2037 sodium chloride 0.9 % bolus 1,000 mL 1,000 mL Intravenous New Bag     02/02/2025 2039 ceftriaxone (ROCEPHIN) 1 g/50 mL in dextrose IVPB 1,000 mg Intravenous New Bag     02/02/2025 2038 acetaminophen (Ofirmev) injection 1,000 mg 1,000 mg Intravenous New Bag     02/03/2025 0106 benzonatate (TESSALON PERLES) capsule 200 mg 200 mg Oral Given     02/03/2025 0549 levothyroxine tablet 100 mcg 100 mcg Oral Given     02/02/2025  2303 cefepime (MAXIPIME) 2 g/50 mL dextrose IVPB 2,000 mg Intravenous New Bag     02/03/2025 0610 cefepime (MAXIPIME) 2 g/50 mL dextrose IVPB 2,000 mg Intravenous New Bag     02/02/2025 2306 azithromycin (ZITHROMAX) tablet 500 mg 500 mg Oral Given     02/03/2025 0018 vancomycin (VANCOCIN) 1,750 mg in sodium chloride 0.9 % 500 mL IVPB 1,750 mg Intravenous New Bag     02/02/2025 2305 multi-electrolyte (ISOLYTE-S PH 7.4) bolus 1,000 mL 1,000 mL Intravenous New Bag     02/02/2025 2339 levalbuterol (XOPENEX) inhalation solution 0.63 mg 0.63 mg Nebulization Given     02/02/2025 2339 budesonide (PULMICORT) inhalation solution 0.5 mg 0.5 mg Nebulization Given            Scheduled Medications:  amLODIPine, 5 mg, Oral, Daily  budesonide, 0.5 mg, Nebulization, Q12H  cefTRIAXone, 1,000 mg, Intravenous, Q24H  enoxaparin, 40 mg, Subcutaneous, Daily  levalbuterol, 0.63 mg, Nebulization, TID  levothyroxine, 100 mcg, Oral, Early Morning  senna, 1 tablet, Oral, Daily      Continuous IV Infusions:     PRN Meds:  acetaminophen, 650 mg, Oral, Q6H PRN  albuterol, 2 puff, Inhalation, Q6H PRN  benzonatate, 200 mg, Oral, TID PRN  calcium carbonate, 1,000 mg, Oral, Daily PRN  ondansetron, 4 mg, Intravenous, Q6H PRN      ED Triage Vitals   Temperature Pulse Respirations Blood Pressure SpO2 Pain Score   02/02/25 1947 02/02/25 1947 02/02/25 1947 02/02/25 1947 02/02/25 1947 02/03/25 1958   99.6 °F (37.6 °C) 98 20 109/69 (!) 89 % Med Not Given for Pain - for MAR use only     Weight (last 2 days)       Date/Time Weight    02/03/25 1300 69.9 (154)            Vital Signs (last 3 days)       Date/Time Temp Pulse Resp BP MAP (mmHg) SpO2 Calculated FIO2 (%) - Nasal Cannula Nasal Cannula O2 Flow Rate (L/min) O2 Device Patient Position - Orthostatic VS Mable Coma Scale Score Pain    02/05/25 0812 -- -- -- -- -- -- 32 3 L/min Nasal cannula -- -- --    02/05/25 07:15:41 99.7 °F (37.6 °C) 76 17 120/72 88 89 % 40 5 L/min Nasal cannula Lying -- --     02/04/25 22:15:40 98.4 °F (36.9 °C) 73 20 121/70 87 84 % -- -- -- -- -- --    02/04/25 2000 -- -- -- -- -- -- -- -- -- -- 15 --    02/04/25 1918 -- -- -- -- -- -- 36 4 L/min Nasal cannula -- -- --    02/04/25 18:29:39 99.3 °F (37.4 °C) 73 20 104/65 78 89 % -- -- -- -- -- --    02/04/25 1752 -- -- -- -- -- -- -- -- -- -- -- Med Not Given for Pain - for MAR use only    02/04/25 17:36:03 100.3 °F (37.9 °C) 74 -- -- -- 97 % -- -- -- -- -- --    02/04/25 16:51:20 99.7 °F (37.6 °C) 68 -- -- -- 98 % -- -- -- -- -- --    02/04/25 16:50:44 99.7 °F (37.6 °C) 71 -- -- -- 98 % -- -- -- -- -- --    02/04/25 1531 -- -- -- -- -- 92 % 34 3.5 L/min Nasal cannula -- -- --    02/04/25 1529 -- -- -- -- -- 89 % -- -- -- -- -- --    02/04/25 1510 -- -- -- -- -- 83 % -- -- -- -- -- --    02/04/25 15:09:59 99 °F (37.2 °C) -- 16 105/63 77 83 % -- -- -- -- -- --    02/04/25 1310 -- -- -- -- -- 95 % 28 2 L/min Nasal cannula -- -- --    02/04/25 1007 -- -- -- -- -- 90 % 28 2 L/min Nasal cannula -- 15 No Pain    02/04/25 10:04:55 -- 80 -- 121/70 87 91 % -- -- -- -- -- --    02/04/25 1004 -- -- -- 121/70 -- -- -- -- -- -- -- --    02/04/25 0707 -- -- -- -- -- 98 % 34 3.5 L/min Nasal cannula -- -- --    02/04/25 06:37:53 98.7 °F (37.1 °C) 68 16 102/56 71 99 % -- -- -- -- -- --    02/03/25 2312 -- -- -- -- -- -- -- -- -- -- -- 8    02/03/25 22:37:38 99.1 °F (37.3 °C) -- 16 114/64 81 -- -- -- -- -- -- --    02/03/25 22:19:48 99 °F (37.2 °C) -- -- -- -- -- -- -- -- -- -- --    02/03/25 2052 -- -- -- -- -- 94 % -- -- None (Room air) -- -- --    02/03/25 1958 -- -- -- -- -- -- -- -- -- -- 15 Med Not Given for Pain - for MAR use only    02/03/25 19:56:37 101.9 °F (38.8 °C) -- 16 113/67 82 -- -- -- -- -- -- --    02/03/25 1643 -- 75 20 134/78 101 92 % -- -- None (Room air) Sitting -- --    02/03/25 1300 -- 70 -- 119/73 -- -- -- -- -- -- -- --    02/03/25 1048 -- -- -- -- -- -- -- -- -- -- 15 --    02/03/25 0946 -- 69 20 119/73 -- 94 % -- -- None (Room  air) Sitting -- --    02/03/25 0400 -- 77 20 147/84 108 95 % 28 2 L/min Nasal cannula Lying -- --    02/03/25 0321 -- -- -- -- -- -- -- -- -- -- 15 --    02/02/25 2339 -- -- -- -- -- 96 % 28 2 L/min Nasal cannula -- -- --    02/02/25 2330 -- 68 20 109/65 79 95 % 28 2 L/min Nasal cannula Sitting -- --    02/02/25 2315 -- 67 20 95/58 71 95 % 28 2 L/min Nasal cannula Sitting -- --    02/02/25 2300 -- 69 20 95/59 73 95 % 28 2 L/min Nasal cannula Sitting 15 --    02/02/25 2245 -- 71 22 97/69 80 94 % 28 2 L/min Nasal cannula Sitting -- --    02/02/25 2230 -- 68 22 90/58 69 95 % 28 2 L/min Nasal cannula Sitting -- --    02/02/25 2225 -- -- -- 86/56 -- -- -- -- -- Sitting -- --    02/02/25 2215 -- 69 22 90/52 66 94 % 28 2 L/min Nasal cannula Sitting -- --    02/02/25 2018 99.8 °F (37.7 °C) 89 22 109/65 -- 95 % 32 3 L/min Nasal cannula Lying -- --    02/02/25 1947 99.6 °F (37.6 °C) 98 20 109/69 84 89 % -- -- None (Room air) -- -- --              Pertinent Labs/Diagnostic Test Results:   Radiology:  XR chest 2 views   ED Interpretation by Raphael Barger DO (02/02 2106)   Abnormal   Bibasilar left greater than right consolidations with bilateral pleural effusions.  No pneumothorax.  Increased consolidations compared to prior chest x-ray done on December 30, 2024.      Final Interpretation by Ronen Aguilar MD (02/03 0830)      Bibasilar consolidation may be on the basis of pneumonia or possibly aspiration. Probable small bilateral pleural effusions.            Workstation performed: [id number]         FL barium swallow video w speech    (Results Pending)     Cardiology:  Echo complete w/ contrast if indicated   Final Result by Isaiah Rdz MD (02/03 1529)        Left Ventricle: Left ventricular cavity size is normal. Wall thickness    is mildly increased. The left ventricular ejection fraction is 60%.    Systolic function is normal. Diastolic function is normal.     The following segments are hypokinetic: apical septal and apex.      All other segments are normal.     Left Atrium: The atrium is mildly dilated.     Aorta: The aortic root is normal in size. The ascending aorta is mildly    dilated. The ascending aorta is 4.3 cm.         ECG 12 lead   Final Result by Adrian Hardy MD (02/03 1929)   Normal sinus rhythm   Possible Left atrial enlargement   Poor R wave progression   Abnormal ECG   When compared with ECG of 24-Nov-2024 20:01,   No significant change was found   Confirmed by Adrian Hardy ([id number]) on 2/3/2025 7:29:13 PM      ECG 12 lead   Final Result by Adrian Hardy MD (02/03 1929)   Normal sinus rhythm   Possible Left atrial enlargement   Poor R wave progression   Abnormal ECG   No significant change was found   Confirmed by Adrian Hardy ([id number]) on 2/3/2025 7:29:03 PM        GI:  No orders to display           Results from last 7 days   Lab Units 02/05/25  0509 02/04/25  0510 02/03/25  0545 02/02/25 2024   WBC Thousand/uL 2.20* 2.39* 3.60* 3.66*   HEMOGLOBIN g/dL 12.1 11.4* 11.5* 12.1   HEMATOCRIT % 36.6 35.2* 35.4* 37.1   PLATELETS Thousands/uL 198 198 172 223   TOTAL NEUT ABS Thousands/µL  --  1.49* 2.64 2.95         Results from last 7 days   Lab Units 02/05/25  0509 02/04/25  0510 02/03/25  0545 02/02/25 2024   SODIUM mmol/L 135 137 137 132*   POTASSIUM mmol/L 3.8 3.8 3.7 3.8   CHLORIDE mmol/L 98 100 100 95*   CO2 mmol/L 31 32 34* 29   ANION GAP mmol/L 6 5 3* 8   BUN mg/dL 12 15 17 24   CREATININE mg/dL 0.29* 0.24* 0.36* 0.54*   EGFR ml/min/1.73sq m 146 158 133 113   CALCIUM mg/dL 8.6 8.6 8.1* 8.7   MAGNESIUM mg/dL  --   --  2.0  --    PHOSPHORUS mg/dL  --   --  2.9  --      Results from last 7 days   Lab Units 02/02/25 2024   AST U/L 34   ALT U/L 37   ALK PHOS U/L 86   TOTAL PROTEIN g/dL 7.0   ALBUMIN g/dL 3.6   TOTAL BILIRUBIN mg/dL 0.45         Results from last 7 days   Lab Units 02/05/25  0509 02/04/25  0510 02/03/25  0545 02/02/25  2024   GLUCOSE RANDOM mg/dL 94 88 84 182*           Results from last 7 days   Lab Units  02/03/25  0017 02/02/25  2303 02/02/25 2024   HS TNI 0HR ng/L  --   --  2,290*   HS TNI 2HR ng/L  --  2,564*  --    HSTNI D2 ng/L  --  274*  --    HS TNI 4HR ng/L 1,447*  --   --    HSTNI D4 ng/L -843  --   --          Results from last 7 days   Lab Units 02/02/25 2024   PROTIME seconds 14.2   INR  1.03   PTT seconds 34         Results from last 7 days   Lab Units 02/03/25  0545 02/02/25 2024   PROCALCITONIN ng/ml 0.75* 0.86*     Results from last 7 days   Lab Units 02/02/25 2024   LACTIC ACID mmol/L 1.4           Results from last 7 days   Lab Units 02/03/25  0015   STREP PNEUMONIAE ANTIGEN, URINE  Negative   LEGIONELLA URINARY ANTIGEN  Negative           Results from last 7 days   Lab Units 02/03/25  0016 02/02/25 2032 02/02/25 2024   BLOOD CULTURE   --  No Growth at 48 hrs. No Growth at 48 hrs.   SPUTUM CULTURE  Culture too young- will reincubate  --   --    GRAM STAIN RESULT  1+ Epithelial cells per low power field*  No polys seen*  Rare Gram positive cocci in pairs*  Rare Gram variable rods*  --   --          Past Medical History:   Diagnosis Date    Cancer of base of tongue (HCC)     excision    Cough     CPAP (continuous positive airway pressure) dependence     no currently    Disease of thyroid gland     hypo    Dysphagia     ED (erectile dysfunction)     GERD (gastroesophageal reflux disease)     History of pneumonia     Hypertension     Leukopenia     Peyronie's disease     Pneumonia     PONV (postoperative nausea and vomiting)     Psoriasis     Sleep apnea     Tongue cancer (HCC)     Weight loss, abnormal      Present on Admission:   Essential hypertension   Hypothyroidism   At risk for aspiration   Sepsis (HCC)   Obstructive sleep apnea syndrome   Gastroesophageal reflux disease      Admitting Diagnosis: Cough [R05.9]  Pneumonia [J18.9]  Elevated troponin [R79.89]  History of tongue cancer [Z85.810]  Acute respiratory failure with hypoxia (HCC) [J96.01]  Severe swallowing dysfunction  [R13.10]  Chronic leukopenia [D72.819]  Age/Sex: 59 y.o. male    Network Utilization Review Department  ATTENTION: Please call with any questions or concerns to [phone] and carefully listen to the prompts so that you are directed to the right person. All voicemails are confidential.   For Discharge needs, contact Care Management DC Support Team at [phone] opt. [phone]  Send all requests for admission clinical reviews, approved or denied determinations and any other requests to dedicated fax number below belonging to the campus where the patient is receiving treatment. List of dedicated fax numbers for the Facilities:  FACILITY NAME UR FAX NUMBER   ADMISSION DENIALS (Administrative/Medical Necessity) [phone]   DISCHARGE SUPPORT TEAM (NETWORK) [phone]   PARENT CHILD HEALTH (Maternity/NICU/Pediatrics) [phone]   [facility] [phone]   [facility] [phone]   [facility] [phone]   [facility] [phone]   [facility] [phone]   [facility] [phone]   [facility] [phone]   [facility] [phone]   [facility] [phone]   [facility] [phone]   [facility] [phone]   [facility] [phone]

## 2025-02-03 NOTE — PROGRESS NOTES
Mohamud CH Sirisha Dash is a 59 y.o. male who is currently ordered Vancomycin IV with management by the Pharmacy Consult service.  Relevant clinical data and objective / subjective history reviewed.  Vancomycin Assessment:  Indication and Goal AUC/Trough: Pneumonia (goal -600, trough >10)  Clinical Status: stable  Micro:     Renal Function:  SCr: 0.36 mg/dL  CrCl: 166.6 mL/min  Renal replacement: Not on dialysis  Days of Therapy: 1  Current Dose: 1750 mg IV every 12 hours  Vancomycin Plan:  New Dosing: continue current dose  Estimated AUC: 534 mcg*hr/mL  Estimated Trough: 11 mcg/mL  Next Level: 2/10 @ 0600  Renal Function Monitoring: Daily BMP and UOP  Pharmacy will continue to follow closely for s/sx of nephrotoxicity, infusion reactions and appropriateness of therapy.  BMP and CBC will be ordered per protocol. We will continue to follow the patient’s culture results and clinical progress daily.    Vilma Patel, Pharmacist

## 2025-02-03 NOTE — ASSESSMENT & PLAN NOTE
Continue tube feeds with Jevity 1.5 500 mL 3 times daily bolus with 100 mL H2O flushes before and after feeds  Will not continue pleasure feeds due to concern for aspiration pneumonia  Speech and nutrition consulted.  Appreciate recommendations  F/u on am phosphorus

## 2025-02-04 PROBLEM — J96.20 ACUTE ON CHRONIC RESPIRATORY FAILURE (HCC): Status: ACTIVE | Noted: 2025-02-04

## 2025-02-04 LAB
ANION GAP SERPL CALCULATED.3IONS-SCNC: 5 MMOL/L (ref 4–13)
BASOPHILS # BLD AUTO: 0.01 THOUSANDS/ΜL (ref 0–0.1)
BASOPHILS NFR BLD AUTO: 0 % (ref 0–1)
BUN SERPL-MCNC: 15 MG/DL (ref 5–25)
CALCIUM SERPL-MCNC: 8.6 MG/DL (ref 8.4–10.2)
CHLORIDE SERPL-SCNC: 100 MMOL/L (ref 96–108)
CO2 SERPL-SCNC: 32 MMOL/L (ref 21–32)
CREAT SERPL-MCNC: 0.24 MG/DL (ref 0.6–1.3)
EOSINOPHIL # BLD AUTO: 0.02 THOUSAND/ΜL (ref 0–0.61)
EOSINOPHIL NFR BLD AUTO: 1 % (ref 0–6)
ERYTHROCYTE [DISTWIDTH] IN BLOOD BY AUTOMATED COUNT: 14.3 % (ref 11.6–15.1)
GFR SERPL CREATININE-BSD FRML MDRD: 158 ML/MIN/1.73SQ M
GLUCOSE SERPL-MCNC: 88 MG/DL (ref 65–140)
HCT VFR BLD AUTO: 35.2 % (ref 36.5–49.3)
HGB BLD-MCNC: 11.4 G/DL (ref 12–17)
IMM GRANULOCYTES # BLD AUTO: 0.01 THOUSAND/UL (ref 0–0.2)
IMM GRANULOCYTES NFR BLD AUTO: 0 % (ref 0–2)
LYMPHOCYTES # BLD AUTO: 0.64 THOUSANDS/ΜL (ref 0.6–4.47)
LYMPHOCYTES NFR BLD AUTO: 27 % (ref 14–44)
MCH RBC QN AUTO: 29.6 PG (ref 26.8–34.3)
MCHC RBC AUTO-ENTMCNC: 32.4 G/DL (ref 31.4–37.4)
MCV RBC AUTO: 91 FL (ref 82–98)
MONOCYTES # BLD AUTO: 0.22 THOUSAND/ΜL (ref 0.17–1.22)
MONOCYTES NFR BLD AUTO: 9 % (ref 4–12)
MRSA NOSE QL CULT: NORMAL
NEUTROPHILS # BLD AUTO: 1.49 THOUSANDS/ΜL (ref 1.85–7.62)
NEUTS SEG NFR BLD AUTO: 63 % (ref 43–75)
NRBC BLD AUTO-RTO: 0 /100 WBCS
PLATELET # BLD AUTO: 198 THOUSANDS/UL (ref 149–390)
PMV BLD AUTO: 10.2 FL (ref 8.9–12.7)
POTASSIUM SERPL-SCNC: 3.8 MMOL/L (ref 3.5–5.3)
RBC # BLD AUTO: 3.85 MILLION/UL (ref 3.88–5.62)
SODIUM SERPL-SCNC: 137 MMOL/L (ref 135–147)
WBC # BLD AUTO: 2.39 THOUSAND/UL (ref 4.31–10.16)

## 2025-02-04 PROCEDURE — 94640 AIRWAY INHALATION TREATMENT: CPT

## 2025-02-04 PROCEDURE — 92526 ORAL FUNCTION THERAPY: CPT

## 2025-02-04 PROCEDURE — 85025 COMPLETE CBC W/AUTO DIFF WBC: CPT

## 2025-02-04 PROCEDURE — 80048 BASIC METABOLIC PNL TOTAL CA: CPT

## 2025-02-04 PROCEDURE — 94760 N-INVAS EAR/PLS OXIMETRY 1: CPT

## 2025-02-04 RX ADMIN — AMLODIPINE BESYLATE 5 MG: 5 TABLET ORAL at 10:04

## 2025-02-04 RX ADMIN — CEFTRIAXONE SODIUM 1000 MG: 10 INJECTION, POWDER, FOR SOLUTION INTRAVENOUS at 19:56

## 2025-02-04 RX ADMIN — BUDESONIDE INHALATION 0.5 MG: 0.5 SUSPENSION RESPIRATORY (INHALATION) at 19:18

## 2025-02-04 RX ADMIN — LEVALBUTEROL HYDROCHLORIDE 0.63 MG: 0.63 SOLUTION RESPIRATORY (INHALATION) at 07:07

## 2025-02-04 RX ADMIN — LEVALBUTEROL HYDROCHLORIDE 0.63 MG: 0.63 SOLUTION RESPIRATORY (INHALATION) at 13:10

## 2025-02-04 RX ADMIN — LEVOTHYROXINE SODIUM 100 MCG: 0.1 TABLET ORAL at 05:14

## 2025-02-04 RX ADMIN — LEVALBUTEROL HYDROCHLORIDE 0.63 MG: 0.63 SOLUTION RESPIRATORY (INHALATION) at 19:18

## 2025-02-04 RX ADMIN — BUDESONIDE INHALATION 0.5 MG: 0.5 SUSPENSION RESPIRATORY (INHALATION) at 07:07

## 2025-02-04 RX ADMIN — VANCOMYCIN HYDROCHLORIDE 1750 MG: 5 INJECTION, POWDER, LYOPHILIZED, FOR SOLUTION INTRAVENOUS at 10:39

## 2025-02-04 RX ADMIN — ACETAMINOPHEN 650 MG: 325 TABLET, FILM COATED ORAL at 17:52

## 2025-02-04 NOTE — PLAN OF CARE
Problem: Nutrition/Hydration-ADULT  Goal: Nutrient/Hydration intake appropriate for improving, restoring or maintaining nutritional needs  Description: Monitor and assess patient's nutrition/hydration status for malnutrition. Collaborate with interdisciplinary team and initiate plan and interventions as ordered.  Monitor patient's weight and dietary intake as ordered or per policy. Utilize nutrition screening tool and intervene as necessary. Determine patient's food preferences and provide high-protein, high-caloric foods as appropriate.     INTERVENTIONS:  - Monitor oral intake, urinary output, labs, and treatment plans  - Assess nutrition and hydration status and recommend course of action  - Evaluate amount of meals eaten  - Assist patient with eating if necessary   - Allow adequate time for meals  - Recommend/ encourage appropriate diets, oral nutritional supplements, and vitamin/mineral supplements  - Order, calculate, and assess calorie counts as needed  - Recommend, monitor, and adjust tube feedings and TPN/PPN based on assessed needs  - Assess need for intravenous fluids  - Provide specific nutrition/hydration education as appropriate  - Include patient/family/caregiver in decisions related to nutrition  Outcome: Progressing     Problem: PAIN - ADULT  Goal: Verbalizes/displays adequate comfort level or baseline comfort level  Description: Interventions:  - Encourage patient to monitor pain and request assistance  - Assess pain using appropriate pain scale  - Administer analgesics based on type and severity of pain and evaluate response  - Implement non-pharmacological measures as appropriate and evaluate response  - Consider cultural and social influences on pain and pain management  - Notify physician/advanced practitioner if interventions unsuccessful or patient reports new pain  Outcome: Progressing     Problem: INFECTION - ADULT  Goal: Absence or prevention of progression during  hospitalization  Description: INTERVENTIONS:  - Assess and monitor for signs and symptoms of infection  - Monitor lab/diagnostic results  - Monitor all insertion sites, i.e. indwelling lines, tubes, and drains  - Monitor endotracheal if appropriate and nasal secretions for changes in amount and color  - [place] appropriate cooling/warming therapies per order  - Administer medications as ordered  - Instruct and encourage patient and family to use good hand hygiene technique  - Identify and instruct in appropriate isolation precautions for identified infection/condition  Outcome: Progressing  Goal: Absence of fever/infection during neutropenic period  Description: INTERVENTIONS:  - Monitor WBC    Outcome: Progressing     Problem: SAFETY ADULT  Goal: Patient will remain free of falls  Description: INTERVENTIONS:  - Educate patient/family on patient safety including physical limitations  - Instruct patient to call for assistance with activity   - Consult OT/PT to assist with strengthening/mobility   - Keep Call bell within reach  - Keep bed low and locked with side rails adjusted as appropriate  - Keep care items and personal belongings within reach  - Initiate and maintain comfort rounds  - Make Fall Risk Sign visible to staff  - Offer Toileting every  Hours, in advance of need  - Initiate/Maintain alarm  - Obtain necessary fall risk management equipment:   - Apply yellow socks and bracelet for high fall risk patients  - Consider moving patient to room near nurses station  Outcome: Progressing  Goal: Maintain or return to baseline ADL function  Description: INTERVENTIONS:  -  Assess patient's ability to carry out ADLs; assess patient's baseline for ADL function and identify physical deficits which impact ability to perform ADLs (bathing, care of mouth/teeth, toileting, grooming, dressing, etc.)  - Assess/evaluate cause of self-care deficits   - Assess range of motion  - Assess patient's mobility; develop plan if  impaired  - Assess patient's need for assistive devices and provide as appropriate  - Encourage maximum independence but intervene and supervise when necessary  - Involve family in performance of ADLs  - Assess for home care needs following discharge   - Consider OT consult to assist with ADL evaluation and planning for discharge  - Provide patient education as appropriate  Outcome: Progressing  Goal: Maintains/Returns to pre admission functional level  Description: INTERVENTIONS:  - Perform AM-PAC 6 Click Basic Mobility/ Daily Activity assessment daily.  - Set and communicate daily mobility goal to care team and patient/family/caregiver.   - Collaborate with rehabilitation services on mobility goals if consulted  - Perform Range of Motion  times a day.  - Reposition patient every  hours.  - Dangle patient  times a day  - Stand patient  times a day  - Ambulate patient  times a day  - Out of bed to chair  times a day   - Out of bed for meals  times a day  - Out of bed for toileting  - Record patient progress and toleration of activity level   Outcome: Progressing     Problem: DISCHARGE PLANNING  Goal: Discharge to home or other facility with appropriate resources  Description: INTERVENTIONS:  - Identify barriers to discharge w/patient and caregiver  - Arrange for needed discharge resources and transportation as appropriate  - Identify discharge learning needs (meds, wound care, etc.)  - Arrange for interpretive services to assist at discharge as needed  - Refer to Case Management Department for coordinating discharge planning if the patient needs post-hospital services based on physician/advanced practitioner order or complex needs related to functional status, cognitive ability, or social support system  Outcome: Progressing     Problem: Knowledge Deficit  Goal: Patient/family/caregiver demonstrates understanding of disease process, treatment plan, medications, and discharge instructions  Description: Complete  learning assessment and assess knowledge base.  Interventions:  - Provide teaching at level of understanding  - Provide teaching via preferred learning methods  Outcome: Progressing

## 2025-02-04 NOTE — ASSESSMENT & PLAN NOTE
59-year-old male past medical history of esophageal and tongue cancer s/p radiation with chronic known dysphagia and risk for aspiration presents with a chief complaint of fever, cough, and worsening shortness of breath.  Normally only on oxygen at night due to noncompliance with CPAP. Currently on 2L NC   Previously on doxycycline for pneumonia outpatient (reports taking 3 doses w/o improvement)  SIRS criteria positive on presentation for tachycardia with heart rate of 98 and tachypnea.  Currently afebrile  Lactic acid within normal limits  Pro-Michael elevated at 0.75  Chest x-ray significant for bibasilar consolidations with probable bilateral pleural effusions  COVID/flu negative  MRSA culture negative  Strep pneumo, legionella urine antigen negative  DRIP score: 4 = severe with high risk for MDR  Antibiotic use within 60 days and tube feeds  No prior history of MRSA  Does endorse recent travel to [place] this morning. No clinical signs of DVT, chest pain, hemoptysis, or recent surgery.  No malignancy treatment within the past 6 months. Wells score 0  Sputum culture with rare gram positive cocci in pairs, rare gram variable rods    Concern for community-acquired pneumonia vs. aspiration pneumonia    PLAN:  Continue to trend CBC and fever curve  Continue ceftriaxone  Negative MRSA cx, d/c vanc  nebs PRN for wheezing   F/u blood cx  Incentive spirometry  Respiratory protocol  Aspiration protocol  Appreciate speech therapy recs  Keep NPO  Continue tube feeds with no pleasure feeds at this time d/t concern for aspiration

## 2025-02-04 NOTE — ASSESSMENT & PLAN NOTE
Continue tube feeds with Jevity 1.5 500 mL 3 times daily bolus with 100 mL H2O flushes before and after feeds  Will not continue pleasure feeds due to concern for aspiration pneumonia  Speech and nutrition consulted.  Appreciate recommendations  NPO  2/3 phosphorus was 2.9

## 2025-02-04 NOTE — ASSESSMENT & PLAN NOTE
Patient uses 2 L of oxygen at night  Patient is noncompliant with CPAP  During hospital stay has required up to 4 L  Intermittently desaturates into the high to mid 80s    Plan  -Continue to treat sepsis likely secondary to pneumonia  -Wean O2 as tolerated   Other elevated white blood cell (WBC) count Other elevated white blood cell (WBC) count

## 2025-02-04 NOTE — PLAN OF CARE
Problem: Nutrition/Hydration-ADULT  Goal: Nutrient/Hydration intake appropriate for improving, restoring or maintaining nutritional needs  Description: Monitor and assess patient's nutrition/hydration status for malnutrition. Collaborate with interdisciplinary team and initiate plan and interventions as ordered.  Monitor patient's weight and dietary intake as ordered or per policy. Utilize nutrition screening tool and intervene as necessary. Determine patient's food preferences and provide high-protein, high-caloric foods as appropriate.     INTERVENTIONS:  - Monitor oral intake, urinary output, labs, and treatment plans  - Assess nutrition and hydration status and recommend course of action  - Evaluate amount of meals eaten  - Assist patient with eating if necessary   - Allow adequate time for meals  - Recommend/ encourage appropriate diets, oral nutritional supplements, and vitamin/mineral supplements  - Order, calculate, and assess calorie counts as needed  - Recommend, monitor, and adjust tube feedings and TPN/PPN based on assessed needs  - Assess need for intravenous fluids  - Provide specific nutrition/hydration education as appropriate  - Include patient/family/caregiver in decisions related to nutrition  Outcome: Progressing     Problem: PAIN - ADULT  Goal: Verbalizes/displays adequate comfort level or baseline comfort level  Description: Interventions:  - Encourage patient to monitor pain and request assistance  - Assess pain using appropriate pain scale  - Administer analgesics based on type and severity of pain and evaluate response  - Implement non-pharmacological measures as appropriate and evaluate response  - Consider cultural and social influences on pain and pain management  - Notify physician/advanced practitioner if interventions unsuccessful or patient reports new pain  Outcome: Progressing     Problem: INFECTION - ADULT  Goal: Absence or prevention of progression during  hospitalization  Description: INTERVENTIONS:  - Assess and monitor for signs and symptoms of infection  - Monitor lab/diagnostic results  - Monitor all insertion sites, i.e. indwelling lines, tubes, and drains  - Monitor endotracheal if appropriate and nasal secretions for changes in amount and color  - [place] appropriate cooling/warming therapies per order  - Administer medications as ordered  - Instruct and encourage patient and family to use good hand hygiene technique  - Identify and instruct in appropriate isolation precautions for identified infection/condition  Outcome: Progressing  Goal: Absence of fever/infection during neutropenic period  Description: INTERVENTIONS:  - Monitor WBC    Outcome: Progressing     Problem: SAFETY ADULT  Goal: Patient will remain free of falls  Description: INTERVENTIONS:  - Educate patient/family on patient safety including physical limitations  - Instruct patient to call for assistance with activity   - Consult OT/PT to assist with strengthening/mobility   - Keep Call bell within reach  - Keep bed low and locked with side rails adjusted as appropriate  - Keep care items and personal belongings within reach  - Initiate and maintain comfort rounds  - Make Fall Risk Sign visible to staff  - Offer Toileting every 2 Hours, in advance of need  - Initiate/Maintain bed/chair alarm  - Obtain necessary fall risk management equipment  - Apply yellow socks and bracelet for high fall risk patients  - Consider moving patient to room near nurses station  Outcome: Progressing  Goal: Maintain or return to baseline ADL function  Description: INTERVENTIONS:  -  Assess patient's ability to carry out ADLs; assess patient's baseline for ADL function and identify physical deficits which impact ability to perform ADLs (bathing, care of mouth/teeth, toileting, grooming, dressing, etc.)  - Assess/evaluate cause of self-care deficits   - Assess range of motion  - Assess patient's mobility; develop  plan if impaired  - Assess patient's need for assistive devices and provide as appropriate  - Encourage maximum independence but intervene and supervise when necessary  - Involve family in performance of ADLs  - Assess for home care needs following discharge   - Consider OT consult to assist with ADL evaluation and planning for discharge  - Provide patient education as appropriate  Outcome: Progressing  Goal: Maintains/Returns to pre admission functional level  Description: INTERVENTIONS:  - Perform AM-PAC 6 Click Basic Mobility/ Daily Activity assessment daily.  - Set and communicate daily mobility goal to care team and patient/family/caregiver.   - Collaborate with rehabilitation services on mobility goals if consulted  - Perform Range of Motion 3 times a day.  - Reposition patient every 2 hours.  - Dangle patient 3 times a day  - Stand patient 3 times a day  - Ambulate patient 3 times a day  - Out of bed to chair 3 times a day   - Out of bed for meals 3 times a day  - Out of bed for toileting  - Record patient progress and toleration of activity level   Outcome: Progressing     Problem: DISCHARGE PLANNING  Goal: Discharge to home or other facility with appropriate resources  Description: INTERVENTIONS:  - Identify barriers to discharge w/patient and caregiver  - Arrange for needed discharge resources and transportation as appropriate  - Identify discharge learning needs (meds, wound care, etc.)  - Arrange for interpretive services to assist at discharge as needed  - Refer to Case Management Department for coordinating discharge planning if the patient needs post-hospital services based on physician/advanced practitioner order or complex needs related to functional status, cognitive ability, or social support system  Outcome: Progressing     Problem: Knowledge Deficit  Goal: Patient/family/caregiver demonstrates understanding of disease process, treatment plan, medications, and discharge instructions  Description:  Complete learning assessment and assess knowledge base.  Interventions:  - Provide teaching at level of understanding  - Provide teaching via preferred learning methods  Outcome: Progressing

## 2025-02-04 NOTE — ASSESSMENT & PLAN NOTE
Troponins elevated at 2290 on presentation, downtrended to 1400  Patient currently not complaining of chest pain  EKG 2/2 - negative for ischemic changes  Echo 2/3 - hypokinetic apex and apical septal segments. Normal LV size, LV wall thickness mildly increased. LA mildly dilated  Suspect non-MI elevated troponin secondary to sepsis d/t demand ischemia     PLAN:  Continue monitoring on tele

## 2025-02-04 NOTE — SPEECH THERAPY NOTE
Speech Language/Pathology    Speech/Language Pathology Progress Note    Patient Name: Mohamud Grimm Sr.  Today's Date: 2/4/2025     Problem List  Principal Problem:    Sepsis (HCC)  Active Problems:    Essential hypertension    Hypothyroidism    Obstructive sleep apnea syndrome    Hx of head and neck radiation    Gastroesophageal reflux disease    At risk for aspiration    Elevated troponin    Acute on chronic respiratory failure (HCC)       Past Medical History  Past Medical History:   Diagnosis Date    Cancer of base of tongue (HCC)     excision    Cough     CPAP (continuous positive airway pressure) dependence     no currently    Disease of thyroid gland     hypo    Dysphagia     ED (erectile dysfunction)     GERD (gastroesophageal reflux disease)     History of pneumonia     Hypertension     Leukopenia     Peyronie's disease     Pneumonia     PONV (postoperative nausea and vomiting)     Psoriasis     Sleep apnea     Tongue cancer (HCC)     Weight loss, abnormal         Past Surgical History  Past Surgical History:   Procedure Laterality Date    ESOPHAGOSCOPY N/A 9/16/2022    Procedure: ESOPHAGOSCOPY FLEXIBLE;  Surgeon: Devonte Singh MD;  Location: [facility];  Service: ENT    ESOPHAGOSCOPY N/A 8/25/2023    Procedure: TRANSNASAL ESOPHAGOSCOPY WITH BALLOON DILATION/ ESOPHAGEAL DILATION (BALLOON);;  Surgeon: Devonte Singh MD;  Location: [facility];  Service: ENT    OTHER SURGICAL HISTORY      infusion port inserted and removed    PEG TUBE PLACEMENT      and removal    MA COLONOSCOPY FLX DX W/COLLJ SPEC WHEN PFRMD N/A 6/27/2017    Procedure: COLONOSCOPY with polypectomy x2;  Surgeon: Janet Willard MD;  Location: [facility];  Service: General    MA ESOPHAGOSCOPY DILATE ESOPHAGUS BALLOON 30 MM N/A 9/27/2024    Procedure: trans nasal endoscopy, infinity balloon dilation upper esophageal sphincter(UES) AND POSTERIOR WALL INJECTION;  Surgeon: Devonte Singh MD;  Location: [facility];  Service: ENT    MA LARGSC W/NJX  VOCAL CORD THER W/MICRO/TELESCOPE Bilateral 9/16/2022    Procedure: micro direct laryngoscopy, vocal fold injection, biopsy epiglottis lesion, posterior pharyngeal wall injection, flexible esophagoscopy with dilation;  Surgeon: Devonte Singh MD;  Location: [facility];  Service: ENT    TONGUE BIOPSY / EXCISION      Floor mouth excision of malignant tumor       Subjective:  Pt seen upright in bed. Wife on phone with pt and sister present at bedside. Currently receiving tube feeds.     Objective:  Extensive education provided to pt and family about aspiration risks and current recommendations. Pt and family stating they would like to pursue repeat VBS at this time for diet recommendations/safe swallow strategies prior to d/c. Pt with extensive hx of silent aspiration and admissions for aspiration pna. Pt seen with thins at bedside- eating/drinking for pleasure. Oral stage WNL with thins. No overt s/s of aspiration, however, significant hx of silent aspiration. Discussed with pt and family unable to safely recommend diet at bedside, and imaging would be required.     Assessment:  Pt agreeable to VBS. Extensive education provided. No overt s/s of aspiration with thins.     Plan/Recommendations:  Recommend continuation of NPO + NG tube pending VBS results. Spoke with medical team. Will continue to f/u as able/appropriate.

## 2025-02-04 NOTE — PROGRESS NOTES
Mohamud CH Sirisha Dash is a 59 y.o. male who is currently ordered Vancomycin IV with management by the Pharmacy Consult service.  Relevant clinical data and objective / subjective history reviewed.  Vancomycin Assessment:  Indication and Goal AUC/Trough: Pneumonia (goal -600, trough >10)  Clinical Status: stable  Micro:     Renal Function:  SCr: 0.24 mg/dL  CrCl: 232.6 mL/min  Renal replacement: Not on dialysis  Days of Therapy: 2  Current Dose: 1750 mg IV every 12 hours  Vancomycin Plan:  New Dosing: continue current dose  Estimated AUC: 536 mcg*hr/mL  Estimated Trough: 11.1 mcg/mL  Next Level: 2/10 @ 0600  Renal Function Monitoring: Daily BMP and UOP  Pharmacy will continue to follow closely for s/sx of nephrotoxicity, infusion reactions and appropriateness of therapy.  BMP and CBC will be ordered per protocol. We will continue to follow the patient’s culture results and clinical progress daily.    Vilma Patel, Pharmacist

## 2025-02-04 NOTE — PROGRESS NOTES
Progress Note - Hospitalist   Name: Mohamud Grimm Sr. 59 y.o. male I MRN: [id number]  Unit/Bed#: S -01 I Date of Admission: 2/2/2025   Date of Service: 2/4/2025 I Hospital Day: 0    Assessment & Plan  Sepsis (HCC)  59-year-old male past medical history of esophageal and tongue cancer s/p radiation with chronic known dysphagia and risk for aspiration presents with a chief complaint of fever, cough, and worsening shortness of breath.  Normally only on oxygen at night due to noncompliance with CPAP. Currently on 2L NC   Previously on doxycycline for pneumonia outpatient (reports taking 3 doses w/o improvement)  SIRS criteria positive on presentation for tachycardia with heart rate of 98 and tachypnea.  Currently afebrile  Lactic acid within normal limits  Pro-Michael elevated at 0.75  Chest x-ray significant for bibasilar consolidations with probable bilateral pleural effusions  COVID/flu negative  MRSA culture negative  Strep pneumo, legionella urine antigen negative  DRIP score: 4 = severe with high risk for MDR  Antibiotic use within 60 days and tube feeds  No prior history of MRSA  Does endorse recent travel to [place] this morning. No clinical signs of DVT, chest pain, hemoptysis, or recent surgery.  No malignancy treatment within the past 6 months. Wells score 0  Sputum culture with rare gram positive cocci in pairs, rare gram variable rods    Concern for community-acquired pneumonia vs. aspiration pneumonia    PLAN:  Continue to trend CBC and fever curve  Continue ceftriaxone  Negative MRSA cx, d/c vanc  nebs PRN for wheezing   F/u blood cx  Incentive spirometry  Respiratory protocol  Aspiration protocol  Appreciate speech therapy recs  Keep NPO  Continue tube feeds with no pleasure feeds at this time d/t concern for aspiration   Acute on chronic respiratory failure (HCC)  Patient uses 2 L of oxygen at night  Patient is noncompliant with CPAP  During hospital stay has required up to 4 L  Intermittently  "desaturates into the high to mid 80s    Plan  -Continue to treat sepsis likely secondary to pneumonia  -Wean O2 as tolerated  Essential hypertension  Continue at home Norvasc 5 mg; hold for SBP <110  Hypothyroidism  Continue at home levothyroxine 100 mcg  Last TSH 1 month ago: 9.45  Obstructive sleep apnea syndrome  Reports he does not typically use CPAP at night.  Compliance with 2 L NC at night  At risk for aspiration  Continue tube feeds with Jevity 1.5 500 mL 3 times daily bolus with 100 mL H2O flushes before and after feeds  Will not continue pleasure feeds due to concern for aspiration pneumonia  Speech and nutrition consulted.  Appreciate recommendations  NPO  2/3 phosphorus was 2.9  Elevated troponin  Troponins elevated at 2290 on presentation, downtrended to 1400  Patient currently not complaining of chest pain  EKG 2/2 - negative for ischemic changes  Echo 2/3 - hypokinetic apex and apical septal segments. Normal LV size, LV wall thickness mildly increased. LA mildly dilated  Suspect non-MI elevated troponin secondary to sepsis d/t demand ischemia     PLAN:  Continue monitoring on tele  Hx of head and neck radiation  Past medical history significant for esophageal and tongue cancer s/p radiation of the head and neck.  Per patient and chart review, patient has history of dysphagia and increased risk of aspiration  Chest x-ray significant for bibasilar L>R pna.  Concern for aspiration pneumonia versus community-acquired pneumonia  Diagnosed in 2011 and treated in 2012.  Does have residual dysphagia and left-sided brachial plexitis.  Decreased sensation in LUE.  Chronic LUE weakness on exam    PLAN:  See \"sepsis\" and \"at risk for aspiration\"      VTE Pharmacologic Prophylaxis: VTE Score: 6 High Risk (Score >/= 5) - Pharmacological DVT Prophylaxis Ordered: enoxaparin (Lovenox). Sequential Compression Devices Ordered.    Mobility:   Basic Mobility Inpatient Raw Score: 24  [facility] Goal: 8: Walk 250 feet or " more  JH-HLM Achieved: 8: Walk 250 feet ot more  JH-HLM Goal achieved. Continue to encourage appropriate mobility.    Current Length of Stay: 0 day(s)  Current Patient Status: Observation   Certification Statement: The patient will continue to require additional inpatient hospital stay due to continued oxygen requirements  Discharge Plan: [facility] is following this patient on consult. They are not yet medically stable for discharge secondary to pneumonia.    Code Status: Level 1 - Full Code    Subjective   Patient had an episode of fever to 101.9 last night, resolved with 650mg acetaminophen. Otherwise stable overnight. Patient was seen at bedside this morning. He states that he feels tired and mildly SOB. States that he had a headache last night around the time of the fever, which has since resolved. Voiding and passing BM. Denies chills, chest pain, nausea/vomiting.    Objective :  Temp:  [98.7 °F (37.1 °C)-101.9 °F (38.8 °C)] 98.7 °F (37.1 °C)  HR:  [68-80] 80  BP: (102-134)/(56-78) 121/70  Resp:  [16-20] 16  SpO2:  [90 %-99 %] 95 %  O2 Device: Nasal cannula  Nasal Cannula O2 Flow Rate (L/min):  [2 L/min-3.5 L/min] 2 L/min    Body mass index is 23.42 kg/m².     Input and Output Summary (last 24 hours):     Intake/Output Summary (Last 24 hours) at 2/4/2025 1324  Last data filed at 2/4/2025 1007  Gross per 24 hour   Intake 250 ml   Output --   Net 250 ml       Physical Exam  Vitals and nursing note reviewed.   Constitutional:       General: He is not in acute distress.     Appearance: He is well-developed.   HENT:      Head: Normocephalic and atraumatic.   Eyes:      Conjunctiva/sclera: Conjunctivae normal.   Cardiovascular:      Rate and Rhythm: Normal rate and regular rhythm.      Heart sounds: Normal heart sounds. No murmur heard.  Pulmonary:      Effort: No respiratory distress.      Breath sounds: Rales (L lower extremity) present.   Chest:      Chest wall: No tenderness.   Abdominal:      General: Abdomen is  flat. There is no distension.      Palpations: Abdomen is soft.      Tenderness: There is no abdominal tenderness.   Musculoskeletal:         General: No swelling.      Cervical back: Neck supple.   Skin:     General: Skin is warm and dry.      Capillary Refill: Capillary refill takes less than 2 seconds.   Neurological:      General: No focal deficit present.      Mental Status: He is alert.   Psychiatric:         Mood and Affect: Mood normal.       Updated wife via phone     Lines/Drains:  Lines/Drains/Airways       Active Status       Name Placement date Placement time Site Days    Gastrostomy/Enterostomy Percutaneous Endoscopic Gastrostomy (PEG) 20 Fr. 01/08/25  --  --  27                      Telemetry:  Telemetry Orders (From admission, onward)               24 Hour Telemetry Monitoring  Continuous x 24 Hours (Telem)        Expiring   Question:  Reason for 24 Hour Telemetry  Answer:  PCI/EP study (including pacer and ICD implementation), Cardiac surgery, MI, abnormal cardiac cath, and chest pain- rule out MI                     Telemetry Reviewed: Normal Sinus Rhythm  Indication for Continued Telemetry Use: Acute MI/Unstable Angina/Rule out ACS               Lab Results: I have reviewed the following results:   Results from last 7 days   Lab Units 02/04/25  0510   WBC Thousand/uL 2.39*   HEMOGLOBIN g/dL 11.4*   HEMATOCRIT % 35.2*   PLATELETS Thousands/uL 198   SEGS PCT % 63   LYMPHO PCT % 27   MONO PCT % 9   EOS PCT % 1     Results from last 7 days   Lab Units 02/04/25  0510 02/03/25  0545 02/02/25 2024   SODIUM mmol/L 137   < > 132*   POTASSIUM mmol/L 3.8   < > 3.8   CHLORIDE mmol/L 100   < > 95*   CO2 mmol/L 32   < > 29   BUN mg/dL 15   < > 24   CREATININE mg/dL 0.24*   < > 0.54*   ANION GAP mmol/L 5   < > 8   CALCIUM mg/dL 8.6   < > 8.7   ALBUMIN g/dL  --   --  3.6   TOTAL BILIRUBIN mg/dL  --   --  0.45   ALK PHOS U/L  --   --  86   ALT U/L  --   --  37   AST U/L  --   --  34   GLUCOSE RANDOM mg/dL 88   <  > 182*    < > = values in this interval not displayed.     Results from last 7 days   Lab Units 02/02/25 2024   INR  1.03             Results from last 7 days   Lab Units 02/03/25  0545 02/02/25 2024   LACTIC ACID mmol/L  --  1.4   PROCALCITONIN ng/ml 0.75* 0.86*       Recent Cultures (last 7 days):   Results from last 7 days   Lab Units 02/03/25  0016 02/03/25  0015 02/02/25 2032 02/02/25 2024   BLOOD CULTURE   --   --  No Growth at 24 hrs. No Growth at 24 hrs.   SPUTUM CULTURE  Culture too young- will reincubate  --   --   --    GRAM STAIN RESULT  1+ Epithelial cells per low power field*  No polys seen*  Rare Gram positive cocci in pairs*  Rare Gram variable rods*  --   --   --    LEGIONELLA URINARY ANTIGEN   --  Negative  --   --        Imaging Results Review: I personally reviewed the following image studies/reports in PACS and discussed pertinent findings with Radiology: chest xray and Echocardiogram. My interpretation of the radiology images/reports is: bibasilar consolidations and probable bilateral pleural effusions; echo with hypokinetic apex and apical septal segments, otherwise grossly normal.  Other Study Results Review: EKG was reviewed.     Last 24 Hours Medication List:     Current Facility-Administered Medications:     acetaminophen (TYLENOL) tablet 650 mg, Q6H PRN    albuterol (PROVENTIL HFA,VENTOLIN HFA) inhaler 2 puff, Q6H PRN    amLODIPine (NORVASC) tablet 5 mg, Daily    benzonatate (TESSALON PERLES) capsule 200 mg, TID PRN    budesonide (PULMICORT) inhalation solution 0.5 mg, Q12H    calcium carbonate (TUMS) chewable tablet 1,000 mg, Daily PRN    ceftriaxone (ROCEPHIN) 1 g/50 mL in dextrose IVPB, Q24H, Last Rate: 1,000 mg (02/03/25 2010)    enoxaparin (LOVENOX) subcutaneous injection 40 mg, Daily    levalbuterol (XOPENEX) inhalation solution 0.63 mg, TID    levothyroxine tablet 100 mcg, Early Morning    ondansetron (ZOFRAN) injection 4 mg, Q6H PRN    senna (SENOKOT) tablet 8.6 mg, Daily     vancomycin (VANCOCIN) 1,750 mg in sodium chloride 0.9 % 500 mL IVPB, Q12H, Last Rate: 1,750 mg (02/04/25 1039)      **Please Note: This note may have been constructed using a voice recognition system.**

## 2025-02-05 ENCOUNTER — APPOINTMENT (INPATIENT)
Dept: RADIOLOGY | Facility: HOSPITAL | Age: 60
DRG: 871 | End: 2025-02-05
Payer: COMMERCIAL

## 2025-02-05 LAB
ANION GAP SERPL CALCULATED.3IONS-SCNC: 6 MMOL/L (ref 4–13)
BACTERIA SPT RESP CULT: ABNORMAL
BUN SERPL-MCNC: 12 MG/DL (ref 5–25)
CALCIUM SERPL-MCNC: 8.6 MG/DL (ref 8.4–10.2)
CHLORIDE SERPL-SCNC: 98 MMOL/L (ref 96–108)
CO2 SERPL-SCNC: 31 MMOL/L (ref 21–32)
CREAT SERPL-MCNC: 0.29 MG/DL (ref 0.6–1.3)
ERYTHROCYTE [DISTWIDTH] IN BLOOD BY AUTOMATED COUNT: 14.1 % (ref 11.6–15.1)
GFR SERPL CREATININE-BSD FRML MDRD: 146 ML/MIN/1.73SQ M
GLUCOSE SERPL-MCNC: 94 MG/DL (ref 65–140)
GRAM STN SPEC: ABNORMAL
HCT VFR BLD AUTO: 36.6 % (ref 36.5–49.3)
HGB BLD-MCNC: 12.1 G/DL (ref 12–17)
MCH RBC QN AUTO: 29.4 PG (ref 26.8–34.3)
MCHC RBC AUTO-ENTMCNC: 33.1 G/DL (ref 31.4–37.4)
MCV RBC AUTO: 89 FL (ref 82–98)
PLATELET # BLD AUTO: 198 THOUSANDS/UL (ref 149–390)
PMV BLD AUTO: 9.5 FL (ref 8.9–12.7)
POTASSIUM SERPL-SCNC: 3.8 MMOL/L (ref 3.5–5.3)
RBC # BLD AUTO: 4.11 MILLION/UL (ref 3.88–5.62)
SODIUM SERPL-SCNC: 135 MMOL/L (ref 135–147)
WBC # BLD AUTO: 2.2 THOUSAND/UL (ref 4.31–10.16)

## 2025-02-05 PROCEDURE — 92611 MOTION FLUOROSCOPY/SWALLOW: CPT

## 2025-02-05 PROCEDURE — 80048 BASIC METABOLIC PNL TOTAL CA: CPT

## 2025-02-05 PROCEDURE — 85027 COMPLETE CBC AUTOMATED: CPT

## 2025-02-05 PROCEDURE — 74230 X-RAY XM SWLNG FUNCJ C+: CPT

## 2025-02-05 PROCEDURE — 94640 AIRWAY INHALATION TREATMENT: CPT

## 2025-02-05 PROCEDURE — 94760 N-INVAS EAR/PLS OXIMETRY 1: CPT

## 2025-02-05 RX ADMIN — CEFTRIAXONE SODIUM 1000 MG: 10 INJECTION, POWDER, FOR SOLUTION INTRAVENOUS at 19:45

## 2025-02-05 RX ADMIN — LEVALBUTEROL HYDROCHLORIDE 0.63 MG: 0.63 SOLUTION RESPIRATORY (INHALATION) at 14:05

## 2025-02-05 RX ADMIN — BUDESONIDE INHALATION 0.5 MG: 0.5 SUSPENSION RESPIRATORY (INHALATION) at 08:11

## 2025-02-05 RX ADMIN — LEVOTHYROXINE SODIUM 100 MCG: 0.1 TABLET ORAL at 05:42

## 2025-02-05 RX ADMIN — LEVALBUTEROL HYDROCHLORIDE 0.63 MG: 0.63 SOLUTION RESPIRATORY (INHALATION) at 08:11

## 2025-02-05 RX ADMIN — BENZONATATE 200 MG: 100 CAPSULE ORAL at 15:52

## 2025-02-05 RX ADMIN — AMLODIPINE BESYLATE 5 MG: 5 TABLET ORAL at 08:55

## 2025-02-05 NOTE — ASSESSMENT & PLAN NOTE
59-year-old male past medical history of esophageal and tongue cancer s/p radiation with chronic known dysphagia and risk for aspiration presents with a chief complaint of fever, cough, and worsening shortness of breath.  Normally only on oxygen at night due to noncompliance with CPAP. Currently on 2L NC   Previously on doxycycline for pneumonia outpatient (reports taking 3 doses w/o improvement)  SIRS criteria positive on presentation for tachycardia with heart rate of 98 and tachypnea.  Currently afebrile  Lactic acid within normal limits  Pro-Michael elevated at 0.75  Chest x-ray significant for bibasilar consolidations with probable bilateral pleural effusions  COVID/flu negative  MRSA culture negative  Strep pneumo, legionella urine antigen negative  DRIP score: 4 = severe with high risk for MDR  Antibiotic use within 60 days and tube feeds  No prior history of MRSA  Does endorse recent travel to [place] this morning. No clinical signs of DVT, chest pain, hemoptysis, or recent surgery.  No malignancy treatment within the past 6 months. Wells score 0  Sputum culture with rare gram positive cocci in pairs, rare gram variable rods    Concern for community-acquired pneumonia vs. aspiration pneumonia    PLAN:  Continue to trend CBC and fever curve  Continue ceftriaxone  Negative MRSA cx, d/c vanc  nebs PRN for wheezing   F/u blood cx  Incentive spirometry  Respiratory protocol  Aspiration protocol  Appreciate speech therapy recs  Keep NPO  F/u VBS results   Continue tube feeds with no pleasure feeds at this time d/t concern for aspiration

## 2025-02-05 NOTE — HOSPITAL COURSE
This is a 59 year old male with past history of esophageal cancer (2012) treated with chemo and radiation, chronic dysphagia s/p gastrostomy tube placement, recurrent pneumonia (last hospitalized 3mo ago) who presents with fever, cough, and worsening SOB. Of note, patient's grandson whom he visited was sick with URI, and patient has been engaging in pleasure feeds despite aspiration risk. SIRS positive on presentation for tachycardia and tachypnea on presentation. Patient admitted for sepsis workup with presumed pneumonia. Started on azithromycin, cefepime, vancomycin. CXR showed bibasilar consolidations and bilateral pleural effusions. Over the course of the patient's hospital stay, he improved clinically, and antibiotics were changed as clinically indicated. Patient was evaluated by speech therapy; VBS was performed which showed significant aspiration. Patient was counseled on aspiration risks. Patient to be discharged home.      Imaging:  CXR 2/2 - Bibasilar consolidations, possible bilateral pleural effusions. No pneumothorax    Echo 2/3 - Hypokinesis of apical septal and apex segments. LV wall thickness mildly increased. LA mildly dilated.    Video barium swallow 2/5 - Significant silent aspirations with thins. Cough not assisting with clearance. Spitting out and coughing up trials.      Incidental findings:  None

## 2025-02-05 NOTE — PROCEDURES
Video Swallow Study      Patient Name: Mohamud Grimm Sr.  Today's Date: 2/5/2025        Past Medical History  Past Medical History:   Diagnosis Date    Cancer of base of tongue (HCC)     excision    Cough     CPAP (continuous positive airway pressure) dependence     no currently    Disease of thyroid gland     hypo    Dysphagia     ED (erectile dysfunction)     GERD (gastroesophageal reflux disease)     History of pneumonia     Hypertension     Leukopenia     Peyronie's disease     Pneumonia     PONV (postoperative nausea and vomiting)     Psoriasis     Sleep apnea     Tongue cancer (HCC)     Weight loss, abnormal         Past Surgical History  Past Surgical History:   Procedure Laterality Date    ESOPHAGOSCOPY N/A 9/16/2022    Procedure: ESOPHAGOSCOPY FLEXIBLE;  Surgeon: Devonte Singh MD;  [facility]: [facility];  Service: ENT    ESOPHAGOSCOPY N/A 8/25/2023    Procedure: TRANSNASAL ESOPHAGOSCOPY WITH BALLOON DILATION/ ESOPHAGEAL DILATION (BALLOON);;  Surgeon: Devonte Singh MD;  [facility];  Service: ENT    OTHER SURGICAL HISTORY      infusion port inserted and removed    PEG TUBE PLACEMENT      and removal    NH COLONOSCOPY FLX DX W/COLLJ SPEC WHEN PFRMD N/A 6/27/2017    Procedure: COLONOSCOPY with polypectomy x2;  Surgeon: Janet Willard MD;  Location: [facility];  Service: General    NH ESOPHAGOSCOPY DILATE ESOPHAGUS BALLOON 30 MM N/A 9/27/2024    Procedure: trans nasal endoscopy, infinity balloon dilation upper esophageal sphincter(UES) AND POSTERIOR WALL INJECTION;  Surgeon: Devonte Singh MD;  [facility];  Service: ENT    NH LARGSC W/NJX VOCAL CORD THER W/MICRO/TELESCOPE Bilateral 9/16/2022    Procedure: micro direct laryngoscopy, vocal fold injection, biopsy epiglottis lesion, posterior pharyngeal wall injection, flexible esophagoscopy with dilation;  Surgeon: Devonte Singh MD;  [facility];  Service: ENT    TONGUE BIOPSY / EXCISION      Floor  mouth excision of malignant tumor     Modified (Video) Barium Swallow Study    Summary:  Images are on PACS for review.   *Pt expressing want to continue regular/thin pleasure feeds. Suggest frequent oral care prior to intake and suggesting water vs sugary beverages     Oral stage: Mild  Adequate labial seal and retrieval of bolus. Mildly reduced bolus formation and reduced AP transfer.     Pharyngeal stage: Severe  Mildly decreased elevation and excursion. Absent epiglottic inversion. Decreased BOT retraction. Majority of trials brought back up to oral cavity secondary to inability to pass level of vallecula. Pt spitting out and coughing up trials. Moderate pharyngeal retention/residue. Multiple swallows/chin tuck not always effective in clearance of retention. Significant silent aspiration with thins tsp. Cough did not assist with clearance. Aspiration and/or deep penetration noted with all consistencies and with pharyngeal residue/retention.     Per gross esophageal screen:  Unable to complete at this time       Strategy Trialed y/n  Result +/-   Oral prep set  Secondary/multiple swallows y -   Effortful/hard swallow y -   Alternation w/ liquids     Chin down/neck flexion y -/+   Volitional cough/throat clear     Head turn/rotation     Breath hold/cough     Head tilt     Other:    Recommendations:  Diet: NPO  Frequent/thorough oral care  Upright position  F/u ST tx: Yes  Therapy Prognosis: Poor  Prognosis considerations: Significant dysphagia hx   Aspiration Precautions  Reflux Precautions  Results reviewed with: pt, nursing, family, physician  Aspiration precautions posted.  Repeat MBS as necessary     Pt with extensive dysphagia hx and known to department secondary to hx of BOT and esophageal cancer. Pt currently with PEG tube and receives x3 bolus feeds a day. Per pt and wife report, pt has pleasure feeds at home (bloody kolby's, water, crab cakes,crackers) with known aspiration risk. Per recent VBS 6/2024 with  recommendations of NPO.        Functional Oral Intake Scale (FOIS)  Marquez Cardozo PhD, F-SINAN  (Does not include liquids)  Nothing by mouth (NPO).  Tube dependent with minimal attempts of food or liquid.  Tube dependent with consistent intake of food or liquid.  Total oral diet of a single consistency.  Total oral diet with multiple consistencies but requiring special preparation or compensations.  Total oral diet with multiple consistencies without special preparations, but with specific food limitations.  Total oral diet with no restriction.      H&P/pertinent provider notes: (PMH noted above)  Pt reports he is on abx for pneumonia. Family reports persistently bianka oral fevers, last ibuprofen after dinner, no tylenol today. Pt returned from [place] today via plane. Pt seen in [place] for illness and was started on doxy     Previous MBS:  VBS 6/26/24:  Clinically the patient should be NPO with small amounts of honey thick with strategies and SLP however given that the patient desires and plans to continue a regular diet with thin liquids- he appears to be safest with tsps and use of moisteners with solids as well as EITHER L head turn or chin tuck   Recommended Form of Meds: crushed with puree and non-oral if possible      VBS 7/5/23: Pt presents with mild oral and severe-profound pharyngeal dysphagia characterized by reduced AP transport, delayed swallow initiation with significantly reduced/minimal hyolaryngeal rise, reduced base of tongue retraction, absent epiglottic inversion and absent pharyngeal stripping wave. Airway protection/closure was minimal, and silent aspiration occurred with all liquid consistencies. Pt was unable to swallow puree due to lack of driving force on the bolus and had to regurgitate/expectorate the puree from his vallecula. Strategies were not effective in eliminating aspiration. Note: Images are available for review in PACS as desired.      VBS 6/21/23: Presenting w/ mild-mod oral and  SEVERE pharyngeal dysphagia characterized by SILENT aspiration of thin and mod thick during and after the swallow, unable to clear w/ cued cough. Mod-severe stasis w/ inconsistent clearance. Significant retrograde flow observed. Further trials deferred given aspiration and high risk for re-intubation.      VBS 3/4/21:  Pt presents w/ severe pharyngeal dysphagia characterized by no epiglottic inversion, minimal BOT retraction, minimal hyolaryngeal elevation, reduced airway entrance closure, and trace amounts of penetration and silent aspiration during and after swallowing thick and thin liquids. Pt is at high risk of developing aspiration pneumonia. Lengthy discussion was had w/ patient to review results of VBS and recommendations.      VBS 8/14/19:  Mild oral/moderate pharyngeal dysphagia due to decreased tongue base retraction, decreased epiglottic inversion and airway entrance closure w/ deep transient penetration of all consistencies,  And passive silent aspiration of pharyngeal residue and thin liquids.      VBS 10/25/12(Radiologist report): Aspiration of thin liquid barium and nectar thick barium. The patient did not have a spontaneous cough. Penetration of honey thick barium, applesauce, and mixed with barium       Special Studies:  Chest XR 2/2/25:  IMPRESSION:     Bibasilar consolidation may be on the basis of pneumonia or possibly aspiration. Probable small bilateral pleural effusion    Does the pt have pain? None  If yes, was nursing made aware/was it addressed?      Food allergies: NKFA   Current diet: Regular/thin pleasure feeds   Premorbid diet: NPO + PEG   Dentition: Natural   O2 requirement: NC   Oral mech: WNL   Vocal quality/speech: WNL   Cognitive status: WNL     Precautions: Aspiration     Consistencies administered: Puree,  thin, nectar, honey thick.    Thin liquid by: teaspoon (5 cc's)  Nectar thick liquid by: teaspoon (5 cc's)  Honey thick liquid by: teaspoon (5 cc's), single cup    Pt was  viewed seated laterally at 90 degrees.

## 2025-02-05 NOTE — ASSESSMENT & PLAN NOTE
Patient uses 2 L of oxygen at night  Patient is noncompliant with CPAP  During hospital stay has required up to 4 L  Intermittently desaturates into the high to mid 80s    Plan  -Continue to treat sepsis likely secondary to pneumonia  -Wean O2 as tolerated  -Amb pulse ox

## 2025-02-05 NOTE — PROGRESS NOTES
Progress Note - Hospitalist   Name: Mohamud Grimm Sr. 59 y.o. male I MRN: [id number]  Unit/Bed#: S -01 I Date of Admission: 2/2/2025   Date of Service: 2/5/2025 I Hospital Day: 1    Assessment & Plan  Sepsis (HCC)  59-year-old male past medical history of esophageal and tongue cancer s/p radiation with chronic known dysphagia and risk for aspiration presents with a chief complaint of fever, cough, and worsening shortness of breath.  Normally only on oxygen at night due to noncompliance with CPAP. Currently on 2L NC   Previously on doxycycline for pneumonia outpatient (reports taking 3 doses w/o improvement)  SIRS criteria positive on presentation for tachycardia with heart rate of 98 and tachypnea.  Currently afebrile  Lactic acid within normal limits  Pro-Michael elevated at 0.75  Chest x-ray significant for bibasilar consolidations with probable bilateral pleural effusions  COVID/flu negative  MRSA culture negative  Strep pneumo, legionella urine antigen negative  DRIP score: 4 = severe with high risk for MDR  Antibiotic use within 60 days and tube feeds  No prior history of MRSA  Does endorse recent travel to [place] this morning. No clinical signs of DVT, chest pain, hemoptysis, or recent surgery.  No malignancy treatment within the past 6 months. Wells score 0  Sputum culture with rare gram positive cocci in pairs, rare gram variable rods    Concern for community-acquired pneumonia vs. aspiration pneumonia    PLAN:  Continue to trend CBC and fever curve  Continue ceftriaxone  Negative MRSA cx, d/c vanc  nebs PRN for wheezing   F/u blood cx  Incentive spirometry  Respiratory protocol  Aspiration protocol  Appreciate speech therapy recs  Keep NPO  F/u VBS results   Continue tube feeds with no pleasure feeds at this time d/t concern for aspiration   Acute on chronic respiratory failure (HCC)  Patient uses 2 L of oxygen at night  Patient is noncompliant with CPAP  During hospital stay has required up to 4  "L  Intermittently desaturates into the high to mid 80s    Plan  -Continue to treat sepsis likely secondary to pneumonia  -Wean O2 as tolerated  -Amb pulse ox  Essential hypertension  Continue at home Norvasc 5 mg; hold for SBP <110  Hypothyroidism  Continue at home levothyroxine 100 mcg  Last TSH 1 month ago: 9.45  Obstructive sleep apnea syndrome  Reports he does not typically use CPAP at night.  Compliance with 2 L NC at night  At risk for aspiration  Continue tube feeds with Jevity 1.5 500 mL 3 times daily bolus with 100 mL H2O flushes before and after feeds  Will not continue pleasure feeds due to concern for aspiration pneumonia  Speech and nutrition consulted.  Appreciate recommendations  NPO  2/3 phosphorus was 2.9  Elevated troponin  Troponins elevated at 2290 on presentation, downtrended to 1400  Patient currently not complaining of chest pain  EKG 2/2 - negative for ischemic changes  Echo 2/3 - hypokinetic apex and apical septal segments. Normal LV size, LV wall thickness mildly increased. LA mildly dilated  Suspect non-MI elevated troponin secondary to sepsis d/t demand ischemia     PLAN:  Continue monitoring on tele  Hx of head and neck radiation  Past medical history significant for esophageal and tongue cancer s/p radiation of the head and neck.  Per patient and chart review, patient has history of dysphagia and increased risk of aspiration  Chest x-ray significant for bibasilar L>R pna.  Concern for aspiration pneumonia versus community-acquired pneumonia  Diagnosed in 2011 and treated in 2012.  Does have residual dysphagia and left-sided brachial plexitis.  Decreased sensation in LUE.  Chronic LUE weakness on exam    PLAN:  See \"sepsis\" and \"at risk for aspiration\"      VTE Pharmacologic Prophylaxis: VTE Score: 6 High Risk (Score >/= 5) - Pharmacological DVT Prophylaxis Ordered: enoxaparin (Lovenox). Sequential Compression Devices Ordered.    Mobility:   Basic Mobility Inpatient Raw Score: 24  JH-HLM " Goal: 8: Walk 250 feet or more  JH-HLM Achieved: 8: Walk 250 feet ot more  JH-HLM Goal NOT achieved. Continue with multidisciplinary rounding and encourage appropriate mobility to improve upon JH-HLM goals.    Patient Centered Rounds: I performed bedside rounds with nursing staff today.   Discussions with Specialists or Other Care Team Provider: None    Education and Discussions with Family / Patient: Updated  (wife) at bedside.    Current Length of Stay: 1 day(s)  Current Patient Status: Inpatient   Certification Statement: The patient will continue to require additional inpatient hospital stay due to oxygen optimization and medical management  Discharge Plan: Anticipate discharge tomorrow to home.    Code Status: Level 1 - Full Code    Subjective   Patient seen resting in bed.  He reports feeling better.  However he still experiences some desaturations without oxygen.  He is requiring 4 L which is significantly increased from his baseline of no oxygen during the day and 2 L at night.  No new acute symptomology reported.    Objective :  Temp:  [98.4 °F (36.9 °C)-100.3 °F (37.9 °C)] 99.7 °F (37.6 °C)  HR:  [68-76] 76  BP: (104-121)/(63-72) 120/72  Resp:  [16-20] 17  SpO2:  [83 %-98 %] 90 %  O2 Device: Nasal cannula  Nasal Cannula O2 Flow Rate (L/min):  [2 L/min-5 L/min] 3.5 L/min    Body mass index is 23.42 kg/m².     Input and Output Summary (last 24 hours):     Intake/Output Summary (Last 24 hours) at 2/5/2025 1103  Last data filed at 2/5/2025 0800  Gross per 24 hour   Intake 0 ml   Output 0 ml   Net 0 ml       Physical Exam  Vitals and nursing note reviewed.   Constitutional:       General: He is not in acute distress.     Appearance: He is well-developed.   HENT:      Head: Normocephalic and atraumatic.      Nose: Congestion present.   Eyes:      General: No scleral icterus.        Right eye: No discharge.         Left eye: No discharge.      Conjunctiva/sclera: Conjunctivae normal.    Cardiovascular:      Rate and Rhythm: Normal rate and regular rhythm.      Heart sounds: Normal heart sounds. No murmur heard.  Pulmonary:      Effort: No respiratory distress.      Breath sounds: Rales (L lower extremity) present.   Chest:      Chest wall: No tenderness.   Abdominal:      General: Abdomen is flat. There is no distension.      Palpations: Abdomen is soft.      Tenderness: There is no abdominal tenderness.   Musculoskeletal:         General: No swelling.      Cervical back: Neck supple.   Skin:     General: Skin is warm and dry.      Capillary Refill: Capillary refill takes less than 2 seconds.   Neurological:      General: No focal deficit present.      Mental Status: He is alert.   Psychiatric:         Mood and Affect: Mood normal.         Behavior: Behavior normal.         Thought Content: Thought content normal.         Judgment: Judgment normal.         Lines/Drains:  Lines/Drains/Airways       Active Status       Name Placement date Placement time Site Days    Gastrostomy/Enterostomy Percutaneous Endoscopic Gastrostomy (PEG) 20 Fr. 01/08/25  --  --  28                            Lab Results: I have reviewed the following results:   Results from last 7 days   Lab Units 02/05/25  0509 02/04/25  0510   WBC Thousand/uL 2.20* 2.39*   HEMOGLOBIN g/dL 12.1 11.4*   HEMATOCRIT % 36.6 35.2*   PLATELETS Thousands/uL 198 198   SEGS PCT %  --  63   LYMPHO PCT %  --  27   MONO PCT %  --  9   EOS PCT %  --  1     Results from last 7 days   Lab Units 02/05/25  0509 02/03/25  0545 02/02/25  2024   SODIUM mmol/L 135   < > 132*   POTASSIUM mmol/L 3.8   < > 3.8   CHLORIDE mmol/L 98   < > 95*   CO2 mmol/L 31   < > 29   BUN mg/dL 12   < > 24   CREATININE mg/dL 0.29*   < > 0.54*   ANION GAP mmol/L 6   < > 8   CALCIUM mg/dL 8.6   < > 8.7   ALBUMIN g/dL  --   --  3.6   TOTAL BILIRUBIN mg/dL  --   --  0.45   ALK PHOS U/L  --   --  86   ALT U/L  --   --  37   AST U/L  --   --  34   GLUCOSE RANDOM mg/dL 94   < > 182*     < > = values in this interval not displayed.     Results from last 7 days   Lab Units 02/02/25 2024   INR  1.03             Results from last 7 days   Lab Units 02/03/25  0545 02/02/25 2024   LACTIC ACID mmol/L  --  1.4   PROCALCITONIN ng/ml 0.75* 0.86*       Recent Cultures (last 7 days):   Results from last 7 days   Lab Units 02/03/25  0016 02/03/25  0015 02/02/25 2032 02/02/25 2024   BLOOD CULTURE   --   --  No Growth at 48 hrs. No Growth at 48 hrs.   SPUTUM CULTURE  Culture too young- will reincubate  --   --   --    GRAM STAIN RESULT  1+ Epithelial cells per low power field*  No polys seen*  Rare Gram positive cocci in pairs*  Rare Gram variable rods*  --   --   --    LEGIONELLA URINARY ANTIGEN   --  Negative  --   --              Last 24 Hours Medication List:     Current Facility-Administered Medications:     acetaminophen (TYLENOL) tablet 650 mg, Q6H PRN    albuterol (PROVENTIL HFA,VENTOLIN HFA) inhaler 2 puff, Q6H PRN    amLODIPine (NORVASC) tablet 5 mg, Daily    benzonatate (TESSALON PERLES) capsule 200 mg, TID PRN    budesonide (PULMICORT) inhalation solution 0.5 mg, Q12H    calcium carbonate (TUMS) chewable tablet 1,000 mg, Daily PRN    ceftriaxone (ROCEPHIN) 1 g/50 mL in dextrose IVPB, Q24H, Last Rate: 1,000 mg (02/04/25 1956)    enoxaparin (LOVENOX) subcutaneous injection 40 mg, Daily    levalbuterol (XOPENEX) inhalation solution 0.63 mg, TID    levothyroxine tablet 100 mcg, Early Morning    ondansetron (ZOFRAN) injection 4 mg, Q6H PRN    senna (SENOKOT) tablet 8.6 mg, Daily    Administrative Statements   Today, Patient Was Seen By: Indy Saeed MD      **Please Note: This note may have been constructed using a voice recognition system.**

## 2025-02-05 NOTE — PROGRESS NOTES
Vancomycin IV Pharmacy-to-Dose Consultation     Vancomycin has been discontinued.  Pharmacy will sign off.  Please contact or re-consult with questions.    Vilma Patel, Pharmacist    Not applicable

## 2025-02-06 ENCOUNTER — APPOINTMENT (INPATIENT)
Dept: CT IMAGING | Facility: HOSPITAL | Age: 60
DRG: 871 | End: 2025-02-06
Payer: COMMERCIAL

## 2025-02-06 ENCOUNTER — APPOINTMENT (INPATIENT)
Dept: VASCULAR ULTRASOUND | Facility: HOSPITAL | Age: 60
DRG: 871 | End: 2025-02-06
Payer: COMMERCIAL

## 2025-02-06 VITALS
BODY MASS INDEX: 23.34 KG/M2 | WEIGHT: 154 LBS | TEMPERATURE: 98.6 F | RESPIRATION RATE: 20 BRPM | OXYGEN SATURATION: 94 % | SYSTOLIC BLOOD PRESSURE: 137 MMHG | DIASTOLIC BLOOD PRESSURE: 87 MMHG | HEART RATE: 67 BPM | HEIGHT: 68 IN

## 2025-02-06 LAB
2HR DELTA HS TROPONIN: -13 NG/L
ANION GAP SERPL CALCULATED.3IONS-SCNC: 7 MMOL/L (ref 4–13)
ANISOCYTOSIS BLD QL SMEAR: PRESENT
BASOPHILS # BLD MANUAL: 0 THOUSAND/UL (ref 0–0.1)
BASOPHILS NFR MAR MANUAL: 0 % (ref 0–1)
BUN SERPL-MCNC: 15 MG/DL (ref 5–25)
CALCIUM SERPL-MCNC: 8.8 MG/DL (ref 8.4–10.2)
CARDIAC TROPONIN I PNL SERPL HS: 72 NG/L (ref ?–50)
CARDIAC TROPONIN I PNL SERPL HS: 85 NG/L (ref ?–50)
CHLORIDE SERPL-SCNC: 99 MMOL/L (ref 96–108)
CO2 SERPL-SCNC: 32 MMOL/L (ref 21–32)
CREAT SERPL-MCNC: 0.32 MG/DL (ref 0.6–1.3)
EOSINOPHIL # BLD MANUAL: 0 THOUSAND/UL (ref 0–0.4)
EOSINOPHIL NFR BLD MANUAL: 0 % (ref 0–6)
ERYTHROCYTE [DISTWIDTH] IN BLOOD BY AUTOMATED COUNT: 13.9 % (ref 11.6–15.1)
GFR SERPL CREATININE-BSD FRML MDRD: 140 ML/MIN/1.73SQ M
GLUCOSE SERPL-MCNC: 90 MG/DL (ref 65–140)
HCT VFR BLD AUTO: 37 % (ref 36.5–49.3)
HGB BLD-MCNC: 12.4 G/DL (ref 12–17)
LYMPHOCYTES # BLD AUTO: 0.9 THOUSAND/UL (ref 0.6–4.47)
LYMPHOCYTES # BLD AUTO: 33 % (ref 14–44)
MCH RBC QN AUTO: 29.7 PG (ref 26.8–34.3)
MCHC RBC AUTO-ENTMCNC: 33.5 G/DL (ref 31.4–37.4)
MCV RBC AUTO: 89 FL (ref 82–98)
MONOCYTES # BLD AUTO: 0.26 THOUSAND/UL (ref 0–1.22)
MONOCYTES NFR BLD: 10 % (ref 4–12)
NEUTROPHILS # BLD MANUAL: 1.42 THOUSAND/UL (ref 1.85–7.62)
NEUTS BAND NFR BLD MANUAL: 3 % (ref 0–8)
NEUTS SEG NFR BLD AUTO: 52 % (ref 43–75)
PLATELET # BLD AUTO: 184 THOUSANDS/UL (ref 149–390)
PLATELET BLD QL SMEAR: ADEQUATE
PMV BLD AUTO: 9.4 FL (ref 8.9–12.7)
POTASSIUM SERPL-SCNC: 3.9 MMOL/L (ref 3.5–5.3)
RBC # BLD AUTO: 4.17 MILLION/UL (ref 3.88–5.62)
RBC MORPH BLD: PRESENT
SMUDGE CELLS BLD QL SMEAR: PRESENT
SODIUM SERPL-SCNC: 138 MMOL/L (ref 135–147)
VARIANT LYMPHS # BLD AUTO: 2 %
WBC # BLD AUTO: 2.58 THOUSAND/UL (ref 4.31–10.16)

## 2025-02-06 PROCEDURE — 99222 1ST HOSP IP/OBS MODERATE 55: CPT | Performed by: INTERNAL MEDICINE

## 2025-02-06 PROCEDURE — 84484 ASSAY OF TROPONIN QUANT: CPT | Performed by: INTERNAL MEDICINE

## 2025-02-06 PROCEDURE — 94760 N-INVAS EAR/PLS OXIMETRY 1: CPT

## 2025-02-06 PROCEDURE — 85007 BL SMEAR W/DIFF WBC COUNT: CPT

## 2025-02-06 PROCEDURE — 85027 COMPLETE CBC AUTOMATED: CPT

## 2025-02-06 PROCEDURE — 92526 ORAL FUNCTION THERAPY: CPT

## 2025-02-06 PROCEDURE — 71275 CT ANGIOGRAPHY CHEST: CPT

## 2025-02-06 PROCEDURE — 94761 N-INVAS EAR/PLS OXIMETRY MLT: CPT

## 2025-02-06 PROCEDURE — 93970 EXTREMITY STUDY: CPT | Performed by: SURGERY

## 2025-02-06 PROCEDURE — 93970 EXTREMITY STUDY: CPT

## 2025-02-06 PROCEDURE — 80048 BASIC METABOLIC PNL TOTAL CA: CPT

## 2025-02-06 RX ORDER — ASPIRIN 81 MG/1
81 TABLET ORAL DAILY
Status: DISCONTINUED | OUTPATIENT
Start: 2025-02-06 | End: 2025-02-06 | Stop reason: HOSPADM

## 2025-02-06 RX ORDER — SENNOSIDES 8.6 MG
8.6 TABLET ORAL DAILY
Qty: 30 TABLET | Refills: 0 | Status: SHIPPED | OUTPATIENT
Start: 2025-02-07

## 2025-02-06 RX ORDER — CALCIUM CARBONATE 500 MG/1
1000 TABLET, CHEWABLE ORAL DAILY PRN
Qty: 30 TABLET | Refills: 0 | Status: SHIPPED | OUTPATIENT
Start: 2025-02-06

## 2025-02-06 RX ORDER — ASPIRIN 81 MG/1
81 TABLET ORAL DAILY
Qty: 30 TABLET | Refills: 1 | Status: SHIPPED | OUTPATIENT
Start: 2025-02-07

## 2025-02-06 RX ADMIN — IOHEXOL 85 ML: 350 INJECTION, SOLUTION INTRAVENOUS at 09:42

## 2025-02-06 RX ADMIN — LEVOTHYROXINE SODIUM 100 MCG: 0.1 TABLET ORAL at 04:54

## 2025-02-06 RX ADMIN — AMLODIPINE BESYLATE 5 MG: 5 TABLET ORAL at 08:09

## 2025-02-06 RX ADMIN — ASPIRIN 81 MG: 81 TABLET, COATED ORAL at 13:15

## 2025-02-06 NOTE — SPEECH THERAPY NOTE
Speech Language/Pathology    Speech/Language Pathology Progress Note    Patient Name: Mohamud Grimm Sr.  Today's Date: 2/6/2025     Problem List  Principal Problem:    Sepsis (HCC)  Active Problems:    Essential hypertension    Hypothyroidism    Obstructive sleep apnea syndrome    Hx of head and neck radiation    Gastroesophageal reflux disease    At risk for aspiration    Elevated troponin    Acute on chronic respiratory failure (HCC)       Past Medical History  Past Medical History:   Diagnosis Date    Cancer of base of tongue (HCC)     excision    Cough     CPAP (continuous positive airway pressure) dependence     no currently    Disease of thyroid gland     hypo    Dysphagia     ED (erectile dysfunction)     GERD (gastroesophageal reflux disease)     History of pneumonia     Hypertension     Leukopenia     Peyronie's disease     Pneumonia     PONV (postoperative nausea and vomiting)     Psoriasis     Sleep apnea     Tongue cancer (HCC)     Weight loss, abnormal         Past Surgical History  Past Surgical History:   Procedure Laterality Date    ESOPHAGOSCOPY N/A 9/16/2022    Procedure: ESOPHAGOSCOPY FLEXIBLE;  Surgeon: Devonte Singh MD;  [facility];  Service: ENT    ESOPHAGOSCOPY N/A 8/25/2023    Procedure: TRANSNASAL ESOPHAGOSCOPY WITH BALLOON DILATION/ ESOPHAGEAL DILATION (BALLOON);;  Surgeon: Devonte Singh MD;  [facility];  Service: ENT    OTHER SURGICAL HISTORY      infusion port inserted and removed    PEG TUBE PLACEMENT      and removal    MI COLONOSCOPY FLX DX W/COLLJ SPEC WHEN PFRMD N/A 6/27/2017    Procedure: COLONOSCOPY with polypectomy x2;  Surgeon: Janet Willard MD;  [facility];  Service: General    MI ESOPHAGOSCOPY DILATE ESOPHAGUS BALLOON 30 MM N/A 9/27/2024    Procedure: trans nasal endoscopy, infinity balloon dilation upper esophageal sphincter(UES) AND POSTERIOR WALL INJECTION;  Surgeon: Devonte Singh MD;  [facility];  Service: ENT    MI LARGSC W/NJX  VOCAL CORD THER W/MICRO/TELESCOPE Bilateral 9/16/2022    Procedure: micro direct laryngoscopy, vocal fold injection, biopsy epiglottis lesion, posterior pharyngeal wall injection, flexible esophagoscopy with dilation;  Surgeon: Devonte Singh MD;  [facility];  Service: ENT    TONGUE BIOPSY / EXCISION      Floor mouth excision of malignant tumor       Subjective:  Pt alert and upright in bed. Pt seen s/p VBS with NPO recommendations (no significant changes from prior studies). Pt on phone with wife upon arrival.      Objective:  Pt seen s/p VBS at bedside. Extensive education and detail provided to pt and wife about recommendations. Pt with hx of noncompliance to diet recommendations and previously on regular/thin liquids for pleasure. Discussed with pt and wife importance of frequent oral care. If pt wishing to continue with pleasure feeds, would recommend honey thick liquids by tsp and puree food with multiple swallows. Educated pt and wife that pt will be at risk of aspiration with PO intake. Would recommend continuation of SLP as OP. All questions/concerns answered at this time.     Assessment:  Pt and wife educated about VBS recommendations and findings. All questions answered at this time.     Plan/Recommendations:  Recommend continuation of NPO + alternative means of nutrition/hydration. No further needs identified at this time. Recommend continuation of skilled ST as OP to further decrease risk of aspiration if pt wishes to continue with PO. Please re consult with any concerns/changes.

## 2025-02-06 NOTE — ASSESSMENT & PLAN NOTE
Nonischemic myocardial injury 2/2 pneumonia, hypoxia  Hs troponin up to 2,564 on 2/2. Repeat troponin today down to 72  ECG- NSR with poor anterior R wave progression  TTE- LVEF 60% with apical hypokinesis- possibly Takotsubo from acute illness  No chest pain or SOB reported. Walked 4 miles last week in [place] without exertional symptoms  Plan- add ASA 81 mg daily   Outpatient exercise nuclear stress test to be completed upon returning to [place] for the spring/summer- should be scheduled for early March if possible.  Follow up in the office with Dr. Hardy after completion of testing

## 2025-02-06 NOTE — CONSULTS
"Consultation - Cardiology Team One  Mohamud Grimm Sr. 59 y.o. male MRN: [id number]  Unit/Bed#: S -01 Encounter: [id number]    Inpatient consult to Cardiology  Consult performed by: MAR Clark  Consult ordered by: Ivanna Bajwa MD      Physician Requesting Consult: Ivanna Bajwa MD  Reason for Consult / Principal Problem: elevated troponin, abnormal echocardiogram    Assessment & Plan  Elevated troponin  Nonischemic myocardial injury 2/2 pneumonia, hypoxia  Hs troponin up to 2,564 on 2/2. Repeat troponin today down to 72  ECG- NSR with poor anterior R wave progression  TTE- LVEF 60% with apical hypokinesis- possibly Takotsubo from acute illness  No chest pain or SOB reported. Walked 4 miles last week in [place] without exertional symptoms  Plan- add ASA 81 mg daily   Outpatient exercise nuclear stress test to be completed upon returning to [place] for the spring/summer- should be scheduled for early March if possible.  Follow up in the office with Dr. Hardy after completion of testing  Sepsis (HCC)  2/2 aspiration pneumonia  On IV antibiotics per primary team  Acute on chronic respiratory failure (HCC)  Currently requiring 2LNC  O2 sats 92-94%  At risk for aspiration  Barium swallow eval yesterday showed \"aspiration and/or deep penetration noted with all consistencies and with pharyngeal residue/retention\"  Essential hypertension  Controlled on amlodipine 5 mg daily  Obstructive sleep apnea syndrome  Does not use CPAP, wears 2LNC at night  Hx of head and neck radiation    History of Present Illness   HPI: Mohamud Grimm Sr. is a 59 y.o. year old male with HTN, ERIK noncompliant with CPAP, dysphagia secondary to history of tongue and esophageal cancer s/p radiation. He has no prior cardiac history. He wears 2LNC at night time.    He presented to the ED with persistent fevers despite outpatient oral antibiotics for the treatment of suspected aspiration pneumonia. He had been seen by a provider in [place] " "(where he spends the winter) and started on doxycycline but fevers persisted and he was requiring more O2. CXR showed bibasilar consolidations and probable small bilateral pleural effusions. He was started on IV antibiotics. His O2 requirement improved until today when he is again up to 4-5 L. CTA negative for acute PE but shows \" possible chronic sequela of prior pulmonary embolism and one of the segmental branches of the left lower lobe.  Consider correlation with lower extremity Doppler ultrasound.  Extensive bilateral airspace disease with bibasilar consolidations, as above, likely reflecting pneumonia.  Mediastinal lymphadenopathy, possibly reactive.  Mild to moderate cardiomegaly.\" Hs troponin incidentally elevated on admission 2,290 which peaked at 2,564 a few hours later. An echocardiogram demonstrated preserved LVEF with apical hypokinesis. Given his increasing O2 requirement, cardiology has now been consulted to evaluate the patient's elevated troponin and abnormal echocardiogram.    He lives at home with his wife Prabha, a nurse. They split their time between [place] and [place]. He is independent with all ADLs. He drinks beer occasionally. He quit smoking at the time of his cancer diagnosis in . His father  of a cardiac event around age 68.    EKG reviewed personally: NSR with poor anterior R wave progression    Telemetry reviewed personally: NSR    Review of Systems   Constitutional: Negative for chills, malaise/fatigue and weight gain.   Cardiovascular:  Negative for chest pain, dyspnea on exertion, leg swelling, orthopnea, palpitations and syncope.   Respiratory:  Positive for cough. Negative for shortness of breath, sleep disturbances due to breathing and sputum production.    Endocrine: Negative.    Hematologic/Lymphatic: Negative.    Gastrointestinal:  Negative for bloating and nausea.   Genitourinary: Negative.    Neurological:  Negative for dizziness, light-headedness and weakness. "   Psychiatric/Behavioral:  Negative for altered mental status.    All other systems reviewed and are negative.    Historical Information   Past Medical History:   Diagnosis Date    Cancer of base of tongue (HCC)     excision    Cough     CPAP (continuous positive airway pressure) dependence     no currently    Disease of thyroid gland     hypo    Dysphagia     ED (erectile dysfunction)     GERD (gastroesophageal reflux disease)     History of pneumonia     Hypertension     Leukopenia     Peyronie's disease     Pneumonia     PONV (postoperative nausea and vomiting)     Psoriasis     Sleep apnea     Tongue cancer (HCC)     Weight loss, abnormal      Past Surgical History:   Procedure Laterality Date    ESOPHAGOSCOPY N/A 9/16/2022    Procedure: ESOPHAGOSCOPY FLEXIBLE;  Surgeon: Devonte Singh MD;  Location: [facility];  Service: ENT    ESOPHAGOSCOPY N/A 8/25/2023    Procedure: TRANSNASAL ESOPHAGOSCOPY WITH BALLOON DILATION/ ESOPHAGEAL DILATION (BALLOON);;  Surgeon: Devonte Singh MD;  Location: [facility];  Service: ENT    OTHER SURGICAL HISTORY      infusion port inserted and removed    PEG TUBE PLACEMENT      and removal    DE COLONOSCOPY FLX DX W/COLLJ SPEC WHEN PFRMD N/A 6/27/2017    Procedure: COLONOSCOPY with polypectomy x2;  Surgeon: Janet Willard MD;  Location: [facility];  Service: General    DE ESOPHAGOSCOPY DILATE ESOPHAGUS BALLOON 30 MM N/A 9/27/2024    Procedure: trans nasal endoscopy, infinity balloon dilation upper esophageal sphincter(UES) AND POSTERIOR WALL INJECTION;  Surgeon: Devonte Singh MD;  [facility];  Service: ENT    DE LARGSC W/NJX VOCAL CORD THER W/MICRO/TELESCOPE Bilateral 9/16/2022    Procedure: micro direct laryngoscopy, vocal fold injection, biopsy epiglottis lesion, posterior pharyngeal wall injection, flexible esophagoscopy with dilation;  Surgeon: Devonte Singh MD;  Location: [facility];  Service: ENT    TONGUE BIOPSY / EXCISION      Floor mouth excision of malignant  "tumor     Social History     Substance and Sexual Activity   Alcohol Use Not Currently    Alcohol/week: 12.0 standard drinks of alcohol    Types: 12 Cans of beer per week     Social History     Substance and Sexual Activity   Drug Use Never     Social History     Tobacco Use   Smoking Status Former    Current packs/day: 0.00    Average packs/day: 0.3 packs/day for 20.0 years (5.0 ttl pk-yrs)    Types: Cigarettes    Start date: 1991    Quit date: 2011    Years since quittin.1    Passive exposure: Past   Smokeless Tobacco Former    Quit date:    Tobacco Comments    pt quit with dx of tongue base cancer, per allscripts     Family History:   Family History   Problem Relation Age of Onset    Other Mother         reactive airway dysfunction    Coronary artery disease Father     Hypertension Father     Psoriasis Father     Thyroid disease unspecified Neg Hx        Meds/Allergies   all current active meds have been reviewed and current meds:   Current Facility-Administered Medications:     acetaminophen (TYLENOL) tablet 650 mg, Q6H PRN    albuterol (PROVENTIL HFA,VENTOLIN HFA) inhaler 2 puff, Q6H PRN    amLODIPine (NORVASC) tablet 5 mg, Daily    aspirin (ECOTRIN LOW STRENGTH) EC tablet 81 mg, Daily    benzonatate (TESSALON PERLES) capsule 200 mg, TID PRN    calcium carbonate (TUMS) chewable tablet 1,000 mg, Daily PRN    ceftriaxone (ROCEPHIN) 1 g/50 mL in dextrose IVPB, Q24H, Last Rate: 1,000 mg (25)    enoxaparin (LOVENOX) subcutaneous injection 40 mg, Daily    levothyroxine tablet 100 mcg, Early Morning    ondansetron (ZOFRAN) injection 4 mg, Q6H PRN    senna (SENOKOT) tablet 8.6 mg, Daily       No Known Allergies    Objective   Vitals: Blood pressure 137/87, pulse 67, temperature 98.6 °F (37 °C), resp. rate 20, height 5' 8\" (1.727 m), weight 69.9 kg (154 lb), SpO2 94%., Body mass index is 23.42 kg/m².,     Systolic (24hrs), Av , Min:106 , Max:137     Diastolic (24hrs), Av, Min:64, " Max:87        Intake/Output Summary (Last 24 hours) at 2/6/2025 1143  Last data filed at 2/6/2025 0812  Gross per 24 hour   Intake 580 ml   Output --   Net 580 ml     Wt Readings from Last 3 Encounters:   02/03/25 69.9 kg (154 lb)   12/17/24 62.1 kg (137 lb)   12/10/24 63.6 kg (140 lb 3.2 oz)     Invasive Devices       Peripheral Intravenous Line  Duration             Peripheral IV 02/02/25 Left;Proximal;Ventral (anterior) Forearm 3 days    Peripheral IV 02/02/25 Proximal;Right;Ventral (anterior) Forearm 3 days              Drain  Duration             Gastrostomy/Enterostomy Percutaneous Endoscopic Gastrostomy (PEG) 20 Fr. 29 days                    Physical Exam  Vitals reviewed.   Constitutional:       General: He is not in acute distress.  Neck:      Vascular: No hepatojugular reflux or JVD.   Cardiovascular:      Rate and Rhythm: Normal rate and regular rhythm.      Heart sounds: Normal heart sounds. No murmur heard.     No friction rub. No gallop.   Pulmonary:      Effort: No respiratory distress.      Breath sounds: Rhonchi present. No rales.      Comments: 2LNC  Abdominal:      General: Bowel sounds are normal. There is no distension.      Palpations: Abdomen is soft.      Tenderness: There is no abdominal tenderness.   Musculoskeletal:         General: No tenderness. Normal range of motion.      Cervical back: Neck supple.      Right lower leg: No edema.      Left lower leg: No edema.   Skin:     General: Skin is warm and dry.      Findings: No erythema.   Neurological:      Mental Status: He is alert and oriented to person, place, and time.   Psychiatric:         Mood and Affect: Mood normal.         LABORATORY RESULTS:      CBC with diff:   Results from last 7 days   Lab Units 02/06/25  0449 02/05/25  0509 02/04/25  0510 02/03/25  0545 02/02/25 2024   WBC Thousand/uL 2.58* 2.20* 2.39* 3.60* 3.66*   HEMOGLOBIN g/dL 12.4 12.1 11.4* 11.5* 12.1   HEMATOCRIT % 37.0 36.6 35.2* 35.4* 37.1   MCV fL 89 89 91 92 89  "  PLATELETS Thousands/uL 184 198 198 172 223   RBC Million/uL 4.17 4.11 3.85* 3.87* 4.18   MCH pg 29.7 29.4 29.6 29.7 28.9   MCHC g/dL 33.5 33.1 32.4 32.5 32.6   RDW % 13.9 14.1 14.3 14.4 14.3   MPV fL 9.4 9.5 10.2 9.4 9.8   NRBC AUTO /100 WBCs  --   --  0 0 0       CMP:  Results from last 7 days   Lab Units 02/06/25  0449 02/05/25  0509 02/04/25  0510 02/03/25  0545 02/02/25 2024   POTASSIUM mmol/L 3.9 3.8 3.8 3.7 3.8   CHLORIDE mmol/L 99 98 100 100 95*   CO2 mmol/L 32 31 32 34* 29   BUN mg/dL 15 12 15 17 24   CREATININE mg/dL 0.32* 0.29* 0.24* 0.36* 0.54*   CALCIUM mg/dL 8.8 8.6 8.6 8.1* 8.7   AST U/L  --   --   --   --  34   ALT U/L  --   --   --   --  37   ALK PHOS U/L  --   --   --   --  86   EGFR ml/min/1.73sq m 140 146 158 133 113       BMP:  Results from last 7 days   Lab Units 02/06/25 0449 02/05/25  0509 02/04/25  0510 02/03/25  0545 02/02/25 2024   POTASSIUM mmol/L 3.9 3.8 3.8 3.7 3.8   CHLORIDE mmol/L 99 98 100 100 95*   CO2 mmol/L 32 31 32 34* 29   BUN mg/dL 15 12 15 17 24   CREATININE mg/dL 0.32* 0.29* 0.24* 0.36* 0.54*   CALCIUM mg/dL 8.8 8.6 8.6 8.1* 8.7       Lab Results   Component Value Date    CREATININE 0.32 (L) 02/06/2025    CREATININE 0.29 (L) 02/05/2025    CREATININE 0.24 (L) 02/04/2025       No results found for: \"NTBNP\"       Results from last 7 days   Lab Units 02/03/25  0545   MAGNESIUM mg/dL 2.0                     Results from last 7 days   Lab Units 02/02/25 2024   INR  1.03     Lipid Profile:   Lab Results   Component Value Date    CHOL 199 12/05/2015    CHOL 190 08/13/2015    CHOL 203 07/16/2014     Lab Results   Component Value Date    HDL 68 09/17/2024    HDL 78 03/03/2021    HDL 63 09/02/2020     Lab Results   Component Value Date    LDLCALC 105 (H) 09/17/2024    LDLCALC 111 (H) 03/03/2021    LDLCALC 130 (H) 09/02/2020     Lab Results   Component Value Date    TRIG 60 09/17/2024    TRIG 52 03/03/2021    TRIG 52 09/02/2020       Cardiac testing:   No results found for this or " any previous visit.    No results found for this or any previous visit.    No valid procedures specified.  No results found for this or any previous visit.      Imaging: Results Review Statement: I reviewed radiology reports from this admission including: chest xray, CT chest, and procedure reports.  CTA chest pe study  Result Date: 2/6/2025  Narrative: CTA - CHEST WITH IV CONTRAST - PULMONARY ANGIOGRAM INDICATION: worsening oxygen requirement. COMPARISON: None. TECHNIQUE: CTA examination of the chest was performed using angiographic technique according to a protocol specifically tailored to evaluate for pulmonary embolism. Multiplanar 2D reformatted images were created from the source data. In addition, coronal  3D MIP postprocessing was performed on the acquisition scanner. Radiation dose length product (DLP) for this visit: 216 mGy-cm . This examination, like all CT scans performed in the [facility], was performed utilizing techniques to minimize radiation dose exposure, including the use of iterative reconstruction and automated exposure control. IV Contrast: 85 mL of iohexol FINDINGS: PULMONARY ARTERIAL TREE:  No pulmonary embolus. Minimal webbing in the one of the segmental branches of the left lower lobe image 109 series 2 that may reflect chronic sequela of prior pulmonary embolism. LUNGS: Consolidations in the lower lobes, right middle lobe and lingula with air bronchograms. Peribronchial patchy opacities in the superior segments of the lower lobes and minimally, upper lobes. Patent tracheobronchial tree. PLEURA: Unremarkable. HEART/GREAT VESSELS: Mild to moderate cardiomegaly. No significant pericardial effusion. Coronary artery calcifications. No thoracic aortic aneurysm. MEDIASTINUM AND VIDAL: Mild mediastinal lymphadenopathy with reference measurements below: AP window, 14 mm transverse Subcarinal, 15 mm transverse Right prevascular, 11 mm transverse. CHEST WALL AND LOWER NECK:  Unremarkable. VISUALIZED STRUCTURES IN THE UPPER ABDOMEN: Unremarkable. OSSEOUS STRUCTURES: No acute fracture or destructive osseous lesion.     Impression: No acute pulmonary embolism. Possible chronic sequela of prior pulmonary embolism in one of the segmental branches of the left lower lobe. Consider correlation with lower extremity Doppler ultrasound. Extensive bilateral airspace disease with bibasal consolidations, as above, likely reflecting pneumonia. Mediastinal lymphadenopathy, possibly reactive. Recommend follow-up in to 6 to 8 weeks for resolution. Mild to moderate cardiomegaly. Workstation performed: [id number]     FL barium swallow video w speech  Result Date: 2/5/2025  Narrative: A video barium swallow study was performed by the Department of Speech Pathology. Please refer to the report for the official interpretation. The images are stored for archival purposes only. Study images were not formally reviewed by the Radiology Department.    Echo complete w/ contrast if indicated  Result Date: 2/3/2025  Narrative:   Left Ventricle: Left ventricular cavity size is normal. Wall thickness is mildly increased. The left ventricular ejection fraction is 60%. Systolic function is normal. Diastolic function is normal.   The following segments are hypokinetic: apical septal and apex.   All other segments are normal.   Left Atrium: The atrium is mildly dilated.   Aorta: The aortic root is normal in size. The ascending aorta is mildly dilated. The ascending aorta is 4.3 cm.     XR chest 2 views  Result Date: 2/3/2025  Narrative: XR CHEST PA AND LATERAL INDICATION: at risk for sepsis. COMPARISON: 12/30/2024 FINDINGS: There is bibasilar consolidation. There are possible small bilateral pleural effusions. No pneumothorax. Normal cardiomediastinal silhouette. Bones are unremarkable for age. Normal upper abdomen.     Impression: Bibasilar consolidation may be on the basis of pneumonia or possibly aspiration. Probable  small bilateral pleural effusions. Workstation performed: [id number]     Counseling / Coordination of Care  Total floor / unit time spent today 45 minutes.  Greater than 50% of total time was spent with the patient and / or family counseling and / or coordination of care.  A description of the counseling / coordination of care: Review of history, current assessment, development of a plan.    Code Status: Level 1 - Full Code    ** Please Note: Dragon 360 Dictation voice to text software may have been used in the creation of this document. **

## 2025-02-06 NOTE — PLAN OF CARE
Problem: Nutrition/Hydration-ADULT  Goal: Nutrient/Hydration intake appropriate for improving, restoring or maintaining nutritional needs  Description: Monitor and assess patient's nutrition/hydration status for malnutrition. Collaborate with interdisciplinary team and initiate plan and interventions as ordered.  Monitor patient's weight and dietary intake as ordered or per policy. Utilize nutrition screening tool and intervene as necessary. Determine patient's food preferences and provide high-protein, high-caloric foods as appropriate.     INTERVENTIONS:  - Monitor oral intake, urinary output, labs, and treatment plans  - Assess nutrition and hydration status and recommend course of action  - Evaluate amount of meals eaten  - Assist patient with eating if necessary   - Allow adequate time for meals  - Recommend/ encourage appropriate diets, oral nutritional supplements, and vitamin/mineral supplements  - Order, calculate, and assess calorie counts as needed  - Recommend, monitor, and adjust tube feedings and TPN/PPN based on assessed needs  - Assess need for intravenous fluids  - Provide specific nutrition/hydration education as appropriate  - Include patient/family/caregiver in decisions related to nutrition  Outcome: Progressing     Problem: SAFETY ADULT  Goal: Maintain or return to baseline ADL function  Description: INTERVENTIONS:  -  Assess patient's ability to carry out ADLs; assess patient's baseline for ADL function and identify physical deficits which impact ability to perform ADLs (bathing, care of mouth/teeth, toileting, grooming, dressing, etc.)  - Assess/evaluate cause of self-care deficits   - Assess range of motion  - Assess patient's mobility; develop plan if impaired  - Assess patient's need for assistive devices and provide as appropriate  - Encourage maximum independence but intervene and supervise when necessary  - Involve family in performance of ADLs  - Assess for home care needs following  discharge   - Consider OT consult to assist with ADL evaluation and planning for discharge  - Provide patient education as appropriate  Outcome: Progressing     Problem: Knowledge Deficit  Goal: Patient/family/caregiver demonstrates understanding of disease process, treatment plan, medications, and discharge instructions  Description: Complete learning assessment and assess knowledge base.  Interventions:  - Provide teaching at level of understanding  - Provide teaching via preferred learning methods  Outcome: Progressing

## 2025-02-06 NOTE — PLAN OF CARE
Problem: Nutrition/Hydration-ADULT  Goal: Nutrient/Hydration intake appropriate for improving, restoring or maintaining nutritional needs  Description: Monitor and assess patient's nutrition/hydration status for malnutrition. Collaborate with interdisciplinary team and initiate plan and interventions as ordered.  Monitor patient's weight and dietary intake as ordered or per policy. Utilize nutrition screening tool and intervene as necessary. Determine patient's food preferences and provide high-protein, high-caloric foods as appropriate.     INTERVENTIONS:  - Monitor oral intake, urinary output, labs, and treatment plans  - Assess nutrition and hydration status and recommend course of action  - Evaluate amount of meals eaten  - Assist patient with eating if necessary   - Allow adequate time for meals  - Recommend/ encourage appropriate diets, oral nutritional supplements, and vitamin/mineral supplements  - Order, calculate, and assess calorie counts as needed  - Recommend, monitor, and adjust tube feedings and TPN/PPN based on assessed needs  - Assess need for intravenous fluids  - Provide specific nutrition/hydration education as appropriate  - Include patient/family/caregiver in decisions related to nutrition  Outcome: Progressing     Problem: PAIN - ADULT  Goal: Verbalizes/displays adequate comfort level or baseline comfort level  Description: Interventions:  - Encourage patient to monitor pain and request assistance  - Assess pain using appropriate pain scale  - Administer analgesics based on type and severity of pain and evaluate response  - Implement non-pharmacological measures as appropriate and evaluate response  - Consider cultural and social influences on pain and pain management  - Notify physician/advanced practitioner if interventions unsuccessful or patient reports new pain  Outcome: Adequate for Discharge     Problem: INFECTION - ADULT  Goal: Absence or prevention of progression during  hospitalization  Description: INTERVENTIONS:  - Assess and monitor for signs and symptoms of infection  - Monitor lab/diagnostic results  - Monitor all insertion sites, i.e. indwelling lines, tubes, and drains  - Monitor endotracheal if appropriate and nasal secretions for changes in amount and color  - [place] appropriate cooling/warming therapies per order  - Administer medications as ordered  - Instruct and encourage patient and family to use good hand hygiene technique  - Identify and instruct in appropriate isolation precautions for identified infection/condition  Outcome: Progressing  Goal: Absence of fever/infection during neutropenic period  Description: INTERVENTIONS:  - Monitor WBC    Outcome: Progressing     Problem: SAFETY ADULT  Goal: Patient will remain free of falls  Description: INTERVENTIONS:  - Educate patient/family on patient safety including physical limitations  - Instruct patient to call for assistance with activity   - Consult OT/PT to assist with strengthening/mobility   - Keep Call bell within reach  - Keep bed low and locked with side rails adjusted as appropriate  - Keep care items and personal belongings within reach  - Initiate and maintain comfort rounds  - Make Fall Risk Sign visible to staff  - Offer Toileting every  Hours, in advance of need  - Initiate/Maintain alarm  - Obtain necessary fall risk management equipment:   - Apply yellow socks and bracelet for high fall risk patients  - Consider moving patient to room near nurses station  Outcome: Progressing  Goal: Maintain or return to baseline ADL function  Description: INTERVENTIONS:  -  Assess patient's ability to carry out ADLs; assess patient's baseline for ADL function and identify physical deficits which impact ability to perform ADLs (bathing, care of mouth/teeth, toileting, grooming, dressing, etc.)  - Assess/evaluate cause of self-care deficits   - Assess range of motion  - Assess patient's mobility; develop plan if  impaired  - Assess patient's need for assistive devices and provide as appropriate  - Encourage maximum independence but intervene and supervise when necessary  - Involve family in performance of ADLs  - Assess for home care needs following discharge   - Consider OT consult to assist with ADL evaluation and planning for discharge  - Provide patient education as appropriate  Outcome: Progressing  Goal: Maintains/Returns to pre admission functional level  Description: INTERVENTIONS:  - Perform AM-PAC 6 Click Basic Mobility/ Daily Activity assessment daily.  - Set and communicate daily mobility goal to care team and patient/family/caregiver.   - Collaborate with rehabilitation services on mobility goals if consulted  - Perform Range of Motion  times a day.  - Reposition patient every  hours.  - Dangle patient  times a day  - Stand patient  times a day  - Ambulate patient  times a day  - Out of bed to chair  times a day   - Out of bed for meals times a day  - Out of bed for toileting  - Record patient progress and toleration of activity level   Outcome: Progressing     Problem: DISCHARGE PLANNING  Goal: Discharge to home or other facility with appropriate resources  Description: INTERVENTIONS:  - Identify barriers to discharge w/patient and caregiver  - Arrange for needed discharge resources and transportation as appropriate  - Identify discharge learning needs (meds, wound care, etc.)  - Arrange for interpretive services to assist at discharge as needed  - Refer to Case Management Department for coordinating discharge planning if the patient needs post-hospital services based on physician/advanced practitioner order or complex needs related to functional status, cognitive ability, or social support system  Outcome: Progressing     Problem: Knowledge Deficit  Goal: Patient/family/caregiver demonstrates understanding of disease process, treatment plan, medications, and discharge instructions  Description: Complete  learning assessment and assess knowledge base.  Interventions:  - Provide teaching at level of understanding  - Provide teaching via preferred learning methods  Outcome: Progressing

## 2025-02-06 NOTE — DISCHARGE INSTR - AVS FIRST PAGE
Dear Mohamud Grimm Sr.,     It was our pleasure to care for you here at [facility].  It is our hope that we were always able to exceed the expected standards for your care during your stay.  You were hospitalized due to shortness of breath.  You were cared for on the fourth floor by Abdulaziz Ring DO under the service of Ivanna Bajwa MD with the [facility] who covers for your primary care physician (PCP), Raphael Lopez MD, while you were hospitalized.  If you have any questions or concerns related to this hospitalization, you may contact us at .  For follow up as well as any medication refills, we recommend that you follow up with your primary care physician.  A registered nurse will reach out to you by phone within a few days after your discharge to answer any additional questions that you may have after going home.  However, at this time we provide for you here, the most important instructions / recommendations at discharge:     Notable Medication Adjustments -   Please start aspirin 81 mg daily.   Testing Required after Discharge -   Please complete outpatient stress test after discharge as per cardiology.  Please complete outpatient lab work as ordered by PCP.   Important follow up information -   Please follow up with your PCP upon discharge.   Please establish with cardiologist upon discharge.   Other Instructions -   Please return to the ED if you experience worsening shortness of breath, chest pain, fevers, chills, or any other infectious or concerning symptoms.   Please continue diet of NPO except sips with medicines. Continue with speech therapy outpatient to improve any chance of pleasure feeds.   Per Speech if patient desires to continue regular/thin pleasure feeds- they uggest frequent oral care prior to intake and suggesting water vs sugary beverages   Please review this entire after visit summary as additional general  instructions including medication list, appointments, activity, diet, any pertinent wound care, and other additional recommendations from your care team that may be provided for you.      Sincerely,     Abdulaziz Ring, DO

## 2025-02-06 NOTE — RESPIRATORY THERAPY NOTE
Home Oxygen Qualifying Test     Patient name: Mohamud Grimm        : 1965   Date of Test:  2025  Diagnosis:    Home Oxygen Test:    **Medicare Guidelines require item(s) 1-5 on all ambulatory patients or 1 and 2 on non-ambulatory patients.    1. Baseline SPO2 on Room Air at rest 92 %   If <= 88% on Room Air add O2 via NC to obtain SpO2 >=88%. If LPM needed, document LPM RA needed to reach =>88%    SPO2 during exertion on Room Air 96  %  During exertion monitor SPO2. If SPO2 increases >=89%, do not add supplemental oxygen    SPO2 on Oxygen at Rest NA % at NA LPM    SPO2 during exertion on Oxygen NA % at NA LPM    Test performed during exertion activity.      []  Supplemental Home Oxygen is indicated.    [x]  Client does not qualify for home oxygen.    Respiratory Additional Notes- New pulse ox sticker placed on patient prior to walk. Patient at rest does not qualify for home oxygen. Patient ambulated for 6 minutes does not qualify.    Di Cardona, RT

## 2025-02-06 NOTE — ASSESSMENT & PLAN NOTE
"Barium swallow eval yesterday showed \"aspiration and/or deep penetration noted with all consistencies and with pharyngeal residue/retention\"  "

## 2025-02-06 NOTE — ASSESSMENT & PLAN NOTE
59-year-old male past medical history of esophageal and tongue cancer s/p radiation with chronic known dysphagia and risk for aspiration presents with a chief complaint of fever, cough, and worsening shortness of breath.  Normally only on oxygen at night due to noncompliance with CPAP. Currently on 2L NC   Previously on doxycycline for pneumonia outpatient (reports taking 3 doses w/o improvement)  SIRS criteria positive on presentation for tachycardia with heart rate of 98 and tachypnea.  Currently afebrile  Lactic acid within normal limits. Pro-Michael elevated at 0.75  Chest x-ray significant for bibasilar consolidations with probable bilateral pleural effusions  COVID/flu negative  MRSA culture negative  Strep pneumo, legionella urine antigen negative  DRIP score: 4 = severe with high risk for MDR  Antibiotic use within 60 days and tube feeds  No prior history of MRSA  Does endorse recent travel to [place].  No clinical signs of DVT, chest pain, hemoptysis, or recent surgery.  No malignancy treatment within the past 6 months. Wells score 0  Sputum culture with rare gram positive cocci in pairs, rare gram variable rods    Concern for community-acquired pneumonia vs. aspiration pneumonia.  VBS continues to show risk of aspiration.    PLAN:  nebs PRN for wheezing while inpatient  Blood culture no growth at 72 hours  Incentive spirometry  Respiratory protocol & Aspiration protocol  Keep NPO except sips with meds per Speech after VBS   Patient expresses a desire to continue per speech please consider frequent oral care and suggest wate over sugary beverages.    Patient's family updated of these results

## 2025-02-06 NOTE — DISCHARGE SUMMARY
Discharge Summary - Hospitalist   Name: Mohamud Grimm Sr. 59 y.o. male I MRN: [id number]  Unit/Bed#: S -01 I Date of Admission: 2/2/2025   Date of Service: 2/6/2025 I Hospital Day: 2     Assessment & Plan  Sepsis (HCC)  59-year-old male past medical history of esophageal and tongue cancer s/p radiation with chronic known dysphagia and risk for aspiration presents with a chief complaint of fever, cough, and worsening shortness of breath.  Normally only on oxygen at night due to noncompliance with CPAP. Currently on 2L NC   Previously on doxycycline for pneumonia outpatient (reports taking 3 doses w/o improvement)  SIRS criteria positive on presentation for tachycardia with heart rate of 98 and tachypnea.  Currently afebrile  Lactic acid within normal limits. Pro-Michael elevated at 0.75  Chest x-ray significant for bibasilar consolidations with probable bilateral pleural effusions  COVID/flu negative  MRSA culture negative  Strep pneumo, legionella urine antigen negative  DRIP score: 4 = severe with high risk for MDR  Antibiotic use within 60 days and tube feeds  No prior history of MRSA  Does endorse recent travel to [place].  No clinical signs of DVT, chest pain, hemoptysis, or recent surgery.  No malignancy treatment within the past 6 months. Wells score 0  Sputum culture with rare gram positive cocci in pairs, rare gram variable rods    Concern for community-acquired pneumonia vs. aspiration pneumonia.  VBS continues to show risk of aspiration.    PLAN:  nebs PRN for wheezing while inpatient  Blood culture no growth at 72 hours  Incentive spirometry  Respiratory protocol & Aspiration protocol  Keep NPO except sips with meds per Speech after VBS   Patient expresses a desire to continue per speech please consider frequent oral care and suggest wate over sugary beverages.    Patient's family updated of these results  Acute on chronic respiratory failure (HCC)  Patient uses 2 L of oxygen at night  Patient is  "noncompliant with CPAP  During hospital stay has required up to 4 L  Intermittently desaturates into the high to mid 80s    Plan  -Continue to treat sepsis likely secondary to pneumonia  -Wean O2 as tolerated  -Amb pulse ox  Essential hypertension  Continue at home Norvasc 5 mg; hold for SBP <110  Hypothyroidism  Continue at home levothyroxine 100 mcg  Last TSH 1 month ago: 9.45  Obstructive sleep apnea syndrome  Reports he does not typically use CPAP at night.  Compliance with 2 L NC at night  At risk for aspiration  Continue tube feeds with Jevity 1.5 500 mL 3 times daily bolus with 100 mL H2O flushes before and after feeds  Will not continue pleasure feeds due to concern for aspiration pneumonia  Speech and nutrition consulted.  Appreciate recommendations  NPO  2/3 phosphorus was 2.9  Elevated troponin  Troponins elevated at 2290 on presentation, downtrended to 1400  Patient currently not complaining of chest pain  EKG 2/2 - negative for ischemic changes  Echo 2/3 - hypokinetic apex and apical septal segments. Normal LV size, LV wall thickness mildly increased. LA mildly dilated  Suspect non-MI elevated troponin secondary to sepsis d/t demand ischemia     PLAN:  Continue monitoring on tele  Hx of head and neck radiation  Past medical history significant for esophageal and tongue cancer s/p radiation of the head and neck.  Per patient and chart review, patient has history of dysphagia and increased risk of aspiration  Chest x-ray significant for bibasilar L>R pna.  Concern for aspiration pneumonia versus community-acquired pneumonia  Diagnosed in 2011 and treated in 2012.  Does have residual dysphagia and left-sided brachial plexitis.  Decreased sensation in LUE.  Chronic LUE weakness on exam    PLAN:  See \"sepsis\" and \"at risk for aspiration\"    Gastroesophageal reflux disease  TUMS     Medical Problems       Resolved Problems  Date Reviewed: 2/2/2025   None       Discharging Physician / Practitioner: Indy " MD Christophe  PCP: Raphael Lopez MD  Admission Date:   Admission Orders (From admission, onward)       Ordered        02/04/25 1647  INPATIENT ADMISSION  Once            02/02/25 2128  Place in Observation  Once                          Discharge Date: 02/06/25    Consultations During Hospital Stay:  Cardiology  CTA chest PE study  X-ray chest PA and lateral  VAS venous duplex lower limb Doppler    Procedures Performed:   VBS    Significant Findings / Test Results:   CTA chest pe study   Final Result by Charlette Garvey MD (02/06 1026)   No acute pulmonary embolism. Possible chronic sequela of prior pulmonary embolism in one of the segmental branches of the left lower lobe. Consider correlation with lower extremity Doppler ultrasound.   Extensive bilateral airspace disease with bibasal consolidations, as above, likely reflecting pneumonia.   Mediastinal lymphadenopathy, possibly reactive.   Recommend follow-up in to 6 to 8 weeks for resolution.   Mild to moderate cardiomegaly.               Workstation performed: [id number]         FL barium swallow video w speech   Final Result by  (02/05 1306)      FL barium swallow video w speech   Final Result by [facility], DOCUMENTATION (02/05 1058)      XR chest 2 views   ED Interpretation by Raphael Barger DO (02/02 2106)   Abnormal   Bibasilar left greater than right consolidations with bilateral pleural effusions.  No pneumothorax.  Increased consolidations compared to prior chest x-ray done on December 30, 2024.      Final Result by Ronen Aguilar MD (02/03 0830)      Bibasilar consolidation may be on the basis of pneumonia or possibly aspiration. Probable small bilateral pleural effusions.            Workstation performed: [id number]          VAS VENOUS DUPLEX - LOWER LIMB BILATERAL    (Results Pending)        Incidental Findings:   None  All findings related to main hospital problems    Test Results Pending at Discharge (will require follow up):   None  none     Outpatient Tests Requested:  None    Complications: None    Reason for Admission: Treatment of pneumonia    Hospital Course:   Mohamud Grimm Sr. is a 59 y.o. male patient who originally presented to the hospital on 2/2/2025 due to fever, shortness of breath, and worsening cough.     Mohamud Grimm Sr. is a 59 y.o. male with a PMH of esophageal cancer treated in 2012 with chemo and radiation, ERIK noncompliant with CPAP but does use 2 L NC hs, and recurrent pneumonia (last hospitalization 3 months ago) who presented with chief complaint of fever, shortness of breath, and worsening cough.  It was suspected that he had bibasilar pneumonia possibly aspiration as patient is on tube feeds due to chronic dysphagia but continues to intake pleasure feeds and thin liquids.  Due to a high drip score as patient was on antibiotics outpatient started him broadly on vancomycin cefepime and Zithromax.  Urine Legionella and Streptococcus were negative therefore to Zithromax was discontinued.  Comycin was also discontinued once MRSA culture returned negative.  Patient was downgraded from cefepime to ceftriaxone and maintained on ceftriaxone throughout hospital stay.  His troponins were also found to be elevated likely due to type II MI in the setting of infection given downtrending troponin and normal EKG in the absence of chest pain we were less concerned for ACS.  However given the patient has not had a recent echo we followed up with an echo which showed concern for apical hypokinesis.  Patient remained febrile on hospital day 1 during the night at 101.9 resolved with Tylenol.  He also had increased oxygen  needs as he is normally only on 2 L at night.  On hospital day 2 no reported fever but spiked to 100.3 requiring another dose of Tylenol.  Due to increased oxygen needs, fever and elevated troponins we decided to perform a CTA PE and a cardiology consult for further evaluation.  With respect to his dysphagia speech  "recommended an updated VBS which was completed.  Speech recommends that he remain n.p.o. with sips at this time.  Cardiology recommended starting baby aspirin and outpatient follow-up for stress test.  Patient's oxygen needs slowly resolved and formal home O2 eval showed no need for additional oxygen.  Patient was discharged with cardiology follow-up.  Please see report for additional details.    Condition at Discharge: good    Discharge Day Visit / Exam:   Subjective: Patient seen resting in bed.  He reports that he feels much improved and that he is very close to his baseline.  He reports no new acute symptomology.  Vitals: Blood Pressure: 137/87 (02/06/25 0807)  Pulse: 67 (02/06/25 0807)  Temperature: 98.6 °F (37 °C) (02/06/25 0807)  Temp Source: Oral (02/05/25 0715)  Respirations: 20 (02/06/25 0807)  Height: 5' 8\" (172.7 cm) (02/03/25 1300)  Weight - Scale: 69.9 kg (154 lb) (02/03/25 1300)  SpO2: 94 % (02/06/25 1507)  Physical Exam  Vitals and nursing note reviewed.   Constitutional:       General: He is not in acute distress.     Appearance: He is well-developed.   HENT:      Head: Normocephalic and atraumatic.      Nose: No congestion.   Eyes:      General: No scleral icterus.        Right eye: No discharge.         Left eye: No discharge.      Conjunctiva/sclera: Conjunctivae normal.   Cardiovascular:      Rate and Rhythm: Normal rate and regular rhythm.      Heart sounds: Normal heart sounds. No murmur heard.  Pulmonary:      Effort: No respiratory distress.      Breath sounds: Rales (L lower extremity) present.   Chest:      Chest wall: No tenderness.   Abdominal:      General: Abdomen is flat. There is no distension.      Palpations: Abdomen is soft.      Tenderness: There is no abdominal tenderness.   Musculoskeletal:         General: No swelling.      Cervical back: Neck supple.   Skin:     General: Skin is warm and dry.      Capillary Refill: Capillary refill takes less than 2 seconds.   Neurological: "      General: No focal deficit present.      Mental Status: He is alert.   Psychiatric:         Mood and Affect: Mood normal.         Behavior: Behavior normal.         Thought Content: Thought content normal.         Judgment: Judgment normal.          Discussion with Family: Updated  (wife) via phone.    Discharge instructions/Information to patient and family:   See after visit summary for information provided to patient and family.      Provisions for Follow-Up Care:  See after visit summary for information related to follow-up care and any pertinent home health orders.      Mobility at time of Discharge:   Basic Mobility Inpatient Raw Score: 24  JH-HLM Goal: 8: Walk 250 feet or more  JH-HLM Achieved: 8: Walk 250 feet ot more  HLM Goal NOT achieved. Continue to encourage mobility in post discharge setting.     Disposition:   Home    Planned Readmission: No    Discharge Medications:  See after visit summary for reconciled discharge medications provided to patient and/or family.      Administrative Statements   Discharge Statement:  **Please Note: This note may have been constructed using a voice recognition system**

## 2025-02-07 ENCOUNTER — TRANSITIONAL CARE MANAGEMENT (OUTPATIENT)
Dept: FAMILY MEDICINE CLINIC | Facility: CLINIC | Age: 60
End: 2025-02-07

## 2025-02-07 NOTE — UTILIZATION REVIEW
NOTIFICATION OF ADMISSION DISCHARGE   This is a Notification of Discharge from [facility]. Please be advised that this patient has been discharge from our facility. Below you will find the admission and discharge date and time including the patient’s disposition.   UTILIZATION REVIEW CONTACT:  Genie Mireles  Utilization   Network Utilization Review Department  Phone: [phone] x carefully listen to the prompts. All voicemails are confidential.  Email: [email or web address]     ADMISSION INFORMATION  PRESENTATION DATE: 2/2/2025  7:49 PM  OBERVATION ADMISSION DATE: 02/02/2025 2128  INPATIENT ADMISSION DATE: 2/4/25  4:47 PM   DISCHARGE DATE: 2/6/2025  6:32 PM   DISPOSITION:Home/Self Care    Network Utilization Review Department  ATTENTION: Please call with any questions or concerns to [phone] and carefully listen to the prompts so that you are directed to the right person. All voicemails are confidential.   For Discharge needs, contact Care Management DC Support Team at [phone] opt. 2  Send all requests for admission clinical reviews, approved or denied determinations and any other requests to dedicated fax number below belonging to the campus where the patient is receiving treatment. List of dedicated fax numbers for the Facilities:  FACILITY NAME UR FAX NUMBER   ADMISSION DENIALS (Administrative/Medical Necessity) [phone]   DISCHARGE SUPPORT TEAM ([facility]) [phone]   PARENT CHILD HEALTH (Maternity/NICU/Pediatrics) [phone]   [facility] [phone]   [facility] [phone]   [facility] [phone]   [facility] [phone]   [facility] [phone]   [facility] [phone]   [facility] [phone]   [facility]  [phone]   [facility] [phone]   [facility] [phone]   [facility] [phone]   [place] [phone]

## 2025-02-08 LAB
BACTERIA BLD CULT: NORMAL
BACTERIA BLD CULT: NORMAL

## 2025-02-10 NOTE — PROGRESS NOTES
Called Mohamud, he is doing good. Still tired. He has a slight cough which is not new. [place] so is unable to make a TCM. It will be a hospital follow up at that point.

## 2025-02-28 DIAGNOSIS — R79.89 ELEVATED TROPONIN: ICD-10-CM

## 2025-02-28 RX ORDER — ASPIRIN 81 MG/1
81 TABLET, COATED ORAL DAILY
Qty: 90 TABLET | Refills: 1 | Status: SHIPPED | OUTPATIENT
Start: 2025-02-28

## 2025-03-04 PROBLEM — A41.9 SEPSIS (HCC): Status: RESOLVED | Noted: 2019-04-20 | Resolved: 2025-03-04

## 2025-03-05 ENCOUNTER — RA CDI HCC (OUTPATIENT)
Dept: OTHER | Facility: HOSPITAL | Age: 60
End: 2025-03-05

## 2025-03-10 ENCOUNTER — TELEPHONE (OUTPATIENT)
Age: 60
End: 2025-03-10

## 2025-03-10 NOTE — TELEPHONE ENCOUNTER
Spoke with pt wife   Appointment made for April 18th tube exchange.    She verbalized understanding npo 4 hr before. I will check pt need for Asprin hold.

## 2025-03-11 ENCOUNTER — OFFICE VISIT (OUTPATIENT)
Dept: FAMILY MEDICINE CLINIC | Facility: CLINIC | Age: 60
End: 2025-03-11
Payer: COMMERCIAL

## 2025-03-11 ENCOUNTER — EVALUATION (OUTPATIENT)
Dept: SPEECH THERAPY | Facility: CLINIC | Age: 60
End: 2025-03-11
Payer: COMMERCIAL

## 2025-03-11 VITALS
DIASTOLIC BLOOD PRESSURE: 70 MMHG | BODY MASS INDEX: 23.04 KG/M2 | SYSTOLIC BLOOD PRESSURE: 120 MMHG | HEIGHT: 68 IN | OXYGEN SATURATION: 97 % | HEART RATE: 82 BPM | WEIGHT: 152 LBS | TEMPERATURE: 99.2 F

## 2025-03-11 DIAGNOSIS — Z91.89 AT RISK FOR ASPIRATION: ICD-10-CM

## 2025-03-11 DIAGNOSIS — E03.9 ACQUIRED HYPOTHYROIDISM: ICD-10-CM

## 2025-03-11 DIAGNOSIS — Z92.3 HX OF HEAD AND NECK RADIATION: ICD-10-CM

## 2025-03-11 DIAGNOSIS — Z85.89 HX OF SQUAMOUS CELL CARCINOMA: ICD-10-CM

## 2025-03-11 DIAGNOSIS — R13.14 PHARYNGOESOPHAGEAL DYSPHAGIA: ICD-10-CM

## 2025-03-11 DIAGNOSIS — R13.10 SEVERE SWALLOWING DYSFUNCTION: ICD-10-CM

## 2025-03-11 DIAGNOSIS — G52.2 VAGUS NERVE DISORDER: ICD-10-CM

## 2025-03-11 DIAGNOSIS — R13.13 DYSPHAGIA, PHARYNGEAL PHASE: ICD-10-CM

## 2025-03-11 DIAGNOSIS — J69.0 ASPIRATION PNEUMONIA OF BOTH LUNGS, UNSPECIFIED ASPIRATION PNEUMONIA TYPE, UNSPECIFIED PART OF LUNG (HCC): Primary | ICD-10-CM

## 2025-03-11 DIAGNOSIS — T17.908S ASPIRATION INTO AIRWAY, SEQUELA: ICD-10-CM

## 2025-03-11 DIAGNOSIS — J31.0 CHRONIC RHINITIS: ICD-10-CM

## 2025-03-11 DIAGNOSIS — C01 CANCER OF BASE OF TONGUE (HCC): ICD-10-CM

## 2025-03-11 DIAGNOSIS — J38.3 GLOTTIC INSUFFICIENCY: ICD-10-CM

## 2025-03-11 DIAGNOSIS — R13.12 DYSPHAGIA, OROPHARYNGEAL PHASE: Primary | ICD-10-CM

## 2025-03-11 DIAGNOSIS — I10 ESSENTIAL HYPERTENSION: ICD-10-CM

## 2025-03-11 DIAGNOSIS — Z85.89 HX OF MALIGNANT NEOPLASM OF HEAD AND NECK: ICD-10-CM

## 2025-03-11 DIAGNOSIS — K11.7 SIALORRHEA: ICD-10-CM

## 2025-03-11 DIAGNOSIS — G52.7 CRANIAL NEUROPATHIES, MULTIPLE: ICD-10-CM

## 2025-03-11 DIAGNOSIS — R49.0 DYSPHONIA: ICD-10-CM

## 2025-03-11 DIAGNOSIS — J69.0 ASPIRATION PNEUMONIA, UNSPECIFIED ASPIRATION PNEUMONIA TYPE, UNSPECIFIED LATERALITY, UNSPECIFIED PART OF LUNG (HCC): ICD-10-CM

## 2025-03-11 DIAGNOSIS — R05.3 CHRONIC COUGH: ICD-10-CM

## 2025-03-11 DIAGNOSIS — I21.4 NSTEMI (NON-ST ELEVATED MYOCARDIAL INFARCTION) (HCC): ICD-10-CM

## 2025-03-11 PROCEDURE — 92526 ORAL FUNCTION THERAPY: CPT | Performed by: SPEECH-LANGUAGE PATHOLOGIST

## 2025-03-11 PROCEDURE — 92610 EVALUATE SWALLOWING FUNCTION: CPT | Performed by: SPEECH-LANGUAGE PATHOLOGIST

## 2025-03-11 PROCEDURE — 99214 OFFICE O/P EST MOD 30 MIN: CPT | Performed by: FAMILY MEDICINE

## 2025-03-11 RX ORDER — LEVALBUTEROL TARTRATE 45 UG/1
2 AEROSOL, METERED ORAL EVERY 6 HOURS PRN
Qty: 45 G | Refills: 0 | Status: SHIPPED | OUTPATIENT
Start: 2025-03-11

## 2025-03-11 NOTE — PROGRESS NOTES
Speech-Language Pathology Initial Evaluation    Today's date: 3/11/2025   Patient’s name: Mohamud Grimm Sr.  : 1965  MRN: [id number]  Safety measures: aspiration, dysphagia  Referring provider: Abdulaziz Ring DO    Encounter Diagnosis     ICD-10-CM    1. Dysphagia, oropharyngeal phase  R13.12       2. Dysphagia, pharyngeal phase  R13.13       3. Hx of squamous cell carcinoma-BOT, s/p chemo/RT   Z85.89       4. Aspiration into airway, sequela  T17.908S           Assessment:  Patient presents with known significant oropharyngeal dysphagia complicated by dysgeusia, reflux/dysmotility, radiation fibrosis, and mucositis. He returns to therapy for ongoing education, training and potential return to prior POC for potential benefit. He reports incr motivation due to additional hospitalizations for aspiration PNA with subsequent repeat PEG placement.     Recommendations: Likely ongoing silent aspiration- clinically should most likely be NPO with recent VBS studies however patient is aware of his risks and would like to have some po for pleasure     (Will start with HTL tsp and puree with the following strategies)    Aspiration precautions and compensatory swallowing strategies: upright posture, slow rate of feeding, small bites/sips, head turn left, effortful swallow, cough every 1-2 bites/sips, and alternating bites and sips    Due to patient's current symptoms he may benefit from Myofascial Release (MFR) treatment for swallowing. This is a manual therapy technique through myofascial release (stimulation/pressure/stretch) to address patient's symptoms, and will be completed unless otherwise contraindicated.        Short-term goals:  -Patient will be educated on and report understanding and compliance of appropriate diet, aspiration risks, diagnosis and related pathology, to be achieved in 4-6 weeks.    Education provided in depth re: VBS studies ( with thorough image review), aspiration/silent aspiration, A&P of  the swallow, risks, contributing factors to development of PNA as well as recommendations. Reciprocal comprehension was verbally expressed.  All questions were answered.     Patient will improve lingual strength and ROM via exercises, functional tasks and stretching, to be achieved in 4-6 weeks.     -Patient will improve PSFS by 2 points for improved overall QOL, to be achieved in 4-6 weeks.  Will score at next visit    - Patient will demonstrate the ability to adequately self-monitor swallowing skills and perform appropriate compensatory techniques to reduce overt s/sx of penetration/aspiration to safely tolerate least restrictive food/liquid consistencies, to be achieved in 4-6 week    -Patient will be able to maintain ability to tolerate po throughout treatment.     Long-term goals:    -Patient will maintain adequate hydration and nutrition with optimum safety and efficacy of swallowing function on P.O. intake with reduced s/sx of penetration/ aspiration by discharge.       Plan:  Patient would benefit from outpatient skilled Speech Therapy services: Dysphagia therapy    Frequency: 1-2x weekly  Duration: 4-6 weeks    Intervention certification from: 3/11/2025  Intervention certification to: 4/22/25      Subjective:  History of present illness: Patient is a 60 y.o. male who was referred for further assessment of dysphagia. 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  Treated for this 10 yrs ago (chemo/RT at [facility]).  No surgery for this (other than biopsy).  Swallow issues occurred since that time (shortly after treatment). He has a hx of multiple recurrent prior aspiration PNAs as well as known silent aspiration and previous speech therapy to address his deficit- including prior therapy with me. That therapy was discontinued as the patient chose to stop coming/was traveling. He does admit that this therapy was beneficial to him.     He is S/p transnasal esophagoscopy with infiinity  balloon dilation of UES 8/25/23. S/p MDL, vf steroid injection, biopsy epiglottis, posterior pharyngeal wall injection, esophagoscopy w dilation 9/16/22. S/p balloon dilation 32mm with posterior wall injection 9/27/24. He is also s/p botox to facial muscles.    He characterizes his dysphagia by globus sensation/retention (mid upper pharynx - likely vallecula) requiring multiple swallows as well as coughing and choking and nasal regurgitation ( couple times weekly-primarily with solids). He primarily only utilizes liquid wash strategies but is now utilizing smaller presentations and upright positioning.     Primary concerns today include neck stiffness and aspiration risk. He does report desire for reduced aspiration risk but would also like to maintain QOL and have some po for pleasure. He reports he is more aware of how significant of a risk his aspiration places him for PNA due to increased frequency of hospitalizations for PNA. He is now s/p PEG placement through which he gets a majority of his nutrition/hydration (3x daily). He does admit to some po by mouth for special occasions and has a desire to be able to drink beer.      He follows with Dr. Singh ( scheduled to see for f/u April 24th) and has participated in multiple swallow assessments. PEG placed 11/26/24. Most recent VBS (see below) completed during a hospital stay last month with recommendations for NPO with PEG feeding.       Patient's goal(s): drink beer    Pain: Absent (scale:  n/a )    Hearing: Decreased  Vision: with glasses    Home environment/lifestyle: lives home with his wife and youngest child ( 6 children in total)  Vocational status: Owns/runs [employer]      Objective:  DYSPHAGIA EVALUATION:      Head and Neck Cancer Checklist:  *Patient indicated that he is experiencing difficulties in the following areas:    SWALLOWING: Needing to swallow many times to clear food from the mouth and/or throat, Coughing/choking when swallowing,  "Throat clearing after swallowing, Gurgly, \"wet-sounding\" voice after swallowing, Loss of liquids out of nose, Bringing up food after the swallow, Decreased appetite, Weight loss, and Recent pneumonia    SPEECH: none    VOICE: Hoarse/husky voice, Decreased vocal loudness      Functional Outcome Measurements    1. Functional Oral Intake Scale (FOIS): 2 - tube dependent with minimal/inconsistent oral intake    2. Performance Status Scale For Head and Neck Cancer Patients (PSS-HN):  -Normalcy of Diet (0-100): 0  -Public Eating (0-100): N/A  -Understandability of Speech (0-100): 100     3. The Reflux Symptom Index (RSI) is a self-administered, 9-item outcomes instrument to quantify symptoms in patients with laryngopharyngeal reflux (LRP). An RSI score of <=13 can be considered normal. Some degree of reflux is present in normal individuals. A score of >13 is suggestive of significant LPR symptoms. Patient obtained a score of 19/45. (see scanned document)    4. The Eating Assessment Tool (EAT-10) is a self-administered, symptom-specific outcome instrument for dysphagia. It consists of ten statements that a patient rates on a scale of 0-4, with 0=no problem to 4=severe problem. A score of 3 or more is abnormal. Patient obtained a score of 23/40. (see scanned document)    5. The Dyspnea Index (DI) is a self-administered, 10-item outcomes instrument to quantify severity of symptoms in upper airway dyspnea. Preliminary data suggest that a score of <7 can be considered normal and a >10 is suggestive of PVFM. A change score of 8 has been shown to be meaningful. Patient obtained a score of 13/40. (see scanned document)    -The Cough Severity Index (CSI) is a self-administered, 10-item outcomes instrument to quantify a patient's symptoms of chronic cough of upper airway origin. The total score ranges from a 0 to 40 based on the sum of responses to 10 questions on a 5-point scale ranging from “never a problem” to “always a " problem.” A score of 3 or below is considered normal. A score higher than 3 means that the cough may be impacting quality of life. Patient obtained a score of 26/40. (see scanned document)    6.  PSFS:  Will score at next visit    -Current diet (solids): NPO- occ solids  -Current diet (liquids): NPO- occ thin liquids  -Current pill intake method: via PEG    -Alternative Feeding Method?: yes      Special Studies:    6/6: Flexible laryngoscopy     Pre-Operative Diagnosis:  Dysphonia   Dysphagia   Hx SCCA BOT s/p chemo/RT ~2012 [facility]  Left vf immobility  Glottic insufficiency  Left hemilarynx with mod edema/fibrosis  Min erythema  No pooling of secretions  Narrow/crowding throughout  No laryngeal sensation left hemilarynx, right side intact  VPI  Pooling saliva vallecula/piriforms  Hx Small lesion 4-5mm 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  A high resolution video laryngoscope was inserted transnasally or orally and suspended from the video system for magnification and documentation.       Voice: mild raspy, some gravel/clearing    Supraglottic Hyperfunction:    present, mild ant/post  Arytenoid Joint Movement:    Normal  Dysdiadochokinesis:     Absent  Arytenoids:   mild erythema, mod edema L>R  Posterior Cobblestoning:  present        Right Vocal Fold:  Abduction:    Normal  Adduction:    Normal  Longitudinal Tension:   Normal     Left Vocal Fold:  Abduction:     absent  Adduction:    absent  Longitudinal Tension:   dec     Glottic Closure:    incomplete     Vocal Process Height:    Equal     Vocal Fold Color:  Right: Normal  Left: Normal     Masses/Vibratory Margin Irregularities: Mild Valerio's edema. Left hemilarynx edema/fibrosis  Other Lesions:      The procedure was concluded without complication and the laryngoscope was removed.     Reflux Finding Score (RFS)  Subglottic Edema:   2  Ventricular Obliteration:   2-4 L>R    Erythema/Hyperemia:    2 min  Vocal Fold Edema:   1  Diffuse Laryngeal Edema:   1-2 L>R   Posterior Commissure Hypertrophy:   2     Granuloma/Granulation:   0  Thick Endolaryngeal Mucus:  0     Total: 10    +nocturnal reflux  Reflux laryngitis on laryngoscopy today  Famotidine 40 qhs  Reflux gourmet      VBS 2/5/25:   Oral stage: Mild  Adequate labial seal and retrieval of bolus. Mildly reduced bolus formation and reduced AP transfer.      Pharyngeal stage: Severe  Mildly decreased elevation and excursion. Absent epiglottic inversion. Decreased BOT retraction. Majority of trials brought back up to oral cavity secondary to inability to pass level of vallecula. Pt spitting out and coughing up trials. Moderate pharyngeal retention/residue. Multiple swallows/chin tuck not always effective in clearance of retention. Significant silent aspiration with thins tsp. Cough did not assist with clearance. Aspiration and/or deep penetration noted with all consistencies and with pharyngeal residue/retention.      Per gross esophageal screen:  Unable to complete at this time     Diet: NPO  Frequent/thorough oral care  Upright position  F/u ST tx: Yes  Therapy Prognosis: Poor  Prognosis considerations: Significant dysphagia hx   Aspiration Precautions  Reflux Precautions  Results reviewed with: pt, nursing, family, physician  Aspiration precautions posted.  Repeat MBS as necessary      Pt with extensive dysphagia hx and known to department secondary to hx of BOT and  "esophageal cancer. Pt currently with PEG tube and receives x3 bolus feeds a day. Per pt and wife report, pt has pleasure feeds at home (bloody kolby's, water, crab cakes,crackers) with known aspiration risk. Per recent VBS 6/2024 with recommendations of NPO.      VBS 6/26/24: Pt continues to present with mild oral and significant pharyngeal dysphagia with primary concerns including reduced swallow mechanics, driving forces and airway closure/protection as well as fibrosis resulting in reduced relaxation/passage through UES with post swallow retention, penetration and aspiration. A majority of the aspiration was silent however he did demonstrate increasing delayed cough as study progressed.      He did appear to benefit somewhat from L head turn as well as chin tuck however these did not eliminate aspiration risk and when completed together increased aspiration was noted. Fortunately he did appear to have a strong cough which was beneficial in clearing a majority of penetrated material and some aspirated material.      In addition to significant aspiration risk he presents with increased risk for malnutrition given, frustration with coughing and that he requires extra time for multiple swallows required per bolus- patient also admitted that eating was \"exhausting.\"    The patient did aspirate a significant amount of barium today - referring provider was notified. Consider CXR/ABX.    VBS 7/5/23: Pt presents with mild oral and severe-profound pharyngeal dysphagia characterized by reduced AP transport, delayed swallow initiation with significantly reduced/minimal hyolaryngeal rise, reduced base of tongue retraction, absent epiglottic inversion and absent pharyngeal stripping wave. Airway protection/closure was minimal, and silent aspiration occurred with all liquid consistencies. Pt was unable to swallow puree due to lack of driving force on the bolus and had to regurgitate/expectorate the puree from his vallecula. " Strategies were not effective in eliminating aspiration. Note: Images are available for review in PACS as desired.     VBS 6/21/23: Presenting w/ mild-mod oral and SEVERE pharyngeal dysphagia characterized by SILENT aspiration of thin and mod thick during and after the swallow, unable to clear w/ cued cough. Mod-severe stasis w/ inconsistent clearance. Significant retrograde flow observed. Further trials deferred given aspiration and high risk for re-intubation.     VBS 3/4/21:  Pt presents w/ severe pharyngeal dysphagia characterized by no epiglottic inversion, minimal BOT retraction, minimal hyolaryngeal elevation, reduced airway entrance closure, and trace amounts of penetration and silent aspiration during and after swallowing thick and thin liquids. Pt is at high risk of developing aspiration pneumonia. Lengthy discussion was had w/ patient to review results of VBS and recommendations.     VBS 8/14/19:  Mild oral/moderate pharyngeal dysphagia due to decreased tongue base retraction, decreased epiglottic inversion and airway entrance closure w/ deep transient penetration of all consistencies,  And passive silent aspiration of pharyngeal residue and thin liquids.      VBS 10/25/12(Radiologist report): Aspiration of thin liquid barium and nectar thick barium. The patient did not have a spontaneous cough. Penetration of honey thick barium, applesauce, and mixed with barium.         Treatment:  See above      Visit Tracking:  POC   Expires   4/22/25        Visit/Unit Tracking:  Date 3/1/25   Used 1   Remaining        Sheree Muse M.S., CCC-SLP  Speech Language Pathologist   Available via Secure Chat  [place] #[id number]  [place] #[id number]

## 2025-03-11 NOTE — PROGRESS NOTES
Name: Mohamud Grimm Sr.      : 1965      MRN: [id number]  Encounter Provider: Rapahel Lopez MD  Encounter Date: 3/11/2025   Encounter department: [facility]    Assessment & Plan  Aspiration pneumonia of both lungs, unspecified aspiration pneumonia type, unspecified part of lung (HCC)  Pt improved. Pt recommended to avoid any further fluid or food per mouth. O2 sat 97% onRA.  Check CXR. Continue present recommendations.  Recheck 6m  Orders:  •  XR chest pa and lateral; Future    NSTEMI (non-ST elevated myocardial infarction) (HCC)  I reviewed hospital records, lab results, and echo results with patient.  Explained that he apparently had a very small heart attack that did not affect overall cardiac function.  I did recommend that he have his nuclear medicine scan, preferably before he leaves for [facility].  Our staff was able to arrange for him to have this test on 3/17.  Patient will follow-up with Dr. Hardy on .  Patient understands that he should go back to the hospital if he should suffer any cardiac symptoms in the interim       Essential hypertension  Well controlled. Cont present treatment. Monitor labs. Recheck 6m         Severe swallowing dysfunction  With multiple episodes of aspiration pneumonia.  Recommend that pt avoid fluid/food per mouth. F/u with [facility]       Cancer of base of tongue (HCC)  S/p RT. Now with complications of RT (dysphagia, aspiration, L brachial plexus issues).  Recheck 6m            History of Present Illness     f/u multiple med issues  - pt still struggling with aspiration. He was admitted once since last visit for aspiration pneumonia.  Pt subsequently recommended to avoid taking food or fluid by mouth.  Pt scheduled to go to [facility] for post RT/cancer eval  - during lst admission, troponins found to be elevategd. (>2000). Echo revealed normal EF (60%) with hypokinesis of the apical septal and apex segments.  Since discharge,  pt denies CP, palp lightheadedness or other CV symptoms with or without exertion.  Pt was advised to have a NM stress test, but he has not scheduled this yet.   - pt denies worsening SOB or productive cough at present  - no new GI or  complaints      Review of Systems   Constitutional: Negative.    HENT:  Positive for trouble swallowing. Negative for congestion, ear discharge, ear pain, sinus pressure and sinus pain.    Eyes: Negative.    Respiratory:  Positive for cough. Negative for shortness of breath.    Cardiovascular: Negative.    Gastrointestinal: Negative.    Genitourinary: Negative.    Musculoskeletal:  Positive for arthralgias (L trapezius and shoulder), myalgias, neck pain and neck stiffness. Negative for joint swelling.   Skin: Negative.    Neurological:  Positive for weakness. Negative for dizziness, light-headedness, numbness and headaches.     Past Medical History:   Diagnosis Date   • Cancer of base of tongue (HCC)     excision   • Cough    • CPAP (continuous positive airway pressure) dependence     no currently   • Disease of thyroid gland     hypo   • Dysphagia    • ED (erectile dysfunction)    • GERD (gastroesophageal reflux disease)    • History of pneumonia    • Hypertension    • Leukopenia    • Peyronie's disease    • Pneumonia    • PONV (postoperative nausea and vomiting)    • Psoriasis    • Sleep apnea    • Tongue cancer (HCC)    • Weight loss, abnormal      Past Surgical History:   Procedure Laterality Date   • ESOPHAGOSCOPY N/A 9/16/2022    Procedure: ESOPHAGOSCOPY FLEXIBLE;  Surgeon: Devonte Singh MD;  Location: [facility];  Service: ENT   • ESOPHAGOSCOPY N/A 8/25/2023    Procedure: TRANSNASAL ESOPHAGOSCOPY WITH BALLOON DILATION/ ESOPHAGEAL DILATION (BALLOON);;  Surgeon: Devonte Singh MD;  Location: [facility];  Service: ENT   • OTHER SURGICAL HISTORY      infusion port inserted and removed   • PEG TUBE PLACEMENT      and removal   • AK COLONOSCOPY FLX DX W/COLLJ SPEC WHEN PFRMD N/A  2017    Procedure: COLONOSCOPY with polypectomy x2;  Surgeon: Janet Willard MD;  Location: [facility];  Service: General   • RI ESOPHAGOSCOPY DILATE ESOPHAGUS BALLOON 30 MM N/A 2024    Procedure: trans nasal endoscopy, infinity balloon dilation upper esophageal sphincter(UES) AND POSTERIOR WALL INJECTION;  Surgeon: Devonte Singh MD;  Location: [facility];  Service: ENT   • RI LARGSC W/NJX VOCAL CORD THER W/MICRO/TELESCOPE Bilateral 2022    Procedure: micro direct laryngoscopy, vocal fold injection, biopsy epiglottis lesion, posterior pharyngeal wall injection, flexible esophagoscopy with dilation;  Surgeon: Devonte Singh MD;  Location: [facility];  Service: ENT   • TONGUE BIOPSY / EXCISION      Floor mouth excision of malignant tumor     Family History   Problem Relation Age of Onset   • Other Mother         reactive airway dysfunction   • Coronary artery disease Father    • Hypertension Father    • Psoriasis Father    • Thyroid disease unspecified Neg Hx      Social History     Tobacco Use   • Smoking status: Former     Current packs/day: 0.00     Average packs/day: 0.3 packs/day for 20.0 years (5.0 ttl pk-yrs)     Types: Cigarettes     Start date: 1991     Quit date: 2011     Years since quittin.2     Passive exposure: Past   • Smokeless tobacco: Former     Quit date:    • Tobacco comments:     pt quit with dx of tongue base cancer, per allscripts   Vaping Use   • Vaping status: Never Used   Substance and Sexual Activity   • Alcohol use: Not Currently     Alcohol/week: 12.0 standard drinks of alcohol     Types: 12 Cans of beer per week   • Drug use: Never   • Sexual activity: Yes     Partners: Female     Birth control/protection: Post-menopausal     Current Outpatient Medications on File Prior to Visit   Medication Sig   • amLODIPine (NORVASC) 5 mg tablet TAKE 1 TABLET (5 MG TOTAL) BY MOUTH DAILY.   • Aspirin Low Dose 81 MG EC tablet TAKE 1 TABLET BY MOUTH EVERY DAY   • atropine  "(ISOPTO ATROPINE) 1 % ophthalmic solution 2-3 drops sublingual as needed for increased saliva/cough   • benzonatate (TESSALON) 200 MG capsule Take 1 capsule (200 mg total) by mouth 3 (three) times a day as needed for cough   • ipratropium (ATROVENT) 0.03 % nasal spray 2 sprays into each nostril 3 (three) times a day as needed (nasal drip with meals)   • levothyroxine 100 mcg tablet TAKE 1 TABLET BY MOUTH EVERY DAY   • NON FORMULARY Ginseng + Atractylodes   • tadalafil (CIALIS) 5 MG tablet Take 1 tablet (5 mg total) by mouth daily   • famotidine (PEPCID) 20 mg/2.5 mL oral suspension TAKE 5 ML (40 MG TOTAL) BY MOUTH DAILY AT BEDTIME (Patient not taking: Reported on 2/2/2025)   • Misc Natural Products (CordyMax CS-4) 525 MG CAPS Take by mouth (Patient not taking: Reported on 3/11/2025)     No Known Allergies  Immunization History   Administered Date(s) Administered   • INFLUENZA 10/28/2015, 11/15/2022   • Influenza, injectable, quadrivalent, preservative free 0.5 mL 11/07/2019, 11/16/2020   • Influenza, recombinant, quadrivalent,injectable, preservative free 11/14/2018   • Influenza, seasonal, injectable 12/18/2012, 10/29/2013, 10/28/2015, 10/31/2017   • Pneumococcal Polysaccharide PPV23 12/18/2012   • Tdap 08/16/2017     Objective   /70   Pulse 82   Temp 99.2 °F (37.3 °C)   Ht 5' 8\" (1.727 m)   Wt 68.9 kg (152 lb)   SpO2 97%   BMI 23.11 kg/m²     Physical Exam  Vitals reviewed.   HENT:      Head: Normocephalic.      Nose: Nose normal.      Mouth/Throat:      Mouth: Mucous membranes are dry.      Pharynx: No oropharyngeal exudate or posterior oropharyngeal erythema.   Eyes:      Extraocular Movements: Extraocular movements intact.      Conjunctiva/sclera: Conjunctivae normal.      Pupils: Pupils are equal, round, and reactive to light.   Cardiovascular:      Rate and Rhythm: Normal rate and regular rhythm.      Pulses: Normal pulses.   Pulmonary:      Effort: Pulmonary effort is normal.      Breath sounds: " No wheezing or rales.   Abdominal:      General: There is no distension.      Palpations: There is no mass.      Tenderness: There is no abdominal tenderness.      Comments: PEG tube in place   Musculoskeletal:         General: Normal range of motion.      Cervical back: No tenderness.      Right lower leg: No edema.      Left lower leg: No edema.   Lymphadenopathy:      Cervical: No cervical adenopathy.   Skin:     General: Skin is warm.      Capillary Refill: Capillary refill takes less than 2 seconds.   Neurological:      General: No focal deficit present.      Mental Status: He is alert and oriented to person, place, and time.

## 2025-03-16 NOTE — ASSESSMENT & PLAN NOTE
With multiple episodes of aspiration pneumonia.  Recommend that pt avoid fluid/food per mouth. F/u with [facility]

## 2025-03-16 NOTE — ASSESSMENT & PLAN NOTE
S/p RT. Now with complications of RT (dysphagia, aspiration, L brachial plexus issues).  Recheck 6m

## 2025-03-17 ENCOUNTER — RESULTS FOLLOW-UP (OUTPATIENT)
Dept: FAMILY MEDICINE CLINIC | Facility: CLINIC | Age: 60
End: 2025-03-17

## 2025-03-17 ENCOUNTER — HOSPITAL ENCOUNTER (OUTPATIENT)
Dept: RADIOLOGY | Facility: HOSPITAL | Age: 60
Discharge: HOME | End: 2025-03-17
Payer: COMMERCIAL

## 2025-03-17 ENCOUNTER — HOSPITAL ENCOUNTER (OUTPATIENT)
Dept: NON INVASIVE DIAGNOSTICS | Facility: HOSPITAL | Age: 60
Discharge: HOME | End: 2025-03-17
Payer: COMMERCIAL

## 2025-03-17 VITALS
RESPIRATION RATE: 20 BRPM | DIASTOLIC BLOOD PRESSURE: 76 MMHG | SYSTOLIC BLOOD PRESSURE: 110 MMHG | HEART RATE: 75 BPM | OXYGEN SATURATION: 99 %

## 2025-03-17 DIAGNOSIS — I10 ESSENTIAL HYPERTENSION: ICD-10-CM

## 2025-03-17 DIAGNOSIS — R79.89 ELEVATED TROPONIN: ICD-10-CM

## 2025-03-17 LAB
CHEST PAIN STATEMENT: NORMAL
MAX DIASTOLIC BP: 94 MMHG
MAX HR PERCENT: 86 %
MAX HR: 137 BPM
MAX PREDICTED HEART RATE: 158 BPM
PROTOCOL NAME: NORMAL
RATE PRESSURE PRODUCT: NORMAL
REASON FOR TERMINATION: NORMAL
SL CV REST NUCLEAR ISOTOPE DOSE: 10.6 MCI
SL CV STRESS NUCLEAR ISOTOPE DOSE: 30.6 MCI
SL CV STRESS RECOVERY BP: NORMAL MMHG
SL CV STRESS RECOVERY HR: 85 BPM
SL CV STRESS RECOVERY O2 SAT: 98 %
SL CV STRESS STAGE REACHED: 3
STRESS ANGINA INDEX: 0
STRESS BASELINE BP: NORMAL MMHG
STRESS BASELINE HR: 75 BPM
STRESS O2 SAT REST: 99 %
STRESS PEAK HR: 136 BPM
STRESS POST ESTIMATED WORKLOAD: 10.1 METS
STRESS POST EXERCISE DUR MIN: 7 MIN
STRESS POST EXERCISE DUR MIN: 7 MIN
STRESS POST EXERCISE DUR SEC: 18 SEC
STRESS POST EXERCISE DUR SEC: 18 SEC
STRESS POST O2 SAT PEAK: 95 %
STRESS POST PEAK BP: 200 MMHG
STRESS POST PEAK HR: 137 BPM
STRESS POST PEAK SYSTOLIC BP: 200 MMHG
TARGET HR FORMULA: NORMAL
TEST INDICATION: NORMAL

## 2025-03-17 PROCEDURE — 78452 HT MUSCLE IMAGE SPECT MULT: CPT | Performed by: INTERNAL MEDICINE

## 2025-03-17 PROCEDURE — A9502 TC99M TETROFOSMIN: HCPCS

## 2025-03-17 PROCEDURE — 93018 CV STRESS TEST I&R ONLY: CPT | Performed by: INTERNAL MEDICINE

## 2025-03-17 PROCEDURE — 93016 CV STRESS TEST SUPVJ ONLY: CPT | Performed by: INTERNAL MEDICINE

## 2025-03-17 PROCEDURE — 93017 CV STRESS TEST TRACING ONLY: CPT

## 2025-03-17 PROCEDURE — 78452 HT MUSCLE IMAGE SPECT MULT: CPT

## 2025-03-18 DIAGNOSIS — Z23 ENCOUNTER FOR IMMUNIZATION: Primary | ICD-10-CM

## 2025-03-20 ENCOUNTER — OFFICE VISIT (OUTPATIENT)
Dept: SPEECH THERAPY | Facility: CLINIC | Age: 60
End: 2025-03-20
Payer: COMMERCIAL

## 2025-03-20 DIAGNOSIS — R13.12 DYSPHAGIA, OROPHARYNGEAL PHASE: Primary | ICD-10-CM

## 2025-03-20 DIAGNOSIS — Z92.3 HX OF HEAD AND NECK RADIATION: ICD-10-CM

## 2025-03-20 PROCEDURE — 92526 ORAL FUNCTION THERAPY: CPT | Performed by: SPEECH-LANGUAGE PATHOLOGIST

## 2025-03-20 NOTE — PROGRESS NOTES
"Speech PathologyNote    Today's date: 3/20/2025  Patient name: Mohamud Grimm Sr.  : 1965  MRN: [id number]  Referring provider: Dick Lopez*  Dx:   Encounter Diagnosis     ICD-10-CM    1. Dysphagia, oropharyngeal phase  R13.12       2. Hx of head and neck radiation  Z92.3           Visit Tracking:  POC   Expires   25        Visit/Unit Tracking:  Date 3/1/25   Used 1   Remaining        Subjective/Behavioral:    Has had po one day sine last visit- had a jar of baby food (beef with mashed potatoes and gravy) went ok but pt admits to feeling frustrated and upset that this has significantly impacted QOL- no longer going out/socializing- worried about his relationship with his wife as a result- he feels like he is not able to do anything he wants to do    \"I feel like when I have liquids it goes down fine\"    Short-term goals:  -Patient will be educated on and report understanding and compliance of appropriate diet, aspiration risks, diagnosis and related pathology, to be achieved in 4-6 weeks.    Ongoing education provided on aspiration vs silent aspiration, contributing factors to his dysphagia and prognosis as well as POC. Reciprocal comprehension was verbally expressed.  All questions were answered.     Encouragement was provided on continuing to socialize and do the things he typically would do with modifications. We discussed modifications of diet, strategy use, Siu water protocol and diet variation for special occasions. Reciprocal comprehension was verbally expressed.       Patient will improve lingual/cervical ROM via functional tasks and stretching, to be achieved in 4-6 weeks.   Discussed ongoing stretches- pt reports he has been doing stretches/massage daily.  We also discussed ongoing po/functional swallow to limit disuse atrophy.Reciprocal comprehension was verbally expressed.     (Goal updated)    -Patient will improve PSFS by 2 points for improved overall QOL, to be achieved in " 4-6 weeks    PSFS:     1. Swallow 4/10    2. Neck stiffness: 4/10    - Patient will demonstrate the ability to adequately self-monitor swallowing skills and perform appropriate compensatory techniques to reduce overt s/sx of penetration/aspiration to safely tolerate least restrictive food/liquid consistencies, to be achieved in 4-6 week  DNT    -Patient will be able to maintain ability to tolerate po throughout treatment.   Reports he has not been taking po by choice- see above.    Other:Reviewed testing and plan of care with patient.Patient is in agreement with POC at this time.  Recommendations:Continue with Plan of Care    Consider restarting dry needling    Sheree Muse M.S., CCC-SLP  Speech Language Pathologist   Available via Secure Chat  [place] #[id number]  [place] #[id number]

## 2025-03-31 ENCOUNTER — PATIENT MESSAGE (OUTPATIENT)
Dept: FAMILY MEDICINE CLINIC | Facility: CLINIC | Age: 60
End: 2025-03-31

## 2025-03-31 DIAGNOSIS — J69.0 ASPIRATION PNEUMONIA OF BOTH LUNGS, UNSPECIFIED ASPIRATION PNEUMONIA TYPE, UNSPECIFIED PART OF LUNG (HCC): Primary | ICD-10-CM

## 2025-03-31 RX ORDER — CEFPODOXIME PROXETIL 100 MG/5ML
200 GRANULE, FOR SUSPENSION ORAL 2 TIMES DAILY
Qty: 200 ML | Refills: 0 | Status: SHIPPED | OUTPATIENT
Start: 2025-03-31 | End: 2025-04-11

## 2025-04-01 ENCOUNTER — OFFICE VISIT (OUTPATIENT)
Dept: SPEECH THERAPY | Facility: CLINIC | Age: 60
End: 2025-04-01
Payer: COMMERCIAL

## 2025-04-01 DIAGNOSIS — R13.12 DYSPHAGIA, OROPHARYNGEAL PHASE: Primary | ICD-10-CM

## 2025-04-01 DIAGNOSIS — Z85.89 HX OF SQUAMOUS CELL CARCINOMA: ICD-10-CM

## 2025-04-01 DIAGNOSIS — Z92.3 HX OF HEAD AND NECK RADIATION: ICD-10-CM

## 2025-04-01 DIAGNOSIS — R13.13 DYSPHAGIA, PHARYNGEAL PHASE: ICD-10-CM

## 2025-04-01 PROCEDURE — 92526 ORAL FUNCTION THERAPY: CPT | Performed by: SPEECH-LANGUAGE PATHOLOGIST

## 2025-04-01 NOTE — PROGRESS NOTES
Speech Pathology Note    Today's date: 2025  Patient name: Mohamud Grimm Sr.  : 1965  MRN: [id number]  Referring provider: Dick Lopez*  Dx:   Encounter Diagnosis     ICD-10-CM    1. Dysphagia, oropharyngeal phase  R13.12       2. Hx of head and neck radiation  Z92.3       3. Dysphagia, pharyngeal phase  R13.13       4. Hx of squamous cell carcinoma-BOT, s/p chemo/RT   Z85.89           Visit Tracking:  POC   Expires   25        Visit/Unit Tracking:  Date 3/1/25 4/1   Used 1 2   Remaining         Subjective/Behavioral:    Went to [facility] [place]  since last visit- reports no new recommendations- findings consistent with prior workups. Discussed VF injection and total laryngectomy.- Pt with questions re: this.    Expresses extreme frustration, anger and hopelessness. He continues to report significant impact on QOL.       Short-term goals:  -Patient will be educated on and report understanding and compliance of appropriate diet, aspiration risks, diagnosis and related pathology, to be achieved in 4-6 weeks.    Ongoing education provided on aspiration vs silent aspiration, contributing factors to his dysphagia and prognosis as well as POC. Additionally, education was provided on anatomical changes with laryngectomy as well as speech options and potential dysphagia/need for dilation.  Reciprocal comprehension was verbally expressed.  All questions were answered.     Encouragement was provided to consider therapy- patient became visibly upset today. Reciprocal comprehension was verbally expressed.       Patient will improve lingual/cervical ROM via functional tasks and stretching, to be achieved in 4-6 weeks.       Following patient's verbal consent to treat, manual therapy technique through myofascial release was applied to patient's L anterolateral cervical region.     Region(s) 4/1           L SCM   x5mins           Anterior Cervical   x         Tongue base  x         Splenius Capitus x         Scalenes x5mins         Trapezius x         L submandibular x           Palpatory examination revealed moderate mechanosensitivity (i.e., sensitivity to mechanical pressure/stretch) through the anterolateral cervical region. A mod palpatory pressure was suspected to show a mod- severe area of apparent thickening that, with stimulation/pressure/stretch, reproduced patient's complaints/correlated to his primary issue of reduced ROM /stiffness. Patient verbalized that technique/pressure, through triggering his symptoms, was helpful.    Patient was able to identify regions of myofascial sensitivity to reduce overall tension.    Shooting neuropathy was noted with L lateral neck today.     Notes:  -upright ( in chair),  lotion, and heat    -Patient will improve PSFS by 2 points for improved overall QOL, to be achieved in 4-6 weeks    DNT    - Patient will demonstrate the ability to adequately self-monitor swallowing skills and perform appropriate compensatory techniques to reduce overt s/sx of penetration/aspiration to safely tolerate least restrictive food/liquid consistencies, to be achieved in 4-6 week  DNT    -Patient will be able to maintain ability to tolerate po throughout treatment.   DNT    Other:Reviewed testing and plan of care with patient.Patient is in agreement with POC at this time.  Recommendations:Continue with Plan of Care    Consider restarting dry needling    Sheree Muse M.S., CCC-SLP  Speech Language Pathologist   Available via Secure Chat  [place] #[id number]  [place] #[id number]

## 2025-04-07 ENCOUNTER — OFFICE VISIT (OUTPATIENT)
Dept: CARDIOLOGY CLINIC | Facility: CLINIC | Age: 60
End: 2025-04-07
Payer: COMMERCIAL

## 2025-04-07 VITALS
SYSTOLIC BLOOD PRESSURE: 120 MMHG | DIASTOLIC BLOOD PRESSURE: 70 MMHG | HEART RATE: 79 BPM | BODY MASS INDEX: 23.79 KG/M2 | HEIGHT: 68 IN | WEIGHT: 157 LBS | TEMPERATURE: 97.3 F | OXYGEN SATURATION: 95 %

## 2025-04-07 DIAGNOSIS — I51.81 STRESS-INDUCED CARDIOMYOPATHY: ICD-10-CM

## 2025-04-07 DIAGNOSIS — R79.89 ELEVATED TROPONIN: ICD-10-CM

## 2025-04-07 DIAGNOSIS — I10 ESSENTIAL HYPERTENSION: Primary | ICD-10-CM

## 2025-04-07 PROCEDURE — 99214 OFFICE O/P EST MOD 30 MIN: CPT | Performed by: INTERNAL MEDICINE

## 2025-04-07 PROCEDURE — 93000 ELECTROCARDIOGRAM COMPLETE: CPT | Performed by: INTERNAL MEDICINE

## 2025-04-07 NOTE — PROGRESS NOTES
Cardiology Follow Up    Mohamud Grimm Sr.  1965  [id number]  [facility]  Kervin RAMIREZ [facility] [address] [phone]    Orders Placed This Encounter   Procedures    POCT ECG         Assessment & Plan  Elevated troponin  Non-MI troponin elevation in the setting of pneumonia, hypoxia. He had some regional wall motion abnormalities on his echo but overall EF was preserved. The pattern was suspicious of a improving stress-induced cardiomyopathy. Follow-up nuclear stress test with no ischemia, infarction, normal LV function. Conservative management. He can discontinue aspirin.      Essential hypertension  He was on amlodipine previously. He reports his blood pressure is low or normal at home. His wife is an RN. He checks his blood pressure regularly. He has not been on the amlodipine. Can follow-up with his primary care physician for titration.  Stress-induced cardiomyopathy  Non-MI troponin elevation in the setting of pneumonia, hypoxia. He had some regional wall motion abnormalities on his echo but overall EF was preserved. The pattern was suspicious of a improving stress-induced cardiomyopathy. Follow-up nuclear stress test with no ischemia, infarction, normal LV function. Conservative management. He can discontinue aspirin.    His echo had indicated a mildly dilated ascending aorta at 43 mm. He had a CTA of his chest to rule out PE which did not report any aortic aneurysm. I think the CT is more accurate in this regard. No surveillance imaging necessary unless seen on CT again in the future which she does seem to get occasionally.    No acute cardiac issues, can return to see me as needed.      History of Present Illness:     60-year-old man. History of dysphagia secondary to tongue and esophageal cancer.    He had been in the hospital in February 2025 with aspiration pneumonia. He had elevated troponin at the time.  Initially 2300 peaked at 2500. Subsequently decreased. EF was preserved overall but there was apical wall motion abnormality.    Seen in consultation in the hospital at the time. Denied any chest pain. No evidence of volume overload.    He was discharged home after initiating aspirin. He had been on amlodipine. No changes were made to his medications. He had a nuclear stress test performed which showed no evidence of ischemia and his EF is preserved with regional wall motion which was normal.    He is feeling well. Denies any new cardiac issues.    He tells me that he checks his blood pressure regularly at home and he is getting lightheaded sometimes. His blood pressure is low or normal at home. He has been off of his amlodipine for a few months.      Echo had reported some dilation of the aortic root, but he had a CTA of his chest which did not show this in February 2025.    Medical Problems       Problem List       ED (erectile dysfunction) of organic origin    Peyronie's disease    Arthralgia    Hx of squamous cell carcinoma-BOT, s/p chemo/RT 2012    Essential hypertension    Hypothyroidism    Psoriasis    Pharyngoesophageal dysphagia    Hyperbilirubinemia    Severe swallowing dysfunction    Allergic rhinitis    Witnessed episode of apnea    Obstructive sleep apnea syndrome    Paresthesias    Shoulder weakness    Winged scapula of both sides    Cervical radiculopathy at C8    Carpal tunnel syndrome    Radiation-induced brachial plexopathy    Facial weakness    Hx of head and neck radiation    Hx of malignant neoplasm of head and neck    Scar of neck    Cranial neuropathies, multiple    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    Dysphonia    Laryngeal edema    Glottic insufficiency    Muscle tension dysphonia    Shortness of breath    Velopharyngeal insufficiency, acquired    Difficult airway for intubation    Overview Addendum 9/16/2022  9:45 AM by Guillermo Mitchell MD   ML[facility] [place]  [place] [facility]    Post radiation changes, distorted anatomy, very limited extension    Guillermo Mitchell MD           Altered mental status    Overview Signed 2023  2:08 PM by Raphael Lopez MD   Formatting of this note might be different from the original.  Added automatically from request for surgery [id number]         History of tongue cancer    S/P percutaneous endoscopic gastrostomy (PEG) tube placement (HCC)    Alcohol use    ERIK (obstructive sleep apnea)    Orofacial dystonia    Orofacial dyskinesia    Blepharospasm of both eyes    Gastroesophageal reflux disease    Chronic cough    At risk for aspiration    PEG (percutaneous endoscopic gastrostomy) adjustment/replacement/removal (HCC)    Oropharyngeal dysphagia    Aspiration into airway    Chronic hypoxemic respiratory failure (HCC)    Severe protein-calorie malnutrition (HCC)    Malnutrition Findings:                                 BMI Findings:           Body mass index is 23.87 kg/m².         Elevated troponin    Acute on chronic respiratory failure (HCC)    Cancer of base of tongue (HCC)        Past Medical History:   Diagnosis Date    Cancer of base of tongue (HCC)     excision    Cough     CPAP (continuous positive airway pressure) dependence     no currently    Disease of thyroid gland     hypo    Dysphagia     ED (erectile dysfunction)     GERD (gastroesophageal reflux disease)     History of pneumonia     Hypertension     Leukopenia     Peyronie's disease     Pneumonia     PONV (postoperative nausea and vomiting)     Psoriasis     Sleep apnea     Tongue cancer (HCC)     Weight loss, abnormal      Social History     Tobacco Use    Smoking status: Former     Current packs/day: 0.00     Average packs/day: 0.3 packs/day for 20.0 years (5.0 ttl pk-yrs)     Types: Cigarettes     Start date: 1991     Quit date: 2011     Years since quittin.2     Passive exposure: Past    Smokeless tobacco: Former     Quit date:     Tobacco  comments:     pt quit with dx of tongue base cancer, per allscripts   Vaping Use    Vaping status: Never Used   Substance Use Topics    Alcohol use: Not Currently     Alcohol/week: 12.0 standard drinks of alcohol     Types: 12 Cans of beer per week    Drug use: Never      Family History   Problem Relation Age of Onset    Other Mother         reactive airway dysfunction    Coronary artery disease Father     Hypertension Father     Psoriasis Father     Thyroid disease unspecified Neg Hx      Past Surgical History:   Procedure Laterality Date    ESOPHAGOSCOPY N/A 9/16/2022    Procedure: ESOPHAGOSCOPY FLEXIBLE;  Surgeon: Devonte Singh MD;  Location: [facility];  Service: ENT    ESOPHAGOSCOPY N/A 8/25/2023    Procedure: TRANSNASAL ESOPHAGOSCOPY WITH BALLOON DILATION/ ESOPHAGEAL DILATION (BALLOON);;  Surgeon: Devonte Singh MD;  Location: [facility];  Service: ENT    OTHER SURGICAL HISTORY      infusion port inserted and removed    PEG TUBE PLACEMENT      and removal    WV COLONOSCOPY FLX DX W/COLLJ SPEC WHEN PFRMD N/A 6/27/2017    Procedure: COLONOSCOPY with polypectomy x2;  Surgeon: Janet Willard MD;  Location: [facility];  Service: General    WV ESOPHAGOSCOPY DILATE ESOPHAGUS BALLOON 30 MM N/A 9/27/2024    Procedure: trans nasal endoscopy, infinity balloon dilation upper esophageal sphincter(UES) AND POSTERIOR WALL INJECTION;  Surgeon: Devonte Singh MD;  Location: [facility];  Service: ENT    WV LARGSC W/NJX VOCAL CORD THER W/MICRO/TELESCOPE Bilateral 9/16/2022    Procedure: micro direct laryngoscopy, vocal fold injection, biopsy epiglottis lesion, posterior pharyngeal wall injection, flexible esophagoscopy with dilation;  Surgeon: Devonte Singh MD;  Location: [facility];  Service: ENT    TONGUE BIOPSY / EXCISION      Floor mouth excision of malignant tumor       Current Outpatient Medications:     atropine (ISOPTO ATROPINE) 1 % ophthalmic solution, 2-3 drops sublingual as needed for increased saliva/cough, Disp:  "5 mL, Rfl: 11    benzonatate (TESSALON) 200 MG capsule, Take 1 capsule (200 mg total) by mouth 3 (three) times a day as needed for cough, Disp: 30 capsule, Rfl: 0    cefpodoxime (VANTIN) 100 mg/5 mL suspension, Take 10 mL (200 mg total) by mouth 2 (two) times a day for 10 days, Disp: 200 mL, Rfl: 0    famotidine (PEPCID) 20 mg/2.5 mL oral suspension, TAKE 5 ML (40 MG TOTAL) BY MOUTH DAILY AT BEDTIME, Disp: 450 mL, Rfl: 3    ipratropium (ATROVENT) 0.03 % nasal spray, 2 sprays into each nostril 3 (three) times a day as needed (nasal drip with meals), Disp: 30 mL, Rfl: 2    levalbuterol (XOPENEX HFA) 45 mcg/act inhaler, Inhale 2 puffs every 6 (six) hours as needed for wheezing, Disp: 45 g, Rfl: 0    levothyroxine 100 mcg tablet, TAKE 1 TABLET BY MOUTH EVERY DAY, Disp: 90 tablet, Rfl: 1    NON FORMULARY, Ginseng + Atractylodes, Disp: , Rfl:     tadalafil (CIALIS) 5 MG tablet, Take 1 tablet (5 mg total) by mouth daily, Disp: 90 tablet, Rfl: 3    amLODIPine (NORVASC) 5 mg tablet, TAKE 1 TABLET (5 MG TOTAL) BY MOUTH DAILY. (Patient not taking: Reported on 4/7/2025), Disp: 90 tablet, Rfl: 1    Misc Natural Products (CordyMax CS-4) 525 MG CAPS, Take by mouth (Patient not taking: Reported on 4/7/2025), Disp: , Rfl:   No Known Allergies    Vitals:    04/07/25 1512 04/07/25 1528   BP: 150/82 120/70   BP Location: Left arm    Patient Position: Sitting    Cuff Size: Standard    Pulse: 79    Temp: (!) 97.3 °F (36.3 °C)    TempSrc: Tympanic    SpO2: 95%    Weight: 71.2 kg (157 lb)    Height: 5' 8\" (1.727 m)      Vitals:    04/07/25 1512   Weight: 71.2 kg (157 lb)      Height: 5' 8\" (172.7 cm)   Body mass index is 23.87 kg/m².    Physical Exam:  GENERAL: Alert, well appearing, and in no distress  HEENT:  PERRL, EOMI, no scleral icterus, no conjunctival pallor  NECK:  Supple, No elevated JVP, no thyromegaly, no carotid bruits  HEART:  Regular rate and rhythm, normal S1/S2, no S3/S4, no murmur or rub  LUNGS:  Clear to auscultation " bilaterally  ABDOMEN:  Soft, non-tender, positive bowel sounds, no rebound or guarding  EXTREMITIES:  no edema  VASCULAR:  Normal pedal pulses   NEURO: L arm weakness  SKIN: Normal without suspicious lesions on exposed skin      ROS:  Positive for aspiration.  Except as noted in HPI, is otherwise reviewed in detail and a 12 point review of systems is negative.    Labs:  Lab Results   Component Value Date    SODIUM 138 02/06/2025    K 3.9 02/06/2025    CL 99 02/06/2025    CREATININE 0.32 (L) 02/06/2025    BUN 15 02/06/2025    CO2 32 02/06/2025    ALT 37 02/02/2025    AST 34 02/02/2025    INR 1.03 02/02/2025    GLUF 84 02/03/2025    HGBA1C 5.3 07/14/2018    WBC 2.58 (L) 02/06/2025    HGB 12.4 02/06/2025    HCT 37.0 02/06/2025     02/06/2025       Lab Results   Component Value Date    CHOL 199 12/05/2015    CHOL 190 08/13/2015    CHOL 203 07/16/2014     Lab Results   Component Value Date    LDLCALC 105 (H) 09/17/2024    LDLCALC 111 (H) 03/03/2021    LDLCALC 130 (H) 09/02/2020     Lab Results   Component Value Date    HDL 68 09/17/2024    HDL 78 03/03/2021    HDL 63 09/02/2020     Lab Results   Component Value Date    TRIG 60 09/17/2024    TRIG 52 03/03/2021    TRIG 52 09/02/2020     Testing:  Stress 3/2025    Perfusion: There is a very small, mild, perfusion defect in the LV apex.  Summed stress score is 1 and differential score is 1    Stress Function: Left ventricular function post-stress is normal.  Calculated ejection fraction is 58%    Perfusion Defect Conclusion: There is no evidence of transient ischemic dilation (TID).    The stress ECG is negative for ischemia with <1.0 mm ST depression    Echo 2/2025  Left Ventricle: Left ventricular cavity size is normal. Wall thickness is mildly increased. The left ventricular ejection fraction is 60%. Systolic function is normal. Diastolic function is normal.    The following segments are hypokinetic: apical septal and apex.    All other segments are normal.    Left  Atrium: The atrium is mildly dilated.    Aorta: The aortic root is normal in size. The ascending aorta is mildly dilated. The ascending aorta is 4.3 cm.    EKG:  Sinus 79 BPM. PVC.

## 2025-04-07 NOTE — ASSESSMENT & PLAN NOTE
He was on amlodipine previously. He reports his blood pressure is low or normal at home. His wife is an RN. He checks his blood pressure regularly. He has not been on the amlodipine. Can follow-up with his primary care physician for titration.

## 2025-04-07 NOTE — ASSESSMENT & PLAN NOTE
Non-MI troponin elevation in the setting of pneumonia, hypoxia. He had some regional wall motion abnormalities on his echo but overall EF was preserved. The pattern was suspicious of a improving stress-induced cardiomyopathy. Follow-up nuclear stress test with no ischemia, infarction, normal LV function. Conservative management. He can discontinue aspirin.

## 2025-04-08 ENCOUNTER — OFFICE VISIT (OUTPATIENT)
Dept: SPEECH THERAPY | Facility: CLINIC | Age: 60
End: 2025-04-08
Payer: COMMERCIAL

## 2025-04-08 DIAGNOSIS — Z85.89 HX OF SQUAMOUS CELL CARCINOMA: ICD-10-CM

## 2025-04-08 DIAGNOSIS — T17.908S ASPIRATION INTO AIRWAY, SEQUELA: ICD-10-CM

## 2025-04-08 DIAGNOSIS — Z92.3 HX OF HEAD AND NECK RADIATION: ICD-10-CM

## 2025-04-08 DIAGNOSIS — R13.12 DYSPHAGIA, OROPHARYNGEAL PHASE: Primary | ICD-10-CM

## 2025-04-08 PROCEDURE — 92526 ORAL FUNCTION THERAPY: CPT | Performed by: SPEECH-LANGUAGE PATHOLOGIST

## 2025-04-08 NOTE — PROGRESS NOTES
"Speech Pathology Note    Today's date: 2025  Patient name: Mohamud Grimm Sr.  : 1965  MRN: [id number]  Referring provider: Dick Lopez*  Dx:   Encounter Diagnosis     ICD-10-CM    1. Dysphagia, oropharyngeal phase  R13.12       2. Hx of head and neck radiation  Z92.3       3. Aspiration into airway, sequela  T17.908S       4. Hx of squamous cell carcinoma-BOT, s/p chemo/RT   Z85.89           Visit Tracking:  POC   Expires   25        Visit/Unit Tracking:  Date 3/1/25 4/1 4/8   Used 1 2 3   Remaining          Subjective/Behavioral:    Went to [facility] [place]  since last visit- reports no new recommendations- findings consistent with prior workups. Discussed VF injection and total laryngectomy.- Pt with questions re: this.    Expresses extreme frustration, anger and hopelessness. He continues to report significant impact on QOL.     He reports he still is unsure about starting therapy- \"I've always just dealt with things my whole life\"  He also reports he is not currently interested in restarting dry needling.     Short-term goals:  -Patient will be educated on and report understanding and compliance of appropriate diet, aspiration risks, diagnosis and related pathology, to be achieved in 4-6 weeks.    Ongoing education provided on A&P of the swallow ( pt with questions about epiglottic procedures) as well as aspiration, and speech options for laryngectomy. Reciprocal comprehension was verbally expressed.  All questions were answered.     Encouragement was provided to consider therapy- and expectations with that.  Reciprocal comprehension was verbally expressed.       Patient will improve lingual/cervical ROM via functional tasks and stretching, to be achieved in 4-6 weeks.       Following patient's verbal consent to treat, manual therapy technique through myofascial release was applied to patient's L anterolateral cervical region.     Region(s)       "     L SCM   x5mins x5mins          Anterior Cervical   x x5mins        Tongue base x x5mins        Splenius Capitus x x        Scalenes x5mins x        Trapezius x x        L submandibular x x5mins          Palpatory examination revealed moderate mechanosensitivity (i.e., sensitivity to mechanical pressure/stretch) through the anterolateral cervical region. A mod palpatory pressure was suspected to show a mod- severe area of apparent thickening that, with stimulation/pressure/stretch, reproduced patient's complaints/correlated to his primary issue of reduced ROM /stiffness. Patient verbalized that technique/pressure, through triggering his symptoms, was helpful.    Patient was able to identify regions of myofascial sensitivity to reduce overall tension.    Shooting neuropathy was noted with L lateral neck today.     Notes:  -upright ( in chair),  lotion, and heat    -Patient will improve PSFS by 2 points for improved overall QOL, to be achieved in 4-6 weeks    DNT    - Patient will demonstrate the ability to adequately self-monitor swallowing skills and perform appropriate compensatory techniques to reduce overt s/sx of penetration/aspiration to safely tolerate least restrictive food/liquid consistencies, to be achieved in 4-6 week  DNT    -Patient will be able to maintain ability to tolerate po throughout treatment.   DNT    Other:Reviewed testing and plan of care with patient.Patient is in agreement with POC at this time.  Recommendations:Continue with Plan of Care    - focus on posterior neck at next visit per pt report as area of need    Sheree Muse M.S., CCC-SLP  Speech Language Pathologist   Available via Secure Chat  [place] #[id number]  [place] #[id number]

## 2025-04-09 DIAGNOSIS — J69.0 ASPIRATION PNEUMONIA OF BOTH LUNGS, UNSPECIFIED ASPIRATION PNEUMONIA TYPE, UNSPECIFIED PART OF LUNG (HCC): ICD-10-CM

## 2025-04-10 NOTE — TELEPHONE ENCOUNTER
Patient states that he requested. He said that the quantity he received was not enough to cover the amount of days he was prescribed. He will be short dosing tonight and has none for AM.

## 2025-04-11 RX ORDER — CEFPODOXIME PROXETIL 100 MG/5ML
200 GRANULE, FOR SUSPENSION ORAL 2 TIMES DAILY
Qty: 200 ML | Refills: 0 | Status: SHIPPED | OUTPATIENT
Start: 2025-04-11 | End: 2025-04-21

## 2025-04-15 ENCOUNTER — OFFICE VISIT (OUTPATIENT)
Dept: SPEECH THERAPY | Facility: CLINIC | Age: 60
End: 2025-04-15
Payer: COMMERCIAL

## 2025-04-15 DIAGNOSIS — Z92.3 HX OF HEAD AND NECK RADIATION: ICD-10-CM

## 2025-04-15 DIAGNOSIS — R13.13 DYSPHAGIA, PHARYNGEAL PHASE: Primary | ICD-10-CM

## 2025-04-15 PROCEDURE — 92526 ORAL FUNCTION THERAPY: CPT | Performed by: SPEECH-LANGUAGE PATHOLOGIST

## 2025-04-15 NOTE — PROGRESS NOTES
"Speech Pathology Note    Today's date: 4/15/2025  Patient name: Mohamud Grimm Sr.  : 1965  MRN: [id number]  Referring provider: Dick Lopez*  Dx:   Encounter Diagnosis     ICD-10-CM    1. Dysphagia, pharyngeal phase  R13.13       2. Hx of head and neck radiation  Z92.3             Visit Tracking:  POC   Expires   25        Visit/Unit Tracking:  Date 3/1/25 4/1 4/8 4/15   Used 1 2 3 4   Remaining           Subjective/Behavioral:    Went to [facility] [place]  since last visit- reports no new recommendations- findings consistent with prior workups. Discussed VF injection and total laryngectomy.- Pt with questions re: this.    Expresses extreme frustration, anger and hopelessness. He continues to report significant impact on QOL.     He reports he still is unsure about starting therapy- \"I've always just dealt with things my whole life\"  He also reports he is not currently interested in restarting dry needling.     Reports ongoing compliance with exercises and increased MFR at home    Short-term goals:  -Patient will be educated on and report understanding and compliance of appropriate diet, aspiration risks, diagnosis and related pathology, to be achieved in 4-6 weeks.    DNT      Patient will improve lingual/cervical ROM via functional tasks and stretching, to be achieved in 4-6 weeks.       Following patient's verbal consent to treat, manual therapy technique through myofascial release was applied to patient's L anterolateral cervical region.     Region(s) 4/1 4/8 4/15         L SCM   x5mins x5mins x         Anterior Cervical   x x5mins x       Tongue base x x5mins x       Splenius Capitus x x [id number]       Scalenes x5mins x X10 mins       Trapezius x x x5mins       L submandibular x x5mins x         Palpatory examination revealed moderate mechanosensitivity (i.e., sensitivity to mechanical pressure/stretch) through the anterolateral cervical region. A mod palpatory " pressure was suspected to show a mod- severe area of apparent thickening that, with stimulation/pressure/stretch, reproduced patient's complaints/correlated to his primary issue of reduced ROM /stiffness. Patient verbalized that technique/pressure, through triggering his symptoms, was helpful.    Patient was able to identify regions of myofascial sensitivity to reduce overall tension.    Shooting neuropathy was noted with L lateral neck today.     Notes:  -upright ( in chair),  lotion, and heat    -Patient will improve PSFS by 2 points for improved overall QOL, to be achieved in 4-6 weeks    DNT    - Patient will demonstrate the ability to adequately self-monitor swallowing skills and perform appropriate compensatory techniques to reduce overt s/sx of penetration/aspiration to safely tolerate least restrictive food/liquid consistencies, to be achieved in 4-6 week  DNT    -Patient will be able to maintain ability to tolerate po throughout treatment.   DNT    Other:Reviewed testing and plan of care with patient.Patient is in agreement with POC at this time.  Recommendations:Continue with Plan of Care    - focus on posterior neck at next visit per pt report as area of need    Sheree Muse M.S., CCC-SLP  Speech Language Pathologist   Available via Secure Chat  [place] #[id number]  [place] #[id number]

## 2025-04-18 ENCOUNTER — OFFICE VISIT (OUTPATIENT)
Age: 60
End: 2025-04-18
Payer: COMMERCIAL

## 2025-04-18 VITALS
DIASTOLIC BLOOD PRESSURE: 92 MMHG | HEIGHT: 68 IN | TEMPERATURE: 98 F | OXYGEN SATURATION: 98 % | WEIGHT: 154 LBS | BODY MASS INDEX: 23.34 KG/M2 | HEART RATE: 70 BPM | SYSTOLIC BLOOD PRESSURE: 148 MMHG

## 2025-04-18 DIAGNOSIS — Z43.1 PEG (PERCUTANEOUS ENDOSCOPIC GASTROSTOMY) ADJUSTMENT/REPLACEMENT/REMOVAL (HCC): ICD-10-CM

## 2025-04-18 DIAGNOSIS — C01 CANCER OF BASE OF TONGUE (HCC): Primary | ICD-10-CM

## 2025-04-18 DIAGNOSIS — R13.14 PHARYNGOESOPHAGEAL DYSPHAGIA: ICD-10-CM

## 2025-04-18 PROCEDURE — 99213 OFFICE O/P EST LOW 20 MIN: CPT | Performed by: PHYSICIAN ASSISTANT

## 2025-04-18 PROCEDURE — 43762 RPLC GTUBE NO REVJ TRC: CPT | Performed by: PHYSICIAN ASSISTANT

## 2025-04-18 NOTE — PROGRESS NOTES
Name: Mohamud Grimm Sr.      : 1965      MRN: [id number]  Encounter Provider: Wei Cano PA-C  Encounter Date: 2025   Encounter department: [facility]  :  Assessment & Plan  PEG (percutaneous endoscopic gastrostomy) adjustment/replacement/removal (HCC)  - PEG tube originally placed on 2024  -It was exchanged with a Eliseo 18 Welsh on 2025  - exchanged with Marshal-Key low profile 20Fr 3.5cm stem today  - Follow-up 6 months for routine tube change       Cancer of base of tongue (HCC)         Pharyngoesophageal dysphagia             History of Present Illness   Mohamud Grimm Sr. is a 60 y.o. male new to me with past medical history of hypertension, allergic rhinitis, obstructive sleep apnea, chronic hypoxemic respiratory failure, cancer of the base of the tongue status post gastrostomy tube placement, GERD who presents for routine gastrostomy tube exchange.  His original PEG tube was placed in 2020 for and he subsequently had a Eliseo 18 Welsh exchange in 2025.  He is here today for routine exchange.    Performed by: Wei Cano PA-C  Patient location:  [facility] Office  Consent:     Consent obtained:  Verbal     Consent given by:  Patient     Risks discussed:  Bleeding and pain   Pre-procedure details:     Old tube type:  Gastrostomy     Old tube size:  18 Fr     Gastrostomy site:  LUQ   Indications:     Indications: other (comment)    Procedure details:     Patient position:  Supine     Procedure type:  Replacement     Tube type:  Gastrostomy     Tube :  Marshal-KEY 3.5 cm stem  Juane [id number], Exp 2026    Tube size:  20Fr    Bulb inflation volume:  6 cc   Post-procedure details:     Placement difficulty:  None     Bleeding:  Minimal     Patient tolerance of procedure:  Tolerated well, no immediate   complications       HPI  History obtained from: patient  Review of Systems    Constitutional:  Negative for activity change, appetite change, chills, diaphoresis, fatigue and unexpected weight change.   HENT:  Negative for mouth sores, rhinorrhea, sore throat and trouble swallowing.    Eyes:  Negative for discharge, redness and visual disturbance.   Respiratory:  Negative for apnea, cough, choking, chest tightness and shortness of breath.    Cardiovascular:  Negative for chest pain, palpitations and leg swelling.   Gastrointestinal:  Negative for abdominal distention, abdominal pain, anal bleeding, blood in stool, constipation, diarrhea, nausea, rectal pain and vomiting.   Genitourinary:  Negative for difficulty urinating, dysuria, frequency and hematuria.   Musculoskeletal:  Negative for arthralgias, back pain, gait problem, joint swelling and myalgias.   Skin:  Negative for color change, pallor and rash.   Allergic/Immunologic: Negative for environmental allergies and food allergies.   Neurological:  Negative for dizziness, tremors, weakness, light-headedness, numbness and headaches.   Psychiatric/Behavioral:  Negative for agitation, behavioral problems, confusion and sleep disturbance. The patient is not nervous/anxious.     A complete review of systems is negative other than that noted above in the HPI.    Past Medical History   Past Medical History:   Diagnosis Date   • Cancer of base of tongue (HCC)     excision   • Cough    • CPAP (continuous positive airway pressure) dependence     no currently   • Disease of thyroid gland     hypo   • Dysphagia    • ED (erectile dysfunction)    • GERD (gastroesophageal reflux disease)    • History of pneumonia    • Hypertension    • Leukopenia    • Peyronie's disease    • Pneumonia     Aspiration   • PONV (postoperative nausea and vomiting)    • Psoriasis    • Sleep apnea    • Tongue cancer (HCC)    • Weight loss, abnormal      Past Surgical History:   Procedure Laterality Date   • ESOPHAGOSCOPY N/A 9/16/2022    Procedure: ESOPHAGOSCOPY FLEXIBLE;   Surgeon: Devonte Singh MD;  Location: [facility];  Service: ENT   • ESOPHAGOSCOPY N/A 8/25/2023    Procedure: TRANSNASAL ESOPHAGOSCOPY WITH BALLOON DILATION/ ESOPHAGEAL DILATION (BALLOON);;  Surgeon: Devonte Singh MD;  Location: [facility];  Service: ENT   • OTHER SURGICAL HISTORY      infusion port inserted and removed   • PEG TUBE PLACEMENT      and removal   • WY COLONOSCOPY FLX DX W/COLLJ SPEC WHEN PFRMD N/A 6/27/2017    Procedure: COLONOSCOPY with polypectomy x2;  Surgeon: Janet Willard MD;  Location: [facility];  Service: General   • WY ESOPHAGOSCOPY DILATE ESOPHAGUS BALLOON 30 MM N/A 9/27/2024    Procedure: trans nasal endoscopy, infinity balloon dilation upper esophageal sphincter(UES) AND POSTERIOR WALL INJECTION;  Surgeon: Devonte Singh MD;  Location: [facility];  Service: ENT   • WY LARGSC W/NJX VOCAL CORD THER W/MICRO/TELESCOPE Bilateral 9/16/2022    Procedure: micro direct laryngoscopy, vocal fold injection, biopsy epiglottis lesion, posterior pharyngeal wall injection, flexible esophagoscopy with dilation;  Surgeon: Devonte Singh MD;  Location: [facility];  Service: ENT   • TONGUE BIOPSY / EXCISION      Floor mouth excision of malignant tumor     Family History   Problem Relation Age of Onset   • Other Mother         reactive airway dysfunction   • Asthma Mother    • Coronary artery disease Father    • Hypertension Father    • Psoriasis Father    • Thyroid disease unspecified Neg Hx       reports that he quit smoking about 14 years ago. His smoking use included cigarettes. He started smoking about 34 years ago. He has a 5 pack-year smoking history. He has been exposed to tobacco smoke. He quit smokeless tobacco use about 14 years ago. He reports that he does not currently use alcohol after a past usage of about 12.0 standard drinks of alcohol per week. He reports that he does not use drugs.  Current Outpatient Medications   Medication Instructions   • amLODIPine (NORVASC) 5 mg, Oral, Daily   •  atropine (ISOPTO ATROPINE) 1 % ophthalmic solution 2-3 drops sublingual as needed for increased saliva/cough   • benzonatate (TESSALON) 200 mg, Oral, 3 times daily PRN   • cefpodoxime (VANTIN) 200 mg, Oral, 2 times daily   • famotidine (PEPCID) 40 mg, Oral, Daily at bedtime   • ipratropium (ATROVENT) 0.03 % nasal spray 2 sprays, Nasal, 3 times daily PRN   • levalbuterol (XOPENEX HFA) 45 mcg/act inhaler 2 puffs, Inhalation, Every 6 hours PRN   • levothyroxine 100 mcg, Oral, Daily   • Misc Natural Products (CordyMax CS-4) 525 MG CAPS Take by mouth   • NON FORMULARY Ginseng + Atractylodes   • tadalafil (CIALIS) 5 mg, Oral, Daily   No Known Allergies   Current Outpatient Medications on File Prior to Visit   Medication Sig Dispense Refill   • atropine (ISOPTO ATROPINE) 1 % ophthalmic solution 2-3 drops sublingual as needed for increased saliva/cough 5 mL 11   • benzonatate (TESSALON) 200 MG capsule Take 1 capsule (200 mg total) by mouth 3 (three) times a day as needed for cough 30 capsule 0   • cefpodoxime (VANTIN) 100 mg/5 mL suspension Take 10 mL (200 mg total) by mouth 2 (two) times a day for 10 days 200 mL 0   • famotidine (PEPCID) 20 mg/2.5 mL oral suspension TAKE 5 ML (40 MG TOTAL) BY MOUTH DAILY AT BEDTIME 150 mL 11   • ipratropium (ATROVENT) 0.03 % nasal spray 2 sprays into each nostril 3 (three) times a day as needed (nasal drip with meals) 30 mL 2   • levalbuterol (XOPENEX HFA) 45 mcg/act inhaler Inhale 2 puffs every 6 (six) hours as needed for wheezing 45 g 0   • levothyroxine 100 mcg tablet TAKE 1 TABLET BY MOUTH EVERY DAY 90 tablet 1   • NON FORMULARY Ginseng + Atractylodes     • tadalafil (CIALIS) 5 MG tablet Take 1 tablet (5 mg total) by mouth daily 90 tablet 3   • amLODIPine (NORVASC) 5 mg tablet TAKE 1 TABLET (5 MG TOTAL) BY MOUTH DAILY. (Patient not taking: No sig reported) 90 tablet 1   • Misc Natural Products (CordyMax CS-4) 525 MG CAPS Take by mouth (Patient not taking: Reported on 3/11/2025)     •  [DISCONTINUED] famotidine (PEPCID) 20 mg/2.5 mL oral suspension TAKE 5 ML (40 MG TOTAL) BY MOUTH DAILY AT BEDTIME 450 mL 3     No current facility-administered medications on file prior to visit.      Social History     Tobacco Use   • Smoking status: Former     Current packs/day: 0.00     Average packs/day: 0.3 packs/day for 20.0 years (5.0 ttl pk-yrs)     Types: Cigarettes     Start date: 1991     Quit date: 2011     Years since quittin.3     Passive exposure: Past   • Smokeless tobacco: Former     Quit date:    • Tobacco comments:     pt quit with dx of tongue base cancer, per allscripts   Vaping Use   • Vaping status: Never Used   Substance and Sexual Activity   • Alcohol use: Not Currently     Alcohol/week: 12.0 standard drinks of alcohol     Types: 12 Cans of beer per week   • Drug use: Never   • Sexual activity: Yes     Partners: Female     Birth control/protection: Post-menopausal     Current Outpatient Medications   Medication Sig Dispense Refill   • atropine (ISOPTO ATROPINE) 1 % ophthalmic solution 2-3 drops sublingual as needed for increased saliva/cough 5 mL 11   • benzonatate (TESSALON) 200 MG capsule Take 1 capsule (200 mg total) by mouth 3 (three) times a day as needed for cough 30 capsule 0   • cefpodoxime (VANTIN) 100 mg/5 mL suspension Take 10 mL (200 mg total) by mouth 2 (two) times a day for 10 days 200 mL 0   • famotidine (PEPCID) 20 mg/2.5 mL oral suspension TAKE 5 ML (40 MG TOTAL) BY MOUTH DAILY AT BEDTIME 150 mL 11   • ipratropium (ATROVENT) 0.03 % nasal spray 2 sprays into each nostril 3 (three) times a day as needed (nasal drip with meals) 30 mL 2   • levalbuterol (XOPENEX HFA) 45 mcg/act inhaler Inhale 2 puffs every 6 (six) hours as needed for wheezing 45 g 0   • levothyroxine 100 mcg tablet TAKE 1 TABLET BY MOUTH EVERY DAY 90 tablet 1   • NON FORMULARY Ginseng + Atractylodes     • tadalafil (CIALIS) 5 MG tablet Take 1 tablet (5 mg total) by mouth daily 90 tablet 3   •  "amLODIPine (NORVASC) 5 mg tablet TAKE 1 TABLET (5 MG TOTAL) BY MOUTH DAILY. (Patient not taking: No sig reported) 90 tablet 1   • Misc Natural Products (CordyMax CS-4) 525 MG CAPS Take by mouth (Patient not taking: Reported on 3/11/2025)       No current facility-administered medications for this visit.     Objective   /92 (BP Location: Left arm, Patient Position: Sitting, Cuff Size: Standard)   Pulse 70   Temp 98 °F (36.7 °C) (Tympanic)   Ht 5' 8\" (1.727 m)   Wt 69.9 kg (154 lb)   SpO2 98%   BMI 23.42 kg/m²     Physical Exam  Constitutional:       General: He is not in acute distress.     Appearance: Normal appearance. He is not ill-appearing.   HENT:      Head: Normocephalic and atraumatic.   Eyes:      General: No scleral icterus.     Conjunctiva/sclera: Conjunctivae normal.   Cardiovascular:      Rate and Rhythm: Normal rate and regular rhythm.   Pulmonary:      Effort: Pulmonary effort is normal. No respiratory distress.      Breath sounds: Normal breath sounds.   Abdominal:      General: Bowel sounds are normal. There is no distension.      Palpations: Abdomen is soft.      Tenderness: There is no abdominal tenderness. There is no guarding.   Skin:     General: Skin is warm and dry.      Comments: No rubor, calor, tumor or dolor around PEG insertion site   Neurological:      Mental Status: He is alert and oriented to person, place, and time.   Psychiatric:         Mood and Affect: Mood normal.         Behavior: Behavior normal.            Lab Results: I personally reviewed relevant lab results.       Results for orders placed during the hospital encounter of 11/24/24    EGD Peg Tube Placement    Impression  The esophagus appeared normal.  PEG tube placed in the body of the stomach  The duodenum appeared normal.      RECOMMENDATION:  Do not use the gastrostomy tube for 24 hours.  Keep the tube clamped  NPO for 24 hours.  Continue IV fluids.  G-tube to gravity drainage.  Change G-tube dressing " daily.  After 24 hours, begin sterile water at 50 cc per hour via G tube for 4 hours.  If no pain, fever, complications from patient, begin tube feedings per recommendations of attending physician, nutrition.  If any questions arise during regarding PEG tube, notify GI on call.              MD Wei Sears PA-C  [facility] - Gastroentrology

## 2025-04-18 NOTE — ASSESSMENT & PLAN NOTE
- PEG tube originally placed on November 27, 2024  -It was exchanged with a Eliseo 18 Australian on January 6, 2025  - exchanged with Marshal-Key low profile 20Fr 3.5cm stem today  - Follow-up 6 months for routine tube change

## 2025-04-21 ENCOUNTER — TELEPHONE (OUTPATIENT)
Age: 60
End: 2025-04-21

## 2025-04-21 NOTE — TELEPHONE ENCOUNTER
Pt and wife called back they will send picture through my chart of the currant Marshal-key  they states it is to long.

## 2025-04-21 NOTE — TELEPHONE ENCOUNTER
Spoke to pt,   two laura-Key were ordered pt scheduled for Thursday at 10am for swap.  I will call pt Wednesday night to confirm that the tube has been received.   If they are not received we will have to reschedule pt after his vacation.  Pt verbalized understanding and need to be Npo 4 hr prior to tube swap.

## 2025-04-22 ENCOUNTER — OFFICE VISIT (OUTPATIENT)
Dept: SPEECH THERAPY | Facility: CLINIC | Age: 60
End: 2025-04-22
Payer: COMMERCIAL

## 2025-04-22 DIAGNOSIS — Z92.3 HX OF HEAD AND NECK RADIATION: Primary | ICD-10-CM

## 2025-04-22 DIAGNOSIS — R13.12 DYSPHAGIA, OROPHARYNGEAL PHASE: ICD-10-CM

## 2025-04-22 PROCEDURE — 92526 ORAL FUNCTION THERAPY: CPT | Performed by: SPEECH-LANGUAGE PATHOLOGIST

## 2025-04-22 NOTE — PROGRESS NOTES
Speech-Language Pathology Discharge    Today's date: 2025   Patient’s name: Mohamud Grimm Sr.  : 1965  MRN: [id number]  Referring provider: Dick Lopez*    Encounter Diagnosis     ICD-10-CM    1. Hx of head and neck radiation  Z92.3       2. Dysphagia, oropharyngeal phase  R13.12           Assessment:   Patient continues to present with known significant oropharyngeal dysphagia complicated by dysgeusia, reflux/dysmotility, radiation fibrosis, and mucositis. He has progressed through POC demonstrating improvement in all areas. Most subjective scores have also improved. Is pending f/u with laryngologist to discuss options ( laryngectomy vs other).          Recommendations: Likely ongoing silent aspiration- clinically should most likely be NPO with recent VBS studies however patient is aware of his risks and would like to have some po for pleasure     Ok for small amounts of HTL via tsp and puree with the following strategies:    Aspiration precautions and compensatory swallowing strategies: upright posture, slow rate of feeding, small bites/sips, head turn left, effortful swallow, cough every 1-2 bites/sips, and alternating bites and sips        Short-term goals:  -Patient will be educated on and report understanding and compliance of appropriate diet, aspiration risks, diagnosis and related pathology, to be achieved in 4-6 weeks. GOAL MET      Patient will improve lingual/cervical ROM via functional tasks and stretching, to be achieved in 4-6 weeks. GOAL MET ( See PSFS)      Following patient's verbal consent to treat, manual therapy technique through myofascial release was applied to patient's L anterolateral cervical region.     Region(s) 4/1 4/8 4/15 4/22        L SCM   x5mins x5mins x x        Anterior Cervical   x x5mins x x      Tongue base x x5mins x x      Splenius Capitus x x [id number]      Scalenes x5mins x X10 mins x      Trapezius x x x5mins x5mins      L submandibular x  x5mins x x        Palpatory examination revealed moderate mechanosensitivity (i.e., sensitivity to mechanical pressure/stretch) through the anterolateral cervical region. A mod palpatory pressure was suspected to show a mod- severe area of apparent thickening that, with stimulation/pressure/stretch, reproduced patient's complaints/correlated to his primary issue of reduced ROM /stiffness. Patient verbalized that technique/pressure, through triggering his symptoms, was helpful.    Patient was able to identify regions of myofascial sensitivity to reduce overall tension.    Shooting neuropathy was noted with L lateral neck today- brachial plexus involvement?    Notes:  -upright ( in chair),  lotion    -Patient will improve PSFS by 2 points for improved overall QOL, to be achieved in 4-6 weeks GOAL MET    PSFS:     1. Swallow 5 ( slight improvement from 4) /10    2. Neck stiffness: 8 ( improved from 4) /10    - Patient will demonstrate the ability to adequately self-monitor swallowing skills and perform appropriate compensatory techniques to reduce overt s/sx of penetration/aspiration to safely tolerate least restrictive food/liquid consistencies, to be achieved in 4-6 week GOAL MET    -Patient will be able to maintain ability to tolerate po throughout treatment. GOAL MET    Long-term goals:    -Patient will maintain adequate hydration and nutrition with optimum safety and efficacy of swallowing function on P.O. intake with reduced s/sx of penetration/ aspiration by discharge. GOAL MET       Plan:  Patient is leaving for [place] this week. Will D/C from SLP services at this time. Return as indicated  Consider PT evaluation for reduced L UE function/strength      Subjective:  Subjective/Behavioral:    Went to [facility]  since last visit- reports no new recommendations- findings consistent with prior workups. Discussed VF injection and total laryngectomy.- Pt with questions re:  "this.    Expresses extreme frustration, anger and hopelessness. He continues to report significant impact on QOL.     He reports he still is unsure about starting therapy- \"I've always just dealt with things my whole life\"  He also reports he is not currently interested in restarting dry needling.     Reports ongoing compliance with exercises and increased MFR at home    Reports he went out to eat and had a beer with thickener  Reports good compliance with strategies      Pain: Absent       Objective (testing):  DYSPHAGIA EVALUATION:    Functional Outcome Measurements    1. Functional Oral Intake Scale (FOIS): 2 - tube dependent with minimal/inconsistent oral intake    2. Performance Status Scale For Head and Neck Cancer Patients (PSS-HN):  -Normalcy of Diet (0-100): 30 ( improved from 0)  -Public Eating (0-100): 50 ( improved from N/A)  -Understandability of Speech (0-100): 100 ( unchanged WFL)    3. The Reflux Symptom Index (RSI) is a self-administered, 9-item outcomes instrument to quantify symptoms in patients with laryngopharyngeal reflux (LRP). An RSI score of <=13 can be considered normal. Some degree of reflux is present in normal individuals. A score of >13 is suggestive of significant LPR symptoms. Patient obtained a score of 17( improved from 19) /45. (see scanned document)    4. The Eating Assessment Tool (EAT-10) is a self-administered, symptom-specific outcome instrument for dysphagia. It consists of ten statements that a patient rates on a scale of 0-4, with 0=no problem to 4=severe problem. A score of 3 or more is abnormal. Patient obtained a score of 27 ( declined from 23)/40. (see scanned document)    5. The Dyspnea Index (DI) is a self-administered, 10-item outcomes instrument to quantify severity of symptoms in upper airway dyspnea. Preliminary data suggest that a score of <7 can be considered normal and a >10 is suggestive of PVFM. A change score of 8 has been shown to be meaningful. Patient " obtained a score of 4 ( improved from 13)/40.     -The Cough Severity Index (CSI) is a self-administered, 10-item outcomes instrument to quantify a patient's symptoms of chronic cough of upper airway origin. The total score ranges from a 0 to 40 based on the sum of responses to 10 questions on a 5-point scale ranging from “never a problem” to “always a problem.” A score of 3 or below is considered normal. A score higher than 3 means that the cough may be impacting quality of life. Patient obtained a score of 16( improved from 26)/40. (see scanned document)    6.  PSFS:     1. Swallow 5 ( slight improvement from 4) /10    2. Neck stiffness: 8 ( improved from 4) /10    13 ( improved from 8)    -Current diet (solids): NPO- occ puree  -Current diet (liquids): NPO- occ thin liquids  -Current pill intake method: via PEG    -Alternative Feeding Method?: yes      Special Studies:    6/6: Flexible laryngoscopy     Pre-Operative Diagnosis:  Dysphonia   Dysphagia   Hx SCCA BOT s/p chemo/RT ~2012 [facility]  Left vf immobility  Glottic insufficiency  Left hemilarynx with mod edema/fibrosis  Min erythema  No pooling of secretions  Narrow/crowding throughout  No laryngeal sensation left hemilarynx, right side intact  VPI  Pooling saliva vallecula/piriforms  Hx Small lesion 4-5mm 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  A high resolution video laryngoscope was inserted transnasally or  orally and suspended from the video system for magnification and documentation.       Voice: mild raspy, some gravel/clearing    Supraglottic Hyperfunction:    present, mild ant/post  Arytenoid Joint Movement:    Normal  Dysdiadochokinesis:     Absent  Arytenoids:   mild erythema, mod edema L>R  Posterior Cobblestoning:  present        Right Vocal Fold:  Abduction:    Normal  Adduction:    Normal  Longitudinal Tension:   Normal     Left Vocal Fold:  Abduction:    absent  Adduction:    absent  Longitudinal Tension:   dec     Glottic Closure:    incomplete     Vocal Process Height:    Equal     Vocal Fold Color:  Right: Normal  Left: Normal     Masses/Vibratory Margin Irregularities: Mild Valerio's edema. Left hemilarynx edema/fibrosis  Other Lesions:      The procedure was concluded without complication and the laryngoscope was removed.     Reflux Finding Score (RFS)  Subglottic Edema:   2  Ventricular Obliteration:   2-4 L>R    Erythema/Hyperemia:    2 min  Vocal Fold Edema:   1  Diffuse Laryngeal Edema:   1-2 L>R   Posterior Commissure Hypertrophy:   2     Granuloma/Granulation:   0  Thick Endolaryngeal Mucus:  0     Total: 10    +nocturnal reflux  Reflux laryngitis on laryngoscopy today  Famotidine 40 qhs  Reflux gourmet      VBS 2/5/25:   Oral stage: Mild  Adequate labial seal and retrieval of bolus. Mildly reduced bolus formation and reduced AP transfer.      Pharyngeal stage: Severe  Mildly decreased elevation and excursion. Absent epiglottic inversion. Decreased BOT retraction. Majority of trials brought back up to oral cavity secondary to inability to pass level of vallecula. Pt spitting out and coughing up trials. Moderate pharyngeal retention/residue. Multiple swallows/chin tuck not always effective in clearance of retention. Significant silent aspiration with thins tsp. Cough did not assist with clearance. Aspiration and/or deep penetration noted with all consistencies and with pharyngeal residue/retention.  "     Per gross esophageal screen:  Unable to complete at this time     Diet: NPO  Frequent/thorough oral care  Upright position  F/u ST tx: Yes  Therapy Prognosis: Poor  Prognosis considerations: Significant dysphagia hx   Aspiration Precautions  Reflux Precautions  Results reviewed with: pt, nursing, family, physician  Aspiration precautions posted.  Repeat MBS as necessary      Pt with extensive dysphagia hx and known to department secondary to hx of BOT and esophageal cancer. Pt currently with PEG tube and receives x3 bolus feeds a day. Per pt and wife report, pt has pleasure feeds at home (bloody kolby's, water, crab cakes,crackers) with known aspiration risk. Per recent VBS 6/2024 with recommendations of NPO.      VBS 6/26/24: Pt continues to present with mild oral and significant pharyngeal dysphagia with primary concerns including reduced swallow mechanics, driving forces and airway closure/protection as well as fibrosis resulting in reduced relaxation/passage through UES with post swallow retention, penetration and aspiration. A majority of the aspiration was silent however he did demonstrate increasing delayed cough as study progressed.      He did appear to benefit somewhat from L head turn as well as chin tuck however these did not eliminate aspiration risk and when completed together increased aspiration was noted. Fortunately he did appear to have a strong cough which was beneficial in clearing a majority of penetrated material and some aspirated material.      In addition to significant aspiration risk he presents with increased risk for malnutrition given, frustration with coughing and that he requires extra time for multiple swallows required per bolus- patient also admitted that eating was \"exhausting.\"    The patient did aspirate a significant amount of barium today - referring provider was notified. Consider CXR/ABX.    VBS 7/5/23: Pt presents with mild oral and severe-profound pharyngeal dysphagia " characterized by reduced AP transport, delayed swallow initiation with significantly reduced/minimal hyolaryngeal rise, reduced base of tongue retraction, absent epiglottic inversion and absent pharyngeal stripping wave. Airway protection/closure was minimal, and silent aspiration occurred with all liquid consistencies. Pt was unable to swallow puree due to lack of driving force on the bolus and had to regurgitate/expectorate the puree from his vallecula. Strategies were not effective in eliminating aspiration. Note: Images are available for review in PACS as desired.     VBS 6/21/23: Presenting w/ mild-mod oral and SEVERE pharyngeal dysphagia characterized by SILENT aspiration of thin and mod thick during and after the swallow, unable to clear w/ cued cough. Mod-severe stasis w/ inconsistent clearance. Significant retrograde flow observed. Further trials deferred given aspiration and high risk for re-intubation.     VBS 3/4/21:  Pt presents w/ severe pharyngeal dysphagia characterized by no epiglottic inversion, minimal BOT retraction, minimal hyolaryngeal elevation, reduced airway entrance closure, and trace amounts of penetration and silent aspiration during and after swallowing thick and thin liquids. Pt is at high risk of developing aspiration pneumonia. Lengthy discussion was had w/ patient to review results of VBS and recommendations.     VBS 8/14/19:  Mild oral/moderate pharyngeal dysphagia due to decreased tongue base retraction, decreased epiglottic inversion and airway entrance closure w/ deep transient penetration of all consistencies,  And passive silent aspiration of pharyngeal residue and thin liquids.      VBS 10/25/12(Radiologist report): Aspiration of thin liquid barium and nectar thick barium. The patient did not have a spontaneous cough. Penetration of honey thick barium, applesauce, and mixed with barium.         Treatment:  See above    Other:Reviewed testing and plan of care with  patient.Patient is in agreement with POC at this time.  Recommendations:Discharge      Sheree Muse M.S., CCC-SLP  Speech Language Pathologist   Available via Secure Chat  [id number] #[id number]  [phone] #[id number]

## 2025-04-23 ENCOUNTER — OFFICE VISIT (OUTPATIENT)
Dept: PULMONOLOGY | Facility: CLINIC | Age: 60
End: 2025-04-23
Payer: COMMERCIAL

## 2025-04-23 VITALS
DIASTOLIC BLOOD PRESSURE: 72 MMHG | SYSTOLIC BLOOD PRESSURE: 122 MMHG | OXYGEN SATURATION: 98 % | HEART RATE: 78 BPM | TEMPERATURE: 97.2 F | BODY MASS INDEX: 23.75 KG/M2 | WEIGHT: 156.2 LBS

## 2025-04-23 DIAGNOSIS — J96.11 CHRONIC HYPOXEMIC RESPIRATORY FAILURE (HCC): ICD-10-CM

## 2025-04-23 DIAGNOSIS — G47.33 OBSTRUCTIVE SLEEP APNEA SYNDROME: ICD-10-CM

## 2025-04-23 DIAGNOSIS — J69.0 ASPIRATION PNEUMONIA, UNSPECIFIED ASPIRATION PNEUMONIA TYPE, UNSPECIFIED LATERALITY, UNSPECIFIED PART OF LUNG (HCC): Primary | ICD-10-CM

## 2025-04-23 PROCEDURE — 99213 OFFICE O/P EST LOW 20 MIN: CPT | Performed by: STUDENT IN AN ORGANIZED HEALTH CARE EDUCATION/TRAINING PROGRAM

## 2025-04-23 NOTE — PROGRESS NOTES
Pulmonary Outpatient Progress Note   Mohamud Grimm Sr. 60 y.o. 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  Recently saw [facility] and they agreed with the current therapy but also recommended a possible laryngectomy if he wanted to fully resolve his issue which he held off on. I reviewed his CT and his chest x-ray from [facility], there are right middle lung infiltrates on x-ray along with bibasilar pneumonia on CT.  This is all aspiration related.    Mild ERIK on CPAP, uses CPAP, 93% of the time.  He will continue using CPAP going forward. 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  I would hold off on bronchoscopy from my standpoint   Airway clearance with flutter valve.  If this does not work then we could consider vest therapy.  Aspiration precautions, head of bed elevated, speech therapy   Continue Pepcid  Continue oxygen nightly  Follow-up as needed  I have spent a total time of 26 minutes in caring for this patient on the day of the visit/encounter including Diagnostic results, Prognosis, Risks and benefits of tx options, Instructions for management, Patient and family education, Importance of tx compliance, Risk factor reductions, Impressions, Counseling / Coordination of care, Documenting in the medical record, Reviewing / ordering tests, medicine, procedures  , and Obtaining or reviewing history  .      History of Present Illness   HPI:    60-year-old  gentleman here for follow-up for recurrent aspiration pneumonia.  Patient follows with ENT and is status post transnasal esophagoscopy with balloon dilation, follows with allergy, and speech pathology.  History of squamous cell of the base of the tongue and ex smoker.     Since her last visit I have reached out to our sleep team and they said he is not a candidate for inspire and suggested using a sleep noodle as he is a side sleeper.    Patient went to [facility] for second opinion regarding his chronic cough and aspiration, they did testing down there and recommended laryngectomy which he does not want to pursue.    He has an appointment with ENT and follows with speech pathology.  From a pulmonary effective I reviewed his imaging with him, he does have bibasilar pneumonia with no fevers this is likely aspiration pneumonia/pneumonitis and I would hold off on treating unless he develops fevers.  Unfortunately he is not in a good situation with the above, he will continue to aspirate.    Review of Systems   Constitutional:  Positive for fever. Negative for appetite change.   HENT:  Positive for trouble swallowing. Negative for ear pain, postnasal drip, rhinorrhea, sneezing and sore throat.    Eyes: Negative.    Respiratory:  Positive for cough, choking and shortness of breath. Negative for wheezing.    Cardiovascular:  Negative for chest pain.   Gastrointestinal: Negative.    Endocrine: Negative.    Genitourinary: Negative.    Musculoskeletal: Negative.  Negative for myalgias.   Skin: Negative.    Allergic/Immunologic: Positive for immunocompromised state.   Neurological:  Positive for headaches.   Hematological: Negative.    Psychiatric/Behavioral: Negative.           Historical Information   Past Medical History:   Diagnosis Date   • Cancer of base of tongue (HCC)     excision   • Cough    • CPAP (continuous positive airway pressure) dependence     no currently   • Disease of thyroid gland     hypo   • Dysphagia     • ED (erectile dysfunction)    • GERD (gastroesophageal reflux disease)    • History of pneumonia    • Hypertension    • Leukopenia    • Peyronie's disease    • Pneumonia     Aspiration   • PONV (postoperative nausea and vomiting)    • Psoriasis    • Sleep apnea    • Tongue cancer (HCC)    • Weight loss, abnormal      Past Surgical History:   Procedure Laterality Date   • ESOPHAGOSCOPY N/A 9/16/2022    Procedure: ESOPHAGOSCOPY FLEXIBLE;  Surgeon: Devonte Singh MD;  Location: [facility];  Service: ENT   • ESOPHAGOSCOPY N/A 8/25/2023    Procedure: TRANSNASAL ESOPHAGOSCOPY WITH BALLOON DILATION/ ESOPHAGEAL DILATION (BALLOON);;  Surgeon: Devonte Singh MD;  Location: [facility];  Service: ENT   • OTHER SURGICAL HISTORY      infusion port inserted and removed   • PEG TUBE PLACEMENT      and removal   • OR COLONOSCOPY FLX DX W/COLLJ SPEC WHEN PFRMD N/A 6/27/2017    Procedure: COLONOSCOPY with polypectomy x2;  Surgeon: Janet Willard MD;  Location: [facility];  Service: General   • OR ESOPHAGOSCOPY DILATE ESOPHAGUS BALLOON 30 MM N/A 9/27/2024    Procedure: trans nasal endoscopy, infinity balloon dilation upper esophageal sphincter(UES) AND POSTERIOR WALL INJECTION;  Surgeon: Devonte Singh MD;  Location: [facility];  Service: ENT   • OR LARGSC W/NJX VOCAL CORD THER W/MICRO/TELESCOPE Bilateral 9/16/2022    Procedure: micro direct laryngoscopy, vocal fold injection, biopsy epiglottis lesion, posterior pharyngeal wall injection, flexible esophagoscopy with dilation;  Surgeon: Devonte Singh MD;  Location: [facility];  Service: ENT   • TONGUE BIOPSY / EXCISION      Floor mouth excision of malignant tumor     Family History   Problem Relation Age of Onset   • Other Mother         reactive airway dysfunction   • Asthma Mother    • Coronary artery disease Father    • Hypertension Father    • Psoriasis Father    • Thyroid disease unspecified Neg Hx        Occupational History: works at a furniture store    Social History      Tobacco Use   Smoking Status Former   • Current packs/day: 0.00   • Average packs/day: 0.3 packs/day for 20.0 years (5.0 ttl pk-yrs)   • Types: Cigarettes   • Start date: 1991   • Quit date: 2011   • Years since quittin.3   • Passive exposure: Past   Smokeless Tobacco Former   • Quit date:    Tobacco Comments    pt quit with dx of tongue base cancer, per allscripts       Meds/Allergies     Current Outpatient Medications:   •  atropine (ISOPTO ATROPINE) 1 % ophthalmic solution, 2-3 drops sublingual as needed for increased saliva/cough, Disp: 5 mL, Rfl: 11  •  benzonatate (TESSALON) 200 MG capsule, Take 1 capsule (200 mg total) by mouth 3 (three) times a day as needed for cough, Disp: 30 capsule, Rfl: 0  •  famotidine (PEPCID) 20 mg/2.5 mL oral suspension, TAKE 5 ML (40 MG TOTAL) BY MOUTH DAILY AT BEDTIME, Disp: 150 mL, Rfl: 11  •  ipratropium (ATROVENT) 0.03 % nasal spray, 2 sprays into each nostril 3 (three) times a day as needed (nasal drip with meals), Disp: 30 mL, Rfl: 2  •  levalbuterol (XOPENEX HFA) 45 mcg/act inhaler, Inhale 2 puffs every 6 (six) hours as needed for wheezing, Disp: 45 g, Rfl: 0  •  levothyroxine 100 mcg tablet, TAKE 1 TABLET BY MOUTH EVERY DAY, Disp: 90 tablet, Rfl: 1  •  NON FORMULARY, Ginseng + Atractylodes, Disp: , Rfl:   •  tadalafil (CIALIS) 5 MG tablet, Take 1 tablet (5 mg total) by mouth daily, Disp: 90 tablet, Rfl: 3  •  amLODIPine (NORVASC) 5 mg tablet, TAKE 1 TABLET (5 MG TOTAL) BY MOUTH DAILY. (Patient not taking: No sig reported), Disp: 90 tablet, Rfl: 1  •  famotidine (PEPCID) 20 mg/2.5 mL oral suspension, Take 5 mL (40 mg total) by mouth daily at bedtime, Disp: 450 mL, Rfl: 3  No Known Allergies    Vitals: Blood pressure 122/72, pulse 78, temperature (!) 97.2 °F (36.2 °C), temperature source Temporal, weight 70.9 kg (156 lb 3.2 oz), SpO2 98%., Body mass index is 23.75 kg/m². Oxygen Therapy  SpO2: 98 %    Physical Exam:    GEN: alert and oriented x 3, pleasant  and cooperative   HEENT:  Normocephalic, atraumatic, anicteric  NECK: No JVD, erythema at L neck  HEART: Rate, normal S1 and S2  LUNGS: course rhonchi bilaterally  ABDOMEN:  Normoactive bowel sounds, soft, no tenderness, no distention  EXTREMITIES: no edema  NEURO: no gross focal findings  SKIN:  Dry, intact, warm to touch    Labs: I have personally reviewed pertinent lab results.  Cr 0.46  Lab Results   Component Value Date     09/02/2020       Imaging and other studies: I have personally reviewed pertinent films in PACS  CT and chest x-ray reviewed.  At [facility]    Pulmonary function testing:  Personally interpreted by me  Restriction based on FVC    Other Studies: I have personally reviewed pertinent films in PACS  ENT notes, speech pathology notes, Care Everywhere    Chris Rivera MD  Pulmonary and Critical Care Medicine     Answers submitted by the patient for this visit:  Pulmonology Questionnaire (Submitted on 7/8/2024)  Chief Complaint: Primary symptoms  Do you experience frequent throat clearing?: Yes  Do you have a hoarse voice?: Yes  Do you have a wet cough?: Yes  Chronicity: chronic  When did you first notice your symptoms?: more than 1 year ago  How often do your symptoms occur?: constantly  Since you first noticed this problem, how has it changed?: waxing and waning  Do you have shortness of breath that occurs with effort or exertion?: Yes  Do you have ear congestion?: No  Do you have heartburn?: No  Do you have fatigue?: Yes  Do you have nasal congestion?: No  Do you have shortness of breath when lying flat?: No  Do you have shortness of breath when you wake up?: No  Do you have sweats?: Yes  Have you experienced weight loss?: Yes    Answers submitted by the patient for this visit:  Pulmonology Questionnaire (Submitted on 9/23/2024)  Chief Complaint: Primary symptoms  Do you experience frequent throat clearing?: Yes  Do you have a hoarse voice?: Yes  Do you have a wet cough?: Yes  Chronicity:  chronic  When did you first notice your symptoms?: more than 1 year ago  How often do your symptoms occur?: constantly  Since you first noticed this problem, how has it changed?: unchanged  Do you have shortness of breath that occurs with effort or exertion?: Yes  Do you have heartburn?: No  Do you have fatigue?: Yes  Do you have nasal congestion?: Yes  Do you have shortness of breath when lying flat?: No  Do you have shortness of breath when you wake up?: No  Do you have sweats?: Yes  Have you experienced weight loss?: No  Which of the following makes your symptoms worse?: eating  Which of the following makes your symptoms better?: nothing

## 2025-04-23 NOTE — TELEPHONE ENCOUNTER
Spoke with Mohamud on the Phone.  Let him know the replacement Gtubes arrived and that we would see him tomorrow at 10am.  He verbalized understanding nothing to eat or drink 4 hours before feeding tube exchange.

## 2025-04-24 ENCOUNTER — OFFICE VISIT (OUTPATIENT)
Age: 60
End: 2025-04-24
Payer: COMMERCIAL

## 2025-04-24 VITALS
DIASTOLIC BLOOD PRESSURE: 68 MMHG | OXYGEN SATURATION: 94 % | BODY MASS INDEX: 23.46 KG/M2 | TEMPERATURE: 98.4 F | WEIGHT: 154.8 LBS | HEART RATE: 69 BPM | HEIGHT: 68 IN | SYSTOLIC BLOOD PRESSURE: 132 MMHG

## 2025-04-24 DIAGNOSIS — C01 CANCER OF BASE OF TONGUE (HCC): ICD-10-CM

## 2025-04-24 DIAGNOSIS — Z43.1 PEG (PERCUTANEOUS ENDOSCOPIC GASTROSTOMY) ADJUSTMENT/REPLACEMENT/REMOVAL (HCC): Primary | ICD-10-CM

## 2025-04-24 DIAGNOSIS — E43 SEVERE PROTEIN-CALORIE MALNUTRITION (HCC): ICD-10-CM

## 2025-04-24 DIAGNOSIS — R13.14 PHARYNGOESOPHAGEAL DYSPHAGIA: ICD-10-CM

## 2025-04-24 PROCEDURE — 43762 RPLC GTUBE NO REVJ TRC: CPT | Performed by: DIETITIAN, REGISTERED

## 2025-04-24 PROCEDURE — PBNCHG PB NO CHARGE PLACEHOLDER: Performed by: DIETITIAN, REGISTERED

## 2025-04-24 NOTE — ASSESSMENT & PLAN NOTE
- PEG tube originally placed on November 27, 2024  -It was recently exchanged with Marshal-Key low profile 20Fr 3.5cm stem on 4/18/2025, but patient has noticed that this tube is a bit too long and gets caught on his clothing.  -Today, exchanged with 18Fr Marshal-Key low profile 20Fr 3.0 cm stem.  Orders:  •  Peg tube change

## 2025-04-24 NOTE — PROGRESS NOTES
Name: Mohamud Grimm Sr.      : 1965      MRN: [id number]  Encounter Provider: Hugo Ortega PA-C  Encounter Date: 2025   Encounter department: [facility]  :  Assessment & Plan  PEG (percutaneous endoscopic gastrostomy) adjustment/replacement/removal (HCC)  - PEG tube originally placed on 2024  -It was recently exchanged with Marshal-Key low profile 20Fr 3.5cm stem on 2025, but patient has noticed that this tube is a bit too long and gets caught on his clothing.  -Today, exchanged with 18Fr Marshal-Key low profile 20Fr 3.0 cm stem.  Orders:  •  Peg tube change    Cancer of base of tongue (HCC)    Orders:  •  Peg tube change    Pharyngoesophageal dysphagia    Orders:  •  Peg tube change    Severe protein-calorie malnutrition (HCC)  Body mass index is 23.54 kg/m².              History of Present Illness   Mohamud Grimm Sr. is a 60 y.o. male with past medical history of hypertension, allergic rhinitis, obstructive sleep apnea, chronic hypoxemic respiratory failure, cancer of the base of the tongue status post gastrostomy tube placement, GERD who presents for tube exchange.      This visit was conducted along with Lorenza Osorio PA-C.  Patient was seen in the office last week and had a tube exchange but unfortunately this tube is somewhat long and has been getting caught on clothing, so we will perform another exchange for him today.  He has no other complaints today.    HPI    Review of Systems A complete review of systems is negative other than that noted above in the HPI.      Current Outpatient Medications   Medication Sig Dispense Refill   • atropine (ISOPTO ATROPINE) 1 % ophthalmic solution 2-3 drops sublingual as needed for increased saliva/cough 5 mL 11   • benzonatate (TESSALON) 200 MG capsule Take 1 capsule (200 mg total) by mouth 3 (three) times a day as needed for cough 30 capsule 0   • famotidine (PEPCID) 20 mg/2.5 mL oral suspension Take  "5 mL (40 mg total) by mouth daily at bedtime 450 mL 3   • ipratropium (ATROVENT) 0.03 % nasal spray 2 sprays into each nostril 3 (three) times a day as needed (nasal drip with meals) 30 mL 2   • levalbuterol (XOPENEX HFA) 45 mcg/act inhaler Inhale 2 puffs every 6 (six) hours as needed for wheezing 45 g 0   • levothyroxine 100 mcg tablet TAKE 1 TABLET BY MOUTH EVERY DAY 90 tablet 1   • NON FORMULARY Ginseng + Atractylodes     • tadalafil (CIALIS) 5 MG tablet Take 1 tablet (5 mg total) by mouth daily 90 tablet 3   • amLODIPine (NORVASC) 5 mg tablet TAKE 1 TABLET (5 MG TOTAL) BY MOUTH DAILY. (Patient not taking: No sig reported) 90 tablet 1   • famotidine (PEPCID) 20 mg/2.5 mL oral suspension TAKE 5 ML (40 MG TOTAL) BY MOUTH DAILY AT BEDTIME 150 mL 11     No current facility-administered medications for this visit.     Objective   /68 (BP Location: Left arm, Patient Position: Sitting, Cuff Size: Standard)   Pulse 69   Temp 98.4 °F (36.9 °C) (Tympanic)   Ht 5' 8\" (1.727 m)   Wt 70.2 kg (154 lb 12.8 oz)   SpO2 94%   BMI 23.54 kg/m²     Physical Exam  Vitals reviewed.   HENT:      Head: Normocephalic and atraumatic.   Eyes:      Conjunctiva/sclera: Conjunctivae normal.   Pulmonary:      Effort: Pulmonary effort is normal.   Skin:     Coloration: Skin is not jaundiced or pale.   Neurological:      General: No focal deficit present.   Psychiatric:         Mood and Affect: Mood normal.         Behavior: Behavior normal.            Peg tube change     Date/Time  4/24/2025 10:00 AM     Performed by  Lorenza Osorio PA-C   Authorized by  Hugo Ortega PA-C     [place] Protocol   Patient identity confirmed: verbally with patient      Local anesthesia used: no     Anesthesia   Local anesthesia used: no     Sedation   Patient sedated: no       Procedure Details   Procedure Notes: Tube was easily exchanged at bedside today with Marshal-Key 18 Fr 3.0 cm G-tube.  Patient tolerance: patient tolerated the procedure well " with no immediate complications              Lab Results: I personally reviewed relevant lab results.       Results for orders placed during the hospital encounter of 11/24/24    EGD Peg Tube Placement    Impression  The esophagus appeared normal.  PEG tube placed in the body of the stomach  The duodenum appeared normal.      RECOMMENDATION:  Do not use the gastrostomy tube for 24 hours.  Keep the tube clamped  NPO for 24 hours.  Continue IV fluids.  G-tube to gravity drainage.  Change G-tube dressing daily.  After 24 hours, begin sterile water at 50 cc per hour via G tube for 4 hours.  If no pain, fever, complications from patient, begin tube feedings per recommendations of attending physician, nutrition.  If any questions arise during regarding PEG tube, notify GI on call.              Rose Yun MD

## 2025-05-23 ENCOUNTER — EVALUATION (OUTPATIENT)
Dept: PHYSICAL THERAPY | Age: 60
End: 2025-05-23
Attending: OTOLARYNGOLOGY
Payer: COMMERCIAL

## 2025-05-23 DIAGNOSIS — R29.898 WEAKNESS OF LEFT SHOULDER: Primary | ICD-10-CM

## 2025-05-23 PROCEDURE — 97162 PT EVAL MOD COMPLEX 30 MIN: CPT | Performed by: PHYSICAL THERAPIST

## 2025-05-23 PROCEDURE — 97110 THERAPEUTIC EXERCISES: CPT | Performed by: PHYSICAL THERAPIST

## 2025-05-23 NOTE — PROGRESS NOTES
PT Evaluation     Today's date: 2025  Patient name: Mohamud Grimm Sr.  : 1965  MRN: [id number]  Referring provider: Devonte Singh MD  Dx:   Encounter Diagnosis     ICD-10-CM    1. Weakness of left shoulder  R29.898                      Assessment  Impairments: abnormal or restricted ROM, impaired physical strength and pain with function    Assessment details: Mohamud is a 60 y.o. male who presents with signs and symptoms consistent of brachial plexus dysfunction following  oncology treatment. Patient presents with significant weakness in LUE and is unable to actively move LUE at shoulder and elbow. Patient reports not being able to use shoulder for roughly one year and not being able to move elbow for 6 months. There is significant muscle atrophy at shoulder girdle, notably the supra + infraspinatus. With NMES, patient was able to perform elbow flexion in a supine position, but unable to perform shoulder AROM with NMES in a seated position. At this time, I will contact neuro PT specialists regarding his prognosis.     Patient presents with impairments of pain and decreased ROM. Due to these impairments, patient has difficulty sleeping and performing daily hygiene tasks. Restrictions and protocol was explained to patient and patient vocalized understanding. Patient would benefit from skilled physical therapy to address the current impairments and expected strength impairments, improve their level of function, and to improve their overall quality of life. Thank you for the referral.   Understanding of Dx/Px/POC: good   Prognosis: good     Goals  Short Term Goals: to be achieved by 4 weeks  1) Patient to be independent with basic HEP.  2) Decrease pain to 2/10 at its worst.  3) Increase elbow PROM by 5-10 degrees     Long Term Goals: to be achieved by discharge  1) FOTO equal to or greater than 25 .  2) Patient to be independent with comprehensive HEP.  3) Abolish pain for improved quality of life.  4)  Increase shoulder ROM to within functional limits to improve a/iadls.  5) Increase shoulder strength to 5/5 MMT grade in all planes to improve a/iadls.   6) Patient to return to full duty at work.   7) Improve DASH score to 20       Plan  Patient would benefit from: skilled physical therapy  Planned modality interventions: cryotherapy  Planned therapy interventions: manual therapy, patient education, strengthening, stretching, therapeutic activities, therapeutic exercise, home exercise program and functional ROM exercises  Frequency: 2x week (2 times a week for 12 weeks )  Treatment plan discussed with: patient  Plan details: Follow protocol established by orthopedic surgeon.            Subjective Evaluation     History of Present Illness      Pain  Current pain ratin  At best pain ratin  At worst pain rating: 3  Quality: throbbing  Relieving factors: ice     Hand dominance: right       Diagnostic Tests    Patient Goals  Patient goals for therapy: decreased edema, increased strength, return to sport/leisure activities and decreased pain  Patient goal: Throw ball with kids            Objective      Observations      Left Elbow  Positive for edema and incision.     Additional Observation Details  Incisions appear to be healing well. Slight ecchymosis in elbow region but nothing concerning.        No tenderness      Passive Range of Motion   Left Shoulder   Normal passive range of motion     AROM:  Patient is unable to actively perform any shoulder or elbow movements against gravity.         Strength/Myotome Testing      Left Wrist/Hand   Min mod wrist strength deficits        POC Expires Auth Status Start Date Expiration Date PT Visit Limit        No Auth      BOMN   Date            Used 1           Remaining                         Precautions: Bicep tendon repair     Manuals                       Elbow PROM  JF                                                                                              Neuro Re-Ed                                                                                                                                                                                               Ther Ex                                                                                                                                                                                                                 Ther Activity                                                                       Gait Training                                                                       Modalities

## 2025-06-09 ENCOUNTER — EVALUATION (OUTPATIENT)
Dept: PHYSICAL THERAPY | Facility: CLINIC | Age: 60
End: 2025-06-09
Payer: COMMERCIAL

## 2025-06-09 DIAGNOSIS — R29.898 WEAKNESS OF LEFT SHOULDER: Primary | ICD-10-CM

## 2025-06-09 PROCEDURE — 97140 MANUAL THERAPY 1/> REGIONS: CPT | Performed by: PHYSICAL THERAPIST

## 2025-06-09 NOTE — PROGRESS NOTES
PT Evaluation     Today's date: 2025  Patient name: Mohamud Grimm Sr.  : 1965  MRN: [id number]  Referring provider: No ref. provider found  Dx:   Encounter Diagnosis     ICD-10-CM    1. Weakness of left shoulder  R29.898                      Assessment  Impairments: lacks appropriate home exercise program    Assessment details: Mohamud Grimm Sr. is a 60 y.o. male with Weakness of the L UE due to brachial plexus injury from radiation therapy in .  He presents with decreased strength, decreased ROM, impaired sensation, and postural  dysfunction. Due to these impairments, Patient has difficulty performing a/iadls, recreational activities and engaging in social activities. Patient's clinical presentation is consistent with their referring diagnosis. Patient would benefit from skilled neuro physical therapy to address their aforementioned impairments, improve their level of function and to improve their overall quality of life.  has been given a home exercise program and is in agreement with the plan of care.  Thank you for your referral.     Understanding of Dx/Px/POC: excellent     Prognosis: excellent    Plan  Patient would benefit from: skilled speech therapy, skilled physical therapy and OT eval    Planned therapy interventions: strengthening, stretching, therapeutic activities, therapeutic exercise, home exercise program and neuromuscular re-education    Duration in weeks: 1  Plan of Care beginning date: 2025  Plan of Care expiration date: 2025  Treatment plan discussed with: patient        Subjective Evaluation    History of Present Illness  Mechanism of injury: Patient presents today as a transfer from [place].  Patient was diagnosed with base of tongue in .  He had radiation therapy and chemotherapy. He had approx 35 treatments of radiation.  He was treated through [facility].  He started having tingling and numbness in his first 2 digits.  The paraesthesia progressed up his L UE to the  shoulder. He feels that last year it began getting very bad.  He then began to lose use of his UE.  Eventually he began having the inability to utilize his L UE. Patient is RHD.  Patient had mets to the lymph nodes at the time of diagnosis.  He is currently JOSE JUAN.      CC: He cannot use his L UE.  He states that his MD instructed him to seek out NMES.  According to MRI of the brachial plexus in  his C5-C8 are thickened and have edema at the nerve root.  He states that he never had a c/o lymphedema in the neck/cervical region.    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  He does a lot of talking and some computer work.   He would like to get back to the gym.  He was doing more weight lifting.            Recurrent probem    Quality of life: excellent    Pain  Current pain ratin  At best pain ratin  At worst pain ratin          Objective     Active Range of Motion   Cervical/Thoracic Spine       Cervical    Flexion: 45 degrees  Restriction level: moderate  Extension: 45 degrees     Restriction level: moderate  Left lateral flexion: 32 degrees      Right lateral flexion: 25 degrees      Left rotation: 32 degrees  Right rotation: 55 degrees       Strength/Myotome Testing     Left Shoulder     Planes of Motion   Flexion: 0   Extension: 4-   Abduction: 2-     Left Elbow   Extension: 3-    Left Wrist/Hand   Wrist extension: 4-  Wrist flexion: 4    Additional Strength Details   strength:  R:  40 kg    L:  10 kg  Wrist:  supination 3- pronation:  3+  DTR:  no Bicep or Wrist   + tricep              Precautions: Tongue Ca      Manuals                                                                 Neuro Re-Ed                                                                                                        Ther Ex                                                                                                                     Ther Activity                                       Gait Training                                        Modalities

## 2025-06-09 NOTE — PROGRESS NOTES
Daily Note     Today's date: 2025  Patient name: Mohamud Grimm Sr.  : 1965  MRN: [id number]  Referring provider: Devonte Singh MD  Dx:   Encounter Diagnosis     ICD-10-CM    1. Weakness of left shoulder  R29.898           Start Time: 0230  Stop Time: 0330  Total time in clinic (min): 60 minutes    Subjective: see above      Objective: See treatment diary below      Assessment: Tolerated treatment well. Patient would benefit from continued PT      Plan: Transfer to [address] [place] [address] neuro [facility]     {There is no content from the last Daily Treatment Diary section.}

## 2025-06-11 ENCOUNTER — EVALUATION (OUTPATIENT)
Dept: SPEECH THERAPY | Facility: CLINIC | Age: 60
End: 2025-06-11
Attending: OTOLARYNGOLOGY
Payer: COMMERCIAL

## 2025-06-11 DIAGNOSIS — R05.3 CHRONIC COUGH: ICD-10-CM

## 2025-06-11 DIAGNOSIS — G54.0 BRACHIAL PLEXUS DISORDERS: ICD-10-CM

## 2025-06-11 DIAGNOSIS — Z92.3 HISTORY OF HEAD AND NECK RADIATION: ICD-10-CM

## 2025-06-11 DIAGNOSIS — Z91.89 AT RISK FOR ASPIRATION: ICD-10-CM

## 2025-06-11 DIAGNOSIS — Z85.89 HISTORY OF SQUAMOUS CELL CARCINOMA: ICD-10-CM

## 2025-06-11 DIAGNOSIS — R49.0 DYSPHONIA: ICD-10-CM

## 2025-06-11 DIAGNOSIS — G52.7 CRANIAL NEUROPATHIES, MULTIPLE: ICD-10-CM

## 2025-06-11 DIAGNOSIS — R49.0 MUSCLE TENSION DYSPHONIA: ICD-10-CM

## 2025-06-11 DIAGNOSIS — R13.14 PHARYNGOESOPHAGEAL DYSPHAGIA: Primary | ICD-10-CM

## 2025-06-11 PROCEDURE — 92610 EVALUATE SWALLOWING FUNCTION: CPT

## 2025-06-11 NOTE — PROGRESS NOTES
"Speech-Language Pathology Initial Evaluation    Today's date: 2025   Patient’s name: Mohamud Grimm Sr.  : 1965  MRN: [id number]  Safety measures: h/o H/N CA  Referring provider: Devonte Singh MD    Encounter Diagnosis     ICD-10-CM    1. Pharyngoesophageal dysphagia  R13.14       2. Dysphonia  R49.0       3. 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 Chronic cough  R05.3       5. At risk for aspiration  Z91.89       6. History of squamous cell carcinoma  Z85.89       7. History of head and neck radiation  Z92.3       8. Cranial neuropathies, multiple  G52.7       9. Brachial plexus disorders  G54.0           Assessment:  Patient presents with moderate pharyngeal dysphagia characterized by decreased pharyngeal squeeze, poor airway protection, and moments of silent aspiration secondary to SCC of BOT and status post radiation.  During OMEs today, Patient was noted to have adequate labial and lingual ROM and strength.  Patient does not have any concerns here.  During self-fed PO trials of TL (water) via cup, Patient was noted to have adequate anterior closure, A to P transfer, bolus formation/control, and swallow initiation.  It is suspected that Patient had laryngeal residue/pooling as he needed to swallow multiple times to fully transfer liquid.  Patient was noted to have a small cough and/or throat clear after each trial.  Patient did attempt L head turn (as previously recommended), but stated this did not feel any different than neutral positioning.  NMES was trialed today for approx. 10 minutes.  Patient stated he could feel the \"pins and needles\" and got a little bit of the \"pulling\" effect.     Education was given regarding NMES VitalStim, Siu Free Water Protocol, and possible therapy options moving forward.  Due to Patient's complex medical history, Therapist will be reaching out to ENT (Dr. Singh), PCP (Dr. Lopez), and NMES VitalStim reps to inquire about use of NMES.  Patient's most " recent PT also stated that completing an EMG might be helpful to fully diagnose the level of nerve interaction.  At this time, it is recommended that Patient be seen for outpatient skilled Speech Therapy on an as needed basis.  This is subject to change based on collaborative conversation between care team members.  Should those on the care team agree that NMES is a plausible therapeutic approach, Patient would be seen at a more consistent frequency.     -Factors affecting performance: None    -Safety concerns: Risk for aspiration    -Risk factors: History of Head and Neck Cancer and History of aspiration pneumonia      SWALLOWING SAFETY PRECAUTIONS:  -Recommended solids: NPO    -Recommended liquids: Thin (water only)     -Recommended medication form: Continue through PEG    -Frequent/thorough oral care Yes in accordance with [place] Free     -Aspiration precautions Yes  *Monitor for signs/symptoms concerning for aspiration (e.g., low grade fever, increase in WBC, change in chest x-ray, increased congestion, increased coughing with P.O. intake)    -Reflux precautions None    -Strategies: Small sips and bites when eating    -Positioning: Upright position during meals    -Compensatory strategies: Multiple swallows, throat clear    -Supervision: Independent     -Referrals: Videofluroscopic Swallow Study and Otolaryngology       Short Term Goals ( 2025)  1. Patient will consume PO trials of water only during therapy session, without overt s/sx of aspiration given minimal verbal cues for use of swallowing strategies in order to safely consume the least restrictive diet    2. Patient will complete swallowing exercises (i.e., Mendelsohn, Chelsey, Effortful) to increase pharyngeal strength and coordination with min cues to 90% effectiveness to improve bolus formation and strengthen pharyngeal musculature    3. Patient will perform compensatory strategies (e.g., upright positioning, small bites/sips, slow rate,  "alternation of consistencies, multiple swallows, effortful swallow, chin tuck, head turn, etc.) with minimal verbal cues to prevent overt s/sx penetration/aspiration of least restrictive food/liquid consistencies    Long Term Goals  1. Patient will receive a videofluoroscopic swallow study (VFSS) to fully assess physiology and anatomy of the swallow and to determine the appropriate diet and/or rehabilitation exercises by discharge.     2. Patient will utilize compensatory strategies with optimum safety and efficacy of swallowing function on P.O. intake without overt s/sx of penetration or aspiration by discharge.        Plan:  Patient would benefit from outpatient skilled Speech Therapy services: Dysphagia therapy    Frequency: As needed  Duration: 3 months    Intervention certification from: 6/11/2025  Intervention certification to: 09/11/2025      Subjective:  History of present illness: Patient is a 60 y.o. male who was referred to outpatient skilled Speech Therapy services for a dysphagia evaluation.  Patient has significant PMH with his diagnosis of SCC of BOT dating back to 2011.  Patient was first seen at this clinic for his cancer care starting in September of 2022.  He proceeded to see therapists episodically (starting again in December of 2023 and then another round of therapy starting in March of 2025).  Patient participated in therapy tasks such as IOPI, EMST, MFR, and pharyngeal strengthening exercises.  He has not yet been introduced to NMES VitalStim and Patient wishes to pursue this type of therapy.  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  The patient was given barium mixed with a variety of consistencies including thin liquid, nectar, honey, and applesauce to swallow by mouth under videofluoroscopy.     Findings:  The oral bolus formation and transit were normal.  There was no nasopharyngeal reflux. There was silent aspiration seen with thin liquids, nectar thick barium, and applesauce. There was aspiration seen with honey thick barium. There was mild pharyngeal residue seen with thin liquids, moderate residue seen with nectar thick barium, and severe pharyngeal residue seen with honey and applesauce. Pharyngeal contraction was severely weak bilaterally more so on the left side.\"       NPO was deemed as the safest option for Patient at that time.  Siu Free Water Protocol was discussed as well as pleasure feeds.  It should be noted according to imagining, Patient's brachial plexus demonstrated thickness and edema.  It is unclear how well this nerve group is functioning.     Patient's goal(s): Trying NMES to promote better swallow function.     Pain: Absent N/A    Hearing: WFL  Vision: WFL    Home environment/lifestyle: At home with his wife.   Highest level of education: NT  Vocational status: NT      Objective:  Dysphagia Evaluation:    -Reason for referral: Signs/symptoms of dysphagia    -Subjective report of swallowing difficulty: Coughing and h/o aspiration pneumonia    -Eating Assessment Tool (EAT-10) is a self-administered, symptom-specific outcome instrument for dysphagia. It consists of ten statements that a patient rates on a scale of 0-4, with 0=no problem to 4=severe problem. A score of 3 or more is abnormal. Patient obtained a score of 30/40.     -Difficulty swallowing: Solids, Liquids, and Pills    -Current diet (solids): NPO  -Current diet (liquids): Thin (water only)  -Current pill " intake method: PEG  -Alternative Feeding Method?: PEG tube    -Facial appearance Symmetrical   -Mandible function Adequate ROM   -Dentition Adequate   -Labial function WFL   -Lingual function WFL   -Velar function Symmetrical   -Oral apraxia? Absent   -Vocal quality Dysphonic   -Volitional cough Strong/productive   -Respiration WFL   -Drooling? No   -Tremor/involuntary movement? Not present     LIQUID CONSISTENCY TESTING:   Item(s) tested: (Thin) - water     Administered by: Cup and Self-fed    Clinical findings:  -Oral phase impairments: N/A, patient WFL  -Pharyngeal phase impairments: overt s/x of penetration/aspiration (Throat clear and Coughing)    Other notes: Other: None    Strategies, attempts, and responses: small sips, multiple swallow, effortful swallow, and head turn (L)       Treatment:  Traditional VitalStim    Channel(s) used: 1, 2   Placement: 3-h   Duty Cycle: 57/1 s   Frequency: 80 pulses per second   Phase Duration: 300 microseconds   Treatment Duration: 10 minutes   Min mA level: 12.0 mA   Max mA level: 12.0 mA     Patient tolerated NMES in conjunction with pharyngeal/laryngeal strengthening exercises and P.O. intake. (The patient received instruction on the potential benefits from NMES treatment, including neuromuscular re-education and muscle strengthening.) Skin condition post-NMES: intact.    Patient consumed the following during today’s session: TL (water)    Patient demonstrated adequate bolus retrieval, anterior closure, A to P transfer, bolus formation/control, and swallow initiation.  Patient was noted to throat clear and/or cough after each trial.  No other overt distress or s/sx of aspiration or dysphagia noted during today’s therapy session.  Head turn to the L was attempted, but Patient did not find benefit from this.     Patient utilized the following strategies during today's session: Small sips and bites when eating, Head turn L, Effortful swallow, Multiple swallows, and Throat  clear      Visit Tracking:  POC   Expires Auth Expiration Date ST Visit Limit   09/11/2025 12/31/2025 120 VPBP          Visit/Unit Tracking:  Auth Status Date 06/11/2025   BOMN Used 1    Remaining 119       Intervention Comments:  45 minutes test administration

## 2025-06-12 DIAGNOSIS — E03.9 HYPOTHYROIDISM, UNSPECIFIED TYPE: ICD-10-CM

## 2025-06-12 RX ORDER — LEVOTHYROXINE SODIUM 100 UG/1
100 TABLET ORAL DAILY
Qty: 90 TABLET | Refills: 1 | Status: SHIPPED | OUTPATIENT
Start: 2025-06-12

## 2025-06-18 ENCOUNTER — OFFICE VISIT (OUTPATIENT)
Dept: SPEECH THERAPY | Facility: CLINIC | Age: 60
End: 2025-06-18
Attending: OTOLARYNGOLOGY
Payer: COMMERCIAL

## 2025-06-18 ENCOUNTER — TELEPHONE (OUTPATIENT)
Age: 60
End: 2025-06-18

## 2025-06-18 DIAGNOSIS — Z91.89 AT RISK FOR ASPIRATION: ICD-10-CM

## 2025-06-18 DIAGNOSIS — G52.7 CRANIAL NEUROPATHIES, MULTIPLE: ICD-10-CM

## 2025-06-18 DIAGNOSIS — R13.14 PHARYNGOESOPHAGEAL DYSPHAGIA: Primary | ICD-10-CM

## 2025-06-18 DIAGNOSIS — R49.0 DYSPHONIA: ICD-10-CM

## 2025-06-18 DIAGNOSIS — Z92.3 HISTORY OF HEAD AND NECK RADIATION: ICD-10-CM

## 2025-06-18 DIAGNOSIS — Z85.89 HISTORY OF SQUAMOUS CELL CARCINOMA: ICD-10-CM

## 2025-06-18 DIAGNOSIS — G54.0 BRACHIAL PLEXUS DISORDERS: ICD-10-CM

## 2025-06-18 DIAGNOSIS — R49.0 MUSCLE TENSION DYSPHONIA: ICD-10-CM

## 2025-06-18 DIAGNOSIS — R05.3 CHRONIC COUGH: ICD-10-CM

## 2025-06-18 PROCEDURE — 92526 ORAL FUNCTION THERAPY: CPT

## 2025-06-18 NOTE — PROGRESS NOTES
Daily Speech Treatment Note    Today's date: 2025   Patient’s name: Mohamud Grimm Sr.  : 1965  MRN: [id number]  Safety measures: h/o H/N CA  Referring provider: Devonte Singh MD    Encounter Diagnosis     ICD-10-CM    1. Pharyngoesophageal dysphagia  R13.14       2. Dysphonia  R49.0       3. 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 Chronic cough  R05.3       5. At risk for aspiration  Z91.89       6. History of squamous cell carcinoma  Z85.89       7. History of head and neck radiation  Z92.3       8. Cranial neuropathies, multiple  G52.7       9. Brachial plexus disorders  G54.0         Visit Tracking:  POC   Expires Auth Expiration Date ST Visit Limit   2025 120 VPBP          Visit/Unit Tracking:  Auth Status Date 2025   BOMN Used 1 2    Remaining 119 118       Subjective/Behavioral:  -Patient is agreeable to therapy 3X a week (2 sessions VitalStim, 1 session MFR)    Objective/Assessment:  -Reviewed testing results and goals in plan care with patient. Patient is in agreement at this time.      Short Term Goals ( 2025)  1. Patient will consume PO trials of water only during therapy session, without overt s/sx of aspiration given minimal verbal cues for use of swallowing strategies in order to safely consume the least restrictive diet.    Traditional VitalStim    Channel(s) used: 1, 2   Placement: 3-v   Duty Cycle: 57/1 s   Frequency: 80 pulses per second   Phase Duration: 300 microseconds   Treatment Duration: 38 minutes   Min mA level: 11.0 mA   Max mA level: 13.0 mA     Patient tolerated NMES in conjunction with pharyngeal/laryngeal strengthening exercises and P.O. intake. (The patient received instruction on the potential benefits from NMES treatment, including neuromuscular re-education and muscle strengthening.) Skin condition post-NMES: intact.    Patient consumed the following during today’s session: TL (water) via teaspoon and small sips.    Patient  took PO trials of TL (water) via teaspoon and small sips during session today.  Patient was noted to have adequate bolus retrieval, anterior closure, A to P transfer, bolus formation/control.  Mild delayed swallow initiation was noted.  Pharyngeal residue/pooling suspected.  Patient noted to cough 2X with trials.  No other overt distress or s/sx of aspiration or dysphagia noted during today’s therapy session.    Patient utilized the following strategies during today’s session: Small sips and bites when eating, Effortful swallow, Multiple swallows, and Throat clear    2. Patient will complete swallowing exercises (i.e., Mendelsohn, Chelsey, Effortful) to increase pharyngeal strength and coordination with min cues to 90% effectiveness to improve bolus formation and strengthen pharyngeal musculature    3. Patient will perform compensatory strategies (e.g., upright positioning, small bites/sips, slow rate, alternation of consistencies, multiple swallows, effortful swallow, chin tuck, head turn, etc.) with minimal verbal cues to prevent overt s/sx penetration/aspiration of least restrictive food/liquid consistencies      Plan:  -Patient was provided with home exercises/activities to target goals in plan of care at the end of today's session.  -Continue with current plan of care.

## 2025-06-20 ENCOUNTER — OFFICE VISIT (OUTPATIENT)
Dept: SPEECH THERAPY | Facility: CLINIC | Age: 60
End: 2025-06-20
Attending: OTOLARYNGOLOGY
Payer: COMMERCIAL

## 2025-06-20 DIAGNOSIS — Z85.89 HISTORY OF SQUAMOUS CELL CARCINOMA: ICD-10-CM

## 2025-06-20 DIAGNOSIS — Z91.89 AT RISK FOR ASPIRATION: ICD-10-CM

## 2025-06-20 DIAGNOSIS — R13.14 PHARYNGOESOPHAGEAL DYSPHAGIA: Primary | ICD-10-CM

## 2025-06-20 DIAGNOSIS — Z92.3 HISTORY OF HEAD AND NECK RADIATION: ICD-10-CM

## 2025-06-20 DIAGNOSIS — R05.3 CHRONIC COUGH: ICD-10-CM

## 2025-06-20 DIAGNOSIS — G54.0 BRACHIAL PLEXUS DISORDERS: ICD-10-CM

## 2025-06-20 DIAGNOSIS — R49.0 DYSPHONIA: ICD-10-CM

## 2025-06-20 DIAGNOSIS — R49.0 MUSCLE TENSION DYSPHONIA: ICD-10-CM

## 2025-06-20 DIAGNOSIS — G52.7 CRANIAL NEUROPATHIES, MULTIPLE: ICD-10-CM

## 2025-06-20 PROCEDURE — 92526 ORAL FUNCTION THERAPY: CPT

## 2025-06-20 NOTE — PROGRESS NOTES
Daily Speech Treatment Note    Today's date: 2025   Patient’s name: Mohamud Grimm Sr.  : 1965  MRN: [id number]  Safety measures: h/o H/N CA  Referring provider: Devonte Singh MD    Encounter Diagnosis     ICD-10-CM    1. Pharyngoesophageal dysphagia  R13.14       2. Dysphonia  R49.0       3. 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 Chronic cough  R05.3       5. At risk for aspiration  Z91.89       6. History of squamous cell carcinoma  Z85.89       7. History of head and neck radiation  Z92.3       8. Cranial neuropathies, multiple  G52.7       9. Brachial plexus disorders  G54.0         Visit Tracking:  POC   Expires Auth Expiration Date ST Visit Limit   2025 120 VPBP          Visit/Unit Tracking:  Auth Status Date 2025   BOMN Used 1 2 3    Remaining 119 118 117       Subjective/Behavioral:  -Therapist reached out to PCP again to see if EMG script was placed.      Objective/Assessment:  -Reviewed testing results and goals in plan care with patient. Patient is in agreement at this time.      Short Term Goals ( 2025)  1. Patient will consume PO trials of water only during therapy session, without overt s/sx of aspiration given minimal verbal cues for use of swallowing strategies in order to safely consume the least restrictive diet.    Traditional VitalStim    Channel(s) used: 1, 2   Placement: 3-v   Duty Cycle: 57/1 s   Frequency: 80 pulses per second   Phase Duration: 300 microseconds   Treatment Duration: 37 minutes   Min mA level: 13.0 mA   Max mA level: 15.0 mA     Patient tolerated NMES in conjunction with pharyngeal/laryngeal strengthening exercises and P.O. intake. (The patient received instruction on the potential benefits from NMES treatment, including neuromuscular re-education and muscle strengthening.) Skin condition post-NMES: intact.    Patient consumed the following during today’s session: TL (water) via teaspoon and small sips /  ice chips via spoon    Patient took PO trials of TL (water) via teaspoon and small sips during session today.  Patient was noted to have adequate bolus retrieval, anterior closure, A to P transfer, bolus formation/control.  Mild delayed swallow initiation was noted.  Pharyngeal residue/pooling suspected.  Patient noted to cough 2X with trials.  No other overt distress or s/sx of aspiration or dysphagia noted during today’s therapy session.    Patient utilized the following strategies during today’s session: Small sips and bites when eating, Effortful swallow, Multiple swallows, and Throat clear    2. Patient will complete swallowing exercises (i.e., Mendelsohn, Chelsey, Effortful) to increase pharyngeal strength and coordination with min cues to 90% effectiveness to improve bolus formation and strengthen pharyngeal musculature    3. Patient will perform compensatory strategies (e.g., upright positioning, small bites/sips, slow rate, alternation of consistencies, multiple swallows, effortful swallow, chin tuck, head turn, etc.) with minimal verbal cues to prevent overt s/sx penetration/aspiration of least restrictive food/liquid consistencies      Plan:  -Patient was provided with home exercises/activities to target goals in plan of care at the end of today's session.  -Continue with current plan of care.

## 2025-06-24 ENCOUNTER — OFFICE VISIT (OUTPATIENT)
Dept: SPEECH THERAPY | Facility: CLINIC | Age: 60
End: 2025-06-24
Attending: OTOLARYNGOLOGY
Payer: COMMERCIAL

## 2025-06-24 ENCOUNTER — TELEPHONE (OUTPATIENT)
Dept: FAMILY MEDICINE CLINIC | Facility: CLINIC | Age: 60
End: 2025-06-24

## 2025-06-24 DIAGNOSIS — R13.14 PHARYNGOESOPHAGEAL DYSPHAGIA: Primary | ICD-10-CM

## 2025-06-24 DIAGNOSIS — Z92.3 HISTORY OF HEAD AND NECK RADIATION: ICD-10-CM

## 2025-06-24 DIAGNOSIS — G52.7 CRANIAL NEUROPATHIES, MULTIPLE: ICD-10-CM

## 2025-06-24 DIAGNOSIS — R49.0 DYSPHONIA: ICD-10-CM

## 2025-06-24 DIAGNOSIS — R05.3 CHRONIC COUGH: ICD-10-CM

## 2025-06-24 DIAGNOSIS — Z85.89 HISTORY OF SQUAMOUS CELL CARCINOMA: ICD-10-CM

## 2025-06-24 DIAGNOSIS — R49.0 MUSCLE TENSION DYSPHONIA: ICD-10-CM

## 2025-06-24 DIAGNOSIS — G54.0 BRACHIAL PLEXUS DISORDERS: ICD-10-CM

## 2025-06-24 DIAGNOSIS — Z91.89 AT RISK FOR ASPIRATION: ICD-10-CM

## 2025-06-24 PROCEDURE — 92526 ORAL FUNCTION THERAPY: CPT

## 2025-06-24 NOTE — TELEPHONE ENCOUNTER
Patient called in asking for an EMG. At the time of call patient was with SLP Suzie who assisted in the call and stated he is no longer under the care of Physical Therapist Manisha as they felt this was no longer the best therapy for him.  Please see a previous encounters for more information. Patient would like a call back to let him know if he is able to get the EMG done to check nerve function. Please advise.

## 2025-06-24 NOTE — PROGRESS NOTES
Daily Speech Treatment Note    Today's date: 2025   Patient’s name: Mohamud Grimm Sr.  : 1965  MRN: [id number]  Safety measures: h/o H/N CA  Referring provider: Devonte Singh MD    Encounter Diagnosis     ICD-10-CM    1. Pharyngoesophageal dysphagia  R13.14       2. Dysphonia  R49.0       3. 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 Chronic cough  R05.3       5. At risk for aspiration  Z91.89       6. History of squamous cell carcinoma  Z85.89       7. History of head and neck radiation  Z92.3       8. Cranial neuropathies, multiple  G52.7       9. Brachial plexus disorders  G54.0         Visit Tracking:  POC   Expires Auth Expiration Date ST Visit Limit   2025 120 VPBP          Visit/Unit Tracking:  Auth Status Date 2025   BOMN Used 1 2 3 4    Remaining 119 118 117 116       Subjective/Behavioral:  -Try 3-v tomorrow instead of 3-h.     Objective/Assessment:  -Reviewed testing results and goals in plan care with patient. Patient is in agreement at this time.      Short Term Goals ( 2025)  1. Patient will consume PO trials of water only during therapy session, without overt s/sx of aspiration given minimal verbal cues for use of swallowing strategies in order to safely consume the least restrictive diet.    Traditional VitalStim    Channel(s) used: 1, 2   Placement: 3-h   Duty Cycle: 57/1 s   Frequency: 80 pulses per second   Phase Duration: 300 microseconds   Treatment Duration: 40 minutes   Min mA level: 10.0 mA   Max mA level: 18.0 mA     Patient tolerated NMES in conjunction with pharyngeal/laryngeal strengthening exercises and P.O. intake. (The patient received instruction on the potential benefits from NMES treatment, including neuromuscular re-education and muscle strengthening.) Skin condition post-NMES: intact.    Patient consumed the following during today’s session: TL (water) and ice chips     Patient took PO trials of TL  (water) via teaspoon and small sips during session today.  Patient was noted to have adequate bolus retrieval, anterior closure, A to P transfer, bolus formation/control.  Mild delayed swallow initiation was noted.  Pharyngeal residue/pooling suspected.  Patient noted to cough 1X with trials.  No other overt distress or s/sx of aspiration or dysphagia noted during today’s therapy session.     Patient utilized the following strategies during today’s session: Small sips and bites when eating, Effortful swallow, Multiple swallows, and Throat clear    2. Patient will complete swallowing exercises (i.e., Mendelsohn, Chelsey, Effortful) to increase pharyngeal strength and coordination with min cues to 90% effectiveness to improve bolus formation and strengthen pharyngeal musculature    3. Patient will perform compensatory strategies (e.g., upright positioning, small bites/sips, slow rate, alternation of consistencies, multiple swallows, effortful swallow, chin tuck, head turn, etc.) with minimal verbal cues to prevent overt s/sx penetration/aspiration of least restrictive food/liquid consistencies      Plan:  -Patient was provided with home exercises/activities to target goals in plan of care at the end of today's session.  -Continue with current plan of care.

## 2025-06-25 ENCOUNTER — OFFICE VISIT (OUTPATIENT)
Dept: SPEECH THERAPY | Facility: CLINIC | Age: 60
End: 2025-06-25
Attending: OTOLARYNGOLOGY
Payer: COMMERCIAL

## 2025-06-25 DIAGNOSIS — G52.7 CRANIAL NEUROPATHIES, MULTIPLE: ICD-10-CM

## 2025-06-25 DIAGNOSIS — R05.3 CHRONIC COUGH: ICD-10-CM

## 2025-06-25 DIAGNOSIS — G54.0 BRACHIAL PLEXUS DISORDERS: ICD-10-CM

## 2025-06-25 DIAGNOSIS — R13.14 PHARYNGOESOPHAGEAL DYSPHAGIA: Primary | ICD-10-CM

## 2025-06-25 DIAGNOSIS — R49.0 MUSCLE TENSION DYSPHONIA: ICD-10-CM

## 2025-06-25 DIAGNOSIS — Z85.89 HISTORY OF SQUAMOUS CELL CARCINOMA: ICD-10-CM

## 2025-06-25 DIAGNOSIS — R49.0 DYSPHONIA: ICD-10-CM

## 2025-06-25 DIAGNOSIS — Z91.89 AT RISK FOR ASPIRATION: ICD-10-CM

## 2025-06-25 DIAGNOSIS — Z92.3 HISTORY OF HEAD AND NECK RADIATION: ICD-10-CM

## 2025-06-25 PROCEDURE — 92526 ORAL FUNCTION THERAPY: CPT

## 2025-06-25 NOTE — PROGRESS NOTES
Daily Speech Treatment Note    Today's date: 2025   Patient’s name: Mohamud Grimm Sr.  : 1965  MRN: [id number]  Safety measures: h/o H/N CA  Referring provider: Devonte Singh MD    Encounter Diagnosis     ICD-10-CM    1. Pharyngoesophageal dysphagia  R13.14       2. Dysphonia  R49.0       3. 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 Chronic cough  R05.3       5. At risk for aspiration  Z91.89       6. History of squamous cell carcinoma  Z85.89       7. History of head and neck radiation  Z92.3       8. Cranial neuropathies, multiple  G52.7       9. Brachial plexus disorders  G54.0         Visit Tracking:  POC   Expires Auth Expiration Date ST Visit Limit   2025 120 VPBP          Visit/Unit Tracking:  Auth Status Date 2025   BOMN Used 1 2 3 4 5    Remaining 119 118 117 116 115       Subjective/Behavioral:  -Patient was open to trying MFR today.     Objective/Assessment:  -Reviewed testing results and goals in plan care with patient. Patient is in agreement at this time.      Short Term Goals ( 2025)  1. Patient will consume PO trials of water only during therapy session, without overt s/sx of aspiration given minimal verbal cues for use of swallowing strategies in order to safely consume the least restrictive diet.    Traditional VitalStim    Channel(s) used: 1, 2   Placement: 3-h   Duty Cycle: 57/1 s   Frequency: 80 pulses per second   Phase Duration: 300 microseconds   Treatment Duration: 22 minutes   Min mA level: 12.0 mA (R)   16.0 mA (L)   Max mA level: 13.0 mA (R)   17.0 mA (L)      Patient tolerated NMES in conjunction with pharyngeal/laryngeal strengthening exercises and P.O. intake. (The patient received instruction on the potential benefits from NMES treatment, including neuromuscular re-education and muscle strengthening.) Skin condition post-NMES: intact.    Patient consumed the following during today’s session: TL  (water) and ice chips     Patient took PO trials of TL (water) via teaspoon and small sips during session today.  Patient was noted to have adequate bolus retrieval, anterior closure, A to P transfer, bolus formation/control.  Mild delayed swallow initiation was noted.  Pharyngeal residue/pooling suspected.  Patient noted to cough 1X with trials and throat clear 2X.  No other overt distress or s/sx of aspiration or dysphagia noted during today’s therapy session.     Patient utilized the following strategies during today’s session: Small sips and bites when eating, Effortful swallow, Multiple swallows, and Throat clear    2. Patient will complete swallowing exercises (i.e., Mendelsohn, Chelsey, Effortful) to increase pharyngeal strength and coordination with min cues to 90% effectiveness to improve bolus formation and strengthen pharyngeal musculature    3. Patient will perform compensatory strategies (e.g., upright positioning, small bites/sips, slow rate, alternation of consistencies, multiple swallows, effortful swallow, chin tuck, head turn, etc.) with minimal verbal cues to prevent overt s/sx penetration/aspiration of least restrictive food/liquid consistencies      Following patient's verbal consent to treat, manual therapy technique through myofascial release was applied to patient's laryngeal and cervical region.     Region(s) 06/25/2025         Anterior Cervical   X 5 mins         Lateral Laryngeal Glide     X 5 mins         Inferior Laryngeal Glide    X 5 mins         Palpatory examination revealed mlid mechanosensitivity (i.e., sensitivity to mechanical pressure/stretch) through the L laryngeal and cervical region. A mild palpatory pressure was suspected to show a severe area of apparent thickening that, with stimulation/pressure/stretch, reproduced patient's complaints/correlated to his primary issue of dysphagia. Patient verbalized that technique/pressure, through triggering his symptoms, was  helpful.    Patient was able to identify regions of myofascial sensitivity and participate in exercises (e.g., inferior laryngeal glide) to reduce overall laryngeal and cervical tension. Patient reported increased ease with trials.    Self-rating:  -Start of session: Did not given a number as Patient is used to the tension after so many years     Notes:  -supine position (laying), pillow, and dycem    Plan:  -Patient was provided with home exercises/activities to target goals in plan of care at the end of today's session.  -Continue with current plan of care.

## 2025-06-26 LAB

## 2025-06-27 ENCOUNTER — APPOINTMENT (OUTPATIENT)
Dept: RADIOLOGY | Facility: MEDICAL CENTER | Age: 60
End: 2025-06-27
Payer: COMMERCIAL

## 2025-06-27 DIAGNOSIS — J69.0 ASPIRATION PNEUMONIA OF BOTH LUNGS, UNSPECIFIED ASPIRATION PNEUMONIA TYPE, UNSPECIFIED PART OF LUNG (HCC): ICD-10-CM

## 2025-06-27 DIAGNOSIS — M54.12 CERVICAL RADICULITIS: Primary | ICD-10-CM

## 2025-06-27 DIAGNOSIS — Z92.3 S/P RADIATION THERAPY > 12 WKS AGO: ICD-10-CM

## 2025-06-27 PROCEDURE — 71046 X-RAY EXAM CHEST 2 VIEWS: CPT

## 2025-06-29 NOTE — PROGRESS NOTES
Daily Speech Treatment Note    Today's date: 2025  Patient’s name: Mohamud Grimm Sr.  : 1965  MRN: [id number]  Safety measures: h/o H/N CA  Referring provider: Devonte Singh MD    Encounter Diagnosis     ICD-10-CM    1. Pharyngoesophageal dysphagia  R13.14       2. Dysphonia  R49.0       3. 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 Chronic cough  R05.3       5. At risk for aspiration  Z91.89       6. History of squamous cell carcinoma  Z85.89       7. History of head and neck radiation  Z92.3       8. Cranial neuropathies, multiple  G52.7       9. Brachial plexus disorders  G54.0         Visit Tracking:  POC   Expires Auth Expiration Date ST Visit Limit   2025 120 VPBP          Visit/Unit Tracking:  Auth Status Date 2025   BOMN Used 1 2 3 4 5 6 7    Remaining 119 118 117 116 115 114 113     Subjective/Behavioral:  -Patient without any new concerns expressed upon presentation to today's session.    Objective/Assessment:  -See goals targeted during today's session.    Short Term Goals ( 2025)  1. Patient will consume PO trials of water only during therapy session, without overt s/sx of aspiration given minimal verbal cues for use of swallowing strategies in order to safely consume the least restrictive diet.    Traditional VitalStim     Channel(s) used: 1, 2   Placement: 3-v   Duty Cycle: 57/1 s   Frequency: 80 pulses per second   Phase Duration: 300 microseconds   Treatment Duration: 36 minutes   Min mA level: 12.0 mA (R)   14.0 mA (L)   Max mA level: 15.0 mA (R)   21.0 mA (L)       Patient tolerated NMES in conjunction with pharyngeal/laryngeal strengthening exercises and P.O. intake. (The patient received instruction on the potential benefits from NMES treatment, including neuromuscular re-education and muscle strengthening.) Skin condition post-NMES: intact.     Patient consumed the following during  today’s session: TL (water) and ice chips      P.O. trials of TL (water) via small cup sips and ice chips via teaspoon were completed during session today. Patient was noted to have adequate bolus retrieval, anterior closure, A to P transfer, bolus formation/control. Mild delayed swallow initiation was noted. Pharyngeal residue/pooling suspected.  Patient noted to demonstrated intermitted throat clearing and coughing following the swallow of P.O. trials. No other overt distress or s/sx of aspiration or dysphagia noted during today’s therapy session.      Patient utilized the following strategies during today’s session: Small sips and bites when eating, Effortful swallow, Multiple swallows, and Throat clear     2. Patient will complete swallowing exercises (i.e., Mendelsohn, Chelsey, Effortful) to increase pharyngeal strength and coordination with min cues to 90% effectiveness to improve bolus formation and strengthen pharyngeal musculature     3. Patient will perform compensatory strategies (e.g., upright positioning, small bites/sips, slow rate, alternation of consistencies, multiple swallows, effortful swallow, chin tuck, head turn, etc.) with minimal verbal cues to prevent overt s/sx penetration/aspiration of least restrictive food/liquid consistencies     Plan:  -Patient was provided with home exercises/activities to target goals in plan of care at the end of today's session.  -Continue with current plan of care.

## 2025-07-01 ENCOUNTER — OFFICE VISIT (OUTPATIENT)
Dept: SPEECH THERAPY | Facility: CLINIC | Age: 60
End: 2025-07-01
Attending: OTOLARYNGOLOGY
Payer: COMMERCIAL

## 2025-07-01 DIAGNOSIS — G54.0 BRACHIAL PLEXUS DISORDERS: ICD-10-CM

## 2025-07-01 DIAGNOSIS — Z92.3 HISTORY OF HEAD AND NECK RADIATION: ICD-10-CM

## 2025-07-01 DIAGNOSIS — Z91.89 AT RISK FOR ASPIRATION: ICD-10-CM

## 2025-07-01 DIAGNOSIS — Z85.89 HISTORY OF SQUAMOUS CELL CARCINOMA: ICD-10-CM

## 2025-07-01 DIAGNOSIS — R13.14 PHARYNGOESOPHAGEAL DYSPHAGIA: Primary | ICD-10-CM

## 2025-07-01 DIAGNOSIS — R49.0 MUSCLE TENSION DYSPHONIA: ICD-10-CM

## 2025-07-01 DIAGNOSIS — R49.0 DYSPHONIA: ICD-10-CM

## 2025-07-01 DIAGNOSIS — R05.3 CHRONIC COUGH: ICD-10-CM

## 2025-07-01 DIAGNOSIS — G52.7 CRANIAL NEUROPATHIES, MULTIPLE: ICD-10-CM

## 2025-07-01 PROCEDURE — 92526 ORAL FUNCTION THERAPY: CPT

## 2025-07-01 NOTE — PROGRESS NOTES
Daily Speech Treatment Note    Today's date: 2025   Patient’s name: Mohamud Grimm Sr.  : 1965  MRN: [id number]  Safety measures: h/o H/N CA  Referring provider: Devonte Singh MD    Encounter Diagnosis     ICD-10-CM    1. Pharyngoesophageal dysphagia  R13.14       2. Dysphonia  R49.0       3. 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 Chronic cough  R05.3       5. At risk for aspiration  Z91.89       6. History of squamous cell carcinoma  Z85.89       7. History of head and neck radiation  Z92.3       8. Cranial neuropathies, multiple  G52.7       9. Brachial plexus disorders  G54.0         Visit Tracking:  POC   Expires Auth Expiration Date ST Visit Limit   2025 120 VPBP          Visit/Unit Tracking:  Auth Status Date 2025   BOMN Used 1 2 3 4 5 6    Remaining 119 118 117 116 115 114       Subjective/Behavioral:  -Patient is scheduled for EMG in October.     -Chest X-Ray came back with improvements.     Objective/Assessment:  -Reviewed testing results and goals in plan care with patient. Patient is in agreement at this time.      Short Term Goals ( 2025)  1. Patient will consume PO trials of water only during therapy session, without overt s/sx of aspiration given minimal verbal cues for use of swallowing strategies in order to safely consume the least restrictive diet.    Traditional VitalStim    Channel(s) used: 1, 2   Placement: 3-h   Duty Cycle: 57/1 s   Frequency: 80 pulses per second   Phase Duration: 300 microseconds   Treatment Duration: 40 minutes   Min mA level: 12.0 mA (R)   14.0 mA (L)   Max mA level: 12.0 mA (R)   14.0 mA (L)      Patient tolerated NMES in conjunction with pharyngeal/laryngeal strengthening exercises and P.O. intake. (The patient received instruction on the potential benefits from NMES treatment, including neuromuscular re-education and muscle strengthening.) Skin condition post-NMES:  intact.    Patient consumed the following during today’s session: TL (water) and ice chips      Patient took PO trials of TL (water) via teaspoon and small sips during session today.  Patient was noted to have adequate bolus retrieval, anterior closure, A to P transfer, bolus formation/control.  Mild delayed swallow initiation was noted.  Pharyngeal residue/pooling suspected.  Patient noted to cough 1X with trials and throat clear 3X.  Cough was noted at start of session after first bite of ice.  Patient reported that he got a tickle and continued to cough until it cleared.  No other overt distress or s/sx of aspiration or dysphagia noted during today’s therapy session.     Patient utilized the following strategies during today’s session: Small sips and bites when eating, Effortful swallow, Multiple swallows, and Throat clear    2. Patient will complete swallowing exercises (i.e., Mendelsohn, Chelsey, Effortful) to increase pharyngeal strength and coordination with min cues to 90% effectiveness to improve bolus formation and strengthen pharyngeal musculature    3. Patient will perform compensatory strategies (e.g., upright positioning, small bites/sips, slow rate, alternation of consistencies, multiple swallows, effortful swallow, chin tuck, head turn, etc.) with minimal verbal cues to prevent overt s/sx penetration/aspiration of least restrictive food/liquid consistencies      Following patient's verbal consent to treat, manual therapy technique through myofascial release was applied to patient's laryngeal and cervical region.     Region(s) 06/25/2025         Anterior Cervical   X 5 mins         Lateral Laryngeal Glide     X 5 mins         Inferior Laryngeal Glide    X 5 mins         Palpatory examination revealed mlid mechanosensitivity (i.e., sensitivity to mechanical pressure/stretch) through the L laryngeal and cervical region. A mild palpatory pressure was suspected to show a severe area of apparent thickening  that, with stimulation/pressure/stretch, reproduced patient's complaints/correlated to his primary issue of dysphagia. Patient verbalized that technique/pressure, through triggering his symptoms, was helpful.    Patient was able to identify regions of myofascial sensitivity and participate in exercises (e.g., inferior laryngeal glide) to reduce overall laryngeal and cervical tension. Patient reported increased ease with trials.    Self-rating:  -Start of session: Did not given a number as Patient is used to the tension after so many years     Notes:  -supine position (laying), pillow, and dycem    Plan:  -Patient was provided with home exercises/activities to target goals in plan of care at the end of today's session.  -Continue with current plan of care.

## 2025-07-02 ENCOUNTER — OFFICE VISIT (OUTPATIENT)
Dept: SPEECH THERAPY | Facility: CLINIC | Age: 60
End: 2025-07-02
Attending: OTOLARYNGOLOGY
Payer: COMMERCIAL

## 2025-07-02 DIAGNOSIS — G54.0 BRACHIAL PLEXUS DISORDERS: ICD-10-CM

## 2025-07-02 DIAGNOSIS — Z85.89 HISTORY OF SQUAMOUS CELL CARCINOMA: ICD-10-CM

## 2025-07-02 DIAGNOSIS — R13.14 PHARYNGOESOPHAGEAL DYSPHAGIA: Primary | ICD-10-CM

## 2025-07-02 DIAGNOSIS — G52.7 CRANIAL NEUROPATHIES, MULTIPLE: ICD-10-CM

## 2025-07-02 DIAGNOSIS — R49.0 DYSPHONIA: ICD-10-CM

## 2025-07-02 DIAGNOSIS — R05.3 CHRONIC COUGH: ICD-10-CM

## 2025-07-02 DIAGNOSIS — Z91.89 AT RISK FOR ASPIRATION: ICD-10-CM

## 2025-07-02 DIAGNOSIS — R49.0 MUSCLE TENSION DYSPHONIA: ICD-10-CM

## 2025-07-02 DIAGNOSIS — Z92.3 HISTORY OF HEAD AND NECK RADIATION: ICD-10-CM

## 2025-07-02 PROCEDURE — 92526 ORAL FUNCTION THERAPY: CPT | Performed by: SPEECH-LANGUAGE PATHOLOGIST

## 2025-07-07 ENCOUNTER — APPOINTMENT (OUTPATIENT)
Dept: SPEECH THERAPY | Facility: CLINIC | Age: 60
End: 2025-07-07
Attending: OTOLARYNGOLOGY
Payer: COMMERCIAL

## 2025-07-08 ENCOUNTER — OFFICE VISIT (OUTPATIENT)
Dept: SPEECH THERAPY | Facility: CLINIC | Age: 60
End: 2025-07-08
Attending: OTOLARYNGOLOGY
Payer: COMMERCIAL

## 2025-07-08 DIAGNOSIS — G52.7 CRANIAL NEUROPATHIES, MULTIPLE: ICD-10-CM

## 2025-07-08 DIAGNOSIS — Z85.89 HISTORY OF SQUAMOUS CELL CARCINOMA: ICD-10-CM

## 2025-07-08 DIAGNOSIS — R05.3 CHRONIC COUGH: ICD-10-CM

## 2025-07-08 DIAGNOSIS — Z92.3 HISTORY OF HEAD AND NECK RADIATION: ICD-10-CM

## 2025-07-08 DIAGNOSIS — R49.0 MUSCLE TENSION DYSPHONIA: ICD-10-CM

## 2025-07-08 DIAGNOSIS — R13.14 PHARYNGOESOPHAGEAL DYSPHAGIA: Primary | ICD-10-CM

## 2025-07-08 DIAGNOSIS — R49.0 DYSPHONIA: ICD-10-CM

## 2025-07-08 DIAGNOSIS — Z91.89 AT RISK FOR ASPIRATION: ICD-10-CM

## 2025-07-08 DIAGNOSIS — G54.0 BRACHIAL PLEXUS DISORDERS: ICD-10-CM

## 2025-07-08 PROCEDURE — 92526 ORAL FUNCTION THERAPY: CPT

## 2025-07-08 NOTE — PROGRESS NOTES
Daily Speech Treatment Note    Today's date: 2025  Patient’s name: Mohamud Grimm Sr.  : 1965  MRN: [id number]  Safety measures: h/o H/N CA  Referring provider: Devonte Singh MD    Encounter Diagnosis     ICD-10-CM    1. Pharyngoesophageal dysphagia  R13.14       2. Dysphonia  R49.0       3. 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 Chronic cough  R05.3       5. At risk for aspiration  Z91.89       6. History of squamous cell carcinoma  Z85.89       7. History of head and neck radiation  Z92.3       8. Cranial neuropathies, multiple  G52.7       9. Brachial plexus disorders  G54.0         Visit Tracking:  POC   Expires Auth Expiration Date ST Visit Limit   2025 120 VPBP          Visit/Unit Tracking:  Auth Status Date 2025   BOMN Used 1 2 3 4 5 6 7 8    Remaining 119 118 117 116 115 114 113 112     Subjective/Behavioral:  -Patient without any new concerns expressed upon presentation to today's session.    Objective/Assessment:  -See goals targeted during today's session.    Short Term Goals ( 2025)  1. Patient will consume PO trials of water only during therapy session, without overt s/sx of aspiration given minimal verbal cues for use of swallowing strategies in order to safely consume the least restrictive diet.    Traditional VitalStim     Channel(s) used: 1, 2   Placement: 3-v   Duty Cycle: 57/1 s   Frequency: 80 pulses per second   Phase Duration: 300 microseconds   Treatment Duration: 40 minutes   Min mA level: 12.0 mA (R)   14.0 mA (L)   Max mA level: 18.0 mA (R)   20.0 mA (L)       Patient tolerated NMES in conjunction with pharyngeal/laryngeal strengthening exercises and P.O. intake. (The patient received instruction on the potential benefits from NMES treatment, including neuromuscular re-education and muscle strengthening.) Skin condition post-NMES: intact.     Subjective report:  "\"needling\" sensation with NMES; feels like it \"breaking things up\" in his throat    Patient consumed the following during today’s session: TL (water) and ice chips      P.O. trials of TL (water) via small cup sips and ice chips via teaspoon were completed during session today. Patient was noted to have adequate bolus retrieval, anterior closure, A to P transfer, bolus formation/control. Mild delayed swallow initiation was noted. Pharyngeal residue/pooling suspected.  Patient noted to demonstrated intermitted throat clearing and coughing following the swallow of P.O. trials. No other overt distress or s/sx of aspiration or dysphagia noted during today’s therapy session.      Patient utilized the following strategies during today’s session: Small sips and bites when eating, Effortful swallow, Multiple swallows, and Throat clear     2. Patient will complete swallowing exercises (i.e., Mendelsohn, Chelsey, Effortful) to increase pharyngeal strength and coordination with min cues to 90% effectiveness to improve bolus formation and strengthen pharyngeal musculature     3. Patient will perform compensatory strategies (e.g., upright positioning, small bites/sips, slow rate, alternation of consistencies, multiple swallows, effortful swallow, chin tuck, head turn, etc.) with minimal verbal cues to prevent overt s/sx penetration/aspiration of least restrictive food/liquid consistencies     Plan:  -Patient was provided with home exercises/activities to target goals in plan of care at the end of today's session.  -Continue with current plan of care.  "

## 2025-07-09 ENCOUNTER — OFFICE VISIT (OUTPATIENT)
Dept: SPEECH THERAPY | Facility: CLINIC | Age: 60
End: 2025-07-09
Attending: OTOLARYNGOLOGY
Payer: COMMERCIAL

## 2025-07-09 DIAGNOSIS — R49.0 MUSCLE TENSION DYSPHONIA: ICD-10-CM

## 2025-07-09 DIAGNOSIS — R05.3 CHRONIC COUGH: ICD-10-CM

## 2025-07-09 DIAGNOSIS — Z85.89 HISTORY OF SQUAMOUS CELL CARCINOMA: ICD-10-CM

## 2025-07-09 DIAGNOSIS — R49.0 DYSPHONIA: ICD-10-CM

## 2025-07-09 DIAGNOSIS — Z91.89 AT RISK FOR ASPIRATION: ICD-10-CM

## 2025-07-09 DIAGNOSIS — Z92.3 HISTORY OF HEAD AND NECK RADIATION: ICD-10-CM

## 2025-07-09 DIAGNOSIS — R13.14 PHARYNGOESOPHAGEAL DYSPHAGIA: Primary | ICD-10-CM

## 2025-07-09 DIAGNOSIS — G52.7 CRANIAL NEUROPATHIES, MULTIPLE: ICD-10-CM

## 2025-07-09 DIAGNOSIS — G54.0 BRACHIAL PLEXUS DISORDERS: ICD-10-CM

## 2025-07-09 PROCEDURE — 92526 ORAL FUNCTION THERAPY: CPT

## 2025-07-09 NOTE — PROGRESS NOTES
Daily Speech Treatment Note    Today's date: 2025  Patient’s name: Mohamud Grimm Sr.  : 1965  MRN: [id number]  Safety measures: h/o H/N CA  Referring provider: Devonte Singh MD    Encounter Diagnosis     ICD-10-CM    1. Pharyngoesophageal dysphagia  R13.14       2. Dysphonia  R49.0       3. 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 Chronic cough  R05.3       5. At risk for aspiration  Z91.89       6. History of squamous cell carcinoma  Z85.89       7. History of head and neck radiation  Z92.3       8. Cranial neuropathies, multiple  G52.7       9. Brachial plexus disorders  G54.0         Visit Tracking:  POC   Expires Auth Expiration Date ST Visit Limit   2025 120 VPBP          Visit/Unit Tracking:  Auth Status Date 2025          BOMN Used 9           Remaining 111            Subjective/Behavioral:  -Therapist provided information for possible OT at [facility] while waiting for EMG appointment.     Objective/Assessment:  -See goals targeted during today's session.    Short Term Goals ( 2025)  1. Patient will consume PO trials of water only during therapy session, without overt s/sx of aspiration given minimal verbal cues for use of swallowing strategies in order to safely consume the least restrictive diet.    Traditional VitalStim     Channel(s) used: 1, 2   Placement: 3-v   Duty Cycle: 57/1 s   Frequency: 80 pulses per second   Phase Duration: 300 microseconds   Treatment Duration: 40 minutes   Min mA level: 14.0 mA (R)   12.0 mA (L)   Max mA level: 16.0 mA (R)   20.0 mA (L)       Patient tolerated NMES in conjunction with pharyngeal/laryngeal strengthening exercises and P.O. intake. (The patient received instruction on the potential benefits from NMES treatment, including neuromuscular re-education and muscle strengthening.) Skin condition post-NMES: intact.     Subjective report: Patient reports feeling more phlegm in his throat in recent days.     Patient consumed the  following during today’s session: TL (water) and ice chips      P.O. trials of TL (water) via small cup sips and ice chips via teaspoon were completed during session today. Patient was noted to have adequate bolus retrieval, anterior closure, A to P transfer, bolus formation/control. Mild delayed swallow initiation was noted. Pharyngeal residue/pooling suspected.  Patient noted to demonstrated intermitted throat clearing and coughing 1X (episode) following the swallow of P.O. trials. No other overt distress or s/sx of aspiration or dysphagia noted during today’s therapy session.      Patient utilized the following strategies during today’s session: Small sips and bites when eating, Effortful swallow, Multiple swallows, and Throat clear     2. Patient will complete swallowing exercises (i.e., Mendelsohn, Chelsey, Effortful) to increase pharyngeal strength and coordination with min cues to 90% effectiveness to improve bolus formation and strengthen pharyngeal musculature     3. Patient will perform compensatory strategies (e.g., upright positioning, small bites/sips, slow rate, alternation of consistencies, multiple swallows, effortful swallow, chin tuck, head turn, etc.) with minimal verbal cues to prevent overt s/sx penetration/aspiration of least restrictive food/liquid consistencies     Plan:  -Patient was provided with home exercises/activities to target goals in plan of care at the end of today's session.  -Continue with current plan of care.

## 2025-07-10 ENCOUNTER — OFFICE VISIT (OUTPATIENT)
Dept: SPEECH THERAPY | Facility: CLINIC | Age: 60
End: 2025-07-10
Attending: OTOLARYNGOLOGY
Payer: COMMERCIAL

## 2025-07-10 DIAGNOSIS — R49.0 MUSCLE TENSION DYSPHONIA: ICD-10-CM

## 2025-07-10 DIAGNOSIS — G54.0 BRACHIAL PLEXUS DISORDERS: ICD-10-CM

## 2025-07-10 DIAGNOSIS — R05.3 CHRONIC COUGH: ICD-10-CM

## 2025-07-10 DIAGNOSIS — Z91.89 AT RISK FOR ASPIRATION: ICD-10-CM

## 2025-07-10 DIAGNOSIS — R49.0 DYSPHONIA: ICD-10-CM

## 2025-07-10 DIAGNOSIS — Z85.89 HISTORY OF SQUAMOUS CELL CARCINOMA: ICD-10-CM

## 2025-07-10 DIAGNOSIS — G52.7 CRANIAL NEUROPATHIES, MULTIPLE: ICD-10-CM

## 2025-07-10 DIAGNOSIS — Z92.3 HISTORY OF HEAD AND NECK RADIATION: ICD-10-CM

## 2025-07-10 DIAGNOSIS — R13.14 PHARYNGOESOPHAGEAL DYSPHAGIA: Primary | ICD-10-CM

## 2025-07-10 PROCEDURE — 92526 ORAL FUNCTION THERAPY: CPT

## 2025-07-10 NOTE — PROGRESS NOTES
Daily Speech Treatment Note    Today's date: 7/10/2025  Patient’s name: Mohamud Grimm Sr.  : 1965  MRN: [id number]  Safety measures: h/o H/N CA  Referring provider: Devonte Singh MD    Encounter Diagnosis     ICD-10-CM    1. Pharyngoesophageal dysphagia  R13.14       2. Dysphonia  R49.0       3. 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 Chronic cough  R05.3       5. Cranial neuropathies, multiple  G52.7       6. History of head and neck radiation  Z92.3       7. History of squamous cell carcinoma  Z85.89       8. At risk for aspiration  Z91.89       9. Brachial plexus disorders  G54.0         Visit Tracking:  POC   Expires Auth Expiration Date ST Visit Limit   2025 120 VPBP          Visit/Unit Tracking:  Auth Status Date 2025 07/10/2025         BOMN Used 9 10          Remaining 111 110           Subjective/Behavioral:  Patient leaves for [place] tomorrow morning    Objective/Assessment:  -See goals targeted during today's session.    Short Term Goals ( 2025)  1. Patient will consume PO trials of water only during therapy session, without overt s/sx of aspiration given minimal verbal cues for use of swallowing strategies in order to safely consume the least restrictive diet.    Traditional VitalStim     Channel(s) used: 1, 2   Placement: 3-v   Duty Cycle: 57/1 s   Frequency: 80 pulses per second   Phase Duration: 300 microseconds   Treatment Duration: 40 minutes   Min mA level: 11.0 mA (R)   14.0 mA (L)   Max mA level: 17.0 mA (R)   19.0 mA (L)       Patient tolerated NMES in conjunction with pharyngeal/laryngeal strengthening exercises and P.O. intake. (The patient received instruction on the potential benefits from NMES treatment, including neuromuscular re-education and muscle strengthening.) Skin condition post-NMES: intact.     Subjective report: Patient reports feeling more phlegm in his throat in recent days.     Patient consumed the following during today’s session:  TL (water) and ice chips      P.O. trials of TL (water) via small cup sips and ice chips via teaspoon were completed during session today. Patient was noted to have adequate bolus retrieval, anterior closure, A to P transfer, bolus formation/control. Mild delayed swallow initiation was noted. Pharyngeal residue/pooling suspected.  Patient noted to demonstrated intermitted throat clearing and coughing 2X (episode) following the swallow of P.O. trials. No other overt distress or s/sx of aspiration or dysphagia noted during today’s therapy session.      Patient utilized the following strategies during today’s session: Small sips and bites when eating, Effortful swallow, Multiple swallows, and Throat clear     2. Patient will complete swallowing exercises (i.e., Mendelsohn, Chelsey, Effortful) to increase pharyngeal strength and coordination with min cues to 90% effectiveness to improve bolus formation and strengthen pharyngeal musculature     3. Patient will perform compensatory strategies (e.g., upright positioning, small bites/sips, slow rate, alternation of consistencies, multiple swallows, effortful swallow, chin tuck, head turn, etc.) with minimal verbal cues to prevent overt s/sx penetration/aspiration of least restrictive food/liquid consistencies     Plan:  -Patient was provided with home exercises/activities to target goals in plan of care at the end of today's session.  -Continue with current plan of care.

## 2025-07-21 ENCOUNTER — OFFICE VISIT (OUTPATIENT)
Dept: SPEECH THERAPY | Facility: CLINIC | Age: 60
End: 2025-07-21
Attending: OTOLARYNGOLOGY
Payer: COMMERCIAL

## 2025-07-21 DIAGNOSIS — Z92.3 HISTORY OF HEAD AND NECK RADIATION: ICD-10-CM

## 2025-07-21 DIAGNOSIS — R13.14 PHARYNGOESOPHAGEAL DYSPHAGIA: Primary | ICD-10-CM

## 2025-07-21 DIAGNOSIS — R49.0 DYSPHONIA: ICD-10-CM

## 2025-07-21 DIAGNOSIS — R49.0 MUSCLE TENSION DYSPHONIA: ICD-10-CM

## 2025-07-21 DIAGNOSIS — R05.3 CHRONIC COUGH: ICD-10-CM

## 2025-07-21 DIAGNOSIS — Z91.89 AT RISK FOR ASPIRATION: ICD-10-CM

## 2025-07-21 DIAGNOSIS — Z85.89 HISTORY OF SQUAMOUS CELL CARCINOMA: ICD-10-CM

## 2025-07-21 DIAGNOSIS — G52.7 CRANIAL NEUROPATHIES, MULTIPLE: ICD-10-CM

## 2025-07-21 DIAGNOSIS — G54.0 BRACHIAL PLEXUS DISORDERS: ICD-10-CM

## 2025-07-21 PROCEDURE — 92526 ORAL FUNCTION THERAPY: CPT

## 2025-07-21 NOTE — PROGRESS NOTES
Daily Speech Treatment Note    Today's date: 2025  Patient’s name: Mohamud Grimm Sr.  : 1965  MRN: [id number]  Safety measures: h/o H/N CA  Referring provider: Devonte Singh MD    Encounter Diagnosis     ICD-10-CM    1. Pharyngoesophageal dysphagia  R13.14       2. Dysphonia  R49.0       3. 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 Chronic cough  R05.3       5. Cranial neuropathies, multiple  G52.7       6. History of head and neck radiation  Z92.3       7. History of squamous cell carcinoma  Z85.89       8. At risk for aspiration  Z91.89       9. Brachial plexus disorders  G54.0         Visit Tracking:  POC   Expires Auth Expiration Date ST Visit Limit   2025 120 VPBP          Visit/Unit Tracking:  Auth Status Date 2025 07/10/2025 2025        BOMN Used 9 10 11         Remaining 111 110 109          Subjective/Behavioral:  -Patient was running late today as he got caught up at work.     Objective/Assessment:  -See goals targeted during today's session.      Short Term Goals ( 2025)  1. Patient will consume PO trials of water only during therapy session, without overt s/sx of aspiration given minimal verbal cues for use of swallowing strategies in order to safely consume the least restrictive diet.    Traditional VitalStim     Channel(s) used: 1, 2   Placement: 3-v   Duty Cycle: 57/1 s   Frequency: 80 pulses per second   Phase Duration: 300 microseconds   Treatment Duration: 20 minutes   Min mA level: 12.0 mA (R)   13.0 mA (L)   Max mA level: 12.0 mA (R)   13.0 mA (L)       Patient tolerated NMES in conjunction with pharyngeal/laryngeal strengthening exercises and P.O. intake. (The patient received instruction on the potential benefits from NMES treatment, including neuromuscular re-education and muscle strengthening.) Skin condition post-NMES: intact.     Subjective report: Patient expressed that he was feeling some guilt about skipping sessions last week due to  vacation.  Therapist reassured Patient that it is okay to take a break and enjoy life events!     Patient consumed the following during today’s session: TL (water) and ice chips      P.O. trials of TL (water) via small cup sips and ice chips via teaspoon were completed during session today. Patient was noted to have adequate bolus retrieval, anterior closure, A to P transfer, bolus formation/control. Mild delayed swallow initiation was noted. Pharyngeal residue/pooling suspected.  Patient noted to demonstrated intermitted throat clearing and coughing 1X following the swallow of P.O. trials. No other overt distress or s/sx of aspiration or dysphagia noted during today’s therapy session.      Patient utilized the following strategies during today’s session: Small sips and bites when eating, Effortful swallow, Multiple swallows, and Throat clear     2. Patient will complete swallowing exercises (i.e., Mendelsohn, Chelsey, Effortful) to increase pharyngeal strength and coordination with min cues to 90% effectiveness to improve bolus formation and strengthen pharyngeal musculature     3. Patient will perform compensatory strategies (e.g., upright positioning, small bites/sips, slow rate, alternation of consistencies, multiple swallows, effortful swallow, chin tuck, head turn, etc.) with minimal verbal cues to prevent overt s/sx penetration/aspiration of least restrictive food/liquid consistencies       Plan:  -Patient was provided with home exercises/activities to target goals in plan of care at the end of today's session.  -Continue with current plan of care.

## 2025-07-22 ENCOUNTER — OFFICE VISIT (OUTPATIENT)
Dept: SPEECH THERAPY | Facility: CLINIC | Age: 60
End: 2025-07-22
Attending: OTOLARYNGOLOGY
Payer: COMMERCIAL

## 2025-07-22 DIAGNOSIS — R49.0 DYSPHONIA: ICD-10-CM

## 2025-07-22 DIAGNOSIS — R05.3 CHRONIC COUGH: ICD-10-CM

## 2025-07-22 DIAGNOSIS — R13.14 PHARYNGOESOPHAGEAL DYSPHAGIA: Primary | ICD-10-CM

## 2025-07-22 DIAGNOSIS — G54.0 BRACHIAL PLEXUS DISORDERS: ICD-10-CM

## 2025-07-22 DIAGNOSIS — Z91.89 AT RISK FOR ASPIRATION: ICD-10-CM

## 2025-07-22 DIAGNOSIS — G52.7 CRANIAL NEUROPATHIES, MULTIPLE: ICD-10-CM

## 2025-07-22 DIAGNOSIS — Z92.3 HISTORY OF HEAD AND NECK RADIATION: ICD-10-CM

## 2025-07-22 DIAGNOSIS — R49.0 MUSCLE TENSION DYSPHONIA: ICD-10-CM

## 2025-07-22 DIAGNOSIS — Z85.89 HISTORY OF SQUAMOUS CELL CARCINOMA: ICD-10-CM

## 2025-07-22 PROCEDURE — 92526 ORAL FUNCTION THERAPY: CPT

## 2025-07-22 NOTE — PROGRESS NOTES
Daily Speech Treatment Note    Today's date: 2025  Patient’s name: Mohamud Grimm Sr.  : 1965  MRN: [id number]  Safety measures: h/o H/N CA  Referring provider: Devonte Singh MD    Encounter Diagnosis     ICD-10-CM    1. Pharyngoesophageal dysphagia  R13.14       2. Dysphonia  R49.0       3. 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 Chronic cough  R05.3       5. At risk for aspiration  Z91.89       6. History of squamous cell carcinoma  Z85.89       7. History of head and neck radiation  Z92.3       8. Cranial neuropathies, multiple  G52.7       9. Brachial plexus disorders  G54.0         Visit Tracking:  POC   Expires Auth Expiration Date ST Visit Limit   2025 120 VPBP          Visit/Unit Tracking:  Auth Status Date 2025 07/10/2025 2025 07/22/25       BOMN Used 9 10 11 12        Remaining 111 110 109 108         Subjective/Behavioral:  Patient reports he had a nice time in [place]    Objective/Assessment:  -See goals targeted during today's session.      Short Term Goals ( 2025)  1. Patient will consume PO trials of water only during therapy session, without overt s/sx of aspiration given minimal verbal cues for use of swallowing strategies in order to safely consume the least restrictive diet.    Traditional VitalStim     Channel(s) used: 1, 2   Placement: 3-v   Duty Cycle: 57/1 s   Frequency: 80 pulses per second   Phase Duration: 300 microseconds   Treatment Duration: 20 minutes   Min mA level: 9.0 mA (R)   11.0 mA (L)   Max mA level: 11.0 mA (R)   13.0 mA (L)       Patient tolerated NMES in conjunction with pharyngeal/laryngeal strengthening exercises and P.O. intake. (The patient received instruction on the potential benefits from NMES treatment, including neuromuscular re-education and muscle strengthening.) Skin condition post-NMES: intact.     Subjective report: Patient expressed that he was feeling some guilt about skipping sessions last week due to  vacation.  Therapist reassured Patient that it is okay to take a break and enjoy life events!     Patient consumed the following during today’s session: TL (water) and ice chips      P.O. trials of TL (water) via small cup sips and ice chips via teaspoon were completed during session today. Patient was noted to have adequate bolus retrieval, anterior closure, A to P transfer, bolus formation/control. Mild delayed swallow initiation was noted. Pharyngeal residue/pooling suspected.  Patient noted to demonstrated intermitted throat clearing and coughing 1X following the swallow of P.O. trials. No other overt distress or s/sx of aspiration or dysphagia noted during today’s therapy session.      Patient utilized the following strategies during today’s session: Small sips and bites when eating, Effortful swallow, Multiple swallows, and Throat clear     2. Patient will complete swallowing exercises (i.e., Mendelsohn, Chelsey, Effortful) to increase pharyngeal strength and coordination with min cues to 90% effectiveness to improve bolus formation and strengthen pharyngeal musculature     3. Patient will perform compensatory strategies (e.g., upright positioning, small bites/sips, slow rate, alternation of consistencies, multiple swallows, effortful swallow, chin tuck, head turn, etc.) with minimal verbal cues to prevent overt s/sx penetration/aspiration of least restrictive food/liquid consistencies       Plan:  -Patient was provided with home exercises/activities to target goals in plan of care at the end of today's session.  -Continue with current plan of care.

## 2025-07-23 ENCOUNTER — OFFICE VISIT (OUTPATIENT)
Dept: SPEECH THERAPY | Facility: CLINIC | Age: 60
End: 2025-07-23
Attending: OTOLARYNGOLOGY
Payer: COMMERCIAL

## 2025-07-23 DIAGNOSIS — R49.0 DYSPHONIA: ICD-10-CM

## 2025-07-23 DIAGNOSIS — R49.0 MUSCLE TENSION DYSPHONIA: ICD-10-CM

## 2025-07-23 DIAGNOSIS — Z92.3 HISTORY OF HEAD AND NECK RADIATION: ICD-10-CM

## 2025-07-23 DIAGNOSIS — Z91.89 AT RISK FOR ASPIRATION: ICD-10-CM

## 2025-07-23 DIAGNOSIS — G54.0 BRACHIAL PLEXUS DISORDERS: ICD-10-CM

## 2025-07-23 DIAGNOSIS — Z85.89 HISTORY OF SQUAMOUS CELL CARCINOMA: ICD-10-CM

## 2025-07-23 DIAGNOSIS — R13.14 PHARYNGOESOPHAGEAL DYSPHAGIA: Primary | ICD-10-CM

## 2025-07-23 DIAGNOSIS — R05.3 CHRONIC COUGH: ICD-10-CM

## 2025-07-23 DIAGNOSIS — G52.7 CRANIAL NEUROPATHIES, MULTIPLE: ICD-10-CM

## 2025-07-23 PROCEDURE — 92526 ORAL FUNCTION THERAPY: CPT

## 2025-07-23 NOTE — PROGRESS NOTES
Daily Speech Treatment Note    Today's date: 2025  Patient’s name: Mohamud Grimm Sr.  : 1965  MRN: [id number]  Safety measures: h/o H/N CA  Referring provider: Devonte Singh MD    Encounter Diagnosis     ICD-10-CM    1. Pharyngoesophageal dysphagia  R13.14       2. Dysphonia  R49.0       3. 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 Chronic cough  R05.3       5. At risk for aspiration  Z91.89       6. History of squamous cell carcinoma  Z85.89       7. History of head and neck radiation  Z92.3       8. Cranial neuropathies, multiple  G52.7       9. Brachial plexus disorders  G54.0         Visit Tracking:  POC   Expires Auth Expiration Date ST Visit Limit   2025 120 VPBP          Visit/Unit Tracking:  Auth Status Date 2025 07/10/2025 2025 07/22/25 2025      BOMN Used 9 10 11 12 13       Remaining 111 110 109 108 107        Subjective/Behavioral:  -Patient with no new concerns.     Objective/Assessment:  -See goals targeted during today's session.      Short Term Goals ( 2025)  1. Patient will consume PO trials of water only during therapy session, without overt s/sx of aspiration given minimal verbal cues for use of swallowing strategies in order to safely consume the least restrictive diet.    Traditional VitalStim     Channel(s) used: 1, 2   Placement: 3-v   Duty Cycle: 57/1 s   Frequency: 80 pulses per second   Phase Duration: 300 microseconds   Treatment Duration: 40 minutes   Min mA level: 12.0 mA (R)   14.0 mA (L)   Max mA level: 14.0 mA (R)   16.0 mA (L)       Patient tolerated NMES in conjunction with pharyngeal/laryngeal strengthening exercises and P.O. intake. (The patient received instruction on the potential benefits from NMES treatment, including neuromuscular re-education and muscle strengthening.) Skin condition post-NMES: intact.    Patient consumed the following during today’s session: TL (water) and ice chips      P.O. trials of TL (water)  via small cup sips and ice chips via teaspoon were completed during session today. Patient was noted to have adequate bolus retrieval, anterior closure, A to P transfer, bolus formation/control. Mild delayed swallow initiation was noted. Pharyngeal residue/pooling suspected.  Patient noted to demonstrated intermitted throat clearing and coughing 0X following the swallow of P.O. trials. No other overt distress or s/sx of aspiration or dysphagia noted during today’s therapy session.      Patient utilized the following strategies during today’s session: Small sips and bites when eating, Effortful swallow, Multiple swallows, and Throat clear     2. Patient will complete swallowing exercises (i.e., Mendelsohn, Chelsey, Effortful) to increase pharyngeal strength and coordination with min cues to 90% effectiveness to improve bolus formation and strengthen pharyngeal musculature     3. Patient will perform compensatory strategies (e.g., upright positioning, small bites/sips, slow rate, alternation of consistencies, multiple swallows, effortful swallow, chin tuck, head turn, etc.) with minimal verbal cues to prevent overt s/sx penetration/aspiration of least restrictive food/liquid consistencies       Plan:  -Patient was provided with home exercises/activities to target goals in plan of care at the end of today's session.  -Continue with current plan of care.

## 2025-07-28 ENCOUNTER — OFFICE VISIT (OUTPATIENT)
Dept: SPEECH THERAPY | Facility: CLINIC | Age: 60
End: 2025-07-28
Attending: OTOLARYNGOLOGY
Payer: COMMERCIAL

## 2025-07-28 DIAGNOSIS — Z85.89 HISTORY OF SQUAMOUS CELL CARCINOMA: ICD-10-CM

## 2025-07-28 DIAGNOSIS — R05.3 CHRONIC COUGH: ICD-10-CM

## 2025-07-28 DIAGNOSIS — R13.14 PHARYNGOESOPHAGEAL DYSPHAGIA: Primary | ICD-10-CM

## 2025-07-28 DIAGNOSIS — Z92.3 HISTORY OF HEAD AND NECK RADIATION: ICD-10-CM

## 2025-07-28 DIAGNOSIS — G52.7 CRANIAL NEUROPATHIES, MULTIPLE: ICD-10-CM

## 2025-07-28 DIAGNOSIS — G54.0 BRACHIAL PLEXUS DISORDERS: ICD-10-CM

## 2025-07-28 DIAGNOSIS — Z91.89 AT RISK FOR ASPIRATION: ICD-10-CM

## 2025-07-28 DIAGNOSIS — R49.0 MUSCLE TENSION DYSPHONIA: ICD-10-CM

## 2025-07-28 DIAGNOSIS — R49.0 DYSPHONIA: ICD-10-CM

## 2025-07-28 PROCEDURE — 92526 ORAL FUNCTION THERAPY: CPT

## 2025-07-29 ENCOUNTER — OFFICE VISIT (OUTPATIENT)
Dept: SPEECH THERAPY | Facility: CLINIC | Age: 60
End: 2025-07-29
Attending: OTOLARYNGOLOGY
Payer: COMMERCIAL

## 2025-07-29 DIAGNOSIS — G52.7 CRANIAL NEUROPATHIES, MULTIPLE: ICD-10-CM

## 2025-07-29 DIAGNOSIS — R49.0 MUSCLE TENSION DYSPHONIA: ICD-10-CM

## 2025-07-29 DIAGNOSIS — Z85.89 HISTORY OF SQUAMOUS CELL CARCINOMA: ICD-10-CM

## 2025-07-29 DIAGNOSIS — G54.0 BRACHIAL PLEXUS DISORDERS: ICD-10-CM

## 2025-07-29 DIAGNOSIS — R05.3 CHRONIC COUGH: ICD-10-CM

## 2025-07-29 DIAGNOSIS — R13.14 PHARYNGOESOPHAGEAL DYSPHAGIA: Primary | ICD-10-CM

## 2025-07-29 DIAGNOSIS — Z91.89 AT RISK FOR ASPIRATION: ICD-10-CM

## 2025-07-29 DIAGNOSIS — Z92.3 HISTORY OF HEAD AND NECK RADIATION: ICD-10-CM

## 2025-07-29 PROCEDURE — 92526 ORAL FUNCTION THERAPY: CPT

## 2025-07-30 ENCOUNTER — CONSULT (OUTPATIENT)
Dept: NEUROLOGY | Facility: CLINIC | Age: 60
End: 2025-07-30
Attending: OTOLARYNGOLOGY
Payer: COMMERCIAL

## 2025-07-30 VITALS — HEART RATE: 76 BPM | SYSTOLIC BLOOD PRESSURE: 150 MMHG | DIASTOLIC BLOOD PRESSURE: 92 MMHG

## 2025-07-30 DIAGNOSIS — R13.12 OROPHARYNGEAL DYSPHAGIA: ICD-10-CM

## 2025-07-30 DIAGNOSIS — G54.0 BRACHIAL PLEXUS DISORDERS: ICD-10-CM

## 2025-07-30 DIAGNOSIS — Z93.1 S/P PERCUTANEOUS ENDOSCOPIC GASTROSTOMY (PEG) TUBE PLACEMENT (HCC): ICD-10-CM

## 2025-07-30 DIAGNOSIS — Z92.3 HX OF HEAD AND NECK RADIATION: ICD-10-CM

## 2025-07-30 DIAGNOSIS — R29.898 ARM WEAKNESS: ICD-10-CM

## 2025-07-30 DIAGNOSIS — C01 CANCER OF BASE OF TONGUE (HCC): Primary | ICD-10-CM

## 2025-07-30 DIAGNOSIS — G54.0 BRACHIAL PLEXITIS: ICD-10-CM

## 2025-07-30 DIAGNOSIS — L90.5 SCAR OF NECK: ICD-10-CM

## 2025-07-30 DIAGNOSIS — R29.898 SHOULDER WEAKNESS: ICD-10-CM

## 2025-07-30 PROCEDURE — 99215 OFFICE O/P EST HI 40 MIN: CPT | Performed by: PSYCHIATRY & NEUROLOGY

## 2025-07-31 ENCOUNTER — APPOINTMENT (OUTPATIENT)
Dept: SPEECH THERAPY | Facility: CLINIC | Age: 60
End: 2025-07-31
Attending: OTOLARYNGOLOGY
Payer: COMMERCIAL

## 2025-07-31 DIAGNOSIS — R49.0 DYSPHONIA: ICD-10-CM

## 2025-07-31 DIAGNOSIS — Z85.89 HISTORY OF SQUAMOUS CELL CARCINOMA: ICD-10-CM

## 2025-07-31 DIAGNOSIS — G52.7 CRANIAL NEUROPATHIES, MULTIPLE: ICD-10-CM

## 2025-07-31 DIAGNOSIS — R05.3 CHRONIC COUGH: ICD-10-CM

## 2025-07-31 DIAGNOSIS — R13.14 PHARYNGOESOPHAGEAL DYSPHAGIA: Primary | ICD-10-CM

## 2025-07-31 DIAGNOSIS — R49.0 MUSCLE TENSION DYSPHONIA: ICD-10-CM

## 2025-07-31 DIAGNOSIS — Z91.89 AT RISK FOR ASPIRATION: ICD-10-CM

## 2025-07-31 DIAGNOSIS — Z92.3 HISTORY OF HEAD AND NECK RADIATION: ICD-10-CM

## 2025-08-01 ENCOUNTER — HOSPITAL ENCOUNTER (OUTPATIENT)
Dept: NEUROLOGY | Facility: CLINIC | Age: 60
Discharge: HOME | End: 2025-08-01
Attending: FAMILY MEDICINE
Payer: COMMERCIAL

## 2025-08-01 DIAGNOSIS — M54.12 CERVICAL RADICULITIS: ICD-10-CM

## 2025-08-01 DIAGNOSIS — Z92.3 S/P RADIATION THERAPY > 12 WKS AGO: ICD-10-CM

## 2025-08-01 PROCEDURE — 95886 MUSC TEST DONE W/N TEST COMP: CPT | Performed by: PSYCHIATRY & NEUROLOGY

## 2025-08-01 PROCEDURE — 95910 NRV CNDJ TEST 7-8 STUDIES: CPT | Performed by: PSYCHIATRY & NEUROLOGY

## 2025-08-01 PROCEDURE — 95911 NRV CNDJ TEST 9-10 STUDIES: CPT | Performed by: PSYCHIATRY & NEUROLOGY

## 2025-08-04 ENCOUNTER — OFFICE VISIT (OUTPATIENT)
Dept: SPEECH THERAPY | Facility: CLINIC | Age: 60
End: 2025-08-04
Attending: OTOLARYNGOLOGY
Payer: COMMERCIAL

## 2025-08-04 DIAGNOSIS — R05.3 CHRONIC COUGH: ICD-10-CM

## 2025-08-04 DIAGNOSIS — Z92.3 HISTORY OF HEAD AND NECK RADIATION: ICD-10-CM

## 2025-08-04 DIAGNOSIS — G54.0 BRACHIAL PLEXUS DISORDERS: ICD-10-CM

## 2025-08-04 DIAGNOSIS — Z91.89 AT RISK FOR ASPIRATION: ICD-10-CM

## 2025-08-04 DIAGNOSIS — R13.14 PHARYNGOESOPHAGEAL DYSPHAGIA: Primary | ICD-10-CM

## 2025-08-04 DIAGNOSIS — R49.0 MUSCLE TENSION DYSPHONIA: ICD-10-CM

## 2025-08-04 DIAGNOSIS — G52.7 CRANIAL NEUROPATHIES, MULTIPLE: ICD-10-CM

## 2025-08-04 DIAGNOSIS — R49.0 DYSPHONIA: ICD-10-CM

## 2025-08-04 DIAGNOSIS — Z85.89 HISTORY OF SQUAMOUS CELL CARCINOMA: ICD-10-CM

## 2025-08-04 PROCEDURE — 92526 ORAL FUNCTION THERAPY: CPT

## 2025-08-05 ENCOUNTER — OFFICE VISIT (OUTPATIENT)
Dept: SPEECH THERAPY | Facility: CLINIC | Age: 60
End: 2025-08-05
Attending: OTOLARYNGOLOGY
Payer: COMMERCIAL

## 2025-08-05 DIAGNOSIS — R49.0 MUSCLE TENSION DYSPHONIA: ICD-10-CM

## 2025-08-05 DIAGNOSIS — Z91.89 AT RISK FOR ASPIRATION: ICD-10-CM

## 2025-08-05 DIAGNOSIS — Z85.89 HISTORY OF SQUAMOUS CELL CARCINOMA: ICD-10-CM

## 2025-08-05 DIAGNOSIS — Z92.3 HISTORY OF HEAD AND NECK RADIATION: ICD-10-CM

## 2025-08-05 DIAGNOSIS — R05.3 CHRONIC COUGH: ICD-10-CM

## 2025-08-05 DIAGNOSIS — G54.0 BRACHIAL PLEXUS DISORDERS: ICD-10-CM

## 2025-08-05 DIAGNOSIS — R49.0 DYSPHONIA: ICD-10-CM

## 2025-08-05 DIAGNOSIS — R13.14 PHARYNGOESOPHAGEAL DYSPHAGIA: Primary | ICD-10-CM

## 2025-08-05 DIAGNOSIS — G52.7 CRANIAL NEUROPATHIES, MULTIPLE: ICD-10-CM

## 2025-08-05 PROCEDURE — 92526 ORAL FUNCTION THERAPY: CPT

## 2025-08-06 ENCOUNTER — OFFICE VISIT (OUTPATIENT)
Dept: SPEECH THERAPY | Facility: CLINIC | Age: 60
End: 2025-08-06
Attending: OTOLARYNGOLOGY
Payer: COMMERCIAL

## 2025-08-06 DIAGNOSIS — G52.7 CRANIAL NEUROPATHIES, MULTIPLE: ICD-10-CM

## 2025-08-06 DIAGNOSIS — G54.0 BRACHIAL PLEXUS DISORDERS: ICD-10-CM

## 2025-08-06 DIAGNOSIS — Z92.3 HISTORY OF HEAD AND NECK RADIATION: ICD-10-CM

## 2025-08-06 DIAGNOSIS — R49.0 MUSCLE TENSION DYSPHONIA: ICD-10-CM

## 2025-08-06 DIAGNOSIS — R05.3 CHRONIC COUGH: ICD-10-CM

## 2025-08-06 DIAGNOSIS — Z85.89 HISTORY OF SQUAMOUS CELL CARCINOMA: ICD-10-CM

## 2025-08-06 DIAGNOSIS — Z91.89 AT RISK FOR ASPIRATION: ICD-10-CM

## 2025-08-06 DIAGNOSIS — R13.14 PHARYNGOESOPHAGEAL DYSPHAGIA: Primary | ICD-10-CM

## 2025-08-06 DIAGNOSIS — R49.0 DYSPHONIA: ICD-10-CM

## 2025-08-06 PROCEDURE — 92526 ORAL FUNCTION THERAPY: CPT

## 2025-08-11 ENCOUNTER — OFFICE VISIT (OUTPATIENT)
Dept: SPEECH THERAPY | Facility: CLINIC | Age: 60
End: 2025-08-11
Attending: OTOLARYNGOLOGY
Payer: COMMERCIAL

## 2025-08-12 ENCOUNTER — OFFICE VISIT (OUTPATIENT)
Dept: SPEECH THERAPY | Facility: CLINIC | Age: 60
End: 2025-08-12
Attending: OTOLARYNGOLOGY
Payer: COMMERCIAL

## 2025-08-18 ENCOUNTER — OFFICE VISIT (OUTPATIENT)
Dept: SPEECH THERAPY | Facility: CLINIC | Age: 60
End: 2025-08-18
Attending: OTOLARYNGOLOGY
Payer: COMMERCIAL

## 2025-08-18 DIAGNOSIS — R49.0 MUSCLE TENSION DYSPHONIA: ICD-10-CM

## 2025-08-18 DIAGNOSIS — Z91.89 AT RISK FOR ASPIRATION: ICD-10-CM

## 2025-08-18 DIAGNOSIS — G52.7 CRANIAL NEUROPATHIES, MULTIPLE: ICD-10-CM

## 2025-08-18 DIAGNOSIS — Z92.3 HISTORY OF HEAD AND NECK RADIATION: ICD-10-CM

## 2025-08-18 DIAGNOSIS — G54.0 BRACHIAL PLEXUS DISORDERS: ICD-10-CM

## 2025-08-18 DIAGNOSIS — R49.0 DYSPHONIA: ICD-10-CM

## 2025-08-18 DIAGNOSIS — R05.3 CHRONIC COUGH: ICD-10-CM

## 2025-08-18 DIAGNOSIS — Z85.89 HISTORY OF SQUAMOUS CELL CARCINOMA: ICD-10-CM

## 2025-08-18 DIAGNOSIS — R13.14 PHARYNGOESOPHAGEAL DYSPHAGIA: Primary | ICD-10-CM

## 2025-08-18 PROCEDURE — 92526 ORAL FUNCTION THERAPY: CPT

## 2025-08-19 ENCOUNTER — TELEPHONE (OUTPATIENT)
Age: 60
End: 2025-08-19

## 2025-08-19 ENCOUNTER — OFFICE VISIT (OUTPATIENT)
Dept: SPEECH THERAPY | Facility: CLINIC | Age: 60
End: 2025-08-19
Attending: OTOLARYNGOLOGY
Payer: COMMERCIAL

## 2025-08-19 DIAGNOSIS — Z91.89 AT RISK FOR ASPIRATION: ICD-10-CM

## 2025-08-19 DIAGNOSIS — G52.7 CRANIAL NEUROPATHIES, MULTIPLE: ICD-10-CM

## 2025-08-19 DIAGNOSIS — R13.14 PHARYNGOESOPHAGEAL DYSPHAGIA: Primary | ICD-10-CM

## 2025-08-19 DIAGNOSIS — Z85.89 HISTORY OF SQUAMOUS CELL CARCINOMA: ICD-10-CM

## 2025-08-19 DIAGNOSIS — Z92.3 HISTORY OF HEAD AND NECK RADIATION: ICD-10-CM

## 2025-08-19 DIAGNOSIS — R05.3 CHRONIC COUGH: ICD-10-CM

## 2025-08-19 DIAGNOSIS — R49.0 DYSPHONIA: ICD-10-CM

## 2025-08-19 DIAGNOSIS — R49.0 MUSCLE TENSION DYSPHONIA: ICD-10-CM

## 2025-08-19 DIAGNOSIS — G54.0 BRACHIAL PLEXUS DISORDERS: ICD-10-CM

## 2025-08-19 PROCEDURE — 92526 ORAL FUNCTION THERAPY: CPT

## 2025-08-20 ENCOUNTER — OFFICE VISIT (OUTPATIENT)
Dept: SPEECH THERAPY | Facility: CLINIC | Age: 60
End: 2025-08-20
Attending: OTOLARYNGOLOGY
Payer: COMMERCIAL

## 2025-08-20 DIAGNOSIS — R13.14 PHARYNGOESOPHAGEAL DYSPHAGIA: Primary | ICD-10-CM

## 2025-08-20 DIAGNOSIS — Z85.89 HISTORY OF SQUAMOUS CELL CARCINOMA: ICD-10-CM

## 2025-08-20 DIAGNOSIS — G52.7 CRANIAL NEUROPATHIES, MULTIPLE: ICD-10-CM

## 2025-08-20 DIAGNOSIS — Z92.3 HISTORY OF HEAD AND NECK RADIATION: ICD-10-CM

## 2025-08-20 DIAGNOSIS — R05.3 CHRONIC COUGH: ICD-10-CM

## 2025-08-20 DIAGNOSIS — G54.0 BRACHIAL PLEXUS DISORDERS: ICD-10-CM

## 2025-08-20 DIAGNOSIS — Z91.89 AT RISK FOR ASPIRATION: ICD-10-CM

## 2025-08-20 DIAGNOSIS — R49.0 DYSPHONIA: ICD-10-CM

## 2025-08-20 DIAGNOSIS — R49.0 MUSCLE TENSION DYSPHONIA: ICD-10-CM

## 2025-08-20 PROCEDURE — 92526 ORAL FUNCTION THERAPY: CPT

## (undated) DEVICE — ANTI-FOG SOLUTION WITH FOAM PAD: Brand: DEVON

## (undated) DEVICE — TELFA NON-ADHERENT ABSORBENT DRESSING: Brand: TELFA

## (undated) DEVICE — 3000CC GUARDIAN II: Brand: GUARDIAN

## (undated) DEVICE — AIR AND WATER TUBING/CAP SET FOR OLYMPUS® SCOPES: Brand: ERBE

## (undated) DEVICE — TUBING SUCTION 5MM X 12 FT

## (undated) DEVICE — ASTOUND STANDARD SURGICAL GOWN, XL: Brand: CONVERTORS

## (undated) DEVICE — LUBRICANT JELLY SURGILUBE TUBE 4OZ FLIP TOP

## (undated) DEVICE — SYRINGE 3ML LL

## (undated) DEVICE — NEURO PATTIES 1/2 X 1 1/2

## (undated) DEVICE — SPECIMEN CONTAINER STERILE PEEL PACK

## (undated) DEVICE — PROCISE LW WAND: Brand: COBLATION

## (undated) DEVICE — SINGLE-USE BIOPSY FORCEPS: Brand: RADIAL JAW 4

## (undated) DEVICE — PROLARYN PLUS 1 CC

## (undated) DEVICE — BETHLEHEM UNIVERSAL OUTPATIENT: Brand: CARDINAL HEALTH

## (undated) DEVICE — INFLATION DEVICE 60ML 15ATM INFINITY

## (undated) DEVICE — Device

## (undated) DEVICE — STERILIZATION TEETH GUARDS

## (undated) DEVICE — DENTAL PACK: Brand: CARDINAL HEALTH

## (undated) DEVICE — GLOVE SRG BIOGEL 7.5

## (undated) DEVICE — GLOVE SRG BIOGEL 7

## (undated) DEVICE — Device: Brand: DEFENDO AIR/WATER/SUCTION AND BIOPSY VALVE

## (undated) DEVICE — CLAMP TOWEL TUBING DISPOSABLE

## (undated) DEVICE — FIRST STEP BEDSIDE KIT - STAND-UP POUCH, ENDOSCOPIC CLEANING PAD - 1 POUCH: Brand: FIRST STEP BEDSIDE KIT - STAND-UP POUCH, ENDOSCOPIC CLEANING PAD

## (undated) DEVICE — NEEDLE 18 G X 1 1/2 SAFETY

## (undated) DEVICE — CATH BLLN ESOPH 32MM INFINITY